# Patient Record
Sex: FEMALE | Race: WHITE | Employment: OTHER | URBAN - METROPOLITAN AREA
[De-identification: names, ages, dates, MRNs, and addresses within clinical notes are randomized per-mention and may not be internally consistent; named-entity substitution may affect disease eponyms.]

---

## 2017-02-04 ENCOUNTER — IP HISTORICAL/CONVERTED ENCOUNTER (OUTPATIENT)
Dept: OTHER | Age: 77
End: 2017-02-04

## 2021-05-04 ENCOUNTER — HOSPITAL ENCOUNTER (INPATIENT)
Age: 81
LOS: 15 days | Discharge: NURSING, REHAB OR LONG-TERM CARE | DRG: 242 | End: 2021-05-19
Attending: EMERGENCY MEDICINE | Admitting: FAMILY MEDICINE
Payer: MEDICARE

## 2021-05-04 ENCOUNTER — APPOINTMENT (OUTPATIENT)
Dept: GENERAL RADIOLOGY | Age: 81
DRG: 242 | End: 2021-05-04
Attending: EMERGENCY MEDICINE
Payer: MEDICARE

## 2021-05-04 ENCOUNTER — APPOINTMENT (OUTPATIENT)
Dept: NON INVASIVE DIAGNOSTICS | Age: 81
DRG: 242 | End: 2021-05-04
Attending: FAMILY MEDICINE
Payer: MEDICARE

## 2021-05-04 DIAGNOSIS — I44.2 COMPLETE HEART BLOCK (HCC): ICD-10-CM

## 2021-05-04 DIAGNOSIS — I73.9 PVD (PERIPHERAL VASCULAR DISEASE) (HCC): ICD-10-CM

## 2021-05-04 DIAGNOSIS — I50.9 ACUTE CONGESTIVE HEART FAILURE, UNSPECIFIED HEART FAILURE TYPE (HCC): Primary | ICD-10-CM

## 2021-05-04 DIAGNOSIS — R00.1 BRADYCARDIA: ICD-10-CM

## 2021-05-04 DIAGNOSIS — R07.9 CHEST PAIN, UNSPECIFIED TYPE: ICD-10-CM

## 2021-05-04 PROBLEM — J44.9 COPD (CHRONIC OBSTRUCTIVE PULMONARY DISEASE) (HCC): Status: ACTIVE | Noted: 2021-05-04

## 2021-05-04 LAB
ALBUMIN SERPL-MCNC: 3 G/DL (ref 3.5–5)
ALBUMIN/GLOB SERPL: 0.9 {RATIO} (ref 1.1–2.2)
ALP SERPL-CCNC: 68 U/L (ref 45–117)
ALT SERPL-CCNC: 17 U/L (ref 12–78)
ANION GAP SERPL CALC-SCNC: 4 MMOL/L (ref 5–15)
APPEARANCE UR: CLEAR
APTT PPP: 21.8 SEC (ref 21.2–34.1)
ARTERIAL PATENCY WRIST A: ABNORMAL
ARTERIAL PATENCY WRIST A: ABNORMAL
AST SERPL W P-5'-P-CCNC: 13 U/L (ref 15–37)
ATRIAL RATE: 98 BPM
BACTERIA URNS QL MICRO: NEGATIVE /HPF
BASE EXCESS BLDA CALC-SCNC: 2.9 MMOL/L (ref 0–2)
BASE EXCESS BLDA CALC-SCNC: 4.5 MMOL/L (ref 0–2)
BASOPHILS # BLD: 0.1 K/UL (ref 0–0.1)
BASOPHILS NFR BLD: 1 % (ref 0–1)
BDY SITE: ABNORMAL
BDY SITE: ABNORMAL
BILIRUB SERPL-MCNC: 0.4 MG/DL (ref 0.2–1)
BILIRUB UR QL: NEGATIVE
BNP SERPL-MCNC: 7360 PG/ML
BNP SERPL-MCNC: 9690 PG/ML
BUN SERPL-MCNC: 25 MG/DL (ref 6–20)
BUN/CREAT SERPL: 28 (ref 12–20)
CA-I BLD-MCNC: 8.2 MG/DL (ref 8.5–10.1)
CALCULATED P AXIS, ECG09: 68 DEGREES
CALCULATED R AXIS, ECG10: -52 DEGREES
CALCULATED T AXIS, ECG11: 94 DEGREES
CHLORIDE SERPL-SCNC: 97 MMOL/L (ref 97–108)
CO2 SERPL-SCNC: 28 MMOL/L (ref 21–32)
COLOR UR: ABNORMAL
COVID-19 RAPID TEST, COVR: NOT DETECTED
CREAT SERPL-MCNC: 0.88 MG/DL (ref 0.55–1.02)
D DIMER PPP FEU-MCNC: 0.5 UG/ML(FEU)
DIAGNOSIS, 93000: NORMAL
DIFFERENTIAL METHOD BLD: ABNORMAL
EOSINOPHIL # BLD: 0.1 K/UL (ref 0–0.4)
EOSINOPHIL NFR BLD: 1 % (ref 0–7)
EPAP/CPAP/PEEP, PAPEEP: 0
ERYTHROCYTE [DISTWIDTH] IN BLOOD BY AUTOMATED COUNT: 13.6 % (ref 11.5–14.5)
FIO2 ON VENT: 40 %
GAS FLOW.O2 O2 DELIVERY SYS: 6 L/MIN
GLOBULIN SER CALC-MCNC: 3.5 G/DL (ref 2–4)
GLUCOSE BLD STRIP.AUTO-MCNC: 260 MG/DL (ref 65–100)
GLUCOSE BLD STRIP.AUTO-MCNC: 269 MG/DL (ref 65–100)
GLUCOSE SERPL-MCNC: 182 MG/DL (ref 65–100)
GLUCOSE UR STRIP.AUTO-MCNC: 50 MG/DL
HCO3 BLDA-SCNC: 27 MMOL/L (ref 22–26)
HCO3 BLDA-SCNC: 29 MMOL/L (ref 22–26)
HCT VFR BLD AUTO: 30.8 % (ref 35–47)
HGB BLD-MCNC: 10 G/DL (ref 11.5–16)
HGB UR QL STRIP: NEGATIVE
IMM GRANULOCYTES # BLD AUTO: 0.1 K/UL (ref 0–0.04)
IMM GRANULOCYTES NFR BLD AUTO: 0 % (ref 0–0.5)
INR PPP: 1.1 (ref 0.9–1.1)
KETONES UR QL STRIP.AUTO: 5 MG/DL
LACTATE SERPL-SCNC: 0.6 MMOL/L (ref 0.4–2)
LEUKOCYTE ESTERASE UR QL STRIP.AUTO: NEGATIVE
LYMPHOCYTES # BLD: 0.8 K/UL (ref 0.8–3.5)
LYMPHOCYTES NFR BLD: 6 % (ref 12–49)
MAGNESIUM SERPL-MCNC: 1.8 MG/DL (ref 1.6–2.4)
MCH RBC QN AUTO: 31.9 PG (ref 26–34)
MCHC RBC AUTO-ENTMCNC: 32.5 G/DL (ref 30–36.5)
MCV RBC AUTO: 98.4 FL (ref 80–99)
MONOCYTES # BLD: 0.7 K/UL (ref 0–1)
MONOCYTES NFR BLD: 5 % (ref 5–13)
NEUTS SEG # BLD: 12.4 K/UL (ref 1.8–8)
NEUTS SEG NFR BLD: 87 % (ref 32–75)
NITRITE UR QL STRIP.AUTO: NEGATIVE
NRBC # BLD: 0 K/UL (ref 0–0.01)
NRBC BLD-RTO: 0 PER 100 WBC
P-R INTERVAL, ECG05: 168 MS
PCO2 BLDA: 45 MMHG (ref 35–45)
PCO2 BLDA: 52 MMHG (ref 35–45)
PERFORMED BY, TECHID: ABNORMAL
PERFORMED BY, TECHID: ABNORMAL
PH BLDA: 7.36 [PH] (ref 7.35–7.45)
PH BLDA: 7.43 [PH] (ref 7.35–7.45)
PH UR STRIP: 5 [PH] (ref 5–8)
PLATELET # BLD AUTO: 313 K/UL (ref 150–400)
PMV BLD AUTO: 11.7 FL (ref 8.9–12.9)
PO2 BLDA: 120 MMHG (ref 75–100)
PO2 BLDA: 72 MMHG (ref 75–100)
POTASSIUM SERPL-SCNC: 4.2 MMOL/L (ref 3.5–5.1)
PROCALCITONIN SERPL-MCNC: <0.05 NG/ML
PROT SERPL-MCNC: 6.5 G/DL (ref 6.4–8.2)
PROT UR STRIP-MCNC: NEGATIVE MG/DL
PROTHROMBIN TIME: 13.6 SEC (ref 11.9–14.7)
Q-T INTERVAL, ECG07: 416 MS
QRS DURATION, ECG06: 152 MS
QTC CALCULATION (BEZET), ECG08: 531 MS
RBC # BLD AUTO: 3.13 M/UL (ref 3.8–5.2)
RBC #/AREA URNS HPF: ABNORMAL /HPF (ref 0–5)
SAO2 % BLD: 93 %
SAO2 % BLD: 97 %
SAO2% DEVICE SAO2% SENSOR NAME: ABNORMAL
SAO2% DEVICE SAO2% SENSOR NAME: ABNORMAL
SARS-COV-2, COV2: NORMAL
SODIUM SERPL-SCNC: 129 MMOL/L (ref 136–145)
SP GR UR REFRACTOMETRY: 1.01 (ref 1–1.03)
SPECIMEN SOURCE: NORMAL
THERAPEUTIC RANGE,PTTT: NORMAL SEC (ref 82–109)
TROPONIN I SERPL-MCNC: 0.15 NG/ML
TROPONIN I SERPL-MCNC: <0.05 NG/ML
TSH SERPL DL<=0.05 MIU/L-ACNC: 1.62 UIU/ML (ref 0.36–3.74)
UA: UC IF INDICATED,UAUC: ABNORMAL
UROBILINOGEN UR QL STRIP.AUTO: 0.1 EU/DL (ref 0.1–1)
VENTRICULAR RATE, ECG03: 98 BPM
WBC # BLD AUTO: 14.2 K/UL (ref 3.6–11)
WBC URNS QL MICRO: ABNORMAL /HPF (ref 0–4)

## 2021-05-04 PROCEDURE — 74011000250 HC RX REV CODE- 250: Performed by: FAMILY MEDICINE

## 2021-05-04 PROCEDURE — 96374 THER/PROPH/DIAG INJ IV PUSH: CPT

## 2021-05-04 PROCEDURE — 74011636637 HC RX REV CODE- 636/637: Performed by: FAMILY MEDICINE

## 2021-05-04 PROCEDURE — 93970 EXTREMITY STUDY: CPT

## 2021-05-04 PROCEDURE — 94640 AIRWAY INHALATION TREATMENT: CPT | Performed by: FAMILY MEDICINE

## 2021-05-04 PROCEDURE — 96375 TX/PRO/DX INJ NEW DRUG ADDON: CPT

## 2021-05-04 PROCEDURE — 74011250637 HC RX REV CODE- 250/637: Performed by: FAMILY MEDICINE

## 2021-05-04 PROCEDURE — 84145 PROCALCITONIN (PCT): CPT

## 2021-05-04 PROCEDURE — 65270000029 HC RM PRIVATE

## 2021-05-04 PROCEDURE — 74011250637 HC RX REV CODE- 250/637: Performed by: EMERGENCY MEDICINE

## 2021-05-04 PROCEDURE — 87040 BLOOD CULTURE FOR BACTERIA: CPT

## 2021-05-04 PROCEDURE — 85025 COMPLETE CBC W/AUTO DIFF WBC: CPT

## 2021-05-04 PROCEDURE — 5A09557 ASSISTANCE WITH RESPIRATORY VENTILATION, GREATER THAN 96 CONSECUTIVE HOURS, CONTINUOUS POSITIVE AIRWAY PRESSURE: ICD-10-PCS | Performed by: FAMILY MEDICINE

## 2021-05-04 PROCEDURE — 96361 HYDRATE IV INFUSION ADD-ON: CPT

## 2021-05-04 PROCEDURE — 82803 BLOOD GASES ANY COMBINATION: CPT

## 2021-05-04 PROCEDURE — 83880 ASSAY OF NATRIURETIC PEPTIDE: CPT

## 2021-05-04 PROCEDURE — 85610 PROTHROMBIN TIME: CPT

## 2021-05-04 PROCEDURE — 85379 FIBRIN DEGRADATION QUANT: CPT

## 2021-05-04 PROCEDURE — 84443 ASSAY THYROID STIM HORMONE: CPT

## 2021-05-04 PROCEDURE — 80053 COMPREHEN METABOLIC PANEL: CPT

## 2021-05-04 PROCEDURE — 99285 EMERGENCY DEPT VISIT HI MDM: CPT

## 2021-05-04 PROCEDURE — 94660 CPAP INITIATION&MGMT: CPT

## 2021-05-04 PROCEDURE — 85730 THROMBOPLASTIN TIME PARTIAL: CPT

## 2021-05-04 PROCEDURE — 94640 AIRWAY INHALATION TREATMENT: CPT

## 2021-05-04 PROCEDURE — 74011000258 HC RX REV CODE- 258: Performed by: FAMILY MEDICINE

## 2021-05-04 PROCEDURE — 82962 GLUCOSE BLOOD TEST: CPT

## 2021-05-04 PROCEDURE — 83735 ASSAY OF MAGNESIUM: CPT

## 2021-05-04 PROCEDURE — 71045 X-RAY EXAM CHEST 1 VIEW: CPT

## 2021-05-04 PROCEDURE — 94761 N-INVAS EAR/PLS OXIMETRY MLT: CPT

## 2021-05-04 PROCEDURE — 36415 COLL VENOUS BLD VENIPUNCTURE: CPT

## 2021-05-04 PROCEDURE — 87635 SARS-COV-2 COVID-19 AMP PRB: CPT

## 2021-05-04 PROCEDURE — 81001 URINALYSIS AUTO W/SCOPE: CPT

## 2021-05-04 PROCEDURE — 74011250636 HC RX REV CODE- 250/636: Performed by: FAMILY MEDICINE

## 2021-05-04 PROCEDURE — 93005 ELECTROCARDIOGRAM TRACING: CPT

## 2021-05-04 PROCEDURE — 83605 ASSAY OF LACTIC ACID: CPT

## 2021-05-04 PROCEDURE — 84484 ASSAY OF TROPONIN QUANT: CPT

## 2021-05-04 PROCEDURE — 83036 HEMOGLOBIN GLYCOSYLATED A1C: CPT

## 2021-05-04 PROCEDURE — 74011000250 HC RX REV CODE- 250: Performed by: EMERGENCY MEDICINE

## 2021-05-04 PROCEDURE — 74011250636 HC RX REV CODE- 250/636: Performed by: EMERGENCY MEDICINE

## 2021-05-04 RX ORDER — BUDESONIDE AND FORMOTEROL FUMARATE DIHYDRATE 160; 4.5 UG/1; UG/1
2 AEROSOL RESPIRATORY (INHALATION) 2 TIMES DAILY
Status: DISCONTINUED | OUTPATIENT
Start: 2021-05-04 | End: 2021-05-10

## 2021-05-04 RX ORDER — FAMOTIDINE 20 MG/1
20 TABLET, FILM COATED ORAL 2 TIMES DAILY
Status: DISCONTINUED | OUTPATIENT
Start: 2021-05-04 | End: 2021-05-11

## 2021-05-04 RX ORDER — ASPIRIN 325 MG
325 TABLET ORAL DAILY
Status: DISCONTINUED | OUTPATIENT
Start: 2021-05-05 | End: 2021-05-07

## 2021-05-04 RX ORDER — ENOXAPARIN SODIUM 100 MG/ML
40 INJECTION SUBCUTANEOUS DAILY
Status: DISCONTINUED | OUTPATIENT
Start: 2021-05-04 | End: 2021-05-19 | Stop reason: HOSPADM

## 2021-05-04 RX ORDER — POLYETHYLENE GLYCOL 3350 17 G/17G
17 POWDER, FOR SOLUTION ORAL DAILY PRN
Status: DISCONTINUED | OUTPATIENT
Start: 2021-05-04 | End: 2021-05-19 | Stop reason: HOSPADM

## 2021-05-04 RX ORDER — LORAZEPAM 2 MG/ML
1 INJECTION INTRAMUSCULAR
Status: COMPLETED | OUTPATIENT
Start: 2021-05-04 | End: 2021-05-04

## 2021-05-04 RX ORDER — MAGNESIUM SULFATE 100 %
4 CRYSTALS MISCELLANEOUS AS NEEDED
Status: DISCONTINUED | OUTPATIENT
Start: 2021-05-04 | End: 2021-05-19 | Stop reason: HOSPADM

## 2021-05-04 RX ORDER — INSULIN LISPRO 100 [IU]/ML
INJECTION, SOLUTION INTRAVENOUS; SUBCUTANEOUS
Status: DISCONTINUED | OUTPATIENT
Start: 2021-05-04 | End: 2021-05-19 | Stop reason: HOSPADM

## 2021-05-04 RX ORDER — FUROSEMIDE 10 MG/ML
40 INJECTION INTRAMUSCULAR; INTRAVENOUS
Status: COMPLETED | OUTPATIENT
Start: 2021-05-04 | End: 2021-05-04

## 2021-05-04 RX ORDER — ASPIRIN 325 MG
325 TABLET ORAL
Status: COMPLETED | OUTPATIENT
Start: 2021-05-04 | End: 2021-05-04

## 2021-05-04 RX ORDER — ATORVASTATIN CALCIUM 10 MG/1
10 TABLET, FILM COATED ORAL
Status: DISCONTINUED | OUTPATIENT
Start: 2021-05-04 | End: 2021-05-19 | Stop reason: HOSPADM

## 2021-05-04 RX ORDER — ESCITALOPRAM OXALATE 10 MG/1
10 TABLET ORAL
Status: DISCONTINUED | OUTPATIENT
Start: 2021-05-04 | End: 2021-05-19 | Stop reason: HOSPADM

## 2021-05-04 RX ORDER — LEVOTHYROXINE SODIUM 75 UG/1
150 TABLET ORAL
Status: DISCONTINUED | OUTPATIENT
Start: 2021-05-05 | End: 2021-05-19 | Stop reason: HOSPADM

## 2021-05-04 RX ORDER — LISINOPRIL 20 MG/1
40 TABLET ORAL DAILY
Status: DISCONTINUED | OUTPATIENT
Start: 2021-05-05 | End: 2021-05-08

## 2021-05-04 RX ORDER — IPRATROPIUM BROMIDE AND ALBUTEROL SULFATE 2.5; .5 MG/3ML; MG/3ML
3 SOLUTION RESPIRATORY (INHALATION)
Status: DISCONTINUED | OUTPATIENT
Start: 2021-05-04 | End: 2021-05-10

## 2021-05-04 RX ORDER — ACETAMINOPHEN 325 MG/1
650 TABLET ORAL
Status: DISCONTINUED | OUTPATIENT
Start: 2021-05-04 | End: 2021-05-19 | Stop reason: HOSPADM

## 2021-05-04 RX ORDER — ACETAMINOPHEN 650 MG/1
650 SUPPOSITORY RECTAL
Status: DISCONTINUED | OUTPATIENT
Start: 2021-05-04 | End: 2021-05-19 | Stop reason: HOSPADM

## 2021-05-04 RX ORDER — INSULIN GLARGINE 100 [IU]/ML
60 INJECTION, SOLUTION SUBCUTANEOUS
Status: DISCONTINUED | OUTPATIENT
Start: 2021-05-04 | End: 2021-05-15

## 2021-05-04 RX ORDER — ONDANSETRON 2 MG/ML
4 INJECTION INTRAMUSCULAR; INTRAVENOUS
Status: DISCONTINUED | OUTPATIENT
Start: 2021-05-04 | End: 2021-05-19 | Stop reason: HOSPADM

## 2021-05-04 RX ORDER — DEXTROSE 50 % IN WATER (D50W) INTRAVENOUS SYRINGE
25-50 AS NEEDED
Status: DISCONTINUED | OUTPATIENT
Start: 2021-05-04 | End: 2021-05-19 | Stop reason: HOSPADM

## 2021-05-04 RX ORDER — ONDANSETRON 4 MG/1
4 TABLET, ORALLY DISINTEGRATING ORAL
Status: DISCONTINUED | OUTPATIENT
Start: 2021-05-04 | End: 2021-05-19 | Stop reason: HOSPADM

## 2021-05-04 RX ORDER — FUROSEMIDE 10 MG/ML
40 INJECTION INTRAMUSCULAR; INTRAVENOUS DAILY
Status: DISCONTINUED | OUTPATIENT
Start: 2021-05-05 | End: 2021-05-05

## 2021-05-04 RX ORDER — METOPROLOL TARTRATE 50 MG/1
100 TABLET ORAL 2 TIMES DAILY
Status: DISCONTINUED | OUTPATIENT
Start: 2021-05-04 | End: 2021-05-05

## 2021-05-04 RX ADMIN — LORAZEPAM 1 MG: 2 INJECTION INTRAMUSCULAR; INTRAVENOUS at 12:18

## 2021-05-04 RX ADMIN — ASPIRIN 325 MG ORAL TABLET 325 MG: 325 PILL ORAL at 14:36

## 2021-05-04 RX ADMIN — FAMOTIDINE 20 MG: 20 TABLET ORAL at 20:06

## 2021-05-04 RX ADMIN — CEFTRIAXONE 1 G: 1 INJECTION, POWDER, FOR SOLUTION INTRAMUSCULAR; INTRAVENOUS at 11:27

## 2021-05-04 RX ADMIN — IPRATROPIUM BROMIDE AND ALBUTEROL SULFATE 3 ML: .5; 2.5 SOLUTION RESPIRATORY (INHALATION) at 21:46

## 2021-05-04 RX ADMIN — SODIUM CHLORIDE 500 ML: 9 INJECTION, SOLUTION INTRAVENOUS at 11:26

## 2021-05-04 RX ADMIN — INSULIN LISPRO 10 UNITS: 100 INJECTION, SOLUTION INTRAVENOUS; SUBCUTANEOUS at 18:40

## 2021-05-04 RX ADMIN — ENOXAPARIN SODIUM 40 MG: 40 INJECTION SUBCUTANEOUS at 18:39

## 2021-05-04 RX ADMIN — METOPROLOL TARTRATE 100 MG: 50 TABLET, FILM COATED ORAL at 20:06

## 2021-05-04 RX ADMIN — ESCITALOPRAM OXALATE 10 MG: 10 TABLET ORAL at 22:13

## 2021-05-04 RX ADMIN — PIPERACILLIN AND TAZOBACTAM 3.38 G: 3; .375 INJECTION, POWDER, LYOPHILIZED, FOR SOLUTION INTRAVENOUS at 18:40

## 2021-05-04 RX ADMIN — BUDESONIDE AND FORMOTEROL FUMARATE DIHYDRATE 2 PUFF: 160; 4.5 AEROSOL RESPIRATORY (INHALATION) at 21:43

## 2021-05-04 RX ADMIN — INSULIN GLARGINE 60 UNITS: 100 INJECTION, SOLUTION SUBCUTANEOUS at 22:13

## 2021-05-04 RX ADMIN — ATORVASTATIN CALCIUM 10 MG: 10 TABLET, FILM COATED ORAL at 22:13

## 2021-05-04 RX ADMIN — METHYLPREDNISOLONE SODIUM SUCCINATE 40 MG: 40 INJECTION, POWDER, FOR SOLUTION INTRAMUSCULAR; INTRAVENOUS at 18:38

## 2021-05-04 RX ADMIN — NITROGLYCERIN 1 INCH: 20 OINTMENT TOPICAL at 14:36

## 2021-05-04 RX ADMIN — FUROSEMIDE 40 MG: 10 INJECTION, SOLUTION INTRAMUSCULAR; INTRAVENOUS at 12:08

## 2021-05-04 NOTE — Clinical Note
Lesion: Located in the Mid LAD. Stent deployed. Multiple inflations used. First inflation pressure = 12 ede; inflation time: 11 sec. Second inflation pressure: 12 ede; inflation time: 6 sec.

## 2021-05-04 NOTE — H&P
History and Physical    Subjective:     Kelvin Barksdale is a 81 yo female with a PMHx significant for DM, HLD, HTN, thyroid disease, and depression, who presents to the ED with shortness of breath for the past 1-2 days. She stated that she was told she had the \"beginnings of COPD\" and was recently given an albuterol inhaler. She was at her doctor's office and was sent to the ER and was given 5L O2 and 10mg decadron via EMS. She states that at home she is unable to walk without shortness of breath. She can ambulate around her house if she is holding onto railings or objects. She is occasionally short of breath at rest as well. She denies orthopnea but states that she has had episodes of PND in the last few days. She states that she has never smoked but her sons do. However, they refrain from smoking when they are near her. She also denies any alcohol or drug use. Her previous occupation was as a  and denies any chronic exposures. She currently lives alone. She denies any chest pain, nausea, vomiting, fever, chills, abdomen pain, recent illnesses, allergies, or sick contacts. Her O2 sat was 93% with NC. She currently has a BiPAP machine on. Significant labs:   WBC 14.2  Hgb 10.0  Na 129  BUN 25  Gluc 182  Procalcitonin (+)   BNP 7360  COVID (-)  D-dimer 0.50    ABG:   PH 7.36  PCO2 52  PO2 72  Bicarb 27    CXR:   Hazy and somewhat patchy reticular markings central and basilar lungs. Interstitial edema, possible cardiogenic cause and/or infectious/inflammatory  origin. Cardiac silhouette within normal limits. Atherosclerotic change thoracic  aorta. Small dependent pleural effusions, left greater than right. No  Pneumothorax.     EKG:   ** Poor data quality, interpretation may be adversely affected**  Normal sinus rhythm   Right bundle branch block   Left anterior fascicular block   ** Bifascicular block **  Left ventricular hypertrophy with repolarization abnormality   Cannot rule out Septal infarct , age undetermined       Past Medical History:   Diagnosis Date    Depression     DM [facility]diabetes mellitus) ([facility] [address]     Hyperlipidemia     Hypertension     Pancreatitis     SOB (shortness of breath)     Thyroid disease       Past Surgical History:   Procedure Laterality Date    HX HYSTERECTOMY       History reviewed. No pertinent family history. Social History     Tobacco Use    Smoking status: Never Smoker    Smokeless tobacco: Never Used   Substance Use Topics    Alcohol use: No       Prior to Admission medications    Medication Sig Start Date End Date Taking? Authorizing Provider   Insulin Needles, Disposable, 31 X 5/16 \" Ndle by Does Not Apply route. 10/11/10   Ryan Gaucher, MD   insulin lispro, human, (HUMALOG KWIKPEN) 100 unit/mL flexpen 15 Units by SubCUTAneous route three (3) times daily (with meals). 10/13/10   Ryan Gaucher, MD   benazepril (LOTENSIN) 40 mg tablet Take 40 mg by mouth daily. 10/11/10   Provider, Historical   LEXAPRO 10 mg tablet Take 10 mg by mouth nightly. 10/11/10   Provider, Historical   famotidine (PEPCID) 20 mg tablet Take 20 mg by mouth two (2) times a day. 10/11/10   Provider, Historical   gabapentin (NEURONTIN) 400 mg capsule  9/3/10   Provider, Historical   hydrocodone-acetaminophen (NORCO) 5-325 mg per tablet Take 1 Tab by mouth every six (6) hours as needed. 10/11/10   Provider, Historical   levothyroxine (SYNTHROID) 150 mcg tablet Take 150 mcg by mouth daily (before breakfast). 10/11/10   Provider, Historical   lorazepam (ATIVAN) 1 mg tablet  8/2/10   Provider, Historical   lovastatin (MEVACOR) 40 mg tablet Take 40 mg by mouth nightly. 10/11/10   Provider, Historical   metoprolol (LOPRESSOR) 100 mg tablet Take 100 mg by mouth two (2) times a day.  10/11/10   Provider, Historical   oxycodone-acetaminophen (PERCOCET 10)  mg per tablet  8/2/10   Provider, Historical   insulin glargine (LANTUS) 100 unit/mL injection 60 Units by SubCUTAneous route nightly. 10/11/10   Claudio Storm MD     Allergies   Allergen Reactions    Nortriptyline Itching    Cymbalta [Duloxetine] Itching    Ivp [Fd And C Blue No.1] Hives    Morphine Itching    Tetracycline Itching        Review of Systems:  A comprehensive review of systems was negative except for that written in the History of Present Illness. Objective: Intake and Output:    05/04 0701 - 05/04 1900  In: 250 [I.V.:250]  Out: -   No intake/output data recorded. Physical Exam:   Visit Vitals  BP (!) 147/82   Pulse 98   Temp 97.8 °F (36.6 °C)   Resp 20   Ht 5' 1\" (1.549 m)   Wt 77.1 kg (170 lb)   SpO2 100%   BMI 32.12 kg/m²     General:  Alert, cooperative, no distress. Head:  Normocephalic, without obvious abnormality, atraumatic. Eyes:  Conjunctivae/corneas clear. Pupils equal, round, reactive to light. Extraocular movements intact. Lungs:   Breath sounds with BiPAP   Chest wall:  No tenderness or deformity. Heart:  Regular rate and rhythm, S1, S2 normal, no murmur, click, rub, or gallop. Possible murmur, difficult to auscultate with BiPAP   Abdomen:   Soft, non-tender. Bowel sounds normal. No masses. No organomegaly. Extremities: Extremities normal, atraumatic, no cyanosis. 1+ pitting edema of lower extremities    Pulses: 2+ and symmetric all extremities. Skin: Skin color, texture, turgor normal. No rashes or lesions. Lymph nodes: Cervical, supraclavicular, and axillary nodes normal.   Neurologic: CNII-XII intact. Normal strength, sensation, and reflexes throughout.        ECG:  ** Poor data quality, interpretation may be adversely affected**   Normal sinus rhythm   Right bundle branch block   Left anterior fascicular block   ** Bifascicular block **   Left ventricular hypertrophy with repolarization abnormality   Cannot rule out Septal infarct , age undetermined     Data Review:   Recent Results (from the past 24 hour(s))   EKG, 12 LEAD, INITIAL    Collection Time: 05/04/21 10:24 AM Result Value Ref Range    Ventricular Rate 98 BPM    Atrial Rate 98 BPM    P-R Interval 168 ms    QRS Duration 152 ms    Q-T Interval 416 ms    QTC Calculation (Bezet) 531 ms    Calculated P Axis 68 degrees    Calculated R Axis -52 degrees    Calculated T Axis 94 degrees    Diagnosis       ** Poor data quality, interpretation may be adversely affected  Normal sinus rhythm  Right bundle branch block  Left anterior fascicular block  ** Bifascicular block **  Left ventricular hypertrophy with repolarization abnormality  Cannot rule out Septal infarct , age undetermined  Abnormal ECG  No previous ECGs available  Confirmed by Manuela Hinton MD ([id number]) on 5/4/2021 12:13:56 PM     SARS-COV-2    Collection Time: 05/04/21 10:30 AM   Result Value Ref Range    SARS-CoV-2 Please find results under separate order     COVID-19 RAPID TEST    Collection Time: 05/04/21 10:30 AM   Result Value Ref Range    Specimen source Nasopharyngeal      COVID-19 rapid test Not Detected Not Detected     LACTIC ACID    Collection Time: 05/04/21 11:12 AM   Result Value Ref Range    Lactic acid 0.6 0.4 - 2.0 mmol/L   MAGNESIUM    Collection Time: 05/04/21 11:12 AM   Result Value Ref Range    Magnesium 1.8 1.6 - 2.4 mg/dL   METABOLIC PANEL, COMPREHENSIVE    Collection Time: 05/04/21 11:12 AM   Result Value Ref Range    Sodium 129 (L) 136 - 145 mmol/L    Potassium 4.2 3.5 - 5.1 mmol/L    Chloride 97 97 - 108 mmol/L    CO2 28 21 - 32 mmol/L    Anion gap 4 (L) 5 - 15 mmol/L    Glucose 182 (H) 65 - 100 mg/dL    BUN 25 (H) 6 - 20 mg/dL    Creatinine 0.88 0.55 - 1.02 mg/dL    BUN/Creatinine ratio 28 (H) 12 - 20      GFR est AA >60 >60 ml/min/1.73m2    GFR est non-AA >60 >60 ml/min/1.73m2    Calcium 8.2 (L) 8.5 - 10.1 mg/dL    Bilirubin, total 0.4 0.2 - 1.0 mg/dL    AST (SGOT) 13 (L) 15 - 37 U/L    ALT (SGPT) 17 12 - 78 U/L    Alk.  phosphatase 68 45 - 117 U/L    Protein, total 6.5 6.4 - 8.2 g/dL    Albumin 3.0 (L) 3.5 - 5.0 g/dL    Globulin 3.5 2.0 - 4.0 g/dL    A-G Ratio 0.9 (L) 1.1 - 2.2     BNP    Collection Time: 05/04/21 11:12 AM   Result Value Ref Range    NT pro-BNP 7,360 (H) <450 pg/mL   PROCALCITONIN    Collection Time: 05/04/21 11:12 AM   Result Value Ref Range    Procalcitonin <0.05 (H) 0 ng/mL   TROPONIN I    Collection Time: 05/04/21 11:12 AM   Result Value Ref Range    Troponin-I, Qt. <0.05 <0.05 ng/mL   TSH 3RD GENERATION    Collection Time: 05/04/21 11:12 AM   Result Value Ref Range    TSH 1.62 0.36 - 3.74 uIU/mL   CBC WITH AUTOMATED DIFF    Collection Time: 05/04/21 11:12 AM   Result Value Ref Range    WBC 14.2 (H) 3.6 - 11.0 K/uL    RBC 3.13 (L) 3.80 - 5.20 M/uL    HGB 10.0 (L) 11.5 - 16.0 g/dL    HCT 30.8 (L) 35.0 - 47.0 %    MCV 98.4 80.0 - 99.0 FL    MCH 31.9 26.0 - 34.0 PG    MCHC 32.5 30.0 - 36.5 g/dL    RDW 13.6 11.5 - 14.5 %    PLATELET 360 866 - 764 K/uL    MPV 11.7 8.9 - 12.9 FL    NRBC 0.0 0.0  WBC    ABSOLUTE NRBC 0.00 0.00 - 0.01 K/uL    NEUTROPHILS 87 (H) 32 - 75 %    LYMPHOCYTES 6 (L) 12 - 49 %    MONOCYTES 5 5 - 13 %    EOSINOPHILS 1 0 - 7 %    BASOPHILS 1 0 - 1 %    IMMATURE GRANULOCYTES 0 0 - 0.5 %    ABS. NEUTROPHILS 12.4 (H) 1.8 - 8.0 K/UL    ABS. LYMPHOCYTES 0.8 0.8 - 3.5 K/UL    ABS. MONOCYTES 0.7 0.0 - 1.0 K/UL    ABS. EOSINOPHILS 0.1 0.0 - 0.4 K/UL    ABS. BASOPHILS 0.1 0.0 - 0.1 K/UL    ABS. IMM.  GRANS. 0.1 (H) 0.00 - 0.04 K/UL    DF AUTOMATED     PROTHROMBIN TIME + INR    Collection Time: 05/04/21 11:25 AM   Result Value Ref Range    Prothrombin time 13.6 11.9 - 14.7 sec    INR 1.1 0.9 - 1.1     PTT    Collection Time: 05/04/21 11:25 AM   Result Value Ref Range    aPTT 21.8 21.2 - 34.1 sec    aPTT, therapeutic range   82 - 109 sec   D DIMER    Collection Time: 05/04/21 11:25 AM   Result Value Ref Range    D DIMER 0.50 (H) <0.50 ug/ml(FEU)   BLOOD GAS, ARTERIAL    Collection Time: 05/04/21 11:25 AM   Result Value Ref Range    pH 7.36 7.35 - 7.45      PCO2 52 (H) 35 - 45 mmHg    PO2 72 (L) 75 - 100 mmHg    O2 SAT 93 (L) >95 %    BICARBONATE 27 (H) 22 - 26 mmol/L    BASE EXCESS 2.9 (H) 0 - 2 mmol/L    O2 METHOD Nasal Cannula      O2 FLOW RATE 6 L/min    SITE Right Radial      EBEN'S TEST PASS         Chest x-ray:  Hazy and somewhat patchy reticular markings central and basilar lungs. Interstitial edema, possible cardiogenic cause and/or infectious/inflammatory  origin. Cardiac silhouette within normal limits. Atherosclerotic change thoracic  aorta. Small dependent pleural effusions, left greater than right. No  Pneumothorax.     Assessment:     Shortness of breath; likely due to COPD exacerbation   On BiPAP FiO2 40%  ASA, nitrobid     Hyponatremia  Na 129    Congestive heart failure  CXR showed hazy/patchy reticular markings    Pleural effusions   Worse on left than right     Anemia  Hgb 10.0     Leukocytosis   WBCs 14.2     Diabetes mellitus    Hypertension     Hypothyroidism  TSH within normal limits     Depression         Plan:       Current Facility-Administered Medications:     escitalopram oxalate (LEXAPRO) tablet 10 mg, 10 mg, Oral, QHS, Beny Cheung MD Heddie Konig  [START ON 5/5/2021] levothyroxine (SYNTHROID) tablet 150 mcg, 150 mcg, Oral, ACB, Cyndi Cheung MD    metoprolol tartrate (LOPRESSOR) tablet 100 mg, 100 mg, Oral, BID, Cyndi Cheung MD    insulin glargine (LANTUS) injection 60 Units, 60 Units, SubCUTAneous, QHS, Beny Cheung MD Heddie Konig  [START ON 5/5/2021] benazepriL (LOTENSIN) tablet 40 mg, 40 mg, Oral, DAILY, Cyndi Cheung MD    famotidine (PEPCID) tablet 20 mg, 20 mg, Oral, BID, Cyndi Cheung MD    lovastatin (MEVACOR) tablet 40 mg, 40 mg, Oral, QHS, Beny Cheung MD    insulin lispro (HUMALOG) injection, , SubCUTAneous, AC&HS, Cyndi Cheung MD    glucose chewable tablet 16 g, 4 Tab, Oral, PRN, Beny Cheung MD    dextrose (D50W) injection syrg 12.5-25 g, 25-50 mL, IntraVENous, PRN, Beny Cheung MD    glucagon (GLUCAGEN) injection 1 mg, 1 mg, IntraMUSCular, PRN, Jass Caro MD    acetaminophen (TYLENOL) tablet 650 mg, 650 mg, Oral, Q6H PRN **OR** acetaminophen (TYLENOL) suppository 650 mg, 650 mg, Rectal, Q6H PRN, Yair Cheung MD    polyethylene glycol (MIRALAX) packet 17 g, 17 g, Oral, DAILY PRN, Yair Cheung MD    ondansetron (ZOFRAN ODT) tablet 4 mg, 4 mg, Oral, Q8H PRN **OR** ondansetron (ZOFRAN) injection 4 mg, 4 mg, IntraVENous, Q6H PRN, Beny Cheung MD    [START ON 5/5/2021] enoxaparin (LOVENOX) injection 40 mg, 40 mg, SubCUTAneous, DAILY, Yair Cheung MD [place] Modena  [START ON 5/5/2021] furosemide (LASIX) injection 40 mg, 40 mg, IntraVENous, DAILY, Yair Cheung MD    methylPREDNISolone (PF) (SOLU-MEDROL) injection 40 mg, 40 mg, IntraVENous, Q6H, Beny Cheung MD    albuterol-ipratropium (DUO-NEB) 2.5 MG-0.5 MG/3 ML, 3 mL, Nebulization, Q6H RT, Yair Cheung MD    piperacillin-tazobactam (ZOSYN) 3.375 g in 0.9% sodium chloride (MBP/ADV) 100 mL MBP, 3.375 g, IntraVENous, Q8H, Beny Cheung MD    budesonide-formoteroL (SYMBICORT) 160-4.5 mcg/actuation HFA inhaler 2 Puff, 2 Puff, Inhalation, BID, Yair Cheung MD [place] Modena  [START ON 5/5/2021] aspirin tablet 325 mg, 325 mg, Oral, DAILY, Beny Cheung MD    Current Outpatient Medications:     Insulin Needles, Disposable, 31 X 5/16 \" Ndle, by Does Not Apply route., Disp: 200 Package, Rfl: 6    insulin lispro, human, (HUMALOG KWIKPEN) 100 unit/mL flexpen, 15 Units by SubCUTAneous route three (3) times daily (with meals). , Disp: 10 Each, Rfl: 6    benazepril (LOTENSIN) 40 mg tablet, Take 40 mg by mouth daily. , Disp: , Rfl:     LEXAPRO 10 mg tablet, Take 10 mg by mouth nightly., Disp: , Rfl:     famotidine (PEPCID) 20 mg tablet, Take 20 mg by mouth two (2) times a day., Disp: , Rfl:     gabapentin (NEURONTIN) 400 mg capsule, , Disp: , Rfl:     hydrocodone-acetaminophen (NORCO) 5-325 mg per tablet, Take 1 Tab by mouth every six (6) hours as needed. , Disp: , Rfl:    levothyroxine (SYNTHROID) 150 mcg tablet, Take 150 mcg by mouth daily (before breakfast). , Disp: , Rfl:     lorazepam (ATIVAN) 1 mg tablet, , Disp: , Rfl:     lovastatin (MEVACOR) 40 mg tablet, Take 40 mg by mouth nightly., Disp: , Rfl:     metoprolol (LOPRESSOR) 100 mg tablet, Take 100 mg by mouth two (2) times a day., Disp: , Rfl:     oxycodone-acetaminophen (PERCOCET 10)  mg per tablet, , Disp: , Rfl:     insulin glargine (LANTUS) 100 unit/mL injection, 60 Units by SubCUTAneous route nightly., Disp: 2 Vial, Rfl: 6     Medical telemetry floor continue BiPAP  Pulmonary consult and cardiology consult  Lasix 40 mg IV daily  Start on aspirin statin  Start on nebulizer treatment IV Solu-Medrol and Symbicort  Doppler studies of the leg to rule out DVT  Lovenox 40 mg subcu for DVT prophylaxis  2D complete echo    And IV Zosyn for possible pneumonia    And repeat the labs in the morning

## 2021-05-04 NOTE — ED TRIAGE NOTES
Pt c/o difficulty breathing for the past couple of days - was at her doctor's office and sent to er for further eval - 5l o2 and 10mg decadron given per ems.

## 2021-05-04 NOTE — Clinical Note
TRANSFER - OUT REPORT:     Verbal report given to: Lisandra. Report consisted of patient's Situation, Background, Assessment and   Recommendations(SBAR). Opportunity for questions and clarification was provided. Patient transported with a Registered Nurse. Patient transported to: PACU.

## 2021-05-04 NOTE — H&P
History and Physical    Subjective:     Aric Reyes is a 81 yo female with a PMHx significant for DM, HLD, HTN, thyroid disease, and depression, who presents to the ED with shortness of breath for the past 1-2 days. She stated that she was told she had the \"beginnings of COPD\" and was recently given an albuterol inhaler. She was at her doctor's office and was sent to the ER and was given 5L O2 and 10mg decadron via EMS. She states that at home she is unable to walk without shortness of breath. She can ambulate around her house if she is holding onto railings or objects. She is occasionally short of breath at rest as well. She denies orthopnea but states that she has had episodes of PND in the last few days. She states that she has never smoked but her sons do. However, they refrain from smoking when they are near her. She also denies any alcohol or drug use. Her previous occupation was as a  and denies any chronic exposures. She currently lives alone. She denies any chest pain, nausea, vomiting, fever, chills, abdomen pain, recent illnesses, allergies, or sick contacts. Her O2 sat was 93% with NC. She currently has a BiPAP machine on. Significant labs:   WBC 14.2  Hgb 10.0  Na 129  BUN 25  Gluc 182  Procalcitonin (+)   BNP 7360  COVID (-)  D-dimer 0.50    ABG:   PH 7.36  PCO2 52  PO2 72  Bicarb 27    CXR:   Hazy and somewhat patchy reticular markings central and basilar lungs. Interstitial edema, possible cardiogenic cause and/or infectious/inflammatory  origin. Cardiac silhouette within normal limits. Atherosclerotic change thoracic  aorta. Small dependent pleural effusions, left greater than right. No  Pneumothorax.     EKG:   ** Poor data quality, interpretation may be adversely affected**  Normal sinus rhythm   Right bundle branch block   Left anterior fascicular block   ** Bifascicular block **  Left ventricular hypertrophy with repolarization abnormality   Cannot rule out Septal infarct , age undetermined       Past Medical History:   Diagnosis Date    Depression     DM [facility]diabetes mellitus) ([facility] [address]     Hyperlipidemia     Hypertension     Pancreatitis     SOB (shortness of breath)     Thyroid disease       Past Surgical History:   Procedure Laterality Date    HX HYSTERECTOMY       History reviewed. No pertinent family history. Social History     Tobacco Use    Smoking status: Never Smoker    Smokeless tobacco: Never Used   Substance Use Topics    Alcohol use: No       Prior to Admission medications    Medication Sig Start Date End Date Taking? Authorizing Provider   Insulin Needles, Disposable, 31 X 5/16 \" Ndle by Does Not Apply route. 10/11/10   Cory Huntley MD   insulin lispro, human, (HUMALOG KWIKPEN) 100 unit/mL flexpen 15 Units by SubCUTAneous route three (3) times daily (with meals). 10/13/10   Cory Huntley MD   benazepril (LOTENSIN) 40 mg tablet Take 40 mg by mouth daily. 10/11/10   Provider, Historical   LEXAPRO 10 mg tablet Take 10 mg by mouth nightly. 10/11/10   Provider, Historical   famotidine (PEPCID) 20 mg tablet Take 20 mg by mouth two (2) times a day. 10/11/10   Provider, Historical   gabapentin (NEURONTIN) 400 mg capsule  9/3/10   Provider, Historical   hydrocodone-acetaminophen (NORCO) 5-325 mg per tablet Take 1 Tab by mouth every six (6) hours as needed. 10/11/10   Provider, Historical   levothyroxine (SYNTHROID) 150 mcg tablet Take 150 mcg by mouth daily (before breakfast). 10/11/10   Provider, Historical   lorazepam (ATIVAN) 1 mg tablet  8/2/10   Provider, Historical   lovastatin (MEVACOR) 40 mg tablet Take 40 mg by mouth nightly. 10/11/10   Provider, Historical   metoprolol (LOPRESSOR) 100 mg tablet Take 100 mg by mouth two (2) times a day.  10/11/10   Provider, Historical   oxycodone-acetaminophen (PERCOCET 10)  mg per tablet  8/2/10   Provider, Historical   insulin glargine (LANTUS) 100 unit/mL injection 60 Units by SubCUTAneous route nightly. 10/11/10   Kaylah Thomas MD     Allergies   Allergen Reactions    Nortriptyline Itching    Cymbalta [Duloxetine] Itching    Ivp [Fd And C Blue No.1] Hives    Morphine Itching    Tetracycline Itching        Review of Systems:  A comprehensive review of systems was negative except for that written in the History of Present Illness. Objective: Intake and Output:    05/04 0701 - 05/04 1900  In: 250 [I.V.:250]  Out: -   No intake/output data recorded. Physical Exam:   Visit Vitals  BP (!) 147/82   Pulse 98   Temp 97.8 °F (36.6 °C)   Resp 20   Ht 5' 1\" (1.549 m)   Wt 170 lb (77.1 kg)   SpO2 100%   BMI 32.12 kg/m²     General:  Alert, cooperative, no distress. Head:  Normocephalic, without obvious abnormality, atraumatic. Eyes:  Conjunctivae/corneas clear. Pupils equal, round, reactive to light. Extraocular movements intact. Lungs:   Breath sounds with BiPAP   Chest wall:  No tenderness or deformity. Heart:  Regular rate and rhythm, S1, S2 normal, no murmur, click, rub, or gallop. Possible murmur, difficult to auscultate with BiPAP   Abdomen:   Soft, non-tender. Bowel sounds normal. No masses. No organomegaly. Extremities: Extremities normal, atraumatic, no cyanosis. 1+ pitting edema of lower extremities    Pulses: 2+ and symmetric all extremities. Skin: Skin color, texture, turgor normal. No rashes or lesions. Lymph nodes: Cervical, supraclavicular, and axillary nodes normal.   Neurologic: CNII-XII intact. Normal strength, sensation, and reflexes throughout.        ECG:  ** Poor data quality, interpretation may be adversely affected**   Normal sinus rhythm   Right bundle branch block   Left anterior fascicular block   ** Bifascicular block **   Left ventricular hypertrophy with repolarization abnormality   Cannot rule out Septal infarct , age undetermined     Data Review:   Recent Results (from the past 24 hour(s))   EKG, 12 LEAD, INITIAL    Collection Time: 05/04/21 10:24 AM Result Value Ref Range    Ventricular Rate 98 BPM    Atrial Rate 98 BPM    P-R Interval 168 ms    QRS Duration 152 ms    Q-T Interval 416 ms    QTC Calculation (Bezet) 531 ms    Calculated P Axis 68 degrees    Calculated R Axis -52 degrees    Calculated T Axis 94 degrees    Diagnosis       ** Poor data quality, interpretation may be adversely affected  Normal sinus rhythm  Right bundle branch block  Left anterior fascicular block  ** Bifascicular block **  Left ventricular hypertrophy with repolarization abnormality  Cannot rule out Septal infarct , age undetermined  Abnormal ECG  No previous ECGs available  Confirmed by Germán Taylor MD, Ankit Whitaker ([id number]) on 5/4/2021 12:13:56 PM     SARS-COV-2    Collection Time: 05/04/21 10:30 AM   Result Value Ref Range    SARS-CoV-2 Please find results under separate order     COVID-19 RAPID TEST    Collection Time: 05/04/21 10:30 AM   Result Value Ref Range    Specimen source Nasopharyngeal      COVID-19 rapid test Not Detected Not Detected     LACTIC ACID    Collection Time: 05/04/21 11:12 AM   Result Value Ref Range    Lactic acid 0.6 0.4 - 2.0 mmol/L   MAGNESIUM    Collection Time: 05/04/21 11:12 AM   Result Value Ref Range    Magnesium 1.8 1.6 - 2.4 mg/dL   METABOLIC PANEL, COMPREHENSIVE    Collection Time: 05/04/21 11:12 AM   Result Value Ref Range    Sodium 129 (L) 136 - 145 mmol/L    Potassium 4.2 3.5 - 5.1 mmol/L    Chloride 97 97 - 108 mmol/L    CO2 28 21 - 32 mmol/L    Anion gap 4 (L) 5 - 15 mmol/L    Glucose 182 (H) 65 - 100 mg/dL    BUN 25 (H) 6 - 20 mg/dL    Creatinine 0.88 0.55 - 1.02 mg/dL    BUN/Creatinine ratio 28 (H) 12 - 20      GFR est AA >60 >60 ml/min/1.73m2    GFR est non-AA >60 >60 ml/min/1.73m2    Calcium 8.2 (L) 8.5 - 10.1 mg/dL    Bilirubin, total 0.4 0.2 - 1.0 mg/dL    AST (SGOT) 13 (L) 15 - 37 U/L    ALT (SGPT) 17 12 - 78 U/L    Alk.  phosphatase 68 45 - 117 U/L    Protein, total 6.5 6.4 - 8.2 g/dL    Albumin 3.0 (L) 3.5 - 5.0 g/dL    Globulin 3.5 2.0 - 4.0 g/dL    A-G Ratio 0.9 (L) 1.1 - 2.2     BNP    Collection Time: 05/04/21 11:12 AM   Result Value Ref Range    NT pro-BNP 7,360 (H) <450 pg/mL   PROCALCITONIN    Collection Time: 05/04/21 11:12 AM   Result Value Ref Range    Procalcitonin <0.05 (H) 0 ng/mL   TROPONIN I    Collection Time: 05/04/21 11:12 AM   Result Value Ref Range    Troponin-I, Qt. <0.05 <0.05 ng/mL   TSH 3RD GENERATION    Collection Time: 05/04/21 11:12 AM   Result Value Ref Range    TSH 1.62 0.36 - 3.74 uIU/mL   CBC WITH AUTOMATED DIFF    Collection Time: 05/04/21 11:12 AM   Result Value Ref Range    WBC 14.2 (H) 3.6 - 11.0 K/uL    RBC 3.13 (L) 3.80 - 5.20 M/uL    HGB 10.0 (L) 11.5 - 16.0 g/dL    HCT 30.8 (L) 35.0 - 47.0 %    MCV 98.4 80.0 - 99.0 FL    MCH 31.9 26.0 - 34.0 PG    MCHC 32.5 30.0 - 36.5 g/dL    RDW 13.6 11.5 - 14.5 %    PLATELET 711 846 - 552 K/uL    MPV 11.7 8.9 - 12.9 FL    NRBC 0.0 0.0  WBC    ABSOLUTE NRBC 0.00 0.00 - 0.01 K/uL    NEUTROPHILS 87 (H) 32 - 75 %    LYMPHOCYTES 6 (L) 12 - 49 %    MONOCYTES 5 5 - 13 %    EOSINOPHILS 1 0 - 7 %    BASOPHILS 1 0 - 1 %    IMMATURE GRANULOCYTES 0 0 - 0.5 %    ABS. NEUTROPHILS 12.4 (H) 1.8 - 8.0 K/UL    ABS. LYMPHOCYTES 0.8 0.8 - 3.5 K/UL    ABS. MONOCYTES 0.7 0.0 - 1.0 K/UL    ABS. EOSINOPHILS 0.1 0.0 - 0.4 K/UL    ABS. BASOPHILS 0.1 0.0 - 0.1 K/UL    ABS. IMM.  GRANS. 0.1 (H) 0.00 - 0.04 K/UL    DF AUTOMATED     PROTHROMBIN TIME + INR    Collection Time: 05/04/21 11:25 AM   Result Value Ref Range    Prothrombin time 13.6 11.9 - 14.7 sec    INR 1.1 0.9 - 1.1     PTT    Collection Time: 05/04/21 11:25 AM   Result Value Ref Range    aPTT 21.8 21.2 - 34.1 sec    aPTT, therapeutic range   82 - 109 sec   D DIMER    Collection Time: 05/04/21 11:25 AM   Result Value Ref Range    D DIMER 0.50 (H) <0.50 ug/ml(FEU)   BLOOD GAS, ARTERIAL    Collection Time: 05/04/21 11:25 AM   Result Value Ref Range    pH 7.36 7.35 - 7.45      PCO2 52 (H) 35 - 45 mmHg    PO2 72 (L) 75 - 100 mmHg    O2 SAT 93 (L) >95 %    BICARBONATE 27 (H) 22 - 26 mmol/L    BASE EXCESS 2.9 (H) 0 - 2 mmol/L    O2 METHOD Nasal Cannula      O2 FLOW RATE 6 L/min    SITE Right Radial      EBEN'S TEST PASS         Chest x-ray:  Hazy and somewhat patchy reticular markings central and basilar lungs. Interstitial edema, possible cardiogenic cause and/or infectious/inflammatory  origin. Cardiac silhouette within normal limits. Atherosclerotic change thoracic  aorta. Small dependent pleural effusions, left greater than right. No  Pneumothorax.     Assessment:     Shortness of breath; likely due to COPD exacerbation   On BiPAP FiO2 40%  ASA, nitrobid     Hyponatremia  Na 129    Interstitial edema  CXR showed hazy/patchy reticular markings    Pleural effusions   Worse on left than right     Anemia  Hgb 10.0     Leukocytosis   WBCs 14.2     Diabetes mellitus    Hypertension     Hypothyroidism  TSH within normal limits     Depression         Plan:     Medications to consider:   Humalog  Lantus   Lasix 40mg IV   D5W  Zosyn   Solu-medrol   Levothyroxine 150mcg qd    Order  CTA to rule out PE   Sputum cx and gram stain for possible pneumonia  Echo   Repeat BNP  Repeat troponin     Consult  Pulmonology   Cardiology  Nephrology   ID    Pending labs  Blood cx

## 2021-05-04 NOTE — Clinical Note
Status[de-identified] INPATIENT [101]  Type of Bed: Telemetry [19]  Inpatient Hospitalization Certified Necessary for the Following Reasons: 3.  Patient receiving treatment that can only be provided in an inpatient setting (further clarification in H&P documentati on)  Admitting Diagnosis: CHF (congestive heart failure) [facility]) [[id number]]  Admitting Physician: Milton Dimas [[id number]]  Attending Physician: Milton Dimas [[id number]]  Estimated Length of Stay: 3-4 Midnights  Discharge Plan[de-identified] Home with Office Foll ow-up

## 2021-05-04 NOTE — Clinical Note
TRANSFER - OUT REPORT:     Verbal report given to: Santi. Report consisted of patient's Situation, Background, Assessment and   Recommendations(SBAR). Opportunity for questions and clarification was provided. Patient transported with a Registered Nurse. Patient transported to: 56.

## 2021-05-04 NOTE — CONSULTS
PULMONARY CONSULT  [facility]    Name: Shannon Dan MRN: [id number]   : 1940 Hospital: [facility]   Date: 2021  Admission date: 2021 Hospital Day: 1       HPI:     Hospital Problems  Never Reviewed          Codes Class Noted POA    CHF (congestive heart failure) ([facility]) ICD-10-CM: I50.9  ICD-9-CM: 428.0  2021 Unknown        COPD (chronic obstructive pulmonary disease) ([facility] ICD-10-CM: J44.9  ICD-9-CM: 109  2021 Unknown                   [x] High complexity decision making was performed  [x] See my orders for details      Subjective/Initial History:     I was asked by Rajni Barker MD to see Shannon Dan  a 80 y.o.  female in consultation     Excerpts from admission 2021 or consult notes as follows:   82-year-old lady came in because of shortness of breath and dyspnea she was told that she has COPD recently and she was giving albuterol inhaler did not seem to help so came to the emergency room she was hypoxic she was put on oxygen 5 L nasal cannula she was having dyspnea minimal exertion and also at rest she is a lifetime non-smoker, but she has been exposed to secondhand smoke her  used to smoke and all her children smoke she used to work on the farm as a farmer no chest pain no fever no chills.        Allergies   Allergen Reactions    Nortriptyline Itching    Cymbalta [Duloxetine] Itching    Ivp [Fd And C Blue No.1] Hives    Morphine Itching    Tetracycline Itching        MAR reviewed and pertinent medications noted or modified as needed     Current Facility-Administered Medications   Medication    escitalopram oxalate (LEXAPRO) tablet 10 mg    [START ON 2021] levothyroxine (SYNTHROID) tablet 150 mcg    metoprolol tartrate (LOPRESSOR) tablet 100 mg    insulin glargine (LANTUS) injection 60 Units    [START ON 2021] lisinopriL (PRINIVIL, ZESTRIL) tablet 40 mg    famotidine (PEPCID) tablet 20 mg    atorvastatin (LIPITOR) tablet 10 mg    insulin lispro (HUMALOG) injection    glucose chewable tablet 16 g    dextrose (D50W) injection syrg 12.5-25 g    glucagon (GLUCAGEN) injection 1 mg    acetaminophen (TYLENOL) tablet 650 mg    Or    acetaminophen (TYLENOL) suppository 650 mg    polyethylene glycol (MIRALAX) packet 17 g    ondansetron (ZOFRAN ODT) tablet 4 mg    Or    ondansetron (ZOFRAN) injection 4 mg    enoxaparin (LOVENOX) injection 40 mg    [START ON 5/5/2021] furosemide (LASIX) injection 40 mg    methylPREDNISolone (PF) (SOLU-MEDROL) injection 40 mg    albuterol-ipratropium (DUO-NEB) 2.5 MG-0.5 MG/3 ML    piperacillin-tazobactam (ZOSYN) 3.375 g in 0.9% sodium chloride (MBP/ADV) 100 mL MBP    budesonide-formoteroL (SYMBICORT) 160-4.5 mcg/actuation HFA inhaler 2 Puff    [START ON 5/5/2021] aspirin tablet 325 mg     Current Outpatient Medications   Medication Sig    Insulin Needles, Disposable, 31 X 5/16 \" Ndle by Does Not Apply route.  insulin lispro, human, (HUMALOG KWIKPEN) 100 unit/mL flexpen 15 Units by SubCUTAneous route three (3) times daily (with meals).  benazepril (LOTENSIN) 40 mg tablet Take 40 mg by mouth daily.  LEXAPRO 10 mg tablet Take 10 mg by mouth nightly.  famotidine (PEPCID) 20 mg tablet Take 20 mg by mouth two (2) times a day.  gabapentin (NEURONTIN) 400 mg capsule     hydrocodone-acetaminophen (NORCO) 5-325 mg per tablet Take 1 Tab by mouth every six (6) hours as needed.  levothyroxine (SYNTHROID) 150 mcg tablet Take 150 mcg by mouth daily (before breakfast).  lorazepam (ATIVAN) 1 mg tablet     lovastatin (MEVACOR) 40 mg tablet Take 40 mg by mouth nightly.  metoprolol (LOPRESSOR) 100 mg tablet Take 100 mg by mouth two (2) times a day.  oxycodone-acetaminophen (PERCOCET 10)  mg per tablet     insulin glargine (LANTUS) 100 unit/mL injection 60 Units by SubCUTAneous route nightly.       Patient PCP: Mariam Connor MD  PMH:  has a past medical history of Depression, DM (diabetes mellitus) ([facility] [address] Hyperlipidemia, Hypertension, Pancreatitis, SOB (shortness of breath), and Thyroid disease. PSH:   has a past surgical history that includes hx hysterectomy. FHX: family history is not on file. SHX:  reports that she has never smoked. She has never used smokeless tobacco. She reports that she does not drink alcohol or use drugs. ROS:    Review of Systems   Constitutional: Negative. HENT: Negative. Eyes: Negative. Respiratory: Positive for cough, sputum production, shortness of breath and wheezing. Cardiovascular: Positive for orthopnea. Gastrointestinal: Negative. Genitourinary: Negative. Musculoskeletal: Negative. Skin: Negative. Neurological: Negative. Psychiatric/Behavioral: Negative. Objective:     Vital Signs: Telemetry:    normal sinus rhythm Intake/Output:   Visit Vitals  BP (!) 147/82   Pulse 98   Temp 97.8 °F (36.6 °C)   Resp 20   Ht 5' 1\" (1.549 m)   Wt 77.1 kg (170 lb)   SpO2 100%   BMI 32.12 kg/m²       Temp (24hrs), Av.8 °F (36.6 °C), Min:97.8 °F (36.6 °C), Max:97.8 °F (36.6 °C)        O2 Device: BIPAP O2 Flow Rate (L/min): 5 l/min       Wt Readings from Last 4 Encounters:   21 77.1 kg (170 lb)   10/11/10 102.2 kg (225 lb 3.2 oz)          Intake/Output Summary (Last 24 hours) at 2021 1645  Last data filed at 2021 1238  Gross per 24 hour   Intake 250 ml   Output    Net 250 ml       Last shift:       0701 -  1900  In: 250 [I.V.:250]  Out: -   Last 3 shifts: No intake/output data recorded. Physical Exam:     Physical Exam   Constitutional: She appears well-developed. HENT:   Head: Normocephalic and atraumatic. Eyes: Pupils are equal, round, and reactive to light. Conjunctivae are normal.   Neck: Normal range of motion. Neck supple. Cardiovascular: Normal rate and regular rhythm. Pulmonary/Chest: Breath sounds normal. She is in respiratory distress. Abdominal: Soft.  Bowel sounds are normal.   Musculoskeletal: Normal range of motion. Neurological: She is alert. Skin: Skin is warm. Labs:    Recent Labs     05/04/21  1125 05/04/21  1112   WBC  --  14.2*   HGB  --  10.0*   PLT  --  313   INR 1.1  --    APTT 21.8  --      Recent Labs     05/04/21  1112   *   K 4.2   CL 97   CO2 28   *   BUN 25*   CREA 0.88   CA 8.2*   MG 1.8   LAC 0.6   ALB 3.0*   ALT 17     Recent Labs     05/04/21  1125   PH 7.36   PCO2 52*   PO2 72*   HCO3 27*     Recent Labs     05/04/21  1112   TROIQ <0.05     No results found for: BNPP, BNP   No results found for: CULT  Lab Results   Component Value Date/Time    TSH 1.62 05/04/2021 11:12 AM       Imaging:    CXR Results  (Last 48 hours)               05/04/21 1050  XR CHEST SNGL V Final result    Narrative:  Chest single view. Hazy and somewhat patchy reticular markings central and basilar lungs. Interstitial edema, possible cardiogenic cause and/or infectious/inflammatory   origin. Cardiac silhouette within normal limits. Atherosclerotic change thoracic   aorta. Small dependent pleural effusions, left greater than right. No   pneumothorax. Asymmetric advanced DJD with nonacute fracture deformity proximal left humerus. IMPRESSION:     1. Chronic Obstructive Pulmonary Disease with Severe Acute Exacerbation requiring inpatient hospitalization and management; has very poor airway clearance. Increased work of breathing  2. Body mass index is 32.12 kg/m². 3. Acute hypoxic and hypercapnic respiratory failure   4. Hyponatremia  5. Congestive heart failure  6. Pleural effusion more on the left side  7. Pt is requiring Drug therapy requiring intensive monitoring for toxicity  8. Pt is unstable, unpredictable needing inpatient monitoring; is acutely ill and at high risk of sudden decline and decompensation with severe consequenses and continued end organ dysfunction and failure  9.  Prognosis guarded RECOMMENDATIONS/PLAN:     1. BIPAP for non invasive ventilatory life support to prevent worsening respiratory acidosis  2. Start patient on IV Solu-Medrol nebulizer treatment  3. Repeat arterial blood gases and try to wean her from noninvasive ventilator BiPAP machine  4. Intubate and place on vent if NIV fails  5. Agree with Empiric IV antibiotics pending culture results   6. Follow culture results  7. Chest x-ray shows congestive changes  8. Supplemental O2 to keep sats > 93%  9. Aspiration precautions  10. Labs to follow electrolytes, renal function and and blood counts  11. Glucose monitoring and SSI  12. Bronchial hygiene with respiratory therapy techniques, bronchodilators  13. DVT, SUP prophylaxis       This care involved high complexity medical decision making: I personally:  · Reviewed the flowsheet and previous days notes  · Reviewed and summarized records or history from previous days note or discussions with staff, family  · High Risk Drug therapy requiring intensive monitoring for toxicity: eg steroids, pressors, antibiotics  · Reviewed and/or ordered Clinical lab tests  · Reviewed images and/or ordered Radiology tests  · Reviewed the patients ECG / Telemetry  · Reviewed and/or adjusted NiPPV settings  · Called and arranged for Radiologic procedures or interventions  · performed or ordered Diagnostic endoscopies with identified risk factors.   · discussed my assessment/management with : Nursing, Hospitalist and Family for coordination of care          Alexandro Solorio MD

## 2021-05-04 NOTE — Clinical Note
Sheath #1: Sheath: inserted. Sheath inserted/placed in the left subclavian  vein. Upon evaluation of the common femoral artery stick using fluoroscopy, the access site puncture was within the safe zone.

## 2021-05-04 NOTE — Clinical Note
Contrast Dose Calculator:   Patient's age: 80.   Patient's sex: Female. Patient weight (kg) = 79. Creatinine level (mg/dL) = 1.01. Creatinine clearance (mL/min): 56.   Contrast concentration (mg/mL) = 370. MACD = 300 mL. Max Contrast dose per Creatinine Cl calculator = 126 mL.

## 2021-05-04 NOTE — Clinical Note
Temporary pacemaker inserted. Rate = 80 bpm.   Sensitivity setting = 2.   3 mA. Electrical capture and mechanical capture obtained.

## 2021-05-04 NOTE — Clinical Note
History and physical documented and up to date, allergies reviewed, lab results reviewed and patient is NPO.

## 2021-05-04 NOTE — Clinical Note
TRANSFER - OUT REPORT:     Verbal report given to: (at bedside). Report consisted of patient's Situation, Background, Assessment and   Recommendations(SBAR). Opportunity for questions and clarification was provided. Patient transported with a Registered Nurse, Monitor, Oxygen and Respiratory Therapist.   Patient transported to: ICU.

## 2021-05-04 NOTE — ED PROVIDER NOTES
EMERGENCY DEPARTMENT HISTORY AND PHYSICAL EXAM      Date: 5/4/2021  Patient Name: Tuan Mazariegos    History of Presenting Illness     Chief Complaint   Patient presents with    Respiratory Distress       History Provided By: Patient    HPI: Tuan Mazariegos, 80 y.o. female with a past medical history significant diabetes, hypertension and hyperlipidemia presents to the ED with chief complaint of Respiratory Distress  . 82-year-old female having severe chest pressure shortness of breath. Was severe when she woke up this morning. Feels like she needs to gasp for air. Slight swelling in her legs is always there. No history of any heart problems that she knows of. Presents via EMS receiving a breathing treatment. Saw her doctor on Friday where she was prescribed an albuterol MDI. No real change in her symptoms. Actively having worsening shortness of breath. There are no other complaints, changes, or physical findings at this time. PCP: Libia Valdez MD    Current Outpatient Medications   Medication Sig Dispense Refill    Insulin Needles, Disposable, 31 X 5/16 \" Ndle by Does Not Apply route. 200 Package 6    insulin lispro, human, (HUMALOG KWIKPEN) 100 unit/mL flexpen 15 Units by SubCUTAneous route three (3) times daily (with meals). 10 Each 6    benazepril (LOTENSIN) 40 mg tablet Take 40 mg by mouth daily.  LEXAPRO 10 mg tablet Take 10 mg by mouth nightly.  famotidine (PEPCID) 20 mg tablet Take 20 mg by mouth two (2) times a day.  gabapentin (NEURONTIN) 400 mg capsule       hydrocodone-acetaminophen (NORCO) 5-325 mg per tablet Take 1 Tab by mouth every six (6) hours as needed.  levothyroxine (SYNTHROID) 150 mcg tablet Take 150 mcg by mouth daily (before breakfast).  lorazepam (ATIVAN) 1 mg tablet       lovastatin (MEVACOR) 40 mg tablet Take 40 mg by mouth nightly.  metoprolol (LOPRESSOR) 100 mg tablet Take 100 mg by mouth two (2) times a day.       oxycodone-acetaminophen (PERCOCET 10)  mg per tablet       insulin glargine (LANTUS) 100 unit/mL injection 60 Units by SubCUTAneous route nightly. 2 Vial 6       Past History     Past Medical History:  Past Medical History:   Diagnosis Date    Depression     DM (diabetes mellitus) ([address]     Hyperlipidemia     Hypertension     Pancreatitis     SOB (shortness of breath)     Thyroid disease        Past Surgical History:  Past Surgical History:   Procedure Laterality Date    HX HYSTERECTOMY         Family History:  History reviewed. No pertinent family history. Social History:  Social History     Tobacco Use    Smoking status: Never Smoker    Smokeless tobacco: Never Used   Substance Use Topics    Alcohol use: No    Drug use: No       Allergies: Allergies   Allergen Reactions    Nortriptyline Itching    Cymbalta [Duloxetine] Itching    Ivp [Fd And C Blue No.1] Hives    Morphine Itching    Tetracycline Itching         Review of Systems   Review of Systems   Constitutional: Negative. Negative for chills, fatigue and fever. HENT: Negative. Negative for congestion, nosebleeds and sore throat. Eyes: Negative. Negative for pain, discharge and visual disturbance. Respiratory: Positive for chest tightness and shortness of breath. Negative for cough. Cardiovascular: Negative for chest pain, palpitations and leg swelling. Gastrointestinal: Negative for abdominal pain, blood in stool, constipation, diarrhea, nausea and vomiting. Endocrine: Negative. Genitourinary: Negative. Negative for difficulty urinating, dysuria, pelvic pain and vaginal bleeding. Musculoskeletal: Negative. Negative for arthralgias, back pain and myalgias. Skin: Negative. Negative for rash and wound. Allergic/Immunologic: Negative. Neurological: Negative. Negative for dizziness, syncope, weakness, numbness and headaches. Hematological: Negative. Psychiatric/Behavioral: Negative.   Negative for agitation, confusion and suicidal ideas. All other systems reviewed and are negative. Physical Exam   Physical Exam  Vitals signs and nursing note reviewed. Exam conducted with a chaperone present. Constitutional:       Appearance: Normal appearance. She is normal weight. HENT:      Head: Normocephalic and atraumatic. Nose: Nose normal.      Mouth/Throat:      Mouth: Mucous membranes are moist.      Pharynx: Oropharynx is clear. Eyes:      Extraocular Movements: Extraocular movements intact. Conjunctiva/sclera: Conjunctivae normal.      Pupils: Pupils are equal, round, and reactive to light. Neck:      Musculoskeletal: Normal range of motion and neck supple. Cardiovascular:      Rate and Rhythm: Normal rate and regular rhythm. Pulses: Normal pulses. Heart sounds: Normal heart sounds. Pulmonary:      Effort: Pulmonary effort is normal. No respiratory distress. Breath sounds: Normal breath sounds. Abdominal:      General: Abdomen is flat. Bowel sounds are normal. There is no distension. Palpations: Abdomen is soft. Tenderness: There is no abdominal tenderness. There is no guarding. Musculoskeletal: Normal range of motion. General: No swelling, tenderness, deformity or signs of injury. Right lower leg: No edema. Left lower leg: No edema. Skin:     General: Skin is warm and dry. Capillary Refill: Capillary refill takes less than 2 seconds. Findings: No lesion or rash. Neurological:      General: No focal deficit present. Mental Status: She is alert and oriented to person, place, and time. Mental status is at baseline. Cranial Nerves: No cranial nerve deficit. Psychiatric:         Mood and Affect: Mood normal.         Behavior: Behavior normal.         Thought Content:  Thought content normal.         Judgment: Judgment normal.         Diagnostic Study Results     Labs -     Recent Results (from the past 12 hour(s)) EKG, 12 LEAD, INITIAL    Collection Time: 05/04/21 10:24 AM   Result Value Ref Range    Ventricular Rate 98 BPM    Atrial Rate 98 BPM    P-R Interval 168 ms    QRS Duration 152 ms    Q-T Interval 416 ms    QTC Calculation (Bezet) 531 ms    Calculated P Axis 68 degrees    Calculated R Axis -52 degrees    Calculated T Axis 94 degrees    Diagnosis        Poor data quality, interpretation may be adversely affected  Normal sinus rhythm  Right bundle branch block  Left anterior fascicular block   Bifascicular block   Left ventricular hypertrophy with repolarization abnormality  Cannot rule out Septal infarct , age undetermined  Abnormal ECG  No previous ECGs available  Confirmed by Luciano Chamorro MD, Ana Ho ([id number]) on 5/4/2021 12:13:56 PM     SARS-COV-2    Collection Time: 05/04/21 10:30 AM   Result Value Ref Range    SARS-CoV-2 Please find results under separate order     COVID-19 RAPID TEST    Collection Time: 05/04/21 10:30 AM   Result Value Ref Range    Specimen source Nasopharyngeal      COVID-19 rapid test Not Detected Not Detected     LACTIC ACID    Collection Time: 05/04/21 11:12 AM   Result Value Ref Range    Lactic acid 0.6 0.4 - 2.0 mmol/L   MAGNESIUM    Collection Time: 05/04/21 11:12 AM   Result Value Ref Range    Magnesium 1.8 1.6 - 2.4 mg/dL   METABOLIC PANEL, COMPREHENSIVE    Collection Time: 05/04/21 11:12 AM   Result Value Ref Range    Sodium 129 (L) 136 - 145 mmol/L    Potassium 4.2 3.5 - 5.1 mmol/L    Chloride 97 97 - 108 mmol/L    CO2 28 21 - 32 mmol/L    Anion gap 4 (L) 5 - 15 mmol/L    Glucose 182 (H) 65 - 100 mg/dL    BUN 25 (H) 6 - 20 mg/dL    Creatinine 0.88 0.55 - 1.02 mg/dL    BUN/Creatinine ratio 28 (H) 12 - 20      GFR est AA >60 >60 ml/min/1.73m2    GFR est non-AA >60 >60 ml/min/1.73m2    Calcium 8.2 (L) 8.5 - 10.1 mg/dL    Bilirubin, total 0.4 0.2 - 1.0 mg/dL    AST (SGOT) 13 (L) 15 - 37 U/L    ALT (SGPT) 17 12 - 78 U/L    Alk.  phosphatase 68 45 - 117 U/L    Protein, total 6.5 6.4 - 8.2 g/dL Albumin 3.0 (L) 3.5 - 5.0 g/dL    Globulin 3.5 2.0 - 4.0 g/dL    A-G Ratio 0.9 (L) 1.1 - 2.2     BNP    Collection Time: 05/04/21 11:12 AM   Result Value Ref Range    NT pro-BNP 7,360 (H) <450 pg/mL   PROCALCITONIN    Collection Time: 05/04/21 11:12 AM   Result Value Ref Range    Procalcitonin <0.05 (H) 0 ng/mL   TROPONIN I    Collection Time: 05/04/21 11:12 AM   Result Value Ref Range    Troponin-I, Qt. <0.05 <0.05 ng/mL   TSH 3RD GENERATION    Collection Time: 05/04/21 11:12 AM   Result Value Ref Range    TSH 1.62 0.36 - 3.74 uIU/mL   CBC WITH AUTOMATED DIFF    Collection Time: 05/04/21 11:12 AM   Result Value Ref Range    WBC 14.2 (H) 3.6 - 11.0 K/uL    RBC 3.13 (L) 3.80 - 5.20 M/uL    HGB 10.0 (L) 11.5 - 16.0 g/dL    HCT 30.8 (L) 35.0 - 47.0 %    MCV 98.4 80.0 - 99.0 FL    MCH 31.9 26.0 - 34.0 PG    MCHC 32.5 30.0 - 36.5 g/dL    RDW 13.6 11.5 - 14.5 %    PLATELET 873 047 - 013 K/uL    MPV 11.7 8.9 - 12.9 FL    NRBC 0.0 0.0  WBC    ABSOLUTE NRBC 0.00 0.00 - 0.01 K/uL    NEUTROPHILS 87 (H) 32 - 75 %    LYMPHOCYTES 6 (L) 12 - 49 %    MONOCYTES 5 5 - 13 %    EOSINOPHILS 1 0 - 7 %    BASOPHILS 1 0 - 1 %    IMMATURE GRANULOCYTES 0 0 - 0.5 %    ABS. NEUTROPHILS 12.4 (H) 1.8 - 8.0 K/UL    ABS. LYMPHOCYTES 0.8 0.8 - 3.5 K/UL    ABS. MONOCYTES 0.7 0.0 - 1.0 K/UL    ABS. EOSINOPHILS 0.1 0.0 - 0.4 K/UL    ABS. BASOPHILS 0.1 0.0 - 0.1 K/UL    ABS. IMM.  GRANS. 0.1 (H) 0.00 - 0.04 K/UL    DF AUTOMATED     PROTHROMBIN TIME + INR    Collection Time: 05/04/21 11:25 AM   Result Value Ref Range    Prothrombin time 13.6 11.9 - 14.7 sec    INR 1.1 0.9 - 1.1     PTT    Collection Time: 05/04/21 11:25 AM   Result Value Ref Range    aPTT 21.8 21.2 - 34.1 sec    aPTT, therapeutic range   82 - 109 sec   D DIMER    Collection Time: 05/04/21 11:25 AM   Result Value Ref Range    D DIMER 0.50 (H) <0.50 ug/ml(FEU)   BLOOD GAS, ARTERIAL    Collection Time: 05/04/21 11:25 AM   Result Value Ref Range    pH 7.36 7.35 - 7.45      PCO2 52 (H) 35 - 45 mmHg    PO2 72 (L) 75 - 100 mmHg    O2 SAT 93 (L) >95 %    BICARBONATE 27 (H) 22 - 26 mmol/L    BASE EXCESS 2.9 (H) 0 - 2 mmol/L    O2 METHOD Nasal Cannula      O2 FLOW RATE 6 L/min    SITE Right Radial      EBEN'S TEST PASS         Radiologic Studies -   XR CHEST SNGL V   Final Result        CT Results  (Last 48 hours)    None        CXR Results  (Last 48 hours)               05/04/21 1050  XR CHEST SNGL V Final result    Narrative:  Chest single view. Hazy and somewhat patchy reticular markings central and basilar lungs. Interstitial edema, possible cardiogenic cause and/or infectious/inflammatory   origin. Cardiac silhouette within normal limits. Atherosclerotic change thoracic   aorta. Small dependent pleural effusions, left greater than right. No   pneumothorax. Asymmetric advanced DJD with nonacute fracture deformity proximal left humerus. Medical Decision Making and ED Course   I am the first provider for this patient. I reviewed the vital signs, available nursing notes, past medical history, past surgical history, family history and social history. Vital Signs-Reviewed the patient's vital signs. Patient Vitals for the past 12 hrs:   Temp Pulse Resp BP SpO2   05/04/21 1436  98  (!) 147/82    05/04/21 1434  98 20 (!) 147/82 100 %   05/04/21 1221  (!) 112 20 (!) 175/81 97 %   05/04/21 1217     98 %   05/04/21 1132   20 (!) 164/72 96 %   05/04/21 1036 97.8 °F (36.6 °C) (!) 103 22 (!) 158/73 97 %       EKG interpretation:   EKG at 1024. Normal sinus rhythm rate of 98. Right bundle branch block. Left anterior fascicular block. Inferior Q waves. Reason rule out dysrhythmia. Interpreted by ER physician. Records Reviewed: Previous Hospital chart. EMS run report      ED Course:   Initial assessment performed. The patients presenting problems have been discussed, and they are in agreement with the care plan formulated and outlined with them.   I have encouraged them to ask questions as they arise throughout their visit. Orders Placed This Encounter    CULTURE, BLOOD, PAIRED     Standing Status:   Standing     Number of Occurrences:   1    COVID-19 RAPID TEST     Standing Status:   Standing     Number of Occurrences:   1    XR CHEST SNGL V     Standing Status:   Standing     Number of Occurrences:   1     Order Specific Question:   Transport     Answer:   BED [2]     Order Specific Question:   Reason for Exam     Answer:   sob    PROTHROMBIN TIME + INR     Standing Status:   Standing     Number of Occurrences:   1    PTT     Standing Status:   Standing     Number of Occurrences:   1    URINALYSIS W/ REFLEX CULTURE     Standing Status:   Standing     Number of Occurrences:   1    SARS-COV-2     Standing Status:   Standing     Number of Occurrences:   1     Order Specific Question:   Specimen source     Answer:   NASOPHARYNGEAL SWAB [650]     Order Specific Question:   Is this test for diagnosis or screening? Answer:   Diagnosis of ill patient     Order Specific Question:   Symptomatic for COVID-19 as defined by CDC? Answer:   Yes     Order Specific Question:   Date of Symptom Onset     Answer:   5/4/2021     Order Specific Question:   Hospitalized for COVID-19? Answer:   No     Order Specific Question:   Admitted to ICU for COVID-19? Answer:   No     Order Specific Question:   Employed in healthcare setting? Answer:   No     Order Specific Question:   Resident in a congregate (group) care setting? Answer:   No     Order Specific Question:   Previously tested for COVID-19? Answer:   No     Order Specific Question:   Pregnant?      Answer:   No    D DIMER     Standing Status:   Standing     Number of Occurrences:   1    BLOOD GAS, ARTERIAL     Standing Status:   Standing     Number of Occurrences:   1    LACTIC ACID     Standing Status:   Standing     Number of Occurrences:   1    MAGNESIUM     Standing Status:   Standing Number of Occurrences:   1    METABOLIC PANEL, COMPREHENSIVE     Standing Status:   Standing     Number of Occurrences:   1    BNP     Standing Status:   Standing     Number of Occurrences:   1    PROCALCITONIN     Standing Status:   Standing     Number of Occurrences:   1    TROPONIN I     Standing Status:   Standing     Number of Occurrences:   1    TSH 3RD GENERATION     Standing Status:   Standing     Number of Occurrences:   1    CBC WITH AUTOMATED DIFF     Standing Status:   Standing     Number of Occurrences:   1    RT--BIPAP     Standing Status:   Standing     Number of Occurrences:   1     Order Specific Question:   Inspiratory Pressure     Answer:   15     Order Specific Question:   Expiratory Pressure     Answer:   5     Order Specific Question:   Minimum Rate     Answer:   14     Order Specific Question:   FIO2     Answer:   40%    EKG, 12 LEAD, INITIAL     Standing Status:   Standing     Number of Occurrences:   1     Order Specific Question:   Reason for Exam:     Answer:   sob    cefTRIAXone (ROCEPHIN) 1 g in sterile water (preservative free) 10 mL IV syringe     Order Specific Question:   Antibiotic Indications     Answer:   Sepsis of Unknown Etiology    sodium chloride 0.9 % bolus infusion 500 mL    furosemide (LASIX) injection 40 mg    aspirin tablet 325 mg    LORazepam (ATIVAN) injection 1 mg    nitroglycerin (NITROBID) 2 % ointment 1 Inch    INITIAL PHYSICIAN ORDER: INPATIENT Telemetry; 3. Patient receiving treatment that can only be provided in an inpatient setting (further clarification in H&P documentation)     Standing Status:   Standing     Number of Occurrences:   1     Order Specific Question:   Status: Answer:   INPATIENT [101]     Order Specific Question:   Type of Bed     Answer:   Telemetry [19]     Order Specific Question:   Inpatient Hospitalization Certified Necessary for the Following Reasons     Answer:   3.  Patient receiving treatment that can only be provided in an inpatient setting (further clarification in H&P documentation)     Order Specific Question:   Admitting Diagnosis     Answer:   CHF (congestive heart failure) [facility]) [[id number]]     Order Specific Question:   Admitting Physician     Answer:   Dean Failing     Order Specific Question:   Attending Physician     Answer:   Dean Failing     Order Specific Question:   Estimated Length of Stay     Answer:   3-4 Midnights     Order Specific Question:   Discharge Plan:     Answer:   Home with Office Follow-up              CONSULTANTS:  Consults  Dr Kareem Cano admit    Provider Notes (Medical Decision Making):   68-year-old female new onset CHF with worsening shortness of breath dyspnea now requiring BiPAP. Tolerating BiPAP well. For diuresis for new onset CHF. Procedures               CRITICAL CARE NOTE : bipap, resp distress  12:55 PM  Amount of Critical Care Time: 45 minutes    IMPENDING DETERIORATION -Airway, Respiratory and Cardiovascular  ASSOCIATED RISK FACTORS - Hypotension  MANAGEMENT- Bedside Assessment and Supervision of Care  INTERPRETATION -  Xrays, CT Scan, ECG and Blood Pressure  INTERVENTIONS - hemodynamic mngmt  CASE REVIEW - Hospitalist/Intensivist  TREATMENT RESPONSE -Improved  PERFORMED BY - Self    NOTES   :  I have spent critical care time involved in lab review, consultations with specialist, family decision- making, bedside attention and documentation. This time excludes time spent in any separate billed procedures. During this entire length of time I was immediately available to the patient . Go Kearns MD            Disposition       Emergency Department Disposition:  Admitted      Diagnosis     Clinical Impression:   1. Acute congestive heart failure, unspecified heart failure type [facility])        Attestations:    Go Kearns MD    Please note that this dictation was completed with [employer], the computer voice recognition software.   Quite often unanticipated grammatical, syntax, homophones, and other interpretive errors are inadvertently transcribed by the computer software. Please disregard these errors. Please excuse any errors that have escaped final proofreading. Thank you.

## 2021-05-04 NOTE — Clinical Note
Lesion located in the Mid LAD. Balloon inflated using multiple inflation technique. Lesion #1: Pressure = 6 ede; Duration = 4 sec. Inflation 2: Pressure = 6 ede; Duration = 10 sec.

## 2021-05-04 NOTE — Clinical Note
Single view of the left ventricle obtained using power injection. Total volume = 20 mL. Rate = 15 mL/sec. Pressure = 500 PSI.

## 2021-05-05 ENCOUNTER — APPOINTMENT (OUTPATIENT)
Dept: GENERAL RADIOLOGY | Age: 81
DRG: 242 | End: 2021-05-05
Attending: INTERNAL MEDICINE
Payer: MEDICARE

## 2021-05-05 ENCOUNTER — APPOINTMENT (OUTPATIENT)
Dept: NON INVASIVE DIAGNOSTICS | Age: 81
DRG: 242 | End: 2021-05-05
Attending: FAMILY MEDICINE
Payer: MEDICARE

## 2021-05-05 LAB
ALBUMIN SERPL-MCNC: 3.1 G/DL (ref 3.5–5)
ALBUMIN/GLOB SERPL: 0.8 {RATIO} (ref 1.1–2.2)
ALP SERPL-CCNC: 74 U/L (ref 45–117)
ALT SERPL-CCNC: 21 U/L (ref 12–78)
ANION GAP SERPL CALC-SCNC: 12 MMOL/L (ref 5–15)
ANION GAP SERPL CALC-SCNC: 9 MMOL/L (ref 5–15)
AST SERPL W P-5'-P-CCNC: 20 U/L (ref 15–37)
BASOPHILS # BLD: 0 K/UL (ref 0–0.1)
BASOPHILS NFR BLD: 0 % (ref 0–1)
BILIRUB SERPL-MCNC: 0.4 MG/DL (ref 0.2–1)
BUN SERPL-MCNC: 34 MG/DL (ref 6–20)
BUN SERPL-MCNC: 39 MG/DL (ref 6–20)
BUN/CREAT SERPL: 27 (ref 12–20)
BUN/CREAT SERPL: 34 (ref 12–20)
CA-I BLD-MCNC: 9 MG/DL (ref 8.5–10.1)
CA-I BLD-MCNC: 9 MG/DL (ref 8.5–10.1)
CHLORIDE SERPL-SCNC: 90 MMOL/L (ref 97–108)
CHLORIDE SERPL-SCNC: 94 MMOL/L (ref 97–108)
CO2 SERPL-SCNC: 25 MMOL/L (ref 21–32)
CO2 SERPL-SCNC: 27 MMOL/L (ref 21–32)
CREAT SERPL-MCNC: 1.16 MG/DL (ref 0.55–1.02)
CREAT SERPL-MCNC: 1.26 MG/DL (ref 0.55–1.02)
DIFFERENTIAL METHOD BLD: ABNORMAL
ECHO AO ROOT DIAM: 3.3 CM
ECHO AV AREA PEAK VELOCITY: 0.83 CM2
ECHO AV AREA VTI: 0.92 CM2
ECHO AV AREA/BSA PEAK VELOCITY: 0.5 CM2/M2
ECHO AV AREA/BSA VTI: 0.5 CM2/M2
ECHO AV MEAN GRADIENT: 16 MMHG
ECHO AV MEAN VELOCITY: 179 CM/S
ECHO AV PEAK GRADIENT: 34 MMHG
ECHO AV VTI: 59.7 CM
ECHO EST RA PRESSURE: 3 MMHG
ECHO LA AREA 4C: 13.32 CM2
ECHO LA MAJOR AXIS: 4.8 CM
ECHO LA MINOR AXIS: 2.64 CM
ECHO LA TO AORTIC ROOT RATIO: 1.45
ECHO LV E' SEPTAL VELOCITY: 3.7 CM/S
ECHO LV EJECTION FRACTION BIPLANE: 47.8 % (ref 55–100)
ECHO LV ESV A2C: 66.3 CM3
ECHO LV INTERNAL DIMENSION DIASTOLIC MMODE: 5.16 CM
ECHO LV INTERNAL DIMENSION SYSTOLIC MMODE: 3.91 CM
ECHO LV IVSD MMODE: 1.53 CM
ECHO LV POSTERIOR WALL DIASTOLIC MMODE: 1.77 CM
ECHO LVOT DIAM: 1.9 CM
ECHO LVOT PEAK GRADIENT: 3 MMHG
ECHO LVOT SV: 55 CM3
ECHO LVOT SV: 77.2 CM3
ECHO LVOT VTI: 13.6 CM
ECHO LVOT VTI: 19.3 CM
ECHO MV A VELOCITY: 145 CM/S
ECHO MV AREA PHT: 2.39 CM2
ECHO MV E DECELERATION TIME (DT): 197 MS
ECHO MV E VELOCITY: 169 CM/S
ECHO MV E/A RATIO: 1.17
ECHO MV E/E' SEPTAL: 45.68
ECHO MV MAX VELOCITY: 202 CM/S
ECHO MV MEAN GRADIENT: 3 MMHG
ECHO MV PEAK GRADIENT: 16 MMHG
ECHO MV PRESSURE HALF TIME (PHT): 92 MS
ECHO MV VTI: 49.5 CM
ECHO PV PEAK INSTANTANEOUS GRADIENT SYSTOLIC: 2 MMHG
ECHO PV REGURGITANT MAX VELOCITY: 291 CM/S
ECHO PV REGURGITANT MAX VELOCITY: 64.9 CM/S
ECHO PV REGURGITANT MAX VELOCITY: 85.3 CM/S
ECHO PVEIN A DURATION: 116 MS
ECHO PVEIN A VELOCITY: 37.5 CM/S
ECHO RA AREA 4C: 15.61 CM2
ECHO RIGHT VENTRICULAR SYSTOLIC PRESSURE (RVSP): 52 MMHG
ECHO RV INTERNAL DIMENSION: 2.92 CM
ECHO TV MAX VELOCITY: 350 CM/S
ECHO TV REGURGITANT PEAK GRADIENT: 49 MMHG
EOSINOPHIL # BLD: 0 K/UL (ref 0–0.4)
EOSINOPHIL NFR BLD: 0 % (ref 0–7)
ERYTHROCYTE [DISTWIDTH] IN BLOOD BY AUTOMATED COUNT: 13.2 % (ref 11.5–14.5)
EST. AVERAGE GLUCOSE BLD GHB EST-MCNC: 171 MG/DL
GLOBULIN SER CALC-MCNC: 4.1 G/DL (ref 2–4)
GLUCOSE BLD STRIP.AUTO-MCNC: 142 MG/DL (ref 65–100)
GLUCOSE BLD STRIP.AUTO-MCNC: 145 MG/DL (ref 65–100)
GLUCOSE BLD STRIP.AUTO-MCNC: 177 MG/DL (ref 65–100)
GLUCOSE BLD STRIP.AUTO-MCNC: 185 MG/DL (ref 65–100)
GLUCOSE BLD STRIP.AUTO-MCNC: 258 MG/DL (ref 65–100)
GLUCOSE SERPL-MCNC: 142 MG/DL (ref 65–100)
GLUCOSE SERPL-MCNC: 234 MG/DL (ref 65–100)
HBA1C MFR BLD: 7.6 % (ref 4–5.6)
HCT VFR BLD AUTO: 34.2 % (ref 35–47)
HGB BLD-MCNC: 11 G/DL (ref 11.5–16)
IMM GRANULOCYTES # BLD AUTO: 0.1 K/UL (ref 0–0.04)
IMM GRANULOCYTES NFR BLD AUTO: 1 % (ref 0–0.5)
LYMPHOCYTES # BLD: 1.4 K/UL (ref 0.8–3.5)
LYMPHOCYTES NFR BLD: 7 % (ref 12–49)
MCH RBC QN AUTO: 31.9 PG (ref 26–34)
MCHC RBC AUTO-ENTMCNC: 32.2 G/DL (ref 30–36.5)
MCV RBC AUTO: 99.1 FL (ref 80–99)
MONOCYTES # BLD: 0.8 K/UL (ref 0–1)
MONOCYTES NFR BLD: 4 % (ref 5–13)
MV DEC SLOPE: 5740 MM/S2
MV DEC SLOPE: 5740 MM/S2
NEUTS SEG # BLD: 18.9 K/UL (ref 1.8–8)
NEUTS SEG NFR BLD: 88 % (ref 32–75)
NRBC # BLD: 0 K/UL (ref 0–0.01)
NRBC BLD-RTO: 0 PER 100 WBC
PERFORMED BY, TECHID: ABNORMAL
PLATELET # BLD AUTO: 379 K/UL (ref 150–400)
PMV BLD AUTO: 12 FL (ref 8.9–12.9)
POTASSIUM SERPL-SCNC: 3.4 MMOL/L (ref 3.5–5.1)
POTASSIUM SERPL-SCNC: 4.3 MMOL/L (ref 3.5–5.1)
PROT SERPL-MCNC: 7.2 G/DL (ref 6.4–8.2)
RBC # BLD AUTO: 3.45 M/UL (ref 3.8–5.2)
SODIUM SERPL-SCNC: 128 MMOL/L (ref 136–145)
SODIUM SERPL-SCNC: 129 MMOL/L (ref 136–145)
WBC # BLD AUTO: 21.3 K/UL (ref 3.6–11)

## 2021-05-05 PROCEDURE — 74011250636 HC RX REV CODE- 250/636: Performed by: FAMILY MEDICINE

## 2021-05-05 PROCEDURE — 77010033678 HC OXYGEN DAILY

## 2021-05-05 PROCEDURE — 80048 BASIC METABOLIC PNL TOTAL CA: CPT

## 2021-05-05 PROCEDURE — 82962 GLUCOSE BLOOD TEST: CPT

## 2021-05-05 PROCEDURE — 36415 COLL VENOUS BLD VENIPUNCTURE: CPT

## 2021-05-05 PROCEDURE — 71045 X-RAY EXAM CHEST 1 VIEW: CPT

## 2021-05-05 PROCEDURE — 74011250637 HC RX REV CODE- 250/637: Performed by: FAMILY MEDICINE

## 2021-05-05 PROCEDURE — 74011000258 HC RX REV CODE- 258: Performed by: FAMILY MEDICINE

## 2021-05-05 PROCEDURE — 94640 AIRWAY INHALATION TREATMENT: CPT

## 2021-05-05 PROCEDURE — 74011636637 HC RX REV CODE- 636/637: Performed by: FAMILY MEDICINE

## 2021-05-05 PROCEDURE — 85025 COMPLETE CBC W/AUTO DIFF WBC: CPT

## 2021-05-05 PROCEDURE — 74011000250 HC RX REV CODE- 250: Performed by: FAMILY MEDICINE

## 2021-05-05 PROCEDURE — 74011250636 HC RX REV CODE- 250/636: Performed by: INTERNAL MEDICINE

## 2021-05-05 PROCEDURE — 93306 TTE W/DOPPLER COMPLETE: CPT

## 2021-05-05 PROCEDURE — 94762 N-INVAS EAR/PLS OXIMTRY CONT: CPT

## 2021-05-05 PROCEDURE — 65270000029 HC RM PRIVATE

## 2021-05-05 PROCEDURE — 94660 CPAP INITIATION&MGMT: CPT

## 2021-05-05 PROCEDURE — 80053 COMPREHEN METABOLIC PANEL: CPT

## 2021-05-05 RX ORDER — FUROSEMIDE 10 MG/ML
40 INJECTION INTRAMUSCULAR; INTRAVENOUS 2 TIMES DAILY
Status: DISCONTINUED | OUTPATIENT
Start: 2021-05-05 | End: 2021-05-06

## 2021-05-05 RX ORDER — HALOPERIDOL 5 MG/ML
0.5 INJECTION INTRAMUSCULAR ONCE
Status: COMPLETED | OUTPATIENT
Start: 2021-05-05 | End: 2021-05-05

## 2021-05-05 RX ORDER — IPRATROPIUM BROMIDE AND ALBUTEROL SULFATE 2.5; .5 MG/3ML; MG/3ML
3 SOLUTION RESPIRATORY (INHALATION)
Status: ACTIVE | OUTPATIENT
Start: 2021-05-05 | End: 2021-05-05

## 2021-05-05 RX ORDER — FUROSEMIDE 10 MG/ML
40 INJECTION INTRAMUSCULAR; INTRAVENOUS ONCE
Status: COMPLETED | OUTPATIENT
Start: 2021-05-05 | End: 2021-05-05

## 2021-05-05 RX ORDER — HYDROXYZINE 50 MG/ML
25 INJECTION, SOLUTION INTRAMUSCULAR ONCE
Status: COMPLETED | OUTPATIENT
Start: 2021-05-05 | End: 2021-05-05

## 2021-05-05 RX ORDER — HYDROXYZINE PAMOATE 25 MG/1
25 CAPSULE ORAL
Status: DISCONTINUED | OUTPATIENT
Start: 2021-05-05 | End: 2021-05-19 | Stop reason: HOSPADM

## 2021-05-05 RX ADMIN — FUROSEMIDE 40 MG: 10 INJECTION, SOLUTION INTRAMUSCULAR; INTRAVENOUS at 11:59

## 2021-05-05 RX ADMIN — LISINOPRIL 40 MG: 20 TABLET ORAL at 08:35

## 2021-05-05 RX ADMIN — PIPERACILLIN AND TAZOBACTAM 3.38 G: 3; .375 INJECTION, POWDER, LYOPHILIZED, FOR SOLUTION INTRAVENOUS at 16:21

## 2021-05-05 RX ADMIN — HALOPERIDOL LACTATE 0.5 MG: 5 INJECTION, SOLUTION INTRAMUSCULAR at 20:06

## 2021-05-05 RX ADMIN — FUROSEMIDE 40 MG: 10 INJECTION, SOLUTION INTRAMUSCULAR; INTRAVENOUS at 21:20

## 2021-05-05 RX ADMIN — METHYLPREDNISOLONE SODIUM SUCCINATE 40 MG: 40 INJECTION, POWDER, FOR SOLUTION INTRAMUSCULAR; INTRAVENOUS at 12:00

## 2021-05-05 RX ADMIN — HYDROXYZINE PAMOATE 25 MG: 25 CAPSULE ORAL at 17:57

## 2021-05-05 RX ADMIN — IPRATROPIUM BROMIDE AND ALBUTEROL SULFATE 3 ML: .5; 2.5 SOLUTION RESPIRATORY (INHALATION) at 01:08

## 2021-05-05 RX ADMIN — INSULIN LISPRO 10 UNITS: 100 INJECTION, SOLUTION INTRAVENOUS; SUBCUTANEOUS at 07:30

## 2021-05-05 RX ADMIN — METOPROLOL TARTRATE 100 MG: 50 TABLET, FILM COATED ORAL at 20:44

## 2021-05-05 RX ADMIN — ACETAMINOPHEN 650 MG: 325 TABLET, FILM COATED ORAL at 00:18

## 2021-05-05 RX ADMIN — METHYLPREDNISOLONE SODIUM SUCCINATE 40 MG: 40 INJECTION, POWDER, FOR SOLUTION INTRAMUSCULAR; INTRAVENOUS at 06:19

## 2021-05-05 RX ADMIN — INSULIN GLARGINE 60 UNITS: 100 INJECTION, SOLUTION SUBCUTANEOUS at 22:00

## 2021-05-05 RX ADMIN — LEVOTHYROXINE SODIUM 150 MCG: 75 TABLET ORAL at 08:35

## 2021-05-05 RX ADMIN — METHYLPREDNISOLONE SODIUM SUCCINATE 40 MG: 40 INJECTION, POWDER, FOR SOLUTION INTRAMUSCULAR; INTRAVENOUS at 00:18

## 2021-05-05 RX ADMIN — FUROSEMIDE 40 MG: 10 INJECTION, SOLUTION INTRAMUSCULAR; INTRAVENOUS at 08:36

## 2021-05-05 RX ADMIN — HYDROXYZINE HYDROCHLORIDE 25 MG: 50 INJECTION, SOLUTION INTRAMUSCULAR at 18:19

## 2021-05-05 RX ADMIN — IPRATROPIUM BROMIDE AND ALBUTEROL SULFATE 3 ML: .5; 2.5 SOLUTION RESPIRATORY (INHALATION) at 13:34

## 2021-05-05 RX ADMIN — ASPIRIN 325 MG ORAL TABLET 325 MG: 325 PILL ORAL at 08:36

## 2021-05-05 RX ADMIN — IPRATROPIUM BROMIDE AND ALBUTEROL SULFATE 3 ML: .5; 2.5 SOLUTION RESPIRATORY (INHALATION) at 07:58

## 2021-05-05 RX ADMIN — ATORVASTATIN CALCIUM 10 MG: 10 TABLET, FILM COATED ORAL at 20:46

## 2021-05-05 RX ADMIN — INSULIN LISPRO 3 UNITS: 100 INJECTION, SOLUTION INTRAVENOUS; SUBCUTANEOUS at 11:30

## 2021-05-05 RX ADMIN — FAMOTIDINE 20 MG: 20 TABLET ORAL at 08:36

## 2021-05-05 RX ADMIN — ESCITALOPRAM OXALATE 10 MG: 10 TABLET ORAL at 20:50

## 2021-05-05 RX ADMIN — HYDROXYZINE PAMOATE 25 MG: 25 CAPSULE ORAL at 01:17

## 2021-05-05 RX ADMIN — METOPROLOL TARTRATE 100 MG: 50 TABLET, FILM COATED ORAL at 08:36

## 2021-05-05 RX ADMIN — PIPERACILLIN AND TAZOBACTAM 3.38 G: 3; .375 INJECTION, POWDER, LYOPHILIZED, FOR SOLUTION INTRAVENOUS at 01:16

## 2021-05-05 RX ADMIN — ENOXAPARIN SODIUM 40 MG: 40 INJECTION SUBCUTANEOUS at 08:36

## 2021-05-05 RX ADMIN — METHYLPREDNISOLONE SODIUM SUCCINATE 40 MG: 40 INJECTION, POWDER, FOR SOLUTION INTRAMUSCULAR; INTRAVENOUS at 17:25

## 2021-05-05 RX ADMIN — BUDESONIDE AND FORMOTEROL FUMARATE DIHYDRATE 2 PUFF: 160; 4.5 AEROSOL RESPIRATORY (INHALATION) at 08:15

## 2021-05-05 RX ADMIN — FAMOTIDINE 20 MG: 20 TABLET ORAL at 20:46

## 2021-05-05 RX ADMIN — IPRATROPIUM BROMIDE AND ALBUTEROL SULFATE 3 ML: .5; 2.5 SOLUTION RESPIRATORY (INHALATION) at 19:35

## 2021-05-05 RX ADMIN — INSULIN LISPRO 3 UNITS: 100 INJECTION, SOLUTION INTRAVENOUS; SUBCUTANEOUS at 16:30

## 2021-05-05 RX ADMIN — BUDESONIDE AND FORMOTEROL FUMARATE DIHYDRATE 2 PUFF: 160; 4.5 AEROSOL RESPIRATORY (INHALATION) at 19:36

## 2021-05-05 RX ADMIN — PIPERACILLIN AND TAZOBACTAM 3.38 G: 3; .375 INJECTION, POWDER, LYOPHILIZED, FOR SOLUTION INTRAVENOUS at 08:37

## 2021-05-05 NOTE — ROUTINE PROCESS
Bedside and Verbal shift change report given to Sharmaine Jeronimo  (oncoming nurse) by Yunior Mcclelland. (offgoing nurse). Report included the following information SBAR, MAR and Recent Results.

## 2021-05-05 NOTE — PROGRESS NOTES
Dr. Alondra Hernandez notified earlier regarding /110; Pt was still agitated;   Son was notified pt was in bilateral wrist restraints;  See orders

## 2021-05-05 NOTE — PROGRESS NOTES
Primary Nurse Manisha Garsia RN and Orion Parisi RN performed a dual skin assessment on this patient Impairment noted- healing abrasion noted to right arm , scab noted to same arm ; redness noted to perineum .

## 2021-05-05 NOTE — ROUTINE PROCESS
Report given to Aayush Lopez RN oncoming nurse to include sbar, mar, kardex, recent orders and changes

## 2021-05-05 NOTE — PROGRESS NOTES
Dr. Rivers Servant notified at [phone] rega leave room to go downstairs; Very agitated;  Cursing and pushing at the nurses;   Code manuel called;  Orders given to give vistaril IM;

## 2021-05-05 NOTE — PROGRESS NOTES
Patient became tachypnic with increased WOB on 7lpm NC. Duoneb was given. Patient was placed back on BiPAP on the previous settings. Appears more comfortable and is tolerating well.

## 2021-05-05 NOTE — CONSULTS
Consult    Patient: Tricia Urrutia MRN: [id number]  SSN: [id number]    YOB: 1940  Age: 80 y.o. Sex: female      Subjective:      Tricia Urrutia is a 80 y.o. female who is being seen for congestive heart failure. Patient with history of depression, diabetes mellitus, hyperlipidemia, hypertension who presented to the emergency room complaining of shortness of breath for the last 2 days. She has been told she has a COPD. Upon arrival to the emergency room she received nebulizer treatment and she was satting in low 90s on 5 L of oxygen by nasal cannula. She has been so short winded at home with minimal activity. She has some PND. Past Medical History:   Diagnosis Date    Depression     DM (diabetes mellitus) ([address]     Hyperlipidemia     Hypertension     Pancreatitis     SOB (shortness of breath)     Thyroid disease      Past Surgical History:   Procedure Laterality Date    HX HYSTERECTOMY        History reviewed. No pertinent family history.   Social History     Tobacco Use    Smoking status: Never Smoker    Smokeless tobacco: Never Used   Substance Use Topics    Alcohol use: No      Current Facility-Administered Medications   Medication Dose Route Frequency Provider Last Rate Last Admin    hydrOXYzine pamoate (VISTARIL) capsule 25 mg  25 mg Oral Q6H PRN Beny Cheung MD   25 mg at 05/05/21 0117    escitalopram oxalate (LEXAPRO) tablet 10 mg  10 mg Oral QHS Beny Cheung MD   10 mg at 05/04/21 2213    levothyroxine (SYNTHROID) tablet 150 mcg  150 mcg Oral ACB Beny Cheung MD   150 mcg at 05/05/21 0835    metoprolol tartrate (LOPRESSOR) tablet 100 mg  100 mg Oral BID Beny Cheung MD   100 mg at 05/05/21 0836    insulin glargine (LANTUS) injection 60 Units  60 Units SubCUTAneous QHS Beny Cheung MD   60 Units at 05/04/21 2213    lisinopriL (PRINIVIL, ZESTRIL) tablet 40 mg  40 mg Oral DAILY Beny Cheung MD   40 mg at 05/05/21 0835    famotidine (PEPCID) tablet 20 mg  20 mg Oral BID Beny Cheung MD   20 mg at 05/05/21 0836    atorvastatin (LIPITOR) tablet 10 mg  10 mg Oral QHS Beny Cheung MD   10 mg at 05/04/21 2213    insulin lispro (HUMALOG) injection   SubCUTAneous AC&HS Richelle Cheung MD   3 Units at 05/05/21 1130    glucose chewable tablet 16 g  4 Tab Oral PRN Richelle Cheung MD        dextrose (D50W) injection syrg 12.5-25 g  25-50 mL IntraVENous PRN Richelle Cheung MD        glucagon (GLUCAGEN) injection 1 mg  1 mg IntraMUSCular PRN Richelle Cheung MD       [facility] acetaminophen (TYLENOL) tablet 650 mg  650 mg Oral Q6H PRN Richelle Cheung MD   650 mg at 05/05/21 0018    Or    acetaminophen (TYLENOL) suppository 650 mg  650 mg Rectal Q6H PRN Richelle Cheung MD        polyethylene glycol (MIRALAX) packet 17 g  17 g Oral DAILY PRN Richelle Cheung MD        ondansetron (ZOFRAN ODT) tablet 4 mg  4 mg Oral Q8H PRN Richelle Cheung MD        Or    [facility]) injection 4 mg  4 mg IntraVENous Q6H PRN Richelle Cheung MD        enoxaparin (LOVENOX) injection 40 mg  40 mg SubCUTAneous DAILY Beny Cheung MD   40 mg at 05/05/21 0836    furosemide (LASIX) injection 40 mg  40 mg IntraVENous DAILY Beny Cheung MD   40 mg at 05/05/21 0836    methylPREDNISolone (PF) (SOLU-MEDROL) injection 40 mg  40 mg IntraVENous Q6H Beny Cheung MD   40 mg at 05/05/21 1200    albuterol-ipratropium (DUO-NEB) 2.5 MG-0.5 MG/3 ML  3 mL Nebulization Q6H RT Beny Cheung MD   3 mL at 05/05/21 1334    piperacillin-tazobactam (ZOSYN) 3.375 g in 0.9% sodium chloride (MBP/ADV) 100 mL MBP  3.375 g IntraVENous Q8H Beny Cheung  mL/hr at 05/05/21 0837 3.375 g at 05/05/21 0837    budesonide-formoteroL (SYMBICORT) 160-4.5 mcg/actuation HFA inhaler 2 Puff  2 Puff Inhalation BID Richelle Cheung MD   2 Puff at 05/05/21 0815    aspirin tablet 325 mg  325 mg Oral DAILY Beny Cheung MD   325 mg at 05/05/21 6107 Allergies   Allergen Reactions    Nortriptyline Itching    Cymbalta [Duloxetine] Itching    Ivp [Fd And C Blue No.1] Hives    Morphine Itching    Tetracycline Itching       Review of Systems:  A comprehensive review of systems was negative except for that written in the History of Present Illness. She is a poor historian. Objective:     Vitals:    05/05/21 0736 05/05/21 0805 05/05/21 0811 05/05/21 1159   BP: (!) 154/98   (!) 154/98   Pulse: 87   87   Resp: 30      Temp: 97.3 °F (36.3 °C)      SpO2: 99% 98% 97%    Weight:       Height:            Physical Exam:  General:  Alert, cooperative, no distress, appears stated age. Eyes:  Conjunctivae/corneas clear. PERRL, EOMs intact. Fundi benign   Ears:  Normal TMs and external ear canals both ears. Nose: Nares normal. Septum midline. Mucosa normal. No drainage or sinus tenderness. Mouth/Throat: Lips, mucosa, and tongue normal. Teeth and gums normal.   Neck: Supple, symmetrical, trachea midline, no adenopathy, thyroid: no enlargment/tenderness/nodules, no carotid bruit and no JVD. Back:   Symmetric, no curvature. ROM normal. No CVA tenderness. Lungs:    Coarse breath sounds bilaterally. Heart:  Regular rate and rhythm, S1, S2 normal, no murmur, click, rub or gallop. Abdomen:   Soft, non-tender. Bowel sounds normal. No masses,  No organomegaly. Extremities: Extremities normal, atraumatic, no cyanosis or edema. Pulses: 2+ and symmetric all extremities. Skin: Skin color, texture, turgor normal. No rashes or lesions   Lymph nodes: Cervical, supraclavicular, and axillary nodes normal.   Neurologic: CNII-XII intact. Normal strength, sensation and reflexes throughout.          Assessment:     Hospital Problems  Never Reviewed          Codes Class Noted POA    CHF (congestive heart failure) ([facility]) ICD-10-CM: I50.9  ICD-9-CM: 428.0  5/4/2021 Unknown        COPD [facility]chronic obstructive pulmonary disease) ([facility] ICD-10-CM: J44.9  ICD-9-CM: 496  5/4/2021 Unknown            Mrs. Sujata Penaloza is a 42-year-old white female with:  1. Heart failure with decompensation  2. Diabetes mellitus  3. Hypertension  4. Hypothyroidism  5. Depression  6. COPD  7. Right bundle branch block with left episode  8. Hyponatremia  9. Acute kidney injury  10. Peripheral vascular disease  11. Right upper lobe opacity  12. Fracture deformity of proximal left humerus  Plan:     EKG showed sinus rhythm, right bundle branch block with left anterior fascicular block. White count is elevated at 21. Hemoglobin 11, platelet is 642. Sodium is 128, chloride is 94, bicarb 25, creatinine is 1.26. First set of troponin is negative and the second 1 was 0.15. BNP was elevated. A1c 7.6. Currently on aspirin, atorvastatin, IV Lasix, lisinopril, metoprolol. I will increase the Lasix and stop the lisinopril until the kidney function is more stable. Obtain an echocardiogram.  We will monitor kidney function, ins and outs and daily weight. Further recommendation depending on clinical progression, echo finding  If sodium level deteriorating then consider nephrology consult. Duplex ultrasound was negative for DVT    Thank you  For involving me in Mrs. Logan pinedo. I will follow.       Signed By: Pavel Kumar MD     May 5, 2021

## 2021-05-05 NOTE — H&P
Hospitalist Progress Note    Subjective:     Verna Varghese is a 79 yo female with a PMHx significant for DM, HLD, HTN, thyroid disease, and depression, who presents to the ED with shortness of breath for the past 1-2 days due to a severe acute exacerbation of COPD, acute hypoxic/hypercapnic respiratory failure, and CHF. She also has a bilateral pleural effusion (worse on left), interstitial edema (cardiogenic or infectious cause), and hyponatremia.   ___________________________________________________________    Today, the patient presents severely short of breath with labored breathing and wheezing, on 7L NC. She states that she feels the same as yesterday and is still struggling to breathe. Last night she had difficulty tolerating BiPAP and SpO2 dropped to 85-86% when mask was removed. The BiPAP was replaced, and then discontinued by Dr. Jackelyn Salomon according to the respiratory therapist. The patient is now on 7L NC although she occasionally removes it. She had some nausea this morning but it has subsided. She denies any chest pain, coughing, lightheadedness, dizziness, vomiting, fever, or chills. Significant labs:   WBC 21.3 (14.2 yesterday)  Na 128 (129 yesterday)  Gluc 234  BNP 9,690 (7,360)  BUN 34 (25 yesterday)  Cr 1.26 (0.88 yesterday)  Hgb 11.0 (10.0 yesterday)  Procalcitonin (+)     UA:   Gluc 50  Ketone 5    ABG: on BiPAP  PH 7.43  PCO2 45  PO2 120  Bicarb 29    CXR:   Hazy and somewhat patchy reticular markings central and basilar lungs. Interstitial edema, possible cardiogenic cause and/or infectious/inflammatory  origin. Small dependent pleural effusions, left greater than right. Past Medical History:   Diagnosis Date    Depression     DM (diabetes mellitus) ([facility]     Hyperlipidemia     Hypertension     Pancreatitis     SOB (shortness of breath)     Thyroid disease       Past Surgical History:   Procedure Laterality Date    HX HYSTERECTOMY       History reviewed.  No pertinent family history. Social History     Tobacco Use    Smoking status: Never Smoker    Smokeless tobacco: Never Used   Substance Use Topics    Alcohol use: No       Prior to Admission medications    Medication Sig Start Date End Date Taking? Authorizing Provider   Insulin Needles, Disposable, 31 X 5/16 \" Ndle by Does Not Apply route. 10/11/10   Janiya Kelly MD   insulin lispro, human, (HUMALOG KWIKPEN) 100 unit/mL flexpen 15 Units by SubCUTAneous route three (3) times daily (with meals). 10/13/10   Janiya Kelly MD   benazepril (LOTENSIN) 40 mg tablet Take 40 mg by mouth daily. 10/11/10   Provider, Historical   LEXAPRO 10 mg tablet Take 10 mg by mouth nightly. 10/11/10   Provider, Historical   famotidine (PEPCID) 20 mg tablet Take 20 mg by mouth two (2) times a day. 10/11/10   Provider, Historical   gabapentin (NEURONTIN) 400 mg capsule  9/3/10   Provider, Historical   hydrocodone-acetaminophen (NORCO) 5-325 mg per tablet Take 1 Tab by mouth every six (6) hours as needed. 10/11/10   Provider, Historical   levothyroxine (SYNTHROID) 150 mcg tablet Take 150 mcg by mouth daily (before breakfast). 10/11/10   Provider, Historical   lorazepam (ATIVAN) 1 mg tablet  8/2/10   Provider, Historical   lovastatin (MEVACOR) 40 mg tablet Take 40 mg by mouth nightly. 10/11/10   Provider, Historical   metoprolol (LOPRESSOR) 100 mg tablet Take 100 mg by mouth two (2) times a day. 10/11/10   Provider, Historical   oxycodone-acetaminophen (PERCOCET 10)  mg per tablet  8/2/10   Provider, Historical   insulin glargine (LANTUS) 100 unit/mL injection 60 Units by SubCUTAneous route nightly.  10/11/10   Janiya Kelly MD     Allergies   Allergen Reactions    Nortriptyline Itching    Cymbalta [Duloxetine] Itching    Ivp [Fd And C Blue No.1] Hives    Morphine Itching    Tetracycline Itching        Review of Systems:  A comprehensive review of systems was negative except for that written in the History of Present Illness. Objective: Intake and Output:    No intake/output data recorded. 05/03 1901 - 05/05 0700  In: 250 [I.V.:250]  Out: -     Physical Exam:   Visit Vitals  BP (!) 154/98 (BP 1 Location: Left upper arm, BP Patient Position: At rest)   Pulse 87   Temp 97.3 °F (36.3 °C)   Resp 30   Ht 5' 1\" (1.549 m)   Wt 184 lb 12.8 oz (83.8 kg)   SpO2 97%   BMI 34.92 kg/m²     General:  Alert, cooperative, no distress. Head:  Normocephalic, without obvious abnormality, atraumatic. Eyes:  Conjunctivae/corneas clear. Pupils equal, round, reactive to light. Extraocular movements intact. Lungs:   Labored breathing, crackles, wheezing. Chest wall:  No tenderness or deformity. Heart:  Regular rate and rhythm, S1, S2 normal, no murmur, click, rub, or gallop. Possible murmur, difficult to auscultate with BiPAP   Abdomen:   Soft, non-tender. Bowel sounds normal. No masses. No organomegaly. Extremities: Extremities normal, atraumatic, no cyanosis. 1+ pitting edema of lower extremities    Pulses: 2+ and symmetric all extremities. Skin: Skin color, texture, turgor normal. No rashes or lesions. Lymph nodes: Cervical, supraclavicular, and axillary nodes normal.   Neurologic: CNII-XII intact. Normal strength, sensation, and reflexes throughout.        ECG:  ** Poor data quality, interpretation may be adversely affected**   Normal sinus rhythm   Right bundle branch block   Left anterior fascicular block   ** Bifascicular block **   Left ventricular hypertrophy with repolarization abnormality   Cannot rule out Septal infarct , age undetermined     Data Review:   Recent Results (from the past 24 hour(s))   EKG, 12 LEAD, INITIAL    Collection Time: 05/04/21 10:24 AM   Result Value Ref Range    Ventricular Rate 98 BPM    Atrial Rate 98 BPM    P-R Interval 168 ms    QRS Duration 152 ms    Q-T Interval 416 ms    QTC Calculation (Bezet) 531 ms    Calculated P Axis 68 degrees    Calculated R Axis -52 degrees    Calculated T Axis 94 degrees    Diagnosis       ** Poor data quality, interpretation may be adversely affected  Normal sinus rhythm  Right bundle branch block  Left anterior fascicular block  ** Bifascicular block **  Left ventricular hypertrophy with repolarization abnormality  Cannot rule out Septal infarct , age undetermined  Abnormal ECG  No previous ECGs available  Confirmed by Jhoana Marshall MD, Rip Smith ([id number]) on 5/4/2021 12:13:56 PM     SARS-COV-2    Collection Time: 05/04/21 10:30 AM   Result Value Ref Range    SARS-CoV-2 Please find results under separate order     COVID-19 RAPID TEST    Collection Time: 05/04/21 10:30 AM   Result Value Ref Range    Specimen source Nasopharyngeal      COVID-19 rapid test Not Detected Not Detected     LACTIC ACID    Collection Time: 05/04/21 11:12 AM   Result Value Ref Range    Lactic acid 0.6 0.4 - 2.0 mmol/L   MAGNESIUM    Collection Time: 05/04/21 11:12 AM   Result Value Ref Range    Magnesium 1.8 1.6 - 2.4 mg/dL   METABOLIC PANEL, COMPREHENSIVE    Collection Time: 05/04/21 11:12 AM   Result Value Ref Range    Sodium 129 (L) 136 - 145 mmol/L    Potassium 4.2 3.5 - 5.1 mmol/L    Chloride 97 97 - 108 mmol/L    CO2 28 21 - 32 mmol/L    Anion gap 4 (L) 5 - 15 mmol/L    Glucose 182 (H) 65 - 100 mg/dL    BUN 25 (H) 6 - 20 mg/dL    Creatinine 0.88 0.55 - 1.02 mg/dL    BUN/Creatinine ratio 28 (H) 12 - 20      GFR est AA >60 >60 ml/min/1.73m2    GFR est non-AA >60 >60 ml/min/1.73m2    Calcium 8.2 (L) 8.5 - 10.1 mg/dL    Bilirubin, total 0.4 0.2 - 1.0 mg/dL    AST (SGOT) 13 (L) 15 - 37 U/L    ALT (SGPT) 17 12 - 78 U/L    Alk.  phosphatase 68 45 - 117 U/L    Protein, total 6.5 6.4 - 8.2 g/dL    Albumin 3.0 (L) 3.5 - 5.0 g/dL    Globulin 3.5 2.0 - 4.0 g/dL    A-G Ratio 0.9 (L) 1.1 - 2.2     BNP    Collection Time: 05/04/21 11:12 AM   Result Value Ref Range    NT pro-BNP 7,360 (H) <450 pg/mL   PROCALCITONIN    Collection Time: 05/04/21 11:12 AM   Result Value Ref Range    Procalcitonin <0.05 (H) 0 ng/mL TROPONIN I    Collection Time: 05/04/21 11:12 AM   Result Value Ref Range    Troponin-I, Qt. <0.05 <0.05 ng/mL   TSH 3RD GENERATION    Collection Time: 05/04/21 11:12 AM   Result Value Ref Range    TSH 1.62 0.36 - 3.74 uIU/mL   CBC WITH AUTOMATED DIFF    Collection Time: 05/04/21 11:12 AM   Result Value Ref Range    WBC 14.2 (H) 3.6 - 11.0 K/uL    RBC 3.13 (L) 3.80 - 5.20 M/uL    HGB 10.0 (L) 11.5 - 16.0 g/dL    HCT 30.8 (L) 35.0 - 47.0 %    MCV 98.4 80.0 - 99.0 FL    MCH 31.9 26.0 - 34.0 PG    MCHC 32.5 30.0 - 36.5 g/dL    RDW 13.6 11.5 - 14.5 %    PLATELET 349 644 - 358 K/uL    MPV 11.7 8.9 - 12.9 FL    NRBC 0.0 0.0  WBC    ABSOLUTE NRBC 0.00 0.00 - 0.01 K/uL    NEUTROPHILS 87 (H) 32 - 75 %    LYMPHOCYTES 6 (L) 12 - 49 %    MONOCYTES 5 5 - 13 %    EOSINOPHILS 1 0 - 7 %    BASOPHILS 1 0 - 1 %    IMMATURE GRANULOCYTES 0 0 - 0.5 %    ABS. NEUTROPHILS 12.4 (H) 1.8 - 8.0 K/UL    ABS. LYMPHOCYTES 0.8 0.8 - 3.5 K/UL    ABS. MONOCYTES 0.7 0.0 - 1.0 K/UL    ABS. EOSINOPHILS 0.1 0.0 - 0.4 K/UL    ABS. BASOPHILS 0.1 0.0 - 0.1 K/UL    ABS. IMM.  GRANS. 0.1 (H) 0.00 - 0.04 K/UL    DF AUTOMATED     PROTHROMBIN TIME + INR    Collection Time: 05/04/21 11:25 AM   Result Value Ref Range    Prothrombin time 13.6 11.9 - 14.7 sec    INR 1.1 0.9 - 1.1     PTT    Collection Time: 05/04/21 11:25 AM   Result Value Ref Range    aPTT 21.8 21.2 - 34.1 sec    aPTT, therapeutic range   82 - 109 sec   D DIMER    Collection Time: 05/04/21 11:25 AM   Result Value Ref Range    D DIMER 0.50 (H) <0.50 ug/ml(FEU)   BLOOD GAS, ARTERIAL    Collection Time: 05/04/21 11:25 AM   Result Value Ref Range    pH 7.36 7.35 - 7.45      PCO2 52 (H) 35 - 45 mmHg    PO2 72 (L) 75 - 100 mmHg    O2 SAT 93 (L) >95 %    BICARBONATE 27 (H) 22 - 26 mmol/L    BASE EXCESS 2.9 (H) 0 - 2 mmol/L    O2 METHOD Nasal Cannula      O2 FLOW RATE 6 L/min    SITE Right Radial      EBEN'S TEST PASS         Chest x-ray:  Hazy and somewhat patchy reticular markings central and basilar lungs. Interstitial edema, possible cardiogenic cause and/or infectious/inflammatory  origin. Cardiac silhouette within normal limits. Atherosclerotic change thoracic  aorta. Small dependent pleural effusions, left greater than right. No  Pneumothorax.     Assessment:     Shortness of breath; likely due to COPD exacerbation   On 7L NC  On Duo-neb, symbicort, solu-medrol  BiPAP discontinued    Hyponatremia  Na 128    Congestive heart failure  CXR showed hazy/patchy reticular markings    Pleural effusions   Worse on left than right   Lasix 40mg currently     Anemia  Hgb 11.0    Leukocytosis   WBCs 21.3 (was 14.2)     Diabetes mellitus  Gluc 243    Hypertension     Hypothyroidism  TSH within normal limits     Depression       Plan:     Continue medications:   Duo-meb 3mL neb q6hrs  ASA 325mg po qday   Lipitor 10mg po qhs  Symbicort 2 puffs inhaled bid   Enoxaparin 40mg subq qday   Lexapro 10mg po qhs  Famotidine 20mg po bid   Lasix 40mg IV qday (Increase to 80mg or 40mg bid)  Lantus 60 units subq qhs  Humalog subq qid   Synthroid 150mcg po qam   Lisinopril 40mg po qday   Solu-medrol 40mg IV q6hrs  Metoprolol tartrate 100mg po bid   Zosyn 3.375mg IV q8hrs       Continue 7L NC and switch to BiPAP as required   Increase lasix dose     Pending labs:   Doppler  Echo   Blood cx     Continue to follow with   Pulmonology     Awaiting cardiology consult   Consult ID     Repeat CBC, CMP, ABG, BNP    Current Facility-Administered Medications:     albuterol-ipratropium (DUO-NEB) 2.5 MG-0.5 MG/3 ML, 3 mL, Nebulization, NOW, Beny Cheung MD, Stopped at 05/05/21 0100    hydrOXYzine pamoate (VISTARIL) capsule 25 mg, 25 mg, Oral, Q6H PRN, Beny Cheung MD, 25 mg at 05/05/21 0117    escitalopram oxalate (LEXAPRO) tablet 10 mg, 10 mg, Oral, QHS, Beny Cheung MD, 10 mg at 05/04/21 2213    levothyroxine (SYNTHROID) tablet 150 mcg, 150 mcg, Oral, ACB, Beny Cheung MD, 150 mcg at 05/05/21 5624   metoprolol tartrate (LOPRESSOR) tablet 100 mg, 100 mg, Oral, BID, Beny Cheung MD, 100 mg at 05/05/21 0836    insulin glargine (LANTUS) injection 60 Units, 60 Units, SubCUTAneous, QHS, Beny Cheung MD, 60 Units at 05/04/21 2213    lisinopriL (PRINIVIL, ZESTRIL) tablet 40 mg, 40 mg, Oral, DAILY, Beny Cheung MD, 40 mg at 05/05/21 0835    famotidine (PEPCID) tablet 20 mg, 20 mg, Oral, BID, Beny Cheung MD, 20 mg at 05/05/21 2455    atorvastatin (LIPITOR) tablet 10 mg, 10 mg, Oral, QHS, Beny Cheung MD, 10 mg at 05/04/21 2213    insulin lispro (HUMALOG) injection, , SubCUTAneous, AC&HS, Beny Cheung MD, 10 Units at 05/05/21 0730    glucose chewable tablet 16 g, 4 Tab, Oral, PRN, Itz Cheung MD    dextrose (D50W) injection syrg 12.5-25 g, 25-50 mL, IntraVENous, PRN, Itz Cheung MD    glucagon (GLUCAGEN) injection 1 mg, 1 mg, IntraMUSCular, PRN, Itz Cheung MD    acetaminophen (TYLENOL) tablet 650 mg, 650 mg, Oral, Q6H PRN, 650 mg at 05/05/21 0018 **OR** acetaminophen (TYLENOL) suppository 650 mg, 650 mg, Rectal, Q6H PRN, Itz Cheung MD    polyethylene glycol (MIRALAX) packet 17 g, 17 g, Oral, DAILY PRN, Itz Cheung MD    ondansetron (ZOFRAN ODT) tablet 4 mg, 4 mg, Oral, Q8H PRN **OR** ondansetron (ZOFRAN) injection 4 mg, 4 mg, IntraVENous, Q6H PRN, Beny Cheung MD    enoxaparin (LOVENOX) injection 40 mg, 40 mg, SubCUTAneous, DAILY, Beny Cheung MD, 40 mg at 05/05/21 0836    furosemide (LASIX) injection 40 mg, 40 mg, IntraVENous, DAILY, Beny Cheung MD, 40 mg at 05/05/21 0836    methylPREDNISolone (PF) (SOLU-MEDROL) injection 40 mg, 40 mg, IntraVENous, Q6H, Beny Cheung MD, 40 mg at 05/05/21 0619    albuterol-ipratropium (DUO-NEB) 2.5 MG-0.5 MG/3 ML, 3 mL, Nebulization, Q6H RT, Beny Cheung MD, 3 mL at 05/05/21 0758    piperacillin-tazobactam (ZOSYN) 3.375 g in 0.9% sodium chloride (MBP/ADV) 100 mL MBP, 3.375 g, IntraVENous, Q8H, Beny Cheung MD, Last Rate: 200 mL/hr at 05/05/21 0837, 3.375 g at 05/05/21 0837    budesonide-formoteroL (SYMBICORT) 160-4.5 mcg/actuation HFA inhaler 2 Puff, 2 Puff, Inhalation, BID, Beny Cheung MD, 2 Puff at 05/05/21 0815    aspirin tablet 325 mg, 325 mg, Oral, DAILY, Beny Cheung MD, 325 mg at 05/05/21 4309

## 2021-05-05 NOTE — PROGRESS NOTES
Nutrition Education    · Verbally reviewed information with Patient/daughter  · Educated on CHF nutrition therapy  · Written educational materials provided- Heart failure nutrition therapy handout  · Contact name and number provided. · Refer to Patient Education activity for more details. RD spoke to pt and daughter at bedside regarding heart healthy nutrition therapy. Pt reports her appetite has been poor the past x2/d since she has not been feeling too well. RD added ONS. Pt reports that she has been trying to follow a heart healthy diet PTA. Diet recall B- turkey ward, egg, cheese biscuit L- salad, turkey sandwich on white bread D- air fryer with chicken and french fries. Reports she has lost 41 lbs in the past year intentionally by cutting back on portions and increasing physical activity. RD reviewed heart healthy diet. Reviewed sodium restriction and food sources. Encouraged pt to monitor wt to watch for fluid retention. Reports recently started taking a diuretic per MD. Reviewed heart healthy foods to increase: whole grains, lean protein, low fat dairy, f/v, heart healthy fats. Discussed foods to limit for heart health. RD gave contact info and encouraged to reach out with further questions or concerns.      Electronically signed by Reggie Tsai RD on 5/5/2021 at 1:35 PM    Contact Number: Ext [phone]

## 2021-05-05 NOTE — PROGRESS NOTES
0224: Patient is having difficulty tolerating BiPAP due to anxiety. SpO2 dropped to 86% when mask was removed. WOB is improved with BiPAP, increased IPAP from 15 to 18. Nurse at bedside, patient is encouraged to keep BiPAP mask on.    0300: Called to the bedside by nursing, patient is trying to get BiPAP off. Upon arrival, patient had BiPAP mask off and SpO2 was 85% Placed patient on 9lpm NC ([place]). Explained to patient that if her breathing gets worse she will need to go back onto the BiPAP. Patient is very resistant to BiPAP equipment at this time. SpO2 recovered quickly once placed on NC. Discussed plan with nursing. 4651: Called to patient's bedside, patient requests to go back on BiPAP machine after getting SOB getting up to the restroom. Education provided on how the equipment works and that she will need to keep it on till her breathing improves.  Encouraged patient to try to tolerate equipment at least till breakfast, then re-assess going back to the NC.

## 2021-05-05 NOTE — ROUTINE PROCESS
.. TRANSFER - OUT REPORT:    Verbal report given to Moses on Lizet Mcclain  being transferred to [facility] for routine progression of care       Report consisted of patients Situation, Background, Assessment and   Recommendations(SBAR). Information from the following report(s) SBAR was reviewed with the receiving nurse. Lines:   Peripheral IV 05/04/21 Left Antecubital (Active)   Site Assessment Clean, dry, & intact 05/04/21 1037   Phlebitis Assessment 0 05/04/21 1037   Infiltration Assessment 0 05/04/21 1037   Dressing Status Clean, dry, & intact 05/04/21 1037   Dressing Type Transparent 05/04/21 1037   Hub Color/Line Status Pink;Flushed;Patent 05/04/21 1037        Opportunity for questions and clarification was provided.       Patient transported with:   [employer]

## 2021-05-05 NOTE — CONSULTS
PULMONARY NOTE  [facility]    Name: Rudy Orlando MRN: [id number]   : 1940 Hospital: [address] [place] [address]   Date: 2021  Admission date: 2021 Hospital Day: 2       HPI:     Hospital Problems  Never Reviewed          Codes Class Noted POA    CHF (congestive heart failure) ([facility]) ICD-10-CM: I50.9  ICD-9-CM: 428.0  2021 Unknown        COPD (chronic obstructive pulmonary disease) ([facility] ICD-10-CM: J44.9  ICD-9-CM: 496  2021 Unknown                   [x] High complexity decision making was performed  [x] See my orders for details      Subjective/Initial History:     I was asked by Tessy Mcclelland MD to see Rudy Orlando  a 80 y.o.  female in consultation     Excerpts from admission 2021 or consult notes as follows:   29-year-old lady came in because of shortness of breath and dyspnea she was told that she has COPD recently and she was giving albuterol inhaler did not seem to help so came to the emergency room she was hypoxic she was put on oxygen 5 L nasal cannula she was having dyspnea minimal exertion and also at rest she is a lifetime non-smoker, but she has been exposed to secondhand smoke her  used to smoke and all her children smoke she used to work on the farm as a farmer no chest pain no fever no chills.        Allergies   Allergen Reactions    Nortriptyline Itching    Cymbalta [Duloxetine] Itching    Ivp [Fd And C Blue No.1] Hives    Morphine Itching    Tetracycline Itching        MAR reviewed and pertinent medications noted or modified as needed     Current Facility-Administered Medications   Medication    albuterol-ipratropium (DUO-NEB) 2.5 MG-0.5 MG/3 ML    hydrOXYzine pamoate (VISTARIL) capsule 25 mg    escitalopram oxalate (LEXAPRO) tablet 10 mg    levothyroxine (SYNTHROID) tablet 150 mcg    metoprolol tartrate (LOPRESSOR) tablet 100 mg    insulin glargine (LANTUS) injection 60 Units    lisinopriL (PRINIVIL, ZESTRIL) tablet 40 mg    famotidine (PEPCID) tablet 20 mg    atorvastatin (LIPITOR) tablet 10 mg    insulin lispro (HUMALOG) injection    glucose chewable tablet 16 g    dextrose (D50W) injection syrg 12.5-25 g    glucagon (GLUCAGEN) injection 1 mg    acetaminophen (TYLENOL) tablet 650 mg    Or    acetaminophen (TYLENOL) suppository 650 mg    polyethylene glycol (MIRALAX) packet 17 g    ondansetron (ZOFRAN ODT) tablet 4 mg    Or    ondansetron (ZOFRAN) injection 4 mg    enoxaparin (LOVENOX) injection 40 mg    furosemide (LASIX) injection 40 mg    methylPREDNISolone (PF) (SOLU-MEDROL) injection 40 mg    albuterol-ipratropium (DUO-NEB) 2.5 MG-0.5 MG/3 ML    piperacillin-tazobactam (ZOSYN) 3.375 g in 0.9% sodium chloride (MBP/ADV) 100 mL MBP    budesonide-formoteroL (SYMBICORT) 160-4.5 mcg/actuation HFA inhaler 2 Puff    aspirin tablet 325 mg      Patient PCP: Gemma Wolfe MD  PMH:  has a past medical history of Depression, DM (diabetes mellitus) ([address] Hyperlipidemia, Hypertension, Pancreatitis, SOB (shortness of breath), and Thyroid disease. PSH:   has a past surgical history that includes hx hysterectomy. FHX: family history is not on file. SHX:  reports that she has never smoked. She has never used smokeless tobacco. She reports that she does not drink alcohol or use drugs. ROS:    Review of Systems   Constitutional: Negative. HENT: Negative. Eyes: Negative. Respiratory: Positive for cough, sputum production, shortness of breath and wheezing. Cardiovascular: Positive for orthopnea. Gastrointestinal: Negative. Genitourinary: Negative. Musculoskeletal: Negative. Skin: Negative. Neurological: Negative. Psychiatric/Behavioral: Negative.          Objective:     Vital Signs: Telemetry:    normal sinus rhythm Intake/Output:   Visit Vitals  BP (!) 154/98 (BP 1 Location: Left upper arm, BP Patient Position: At rest)   Pulse 87   Temp 97.3 °F (36.3 °C)   Resp 30   Ht 5' 1\" (1.549 m) Wt 83.8 kg (184 lb 12.8 oz)   SpO2 97%   BMI 34.92 kg/m²       Temp (24hrs), Av.7 °F (36.5 °C), Min:97.3 °F (36.3 °C), Max:98.1 °F (36.7 °C)        O2 Device: BIPAP O2 Flow Rate (L/min): 6 l/min       Wt Readings from Last 4 Encounters:   21 83.8 kg (184 lb 12.8 oz)   10/11/10 102.2 kg (225 lb 3.2 oz)          Intake/Output Summary (Last 24 hours) at 2021 0956  Last data filed at 2021 1238  Gross per 24 hour   Intake 250 ml   Output    Net 250 ml       Last shift:      No intake/output data recorded. Last 3 shifts: 1901 -  0700  In: 250 [I.V.:250]  Out: -        Physical Exam:     Physical Exam   Constitutional: She appears well-developed. HENT:   Head: Normocephalic and atraumatic. Eyes: Pupils are equal, round, and reactive to light. Conjunctivae are normal.   Neck: Normal range of motion. Neck supple. Cardiovascular: Normal rate and regular rhythm. Pulmonary/Chest: Breath sounds normal. She is in respiratory distress. Abdominal: Soft. Bowel sounds are normal.   Musculoskeletal: Normal range of motion. Neurological: She is alert. Skin: Skin is warm.         Labs:    Recent Labs     21  0638 21  1125 21  1112   WBC 21.3*  --  14.2*   HGB 11.0*  --  10.0*     --  313   INR  --  1.1  --    APTT  --  21.8  --      Recent Labs     21  0638 21  1112   * 129*   K 4.3 4.2   CL 94* 97   CO2 25 28   * 182*   BUN 34* 25*   CREA 1.26* 0.88   CA 9.0 8.2*   MG  --  1.8   LAC  --  0.6   ALB 3.1* 3.0*   ALT 21 17     Recent Labs     21  1933 21  1125   PH 7.43 7.36   PCO2 45 52*   PO2 120* 72*   HCO3 29* 27*   FIO2 40.0  --      Recent Labs     21  1600 21  1112   TROIQ 0.15* <0.05     No results found for: BNPP, BNP   No results found for: CULT  Lab Results   Component Value Date/Time    TSH 1.62 2021 11:12 AM       Imaging:    CXR Results  (Last 48 hours)               21 1050  XR CHEST SNGL V Final result    Narrative:  Chest single view. Hazy and somewhat patchy reticular markings central and basilar lungs. Interstitial edema, possible cardiogenic cause and/or infectious/inflammatory   origin. Cardiac silhouette within normal limits. Atherosclerotic change thoracic   aorta. Small dependent pleural effusions, left greater than right. No   pneumothorax. Asymmetric advanced DJD with nonacute fracture deformity proximal left humerus. IMPRESSION:     1. Chronic Obstructive Pulmonary Disease with Severe Acute Exacerbation requiring inpatient hospitalization and management; has very poor airway clearance. Increased work of breathing  Body mass index is 34.92 kg/m². 2. Acute hypoxic and hypercapnic respiratory failure   3. Hyponatremia  4. Congestive heart failure  5. Pleural effusion more on the left side  6. Pt is requiring Drug therapy requiring intensive monitoring for toxicity  7. Pt is unstable, unpredictable needing inpatient monitoring; is acutely ill and at high risk of sudden decline and decompensation with severe consequenses and continued end organ dysfunction and failure  8. Prognosis guarded       RECOMMENDATIONS/PLAN:     1. BIPAP for non invasive ventilatory life support to prevent worsening respiratory acidosis  2. Patient on IV Solu-Medrol and nebulizer treatment  3. Repeat arterial blood gases shows normal PCO2 and will try to wean her from noninvasive ventilator BiPAP machine  4. Intubate and place on vent if NIV fails  5. Chest x-ray shows congestive changes we will repeat chest x-ray  6. Supplemental O2 to keep sats > 93%  7. Aspiration precautions  8. Labs to follow electrolytes, renal function and and blood counts  9. Glucose monitoring and SSI  10. Bronchial hygiene with respiratory therapy techniques, bronchodilators  11.  DVT, SUP prophylaxis             Leyla Meza MD

## 2021-05-05 NOTE — PROGRESS NOTES
Hospitalist Progress Note    Subjective:     Allen Juarez is a 81 yo female with a PMHx significant for DM, HLD, HTN, thyroid disease, and depression, who presents to the ED with shortness of breath for the past 1-2 days due to a severe acute exacerbation of COPD, acute hypoxic/hypercapnic respiratory failure, and CHF. She also has a bilateral pleural effusion (worse on left), interstitial edema (cardiogenic or infectious cause), and hyponatremia.   ___________________________________________________________    Today, the patient presents severely short of breath with labored breathing and wheezing, on 7L NC. She states that she feels the same as yesterday and is still struggling to breathe. Last night she had difficulty tolerating BiPAP and SpO2 dropped to 85-86% when mask was removed. The BiPAP was replaced, and then discontinued by Dr. Carla Menon according to the respiratory therapist. The patient is now on 7L NC although she occasionally removes it. She had some nausea this morning but it has subsided. She denies any chest pain, coughing, lightheadedness, dizziness, vomiting, fever, or chills. Significant labs:   WBC 21.3 (14.2 yesterday)  Na 128 (129 yesterday)  Gluc 234  BNP 9,690 (7,360)  BUN 34 (25 yesterday)  Cr 1.26 (0.88 yesterday)  Hgb 11.0 (10.0 yesterday)  Procalcitonin (+)     UA:   Gluc 50  Ketone 5    ABG: on BiPAP  PH 7.43  PCO2 45  PO2 120  Bicarb 29    CXR:   Hazy and somewhat patchy reticular markings central and basilar lungs. Interstitial edema, possible cardiogenic cause and/or infectious/inflammatory  origin. Small dependent pleural effusions, left greater than right. Past Medical History:   Diagnosis Date    Depression     [facility]di[facility] mellitus) [facility]     Hyperlipidemia     Hypertension     Pancreatitis     SOB (shortness of breath)     Thyroid disease       Past Surgical History:   Procedure Laterality Date    HX HYSTERECTOMY       History reviewed.  No pertinent family history. Social History     Tobacco Use    Smoking status: Never Smoker    Smokeless tobacco: Never Used   Substance Use Topics    Alcohol use: No       Prior to Admission medications    Medication Sig Start Date End Date Taking? Authorizing Provider   Insulin Needles, Disposable, 31 X 5/16 \" Ndle by Does Not Apply route. 10/11/10   Cory Huntley MD   insulin lispro, human, (HUMALOG KWIKPEN) 100 unit/mL flexpen 15 Units by SubCUTAneous route three (3) times daily (with meals). 10/13/10   Cory Huntley MD   benazepril (LOTENSIN) 40 mg tablet Take 40 mg by mouth daily. 10/11/10   Provider, Historical   LEXAPRO 10 mg tablet Take 10 mg by mouth nightly. 10/11/10   Provider, Historical   famotidine (PEPCID) 20 mg tablet Take 20 mg by mouth two (2) times a day. 10/11/10   Provider, Historical   gabapentin (NEURONTIN) 400 mg capsule  9/3/10   Provider, Historical   hydrocodone-acetaminophen (NORCO) 5-325 mg per tablet Take 1 Tab by mouth every six (6) hours as needed. 10/11/10   Provider, Historical   levothyroxine (SYNTHROID) 150 mcg tablet Take 150 mcg by mouth daily (before breakfast). 10/11/10   Provider, Historical   lorazepam (ATIVAN) 1 mg tablet  8/2/10   Provider, Historical   lovastatin (MEVACOR) 40 mg tablet Take 40 mg by mouth nightly. 10/11/10   Provider, Historical   metoprolol (LOPRESSOR) 100 mg tablet Take 100 mg by mouth two (2) times a day. 10/11/10   Provider, Historical   oxycodone-acetaminophen (PERCOCET 10)  mg per tablet  8/2/10   Provider, Historical   insulin glargine (LANTUS) 100 unit/mL injection 60 Units by SubCUTAneous route nightly.  10/11/10   Cory Huntley MD     Allergies   Allergen Reactions    Nortriptyline Itching    Cymbalta [Duloxetine] Itching    Ivp [Fd And C Blue No.1] Hives    Morphine Itching    Tetracycline Itching        Review of Systems:  A comprehensive review of systems was negative except for that written in the History of Present Illness. Objective: Intake and Output:    No intake/output data recorded. 05/03 1901 - 05/05 0700  In: 250 [I.V.:250]  Out: -     Physical Exam:   Visit Vitals  BP (!) 154/98   Pulse 87   Temp 97.3 °F (36.3 °C)   Resp 30   Ht 5' 1\" (1.549 m)   Wt 83 kg (182 lb 14.4 oz)   SpO2 97%   BMI 34.56 kg/m²     General:  Alert, cooperative, no distress. Head:  Normocephalic, without obvious abnormality, atraumatic. Eyes:  Conjunctivae/corneas clear. Pupils equal, round, reactive to light. Extraocular movements intact. Lungs:    Decreased breath sounds , crackles, wheezing. Chest wall:  No tenderness or deformity. Heart:  Regular rate and rhythm, S1, S2 normal, no murmur, click, rub, or gallop. Possible murmur, difficult to auscultate with BiPAP   Abdomen:   Soft, non-tender. Bowel sounds normal. No masses. No organomegaly. Extremities: Extremities normal, atraumatic, no cyanosis. 1+ pitting edema of lower extremities    Pulses: 2+ and symmetric all extremities. Skin: Skin color, texture, turgor normal. No rashes or lesions. Lymph nodes: Cervical, supraclavicular, and axillary nodes normal.   Neurologic: CNII-XII intact. Normal strength, sensation, and reflexes throughout.        ECG:  ** Poor data quality, interpretation may be adversely affected**   Normal sinus rhythm   Right bundle branch block   Left anterior fascicular block   ** Bifascicular block **   Left ventricular hypertrophy with repolarization abnormality   Cannot rule out Septal infarct , age undetermined     Data Review:   Recent Results (from the past 24 hour(s))   EKG, 12 LEAD, INITIAL    Collection Time: 05/04/21 10:24 AM   Result Value Ref Range    Ventricular Rate 98 BPM    Atrial Rate 98 BPM    P-R Interval 168 ms    QRS Duration 152 ms    Q-T Interval 416 ms    QTC Calculation (Bezet) 531 ms    Calculated P Axis 68 degrees    Calculated R Axis -52 degrees    Calculated T Axis 94 degrees    Diagnosis       ** Poor data quality, interpretation may be adversely affected  Normal sinus rhythm  Right bundle branch block  Left anterior fascicular block  ** Bifascicular block **  Left ventricular hypertrophy with repolarization abnormality  Cannot rule out Septal infarct , age undetermined  Abnormal ECG  No previous ECGs available  Confirmed by Lisbeth Vila MD, Nicol Serna ([id number]) on 5/4/2021 12:13:56 PM     SARS-COV-2    Collection Time: 05/04/21 10:30 AM   Result Value Ref Range    SARS-CoV-2 Please find results under separate order     COVID-19 RAPID TEST    Collection Time: 05/04/21 10:30 AM   Result Value Ref Range    Specimen source Nasopharyngeal      COVID-19 rapid test Not Detected Not Detected     LACTIC ACID    Collection Time: 05/04/21 11:12 AM   Result Value Ref Range    Lactic acid 0.6 0.4 - 2.0 mmol/L   MAGNESIUM    Collection Time: 05/04/21 11:12 AM   Result Value Ref Range    Magnesium 1.8 1.6 - 2.4 mg/dL   METABOLIC PANEL, COMPREHENSIVE    Collection Time: 05/04/21 11:12 AM   Result Value Ref Range    Sodium 129 (L) 136 - 145 mmol/L    Potassium 4.2 3.5 - 5.1 mmol/L    Chloride 97 97 - 108 mmol/L    CO2 28 21 - 32 mmol/L    Anion gap 4 (L) 5 - 15 mmol/L    Glucose 182 (H) 65 - 100 mg/dL    BUN 25 (H) 6 - 20 mg/dL    Creatinine 0.88 0.55 - 1.02 mg/dL    BUN/Creatinine ratio 28 (H) 12 - 20      GFR est AA >60 >60 ml/min/1.73m2    GFR est non-AA >60 >60 ml/min/1.73m2    Calcium 8.2 (L) 8.5 - 10.1 mg/dL    Bilirubin, total 0.4 0.2 - 1.0 mg/dL    AST (SGOT) 13 (L) 15 - 37 U/L    ALT (SGPT) 17 12 - 78 U/L    Alk.  phosphatase 68 45 - 117 U/L    Protein, total 6.5 6.4 - 8.2 g/dL    Albumin 3.0 (L) 3.5 - 5.0 g/dL    Globulin 3.5 2.0 - 4.0 g/dL    A-G Ratio 0.9 (L) 1.1 - 2.2     BNP    Collection Time: 05/04/21 11:12 AM   Result Value Ref Range    NT pro-BNP 7,360 (H) <450 pg/mL   PROCALCITONIN    Collection Time: 05/04/21 11:12 AM   Result Value Ref Range    Procalcitonin <0.05 (H) 0 ng/mL   TROPONIN I    Collection Time: 05/04/21 11:12 AM   Result Value Ref Range    Troponin-I, Qt. <0.05 <0.05 ng/mL   TSH 3RD GENERATION    Collection Time: 05/04/21 11:12 AM   Result Value Ref Range    TSH 1.62 0.36 - 3.74 uIU/mL   CBC WITH AUTOMATED DIFF    Collection Time: 05/04/21 11:12 AM   Result Value Ref Range    WBC 14.2 (H) 3.6 - 11.0 K/uL    RBC 3.13 (L) 3.80 - 5.20 M/uL    HGB 10.0 (L) 11.5 - 16.0 g/dL    HCT 30.8 (L) 35.0 - 47.0 %    MCV 98.4 80.0 - 99.0 FL    MCH 31.9 26.0 - 34.0 PG    MCHC 32.5 30.0 - 36.5 g/dL    RDW 13.6 11.5 - 14.5 %    PLATELET 835 330 - 409 K/uL    MPV 11.7 8.9 - 12.9 FL    NRBC 0.0 0.0  WBC    ABSOLUTE NRBC 0.00 0.00 - 0.01 K/uL    NEUTROPHILS 87 (H) 32 - 75 %    LYMPHOCYTES 6 (L) 12 - 49 %    MONOCYTES 5 5 - 13 %    EOSINOPHILS 1 0 - 7 %    BASOPHILS 1 0 - 1 %    IMMATURE GRANULOCYTES 0 0 - 0.5 %    ABS. NEUTROPHILS 12.4 (H) 1.8 - 8.0 K/UL    ABS. LYMPHOCYTES 0.8 0.8 - 3.5 K/UL    ABS. MONOCYTES 0.7 0.0 - 1.0 K/UL    ABS. EOSINOPHILS 0.1 0.0 - 0.4 K/UL    ABS. BASOPHILS 0.1 0.0 - 0.1 K/UL    ABS. IMM. GRANS. 0.1 (H) 0.00 - 0.04 K/UL    DF AUTOMATED     PROTHROMBIN TIME + INR    Collection Time: 05/04/21 11:25 AM   Result Value Ref Range    Prothrombin time 13.6 11.9 - 14.7 sec    INR 1.1 0.9 - 1.1     PTT    Collection Time: 05/04/21 11:25 AM   Result Value Ref Range    aPTT 21.8 21.2 - 34.1 sec    aPTT, therapeutic range   82 - 109 sec   D DIMER    Collection Time: 05/04/21 11:25 AM   Result Value Ref Range    D DIMER 0.50 (H) <0.50 ug/ml(FEU)   BLOOD GAS, ARTERIAL    Collection Time: 05/04/21 11:25 AM   Result Value Ref Range    pH 7.36 7.35 - 7.45      PCO2 52 (H) 35 - 45 mmHg    PO2 72 (L) 75 - 100 mmHg    O2 SAT 93 (L) >95 %    BICARBONATE 27 (H) 22 - 26 mmol/L    BASE EXCESS 2.9 (H) 0 - 2 mmol/L    O2 METHOD Nasal Cannula      O2 FLOW RATE 6 L/min    SITE Right Radial      EBEN'S TEST PASS         Chest x-ray:  Hazy and somewhat patchy reticular markings central and basilar lungs.   Interstitial edema, possible cardiogenic cause and/or infectious/inflammatory  origin. Cardiac silhouette within normal limits. Atherosclerotic change thoracic  aorta. Small dependent pleural effusions, left greater than right. No  Pneumothorax.     Assessment:   Acute hypoxemic respiratory failure  COPD exacerbation   On 7L NC  On Duo-neb, symbicort, solu-medrol  BiPAP discontinued    Hyponatremia  Na 128    Congestive heart failure  CXR showed hazy/patchy reticular markings    Pleural effusions   Worse on left than right   Lasix 40 mg IV twice daily    Anemia  Hgb 11.0    Leukocytosis   WBCs 21.3 (was 14.2)     Diabetes mellitus  Gluc 243    Hypertension     Hypothyroidism  TSH within normal limits     Depression     Echo done shows ejection fraction of 40 to 45% and moderate grade 2 diastolic dysfunction    Dopplers negative for DVT  Plan:     Continue medications:   Duo-meb 3mL neb q6hrs  ASA 325mg po qday   Lipitor 10mg po qhs  Symbicort 2 puffs inhaled bid   Enoxaparin 40mg subq qday   Lexapro 10mg po qhs  Famotidine 20mg po bid   Lasix 40mg IV 40mg bid)  Lantus 60 units subq qhs  Humalog subq qid   Synthroid 150mcg po qam   Lisinopril 40mg po qday   Solu-medrol 40mg IV q6hrs  Metoprolol tartrate 100mg po bid   Zosyn 3.375mg IV q8hrs               Blood cx so far negative    Continue to follow with   Pulmonology         Repeat CBC, CMP, ABG, BNP    Current Facility-Administered Medications:     hydrOXYzine pamoate (VISTARIL) capsule 25 mg, 25 mg, Oral, Q6H PRN, Beny Cheung MD, 25 mg at 05/05/21 0117    furosemide (LASIX) injection 40 mg, 40 mg, IntraVENous, BID, Noé Bunch MD    escitalopram oxalate (LEXAPRO) tablet 10 mg, 10 mg, Oral, QHS, Beny Cheung MD, 10 mg at 05/04/21 2213    levothyroxine (SYNTHROID) tablet 150 mcg, 150 mcg, Oral, ACB, Beny Cheung MD, 150 mcg at 05/05/21 0835    metoprolol tartrate (LOPRESSOR) tablet 100 mg, 100 mg, Oral, BID, Beny Cheung MD, 100 mg at 05/05/21 0836    insulin glargine (LANTUS) injection 60 Units, 60 Units, SubCUTAneous, QHS, Beny Cheung MD, 60 Units at 05/04/21 2213    [Held by provider] lisinopriL (PRINIVIL, ZESTRIL) tablet 40 mg, 40 mg, Oral, DAILY, Beny Cheung MD, 40 mg at 05/05/21 0835    famotidine (PEPCID) tablet 20 mg, 20 mg, Oral, BID, Sky Cheung MD, 20 mg at 05/05/21 9867    atorvastatin (LIPITOR) tablet 10 mg, 10 mg, Oral, QHS, Beny Cheung MD, 10 mg at 05/04/21 2213    insulin lispro (HUMALOG) injection, , SubCUTAneous, AC&HS, Beny Cheung MD, 3 Units at 05/05/21 1130    glucose chewable tablet 16 g, 4 Tab, Oral, PRN, Sky Cheung MD    dextrose (D50W) injection syrg 12.5-25 g, 25-50 mL, IntraVENous, PRN, Sky Cheung MD    glucagon (GLUCAGEN) injection 1 mg, 1 mg, IntraMUSCular, PRN, Sky Cheung MD    acetaminophen (TYLENOL) tablet 650 mg, 650 mg, Oral, Q6H PRN, 650 mg at 05/05/21 0018 **OR** acetaminophen (TYLENOL) suppository 650 mg, 650 mg, Rectal, Q6H PRN, Sky Cheung MD    polyethylene glycol (MIRALAX) packet 17 g, 17 g, Oral, DAILY PRN, Sky Cheung MD    ondansetron (ZOFRAN ODT) tablet 4 mg, 4 mg, Oral, Q8H PRN **OR** ondansetron (ZOFRAN) injection 4 mg, 4 mg, IntraVENous, Q6H PRN, Beny Cheung MD    enoxaparin (LOVENOX) injection 40 mg, 40 mg, SubCUTAneous, DAILY, Beny Cheung MD, 40 mg at 05/05/21 0836    methylPREDNISolone (PF) (SOLU-MEDROL) injection 40 mg, 40 mg, IntraVENous, Q6H, Beny Cheung MD, 40 mg at 05/05/21 1200    albuterol-ipratropium (DUO-NEB) 2.5 MG-0.5 MG/3 ML, 3 mL, Nebulization, Q6H RT, Beny Cheung MD, 3 mL at 05/05/21 1334    piperacillin-tazobactam (ZOSYN) 3.375 g in 0.9% sodium chloride (MBP/ADV) 100 mL MBP, 3.375 g, IntraVENous, Q8H, Beny Cheung MD, Last Rate: 200 mL/hr at 05/05/21 0837, 3.375 g at 05/05/21 0837    budesonide-formoteroL (SYMBICORT) 160-4.5 mcg/actuation HFA inhaler 2 Puff, 2 Puff, Inhalation, BID, Sky Cheung MD, 2 Zelda at 05/05/21 0830    aspirin tablet 325 mg, 325 mg, Oral, DAILY, Beny Cheung MD, 325 mg at 05/05/21 8728

## 2021-05-06 LAB
ALBUMIN SERPL-MCNC: 3.1 G/DL (ref 3.5–5)
ALBUMIN/GLOB SERPL: 0.8 {RATIO} (ref 1.1–2.2)
ALP SERPL-CCNC: 65 U/L (ref 45–117)
ALT SERPL-CCNC: 32 U/L (ref 12–78)
ANION GAP SERPL CALC-SCNC: 10 MMOL/L (ref 5–15)
AST SERPL W P-5'-P-CCNC: 48 U/L (ref 15–37)
BASOPHILS # BLD: 0 K/UL (ref 0–0.1)
BASOPHILS NFR BLD: 0 % (ref 0–1)
BILIRUB SERPL-MCNC: 0.4 MG/DL (ref 0.2–1)
BUN SERPL-MCNC: 42 MG/DL (ref 6–20)
BUN/CREAT SERPL: 35 (ref 12–20)
CA-I BLD-MCNC: 8.6 MG/DL (ref 8.5–10.1)
CHLORIDE SERPL-SCNC: 92 MMOL/L (ref 97–108)
CO2 SERPL-SCNC: 30 MMOL/L (ref 21–32)
CREAT SERPL-MCNC: 1.2 MG/DL (ref 0.55–1.02)
DIFFERENTIAL METHOD BLD: ABNORMAL
EOSINOPHIL # BLD: 0 K/UL (ref 0–0.4)
EOSINOPHIL NFR BLD: 0 % (ref 0–7)
ERYTHROCYTE [DISTWIDTH] IN BLOOD BY AUTOMATED COUNT: 13 % (ref 11.5–14.5)
GLOBULIN SER CALC-MCNC: 3.8 G/DL (ref 2–4)
GLUCOSE BLD STRIP.AUTO-MCNC: 153 MG/DL (ref 65–100)
GLUCOSE BLD STRIP.AUTO-MCNC: 182 MG/DL (ref 65–100)
GLUCOSE BLD STRIP.AUTO-MCNC: 183 MG/DL (ref 65–100)
GLUCOSE BLD STRIP.AUTO-MCNC: 275 MG/DL (ref 65–100)
GLUCOSE SERPL-MCNC: 226 MG/DL (ref 65–100)
HCT VFR BLD AUTO: 33.6 % (ref 35–47)
HGB BLD-MCNC: 11.3 G/DL (ref 11.5–16)
IMM GRANULOCYTES # BLD AUTO: 0.2 K/UL (ref 0–0.04)
IMM GRANULOCYTES NFR BLD AUTO: 1 % (ref 0–0.5)
LYMPHOCYTES # BLD: 1 K/UL (ref 0.8–3.5)
LYMPHOCYTES NFR BLD: 4 % (ref 12–49)
MCH RBC QN AUTO: 31.6 PG (ref 26–34)
MCHC RBC AUTO-ENTMCNC: 33.6 G/DL (ref 30–36.5)
MCV RBC AUTO: 93.9 FL (ref 80–99)
MONOCYTES # BLD: 0.9 K/UL (ref 0–1)
MONOCYTES NFR BLD: 3 % (ref 5–13)
NEUTS SEG # BLD: 25.2 K/UL (ref 1.8–8)
NEUTS SEG NFR BLD: 92 % (ref 32–75)
NRBC # BLD: 0 K/UL (ref 0–0.01)
NRBC BLD-RTO: 0 PER 100 WBC
PERFORMED BY, TECHID: ABNORMAL
PLATELET # BLD AUTO: 412 K/UL (ref 150–400)
PMV BLD AUTO: 11.4 FL (ref 8.9–12.9)
POTASSIUM SERPL-SCNC: 2.6 MMOL/L (ref 3.5–5.1)
POTASSIUM SERPL-SCNC: 2.6 MMOL/L (ref 3.5–5.1)
POTASSIUM SERPL-SCNC: 3.3 MMOL/L (ref 3.5–5.1)
PROT SERPL-MCNC: 6.9 G/DL (ref 6.4–8.2)
RBC # BLD AUTO: 3.58 M/UL (ref 3.8–5.2)
SODIUM SERPL-SCNC: 132 MMOL/L (ref 136–145)
TROPONIN I SERPL-MCNC: 0.21 NG/ML
WBC # BLD AUTO: 27.3 K/UL (ref 3.6–11)

## 2021-05-06 PROCEDURE — 94640 AIRWAY INHALATION TREATMENT: CPT

## 2021-05-06 PROCEDURE — 36415 COLL VENOUS BLD VENIPUNCTURE: CPT

## 2021-05-06 PROCEDURE — 84484 ASSAY OF TROPONIN QUANT: CPT

## 2021-05-06 PROCEDURE — 84132 ASSAY OF SERUM POTASSIUM: CPT

## 2021-05-06 PROCEDURE — 82962 GLUCOSE BLOOD TEST: CPT

## 2021-05-06 PROCEDURE — 74011250636 HC RX REV CODE- 250/636: Performed by: INTERNAL MEDICINE

## 2021-05-06 PROCEDURE — 74011250637 HC RX REV CODE- 250/637: Performed by: FAMILY MEDICINE

## 2021-05-06 PROCEDURE — 74011000258 HC RX REV CODE- 258: Performed by: FAMILY MEDICINE

## 2021-05-06 PROCEDURE — 94760 N-INVAS EAR/PLS OXIMETRY 1: CPT

## 2021-05-06 PROCEDURE — 74011250636 HC RX REV CODE- 250/636: Performed by: FAMILY MEDICINE

## 2021-05-06 PROCEDURE — 85025 COMPLETE CBC W/AUTO DIFF WBC: CPT

## 2021-05-06 PROCEDURE — 65270000029 HC RM PRIVATE

## 2021-05-06 PROCEDURE — 74011636637 HC RX REV CODE- 636/637: Performed by: FAMILY MEDICINE

## 2021-05-06 PROCEDURE — 77010033678 HC OXYGEN DAILY

## 2021-05-06 PROCEDURE — 74011000250 HC RX REV CODE- 250: Performed by: FAMILY MEDICINE

## 2021-05-06 RX ORDER — POTASSIUM CHLORIDE 20 MEQ/1
40 TABLET, EXTENDED RELEASE ORAL EVERY 4 HOURS
Status: COMPLETED | OUTPATIENT
Start: 2021-05-06 | End: 2021-05-06

## 2021-05-06 RX ORDER — POTASSIUM CHLORIDE 7.45 MG/ML
10 INJECTION INTRAVENOUS
Status: COMPLETED | OUTPATIENT
Start: 2021-05-06 | End: 2021-05-06

## 2021-05-06 RX ORDER — POTASSIUM CHLORIDE 7.45 MG/ML
10 INJECTION INTRAVENOUS
Status: DISCONTINUED | OUTPATIENT
Start: 2021-05-06 | End: 2021-05-06

## 2021-05-06 RX ORDER — PREDNISONE 5 MG/1
10 TABLET ORAL
Status: DISCONTINUED | OUTPATIENT
Start: 2021-05-07 | End: 2021-05-16

## 2021-05-06 RX ADMIN — FAMOTIDINE 20 MG: 20 TABLET ORAL at 08:40

## 2021-05-06 RX ADMIN — FAMOTIDINE 20 MG: 20 TABLET ORAL at 20:38

## 2021-05-06 RX ADMIN — IPRATROPIUM BROMIDE AND ALBUTEROL SULFATE 3 ML: .5; 2.5 SOLUTION RESPIRATORY (INHALATION) at 01:03

## 2021-05-06 RX ADMIN — METHYLPREDNISOLONE SODIUM SUCCINATE 40 MG: 40 INJECTION, POWDER, FOR SOLUTION INTRAMUSCULAR; INTRAVENOUS at 05:50

## 2021-05-06 RX ADMIN — METHYLPREDNISOLONE SODIUM SUCCINATE 40 MG: 40 INJECTION, POWDER, FOR SOLUTION INTRAMUSCULAR; INTRAVENOUS at 11:59

## 2021-05-06 RX ADMIN — INSULIN GLARGINE 60 UNITS: 100 INJECTION, SOLUTION SUBCUTANEOUS at 21:00

## 2021-05-06 RX ADMIN — IPRATROPIUM BROMIDE AND ALBUTEROL SULFATE 3 ML: .5; 2.5 SOLUTION RESPIRATORY (INHALATION) at 19:16

## 2021-05-06 RX ADMIN — POTASSIUM CHLORIDE 10 MEQ: 7.46 INJECTION, SOLUTION INTRAVENOUS at 17:48

## 2021-05-06 RX ADMIN — POTASSIUM CHLORIDE 40 MEQ: 1500 TABLET, EXTENDED RELEASE ORAL at 20:39

## 2021-05-06 RX ADMIN — IPRATROPIUM BROMIDE AND ALBUTEROL SULFATE 3 ML: .5; 2.5 SOLUTION RESPIRATORY (INHALATION) at 09:03

## 2021-05-06 RX ADMIN — POTASSIUM CHLORIDE 40 MEQ: 1500 TABLET, EXTENDED RELEASE ORAL at 12:19

## 2021-05-06 RX ADMIN — BUDESONIDE AND FORMOTEROL FUMARATE DIHYDRATE 2 PUFF: 160; 4.5 AEROSOL RESPIRATORY (INHALATION) at 09:03

## 2021-05-06 RX ADMIN — LEVOTHYROXINE SODIUM 150 MCG: 75 TABLET ORAL at 08:40

## 2021-05-06 RX ADMIN — HYDROXYZINE PAMOATE 25 MG: 25 CAPSULE ORAL at 03:14

## 2021-05-06 RX ADMIN — INSULIN LISPRO 2 UNITS: 100 INJECTION, SOLUTION INTRAVENOUS; SUBCUTANEOUS at 16:28

## 2021-05-06 RX ADMIN — BUDESONIDE AND FORMOTEROL FUMARATE DIHYDRATE 2 PUFF: 160; 4.5 AEROSOL RESPIRATORY (INHALATION) at 19:17

## 2021-05-06 RX ADMIN — ASPIRIN 325 MG ORAL TABLET 325 MG: 325 PILL ORAL at 08:40

## 2021-05-06 RX ADMIN — POTASSIUM CHLORIDE 40 MEQ: 1500 TABLET, EXTENDED RELEASE ORAL at 16:08

## 2021-05-06 RX ADMIN — IPRATROPIUM BROMIDE AND ALBUTEROL SULFATE 3 ML: .5; 2.5 SOLUTION RESPIRATORY (INHALATION) at 13:28

## 2021-05-06 RX ADMIN — INSULIN LISPRO 10 UNITS: 100 INJECTION, SOLUTION INTRAVENOUS; SUBCUTANEOUS at 11:30

## 2021-05-06 RX ADMIN — PIPERACILLIN AND TAZOBACTAM 3.38 G: 3; .375 INJECTION, POWDER, LYOPHILIZED, FOR SOLUTION INTRAVENOUS at 08:40

## 2021-05-06 RX ADMIN — METHYLPREDNISOLONE SODIUM SUCCINATE 40 MG: 40 INJECTION, POWDER, FOR SOLUTION INTRAMUSCULAR; INTRAVENOUS at 00:06

## 2021-05-06 RX ADMIN — ESCITALOPRAM OXALATE 10 MG: 10 TABLET ORAL at 20:38

## 2021-05-06 RX ADMIN — ENOXAPARIN SODIUM 40 MG: 40 INJECTION SUBCUTANEOUS at 08:40

## 2021-05-06 RX ADMIN — POTASSIUM CHLORIDE 10 MEQ: 7.46 INJECTION, SOLUTION INTRAVENOUS at 16:08

## 2021-05-06 RX ADMIN — PIPERACILLIN AND TAZOBACTAM 3.38 G: 3; .375 INJECTION, POWDER, LYOPHILIZED, FOR SOLUTION INTRAVENOUS at 00:06

## 2021-05-06 RX ADMIN — FUROSEMIDE 40 MG: 10 INJECTION, SOLUTION INTRAMUSCULAR; INTRAVENOUS at 08:41

## 2021-05-06 RX ADMIN — ATORVASTATIN CALCIUM 10 MG: 10 TABLET, FILM COATED ORAL at 20:39

## 2021-05-06 RX ADMIN — INSULIN LISPRO 3 UNITS: 100 INJECTION, SOLUTION INTRAVENOUS; SUBCUTANEOUS at 07:30

## 2021-05-06 RX ADMIN — PIPERACILLIN AND TAZOBACTAM 3.38 G: 3; .375 INJECTION, POWDER, LYOPHILIZED, FOR SOLUTION INTRAVENOUS at 16:15

## 2021-05-06 NOTE — PROGRESS NOTES
Hospitalist Progress Note    Subjective:     Michele Wilcox is a 81 yo female with a PMHx significant for DM, HLD, HTN, thyroid disease, and depression, who presents to the ED with shortness of breath for the past 1-2 days due to a severe acute exacerbation of COPD, acute hypoxic/hypercapnic respiratory failure, and CHF. She also has a bilateral pleural effusion (worse on left), interstitial edema (cardiogenic or infectious cause), and hyponatremia.   ___________________________________________________________    Today, the patient presents awake and in no acute distress, sitting up in bed on 3L NC. She is able to speak without difficulty and is not wheezing. She does appear somewhat confused but is oriented to person and place. The patient does have restraints on due to agitation and aggressive behavior last night. She was given vistaril last night. No BiPAP due to restraints. She denies any shortness of breath, chest pain, nausea, vomiting, dizziness, or coughing. The sitter stated that son had been visiting and mentioned that the patient lives at home alone. RN note:  HR dropped to 42 and had a heart block overnight. Ordered to discontinue metoprolol. Pulmonology consult:   Continue on IV solu-medrol and neb tx  Repeat ABG shows normal PCO2     Cardiology consult:   Stopped lisinopril (until kidney fx is more stable)  If Na continues to deteriorate, consider nephrology consult. Significant labs: (05/05)   WBC 21.3 (14.2 before)  Na 129 (128 before)  Gluc 234  BNP 9,690 (7,360)  BUN 34 (27 before)  Cr 1.16 (1.26 before)    ABG: on BiPAP  PH 7.43  PCO2 45  PO2 120  Bicarb 29    CXR: (05/05)      More conspicuous RUL opacity. Patchy mid and lower lung reticular markings persist. Cardiac and mediastinal structures unchanged. Thoracic aorta atherosclerosis. No pneumothorax. Probable small dependent bilateral pleuraleffusions.       Past Medical History:   Diagnosis Date    Depression     DM (diabetes mellitus) ([facility] [address]     Hyperlipidemia     Hypertension     Pancreatitis     SOB (shortness of breath)     Thyroid disease       Past Surgical History:   Procedure Laterality Date    HX HYSTERECTOMY       History reviewed. No pertinent family history. Social History     Tobacco Use    Smoking status: Never Smoker    Smokeless tobacco: Never Used   Substance Use Topics    Alcohol use: No       Prior to Admission medications    Medication Sig Start Date End Date Taking? Authorizing Provider   Insulin Needles, Disposable, 31 X 5/16 \" Ndle by Does Not Apply route. 10/11/10   Claire Vaughn MD   insulin lispro, human, (HUMALOG KWIKPEN) 100 unit/mL flexpen 15 Units by SubCUTAneous route three (3) times daily (with meals). 10/13/10   Claire Vaughn MD   benazepril (LOTENSIN) 40 mg tablet Take 40 mg by mouth daily. 10/11/10   Provider, Historical   LEXAPRO 10 mg tablet Take 10 mg by mouth nightly. 10/11/10   Provider, Historical   famotidine (PEPCID) 20 mg tablet Take 20 mg by mouth two (2) times a day. 10/11/10   Provider, Historical   gabapentin (NEURONTIN) 400 mg capsule  9/3/10   Provider, Historical   hydrocodone-acetaminophen (NORCO) 5-325 mg per tablet Take 1 Tab by mouth every six (6) hours as needed. 10/11/10   Provider, Historical   levothyroxine (SYNTHROID) 150 mcg tablet Take 150 mcg by mouth daily (before breakfast). 10/11/10   Provider, Historical   lorazepam (ATIVAN) 1 mg tablet  8/2/10   Provider, Historical   lovastatin (MEVACOR) 40 mg tablet Take 40 mg by mouth nightly. 10/11/10   Provider, Historical   metoprolol (LOPRESSOR) 100 mg tablet Take 100 mg by mouth two (2) times a day. 10/11/10   Provider, Historical   oxycodone-acetaminophen (PERCOCET 10)  mg per tablet  8/2/10   Provider, Historical   insulin glargine (LANTUS) 100 unit/mL injection 60 Units by SubCUTAneous route nightly.  10/11/10   Claire Vaughn MD     Allergies   Allergen Reactions    Nortriptyline Itching    Cymbalta [Duloxetine] Itching    Ivp [Fd And C Blue No.1] Hives    Morphine Itching    Tetracycline Itching        Review of Systems:  A comprehensive review of systems was negative except for that written in the History of Present Illness. Objective: Intake and Output:    05/06 0701 - 05/06 1900  In: -   Out: 420 [Urine:420]  No intake/output data recorded. Physical Exam:   Visit Vitals  BP (!) 141/76 (BP Patient Position: At rest;Semi fowlers)   Pulse 84   Temp 98.2 °F (36.8 °C)   Resp 20   Ht 5' 1\" (1.549 m)   Wt 83 kg (182 lb 14.4 oz)   SpO2 97%   BMI 34.56 kg/m²     General:  Alert, cooperative, no distress. Head:  Normocephalic, without obvious abnormality, atraumatic. Eyes:  Conjunctivae/corneas clear. Pupils equal, round, reactive to light. Extraocular movements intact. Lungs:    Decreased breath sounds , crackles, wheezing. Chest wall:  No tenderness or deformity. Heart:  Regular rate and rhythm, S1, S2 normal, no murmur, click, rub, or gallop. Possible murmur, difficult to auscultate with BiPAP   Abdomen:   Soft, non-tender. Bowel sounds normal. No masses. No organomegaly. Extremities: Extremities normal, atraumatic, no cyanosis. 1+ pitting edema of lower extremities    Pulses: 2+ and symmetric all extremities. Skin: Skin color, texture, turgor normal. No rashes or lesions. Lymph nodes: Cervical, supraclavicular, and axillary nodes normal.   Neurologic: CNII-XII intact. Normal strength, sensation, and reflexes throughout.        ECG:  ** Poor data quality, interpretation may be adversely affected**   Normal sinus rhythm   Right bundle branch block   Left anterior fascicular block   ** Bifascicular block **   Left ventricular hypertrophy with repolarization abnormality   Cannot rule out Septal infarct , age undetermined     Data Review:   Recent Results (from the past 24 hour(s))   EKG, 12 LEAD, INITIAL    Collection Time: 05/04/21 10:24 AM   Result Value Ref Range Ventricular Rate 98 BPM    Atrial Rate 98 BPM    P-R Interval 168 ms    QRS Duration 152 ms    Q-T Interval 416 ms    QTC Calculation (Bezet) 531 ms    Calculated P Axis 68 degrees    Calculated R Axis -52 degrees    Calculated T Axis 94 degrees    Diagnosis       ** Poor data quality, interpretation may be adversely affected  Normal sinus rhythm  Right bundle branch block  Left anterior fascicular block  ** Bifascicular block **  Left ventricular hypertrophy with repolarization abnormality  Cannot rule out Septal infarct , age undetermined  Abnormal ECG  No previous ECGs available  Confirmed by Gianni Thompson MD, Jaclynn Canavan ([id number]) on 5/4/2021 12:13:56 PM     SARS-COV-2    Collection Time: 05/04/21 10:30 AM   Result Value Ref Range    SARS-CoV-2 Please find results under separate order     COVID-19 RAPID TEST    Collection Time: 05/04/21 10:30 AM   Result Value Ref Range    Specimen source Nasopharyngeal      COVID-19 rapid test Not Detected Not Detected     LACTIC ACID    Collection Time: 05/04/21 11:12 AM   Result Value Ref Range    Lactic acid 0.6 0.4 - 2.0 mmol/L   MAGNESIUM    Collection Time: 05/04/21 11:12 AM   Result Value Ref Range    Magnesium 1.8 1.6 - 2.4 mg/dL   METABOLIC PANEL, COMPREHENSIVE    Collection Time: 05/04/21 11:12 AM   Result Value Ref Range    Sodium 129 (L) 136 - 145 mmol/L    Potassium 4.2 3.5 - 5.1 mmol/L    Chloride 97 97 - 108 mmol/L    CO2 28 21 - 32 mmol/L    Anion gap 4 (L) 5 - 15 mmol/L    Glucose 182 (H) 65 - 100 mg/dL    BUN 25 (H) 6 - 20 mg/dL    Creatinine 0.88 0.55 - 1.02 mg/dL    BUN/Creatinine ratio 28 (H) 12 - 20      GFR est AA >60 >60 ml/min/1.73m2    GFR est non-AA >60 >60 ml/min/1.73m2    Calcium 8.2 (L) 8.5 - 10.1 mg/dL    Bilirubin, total 0.4 0.2 - 1.0 mg/dL    AST (SGOT) 13 (L) 15 - 37 U/L    ALT (SGPT) 17 12 - 78 U/L    Alk.  phosphatase 68 45 - 117 U/L    Protein, total 6.5 6.4 - 8.2 g/dL    Albumin 3.0 (L) 3.5 - 5.0 g/dL    Globulin 3.5 2.0 - 4.0 g/dL    A-G Ratio 0.9 (L) 1.1 - 2.2     BNP    Collection Time: 05/04/21 11:12 AM   Result Value Ref Range    NT pro-BNP 7,360 (H) <450 pg/mL   PROCALCITONIN    Collection Time: 05/04/21 11:12 AM   Result Value Ref Range    Procalcitonin <0.05 (H) 0 ng/mL   TROPONIN I    Collection Time: 05/04/21 11:12 AM   Result Value Ref Range    Troponin-I, Qt. <0.05 <0.05 ng/mL   TSH 3RD GENERATION    Collection Time: 05/04/21 11:12 AM   Result Value Ref Range    TSH 1.62 0.36 - 3.74 uIU/mL   CBC WITH AUTOMATED DIFF    Collection Time: 05/04/21 11:12 AM   Result Value Ref Range    WBC 14.2 (H) 3.6 - 11.0 K/uL    RBC 3.13 (L) 3.80 - 5.20 M/uL    HGB 10.0 (L) 11.5 - 16.0 g/dL    HCT 30.8 (L) 35.0 - 47.0 %    MCV 98.4 80.0 - 99.0 FL    MCH 31.9 26.0 - 34.0 PG    MCHC 32.5 30.0 - 36.5 g/dL    RDW 13.6 11.5 - 14.5 %    PLATELET 891 436 - 185 K/uL    MPV 11.7 8.9 - 12.9 FL    NRBC 0.0 0.0  WBC    ABSOLUTE NRBC 0.00 0.00 - 0.01 K/uL    NEUTROPHILS 87 (H) 32 - 75 %    LYMPHOCYTES 6 (L) 12 - 49 %    MONOCYTES 5 5 - 13 %    EOSINOPHILS 1 0 - 7 %    BASOPHILS 1 0 - 1 %    IMMATURE GRANULOCYTES 0 0 - 0.5 %    ABS. NEUTROPHILS 12.4 (H) 1.8 - 8.0 K/UL    ABS. LYMPHOCYTES 0.8 0.8 - 3.5 K/UL    ABS. MONOCYTES 0.7 0.0 - 1.0 K/UL    ABS. EOSINOPHILS 0.1 0.0 - 0.4 K/UL    ABS. BASOPHILS 0.1 0.0 - 0.1 K/UL    ABS. IMM.  GRANS. 0.1 (H) 0.00 - 0.04 K/UL    DF AUTOMATED     PROTHROMBIN TIME + INR    Collection Time: 05/04/21 11:25 AM   Result Value Ref Range    Prothrombin time 13.6 11.9 - 14.7 sec    INR 1.1 0.9 - 1.1     PTT    Collection Time: 05/04/21 11:25 AM   Result Value Ref Range    aPTT 21.8 21.2 - 34.1 sec    aPTT, therapeutic range   82 - 109 sec   D DIMER    Collection Time: 05/04/21 11:25 AM   Result Value Ref Range    D DIMER 0.50 (H) <0.50 ug/ml(FEU)   BLOOD GAS, ARTERIAL    Collection Time: 05/04/21 11:25 AM   Result Value Ref Range    pH 7.36 7.35 - 7.45      PCO2 52 (H) 35 - 45 mmHg    PO2 72 (L) 75 - 100 mmHg    O2 SAT 93 (L) >95 %    BICARBONATE 27 (H) 22 - 26 mmol/L    BASE EXCESS 2.9 (H) 0 - 2 mmol/L    O2 METHOD Nasal Cannula      O2 FLOW RATE 6 L/min    SITE Right Radial      EBEN'S TEST PASS         Chest x-ray:  More conspicuous RUL opacity. Patchy mid and lower lung reticular markings  persist. Cardiac and mediastinal structures unchanged. Thoracic aorta  atherosclerosis. No pneumothorax. Probable small dependent bilateral pleural  effusions.     Assessment:   Acute hypoxemic respiratory failure  COPD exacerbation   On 3L NC  On Duo-neb, symbicort, solu-medrol  No BiPAP due to restraints    Hyponatremia    Hypokalemia  Replace potassium    Congestive heart failure  CXR showed RUL opacity and hazy/patchy reticular markings  Elevated troponin  Cardiology discussed with the patient son plan for cardiac cath tomorrow    Pleural effusions   Worse on left than right   Lasix 40 mg IV twice daily    Anemia  Hgb 11.0    Leukocytosis   WBC 27,000 likely from steroid    Diabetes mellitus  Gluc 243    Hypertension     Hypothyroidism  TSH within normal limits     Depression     Echo done shows ejection fraction of 40 to 45% and moderate grade 2 diastolic dysfunction    Dopplers negative for DVT  Plan:     Continue medications:   Duo-meb 3mL neb q6hrs  ASA 325mg po qday   Lipitor 10mg po qhs  Symbicort 2 puffs inhaled bid   Enoxaparin 40mg subq qday   Lexapro 10mg po qhs  Famotidine 20mg po bid   Lasix 40mg IV bid  Lantus 60 units subq qhs  Humalog subq qid   Synthroid 150mcg po qam   Lisinopril 40mg po qday (on hold due to kidney fx)   Discontinue Solu-Medrol  Start on prednisone 10 mg daily  Metoprolol tartrate 100mg po bid   Zosyn 3.375mg IV q8hrs     Blood cx so far negative    Continue to follow with   Pulmonology     Discussed with the patient's son at the bedside  Discussed with the cardiology  For cardiac cath tomorrow  Replace potassium        Repeat CBC, CMP, ABG, BNP    Current Facility-Administered Medications:     potassium chloride (K-DUR, KLOR-CON) SR tablet 40 mEq, 40 mEq, Oral, Q4H, Beny Cheung MD, 40 mEq at 05/06/21 1219    potassium chloride 10 mEq in 100 ml IVPB, 10 mEq, IntraVENous, Q1H, Noé Bunch MD    hydrOXYzine pamoate (VISTARIL) capsule 25 mg, 25 mg, Oral, Q6H PRN, Beny Cheung MD, 25 mg at 05/06/21 0314    piperacillin-tazobactam (ZOSYN) 3.375 g in 0.9% sodium chloride (MBP/ADV) 100 mL MBP, 3.375 g, IntraVENous, Q8H, Beny Cheung MD, Last Rate: 25 mL/hr at 05/06/21 0840, 3.375 g at 05/06/21 0840    escitalopram oxalate (LEXAPRO) tablet 10 mg, 10 mg, Oral, QHS, Beny Cheung MD, 10 mg at 05/05/21 2050    levothyroxine (SYNTHROID) tablet 150 mcg, 150 mcg, Oral, ACB, Beny Cheung MD, 150 mcg at 05/06/21 0840    insulin glargine (LANTUS) injection 60 Units, 60 Units, SubCUTAneous, QHS, Beny Cheung MD, 60 Units at 05/05/21 2200    [Held by provider] lisinopriL (PRINIVIL, ZESTRIL) tablet 40 mg, 40 mg, Oral, DAILY, Beny Cheung MD, 40 mg at 05/05/21 0835    famotidine (PEPCID) tablet 20 mg, 20 mg, Oral, BID, Beny Cheung MD, 20 mg at 05/06/21 0840    atorvastatin (LIPITOR) tablet 10 mg, 10 mg, Oral, QHS, Beny Cheung MD, 10 mg at 05/05/21 2046    insulin lispro (HUMALOG) injection, , SubCUTAneous, AC&HS, Lynda Boyer MD, 10 Units at 05/06/21 1130    glucose chewable tablet 16 g, 4 Tab, Oral, PRN, Manuela Cheung MD    dextrose (D50W) injection syrg 12.5-25 g, 25-50 mL, IntraVENous, PRN, Manuela Cheung MD    glucagon (GLUCAGEN) injection 1 mg, 1 mg, IntraMUSCular, PRN, Manuela Cheung MD    acetaminophen (TYLENOL) tablet 650 mg, 650 mg, Oral, Q6H PRN, 650 mg at 05/05/21 0018 **OR** acetaminophen (TYLENOL) suppository 650 mg, 650 mg, Rectal, Q6H PRN, Manuela Cheung MD    polyethylene glycol (MIRALAX) packet 17 g, 17 g, Oral, DAILY PRN, Beny Cheung MD    ondansetron (ZOFRAN ODT) tablet 4 mg, 4 mg, Oral, Q8H PRN **OR** ondansetron (ZOFRAN) injection 4 mg, 4 mg, IntraVENous, Q6H PRN, Beny Cheung MD    enoxaparin (LOVENOX) injection 40 mg, 40 mg, SubCUTAneous, DAILY, Beny Cheung MD, 40 mg at 05/06/21 0840    methylPREDNISolone (PF) (SOLU-MEDROL) injection 40 mg, 40 mg, IntraVENous, Q6H, Beny Cheung MD, 40 mg at 05/06/21 1159    albuterol-ipratropium (DUO-NEB) 2.5 MG-0.5 MG/3 ML, 3 mL, Nebulization, Q6H RT, Beny Cheung MD, 3 mL at 05/06/21 1328    budesonide-formoteroL (SYMBICORT) 160-4.5 mcg/actuation HFA inhaler 2 Puff, 2 Puff, Inhalation, BID, Beny Cheung MD, 2 Puff at 05/06/21 0903    aspirin tablet 325 mg, 325 mg, Oral, DAILY, Beny Cheung MD, 325 mg at 05/06/21 0840

## 2021-05-06 NOTE — PROGRESS NOTES
Hospitalist Progress Note    Subjective:     Mita Hernandez is a 79 yo female with a PMHx significant for DM, HLD, HTN, thyroid disease, and depression, who presents to the ED with shortness of breath for the past 1-2 days due to a severe acute exacerbation of COPD, acute hypoxic/hypercapnic respiratory failure, and CHF. She also has a bilateral pleural effusion (worse on left), interstitial edema (cardiogenic or infectious cause), and hyponatremia.   ___________________________________________________________    Today, the patient presents awake and in no acute distress, sitting up in bed on 3L NC. She is able to speak without difficulty and is not wheezing. She does appear somewhat confused but is oriented to person and place. The patient does have restraints on due to agitation and aggressive behavior last night. She was given vistaril last night. No BiPAP due to restraints. She denies any shortness of breath, chest pain, nausea, vomiting, dizziness, or coughing. The sitter stated that son had been visiting and mentioned that the patient lives at home alone. RN note:  HR dropped to 42 and had a heart block overnight. Ordered to discontinue metoprolol. Pulmonology consult:   Continue on IV solu-medrol and neb tx  Repeat ABG shows normal PCO2     Cardiology consult:   Stopped lisinopril (until kidney fx is more stable)  If Na continues to deteriorate, consider nephrology consult. Significant labs: (05/05)   WBC 21.3 (14.2 before)  Na 129 (128 before)  Gluc 234  BNP 9,690 (7,360)  BUN 34 (27 before)  Cr 1.16 (1.26 before)    ABG: on BiPAP  PH 7.43  PCO2 45  PO2 120  Bicarb 29    CXR: (05/05)      More conspicuous RUL opacity. Patchy mid and lower lung reticular markings persist. Cardiac and mediastinal structures unchanged. Thoracic aorta atherosclerosis. No pneumothorax. Probable small dependent bilateral pleuraleffusions.       Past Medical History:   Diagnosis Date    Depression     DM (diabetes mellitus) ([facility]     Hyperlipidemia     Hypertension     Pancreatitis     SOB (shortness of breath)     Thyroid disease       Past Surgical History:   Procedure Laterality Date    HX HYSTERECTOMY       History reviewed. No pertinent family history. Social History     Tobacco Use    Smoking status: Never Smoker    Smokeless tobacco: Never Used   Substance Use Topics    Alcohol use: No       Prior to Admission medications    Medication Sig Start Date End Date Taking? Authorizing Provider   Insulin Needles, Disposable, 31 X 5/16 \" Ndle by Does Not Apply route. 10/11/10   Benjy Sagastume MD   insulin lispro, human, (HUMALOG KWIKPEN) 100 unit/mL flexpen 15 Units by SubCUTAneous route three (3) times daily (with meals). 10/13/10   Benjy Sagastume MD   benazepril (LOTENSIN) 40 mg tablet Take 40 mg by mouth daily. 10/11/10   Provider, Historical   LEXAPRO 10 mg tablet Take 10 mg by mouth nightly. 10/11/10   Provider, Historical   famotidine (PEPCID) 20 mg tablet Take 20 mg by mouth two (2) times a day. 10/11/10   Provider, Historical   gabapentin (NEURONTIN) 400 mg capsule  9/3/10   Provider, Historical   hydrocodone-acetaminophen (NORCO) 5-325 mg per tablet Take 1 Tab by mouth every six (6) hours as needed. 10/11/10   Provider, Historical   levothyroxine (SYNTHROID) 150 mcg tablet Take 150 mcg by mouth daily (before breakfast). 10/11/10   Provider, Historical   lorazepam (ATIVAN) 1 mg tablet  8/2/10   Provider, Historical   lovastatin (MEVACOR) 40 mg tablet Take 40 mg by mouth nightly. 10/11/10   Provider, Historical   metoprolol (LOPRESSOR) 100 mg tablet Take 100 mg by mouth two (2) times a day. 10/11/10   Provider, Historical   oxycodone-acetaminophen (PERCOCET 10)  mg per tablet  8/2/10   Provider, Historical   insulin glargine (LANTUS) 100 unit/mL injection 60 Units by SubCUTAneous route nightly.  10/11/10   Benjy Sagastume MD     Allergies   Allergen Reactions    Nortriptyline Itching    Cymbalta [Duloxetine] Itching    Ivp [Fd And C Blue No.1] Hives    Morphine Itching    Tetracycline Itching        Review of Systems:  A comprehensive review of systems was negative except for that written in the History of Present Illness. Objective: Intake and Output:    05/06 0701 - 05/06 1900  In: -   Out: 420 [Urine:420]  No intake/output data recorded. Physical Exam:   Visit Vitals  BP (!) 141/76 (BP Patient Position: At rest;Semi fowlers)   Pulse 84   Temp 98.2 °F (36.8 °C)   Resp 20   Ht 5' 1\" (1.549 m)   Wt 182 lb 14.4 oz (83 kg)   SpO2 97%   BMI 34.56 kg/m²     General:  Alert, cooperative, no distress. Head:  Normocephalic, without obvious abnormality, atraumatic. Eyes:  Conjunctivae/corneas clear. Pupils equal, round, reactive to light. Extraocular movements intact. Lungs:    Decreased breath sounds , crackles, wheezing. Chest wall:  No tenderness or deformity. Heart:  Regular rate and rhythm, S1, S2 normal, no murmur, click, rub, or gallop. Possible murmur, difficult to auscultate with BiPAP   Abdomen:   Soft, non-tender. Bowel sounds normal. No masses. No organomegaly. Extremities: Extremities normal, atraumatic, no cyanosis. 1+ pitting edema of lower extremities    Pulses: 2+ and symmetric all extremities. Skin: Skin color, texture, turgor normal. No rashes or lesions. Lymph nodes: Cervical, supraclavicular, and axillary nodes normal.   Neurologic: CNII-XII intact. Normal strength, sensation, and reflexes throughout.        ECG:  ** Poor data quality, interpretation may be adversely affected**   Normal sinus rhythm   Right bundle branch block   Left anterior fascicular block   ** Bifascicular block **   Left ventricular hypertrophy with repolarization abnormality   Cannot rule out Septal infarct , age undetermined     Data Review:   Recent Results (from the past 24 hour(s))   EKG, 12 LEAD, INITIAL    Collection Time: 05/04/21 10:24 AM   Result Value Ref Range Ventricular Rate 98 BPM    Atrial Rate 98 BPM    P-R Interval 168 ms    QRS Duration 152 ms    Q-T Interval 416 ms    QTC Calculation (Bezet) 531 ms    Calculated P Axis 68 degrees    Calculated R Axis -52 degrees    Calculated T Axis 94 degrees    Diagnosis       ** Poor data quality, interpretation may be adversely affected  Normal sinus rhythm  Right bundle branch block  Left anterior fascicular block  ** Bifascicular block **  Left ventricular hypertrophy with repolarization abnormality  Cannot rule out Septal infarct , age undetermined  Abnormal ECG  No previous ECGs available  Confirmed by Lisbeth Vila MD, Nicol Serna ([id number]) on 5/4/2021 12:13:56 PM     SARS-COV-2    Collection Time: 05/04/21 10:30 AM   Result Value Ref Range    SARS-CoV-2 Please find results under separate order     COVID-19 RAPID TEST    Collection Time: 05/04/21 10:30 AM   Result Value Ref Range    Specimen source Nasopharyngeal      COVID-19 rapid test Not Detected Not Detected     LACTIC ACID    Collection Time: 05/04/21 11:12 AM   Result Value Ref Range    Lactic acid 0.6 0.4 - 2.0 mmol/L   MAGNESIUM    Collection Time: 05/04/21 11:12 AM   Result Value Ref Range    Magnesium 1.8 1.6 - 2.4 mg/dL   METABOLIC PANEL, COMPREHENSIVE    Collection Time: 05/04/21 11:12 AM   Result Value Ref Range    Sodium 129 (L) 136 - 145 mmol/L    Potassium 4.2 3.5 - 5.1 mmol/L    Chloride 97 97 - 108 mmol/L    CO2 28 21 - 32 mmol/L    Anion gap 4 (L) 5 - 15 mmol/L    Glucose 182 (H) 65 - 100 mg/dL    BUN 25 (H) 6 - 20 mg/dL    Creatinine 0.88 0.55 - 1.02 mg/dL    BUN/Creatinine ratio 28 (H) 12 - 20      GFR est AA >60 >60 ml/min/1.73m2    GFR est non-AA >60 >60 ml/min/1.73m2    Calcium 8.2 (L) 8.5 - 10.1 mg/dL    Bilirubin, total 0.4 0.2 - 1.0 mg/dL    AST (SGOT) 13 (L) 15 - 37 U/L    ALT (SGPT) 17 12 - 78 U/L    Alk.  phosphatase 68 45 - 117 U/L    Protein, total 6.5 6.4 - 8.2 g/dL    Albumin 3.0 (L) 3.5 - 5.0 g/dL    Globulin 3.5 2.0 - 4.0 g/dL    A-G Ratio 0.9 (L) 1.1 - 2.2     BNP    Collection Time: 05/04/21 11:12 AM   Result Value Ref Range    NT pro-BNP 7,360 (H) <450 pg/mL   PROCALCITONIN    Collection Time: 05/04/21 11:12 AM   Result Value Ref Range    Procalcitonin <0.05 (H) 0 ng/mL   TROPONIN I    Collection Time: 05/04/21 11:12 AM   Result Value Ref Range    Troponin-I, Qt. <0.05 <0.05 ng/mL   TSH 3RD GENERATION    Collection Time: 05/04/21 11:12 AM   Result Value Ref Range    TSH 1.62 0.36 - 3.74 uIU/mL   CBC WITH AUTOMATED DIFF    Collection Time: 05/04/21 11:12 AM   Result Value Ref Range    WBC 14.2 (H) 3.6 - 11.0 K/uL    RBC 3.13 (L) 3.80 - 5.20 M/uL    HGB 10.0 (L) 11.5 - 16.0 g/dL    HCT 30.8 (L) 35.0 - 47.0 %    MCV 98.4 80.0 - 99.0 FL    MCH 31.9 26.0 - 34.0 PG    MCHC 32.5 30.0 - 36.5 g/dL    RDW 13.6 11.5 - 14.5 %    PLATELET 853 553 - 397 K/uL    MPV 11.7 8.9 - 12.9 FL    NRBC 0.0 0.0  WBC    ABSOLUTE NRBC 0.00 0.00 - 0.01 K/uL    NEUTROPHILS 87 (H) 32 - 75 %    LYMPHOCYTES 6 (L) 12 - 49 %    MONOCYTES 5 5 - 13 %    EOSINOPHILS 1 0 - 7 %    BASOPHILS 1 0 - 1 %    IMMATURE GRANULOCYTES 0 0 - 0.5 %    ABS. NEUTROPHILS 12.4 (H) 1.8 - 8.0 K/UL    ABS. LYMPHOCYTES 0.8 0.8 - 3.5 K/UL    ABS. MONOCYTES 0.7 0.0 - 1.0 K/UL    ABS. EOSINOPHILS 0.1 0.0 - 0.4 K/UL    ABS. BASOPHILS 0.1 0.0 - 0.1 K/UL    ABS. IMM.  GRANS. 0.1 (H) 0.00 - 0.04 K/UL    DF AUTOMATED     PROTHROMBIN TIME + INR    Collection Time: 05/04/21 11:25 AM   Result Value Ref Range    Prothrombin time 13.6 11.9 - 14.7 sec    INR 1.1 0.9 - 1.1     PTT    Collection Time: 05/04/21 11:25 AM   Result Value Ref Range    aPTT 21.8 21.2 - 34.1 sec    aPTT, therapeutic range   82 - 109 sec   D DIMER    Collection Time: 05/04/21 11:25 AM   Result Value Ref Range    D DIMER 0.50 (H) <0.50 ug/ml(FEU)   BLOOD GAS, ARTERIAL    Collection Time: 05/04/21 11:25 AM   Result Value Ref Range    pH 7.36 7.35 - 7.45      PCO2 52 (H) 35 - 45 mmHg    PO2 72 (L) 75 - 100 mmHg    O2 SAT 93 (L) >95 %    BICARBONATE 27 (H) 22 - 26 mmol/L    BASE EXCESS 2.9 (H) 0 - 2 mmol/L    O2 METHOD Nasal Cannula      O2 FLOW RATE 6 L/min    SITE Right Radial      EBEN'S TEST PASS         Chest x-ray:  More conspicuous RUL opacity. Patchy mid and lower lung reticular markings  persist. Cardiac and mediastinal structures unchanged. Thoracic aorta  atherosclerosis. No pneumothorax. Probable small dependent bilateral pleural  effusions.     Assessment:   Acute hypoxemic respiratory failure  COPD exacerbation   On 3L NC  On Duo-neb, symbicort, solu-medrol  No BiPAP due to restraints    Hyponatremia  Na 129    Congestive heart failure  CXR showed RUL opacity and hazy/patchy reticular markings    Pleural effusions   Worse on left than right   Lasix 40 mg IV twice daily    Anemia  Hgb 11.0    Leukocytosis   WBCs 21.3 (was 14.2)     Diabetes mellitus  Gluc 243    Hypertension     Hypothyroidism  TSH within normal limits     Depression     Echo done shows ejection fraction of 40 to 45% and moderate grade 2 diastolic dysfunction    Dopplers negative for DVT  Plan:     Continue medications:   Duo-meb 3mL neb q6hrs  ASA 325mg po qday   Lipitor 10mg po qhs  Symbicort 2 puffs inhaled bid   Enoxaparin 40mg subq qday   Lexapro 10mg po qhs  Famotidine 20mg po bid   Lasix 40mg IV bid  Lantus 60 units subq qhs  Humalog subq qid   Synthroid 150mcg po qam   Lisinopril 40mg po qday (on hold due to kidney fx)   Solu-medrol 40mg IV q6hrs  Metoprolol tartrate 100mg po bid   Zosyn 3.375mg IV q8hrs     Blood cx so far negative    Continue to follow with   Pulmonology         Repeat CBC, CMP, ABG, BNP    Current Facility-Administered Medications:     hydrOXYzine pamoate (VISTARIL) capsule 25 mg, 25 mg, Oral, Q6H PRN, Beny Cheung MD, 25 mg at 05/06/21 0314    furosemide (LASIX) injection 40 mg, 40 mg, IntraVENous, BID, Noé Bunch MD, 40 mg at 05/06/21 0841    piperacillin-tazobactam (ZOSYN) 3.375 g in 0.9% sodium chloride (MBP/ADV) 100 mL MBP, 3.375 g, IntraVENous, Q8H, Beny Cheung MD, Last Rate: 25 mL/hr at 05/06/21 0840, 3.375 g at 05/06/21 0840    escitalopram oxalate (LEXAPRO) tablet 10 mg, 10 mg, Oral, QHS, Beny Cheung MD, 10 mg at 05/05/21 2050    levothyroxine (SYNTHROID) tablet 150 mcg, 150 mcg, Oral, ACB, Beny Cheung MD, 150 mcg at 05/06/21 0840    insulin glargine (LANTUS) injection 60 Units, 60 Units, SubCUTAneous, QHS, Beny Cheung MD, 60 Units at 05/05/21 2200    [Held by provider] lisinopriL (PRINIVIL, ZESTRIL) tablet 40 mg, 40 mg, Oral, DAILY, Beny Cheung MD, 40 mg at 05/05/21 0835    famotidine (PEPCID) tablet 20 mg, 20 mg, Oral, BID, Beny Cheung MD, 20 mg at 05/06/21 0840    atorvastatin (LIPITOR) tablet 10 mg, 10 mg, Oral, QHS, Beny Cheung MD, 10 mg at 05/05/21 2046    insulin lispro (HUMALOG) injection, , SubCUTAneous, AC&HS, Jass Caro MD, 3 Units at 05/06/21 0730    glucose chewable tablet 16 g, 4 Tab, Oral, PRN, Yair Cheung MD    dextrose (D50W) injection syrg 12.5-25 g, 25-50 mL, IntraVENous, PRN, Yair Cheung MD    glucagon (GLUCAGEN) injection 1 mg, 1 mg, IntraMUSCular, PRN, Yair Cheung MD    acetaminophen (TYLENOL) tablet 650 mg, 650 mg, Oral, Q6H PRN, 650 mg at 05/05/21 0018 **OR** acetaminophen (TYLENOL) suppository 650 mg, 650 mg, Rectal, Q6H PRN, Yair Cheung MD    polyethylene glycol (MIRALAX) packet 17 g, 17 g, Oral, DAILY PRN, Yair Cheung MD    ondansetron (ZOFRAN ODT) tablet 4 mg, 4 mg, Oral, Q8H PRN **OR** ondansetron (ZOFRAN) injection 4 mg, 4 mg, IntraVENous, Q6H PRN, Beny Cheung MD    enoxaparin (LOVENOX) injection 40 mg, 40 mg, SubCUTAneous, DAILY, Beny Cheung MD, 40 mg at 05/06/21 0840    methylPREDNISolone (PF) (SOLU-MEDROL) injection 40 mg, 40 mg, IntraVENous, Q6H, Beny Cheung MD, 40 mg at 05/06/21 0550    albuterol-ipratropium (DUO-NEB) 2.5 MG-0.5 MG/3 ML, 3 mL, Nebulization, Q6H RT, Beny Cheung MD, 3 mL at 05/06/21 0903    budesonide-formoteroL (SYMBICORT) 160-4.5 mcg/actuation HFA inhaler 2 Puff, 2 Puff, Inhalation, BID, Beny Cheung MD, 2 Puff at 05/06/21 0903    aspirin tablet 325 mg, 325 mg, Oral, DAILY, Beny Cheung MD, 325 mg at 05/06/21 0840

## 2021-05-06 NOTE — PROGRESS NOTES
Tele called regarding pt heart rate dropping to 42 and a heart block. Call taken by Sondra Matthews RN. I contacted Dr. Carey Dotson to let him know. Also mentioned to him that pt has had appx 3 elevated BP readings, but had received her night BP meds recently. Order received to discontinue metoprolol.

## 2021-05-06 NOTE — PROGRESS NOTES
Lab called Troponin  level 0.21, and potassium 2.6. Dr. Stevan Tillman made aware new order to notify cardiology that patient needs to be seen, and potassium 40 meq q 4 every times 3 doses.

## 2021-05-06 NOTE — CONSULTS
PULMONARY NOTE  [facility]    Name: Isatu Waters MRN: [id number]   : 1940 Hospital: [address] [place] [address]   Date: 2021  Admission date: 2021 Hospital Day: 3       HPI:     Hospital Problems  Never Reviewed          Codes Class Noted POA    CHF (congestive heart failure) ([facility]) ICD-10-CM: I50.9  ICD-9-CM: 428.0  2021 Unknown        COPD (chronic obstructive pulmonary disease) ([facility] ICD-10-CM: J44.9  ICD-9-CM: 496  2021 Unknown                   [x] High complexity decision making was performed  [x] See my orders for details      Subjective/Initial History:     I was asked by Noa Persaud MD to see Isatu Waters  a 80 y.o.  female in consultation     Excerpts from admission 2021 or consult notes as follows:   62-year-old lady came in because of shortness of breath and dyspnea she was told that she has COPD recently and she was giving albuterol inhaler did not seem to help so came to the emergency room she was hypoxic she was put on oxygen 5 L nasal cannula she was having dyspnea minimal exertion and also at rest she is a lifetime non-smoker, but she has been exposed to secondhand smoke her  used to smoke and all her children smoke she used to work on the farm as a farmer no chest pain no fever no chills.        Allergies   Allergen Reactions    Nortriptyline Itching    Cymbalta [Duloxetine] Itching    Ivp [Fd And C Blue No.1] Hives    Morphine Itching    Tetracycline Itching        MAR reviewed and pertinent medications noted or modified as needed     Current Facility-Administered Medications   Medication    hydrOXYzine pamoate (VISTARIL) capsule 25 mg    furosemide (LASIX) injection 40 mg    piperacillin-tazobactam (ZOSYN) 3.375 g in 0.9% sodium chloride (MBP/ADV) 100 mL MBP    escitalopram oxalate (LEXAPRO) tablet 10 mg    levothyroxine (SYNTHROID) tablet 150 mcg    insulin glargine (LANTUS) injection 60 Units    [Held by provider] lisinopriL (PRINIVIL, ZESTRIL) tablet 40 mg    famotidine (PEPCID) tablet 20 mg    atorvastatin (LIPITOR) tablet 10 mg    insulin lispro (HUMALOG) injection    glucose chewable tablet 16 g    dextrose (D50W) injection syrg 12.5-25 g    glucagon (GLUCAGEN) injection 1 mg    acetaminophen (TYLENOL) tablet 650 mg    Or    acetaminophen (TYLENOL) suppository 650 mg    polyethylene glycol (MIRALAX) packet 17 g    ondansetron (ZOFRAN ODT) tablet 4 mg    Or    ondansetron (ZOFRAN) injection 4 mg    enoxaparin (LOVENOX) injection 40 mg    methylPREDNISolone (PF) (SOLU-MEDROL) injection 40 mg    albuterol-ipratropium (DUO-NEB) 2.5 MG-0.5 MG/3 ML    budesonide-formoteroL (SYMBICORT) 160-4.5 mcg/actuation HFA inhaler 2 Puff    aspirin tablet 325 mg      Patient PCP: Alexa Henderson MD  PMH:  has a past medical history of Depression, DM (diabetes mellitus) ([address] Hyperlipidemia, Hypertension, Pancreatitis, SOB (shortness of breath), and Thyroid disease. PSH:   has a past surgical history that includes hx hysterectomy. FHX: family history is not on file. SHX:  reports that she has never smoked. She has never used smokeless tobacco. She reports that she does not drink alcohol or use drugs. ROS:    Review of Systems   Constitutional: Negative. HENT: Negative. Eyes: Negative. Respiratory: Positive for cough, sputum production, shortness of breath and wheezing. Cardiovascular: Positive for orthopnea. Gastrointestinal: Negative. Genitourinary: Negative. Musculoskeletal: Negative. Skin: Negative. Neurological: Negative. Psychiatric/Behavioral: Negative.          Objective:     Vital Signs: Telemetry:    normal sinus rhythm Intake/Output:   Visit Vitals  BP (!) 141/76 (BP Patient Position: At rest;Semi fowlers)   Pulse 84   Temp 98.2 °F (36.8 °C)   Resp 20   Ht 5' 1\" (1.549 m)   Wt 83 kg (182 lb 14.4 oz)   SpO2 97%   BMI 34.56 kg/m²       Temp (24hrs), Av.5 °F (36.4 °C), Min:97 °F (36.1 °C), Max:98.2 °F (36.8 °C)        O2 Device: Nasal cannula O2 Flow Rate (L/min): 4 l/min       Wt Readings from Last 4 Encounters:   05/05/21 83 kg (182 lb 14.4 oz)   10/11/10 102.2 kg (225 lb 3.2 oz)          Intake/Output Summary (Last 24 hours) at 5/6/2021 0919  Last data filed at 5/6/2021 0824  Gross per 24 hour   Intake    Output 420 ml   Net -420 ml       Last shift:      05/06 0701 - 05/06 1900  In: -   Out: 420 [Urine:420]  Last 3 shifts: No intake/output data recorded. Physical Exam:     Physical Exam   Constitutional: She appears well-developed. HENT:   Head: Normocephalic and atraumatic. Eyes: Pupils are equal, round, and reactive to light. Conjunctivae are normal.   Neck: Normal range of motion. Neck supple. Cardiovascular: Normal rate and regular rhythm. Pulmonary/Chest: Breath sounds normal. She is in respiratory distress. Abdominal: Soft. Bowel sounds are normal.   Musculoskeletal: Normal range of motion. Neurological: She is alert. Skin: Skin is warm.         Labs:    Recent Labs     05/05/21  0638 05/04/21  1125 05/04/21  1112   WBC 21.3*  --  14.2*   HGB 11.0*  --  10.0*     --  313   INR  --  1.1  --    APTT  --  21.8  --      Recent Labs     05/05/21  1606 05/05/21  0638 05/04/21  1112   * 128* 129*   K 3.4* 4.3 4.2   CL 90* 94* 97   CO2 27 25 28   * 234* 182*   BUN 39* 34* 25*   CREA 1.16* 1.26* 0.88   CA 9.0 9.0 8.2*   MG  --   --  1.8   LAC  --   --  0.6   ALB  --  3.1* 3.0*   ALT  --  21 17     Recent Labs     05/04/21  1933 05/04/21  1125   PH 7.43 7.36   PCO2 45 52*   PO2 120* 72*   HCO3 29* 27*   FIO2 40.0  --      Recent Labs     05/04/21  1600 05/04/21  1112   TROIQ 0.15* <0.05     No results found for: BNPP, BNP   Lab Results   Component Value Date/Time    Culture result: No growth 1 day 05/04/2021 10:30 AM     Lab Results   Component Value Date/Time    TSH 1.62 05/04/2021 11:12 AM       Imaging:    CXR Results  (Last 48 hours) 05/05/21 1110  XR CHEST PORT Final result    Narrative:  Chest single view       Comparison single view chest 5/4/2021. More conspicuous RUL opacity. Patchy mid and lower lung reticular markings   persist. Cardiac and mediastinal structures unchanged. Thoracic aorta   atherosclerosis. No pneumothorax. Probable small dependent bilateral pleural   effusions. Fracture deformity proximal left humerus. Continued follow-up recommended. 05/04/21 1050  XR CHEST SNGL V Final result    Narrative:  Chest single view. Hazy and somewhat patchy reticular markings central and basilar lungs. Interstitial edema, possible cardiogenic cause and/or infectious/inflammatory   origin. Cardiac silhouette within normal limits. Atherosclerotic change thoracic   aorta. Small dependent pleural effusions, left greater than right. No   pneumothorax. Asymmetric advanced DJD with nonacute fracture deformity proximal left humerus. IMPRESSION:     1. Chronic Obstructive Pulmonary Disease with Severe Acute Exacerbation requiring inpatient hospitalization and management; has very poor airway clearance. Increased work of breathing  Body mass index is 34.56 kg/m². 2. Acute hypoxic and hypercapnic respiratory failure she is on a restraints but cannot wear the BiPAP mask  3. Hyponatremia  4. Congestive heart failure  5. Pleural effusion more on the left side  6. Pt is requiring Drug therapy requiring intensive monitoring for toxicity  7. Pt is unstable, unpredictable needing inpatient monitoring; is acutely ill and at high risk of sudden decline and decompensation with severe consequenses and continued end organ dysfunction and failure  8. Prognosis guarded       RECOMMENDATIONS/PLAN:     1. BIPAP for non invasive ventilatory life support to prevent worsening respiratory acidosis cannot wear the mask as she is on a restraint  2.  Patient on IV Solu-Medrol and nebulizer treatment  3. Repeat arterial blood gases shows normal PCO2   4. Intubate and place on vent if NIV fails  5. Chest x-ray shows congestive changes   6. Supplemental O2 to keep sats > 93%  7. Aspiration precautions  8. Labs to follow electrolytes, renal function and and blood counts  9. Glucose monitoring and SSI  10. Bronchial hygiene with respiratory therapy techniques, bronchodilators  11.  DVT, SUP prophylaxis             Marry Cummings MD

## 2021-05-06 NOTE — PROGRESS NOTES
Bedside and Verbal shift change report given to January Pozo (oncoming nurse) by DODIE Okeefe RN (offgoing nurse). Report given with SBAR, Kardex, Intake/Output, MAR and Recent Results.

## 2021-05-06 NOTE — PROGRESS NOTES
Progress Note    Patient: Ilsa Markham MRN: [id number]  SSN: [id number]    YOB: 1940  Age: 80 y.o. Sex: female      Admit Date: 5/4/2021    LOS: 2 days     Subjective:     Patient was confused yesterday. She was in restraints. Anyhow she is much better this morning. She was seen and examined in the presence of her son. Objective:     Vitals:    05/05/21 2300 05/06/21 0105 05/06/21 0739 05/06/21 0903   BP:   (!) 141/76    Pulse:   84    Resp:   20    Temp:   98.2 °F (36.8 °C)    SpO2: 99% 100%  97%   Weight:       Height:            Intake and Output:  Current Shift: 05/06 0701 - 05/06 1900  In: -   Out: 420 [Urine:420]  Last three shifts: No intake/output data recorded. Physical Exam:   General:  Alert, cooperative, no distress, appears stated age. Eyes:  Conjunctivae/corneas clear. PERRL, EOMs intact. Fundi benign   Ears:  Normal TMs and external ear canals both ears. Nose: Nares normal. Septum midline. Mucosa normal. No drainage or sinus tenderness. Mouth/Throat: Lips, mucosa, and tongue normal. Teeth and gums normal.   Neck: Supple, symmetrical, trachea midline, no adenopathy, thyroid: no enlargment/tenderness/nodules, no carotid bruit and no JVD. Back:   Symmetric, no curvature. ROM normal. No CVA tenderness. Lungs:   Clear to auscultation bilaterally. Heart:  Regular rate and rhythm, S1, S2 normal, no murmur, click, rub or gallop. Abdomen:   Soft, non-tender. Bowel sounds normal. No masses,  No organomegaly. Extremities: Extremities normal, atraumatic, no cyanosis or edema. Pulses: 2+ and symmetric all extremities. Skin: Skin color, texture, turgor normal. No rashes or lesions   Lymph nodes: Cervical, supraclavicular, and axillary nodes normal.   Neurologic: CNII-XII intact. Normal strength, sensation and reflexes throughout. Lab/Data Review: All lab results for the last 24 hours reviewed.          Assessment:     Active Problems:    CHF (congestive heart failure) ([facility] (5/4/2021)      COPD (chronic obstructive pulmonary disease) ([facility] (5/4/2021)    Mrs. Jamie Alcala is a 68-year-old white female with:  1. Heart failure with decompensation  2. Diabetes mellitus  3. Hypertension  4. Hypothyroidism  5. Depression  6. COPD  7. Right bundle branch block with left episode  8. Hyponatremia  9. Acute kidney injury  10. Peripheral vascular disease  11. Right upper lobe opacity  12. Fracture deformity of proximal left humerus    Plan:     There is no accurate ins and outs. Anyhow she had a brief episode of A-V dissociation early this morning. She was on 100 mg of metoprolol that was discontinued. Her blood pressure is better controlled. Creatinine is 1.2 and BUN is 42. I will hold off any more diuresis for now. Breathing has improved. Potassium is 2.6. Will replete potassium. We will have plan for right and left heart catheterization tomorrow. I have discussed with the son Gayathri Phillips at bedside regarding my recommendations for verbalized understanding and agreed to proceed with the procedure. He can be reached at [phone]. Daughter Sarika Palacio can be reached at [phone]. Echo yesterday showed EF of 40 to 45% with mild concentric LVH, moderately dilated left atrium, aortic valve area of 0.9 cm² with moderate to severe aortic stenosis, mean gradient of 16 mmHg. Mitral valve stenosis in the mild range. RVSP 52 mmHg.   Troponin in the indeterminate range  Signed By: Reuben Venegas MD     May 6, 2021

## 2021-05-06 NOTE — PROGRESS NOTES
Spoke with Dr. Debbie Dickens concerning potassium orders complete po potassium order and give patient 2 iv doses of 10meq potassium, and get potasium level at 5pm.

## 2021-05-07 LAB
ALBUMIN SERPL-MCNC: 2.9 G/DL (ref 3.5–5)
ALBUMIN/GLOB SERPL: 0.9 {RATIO} (ref 1.1–2.2)
ALP SERPL-CCNC: 57 U/L (ref 45–117)
ALT SERPL-CCNC: 50 U/L (ref 12–78)
ANION GAP SERPL CALC-SCNC: 5 MMOL/L (ref 5–15)
AST SERPL W P-5'-P-CCNC: 62 U/L (ref 15–37)
BASOPHILS # BLD: 0 K/UL (ref 0–0.1)
BASOPHILS NFR BLD: 0 % (ref 0–1)
BILIRUB SERPL-MCNC: 0.4 MG/DL (ref 0.2–1)
BNP SERPL-MCNC: ABNORMAL PG/ML
BUN SERPL-MCNC: 32 MG/DL (ref 6–20)
BUN/CREAT SERPL: 32 (ref 12–20)
CA-I BLD-MCNC: 8.2 MG/DL (ref 8.5–10.1)
CHLORIDE SERPL-SCNC: 98 MMOL/L (ref 97–108)
CO2 SERPL-SCNC: 33 MMOL/L (ref 21–32)
CREAT SERPL-MCNC: 1.01 MG/DL (ref 0.55–1.02)
DIFFERENTIAL METHOD BLD: ABNORMAL
EOSINOPHIL # BLD: 0 K/UL (ref 0–0.4)
EOSINOPHIL NFR BLD: 0 % (ref 0–7)
ERYTHROCYTE [DISTWIDTH] IN BLOOD BY AUTOMATED COUNT: 13.3 % (ref 11.5–14.5)
GLOBULIN SER CALC-MCNC: 3.3 G/DL (ref 2–4)
GLUCOSE BLD STRIP.AUTO-MCNC: 65 MG/DL (ref 65–100)
GLUCOSE BLD STRIP.AUTO-MCNC: 66 MG/DL (ref 65–100)
GLUCOSE BLD STRIP.AUTO-MCNC: 73 MG/DL (ref 65–100)
GLUCOSE BLD STRIP.AUTO-MCNC: 95 MG/DL (ref 65–100)
GLUCOSE SERPL-MCNC: 101 MG/DL (ref 65–100)
HCT VFR BLD AUTO: 32.5 % (ref 35–47)
HGB BLD-MCNC: 10.8 G/DL (ref 11.5–16)
IMM GRANULOCYTES # BLD AUTO: 0.1 K/UL (ref 0–0.04)
IMM GRANULOCYTES NFR BLD AUTO: 1 % (ref 0–0.5)
LYMPHOCYTES # BLD: 1.1 K/UL (ref 0.8–3.5)
LYMPHOCYTES NFR BLD: 5 % (ref 12–49)
MCH RBC QN AUTO: 31.7 PG (ref 26–34)
MCHC RBC AUTO-ENTMCNC: 33.2 G/DL (ref 30–36.5)
MCV RBC AUTO: 95.3 FL (ref 80–99)
MONOCYTES # BLD: 1.6 K/UL (ref 0–1)
MONOCYTES NFR BLD: 8 % (ref 5–13)
NEUTS SEG # BLD: 16.9 K/UL (ref 1.8–8)
NEUTS SEG NFR BLD: 86 % (ref 32–75)
NRBC # BLD: 0 K/UL (ref 0–0.01)
NRBC BLD-RTO: 0 PER 100 WBC
PERFORMED BY, TECHID: NORMAL
PLATELET # BLD AUTO: 349 K/UL (ref 150–400)
PMV BLD AUTO: 11.2 FL (ref 8.9–12.9)
POTASSIUM SERPL-SCNC: 3.4 MMOL/L (ref 3.5–5.1)
PROT SERPL-MCNC: 6.2 G/DL (ref 6.4–8.2)
RBC # BLD AUTO: 3.41 M/UL (ref 3.8–5.2)
SODIUM SERPL-SCNC: 136 MMOL/L (ref 136–145)
WBC # BLD AUTO: 19.8 K/UL (ref 3.6–11)

## 2021-05-07 PROCEDURE — 77030019698 HC SYR ANGI MDLON MRTM -A: Performed by: INTERNAL MEDICINE

## 2021-05-07 PROCEDURE — 76210000003 HC OR PH I REC 3.5 TO 4 HR: Performed by: INTERNAL MEDICINE

## 2021-05-07 PROCEDURE — 77030025703 HC SYR ANGI VACLOK MRTM -A: Performed by: INTERNAL MEDICINE

## 2021-05-07 PROCEDURE — 74011250637 HC RX REV CODE- 250/637: Performed by: INTERNAL MEDICINE

## 2021-05-07 PROCEDURE — 74011250636 HC RX REV CODE- 250/636: Performed by: INTERNAL MEDICINE

## 2021-05-07 PROCEDURE — 92928 PRQ TCAT PLMT NTRAC ST 1 LES: CPT | Performed by: INTERNAL MEDICINE

## 2021-05-07 PROCEDURE — C1769 GUIDE WIRE: HCPCS | Performed by: INTERNAL MEDICINE

## 2021-05-07 PROCEDURE — 027034Z DILATION OF CORONARY ARTERY, ONE ARTERY WITH DRUG-ELUTING INTRALUMINAL DEVICE, PERCUTANEOUS APPROACH: ICD-10-PCS | Performed by: INTERNAL MEDICINE

## 2021-05-07 PROCEDURE — B2111ZZ FLUOROSCOPY OF MULTIPLE CORONARY ARTERIES USING LOW OSMOLAR CONTRAST: ICD-10-PCS | Performed by: INTERNAL MEDICINE

## 2021-05-07 PROCEDURE — 74011000258 HC RX REV CODE- 258: Performed by: INTERNAL MEDICINE

## 2021-05-07 PROCEDURE — 77030003394 HC NDL ART COOK -A: Performed by: INTERNAL MEDICINE

## 2021-05-07 PROCEDURE — 77030008542 HC TBNG MON PRSS EDWD -A: Performed by: INTERNAL MEDICINE

## 2021-05-07 PROCEDURE — 77030013516 HC DEV INFL ANGI MRTM -B: Performed by: INTERNAL MEDICINE

## 2021-05-07 PROCEDURE — 4A023N8 MEASUREMENT OF CARDIAC SAMPLING AND PRESSURE, BILATERAL, PERCUTANEOUS APPROACH: ICD-10-PCS | Performed by: INTERNAL MEDICINE

## 2021-05-07 PROCEDURE — 74011000250 HC RX REV CODE- 250: Performed by: FAMILY MEDICINE

## 2021-05-07 PROCEDURE — 85025 COMPLETE CBC W/AUTO DIFF WBC: CPT

## 2021-05-07 PROCEDURE — 77030041700 HC CATH BLLN WDG PRES TELE -B: Performed by: INTERNAL MEDICINE

## 2021-05-07 PROCEDURE — C1887 CATHETER, GUIDING: HCPCS | Performed by: INTERNAL MEDICINE

## 2021-05-07 PROCEDURE — 94760 N-INVAS EAR/PLS OXIMETRY 1: CPT

## 2021-05-07 PROCEDURE — 77030042317 HC BND COMPR HEMSTAT -B: Performed by: INTERNAL MEDICINE

## 2021-05-07 PROCEDURE — 36415 COLL VENOUS BLD VENIPUNCTURE: CPT

## 2021-05-07 PROCEDURE — C1874 STENT, COATED/COV W/DEL SYS: HCPCS | Performed by: INTERNAL MEDICINE

## 2021-05-07 PROCEDURE — 2709999900 HC NON-CHARGEABLE SUPPLY: Performed by: INTERNAL MEDICINE

## 2021-05-07 PROCEDURE — 77030012468 HC VLV BLEEDBK CNTRL ABBT -B: Performed by: INTERNAL MEDICINE

## 2021-05-07 PROCEDURE — 93460 R&L HRT ART/VENTRICLE ANGIO: CPT | Performed by: INTERNAL MEDICINE

## 2021-05-07 PROCEDURE — 93571 IV DOP VEL&/PRESS C FLO 1ST: CPT | Performed by: INTERNAL MEDICINE

## 2021-05-07 PROCEDURE — C1894 INTRO/SHEATH, NON-LASER: HCPCS | Performed by: INTERNAL MEDICINE

## 2021-05-07 PROCEDURE — 99153 MOD SED SAME PHYS/QHP EA: CPT | Performed by: INTERNAL MEDICINE

## 2021-05-07 PROCEDURE — 83880 ASSAY OF NATRIURETIC PEPTIDE: CPT

## 2021-05-07 PROCEDURE — 93010 ELECTROCARDIOGRAM REPORT: CPT | Performed by: INTERNAL MEDICINE

## 2021-05-07 PROCEDURE — 4A033BC MEASUREMENT OF ARTERIAL PRESSURE, CORONARY, PERCUTANEOUS APPROACH: ICD-10-PCS | Performed by: INTERNAL MEDICINE

## 2021-05-07 PROCEDURE — 99152 MOD SED SAME PHYS/QHP 5/>YRS: CPT | Performed by: INTERNAL MEDICINE

## 2021-05-07 PROCEDURE — C1725 CATH, TRANSLUMIN NON-LASER: HCPCS | Performed by: INTERNAL MEDICINE

## 2021-05-07 PROCEDURE — 74011250637 HC RX REV CODE- 250/637: Performed by: FAMILY MEDICINE

## 2021-05-07 PROCEDURE — 74011250636 HC RX REV CODE- 250/636: Performed by: FAMILY MEDICINE

## 2021-05-07 PROCEDURE — 82962 GLUCOSE BLOOD TEST: CPT

## 2021-05-07 PROCEDURE — 65270000029 HC RM PRIVATE

## 2021-05-07 PROCEDURE — 74011000258 HC RX REV CODE- 258: Performed by: FAMILY MEDICINE

## 2021-05-07 PROCEDURE — 80053 COMPREHEN METABOLIC PANEL: CPT

## 2021-05-07 PROCEDURE — 74011000250 HC RX REV CODE- 250: Performed by: INTERNAL MEDICINE

## 2021-05-07 PROCEDURE — 93005 ELECTROCARDIOGRAM TRACING: CPT

## 2021-05-07 PROCEDURE — 76937 US GUIDE VASCULAR ACCESS: CPT | Performed by: INTERNAL MEDICINE

## 2021-05-07 PROCEDURE — 77030016700 HC CATH ANGI DX INFN2 CARD -B: Performed by: INTERNAL MEDICINE

## 2021-05-07 PROCEDURE — 74011000636 HC RX REV CODE- 636: Performed by: INTERNAL MEDICINE

## 2021-05-07 PROCEDURE — 94640 AIRWAY INHALATION TREATMENT: CPT

## 2021-05-07 DEVICE — IMPLANTABLE DEVICE: Type: IMPLANTABLE DEVICE | Status: FUNCTIONAL

## 2021-05-07 RX ORDER — FENTANYL CITRATE 50 UG/ML
INJECTION, SOLUTION INTRAMUSCULAR; INTRAVENOUS AS NEEDED
Status: DISCONTINUED | OUTPATIENT
Start: 2021-05-07 | End: 2021-05-07 | Stop reason: HOSPADM

## 2021-05-07 RX ORDER — SODIUM CHLORIDE 0.9 % (FLUSH) 0.9 %
5-40 SYRINGE (ML) INJECTION EVERY 8 HOURS
Status: DISCONTINUED | OUTPATIENT
Start: 2021-05-07 | End: 2021-05-19 | Stop reason: HOSPADM

## 2021-05-07 RX ORDER — HEPARIN SODIUM 1000 [USP'U]/ML
INJECTION, SOLUTION INTRAVENOUS; SUBCUTANEOUS AS NEEDED
Status: DISCONTINUED | OUTPATIENT
Start: 2021-05-07 | End: 2021-05-07 | Stop reason: HOSPADM

## 2021-05-07 RX ORDER — HEPARIN SODIUM 200 [USP'U]/100ML
INJECTION, SOLUTION INTRAVENOUS
Status: COMPLETED | OUTPATIENT
Start: 2021-05-07 | End: 2021-05-07

## 2021-05-07 RX ORDER — GUAIFENESIN 100 MG/5ML
LIQUID (ML) ORAL AS NEEDED
Status: DISCONTINUED | OUTPATIENT
Start: 2021-05-07 | End: 2021-05-07 | Stop reason: HOSPADM

## 2021-05-07 RX ORDER — LIDOCAINE HYDROCHLORIDE 10 MG/ML
INJECTION INFILTRATION; PERINEURAL AS NEEDED
Status: DISCONTINUED | OUTPATIENT
Start: 2021-05-07 | End: 2021-05-07 | Stop reason: HOSPADM

## 2021-05-07 RX ORDER — GUAIFENESIN 100 MG/5ML
81 LIQUID (ML) ORAL DAILY
Status: DISCONTINUED | OUTPATIENT
Start: 2021-05-07 | End: 2021-05-19 | Stop reason: HOSPADM

## 2021-05-07 RX ORDER — SODIUM CHLORIDE 0.9 % (FLUSH) 0.9 %
5-40 SYRINGE (ML) INJECTION AS NEEDED
Status: DISCONTINUED | OUTPATIENT
Start: 2021-05-07 | End: 2021-05-19 | Stop reason: HOSPADM

## 2021-05-07 RX ADMIN — PIPERACILLIN AND TAZOBACTAM 3.38 G: 3; .375 INJECTION, POWDER, LYOPHILIZED, FOR SOLUTION INTRAVENOUS at 17:27

## 2021-05-07 RX ADMIN — PIPERACILLIN AND TAZOBACTAM 3.38 G: 3; .375 INJECTION, POWDER, LYOPHILIZED, FOR SOLUTION INTRAVENOUS at 00:00

## 2021-05-07 RX ADMIN — IPRATROPIUM BROMIDE AND ALBUTEROL SULFATE 3 ML: .5; 2.5 SOLUTION RESPIRATORY (INHALATION) at 01:42

## 2021-05-07 RX ADMIN — Medication 10 ML: at 17:30

## 2021-05-07 RX ADMIN — IPRATROPIUM BROMIDE AND ALBUTEROL SULFATE 3 ML: .5; 2.5 SOLUTION RESPIRATORY (INHALATION) at 14:00

## 2021-05-07 RX ADMIN — ESCITALOPRAM OXALATE 10 MG: 10 TABLET ORAL at 20:54

## 2021-05-07 RX ADMIN — PIPERACILLIN AND TAZOBACTAM 3.38 G: 3; .375 INJECTION, POWDER, LYOPHILIZED, FOR SOLUTION INTRAVENOUS at 23:31

## 2021-05-07 RX ADMIN — TICAGRELOR 90 MG: 90 TABLET ORAL at 20:54

## 2021-05-07 RX ADMIN — FAMOTIDINE 20 MG: 20 TABLET ORAL at 20:54

## 2021-05-07 RX ADMIN — ATORVASTATIN CALCIUM 10 MG: 10 TABLET, FILM COATED ORAL at 20:54

## 2021-05-07 RX ADMIN — IPRATROPIUM BROMIDE AND ALBUTEROL SULFATE 3 ML: .5; 2.5 SOLUTION RESPIRATORY (INHALATION) at 20:13

## 2021-05-07 RX ADMIN — BUDESONIDE AND FORMOTEROL FUMARATE DIHYDRATE 2 PUFF: 160; 4.5 AEROSOL RESPIRATORY (INHALATION) at 20:13

## 2021-05-07 RX ADMIN — Medication 10 ML: at 20:53

## 2021-05-07 NOTE — ROUTINE PROCESS
TRANSPORTED PATIENT FROM PACU TO [facility] ,[id number]., BEDSIDE REPORT GIVEN TO LATRICE LOPEZ. REPLACED TELE BOX 69 WITH TELE BOX 09. PATIENT ALERT. ORIENTED TO PERSON, SITTER AT BEDSIDE.

## 2021-05-07 NOTE — PROGRESS NOTES
TRANSFER - IN REPORT:    Verbal report received from LATRICE Bautista(name) on Artis Bailey  being received from 4W(unit) for routine progression of care      Report consisted of patients Situation, Background, Assessment and   Recommendations(SBAR). Information from the following report(s) SBAR, Procedure Summary, Intake/Output, MAR and Recent Results was reviewed with the receiving nurse. Opportunity for questions and clarification was provided. Assessment completed upon patients arrival to unit and care assumed.

## 2021-05-07 NOTE — PROGRESS NOTES
Progress Note    Patient: Emelda Dubin MRN: [id number]  SSN: [id number]    YOB: 1940  Age: 80 y.o. Sex: female      Admit Date: 5/4/2021    LOS: 3 days     Subjective:     She was confused last night again. She has a sitter. She did not have any chest pain. Shortness of breath is improved    Objective:     Vitals:    05/06/21 1623 05/06/21 1919 05/06/21 1953 05/07/21 0039   BP: (!) 147/81  (!) 148/70 (!) 140/106   Pulse: 95  97 (!) 107   Resp: 16  20 20   Temp: 97.8 °F (36.6 °C)  98.5 °F (36.9 °C) 98.2 °F (36.8 °C)   SpO2: 98% 96% 97% 97%   Weight:   79.1 kg (174 lb 4.8 oz)    Height:            Intake and Output:  Current Shift: No intake/output data recorded. Last three shifts: 05/05 1901 - 05/07 0700  In: 500 [I.V.:500]  Out: 5 [Urine:420]    Physical Exam:   General:  Alert, cooperative, no distress, appears stated age. Eyes:  Conjunctivae/corneas clear. PERRL, EOMs intact. Fundi benign   Ears:  Normal TMs and external ear canals both ears. Nose: Nares normal. Septum midline. Mucosa normal. No drainage or sinus tenderness. Mouth/Throat: Lips, mucosa, and tongue normal. Teeth and gums normal.   Neck: Supple, symmetrical, trachea midline, no adenopathy, thyroid: no enlargment/tenderness/nodules, no carotid bruit and no JVD. Back:   Symmetric, no curvature. ROM normal. No CVA tenderness. Lungs:   Clear to auscultation bilaterally. Heart:   Ejection systolic murmur in the aortic area   Abdomen:   Soft, non-tender. Bowel sounds normal. No masses,  No organomegaly. Extremities: Extremities normal, atraumatic, no cyanosis or edema. Pulses: 2+ and symmetric all extremities. Skin: Skin color, texture, turgor normal. No rashes or lesions   Lymph nodes: Cervical, supraclavicular, and axillary nodes normal.   Neurologic: CNII-XII intact. Normal strength, sensation and reflexes throughout. Lab/Data Review: All lab results for the last 24 hours reviewed. Assessment:     Active Problems:    CHF (congestive heart failure) ([facility]) (5/4/2021)      COPD (chronic obstructive pulmonary disease) ([facility] (5/4/2021)    Mrs. Kaela Jacobo is a 53-year-old white female with:  1. Heart failure with decompensation  2. Diabetes mellitus  3. Hypertension  4. Hypothyroidism  5. Depression  6. COPD  7. Right bundle branch block with left episode  8. Hyponatremia  9. Acute kidney injury  10. Peripheral vascular disease  11. Right upper lobe opacity  12. Fracture deformity of proximal left humerus    Plan:     Patient underwent cardiac catheterization via right radial artery. She was noted to have moderate coronary artery disease in the mid LAD anyhow she had apical hypokinesis. IFR of mid LAD was 0.74 and she received 1 stent 3.5 x 22 mm drug-eluting stent. She would be on Brilinta, aspirin. On a statin. We will monitor her kidney function. She has severe aortic stenosis with a gradient of 47 mmHg across the aortic valve. Wedge pressure was elevated.   We will plan for TAVR as an outpatient    Signed By: Pavel Kumar MD     May 7, 2021

## 2021-05-07 NOTE — PROGRESS NOTES
Hospitalist Progress Note    Subjective:     Vi Araujo is a 79 yo female with a PMHx significant for DM, HLD, HTN, thyroid disease, and depression, who presents to the ED with shortness of breath for the past 1-2 days due to a severe acute exacerbation of COPD, acute hypoxic/hypercapnic respiratory failure, and CHF. She also has a bilateral pleural effusion (worse on left), interstitial edema (cardiogenic or infectious cause), and hyponatremia.   ___________________________________________________________    Today, the patient presents awake and in no acute distress, sitting up in bed on 3L NC. She is able to speak without difficulty and is not wheezing. She does appear somewhat confused but is oriented to person and place. Patient patient have a cardiac cath      Cardiac cath in place 1 stent  Patient have significant severe aortic stenosis    Past Medical History:   Diagnosis Date    Depression     DM (diabetes mellitus) ([address]     Hyperlipidemia     Hypertension     Pancreatitis     SOB (shortness of breath)     Thyroid disease       Past Surgical History:   Procedure Laterality Date    HX HYSTERECTOMY       History reviewed. No pertinent family history. Social History     Tobacco Use    Smoking status: Never Smoker    Smokeless tobacco: Never Used   Substance Use Topics    Alcohol use: No       Prior to Admission medications    Medication Sig Start Date End Date Taking? Authorizing Provider   Insulin Needles, Disposable, 31 X 5/16 \" Ndle by Does Not Apply route. 10/11/10   Ana Suresh MD   insulin lispro, human, (HUMALOG KWIKPEN) 100 unit/mL flexpen 15 Units by SubCUTAneous route three (3) times daily (with meals). 10/13/10   Ana Suresh MD   benazepril (LOTENSIN) 40 mg tablet Take 40 mg by mouth daily. 10/11/10   Provider, Historical   LEXAPRO 10 mg tablet Take 10 mg by mouth nightly.  10/11/10   Provider, Historical   famotidine (PEPCID) 20 mg tablet Take 20 mg by mouth two (2) times a day. 10/11/10   Provider, Historical   gabapentin (NEURONTIN) 400 mg capsule  9/3/10   Provider, Historical   hydrocodone-acetaminophen (NORCO) 5-325 mg per tablet Take 1 Tab by mouth every six (6) hours as needed. 10/11/10   Provider, Historical   levothyroxine (SYNTHROID) 150 mcg tablet Take 150 mcg by mouth daily (before breakfast). 10/11/10   Provider, Historical   lorazepam (ATIVAN) 1 mg tablet  8/2/10   Provider, Historical   lovastatin (MEVACOR) 40 mg tablet Take 40 mg by mouth nightly. 10/11/10   Provider, Historical   metoprolol (LOPRESSOR) 100 mg tablet Take 100 mg by mouth two (2) times a day. 10/11/10   Provider, Historical   oxycodone-acetaminophen (PERCOCET 10)  mg per tablet  8/2/10   Provider, Historical   insulin glargine (LANTUS) 100 unit/mL injection 60 Units by SubCUTAneous route nightly. 10/11/10   Hui Juarez MD     Allergies   Allergen Reactions    Nortriptyline Itching    Cymbalta [Duloxetine] Itching    Ivp [Fd And C Blue No.1] Hives    Morphine Itching    Tetracycline Itching        Review of Systems:  A comprehensive review of systems was negative except for that written in the History of Present Illness. Objective: Intake and Output:    No intake/output data recorded. 05/05 1901 - 05/07 0700  In: 500 [I.V.:500]  Out: 5 [Urine:420]    Physical Exam:   Visit Vitals  BP (!) 143/72 (BP 1 Location: Left lower arm)   Pulse (!) 103   Temp 98.2 °F (36.8 °C)   Resp 15   Ht 5' 1\" (1.549 m)   Wt 79.1 kg (174 lb 4.8 oz)   SpO2 96%   BMI 32.93 kg/m²     General:  Alert, cooperative, no distress. Head:  Normocephalic, without obvious abnormality, atraumatic. Eyes:  Conjunctivae/corneas clear. Pupils equal, round, reactive to light. Extraocular movements intact. Lungs:    Decreased breath sounds , crackles, wheezing. Chest wall:  No tenderness or deformity. Heart:  Regular rate and rhythm, S1, S2 normal, no murmur, click, rub, or gallop.  Possible murmur, difficult to auscultate with BiPAP   Abdomen:   Soft, non-tender. Bowel sounds normal. No masses. No organomegaly. Extremities: Extremities normal, atraumatic, no cyanosis. 1+ pitting edema of lower extremities    Pulses: 2+ and symmetric all extremities. Skin: Skin color, texture, turgor normal. No rashes or lesions. Lymph nodes: Cervical, supraclavicular, and axillary nodes normal.   Neurologic: CNII-XII intact. Normal strength, sensation, and reflexes throughout.        ECG:  ** Poor data quality, interpretation may be adversely affected**   Normal sinus rhythm   Right bundle branch block   Left anterior fascicular block   ** Bifascicular block **   Left ventricular hypertrophy with repolarization abnormality   Cannot rule out Septal infarct , age undetermined     Data Review:   Recent Results (from the past 24 hour(s))   EKG, 12 LEAD, INITIAL    Collection Time: 05/04/21 10:24 AM   Result Value Ref Range    Ventricular Rate 98 BPM    Atrial Rate 98 BPM    P-R Interval 168 ms    QRS Duration 152 ms    Q-T Interval 416 ms    QTC Calculation (Bezet) 531 ms    Calculated P Axis 68 degrees    Calculated R Axis -52 degrees    Calculated T Axis 94 degrees    Diagnosis       ** Poor data quality, interpretation may be adversely affected  Normal sinus rhythm  Right bundle branch block  Left anterior fascicular block  ** Bifascicular block **  Left ventricular hypertrophy with repolarization abnormality  Cannot rule out Septal infarct , age undetermined  Abnormal ECG  No previous ECGs available  Confirmed by James Simpson MD, Oneida Pang ([id number]) on 5/4/2021 12:13:56 PM     SARS-COV-2    Collection Time: 05/04/21 10:30 AM   Result Value Ref Range    SARS-CoV-2 Please find results under separate order     COVID-19 RAPID TEST    Collection Time: 05/04/21 10:30 AM   Result Value Ref Range    Specimen source Nasopharyngeal      COVID-19 rapid test Not Detected Not Detected     LACTIC ACID    Collection Time: 05/04/21 11:12 AM   Result Value Ref Range    Lactic acid 0.6 0.4 - 2.0 mmol/L   MAGNESIUM    Collection Time: 05/04/21 11:12 AM   Result Value Ref Range    Magnesium 1.8 1.6 - 2.4 mg/dL   METABOLIC PANEL, COMPREHENSIVE    Collection Time: 05/04/21 11:12 AM   Result Value Ref Range    Sodium 129 (L) 136 - 145 mmol/L    Potassium 4.2 3.5 - 5.1 mmol/L    Chloride 97 97 - 108 mmol/L    CO2 28 21 - 32 mmol/L    Anion gap 4 (L) 5 - 15 mmol/L    Glucose 182 (H) 65 - 100 mg/dL    BUN 25 (H) 6 - 20 mg/dL    Creatinine 0.88 0.55 - 1.02 mg/dL    BUN/Creatinine ratio 28 (H) 12 - 20      GFR est AA >60 >60 ml/min/1.73m2    GFR est non-AA >60 >60 ml/min/1.73m2    Calcium 8.2 (L) 8.5 - 10.1 mg/dL    Bilirubin, total 0.4 0.2 - 1.0 mg/dL    AST (SGOT) 13 (L) 15 - 37 U/L    ALT (SGPT) 17 12 - 78 U/L    Alk.  phosphatase 68 45 - 117 U/L    Protein, total 6.5 6.4 - 8.2 g/dL    Albumin 3.0 (L) 3.5 - 5.0 g/dL    Globulin 3.5 2.0 - 4.0 g/dL    A-G Ratio 0.9 (L) 1.1 - 2.2     BNP    Collection Time: 05/04/21 11:12 AM   Result Value Ref Range    NT pro-BNP 7,360 (H) <450 pg/mL   PROCALCITONIN    Collection Time: 05/04/21 11:12 AM   Result Value Ref Range    Procalcitonin <0.05 (H) 0 ng/mL   TROPONIN I    Collection Time: 05/04/21 11:12 AM   Result Value Ref Range    Troponin-I, Qt. <0.05 <0.05 ng/mL   TSH 3RD GENERATION    Collection Time: 05/04/21 11:12 AM   Result Value Ref Range    TSH 1.62 0.36 - 3.74 uIU/mL   CBC WITH AUTOMATED DIFF    Collection Time: 05/04/21 11:12 AM   Result Value Ref Range    WBC 14.2 (H) 3.6 - 11.0 K/uL    RBC 3.13 (L) 3.80 - 5.20 M/uL    HGB 10.0 (L) 11.5 - 16.0 g/dL    HCT 30.8 (L) 35.0 - 47.0 %    MCV 98.4 80.0 - 99.0 FL    MCH 31.9 26.0 - 34.0 PG    MCHC 32.5 30.0 - 36.5 g/dL    RDW 13.6 11.5 - 14.5 %    PLATELET 455 566 - 424 K/uL    MPV 11.7 8.9 - 12.9 FL    NRBC 0.0 0.0  WBC    ABSOLUTE NRBC 0.00 0.00 - 0.01 K/uL    NEUTROPHILS 87 (H) 32 - 75 %    LYMPHOCYTES 6 (L) 12 - 49 %    MONOCYTES 5 5 - 13 %    EOSINOPHILS 1 0 - 7 % BASOPHILS 1 0 - 1 %    IMMATURE GRANULOCYTES 0 0 - 0.5 %    ABS. NEUTROPHILS 12.4 (H) 1.8 - 8.0 K/UL    ABS. LYMPHOCYTES 0.8 0.8 - 3.5 K/UL    ABS. MONOCYTES 0.7 0.0 - 1.0 K/UL    ABS. EOSINOPHILS 0.1 0.0 - 0.4 K/UL    ABS. BASOPHILS 0.1 0.0 - 0.1 K/UL    ABS. IMM. GRANS. 0.1 (H) 0.00 - 0.04 K/UL    DF AUTOMATED     PROTHROMBIN TIME + INR    Collection Time: 05/04/21 11:25 AM   Result Value Ref Range    Prothrombin time 13.6 11.9 - 14.7 sec    INR 1.1 0.9 - 1.1     PTT    Collection Time: 05/04/21 11:25 AM   Result Value Ref Range    aPTT 21.8 21.2 - 34.1 sec    aPTT, therapeutic range   82 - 109 sec   D DIMER    Collection Time: 05/04/21 11:25 AM   Result Value Ref Range    D DIMER 0.50 (H) <0.50 ug/ml(FEU)   BLOOD GAS, ARTERIAL    Collection Time: 05/04/21 11:25 AM   Result Value Ref Range    pH 7.36 7.35 - 7.45      PCO2 52 (H) 35 - 45 mmHg    PO2 72 (L) 75 - 100 mmHg    O2 SAT 93 (L) >95 %    BICARBONATE 27 (H) 22 - 26 mmol/L    BASE EXCESS 2.9 (H) 0 - 2 mmol/L    O2 METHOD Nasal Cannula      O2 FLOW RATE 6 L/min    SITE Right Radial      EBEN'S TEST PASS         Chest x-ray:  More conspicuous RUL opacity. Patchy mid and lower lung reticular markings  persist. Cardiac and mediastinal structures unchanged. Thoracic aorta  atherosclerosis. No pneumothorax. Probable small dependent bilateral pleural  effusions.     Assessment:       CAD status post stent LAD  Severe aortic stenosis  Acute hypoxemic respiratory failure  COPD exacerbation   On 3L NC  On Duo-neb, symbicort, solu-medrol  No BiPAP due to restraints    Hyponatremia    Hypokalemia  Replace potassium    Congestive heart failure  CXR showed RUL opacity and hazy/patchy reticular markings  Elevated troponin  Cardiology discussed with the patient son plan for cardiac cath tomorrow    Pleural effusions   Worse on left than right   Lasix 40 mg IV twice daily    Anemia  Hgb 11.0    Leukocytosis   WBC 27,000 likely from steroid    Diabetes mellitus  Gluc 243    Hypertension     Hypothyroidism  TSH within normal limits     Depression     Echo done shows ejection fraction of 40 to 45% and moderate grade 2 diastolic dysfunction    Dopplers negative for DVT  Plan:     Continue medications:   Duo-meb 3mL neb q6hrs  ASA 325mg po qday   Lipitor 10mg po qhs  Symbicort 2 puffs inhaled bid   Enoxaparin 40mg subq qday   Lexapro 10mg po qhs  Famotidine 20mg po bid   Lasix 40mg IV bid  Lantus 60 units subq qhs  Humalog subq qid   Synthroid 150mcg po qam   Lisinopril 40mg po qday (on hold due to kidney fx)   Discontinue Solu-Medrol  Start on prednisone 10 mg daily  Metoprolol tartrate 100mg po bid   Zosyn 3.375mg IV q8hrs   Brilinta 90 mg twice a day    Blood cx so far negative    Continue to follow with   Pulmonology           Repeat CBC, CMP, ABG, BNP    Current Facility-Administered Medications:     aspirin chewable tablet 81 mg, 81 mg, Oral, DAILY, Noé Bunch MD    ticagrelor (BRILINTA) tablet 90 mg, 90 mg, Oral, Q12H, Noé Bunch MD    sodium chloride (NS) flush 5-40 mL, 5-40 mL, IntraVENous, Q8H, Noé Bunch MD    sodium chloride (NS) flush 5-40 mL, 5-40 mL, IntraVENous, PRN, Noé Bunch MD    predniSONE (DELTASONE) tablet 10 mg, 10 mg, Oral, DAILY WITH BREAKFAST, Kehinde Cheung MD    hydrOXYzine pamoate (VISTARIL) capsule 25 mg, 25 mg, Oral, Q6H PRN, Beny Cheung MD, 25 mg at 05/06/21 0314    piperacillin-tazobactam (ZOSYN) 3.375 g in 0.9% sodium chloride (MBP/ADV) 100 mL MBP, 3.375 g, IntraVENous, Q8H, Beny Cheung MD, Last Rate: 25 mL/hr at 05/07/21 0000, 3.375 g at 05/07/21 0000    escitalopram oxalate (LEXAPRO) tablet 10 mg, 10 mg, Oral, QHS, Beny Cheung MD, 10 mg at 05/06/21 2038    levothyroxine (SYNTHROID) tablet 150 mcg, 150 mcg, Oral, ACB, Beyn Cheung MD, 150 mcg at 05/06/21 0840    insulin glargine (LANTUS) injection 60 Units, 60 Units, SubCUTAneous, QCANDIS, Beny Cheung MD, 60 Units at 05/06/21 2100    [Held by provider] lisinopriL (PRINIVIL, ZESTRIL) tablet 40 mg, 40 mg, Oral, DAILY, Beny Cheung MD, Stopped at 05/06/21 0900    famotidine (PEPCID) tablet 20 mg, 20 mg, Oral, BID, Beny Cheung MD, 20 mg at 05/06/21 2038    atorvastatin (LIPITOR) tablet 10 mg, 10 mg, Oral, QHS, Beny Cheung MD, 10 mg at 05/06/21 2039    insulin lispro (HUMALOG) injection, , SubCUTAneous, AC&HS, Prema Cheung MD, Stopped at 05/06/21 2100    glucose chewable tablet 16 g, 4 Tab, Oral, PRN, Prema Cheung MD    dextrose (D50W) injection syrg 12.5-25 g, 25-50 mL, IntraVENous, PRN, Prema Cheung MD    glucagon [facility]) injection 1 mg, 1 mg, IntraMUSCular, PRN, Prema Cheung MD    acetaminophen (TYLENOL) tablet 650 mg, 650 mg, Oral, Q6H PRN, 650 mg at 05/05/21 0018 **OR** acetaminophen (TYLENOL) suppository 650 mg, 650 mg, Rectal, Q6H PRN, Prema Cheung MD    polyethylene glycol (MIRALAX) packet 17 g, 17 g, Oral, DAILY PRN, Prema Cheung MD    ondansetron (ZOFRAN ODT) tablet 4 mg, 4 mg, Oral, Q8H PRN **OR** ondansetron (ZOFRAN) injection 4 mg, 4 mg, IntraVENous, Q6H PRN, Beny Cheung MD    enoxaparin (LOVENOX) injection 40 mg, 40 mg, SubCUTAneous, DAILY, Beny Cheung MD, 40 mg at 05/06/21 0840    albuterol-ipratropium (DUO-NEB) 2.5 MG-0.5 MG/3 ML, 3 mL, Nebulization, Q6H RT, Beny Cheung MD, 3 mL at 05/07/21 0142    budesonide-formoteroL (SYMBICORT) 160-4.5 mcg/actuation HFA inhaler 2 Puff, 2 Puff, Inhalation, BID, Beny Cheung MD, 2 Puff at 05/06/21 8281

## 2021-05-07 NOTE — PROGRESS NOTES
Cath Lab staff stated that the patient was extremely confused during the procedure and is not amenable to teaching. I did attempt to locate a family member in the hospital and to call the patient's son. I was not successful. A folder was left with the patient's sitter to give to the patient's family explaining the importance of medication compliance along with a Brilinta coupon and my business card in case they have questions. Phase 2 outpatient cardiac rehab referral is not appropriate for this patient at this time due to physical and/or mental limitations, diagnosis and/or treatment plans.

## 2021-05-08 LAB
ALBUMIN SERPL-MCNC: 2.5 G/DL (ref 3.5–5)
ALBUMIN/GLOB SERPL: 0.8 {RATIO} (ref 1.1–2.2)
ALP SERPL-CCNC: 55 U/L (ref 45–117)
ALT SERPL-CCNC: 59 U/L (ref 12–78)
ANION GAP SERPL CALC-SCNC: 5 MMOL/L (ref 5–15)
AST SERPL W P-5'-P-CCNC: 36 U/L (ref 15–37)
ATRIAL RATE: 98 BPM
BASOPHILS # BLD: 0 K/UL (ref 0–0.1)
BASOPHILS NFR BLD: 0 % (ref 0–1)
BILIRUB SERPL-MCNC: 0.4 MG/DL (ref 0.2–1)
BNP SERPL-MCNC: ABNORMAL PG/ML
BUN SERPL-MCNC: 27 MG/DL (ref 6–20)
BUN/CREAT SERPL: 26 (ref 12–20)
CA-I BLD-MCNC: 8 MG/DL (ref 8.5–10.1)
CALCULATED P AXIS, ECG09: 68 DEGREES
CALCULATED R AXIS, ECG10: -57 DEGREES
CALCULATED T AXIS, ECG11: 101 DEGREES
CHLORIDE SERPL-SCNC: 102 MMOL/L (ref 97–108)
CO2 SERPL-SCNC: 34 MMOL/L (ref 21–32)
CREAT SERPL-MCNC: 1.03 MG/DL (ref 0.55–1.02)
DIAGNOSIS, 93000: NORMAL
DIFFERENTIAL METHOD BLD: ABNORMAL
EOSINOPHIL # BLD: 0.2 K/UL (ref 0–0.4)
EOSINOPHIL NFR BLD: 1 % (ref 0–7)
ERYTHROCYTE [DISTWIDTH] IN BLOOD BY AUTOMATED COUNT: 13.7 % (ref 11.5–14.5)
GLOBULIN SER CALC-MCNC: 3.1 G/DL (ref 2–4)
GLUCOSE BLD STRIP.AUTO-MCNC: 114 MG/DL (ref 65–100)
GLUCOSE BLD STRIP.AUTO-MCNC: 131 MG/DL (ref 65–100)
GLUCOSE BLD STRIP.AUTO-MCNC: 184 MG/DL (ref 65–100)
GLUCOSE BLD STRIP.AUTO-MCNC: 214 MG/DL (ref 65–100)
GLUCOSE SERPL-MCNC: 76 MG/DL (ref 65–100)
HCT VFR BLD AUTO: 36.7 % (ref 35–47)
HGB BLD-MCNC: 12 G/DL (ref 11.5–16)
IMM GRANULOCYTES # BLD AUTO: 0.1 K/UL (ref 0–0.04)
IMM GRANULOCYTES NFR BLD AUTO: 0 % (ref 0–0.5)
LYMPHOCYTES # BLD: 0.9 K/UL (ref 0.8–3.5)
LYMPHOCYTES NFR BLD: 6 % (ref 12–49)
MCH RBC QN AUTO: 31.4 PG (ref 26–34)
MCHC RBC AUTO-ENTMCNC: 32.7 G/DL (ref 30–36.5)
MCV RBC AUTO: 96.1 FL (ref 80–99)
MONOCYTES # BLD: 1.1 K/UL (ref 0–1)
MONOCYTES NFR BLD: 7 % (ref 5–13)
NEUTS SEG # BLD: 14 K/UL (ref 1.8–8)
NEUTS SEG NFR BLD: 86 % (ref 32–75)
NRBC # BLD: 0 K/UL (ref 0–0.01)
NRBC BLD-RTO: 0 PER 100 WBC
P-R INTERVAL, ECG05: 168 MS
PERFORMED BY, TECHID: ABNORMAL
PLATELET # BLD AUTO: 377 K/UL (ref 150–400)
PMV BLD AUTO: 11 FL (ref 8.9–12.9)
POTASSIUM SERPL-SCNC: 3.2 MMOL/L (ref 3.5–5.1)
PROT SERPL-MCNC: 5.6 G/DL (ref 6.4–8.2)
Q-T INTERVAL, ECG07: 424 MS
QRS DURATION, ECG06: 146 MS
QTC CALCULATION (BEZET), ECG08: 541 MS
RBC # BLD AUTO: 3.82 M/UL (ref 3.8–5.2)
SODIUM SERPL-SCNC: 141 MMOL/L (ref 136–145)
VENTRICULAR RATE, ECG03: 98 BPM
WBC # BLD AUTO: 16.3 K/UL (ref 3.6–11)

## 2021-05-08 PROCEDURE — 82962 GLUCOSE BLOOD TEST: CPT

## 2021-05-08 PROCEDURE — 80053 COMPREHEN METABOLIC PANEL: CPT

## 2021-05-08 PROCEDURE — 74011250636 HC RX REV CODE- 250/636: Performed by: INTERNAL MEDICINE

## 2021-05-08 PROCEDURE — 77010033678 HC OXYGEN DAILY

## 2021-05-08 PROCEDURE — 65270000029 HC RM PRIVATE

## 2021-05-08 PROCEDURE — 94640 AIRWAY INHALATION TREATMENT: CPT

## 2021-05-08 PROCEDURE — 74011000250 HC RX REV CODE- 250: Performed by: FAMILY MEDICINE

## 2021-05-08 PROCEDURE — 74011000258 HC RX REV CODE- 258: Performed by: FAMILY MEDICINE

## 2021-05-08 PROCEDURE — 74011636637 HC RX REV CODE- 636/637: Performed by: FAMILY MEDICINE

## 2021-05-08 PROCEDURE — 74011250637 HC RX REV CODE- 250/637: Performed by: FAMILY MEDICINE

## 2021-05-08 PROCEDURE — 94760 N-INVAS EAR/PLS OXIMETRY 1: CPT

## 2021-05-08 PROCEDURE — 83880 ASSAY OF NATRIURETIC PEPTIDE: CPT

## 2021-05-08 PROCEDURE — 36415 COLL VENOUS BLD VENIPUNCTURE: CPT

## 2021-05-08 PROCEDURE — 74011250636 HC RX REV CODE- 250/636: Performed by: FAMILY MEDICINE

## 2021-05-08 PROCEDURE — 85025 COMPLETE CBC W/AUTO DIFF WBC: CPT

## 2021-05-08 PROCEDURE — 74011250637 HC RX REV CODE- 250/637: Performed by: INTERNAL MEDICINE

## 2021-05-08 RX ORDER — FUROSEMIDE 10 MG/ML
40 INJECTION INTRAMUSCULAR; INTRAVENOUS DAILY
Status: DISCONTINUED | OUTPATIENT
Start: 2021-05-08 | End: 2021-05-13

## 2021-05-08 RX ADMIN — FAMOTIDINE 20 MG: 20 TABLET ORAL at 20:59

## 2021-05-08 RX ADMIN — FAMOTIDINE 20 MG: 20 TABLET ORAL at 09:56

## 2021-05-08 RX ADMIN — LEVOTHYROXINE SODIUM 150 MCG: 75 TABLET ORAL at 09:56

## 2021-05-08 RX ADMIN — IPRATROPIUM BROMIDE AND ALBUTEROL SULFATE 3 ML: .5; 2.5 SOLUTION RESPIRATORY (INHALATION) at 02:23

## 2021-05-08 RX ADMIN — PIPERACILLIN AND TAZOBACTAM 3.38 G: 3; .375 INJECTION, POWDER, LYOPHILIZED, FOR SOLUTION INTRAVENOUS at 19:42

## 2021-05-08 RX ADMIN — PREDNISONE 10 MG: 5 TABLET ORAL at 09:56

## 2021-05-08 RX ADMIN — INSULIN GLARGINE 60 UNITS: 100 INJECTION, SOLUTION SUBCUTANEOUS at 21:07

## 2021-05-08 RX ADMIN — ATORVASTATIN CALCIUM 10 MG: 10 TABLET, FILM COATED ORAL at 21:00

## 2021-05-08 RX ADMIN — FUROSEMIDE 40 MG: 10 INJECTION, SOLUTION INTRAMUSCULAR; INTRAVENOUS at 10:05

## 2021-05-08 RX ADMIN — TICAGRELOR 90 MG: 90 TABLET ORAL at 10:00

## 2021-05-08 RX ADMIN — INSULIN LISPRO 6 UNITS: 100 INJECTION, SOLUTION INTRAVENOUS; SUBCUTANEOUS at 18:40

## 2021-05-08 RX ADMIN — TICAGRELOR 90 MG: 90 TABLET ORAL at 21:00

## 2021-05-08 RX ADMIN — PIPERACILLIN AND TAZOBACTAM 3.38 G: 3; .375 INJECTION, POWDER, LYOPHILIZED, FOR SOLUTION INTRAVENOUS at 09:57

## 2021-05-08 RX ADMIN — ASPIRIN 81 MG CHEWABLE TABLET 81 MG: 81 TABLET CHEWABLE at 09:56

## 2021-05-08 RX ADMIN — HYDROXYZINE PAMOATE 25 MG: 25 CAPSULE ORAL at 00:34

## 2021-05-08 RX ADMIN — Medication 10 ML: at 04:08

## 2021-05-08 RX ADMIN — IPRATROPIUM BROMIDE AND ALBUTEROL SULFATE 3 ML: .5; 2.5 SOLUTION RESPIRATORY (INHALATION) at 07:38

## 2021-05-08 RX ADMIN — ENOXAPARIN SODIUM 40 MG: 40 INJECTION SUBCUTANEOUS at 09:57

## 2021-05-08 RX ADMIN — ESCITALOPRAM OXALATE 10 MG: 10 TABLET ORAL at 21:00

## 2021-05-08 RX ADMIN — INSULIN LISPRO 3 UNITS: 100 INJECTION, SOLUTION INTRAVENOUS; SUBCUTANEOUS at 13:33

## 2021-05-08 RX ADMIN — IPRATROPIUM BROMIDE AND ALBUTEROL SULFATE 3 ML: .5; 2.5 SOLUTION RESPIRATORY (INHALATION) at 18:26

## 2021-05-08 RX ADMIN — BUDESONIDE AND FORMOTEROL FUMARATE DIHYDRATE 2 PUFF: 160; 4.5 AEROSOL RESPIRATORY (INHALATION) at 18:26

## 2021-05-08 RX ADMIN — IPRATROPIUM BROMIDE AND ALBUTEROL SULFATE 3 ML: .5; 2.5 SOLUTION RESPIRATORY (INHALATION) at 13:25

## 2021-05-08 RX ADMIN — Medication 10 ML: at 21:01

## 2021-05-08 NOTE — PROGRESS NOTES
Physician Progress Note      Melissa Dia  CSN #:                  [id number]  :                       1940  ADMIT DATE:       2021 10:15 AM  DISCH DATE:  RESPONDING  PROVIDER #:        Kristofer BENNETT MD          QUERY TEXT:    Pt admitted with shortness of breath and has CHF documented. If possible, please document in progress notes and discharge summary further specificity regarding the type and acuity of CHF:      The medical record reflects the following:  Risk Factors: 80year old female, shortness of breath, HTN  Clinical Indicators:  H&P - Congestive heart failure   Echo - LVEF is 40 - 45%. Normal cavity size. Mild concentric hypertrophy. Moderate (grade 2) left ventricular diastolic dysfunction.  CXR - Hazy and somewhat patchy reticular markings central and basilar lungs. Interstitial edema, possible cardiogenic cause and/or infectious/inflammatory  origin. Cardiac silhouette within normal limits. Atherosclerotic change thoracic  aorta. Small dependent pleural effusions, left greater than right. BNP: 7,360 - 9,690  Treatment: IV Lasix 40mg BID    Please call [phone] with any questions  Options provided:  -- Acute on Chronic Systolic CHF/HFrEF  -- Acute Systolic CHF/HFrEF  -- Chronic Systolic CHF/HFrEF  -- Other - I will add my own diagnosis  -- Disagree - Not applicable / Not valid  -- Disagree - Clinically unable to determine / Unknown  -- Refer to Clinical Documentation Reviewer    PROVIDER RESPONSE TEXT:    This patient is in acute on chronic systolic CHF/HFrEF. Query created by:  Julissa Kate on 2021 8:36 AM      Electronically signed by:  Kristofer Gerardo MD 2021 9:17 AM

## 2021-05-08 NOTE — CONSULTS
PULMONARY NOTE  [facility]    Name: José Miguel Floyd MRN: [id number]   : 1940 Hospital: [facility]   Date: 2021  Admission date: 2021 Hospital Day: 5       HPI:     Hospital Problems  Never Reviewed          Codes Class Noted POA    CHF (congestive heart failure) ([facility]) ICD-10-CM: I50.9  ICD-9-CM: 428.0  2021 Unknown        COPD (chronic obstructive pulmonary disease) ([facility] ICD-10-CM: J44.9  ICD-9-CM: 496  2021 Unknown                   [x] High complexity decision making was performed  [x] See my orders for details      Subjective/Initial History:     I was asked by Grayson Allen MD to see José Miguel Floyd  a 80 y.o.  female in consultation     Excerpts from admission 2021 or consult notes as follows:   77-year-old lady came in because of shortness of breath and dyspnea she was told that she has COPD recently and she was giving albuterol inhaler did not seem to help so came to the emergency room she was hypoxic she was put on oxygen 5 L nasal cannula she was having dyspnea minimal exertion and also at rest she is a lifetime non-smoker, but she has been exposed to secondhand smoke her  used to smoke and all her children smoke she used to work on the farm as a farmer no chest pain no fever no chills.        Allergies   Allergen Reactions    Nortriptyline Itching    Cymbalta [Duloxetine] Itching    Ivp [Fd And C Blue No.1] Hives    Morphine Itching    Tetracycline Itching        MAR reviewed and pertinent medications noted or modified as needed     Current Facility-Administered Medications   Medication    furosemide (LASIX) injection 40 mg    aspirin chewable tablet 81 mg    ticagrelor (BRILINTA) tablet 90 mg    sodium chloride (NS) flush 5-40 mL    sodium chloride (NS) flush 5-40 mL    predniSONE (DELTASONE) tablet 10 mg    hydrOXYzine pamoate (VISTARIL) capsule 25 mg    piperacillin-tazobactam (ZOSYN) 3.375 g in 0.9% sodium chloride (MBP/ADV) 100 mL MBP    escitalopram oxalate (LEXAPRO) tablet 10 mg    levothyroxine (SYNTHROID) tablet 150 mcg    insulin glargine (LANTUS) injection 60 Units    famotidine (PEPCID) tablet 20 mg    atorvastatin (LIPITOR) tablet 10 mg    insulin lispro (HUMALOG) injection    glucose chewable tablet 16 g    dextrose (D50W) injection syrg 12.5-25 g    glucagon (GLUCAGEN) injection 1 mg    acetaminophen (TYLENOL) tablet 650 mg    Or    acetaminophen (TYLENOL) suppository 650 mg    polyethylene glycol (MIRALAX) packet 17 g    ondansetron (ZOFRAN ODT) tablet 4 mg    Or    ondansetron (ZOFRAN) injection 4 mg    enoxaparin (LOVENOX) injection 40 mg    albuterol-ipratropium (DUO-NEB) 2.5 MG-0.5 MG/3 ML    budesonide-formoteroL (SYMBICORT) 160-4.5 mcg/actuation HFA inhaler 2 Puff      Patient PCP: Neal Chu MD  PMH:  has a past medical history of Depression, DM (diabetes mellitus) ([facility] Hyperlipidemia, Hypertension, Pancreatitis, SOB (shortness of breath), and Thyroid disease. PSH:   has a past surgical history that includes hx hysterectomy. FHX: family history is not on file. SHX:  reports that she has never smoked. She has never used smokeless tobacco. She reports that she does not drink alcohol or use drugs. ROS:    Review of Systems   Constitutional: Negative. HENT: Negative. Eyes: Negative. Respiratory: Positive for cough, sputum production, shortness of breath and wheezing. Cardiovascular: Positive for orthopnea. Gastrointestinal: Negative. Genitourinary: Negative. Musculoskeletal: Negative. Skin: Negative. Neurological: Negative. Psychiatric/Behavioral: Negative.          Objective:     Vital Signs: Telemetry:    normal sinus rhythm Intake/Output:   Visit Vitals  BP (!) 143/67   Pulse 100   Temp 97.5 °F (36.4 °C)   Resp 20   Ht 5' 1\" (1.549 m)   Wt 79.1 kg (174 lb 4.8 oz)   SpO2 98%   BMI 32.93 kg/m²       Temp (24hrs), Av.8 °F (36.6 °C), Min:97.4 °F (36.3 °C), Max:98.2 °F (36.8 °C)        O2 Device: Nasal cannula O2 Flow Rate (L/min): 2 l/min       Wt Readings from Last 4 Encounters:   05/06/21 79.1 kg (174 lb 4.8 oz)   10/11/10 102.2 kg (225 lb 3.2 oz)          Intake/Output Summary (Last 24 hours) at 5/8/2021 1038  Last data filed at 5/7/2021 2000  Gross per 24 hour   Intake    Output 900 ml   Net -900 ml       Last shift:      No intake/output data recorded. Last 3 shifts: 05/06 1901 - 05/08 0700  In: -   Out: 900 [Urine:900]       Physical Exam:     Physical Exam   Constitutional: She appears well-developed. HENT:   Head: Normocephalic and atraumatic. Eyes: Pupils are equal, round, and reactive to light. Conjunctivae are normal.   Neck: Normal range of motion. Neck supple. Cardiovascular: Normal rate and regular rhythm. Pulmonary/Chest: Breath sounds normal. She is in respiratory distress. Abdominal: Soft. Bowel sounds are normal.   Musculoskeletal: Normal range of motion. Neurological: She is alert. Skin: Skin is warm. Labs:    Recent Labs     05/07/21  0550 05/06/21  1007   WBC 19.8* 27.3*   HGB 10.8* 11.3*    412*     Recent Labs     05/07/21  0550 05/06/21  2130 05/06/21  1607 05/06/21  1007 05/05/21  1606     --   --  132* 129*   K 3.4* 3.3* 2.6* 2.6* 3.4*   CL 98  --   --  92* 90*   CO2 33*  --   --  30 27   *  --   --  226* 142*   BUN 32*  --   --  42* 39*   CREA 1.01  --   --  1.20* 1.16*   CA 8.2*  --   --  8.6 9.0   ALB 2.9*  --   --  3.1*  --    ALT 50  --   --  32  --      No results for input(s): PH, PCO2, PO2, HCO3, FIO2 in the last 72 hours. Recent Labs     05/06/21  1007   TROIQ 0.21*     No results found for: BNPP, BNP   Lab Results   Component Value Date/Time    Culture result: No growth 3 days 05/04/2021 10:30 AM     Lab Results   Component Value Date/Time    TSH 1.62 05/04/2021 11:12 AM       Imaging:    CXR Results  (Last 48 hours)    None                IMPRESSION:     1.  Chronic Obstructive Pulmonary Disease she is a lifetime non-smoker  2. Acute hypoxic and hypercapnic respiratory failure she is on a restraints but cannot wear the BiPAP mask  3. Hyponatremia  4. Decompensated congestive heart failure  5. Pleural effusion more on the left side  6. Prognosis guarded       RECOMMENDATIONS/PLAN:     1. Unable to use the mask he is on nasal cannula  2. Patient on IV Solu-Medrol and nebulizer treatment discontinue IV Solu-Medrol start patient on prednisone  3. Repeat arterial blood gases shows normal PCO2   4. Intubate and place on vent if NIV fails  5. Chest x-ray shows congestive changes   6. Supplemental O2 to keep sats > 93%  7. We will get PT OT evaluation pulse ox room air and ambulation  8.  We will get overnight pulse oximetry       Fallon Vigil MD

## 2021-05-08 NOTE — PROGRESS NOTES
Progress Note    Patient: Izabella Miranda MRN: [id number]  SSN: [id number]    YOB: 1940  Age: 80 y.o. Sex: female      Admit Date: 5/4/2021    LOS: 4 days     Subjective:     No acute events overnight    Objective:     Vitals:    05/08/21 0353 05/08/21 0432 05/08/21 0738 05/08/21 0743   BP:  121/64  (!) 143/67   Pulse: 98 89  100   Resp:  20  20   Temp:  97.4 °F (36.3 °C)  97.5 °F (36.4 °C)   SpO2:  98% 98%    Weight:       Height:            Intake and Output:  Current Shift: No intake/output data recorded. Last three shifts: 05/06 1901 - 05/08 0700  In: -   Out: 900 [Urine:900]    Physical Exam:   General:  Alert, cooperative, no distress, appears stated age. Eyes:  Conjunctivae/corneas clear. PERRL, EOMs intact. Fundi benign   Ears:  Normal TMs and external ear canals both ears. Nose: Nares normal. Septum midline. Mucosa normal. No drainage or sinus tenderness. Mouth/Throat: Lips, mucosa, and tongue normal. Teeth and gums normal.   Neck: Supple, symmetrical, trachea midline, no adenopathy, thyroid: no enlargment/tenderness/nodules, no carotid bruit and no JVD. Back:   Symmetric, no curvature. ROM normal. No CVA tenderness. Lungs:   Clear to auscultation bilaterally. Heart:   Ejection systolic murmur in the aortic area   Abdomen:   Soft, non-tender. Bowel sounds normal. No masses,  No organomegaly. Extremities: Extremities normal, atraumatic, no cyanosis or edema. Pulses: 2+ and symmetric all extremities. Skin: Skin color, texture, turgor normal. No rashes or lesions   Lymph nodes: Cervical, supraclavicular, and axillary nodes normal.   Neurologic: CNII-XII intact. Normal strength, sensation and reflexes throughout. Lab/Data Review: All lab results for the last 24 hours reviewed. Assessment:     Active Problems:    CHF (congestive heart failure) ([facility]) (5/4/2021)      COPD (chronic obstructive pulmonary disease) ([address] (5/4/2021)    Mrs. Ninfa Cooper is a 68-year-old white female with:  1. Heart failure with decompensation  2. Diabetes mellitus  3. Hypertension  4. Hypothyroidism  5. Depression  6. COPD  7. Right bundle branch block with left episode  8. Hyponatremia  9. Acute kidney injury  10. Peripheral vascular disease  11. Right upper lobe opacity  12. Fracture deformity of proximal left humerus    Plan:       Patient underwent PCI of the mid LAD. She had apical hypokinesis. Her rhythm was stable overnight. Blood pressure is stable. She is currently on aspirin, atorvastatin, Brilinta. Kidney function pending. BNP yesterday was severely elevated. Continue IV diuresis. Recheck labs in a.m. Assessment as an outpatient for TAVR.   Signed By: Yaneth Neal MD     May 8, 2021

## 2021-05-08 NOTE — PROGRESS NOTES
SpO2 on room air, at rest=94%. SpO2 on room air, while ambulating=87%.   SpO2 on 2L , while ambulating=93%

## 2021-05-08 NOTE — PROGRESS NOTES
Hospitalist Progress Note    Subjective:     Michele Wilcox is a 79 yo female with a PMHx significant for DM, HLD, HTN, thyroid disease, and depression, who presents to the ED with shortness of breath for the past 1-2 days due to a severe acute exacerbation of COPD, acute hypoxic/hypercapnic respiratory failure, and CHF. She also has a bilateral pleural effusion (worse on left), interstitial edema (cardiogenic or infectious cause), and hyponatremia.   ___________________________________________________________    Today, the patient presents awake and in no acute distress, sitting up in bed on 3L NC.    daughter at the bedside    Cardiac cath in place 1 stent  Patient have significant severe aortic stenosis    Past Medical History:   Diagnosis Date    Depression     DM (diabetes mellitus) ([address]     Hyperlipidemia     Hypertension     Pancreatitis     SOB (shortness of breath)     Thyroid disease       Past Surgical History:   Procedure Laterality Date    HX HYSTERECTOMY       History reviewed. No pertinent family history. Social History     Tobacco Use    Smoking status: Never Smoker    Smokeless tobacco: Never Used   Substance Use Topics    Alcohol use: No       Prior to Admission medications    Medication Sig Start Date End Date Taking? Authorizing Provider   Insulin Needles, Disposable, 31 X 5/16 \" Ndle by Does Not Apply route. 10/11/10   Chicho Gan MD   insulin lispro, human, (HUMALOG KWIKPEN) 100 unit/mL flexpen 15 Units by SubCUTAneous route three (3) times daily (with meals). 10/13/10   Chicho Gan MD   benazepril (LOTENSIN) 40 mg tablet Take 40 mg by mouth daily. 10/11/10   Provider, Historical   LEXAPRO 10 mg tablet Take 10 mg by mouth nightly. 10/11/10   Provider, Historical   famotidine (PEPCID) 20 mg tablet Take 20 mg by mouth two (2) times a day.  10/11/10   Provider, Historical   gabapentin (NEURONTIN) 400 mg capsule  9/3/10   Provider, Historical   hydrocodone-acetaminophen (NORCO) 5-325 mg per tablet Take 1 Tab by mouth every six (6) hours as needed. 10/11/10   Provider, Historical   levothyroxine (SYNTHROID) 150 mcg tablet Take 150 mcg by mouth daily (before breakfast). 10/11/10   Provider, Historical   lorazepam (ATIVAN) 1 mg tablet  8/2/10   Provider, Historical   lovastatin (MEVACOR) 40 mg tablet Take 40 mg by mouth nightly. 10/11/10   Provider, Historical   metoprolol (LOPRESSOR) 100 mg tablet Take 100 mg by mouth two (2) times a day. 10/11/10   Provider, Historical   oxycodone-acetaminophen (PERCOCET 10)  mg per tablet  8/2/10   Provider, Historical   insulin glargine (LANTUS) 100 unit/mL injection 60 Units by SubCUTAneous route nightly. 10/11/10   Vega Hidalgo MD     Allergies   Allergen Reactions    Nortriptyline Itching    Cymbalta [Duloxetine] Itching    Ivp [Fd And C Blue No.1] Hives    Morphine Itching    Tetracycline Itching        Review of Systems:  A comprehensive review of systems was negative except for that written in the History of Present Illness. Objective: Intake and Output:    No intake/output data recorded. 05/06 1901 - 05/08 0700  In: -   Out: 900 [Urine:900]    Physical Exam:   Visit Vitals  BP (!) 95/56   Pulse (!) 107   Temp 98.9 °F (37.2 °C)   Resp 20   Ht 5' 1\" (1.549 m)   Wt 79.1 kg (174 lb 4.8 oz)   SpO2 99%   BMI 32.93 kg/m²     General:  Alert, cooperative, no distress. Head:  Normocephalic, without obvious abnormality, atraumatic. Eyes:  Conjunctivae/corneas clear. Pupils equal, round, reactive to light. Extraocular movements intact. Lungs:    Decreased breath sounds , crackles, wheezing. Chest wall:  No tenderness or deformity. Heart:  Regular rate and rhythm, S1, S2 normal, no murmur, click, rub, or gallop. Possible murmur, difficult to auscultate with BiPAP   Abdomen:   Soft, non-tender. Bowel sounds normal. No masses. No organomegaly. Extremities: Extremities normal, atraumatic, no cyanosis.  1+ pitting edema of lower extremities    Pulses: 2+ and symmetric all extremities. Skin: Skin color, texture, turgor normal. No rashes or lesions. Lymph nodes: Cervical, supraclavicular, and axillary nodes normal.   Neurologic: CNII-XII intact. Normal strength, sensation, and reflexes throughout.        ECG:  ** Poor data quality, interpretation may be adversely affected**   Normal sinus rhythm   Right bundle branch block   Left anterior fascicular block   ** Bifascicular block **   Left ventricular hypertrophy with repolarization abnormality   Cannot rule out Septal infarct , age undetermined     Data Review:   Recent Results (from the past 24 hour(s))   EKG, 12 LEAD, INITIAL    Collection Time: 05/04/21 10:24 AM   Result Value Ref Range    Ventricular Rate 98 BPM    Atrial Rate 98 BPM    P-R Interval 168 ms    QRS Duration 152 ms    Q-T Interval 416 ms    QTC Calculation (Bezet) 531 ms    Calculated P Axis 68 degrees    Calculated R Axis -52 degrees    Calculated T Axis 94 degrees    Diagnosis       ** Poor data quality, interpretation may be adversely affected  Normal sinus rhythm  Right bundle branch block  Left anterior fascicular block  ** Bifascicular block **  Left ventricular hypertrophy with repolarization abnormality  Cannot rule out Septal infarct , age undetermined  Abnormal ECG  No previous ECGs available  Confirmed by Luciano Chamorro MD, Ana Ho ([id number]) on 5/4/2021 12:13:56 PM     SARS-COV-2    Collection Time: 05/04/21 10:30 AM   Result Value Ref Range    SARS-CoV-2 Please find results under separate order     COVID-19 RAPID TEST    Collection Time: 05/04/21 10:30 AM   Result Value Ref Range    Specimen source Nasopharyngeal      COVID-19 rapid test Not Detected Not Detected     LACTIC ACID    Collection Time: 05/04/21 11:12 AM   Result Value Ref Range    Lactic acid 0.6 0.4 - 2.0 mmol/L   MAGNESIUM    Collection Time: 05/04/21 11:12 AM   Result Value Ref Range    Magnesium 1.8 1.6 - 2.4 mg/dL   METABOLIC PANEL, COMPREHENSIVE    Collection Time: 05/04/21 11:12 AM   Result Value Ref Range    Sodium 129 (L) 136 - 145 mmol/L    Potassium 4.2 3.5 - 5.1 mmol/L    Chloride 97 97 - 108 mmol/L    CO2 28 21 - 32 mmol/L    Anion gap 4 (L) 5 - 15 mmol/L    Glucose 182 (H) 65 - 100 mg/dL    BUN 25 (H) 6 - 20 mg/dL    Creatinine 0.88 0.55 - 1.02 mg/dL    BUN/Creatinine ratio 28 (H) 12 - 20      GFR est AA >60 >60 ml/min/1.73m2    GFR est non-AA >60 >60 ml/min/1.73m2    Calcium 8.2 (L) 8.5 - 10.1 mg/dL    Bilirubin, total 0.4 0.2 - 1.0 mg/dL    AST (SGOT) 13 (L) 15 - 37 U/L    ALT (SGPT) 17 12 - 78 U/L    Alk. phosphatase 68 45 - 117 U/L    Protein, total 6.5 6.4 - 8.2 g/dL    Albumin 3.0 (L) 3.5 - 5.0 g/dL    Globulin 3.5 2.0 - 4.0 g/dL    A-G Ratio 0.9 (L) 1.1 - 2.2     BNP    Collection Time: 05/04/21 11:12 AM   Result Value Ref Range    NT pro-BNP 7,360 (H) <450 pg/mL   PROCALCITONIN    Collection Time: 05/04/21 11:12 AM   Result Value Ref Range    Procalcitonin <0.05 (H) 0 ng/mL   TROPONIN I    Collection Time: 05/04/21 11:12 AM   Result Value Ref Range    Troponin-I, Qt. <0.05 <0.05 ng/mL   TSH 3RD GENERATION    Collection Time: 05/04/21 11:12 AM   Result Value Ref Range    TSH 1.62 0.36 - 3.74 uIU/mL   CBC WITH AUTOMATED DIFF    Collection Time: 05/04/21 11:12 AM   Result Value Ref Range    WBC 14.2 (H) 3.6 - 11.0 K/uL    RBC 3.13 (L) 3.80 - 5.20 M/uL    HGB 10.0 (L) 11.5 - 16.0 g/dL    HCT 30.8 (L) 35.0 - 47.0 %    MCV 98.4 80.0 - 99.0 FL    MCH 31.9 26.0 - 34.0 PG    MCHC 32.5 30.0 - 36.5 g/dL    RDW 13.6 11.5 - 14.5 %    PLATELET 793 021 - 801 K/uL    MPV 11.7 8.9 - 12.9 FL    NRBC 0.0 0.0  WBC    ABSOLUTE NRBC 0.00 0.00 - 0.01 K/uL    NEUTROPHILS 87 (H) 32 - 75 %    LYMPHOCYTES 6 (L) 12 - 49 %    MONOCYTES 5 5 - 13 %    EOSINOPHILS 1 0 - 7 %    BASOPHILS 1 0 - 1 %    IMMATURE GRANULOCYTES 0 0 - 0.5 %    ABS. NEUTROPHILS 12.4 (H) 1.8 - 8.0 K/UL    ABS. LYMPHOCYTES 0.8 0.8 - 3.5 K/UL    ABS.  MONOCYTES 0.7 0.0 - 1.0 K/UL ABS. EOSINOPHILS 0.1 0.0 - 0.4 K/UL    ABS. BASOPHILS 0.1 0.0 - 0.1 K/UL    ABS. IMM. GRANS. 0.1 (H) 0.00 - 0.04 K/UL    DF AUTOMATED     PROTHROMBIN TIME + INR    Collection Time: 05/04/21 11:25 AM   Result Value Ref Range    Prothrombin time 13.6 11.9 - 14.7 sec    INR 1.1 0.9 - 1.1     PTT    Collection Time: 05/04/21 11:25 AM   Result Value Ref Range    aPTT 21.8 21.2 - 34.1 sec    aPTT, therapeutic range   82 - 109 sec   D DIMER    Collection Time: 05/04/21 11:25 AM   Result Value Ref Range    D DIMER 0.50 (H) <0.50 ug/ml(FEU)   BLOOD GAS, ARTERIAL    Collection Time: 05/04/21 11:25 AM   Result Value Ref Range    pH 7.36 7.35 - 7.45      PCO2 52 (H) 35 - 45 mmHg    PO2 72 (L) 75 - 100 mmHg    O2 SAT 93 (L) >95 %    BICARBONATE 27 (H) 22 - 26 mmol/L    BASE EXCESS 2.9 (H) 0 - 2 mmol/L    O2 METHOD Nasal Cannula      O2 FLOW RATE 6 L/min    SITE Right Radial      EBEN'S TEST PASS         Chest x-ray:  More conspicuous RUL opacity. Patchy mid and lower lung reticular markings  persist. Cardiac and mediastinal structures unchanged. Thoracic aorta  atherosclerosis. No pneumothorax. Probable small dependent bilateral pleural  effusions.     Assessment:       CAD status post stent LAD  Severe aortic stenosis  Acute hypoxemic respiratory failure  COPD exacerbation   On 3L NC  On Duo-neb, symbicort, solu-medrol  No BiPAP due to restraints    Hyponatremia    Congestive heart failure    Pleural effusions   y    Anemia      Leukocytosis     Diabetes mellitus      Hypertension     Hypothyroidism      Depression     Echo done shows ejection fraction of 40 to 45% and moderate grade 2 diastolic dysfunction    Dopplers negative for DVT  Plan:     Continue medications:   Duo-meb 3mL neb q6hrs  ASA 325mg po qday   Lipitor 10mg po qhs  Symbicort 2 puffs inhaled bid   Enoxaparin 40mg subq qday   Lexapro 10mg po qhs  Famotidine 20mg po bid   Lasix 40mg IV bid  Lantus 60 units subq qhs  Humalog subq qid   Synthroid 150mcg po qam Lisinopril 40mg po qday (on hold due to kidney fx)   Discontinue Solu-Medrol  Start on prednisone 10 mg daily  Metoprolol tartrate 100mg po bid   Zosyn 3.375mg IV q8hrs   Brilinta 90 mg twice a day    Blood cx so far negative    Oxygen room air 94% while ambulating 87%  While ambulating on 2 L 93%     consult for discharge home on home O2  PT OT consult    Discussed with the patient's daughter at the bedside regarding discharge planning    Current Facility-Administered Medications:     furosemide (LASIX) injection 40 mg, 40 mg, IntraVENous, DAILY, Noé Bunch MD, 40 mg at 05/08/21 1005    aspirin chewable tablet 81 mg, 81 mg, Oral, DAILY, Noé Bunch MD, 81 mg at 05/08/21 0956    ticagrelor (BRILINTA) tablet 90 mg, 90 mg, Oral, Q12H, Noé Bunch MD, 90 mg at 05/08/21 1000    sodium chloride (NS) flush 5-40 mL, 5-40 mL, IntraVENous, Q8H, Noé Bunch MD, 10 mL at 05/08/21 0408    sodium chloride (NS) flush 5-40 mL, 5-40 mL, IntraVENous, PRN, Noé Bunch MD    predniSONE (DELTASONE) tablet 10 mg, 10 mg, Oral, DAILY WITH BREAKFAST, Marlene Mishra MD, 10 mg at 05/08/21 9296    hydrOXYzine pamoate (VISTARIL) capsule 25 mg, 25 mg, Oral, Q6H PRN, Marlene Mishra MD, 25 mg at 05/08/21 0034    piperacillin-tazobactam (ZOSYN) 3.375 g in 0.9% sodium chloride (MBP/ADV) 100 mL MBP, 3.375 g, IntraVENous, Q8H, Beny Cheung MD, Last Rate: 25 mL/hr at 05/08/21 0957, 3.375 g at 05/08/21 0957    escitalopram oxalate (LEXAPRO) tablet 10 mg, 10 mg, Oral, QHS, Beny Cheung MD, 10 mg at 05/07/21 2054    levothyroxine (SYNTHROID) tablet 150 mcg, 150 mcg, Oral, ACB, Beny Cheung MD, 150 mcg at 05/08/21 0956    insulin glargine (LANTUS) injection 60 Units, 60 Units, SubCUTAneous, QHS, Beny Cheung MD, 60 Units at 05/06/21 2100    famotidine (PEPCID) tablet 20 mg, 20 mg, Oral, BID, Bney Cheung MD, 20 mg at 05/08/21 0956    atorvastatin (LIPITOR) tablet 10 mg, 10 mg, Oral, QHS, Beny Cheung MD, 10 mg at 05/07/21 2054    insulin lispro (HUMALOG) injection, , SubCUTAneous, AC&HS, Beny Cheung MD, 3 Units at 05/08/21 1333    glucose chewable tablet 16 g, 4 Tab, Oral, PRN, Marilou Cheung MD    dextrose (D50W) injection syrg 12.5-25 g, 25-50 mL, IntraVENous, PRN, Marilou Cheung MD    glucagon [facility]) injection 1 mg, 1 mg, IntraMUSCular, PRN, Marilou Cheung MD    acetaminophen (TYLENOL) tablet 650 mg, 650 mg, Oral, Q6H PRN, 650 mg at 05/05/21 0018 **OR** acetaminophen (TYLENOL) suppository 650 mg, 650 mg, Rectal, Q6H PRN, Marilou Cheung MD    polyethylene glycol (MIRALAX) packet 17 g, 17 g, Oral, DAILY PRN, Marilou Cheung MD    ondansetron (ZOFRAN ODT) tablet 4 mg, 4 mg, Oral, Q8H PRN **OR** ondansetron (ZOFRAN) injection 4 mg, 4 mg, IntraVENous, Q6H PRN, Beny Cheung MD    enoxaparin (LOVENOX) injection 40 mg, 40 mg, SubCUTAneous, DAILY, Beny Cheung MD, 40 mg at 05/08/21 0957    albuterol-ipratropium (DUO-NEB) 2.5 MG-0.5 MG/3 ML, 3 mL, Nebulization, Q6H RT, Beny Cheung MD, 3 mL at 05/08/21 1325    budesonide-formoteroL (SYMBICORT) 160-4.5 mcg/actuation HFA inhaler 2 Puff, 2 Puff, Inhalation, BID, Beny Cheung MD, 2 Puff at 05/07/21 2013

## 2021-05-09 LAB
ALBUMIN SERPL-MCNC: 2.7 G/DL (ref 3.5–5)
ALBUMIN/GLOB SERPL: 0.8 {RATIO} (ref 1.1–2.2)
ALP SERPL-CCNC: 58 U/L (ref 45–117)
ALT SERPL-CCNC: 60 U/L (ref 12–78)
ANION GAP SERPL CALC-SCNC: 6 MMOL/L (ref 5–15)
AST SERPL W P-5'-P-CCNC: 35 U/L (ref 15–37)
BILIRUB SERPL-MCNC: 0.5 MG/DL (ref 0.2–1)
BUN SERPL-MCNC: 31 MG/DL (ref 6–20)
BUN/CREAT SERPL: 29 (ref 12–20)
CA-I BLD-MCNC: 8.4 MG/DL (ref 8.5–10.1)
CHLORIDE SERPL-SCNC: 103 MMOL/L (ref 97–108)
CO2 SERPL-SCNC: 32 MMOL/L (ref 21–32)
CREAT SERPL-MCNC: 1.08 MG/DL (ref 0.55–1.02)
ERYTHROCYTE [DISTWIDTH] IN BLOOD BY AUTOMATED COUNT: 13.9 % (ref 11.5–14.5)
GLOBULIN SER CALC-MCNC: 3.5 G/DL (ref 2–4)
GLUCOSE BLD STRIP.AUTO-MCNC: 119 MG/DL (ref 65–100)
GLUCOSE BLD STRIP.AUTO-MCNC: 142 MG/DL (ref 65–100)
GLUCOSE BLD STRIP.AUTO-MCNC: 144 MG/DL (ref 65–100)
GLUCOSE BLD STRIP.AUTO-MCNC: 319 MG/DL (ref 65–100)
GLUCOSE BLD STRIP.AUTO-MCNC: 40 MG/DL (ref 65–100)
GLUCOSE BLD STRIP.AUTO-MCNC: 48 MG/DL (ref 65–100)
GLUCOSE BLD STRIP.AUTO-MCNC: 61 MG/DL (ref 65–100)
GLUCOSE SERPL-MCNC: 36 MG/DL (ref 65–100)
HCT VFR BLD AUTO: 40.4 % (ref 35–47)
HGB BLD-MCNC: 13.2 G/DL (ref 11.5–16)
MCH RBC QN AUTO: 31.5 PG (ref 26–34)
MCHC RBC AUTO-ENTMCNC: 32.7 G/DL (ref 30–36.5)
MCV RBC AUTO: 96.4 FL (ref 80–99)
NRBC # BLD: 0 K/UL (ref 0–0.01)
NRBC BLD-RTO: 0 PER 100 WBC
PERFORMED BY, TECHID: ABNORMAL
PLATELET # BLD AUTO: 455 K/UL (ref 150–400)
PMV BLD AUTO: 11.2 FL (ref 8.9–12.9)
POTASSIUM SERPL-SCNC: 2.6 MMOL/L (ref 3.5–5.1)
PROT SERPL-MCNC: 6.2 G/DL (ref 6.4–8.2)
RBC # BLD AUTO: 4.19 M/UL (ref 3.8–5.2)
SODIUM SERPL-SCNC: 141 MMOL/L (ref 136–145)
WBC # BLD AUTO: 23.7 K/UL (ref 3.6–11)

## 2021-05-09 PROCEDURE — 97530 THERAPEUTIC ACTIVITIES: CPT

## 2021-05-09 PROCEDURE — 65610000006 HC RM INTENSIVE CARE

## 2021-05-09 PROCEDURE — 74011000258 HC RX REV CODE- 258: Performed by: FAMILY MEDICINE

## 2021-05-09 PROCEDURE — 74011636637 HC RX REV CODE- 636/637: Performed by: FAMILY MEDICINE

## 2021-05-09 PROCEDURE — 93005 ELECTROCARDIOGRAM TRACING: CPT

## 2021-05-09 PROCEDURE — 85027 COMPLETE CBC AUTOMATED: CPT

## 2021-05-09 PROCEDURE — 74011250636 HC RX REV CODE- 250/636: Performed by: INTERNAL MEDICINE

## 2021-05-09 PROCEDURE — 94762 N-INVAS EAR/PLS OXIMTRY CONT: CPT

## 2021-05-09 PROCEDURE — 82962 GLUCOSE BLOOD TEST: CPT

## 2021-05-09 PROCEDURE — 97161 PT EVAL LOW COMPLEX 20 MIN: CPT

## 2021-05-09 PROCEDURE — 97165 OT EVAL LOW COMPLEX 30 MIN: CPT

## 2021-05-09 PROCEDURE — 74011250637 HC RX REV CODE- 250/637: Performed by: FAMILY MEDICINE

## 2021-05-09 PROCEDURE — 94640 AIRWAY INHALATION TREATMENT: CPT

## 2021-05-09 PROCEDURE — 74011000250 HC RX REV CODE- 250: Performed by: FAMILY MEDICINE

## 2021-05-09 PROCEDURE — 87641 MR-STAPH DNA AMP PROBE: CPT

## 2021-05-09 PROCEDURE — 74011250637 HC RX REV CODE- 250/637: Performed by: INTERNAL MEDICINE

## 2021-05-09 PROCEDURE — 74011250636 HC RX REV CODE- 250/636: Performed by: FAMILY MEDICINE

## 2021-05-09 PROCEDURE — 80053 COMPREHEN METABOLIC PANEL: CPT

## 2021-05-09 RX ORDER — ATROPINE SULFATE 0.1 MG/ML
INJECTION INTRAVENOUS
Status: DISPENSED
Start: 2021-05-09 | End: 2021-05-10

## 2021-05-09 RX ORDER — DOPAMINE HYDROCHLORIDE 320 MG/100ML
0-20 INJECTION, SOLUTION INTRAVENOUS
Status: DISCONTINUED | OUTPATIENT
Start: 2021-05-09 | End: 2021-05-19 | Stop reason: HOSPADM

## 2021-05-09 RX ORDER — POTASSIUM CHLORIDE 750 MG/1
40 TABLET, FILM COATED, EXTENDED RELEASE ORAL EVERY 4 HOURS
Status: COMPLETED | OUTPATIENT
Start: 2021-05-09 | End: 2021-05-09

## 2021-05-09 RX ORDER — ATROPINE SULFATE 0.4 MG/ML
0.5 INJECTION, SOLUTION ENDOTRACHEAL; INTRAMEDULLARY; INTRAMUSCULAR; INTRAVENOUS; SUBCUTANEOUS ONCE
Status: COMPLETED | OUTPATIENT
Start: 2021-05-09 | End: 2021-05-09

## 2021-05-09 RX ADMIN — Medication 10 ML: at 06:13

## 2021-05-09 RX ADMIN — ATROPINE SULFATE 0.5 MG: 0.4 INJECTION, SOLUTION INTRAMUSCULAR; INTRAVENOUS; SUBCUTANEOUS at 18:03

## 2021-05-09 RX ADMIN — ESCITALOPRAM OXALATE 10 MG: 10 TABLET ORAL at 21:55

## 2021-05-09 RX ADMIN — POTASSIUM CHLORIDE 40 MEQ: 750 TABLET, EXTENDED RELEASE ORAL at 09:10

## 2021-05-09 RX ADMIN — INSULIN LISPRO 10 UNITS: 100 INJECTION, SOLUTION INTRAVENOUS; SUBCUTANEOUS at 16:15

## 2021-05-09 RX ADMIN — IPRATROPIUM BROMIDE AND ALBUTEROL SULFATE 3 ML: .5; 2.5 SOLUTION RESPIRATORY (INHALATION) at 21:07

## 2021-05-09 RX ADMIN — ASPIRIN 81 MG CHEWABLE TABLET 81 MG: 81 TABLET CHEWABLE at 09:10

## 2021-05-09 RX ADMIN — POTASSIUM CHLORIDE 40 MEQ: 750 TABLET, EXTENDED RELEASE ORAL at 16:15

## 2021-05-09 RX ADMIN — FUROSEMIDE 40 MG: 10 INJECTION, SOLUTION INTRAMUSCULAR; INTRAVENOUS at 09:09

## 2021-05-09 RX ADMIN — TICAGRELOR 90 MG: 90 TABLET ORAL at 09:10

## 2021-05-09 RX ADMIN — Medication 10 ML: at 13:25

## 2021-05-09 RX ADMIN — ENOXAPARIN SODIUM 40 MG: 40 INJECTION SUBCUTANEOUS at 09:10

## 2021-05-09 RX ADMIN — Medication 10 ML: at 22:11

## 2021-05-09 RX ADMIN — IPRATROPIUM BROMIDE AND ALBUTEROL SULFATE 3 ML: .5; 2.5 SOLUTION RESPIRATORY (INHALATION) at 00:41

## 2021-05-09 RX ADMIN — BUDESONIDE AND FORMOTEROL FUMARATE DIHYDRATE 2 PUFF: 160; 4.5 AEROSOL RESPIRATORY (INHALATION) at 21:07

## 2021-05-09 RX ADMIN — DOPAMINE HYDROCHLORIDE IN DEXTROSE 5 MCG/KG/MIN: 3.2 INJECTION, SOLUTION INTRAVENOUS at 19:46

## 2021-05-09 RX ADMIN — INSULIN GLARGINE 60 UNITS: 100 INJECTION, SOLUTION SUBCUTANEOUS at 21:57

## 2021-05-09 RX ADMIN — PIPERACILLIN AND TAZOBACTAM 3.38 G: 3; .375 INJECTION, POWDER, LYOPHILIZED, FOR SOLUTION INTRAVENOUS at 16:15

## 2021-05-09 RX ADMIN — FAMOTIDINE 20 MG: 20 TABLET ORAL at 09:10

## 2021-05-09 RX ADMIN — IPRATROPIUM BROMIDE AND ALBUTEROL SULFATE 3 ML: .5; 2.5 SOLUTION RESPIRATORY (INHALATION) at 08:05

## 2021-05-09 RX ADMIN — TICAGRELOR 90 MG: 90 TABLET ORAL at 21:55

## 2021-05-09 RX ADMIN — ATORVASTATIN CALCIUM 10 MG: 10 TABLET, FILM COATED ORAL at 21:55

## 2021-05-09 RX ADMIN — GLUCAGON HYDROCHLORIDE 1 MG: KIT at 17:38

## 2021-05-09 RX ADMIN — PREDNISONE 10 MG: 5 TABLET ORAL at 09:09

## 2021-05-09 RX ADMIN — FAMOTIDINE 20 MG: 20 TABLET ORAL at 21:55

## 2021-05-09 RX ADMIN — PIPERACILLIN AND TAZOBACTAM 3.38 G: 3; .375 INJECTION, POWDER, LYOPHILIZED, FOR SOLUTION INTRAVENOUS at 09:10

## 2021-05-09 RX ADMIN — POTASSIUM CHLORIDE 40 MEQ: 750 TABLET, EXTENDED RELEASE ORAL at 13:25

## 2021-05-09 RX ADMIN — BUDESONIDE AND FORMOTEROL FUMARATE DIHYDRATE 2 PUFF: 160; 4.5 AEROSOL RESPIRATORY (INHALATION) at 08:05

## 2021-05-09 RX ADMIN — PIPERACILLIN AND TAZOBACTAM 3.38 G: 3; .375 INJECTION, POWDER, LYOPHILIZED, FOR SOLUTION INTRAVENOUS at 00:00

## 2021-05-09 RX ADMIN — LEVOTHYROXINE SODIUM 150 MCG: 75 TABLET ORAL at 09:26

## 2021-05-09 RX ADMIN — IPRATROPIUM BROMIDE AND ALBUTEROL SULFATE 3 ML: .5; 2.5 SOLUTION RESPIRATORY (INHALATION) at 13:45

## 2021-05-09 NOTE — PROGRESS NOTES
Problem: Mobility Impaired (Adult and Pediatric)  Goal: *Acute Goals and Plan of Care (Insert Text)  Description: Patient will move from supine to sit and sit to supine , scoot up and down, and roll side to side in bed with modified independence within 7 day(s). Patient will transfer from bed to chair and chair to bed with supervision/set-up using the least restrictive device within 7 day(s). Patient will improve static standing balance to modified independence within 1 week(s). Patient will ambulate 50 feet with supervision with least restrictive device within 1 weeks. Outcome: Not Met   PHYSICAL THERAPY EVALUATION  Patient: Brennan Frazier (38 y.o. female)  Date: 5/9/2021  Primary Diagnosis: CHF (congestive heart failure) ([facility]) [I50.9]  COPD (chronic obstructive pulmonary disease) ([facility] [J44.9]  Procedure(s) (LRB):  LEFT HEART CATH / CORONARY ANGIOGRAPHY (N/A)  Right Heart Cath (N/A)  Fractional Flow Reserve (N/A)  Percutaneous Coronary Intervention (N/A) 2 Days Post-Op   Precautions: fall  ASSESSMENT  As per ED notes(Brayden Alcala is a 81 yo female with a PMHx significant for DM, HLD, HTN, thyroid disease, and depression, who presents to the ED with shortness of breath for the past 1-2 days. She stated that she was told she had the \"beginnings of COPD\" and was recently given an albuterol inhaler. She was at her doctor's office and was sent to the ER and was given 5L O2 and 10mg decadron via EMS. She states that at home she is unable to walk without shortness of breath. She can ambulate around her house if she is holding onto railings or objects. She is occasionally short of breath at rest as well. She denies orthopnea but states that she has had episodes of PND in the last few days. She states that she has never smoked but her sons do. However, they refrain from smoking when they are near her. She also denies any alcohol or drug use.  Her previous occupation was as a  and denies any chronic exposures. She currently lives alone. She denies any chest pain, nausea, vomiting, fever, chills, abdomen pain, recent illnesses, allergies, or sick contacts. Her O2 sat was 93% with NC. She currently has a BiPAP machine on.)    Patient was found in bed supine at RA. Spo2 wfl. Patient agreeable to PT eval. Able to perform bed mob andsupine to sit with sba to cg. Sit to stand with cg to min assist. Patient took 5 steps back and forth to the bed with gait belt and RW. Patient;s  at rest to begin with. After returning to bed  for fraction of a second and than 106. Patient had no c/os. MD  came to see the patient and reported to MD about the HR       Current Level of Function Impacting Discharge (mobility/balance): decreased gen strength and safety. decreased mobility  Other factors to consider for discharge: HHPT pending progress wt therapy. Patient will benefit from skilled therapy intervention to address the above noted impairments. PLAN :  Recommendations and Planned Interventions: bed mobility training, transfer training, gait training, therapeutic exercises, patient and family training/education, and therapeutic activities      Frequency/Duration: Patient will be followed by physical therapy:  3 -5times a week to address goals. Recommendation for discharge: (in order for the patient to meet his/her long term goals)  To be determined: IRF vs HHPT    This discharge recommendation:  Has been made in collaboration with the attending provider and/or case management    IF patient discharges home will need the following DME: to be determined (TBD)         SUBJECTIVE:   Patient stated I am feeling better.     OBJECTIVE DATA SUMMARY:   HISTORY:    Past Medical History:   Diagnosis Date    Depression     DM (diabetes mellitus) ([facility]     Hyperlipidemia     Hypertension     Pancreatitis     SOB (shortness of breath)     Thyroid disease      Past Surgical History:   Procedure Laterality Date HX HYSTERECTOMY         Personal factors and/or comorbidities impacting plan of care:   Home Situation  Home Environment: Private residence  # Steps to Enter: 3  Wheelchair Ramp: Yes  One/Two Story Residence: Two story, live on 1st floor  Lift Chair Available: Yes  Living Alone: Yes(son present stating he can stay with pt upon d/c)  Support Systems: Child(toshia)  Patient Expects to be Discharged to[de-identified] Private residence  Current DME Used/Available at Home: Ginny Creed, straight, Shower chair, Walker, rollator, Walker, rolling  Tub or Shower Type: Tub/Shower combination    EXAMINATION/PRESENTATION/DECISION MAKING:   Critical Behavior:  Neurologic State: Alert  Orientation Level: Oriented X4        Hearing: Auditory  Auditory Impairment: Hard of hearing, right side(wears hearing assistive device )  Range Of Motion:  AROM: Generally decreased, functional                       Strength:    Strength: Generally decreased, functional(you UE grossly observed to be 3+/5)                    Tone & Sensation:                  Sensation: Intact               Functional Mobility:  Bed Mobility:  Rolling: Stand-by assistance  Supine to Sit: Stand-by assistance  Sit to Supine: Contact guard assistance  Scooting: Stand-by assistance  Transfers:  Sit to Stand: Contact guard assistance  Stand to Sit: Contact guard assistance        Bed to Chair: Contact guard assistance              Balance:   Sitting: Impaired; With support  Sitting - Static: Good (unsupported)  Sitting - Dynamic: Fair (occasional)  Standing: Impaired; With support  Standing - Static: Constant support;Good  Standing - Dynamic : Constant support; Fair  Ambulation/Gait Training:  Distance (ft): 5 Feet (ft)  Assistive Device: Gait belt;Walker, rolling  Ambulation - Level of Assistance: Minimal assistance;Assist x1     Gait Description (WDL): Exceptions to [facility]           Base of Support: Widened     Speed/Roya: Slow  Step Length: Left shortened;Right shortened Functional Measure:  [facility] AM-PAC 6 Clicks         Basic Mobility Inpatient Short Form  How much difficulty does the patient currently have. .. Unable A Lot A Little None   1. Turning over in bed (including adjusting bedclothes, sheets and blankets)? [] 1   [] 2   [x] 3   [] 4   2. Sitting down on and standing up from a chair with arms ( e.g., wheelchair, bedside commode, etc.)   [] 1   [] 2   [x] 3   [] 4   3. Moving from lying on back to sitting on the side of the bed? [] 1   [] 2   [x] 3   [] 4          How much help from another person does the patient currently need. .. Total A Lot A Little None   4. Moving to and from a bed to a chair (including a wheelchair)? [] 1   [] 2   [x] 3   [] 4   5. Need to walk in hospital room? [] 1   [] 2   [x] 3   [] 4   6. Climbing 3-5 steps with a railing? [] 1   [] 2   [x] 3   [] 4   © , Trustees of [facility], under license to [employer] All rights reserved     Score:  Initial:  Most Recent: X (Date: 2021 )   Interpretation of Tool:  Represents activities that are increasingly more difficult (i.e. Bed mobility, Transfers, Gait). Score 24 23 22-20 19-15 14-10 9-7 6   Modifier CH CI CJ CK CL CM CN           Physical Therapy Evaluation Charge Determination   History Examination Presentation Decision-Making   LOW Complexity : Zero comorbidities / personal factors that will impact the outcome / POC LOW Complexity : 1-2 Standardized tests and measures addressing body structure, function, activity limitation and / or participation in recreation  LOW Complexity : Stable, uncomplicated  LOW Complexity : FOTO score of       Based on the above components, the patient evaluation is determined to be of the following complexity level: LOW     Pain Ratin/10    Activity Tolerance:   Good  Please refer to the flowsheet for vital signs taken during this treatment.     After treatment patient left in no apparent distress: Supine in bed, Call bell within reach, and Bed / chair alarm activated    COMMUNICATION/EDUCATION:   The patients plan of care was discussed with: Occupational therapist, Registered nurse, and Physician. Fall prevention education was provided and the patient/caregiver indicated understanding., Patient/family have participated as able in goal setting and plan of care. , and Patient/family agree to work toward stated goals and plan of care.     Thank you for this referral.  Toyin Culver PT   Time Calculation: 20 mins

## 2021-05-09 NOTE — CONSULTS
PULMONARY NOTE  [facility]    Name: Sindhu Shanks MRN: [id number]   : 1940 [facility]   Date: 2021  Admission date: 2021 Hospital Day: 6       HPI:     Hospital Problems  Never Reviewed          Codes Class Noted POA    CHF (congestive heart failure) ([facility]) ICD-10-CM: I50.9  ICD-9-CM: 428.0  2021 Unknown        COPD (chronic obstructive pulmonary disease) ([facility] ICD-10-CM: J44.9  ICD-9-CM: 496  2021 Unknown                   [x] High complexity decision making was performed  [x] See my orders for details      Subjective/Initial History:     I was asked by Zi Peoples MD to see Sindhu Shanks  a 80 y.o.  female in consultation     Excerpts from admission 2021 or consult notes as follows:   79-year-old lady came in because of shortness of breath and dyspnea she was told that she has COPD recently and she was giving albuterol inhaler did not seem to help so came to the emergency room she was hypoxic she was put on oxygen 5 L nasal cannula she was having dyspnea minimal exertion and also at rest she is a lifetime non-smoker, but she has been exposed to secondhand smoke her  used to smoke and all her children smoke she used to work on the farm as a farmer no chest pain no fever no chills.        Allergies   Allergen Reactions    Nortriptyline Itching    Cymbalta [Duloxetine] Itching    Ivp [Fd And C Blue No.1] Hives    Morphine Itching    Tetracycline Itching        MAR reviewed and pertinent medications noted or modified as needed     Current Facility-Administered Medications   Medication    potassium chloride SR (KLOR-CON 10) tablet 40 mEq    furosemide (LASIX) injection 40 mg    aspirin chewable tablet 81 mg    ticagrelor (BRILINTA) tablet 90 mg    sodium chloride (NS) flush 5-40 mL    sodium chloride (NS) flush 5-40 mL    predniSONE (DELTASONE) tablet 10 mg    hydrOXYzine pamoate (VISTARIL) capsule 25 mg    piperacillin-tazobactam (ZOSYN) 3.375 g in 0.9% sodium chloride (MBP/ADV) 100 mL MBP    escitalopram oxalate (LEXAPRO) tablet 10 mg    levothyroxine (SYNTHROID) tablet 150 mcg    insulin glargine (LANTUS) injection 60 Units    famotidine (PEPCID) tablet 20 mg    atorvastatin (LIPITOR) tablet 10 mg    insulin lispro (HUMALOG) injection    glucose chewable tablet 16 g    dextrose (D50W) injection syrg 12.5-25 g    glucagon (GLUCAGEN) injection 1 mg    acetaminophen (TYLENOL) tablet 650 mg    Or    acetaminophen (TYLENOL) suppository 650 mg    polyethylene glycol (MIRALAX) packet 17 g    ondansetron (ZOFRAN ODT) tablet 4 mg    Or    ondansetron (ZOFRAN) injection 4 mg    enoxaparin (LOVENOX) injection 40 mg    albuterol-ipratropium (DUO-NEB) 2.5 MG-0.5 MG/3 ML    budesonide-formoteroL (SYMBICORT) 160-4.5 mcg/actuation HFA inhaler 2 Puff      Patient PCP: Ney Almonte MD  PMH:  has a past medical history of Depression, DM (diabetes mellitus) ([facility] Hyperlipidemia, Hypertension, Pancreatitis, SOB (shortness of breath), and Thyroid disease. PSH:   has a past surgical history that includes hx hysterectomy. FHX: family history is not on file. SHX:  reports that she has never smoked. She has never used smokeless tobacco. She reports that she does not drink alcohol or use drugs. ROS:    Review of Systems   Constitutional: Negative. HENT: Negative. Eyes: Negative. Respiratory: Positive for cough, sputum production, shortness of breath and wheezing. Cardiovascular: Positive for orthopnea. Gastrointestinal: Negative. Genitourinary: Negative. Musculoskeletal: Negative. Skin: Negative. Neurological: Negative. Psychiatric/Behavioral: Negative.          Objective:     Vital Signs: Telemetry:    normal sinus rhythm Intake/Output:   Visit Vitals  /61   Pulse (!) 51   Temp 97.8 °F (36.6 °C)   Resp 18   Ht 5' 1\" (1.549 m)   Wt 79.1 kg (174 lb 4.8 oz)   SpO2 97% BMI 32.93 kg/m²       Temp (24hrs), Av.4 °F (36.9 °C), Min:97.8 °F (36.6 °C), Max:98.9 °F (37.2 °C)        O2 Device: None (Room air) O2 Flow Rate (L/min): 2 l/min       Wt Readings from Last 4 Encounters:   21 79.1 kg (174 lb 4.8 oz)   10/11/10 102.2 kg (225 lb 3.2 oz)        No intake or output data in the 24 hours ending 21 1049    Last shift:      No intake/output data recorded. Last 3 shifts:  190 -  07  In: -   Out: 900 [Urine:900]       Physical Exam:     Physical Exam   Constitutional: She appears well-developed. HENT:   Head: Normocephalic and atraumatic. Eyes: Pupils are equal, round, and reactive to light. Conjunctivae are normal.   Neck: Normal range of motion. Neck supple. Cardiovascular: Normal rate and regular rhythm. Pulmonary/Chest: Breath sounds normal. She is in respiratory distress. Abdominal: Soft. Bowel sounds are normal.   Musculoskeletal: Normal range of motion. Neurological: She is alert. Skin: Skin is warm. Labs:    Recent Labs     21  0640 21  21421  0550   WBC 23.7* 16.3* 19.8*   HGB 13.2 12.0 10.8*   * 377 349     Recent Labs     21  0640 21  21421  0550    141 136   K 2.6* 3.2* 3.4*    102 98   CO2 32 34* 33*   GLU 36* 76 101*   BUN 31* 27* 32*   CREA 1.08* 1.03* 1.01   CA 8.4* 8.0* 8.2*   ALB 2.7* 2.5* 2.9*   ALT 60 59 50     No results for input(s): PH, PCO2, PO2, HCO3, FIO2 in the last 72 hours. No results for input(s): CPK, CKNDX, TROIQ in the last 72 hours. No lab exists for component: CPKMB  No results found for: BNPP, BNP   Lab Results   Component Value Date/Time    Culture result: No growth 4 days 2021 10:30 AM     Lab Results   Component Value Date/Time    TSH 1.62 2021 11:12 AM       Imaging:    CXR Results  (Last 48 hours)    None                IMPRESSION:     1. Chronic Obstructive Pulmonary Disease she is a lifetime non-smoker  2.  Acute hypoxic and hypercapnic respiratory failure she is on a restraints but cannot wear the BiPAP mask  3. Hyponatremia  4. Decompensated congestive heart failure  5. Pleural effusion more on the left side  6. Prognosis guarded       RECOMMENDATIONS/PLAN:     1. Unable to use the mask he is on nasal cannula  2. Patient on IV Solu-Medrol and nebulizer treatment discontinue IV Solu-Medrol start patient on prednisone  3. Repeat arterial blood gases shows normal PCO2   4. Intubate and place on vent if NIV fails  5. Chest x-ray shows congestive changes   6. Supplemental O2 to keep sats > 93%  7. Overnight pulse oximetry shows no desaturation but pulse ox room air and ambulation shows desaturating to 87% on room air   SpO2 on room air, at rest=94%. SpO2 on room air, while ambulating=87%.   SpO2 on 2L , while ambulating=93%  We will repeat before discharge again pulse ox on room air and ambulation    Cristian Ballard MD

## 2021-05-09 NOTE — PROGRESS NOTES
Received call from telemetry- patient's heart rate between 40s-50s with possible third degree AV block. STAT EKG ordered and Dr. Mumtaz Daniel paged. 1730-Spoke with Dr. Mumtaz Daniel- orders received to give 1 mg IV glucagon ONCE.    1800- Spoke with Dr. Mumtaz Daniel regarding EKG results. HR still 40s-50. Orders received to give 0.5 mg IV atropine ONCE, and transfer patient to ICU at this time. SBAR report given to [facility], RN in ICU. Patient was transferred to room 269 with all belongings. Tiburcio Astogra RN spoke with son Kelsie Hui) regarding patient's transfer to room 269. Dr. Etienne Nery aware of transfer.

## 2021-05-09 NOTE — PROGRESS NOTES
Problem: Self Care Deficits Care Plan (Adult)  Goal: *Acute Goals and Plan of Care (Insert Text)  Description: Pt will be MI sup<->sit in prep for EOB ADL's  Pt will be MI  LB dressing EOB level  Pt will be Mi  sit<-> prep for toilet transfer  Pt will be MI  toilet transfer with LRAD  Pt will be MI  toileting/cloth mgmt LRAD  Pt will be MI  grooming standing sink  Pt will be MI bathing sitting/standing sink LRAD  Pt will be MI you UE HEP in prep for self care tasks      Outcome: Not Met     OCCUPATIONAL THERAPY EVALUATION  Patient: Kiya Arango (15 y.o. female)  Date: 5/9/2021  Primary Diagnosis: CHF (congestive heart failure) ([facility]) [I50.9]  COPD (chronic obstructive pulmonary disease) ([facility] [J44.9]  Procedure(s) (LRB):  LEFT HEART CATH / CORONARY ANGIOGRAPHY (N/A)  Right Heart Cath (N/A)  Fractional Flow Reserve (N/A)  Percutaneous Coronary Intervention (N/A) 2 Days Post-Op   Precautions: Falls       ASSESSMENT  Pt is 81 y/o female came to [facility] with SOB for past 1-2 days and adm 5/4/2021 for severe COPD exacerbation, acute hypoxic/hypercapnic respiratory failure, CHF, you pleural effusion worse on left, anemia, leukocytosis, hypothyroidism, interstitial edema, 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 Pt has hx of DM, HLD, HTN, thyroid disease, depression. Pt received semi supine in bed A&Ox4 and agreeable for OT eval/tx. Per pt report, pt lives alone (son present stating he can stay with pt upon D/C)  in 2 story home (stays on first floor) with ramp to enter and is MI for self care using shower chair and functional transfers/mobility using cane (also has RW and rollator).      Pt currently presents with generalized weakness, decreased balance, decreased activity tolerance and increased need for assist with self care (min A LB dressing seated EOB pt stating she does not wear socks but slip on shoes at home, CGA toileting/cloth mgmt, CGA grooming standing) and functional transfers/mobility (SBA sup->sit, SBA scooting EOB, CGA sit<->stand and toilet transfer with Rw and gait belt). Pt would benefit from skilled OT services while at [facility] in order to increase safety and independence with self care and functional transfers/mobility. Recommend discharge to home with [facility] and family care when medically appropriate. Other factors to consider for discharge: time since onset, good family support         PLAN :  Recommendations and Planned Interventions: self care training, functional mobility training, therapeutic exercise, balance training, therapeutic activities, endurance activities, patient education, and home safety training    Frequency/Duration: Patient will be followed by occupational therapy 5 times a week to address goals. Recommendation for discharge: (in order for the patient to meet his/her long term goals)  HHOT and family care (son stating he will stay with pt upon D/C)    This discharge recommendation:  Has been made in collaboration with the attending provider and/or case management    IF patient discharges home will need the following DME: TBD       SUBJECTIVE:   Patient stated I usually wear slip on shoes not socks.     OBJECTIVE DATA SUMMARY:   HISTORY:   Past Medical History:   Diagnosis Date    Depression     DM (diabetes mellitus) ([facility] [address]     Hyperlipidemia     Hypertension     Pancreatitis     SOB (shortness of breath)     Thyroid disease      Past Surgical History:   Procedure Laterality Date    HX HYSTERECTOMY         Expanded or extensive additional review of patient history:     Home Situation  Home Environment: Private residence  # Steps to Enter: 3  Wheelchair Ramp: Yes  One/Two Story Residence: Two story, live on 1st floor  Lift Chair Available: Yes  Living Alone: Yes(son present stating he can stay with pt upon d/c)  Support Systems: Child(toshia)  Patient Expects to be Discharged to[de-identified] Private residence  Current DME Used/Available at Home: Cane, straight, Shower chair, Walker, rollator, Walker, rolling  Tub or Shower Type: Tub/Shower combination    PLOF: Pt MI for ADLS/IADLS, MI with mobility prior to admission. EXAMINATION OF PERFORMANCE DEFICITS:  Cognitive/Behavioral Status:  Neurologic State: Alert  Orientation Level: Oriented X4     Hearing: Auditory  Auditory Impairment: Hard of hearing, right side(wears hearing assistive device )    Range of Motion:  AROM: Generally decreased, functional     Strength:  Strength: Generally decreased, functional(you UE grossly observed to be 3+/5)     Balance:  Sitting: Impaired; With support  Sitting - Static: Good (unsupported)  Sitting - Dynamic: Fair (occasional)  Standing: Impaired; With support  Standing - Static: Constant support;Good  Standing - Dynamic : Constant support; Fair    Functional Mobility and Transfers for ADLs:  Bed Mobility:  Rolling: Stand-by assistance  Supine to Sit: Stand-by assistance  Sit to Supine: Contact guard assistance  Scooting: Stand-by assistance    Transfers:  Sit to Stand: Contact guard assistance  Stand to Sit: Contact guard assistance  Bed to Chair: Contact guard assistance  Bathroom Mobility: Contact guard assistance  Toilet Transfer : Contact guard assistance  Assistive Device : Gait Belt;Walker    ADL Assessment:     Oral Facial Hygiene/Grooming: Contact guard assistance    Lower Body Dressing: Minimum assistance    Toileting: Contact guard assistance     ADL Intervention and task modifications:     Grooming  Grooming Assistance: Contact guard assistance  Position Performed: Standing  Washing Face: Contact guard assistance  Washing Hands: Contact guard assistance    Lower Body Dressing Assistance  Socks: Minimum assistance  Position Performed: Seated edge of bed    Toileting  Toileting Assistance: Contact guard assistance  Bladder Hygiene: Stand-by assistance  Bowel Hygiene: Stand-by assistance  Clothing Management: Contact guard assistance    Therapeutic Exercise:  Pt educated on you UE HEP in prep for self care tasks with pt demonstrating and verbalizing understanding     [employer] AM-PACTM \"6 Clicks\"                                                       Daily Activity Inpatient Short Form  How much help from another person does the patient currently need. .. Total; A Lot A Little None   1. Putting on and taking off regular lower body clothing? []  1 []  2 [x]  3 []  4   2. Bathing (including washing, rinsing, drying)? []  1 []  2 [x]  3 []  4   3. Toileting, which includes using toilet, bedpan or urinal? [] 1 []  2 [x]  3 []  4   4. Putting on and taking off regular upper body clothing? []  1 []  2 []  3 [x]  4   5. Taking care of personal grooming such as brushing teeth? []  1 []  2 [x]  3 []  4   6. Eating meals? []  1 []  2 []  3 [x]  4   © 2007, Trustees of [facility] [employer], under license to [employer]. All rights reserved     Score: 20/24     Interpretation of Tool:  Represents clinically-significant functional categories (i.e. Activities of daily living). Percentage of Impairment CH    0%   CI    1-19% CJ    20-39% CK    40-59% CL    60-79% CM    80-99% CN     100%   [facility]  Score 6-24 24 23 20-22 15-19 10-14 7-9 6        Occupational Therapy Evaluation Charge Determination   History Examination Decision-Making   LOW Complexity : Brief history review  LOW Complexity : 1-3 performance deficits relating to physical, cognitive , or psychosocial skils that result in activity limitations and / or participation restrictions  MEDIUM Complexity : Patient may present with comorbidities that affect occupational performnce.  Miniml to moderate modification of tasks or assistance (eg, physical or verbal ) with assesment(s) is necessary to enable patient to complete evaluation       Based on the above components, the patient evaluation is determined to be of the following complexity level: LOW   Pain Rating:  No pain reported    Activity Tolerance:   Fair and requires rest breaks  Please refer to the flowsheet for vital signs taken during this treatment. After treatment patient left in no apparent distress:    Sitting in chair, Call bell within reach, and Caregiver / family present    COMMUNICATION/EDUCATION:   The patients plan of care was discussed with: Physical therapist and Registered nurse. Home safety education was provided and the patient/caregiver indicated understanding. and Patient/family have participated as able in goal setting and plan of care. This patients plan of care is appropriate for delegation to [facility].     Thank you for this referral.  Beau Keyes  Time Calculation: 38 mins

## 2021-05-09 NOTE — PROGRESS NOTES
Progress Note      5/9/2021 7:11 PM  NAME: Johana Hassan   MRN:  [id number]   Admit Diagnosis: CHF (congestive heart failure) ([facility]) [I50.9]  COPD (chronic obstructive pulmonary disease) ([facility] [J44.9]      Problem List:   1.  Heart failure with decompensation  2.  Diabetes mellitus  3.  Hypertension  4.  Hypothyroidism  5.  Depression  6.  COPD  7.  Right bundle branch block with left episode  8.  Hyponatremia  9.  Acute kidney injury  10.  Peripheral vascular disease  11.  Right upper lobe opacity  12.  Fracture deformity of proximal left humerus     Assessment/Plan:   -61-year-old white female status post PCI of the mid LAD transfer from 3 W. to ICU for third-degree AV block  -When I saw the patient she was lying comfortably in the bed in room 269 and in and out of third-degree AV block on the monitor.  -Patient is completely asymptomatic and denies any chest pain shortness of breath dizziness palpitation or any other anginal equivalent at this time.  -Currently she is hemodynamically stable and her heart rate between 40 to 61 bpm and systolic blood pressure is 103 mmHg.  -Based on my current evaluation patient may be a candidate for permanent pacemaker so I will start her on dopamine for now and keep atropine handy on the bedside to cruise through the night if possible and then evaluate her for PPM in the morning.  -If patient become hemodynamically unstable during the night per my clinical evaluation we will proceed with temporary pacemaker on an urgent basis tonight. Temporary pacing pads are applied to the patient for now. -For now continue all current medical management. []       High complexity decision making was performed in this patient at high risk for decompensation with multiple organ involvement.     Subjective:     Johana Hassan is a 61-year-old white female with history of coronary artery disease status post mid LAD stent placement recently discharged showing bradycardia and found to be in and out of third-degree AV block. Patient was transferred to ICU to 71 and I came to see the patient he was completely asymptomatic and hemodynamically stable. Starting dopamine with atropine on as needed basis during the night and we will evaluate her for a permanent pacemaker in the morning. If needed during the night we will put  transvenous pacemaker. Discussed with RN events overnight. Review of Systems: Limited review of the system pertinent to cardiovascular symptoms are unremarkable. Symptom Y/N Comments  Symptom Y/N Comments   Fever/Chills N   Chest Pain N    Poor Appetite N   Edema N    Cough N   Abdominal Pain N    Sputum N   Joint Pain N    SOB/KNOWLES N   Pruritis/Rash N    Nausea/vomit N   Tolerating PT/OT Y    Diarrhea N   Tolerating Diet Y    Constipation N   Other       Could NOT obtain due to:      Objective:      Physical Exam:    Last 24hrs VS reviewed since prior progress note. Most recent are:    Visit Vitals  BP (!) 130/54   Pulse 68   Temp 98.8 °F (37.1 °C)   Resp 18   Ht 5' 1\" (1.549 m)   Wt 79.8 kg (175 lb 14.8 oz)   SpO2 96%   BMI 33.24 kg/m²     No intake or output data in the 24 hours ending 05/09/21 1911     General Appearance: Well developed, well nourished, alert & oriented x 3,    no acute distress. Ears/Nose/Mouth/Throat: Hearing grossly normal.  Neck: Supple. Chest: Lungs clear to auscultation bilaterally. Cardiovascular: Regular rate and rhythm, S1S2 normal, no murmur. Abdomen: Soft, non-tender, bowel sounds are active. Extremities: No edema bilaterally. Skin: Warm and dry. []         Post-cath site without hematoma, bruit, tenderness, or thrill. Distal pulses intact.     PMH/SH reviewed - no change compared to H&P    Data Review    Telemetry: normal sinus rhythm     EKG:   []  No new EKG for review  XR CHEST PORT   Final Result      XR CHEST SNGL V   Final Result           Lab Data Personally Reviewed:    Recent Labs     05/09/21  0640 05/08/21  2145   WBC 23.7* 16.3*   HGB 13.2 12.0   HCT 40.4 36.7   * 377     No results for input(s): INR, PTP, APTT, INREXT in the last 72 hours. Recent Labs     05/09/21  0640 05/08/21  2145 05/07/21  0550    141 136   K 2.6* 3.2* 3.4*    102 98   CO2 32 34* 33*   BUN 31* 27* 32*   CREA 1.08* 1.03* 1.01   GLU 36* 76 101*   CA 8.4* 8.0* 8.2*     No results for input(s): CPK, CKNDX, TROIQ in the last 72 hours. No lab exists for component: CPKMB  Lab Results   Component Value Date/Time    Cholesterol, total 209 (H) 06/20/2009 04:30 AM    HDL Cholesterol 38 (L) 06/20/2009 04:30 AM    LDL, calculated 138.4 (H) 06/20/2009 04:30 AM    Triglyceride 163 06/20/2009 04:30 AM    CHOL/HDL Ratio 5.5 (H) 06/20/2009 04:30 AM       Recent Labs     05/09/21  0640 05/08/21  2145 05/07/21  0550   AP 58 55 57   TP 6.2* 5.6* 6.2*   ALB 2.7* 2.5* 2.9*   GLOB 3.5 3.1 3.3     No results for input(s): PH, PCO2, PO2 in the last 72 hours.     Medications Personally Reviewed:    Current Facility-Administered Medications   Medication Dose Route Frequency    DOPamine (INTROPIN) 800 mg in dextrose 5% 250 mL infusion  0-20 mcg/kg/min IntraVENous TITRATE    atropine 0.1 mg/mL injection        furosemide (LASIX) injection 40 mg  40 mg IntraVENous DAILY    aspirin chewable tablet 81 mg  81 mg Oral DAILY    ticagrelor (BRILINTA) tablet 90 mg  90 mg Oral Q12H    sodium chloride (NS) flush 5-40 mL  5-40 mL IntraVENous Q8H    sodium chloride (NS) flush 5-40 mL  5-40 mL IntraVENous PRN    predniSONE (DELTASONE) tablet 10 mg  10 mg Oral DAILY WITH BREAKFAST    hydrOXYzine pamoate (VISTARIL) capsule 25 mg  25 mg Oral Q6H PRN    piperacillin-tazobactam (ZOSYN) 3.375 g in 0.9% sodium chloride (MBP/ADV) 100 mL MBP  3.375 g IntraVENous Q8H    escitalopram oxalate (LEXAPRO) tablet 10 mg  10 mg Oral QHS    levothyroxine (SYNTHROID) tablet 150 mcg  150 mcg Oral ACB    insulin glargine (LANTUS) injection 60 Units  60 Units SubCUTAneous QHS    famotidine (PEPCID) tablet 20 mg  20 mg Oral BID    atorvastatin (LIPITOR) tablet 10 mg  10 mg Oral QHS    insulin lispro (HUMALOG) injection   SubCUTAneous AC&HS    glucose chewable tablet 16 g  4 Tab Oral PRN    dextrose (D50W) injection syrg 12.5-25 g  25-50 mL IntraVENous PRN    glucagon (GLUCAGEN) injection 1 mg  1 mg IntraMUSCular PRN    acetaminophen (TYLENOL) tablet 650 mg  650 mg Oral Q6H PRN    Or    acetaminophen (TYLENOL) suppository 650 mg  650 mg Rectal Q6H PRN    polyethylene glycol (MIRALAX) packet 17 g  17 g Oral DAILY PRN    ondansetron (ZOFRAN ODT) tablet 4 mg  4 mg Oral Q8H PRN    Or    ondansetron (ZOFRAN) injection 4 mg  4 mg IntraVENous Q6H PRN    enoxaparin (LOVENOX) injection 40 mg  40 mg SubCUTAneous DAILY    albuterol-ipratropium (DUO-NEB) 2.5 MG-0.5 MG/3 ML  3 mL Nebulization Q6H RT    budesonide-formoteroL (SYMBICORT) 160-4.5 mcg/actuation HFA inhaler 2 Puff  2 Puff Inhalation BID         Yamilet Tanner MD

## 2021-05-09 NOTE — PROGRESS NOTES
Hospitalist Progress Note    Subjective:     Mita Hernandez is a 79 yo female with a PMHx significant for DM, HLD, HTN, thyroid disease, and depression, who presents to the ED with shortness of breath for the past 1-2 days due to a severe acute exacerbation of COPD, acute hypoxic/hypercapnic respiratory failure, and CHF. She also has a bilateral pleural effusion (worse on left), interstitial edema (cardiogenic or infectious cause), and hyponatremia.   ___________________________________________________________    Today, the patient presents awake and in no acute distress, sitting up in bed    Son at the bedside    Cardiac cath in place 1 stent  Patient have significant severe aortic stenosis    Past Medical History:   Diagnosis Date    Depression     DM (diabetes mellitus) ([address]     Hyperlipidemia     Hypertension     Pancreatitis     SOB (shortness of breath)     Thyroid disease       Past Surgical History:   Procedure Laterality Date    HX HYSTERECTOMY       History reviewed. No pertinent family history. Social History     Tobacco Use    Smoking status: Never Smoker    Smokeless tobacco: Never Used   Substance Use Topics    Alcohol use: No       Prior to Admission medications    Medication Sig Start Date End Date Taking? Authorizing Provider   Insulin Needles, Disposable, 31 X 5/16 \" Ndle by Does Not Apply route. 10/11/10   Kaylah Thomas MD   insulin lispro, human, (HUMALOG KWIKPEN) 100 unit/mL flexpen 15 Units by SubCUTAneous route three (3) times daily (with meals). 10/13/10   Kaylah Thomas MD   benazepril (LOTENSIN) 40 mg tablet Take 40 mg by mouth daily. 10/11/10   Provider, Historical   LEXAPRO 10 mg tablet Take 10 mg by mouth nightly. 10/11/10   Provider, Historical   famotidine (PEPCID) 20 mg tablet Take 20 mg by mouth two (2) times a day.  10/11/10   Provider, Historical   gabapentin (NEURONTIN) 400 mg capsule  9/3/10   Provider, Historical   hydrocodone-acetaminophen (NORCO) 5-325 mg per tablet Take 1 Tab by mouth every six (6) hours as needed. 10/11/10   Provider, Historical   levothyroxine (SYNTHROID) 150 mcg tablet Take 150 mcg by mouth daily (before breakfast). 10/11/10   Provider, Historical   lorazepam (ATIVAN) 1 mg tablet  8/2/10   Provider, Historical   lovastatin (MEVACOR) 40 mg tablet Take 40 mg by mouth nightly. 10/11/10   Provider, Historical   metoprolol (LOPRESSOR) 100 mg tablet Take 100 mg by mouth two (2) times a day. 10/11/10   Provider, Historical   oxycodone-acetaminophen (PERCOCET 10)  mg per tablet  8/2/10   Provider, Historical   insulin glargine (LANTUS) 100 unit/mL injection 60 Units by SubCUTAneous route nightly. 10/11/10   Lonnie Gallardo MD     Allergies   Allergen Reactions    Nortriptyline Itching    Cymbalta [Duloxetine] Itching    Ivp [Fd And C Blue No.1] Hives    Morphine Itching    Tetracycline Itching        Review of Systems:  A comprehensive review of systems was negative except for that written in the History of Present Illness. Objective: Intake and Output:    No intake/output data recorded. 05/07 1901 - 05/09 0700  In: -   Out: 900 [Urine:900]    Physical Exam:   Visit Vitals  /62   Pulse 73   Temp 97.7 °F (36.5 °C)   Resp 18   Ht 5' 1\" (1.549 m)   Wt 79.1 kg (174 lb 4.8 oz)   SpO2 97%   BMI 32.93 kg/m²     General:  Alert, cooperative, no distress. Head:  Normocephalic, without obvious abnormality, atraumatic. Eyes:  Conjunctivae/corneas clear. Pupils equal, round, reactive to light. Extraocular movements intact. Lungs:    Decreased breath sounds , crackles, wheezing. Chest wall:  No tenderness or deformity. Heart:  Regular rate and rhythm, S1, S2 normal, no murmur, click, rub, or gallop. Possible murmur, difficult to auscultate with BiPAP   Abdomen:   Soft, non-tender. Bowel sounds normal. No masses. No organomegaly. Extremities: Extremities normal, atraumatic, no cyanosis.  1+ pitting edema of lower extremities Pulses: 2+ and symmetric all extremities. Skin: Skin color, texture, turgor normal. No rashes or lesions. Lymph nodes: Cervical, supraclavicular, and axillary nodes normal.   Neurologic: CNII-XII intact. Normal strength, sensation, and reflexes throughout.        ECG:  ** Poor data quality, interpretation may be adversely affected**   Normal sinus rhythm   Right bundle branch block   Left anterior fascicular block   ** Bifascicular block **   Left ventricular hypertrophy with repolarization abnormality   Cannot rule out Septal infarct , age undetermined     Data Review:   Recent Results (from the past 24 hour(s))   EKG, 12 LEAD, INITIAL    Collection Time: 05/04/21 10:24 AM   Result Value Ref Range    Ventricular Rate 98 BPM    Atrial Rate 98 BPM    P-R Interval 168 ms    QRS Duration 152 ms    Q-T Interval 416 ms    QTC Calculation (Bezet) 531 ms    Calculated P Axis 68 degrees    Calculated R Axis -52 degrees    Calculated T Axis 94 degrees    Diagnosis       ** Poor data quality, interpretation may be adversely affected  Normal sinus rhythm  Right bundle branch block  Left anterior fascicular block  ** Bifascicular block **  Left ventricular hypertrophy with repolarization abnormality  Cannot rule out Septal infarct , age undetermined  Abnormal ECG  No previous ECGs available  Confirmed by Keyon Beavers MD, Favio Eric ([id number]) on 5/4/2021 12:13:56 PM     SARS-COV-2    Collection Time: 05/04/21 10:30 AM   Result Value Ref Range    SARS-CoV-2 Please find results under separate order     COVID-19 RAPID TEST    Collection Time: 05/04/21 10:30 AM   Result Value Ref Range    Specimen source Nasopharyngeal      COVID-19 rapid test Not Detected Not Detected     LACTIC ACID    Collection Time: 05/04/21 11:12 AM   Result Value Ref Range    Lactic acid 0.6 0.4 - 2.0 mmol/L   MAGNESIUM    Collection Time: 05/04/21 11:12 AM   Result Value Ref Range    Magnesium 1.8 1.6 - 2.4 mg/dL   METABOLIC PANEL, COMPREHENSIVE    Collection Time: 05/04/21 11:12 AM   Result Value Ref Range    Sodium 129 (L) 136 - 145 mmol/L    Potassium 4.2 3.5 - 5.1 mmol/L    Chloride 97 97 - 108 mmol/L    CO2 28 21 - 32 mmol/L    Anion gap 4 (L) 5 - 15 mmol/L    Glucose 182 (H) 65 - 100 mg/dL    BUN 25 (H) 6 - 20 mg/dL    Creatinine 0.88 0.55 - 1.02 mg/dL    BUN/Creatinine ratio 28 (H) 12 - 20      GFR est AA >60 >60 ml/min/1.73m2    GFR est non-AA >60 >60 ml/min/1.73m2    Calcium 8.2 (L) 8.5 - 10.1 mg/dL    Bilirubin, total 0.4 0.2 - 1.0 mg/dL    AST (SGOT) 13 (L) 15 - 37 U/L    ALT (SGPT) 17 12 - 78 U/L    Alk. phosphatase 68 45 - 117 U/L    Protein, total 6.5 6.4 - 8.2 g/dL    Albumin 3.0 (L) 3.5 - 5.0 g/dL    Globulin 3.5 2.0 - 4.0 g/dL    A-G Ratio 0.9 (L) 1.1 - 2.2     BNP    Collection Time: 05/04/21 11:12 AM   Result Value Ref Range    NT pro-BNP 7,360 (H) <450 pg/mL   PROCALCITONIN    Collection Time: 05/04/21 11:12 AM   Result Value Ref Range    Procalcitonin <0.05 (H) 0 ng/mL   TROPONIN I    Collection Time: 05/04/21 11:12 AM   Result Value Ref Range    Troponin-I, Qt. <0.05 <0.05 ng/mL   TSH 3RD GENERATION    Collection Time: 05/04/21 11:12 AM   Result Value Ref Range    TSH 1.62 0.36 - 3.74 uIU/mL   CBC WITH AUTOMATED DIFF    Collection Time: 05/04/21 11:12 AM   Result Value Ref Range    WBC 14.2 (H) 3.6 - 11.0 K/uL    RBC 3.13 (L) 3.80 - 5.20 M/uL    HGB 10.0 (L) 11.5 - 16.0 g/dL    HCT 30.8 (L) 35.0 - 47.0 %    MCV 98.4 80.0 - 99.0 FL    MCH 31.9 26.0 - 34.0 PG    MCHC 32.5 30.0 - 36.5 g/dL    RDW 13.6 11.5 - 14.5 %    PLATELET 855 729 - 290 K/uL    MPV 11.7 8.9 - 12.9 FL    NRBC 0.0 0.0  WBC    ABSOLUTE NRBC 0.00 0.00 - 0.01 K/uL    NEUTROPHILS 87 (H) 32 - 75 %    LYMPHOCYTES 6 (L) 12 - 49 %    MONOCYTES 5 5 - 13 %    EOSINOPHILS 1 0 - 7 %    BASOPHILS 1 0 - 1 %    IMMATURE GRANULOCYTES 0 0 - 0.5 %    ABS. NEUTROPHILS 12.4 (H) 1.8 - 8.0 K/UL    ABS. LYMPHOCYTES 0.8 0.8 - 3.5 K/UL    ABS. MONOCYTES 0.7 0.0 - 1.0 K/UL    ABS.  EOSINOPHILS 0.1 0.0 - 0.4 K/UL    ABS. BASOPHILS 0.1 0.0 - 0.1 K/UL    ABS. IMM. GRANS. 0.1 (H) 0.00 - 0.04 K/UL    DF AUTOMATED     PROTHROMBIN TIME + INR    Collection Time: 05/04/21 11:25 AM   Result Value Ref Range    Prothrombin time 13.6 11.9 - 14.7 sec    INR 1.1 0.9 - 1.1     PTT    Collection Time: 05/04/21 11:25 AM   Result Value Ref Range    aPTT 21.8 21.2 - 34.1 sec    aPTT, therapeutic range   82 - 109 sec   D DIMER    Collection Time: 05/04/21 11:25 AM   Result Value Ref Range    D DIMER 0.50 (H) <0.50 ug/ml(FEU)   BLOOD GAS, ARTERIAL    Collection Time: 05/04/21 11:25 AM   Result Value Ref Range    pH 7.36 7.35 - 7.45      PCO2 52 (H) 35 - 45 mmHg    PO2 72 (L) 75 - 100 mmHg    O2 SAT 93 (L) >95 %    BICARBONATE 27 (H) 22 - 26 mmol/L    BASE EXCESS 2.9 (H) 0 - 2 mmol/L    O2 METHOD Nasal Cannula      O2 FLOW RATE 6 L/min    SITE Right Radial      EBEN'S TEST PASS         Chest x-ray:  More conspicuous RUL opacity. Patchy mid and lower lung reticular markings  persist. Cardiac and mediastinal structures unchanged. Thoracic aorta  atherosclerosis. No pneumothorax. Probable small dependent bilateral pleural  effusions.     Assessment:       CAD status post stent LAD  Severe aortic stenosis  Acute hypoxemic respiratory failure  COPD exacerbation   On 3L NC  On Duo-neb, symbicort, solu-medrol  No BiPAP due to restraints    Hyponatremia    Congestive heart failure    Pleural effusions   y    Anemia      Leukocytosis     Diabetes mellitus      Hypertension     Hypothyroidism      Depression     Echo done shows ejection fraction of 40 to 45% and moderate grade 2 diastolic dysfunction    Dopplers negative for DVT  Plan:     Continue medications:   Duo-meb 3mL neb q6hrs  ASA 325mg po qday   Lipitor 10mg po qhs  Symbicort 2 puffs inhaled bid   Enoxaparin 40mg subq qday   Lexapro 10mg po qhs  Famotidine 20mg po bid   Lasix 40mg IV bid  Lantus 60 units subq qhs  Humalog subq qid   Synthroid 150mcg po qam   Lisinopril 40mg po qday (on hold due to kidney fx)   Discontinue Solu-Medrol  Start on prednisone 10 mg daily  Metoprolol tartrate 100mg po bid   Zosyn 3.375mg IV q8hrs   Brilinta 90 mg twice a day    Blood cx so far negative    Oxygen room air 94% while ambulating 87%  While ambulating on 2 L 93%     consult for discharge home on home O2  PT OT consult    Discussed with the patient's sonr at the bedside regarding discharge planning    Current Facility-Administered Medications:     potassium chloride SR (KLOR-CON 10) tablet 40 mEq, 40 mEq, Oral, Q4H, Beny Cheung MD, 40 mEq at 05/09/21 1325    furosemide (LASIX) injection 40 mg, 40 mg, IntraVENous, DAILY, Noé Bunch MD, 40 mg at 05/09/21 0909    aspirin chewable tablet 81 mg, 81 mg, Oral, DAILY, Noé Bunch MD, 81 mg at 05/09/21 0910    ticagrelor (BRILINTA) tablet 90 mg, 90 mg, Oral, Q12H, Noé Bunch MD, 90 mg at 05/09/21 0910    sodium chloride (NS) flush 5-40 mL, 5-40 mL, IntraVENous, Q8H, Noé Bunch MD, 10 mL at 05/09/21 1325    sodium chloride (NS) flush 5-40 mL, 5-40 mL, IntraVENous, PRN, Noé Bunch MD    predniSONE (DELTASONE) tablet 10 mg, 10 mg, Oral, DAILY WITH BREAKFAST, Beny Cheung MD, 10 mg at 05/09/21 0909    hydrOXYzine pamoate (VISTARIL) capsule 25 mg, 25 mg, Oral, Q6H PRN, Beny Cheung MD, 25 mg at 05/08/21 0034    piperacillin-tazobactam (ZOSYN) 3.375 g in 0.9% sodium chloride (MBP/ADV) 100 mL MBP, 3.375 g, IntraVENous, Q8H, Beny Cheung MD, Last Rate: 25 mL/hr at 05/09/21 0910, 3.375 g at 05/09/21 0910    escitalopram oxalate (LEXAPRO) tablet 10 mg, 10 mg, Oral, QHS, Beny Cheung MD, 10 mg at 05/08/21 2100    levothyroxine (SYNTHROID) tablet 150 mcg, 150 mcg, Oral, ACB, Beny Cheung MD, 150 mcg at 05/09/21 0926    insulin glargine (LANTUS) injection 60 Units, 60 Units, SubCUTAneous, QHS, Beny Cheung MD, 60 Units at 05/08/21 2107    famotidine (PEPCID) tablet 20 mg, 20 mg, Oral, BID, Lynette Beal MD, 20 mg at 05/09/21 0910    atorvastatin (LIPITOR) tablet 10 mg, 10 mg, Oral, QHS, Beny Cheung MD, Stopped at 05/08/21 2200    insulin lispro (HUMALOG) injection, , SubCUTAneous, AC&HS, Yohannes Cheung MD, Stopped at 05/08/21 2200    glucose chewable tablet 16 g, 4 Tab, Oral, PRN, Yohannes Cheung MD    dextrose (D50W) injection syrg 12.5-25 g, 25-50 mL, IntraVENous, PRN, Yohannes Cheung MD    glucagon [facility]) injection 1 mg, 1 mg, IntraMUSCular, PRN, Yohannes Cheung MD    acetaminophen (TYLENOL) tablet 650 mg, 650 mg, Oral, Q6H PRN, 650 mg at 05/05/21 0018 **OR** acetaminophen (TYLENOL) suppository 650 mg, 650 mg, Rectal, Q6H PRN, Yohannes Cheung MD    polyethylene glycol (MIRALAX) packet 17 g, 17 g, Oral, DAILY PRN, Yohannes Cheung MD    ondansetron (ZOFRAN ODT) tablet 4 mg, 4 mg, Oral, Q8H PRN **OR** ondansetron (ZOFRAN) injection 4 mg, 4 mg, IntraVENous, Q6H PRN, Beny Cheung MD    enoxaparin (LOVENOX) injection 40 mg, 40 mg, SubCUTAneous, DAILY, Beny Cheung MD, 40 mg at 05/09/21 0910    albuterol-ipratropium (DUO-NEB) 2.5 MG-0.5 MG/3 ML, 3 mL, Nebulization, Q6H RT, Beny Cheung MD, 3 mL at 05/09/21 1345    budesonide-formoteroL (SYMBICORT) 160-4.5 mcg/actuation HFA inhaler 2 Puff, 2 Puff, Inhalation, BID, Lynette Beal MD, 2 Puff at 05/09/21 1951

## 2021-05-10 ENCOUNTER — APPOINTMENT (OUTPATIENT)
Dept: GENERAL RADIOLOGY | Age: 81
DRG: 242 | End: 2021-05-10
Attending: ANESTHESIOLOGY
Payer: MEDICARE

## 2021-05-10 ENCOUNTER — APPOINTMENT (OUTPATIENT)
Dept: GENERAL RADIOLOGY | Age: 81
DRG: 242 | End: 2021-05-10
Attending: INTERNAL MEDICINE
Payer: MEDICARE

## 2021-05-10 ENCOUNTER — APPOINTMENT (OUTPATIENT)
Dept: INTERVENTIONAL RADIOLOGY/VASCULAR | Age: 81
DRG: 242 | End: 2021-05-10
Attending: FAMILY MEDICINE
Payer: MEDICARE

## 2021-05-10 LAB
ALBUMIN SERPL-MCNC: 2.4 G/DL (ref 3.5–5)
ALBUMIN/GLOB SERPL: 0.8 {RATIO} (ref 1.1–2.2)
ALP SERPL-CCNC: 49 U/L (ref 45–117)
ALT SERPL-CCNC: 46 U/L (ref 12–78)
ANION GAP SERPL CALC-SCNC: 5 MMOL/L (ref 5–15)
ARTERIAL PATENCY WRIST A: POSITIVE
AST SERPL W P-5'-P-CCNC: 26 U/L (ref 15–37)
BASE EXCESS BLDA CALC-SCNC: 1.9 MMOL/L (ref 0–2)
BASOPHILS # BLD: 0.1 K/UL (ref 0–0.1)
BASOPHILS NFR BLD: 0 % (ref 0–1)
BDY SITE: ABNORMAL
BILIRUB SERPL-MCNC: 0.4 MG/DL (ref 0.2–1)
BUN SERPL-MCNC: 41 MG/DL (ref 6–20)
BUN/CREAT SERPL: 30 (ref 12–20)
CA-I BLD-MCNC: 8.2 MG/DL (ref 8.5–10.1)
CHLORIDE SERPL-SCNC: 106 MMOL/L (ref 97–108)
CO2 SERPL-SCNC: 30 MMOL/L (ref 21–32)
CREAT SERPL-MCNC: 1.38 MG/DL (ref 0.55–1.02)
DIFFERENTIAL METHOD BLD: ABNORMAL
EOSINOPHIL # BLD: 1 K/UL (ref 0–0.4)
EOSINOPHIL NFR BLD: 4 % (ref 0–7)
EPAP/CPAP/PEEP, PAPEEP: 5
ERYTHROCYTE [DISTWIDTH] IN BLOOD BY AUTOMATED COUNT: 14.1 % (ref 11.5–14.5)
FIO2 ON VENT: 10 %
GAS FLOW.O2 SETTING OXYMISER: 12 L/MIN
GLOBULIN SER CALC-MCNC: 3.2 G/DL (ref 2–4)
GLUCOSE BLD STRIP.AUTO-MCNC: 108 MG/DL (ref 65–100)
GLUCOSE BLD STRIP.AUTO-MCNC: 123 MG/DL (ref 65–100)
GLUCOSE BLD STRIP.AUTO-MCNC: 162 MG/DL (ref 65–100)
GLUCOSE SERPL-MCNC: 140 MG/DL (ref 65–100)
HCO3 BLDA-SCNC: 26 MMOL/L (ref 22–26)
HCT VFR BLD AUTO: 32.7 % (ref 35–47)
HGB BLD-MCNC: 10.7 G/DL (ref 11.5–16)
IMM GRANULOCYTES # BLD AUTO: 0.1 K/UL (ref 0–0.04)
IMM GRANULOCYTES NFR BLD AUTO: 1 % (ref 0–0.5)
IPAP/PIP, IPAPIP: 0
LYMPHOCYTES # BLD: 1.3 K/UL (ref 0.8–3.5)
LYMPHOCYTES NFR BLD: 5 % (ref 12–49)
MAGNESIUM SERPL-MCNC: 1.9 MG/DL (ref 1.6–2.4)
MCH RBC QN AUTO: 31.7 PG (ref 26–34)
MCHC RBC AUTO-ENTMCNC: 32.7 G/DL (ref 30–36.5)
MCV RBC AUTO: 96.7 FL (ref 80–99)
MONOCYTES # BLD: 1.8 K/UL (ref 0–1)
MONOCYTES NFR BLD: 7 % (ref 5–13)
MRSA DNA SPEC QL NAA+PROBE: NOT DETECTED
NEUTS SEG # BLD: 21.6 K/UL (ref 1.8–8)
NEUTS SEG NFR BLD: 83 % (ref 32–75)
NRBC # BLD: 0 K/UL (ref 0–0.01)
NRBC BLD-RTO: 0 PER 100 WBC
PCO2 BLDA: 32 MMHG (ref 35–45)
PERFORMED BY, TECHID: ABNORMAL
PH BLDA: 7.5 [PH] (ref 7.35–7.45)
PLATELET # BLD AUTO: 366 K/UL (ref 150–400)
PMV BLD AUTO: 11.4 FL (ref 8.9–12.9)
PO2 BLDA: 327 MMHG (ref 75–100)
POTASSIUM SERPL-SCNC: 4 MMOL/L (ref 3.5–5.1)
PROT SERPL-MCNC: 5.6 G/DL (ref 6.4–8.2)
RBC # BLD AUTO: 3.38 M/UL (ref 3.8–5.2)
SAO2 % BLD: 98 %
SAO2% DEVICE SAO2% SENSOR NAME: ABNORMAL
SODIUM SERPL-SCNC: 141 MMOL/L (ref 136–145)
VENTILATION MODE VENT: ABNORMAL
VT SETTING VENT: 500 ML
WBC # BLD AUTO: 25.8 K/UL (ref 3.6–11)

## 2021-05-10 PROCEDURE — 74011250636 HC RX REV CODE- 250/636: Performed by: FAMILY MEDICINE

## 2021-05-10 PROCEDURE — 82962 GLUCOSE BLOOD TEST: CPT

## 2021-05-10 PROCEDURE — 77030016707 HC CATH ANGI DX SUPT1 CARD -B: Performed by: INTERNAL MEDICINE

## 2021-05-10 PROCEDURE — 71045 X-RAY EXAM CHEST 1 VIEW: CPT

## 2021-05-10 PROCEDURE — 94002 VENT MGMT INPAT INIT DAY: CPT

## 2021-05-10 PROCEDURE — 85025 COMPLETE CBC W/AUTO DIFF WBC: CPT

## 2021-05-10 PROCEDURE — 2709999900 HC NON-CHARGEABLE SUPPLY: Performed by: INTERNAL MEDICINE

## 2021-05-10 PROCEDURE — 74011250636 HC RX REV CODE- 250/636: Performed by: INTERNAL MEDICINE

## 2021-05-10 PROCEDURE — 94640 AIRWAY INHALATION TREATMENT: CPT

## 2021-05-10 PROCEDURE — 77030002912 HC SUT ETHBND J&J -A: Performed by: INTERNAL MEDICINE

## 2021-05-10 PROCEDURE — 74011636637 HC RX REV CODE- 636/637: Performed by: FAMILY MEDICINE

## 2021-05-10 PROCEDURE — 77001 FLUOROGUIDE FOR VEIN DEVICE: CPT

## 2021-05-10 PROCEDURE — 92950 HEART/LUNG RESUSCITATION CPR: CPT

## 2021-05-10 PROCEDURE — 33210 INSERT ELECTRD/PM CATH SNGL: CPT | Performed by: INTERNAL MEDICINE

## 2021-05-10 PROCEDURE — C1894 INTRO/SHEATH, NON-LASER: HCPCS | Performed by: INTERNAL MEDICINE

## 2021-05-10 PROCEDURE — 74011000250 HC RX REV CODE- 250

## 2021-05-10 PROCEDURE — C1769 GUIDE WIRE: HCPCS

## 2021-05-10 PROCEDURE — 93454 CORONARY ARTERY ANGIO S&I: CPT | Performed by: INTERNAL MEDICINE

## 2021-05-10 PROCEDURE — 74011000250 HC RX REV CODE- 250: Performed by: FAMILY MEDICINE

## 2021-05-10 PROCEDURE — 74011000636 HC RX REV CODE- 636: Performed by: INTERNAL MEDICINE

## 2021-05-10 PROCEDURE — 65610000006 HC RM INTENSIVE CARE

## 2021-05-10 PROCEDURE — 36415 COLL VENOUS BLD VENIPUNCTURE: CPT

## 2021-05-10 PROCEDURE — 5A1223Z PERFORMANCE OF CARDIAC PACING, CONTINUOUS: ICD-10-PCS | Performed by: INTERNAL MEDICINE

## 2021-05-10 PROCEDURE — C1769 GUIDE WIRE: HCPCS | Performed by: INTERNAL MEDICINE

## 2021-05-10 PROCEDURE — 0BH17EZ INSERTION OF ENDOTRACHEAL AIRWAY INTO TRACHEA, VIA NATURAL OR ARTIFICIAL OPENING: ICD-10-PCS | Performed by: FAMILY MEDICINE

## 2021-05-10 PROCEDURE — 83735 ASSAY OF MAGNESIUM: CPT

## 2021-05-10 PROCEDURE — 74011000258 HC RX REV CODE- 258: Performed by: FAMILY MEDICINE

## 2021-05-10 PROCEDURE — 93005 ELECTROCARDIOGRAM TRACING: CPT

## 2021-05-10 PROCEDURE — 80053 COMPREHEN METABOLIC PANEL: CPT

## 2021-05-10 PROCEDURE — 74011250636 HC RX REV CODE- 250/636

## 2021-05-10 PROCEDURE — 82803 BLOOD GASES ANY COMBINATION: CPT

## 2021-05-10 PROCEDURE — 76937 US GUIDE VASCULAR ACCESS: CPT

## 2021-05-10 PROCEDURE — 77030005319 HC CATH PACE FEM BL STJU -C: Performed by: INTERNAL MEDICINE

## 2021-05-10 PROCEDURE — 74011000250 HC RX REV CODE- 250: Performed by: INTERNAL MEDICINE

## 2021-05-10 PROCEDURE — 77030041720 HC KT TRNSDUC BLD EDWD -C: Performed by: INTERNAL MEDICINE

## 2021-05-10 PROCEDURE — 74011250637 HC RX REV CODE- 250/637: Performed by: FAMILY MEDICINE

## 2021-05-10 PROCEDURE — 74011250637 HC RX REV CODE- 250/637: Performed by: INTERNAL MEDICINE

## 2021-05-10 PROCEDURE — B2111ZZ FLUOROSCOPY OF MULTIPLE CORONARY ARTERIES USING LOW OSMOLAR CONTRAST: ICD-10-PCS | Performed by: INTERNAL MEDICINE

## 2021-05-10 PROCEDURE — 5A1945Z RESPIRATORY VENTILATION, 24-96 CONSECUTIVE HOURS: ICD-10-PCS | Performed by: FAMILY MEDICINE

## 2021-05-10 PROCEDURE — 4A023N7 MEASUREMENT OF CARDIAC SAMPLING AND PRESSURE, LEFT HEART, PERCUTANEOUS APPROACH: ICD-10-PCS | Performed by: INTERNAL MEDICINE

## 2021-05-10 DEVICE — FLOW DIRECTED PACING CATHETER
Type: IMPLANTABLE DEVICE | Status: FUNCTIONAL
Brand: PACEL™

## 2021-05-10 RX ORDER — MAGNESIUM SULFATE 1 G/100ML
INJECTION INTRAVENOUS
Status: COMPLETED
Start: 2021-05-10 | End: 2021-05-10

## 2021-05-10 RX ORDER — PROPOFOL 10 MG/ML
0-50 VIAL (ML) INTRAVENOUS
Status: DISCONTINUED | OUTPATIENT
Start: 2021-05-10 | End: 2021-05-19 | Stop reason: HOSPADM

## 2021-05-10 RX ORDER — EPINEPHRINE 0.1 MG/ML
INJECTION INTRACARDIAC; INTRAVENOUS
Status: DISCONTINUED | OUTPATIENT
Start: 2021-05-10 | End: 2021-05-19 | Stop reason: HOSPADM

## 2021-05-10 RX ORDER — ETOMIDATE 2 MG/ML
0.3 INJECTION INTRAVENOUS ONCE
Status: COMPLETED | OUTPATIENT
Start: 2021-05-10 | End: 2021-05-10

## 2021-05-10 RX ORDER — HEPARIN SODIUM 200 [USP'U]/100ML
INJECTION, SOLUTION INTRAVENOUS
Status: COMPLETED | OUTPATIENT
Start: 2021-05-10 | End: 2021-05-10

## 2021-05-10 RX ORDER — SUCCINYLCHOLINE CHLORIDE 20 MG/ML
INJECTION INTRAMUSCULAR; INTRAVENOUS
Status: COMPLETED
Start: 2021-05-10 | End: 2021-05-10

## 2021-05-10 RX ORDER — MIDAZOLAM HYDROCHLORIDE 1 MG/ML
INJECTION, SOLUTION INTRAMUSCULAR; INTRAVENOUS
Status: COMPLETED
Start: 2021-05-10 | End: 2021-05-10

## 2021-05-10 RX ORDER — IPRATROPIUM BROMIDE AND ALBUTEROL SULFATE 2.5; .5 MG/3ML; MG/3ML
3 SOLUTION RESPIRATORY (INHALATION)
Status: DISCONTINUED | OUTPATIENT
Start: 2021-05-10 | End: 2021-05-19 | Stop reason: HOSPADM

## 2021-05-10 RX ORDER — MIDAZOLAM HYDROCHLORIDE 1 MG/ML
2 INJECTION, SOLUTION INTRAMUSCULAR; INTRAVENOUS ONCE
Status: COMPLETED | OUTPATIENT
Start: 2021-05-10 | End: 2021-05-10

## 2021-05-10 RX ORDER — BUDESONIDE 0.5 MG/2ML
500 INHALANT ORAL
Status: DISCONTINUED | OUTPATIENT
Start: 2021-05-10 | End: 2021-05-16

## 2021-05-10 RX ORDER — LIDOCAINE HYDROCHLORIDE 10 MG/ML
INJECTION INFILTRATION; PERINEURAL AS NEEDED
Status: DISCONTINUED | OUTPATIENT
Start: 2021-05-10 | End: 2021-05-10 | Stop reason: HOSPADM

## 2021-05-10 RX ORDER — ETOMIDATE 2 MG/ML
INJECTION INTRAVENOUS
Status: COMPLETED
Start: 2021-05-10 | End: 2021-05-10

## 2021-05-10 RX ORDER — FUROSEMIDE 10 MG/ML
40 INJECTION INTRAMUSCULAR; INTRAVENOUS ONCE
Status: COMPLETED | OUTPATIENT
Start: 2021-05-10 | End: 2021-05-10

## 2021-05-10 RX ORDER — SUCCINYLCHOLINE CHLORIDE 20 MG/ML
140 INJECTION INTRAMUSCULAR; INTRAVENOUS
Status: COMPLETED | OUTPATIENT
Start: 2021-05-10 | End: 2021-05-10

## 2021-05-10 RX ADMIN — MIDAZOLAM HYDROCHLORIDE 2 MG: 1 INJECTION, SOLUTION INTRAMUSCULAR; INTRAVENOUS at 14:45

## 2021-05-10 RX ADMIN — ETOMIDATE 23.94 MG: 20 INJECTION, SOLUTION INTRAVENOUS at 16:22

## 2021-05-10 RX ADMIN — BUDESONIDE AND FORMOTEROL FUMARATE DIHYDRATE 2 PUFF: 160; 4.5 AEROSOL RESPIRATORY (INHALATION) at 07:41

## 2021-05-10 RX ADMIN — PROPOFOL 45 MCG/KG/MIN: 10 INJECTION, EMULSION INTRAVENOUS at 17:16

## 2021-05-10 RX ADMIN — ETOMIDATE 23.94 MG: 2 INJECTION INTRAVENOUS at 16:22

## 2021-05-10 RX ADMIN — EPINEPHRINE 1 MG: 0.1 INJECTION, SOLUTION ENDOTRACHEAL; INTRACARDIAC; INTRAVENOUS at 14:16

## 2021-05-10 RX ADMIN — PROPOFOL 45 MCG/KG/MIN: 10 INJECTION, EMULSION INTRAVENOUS at 16:29

## 2021-05-10 RX ADMIN — SUCCINYLCHOLINE CHLORIDE 140 MG: 20 INJECTION, SOLUTION INTRAMUSCULAR; INTRAVENOUS at 14:45

## 2021-05-10 RX ADMIN — PIPERACILLIN AND TAZOBACTAM 3.38 G: 3; .375 INJECTION, POWDER, LYOPHILIZED, FOR SOLUTION INTRAVENOUS at 00:40

## 2021-05-10 RX ADMIN — LEVOTHYROXINE SODIUM 150 MCG: 75 TABLET ORAL at 08:46

## 2021-05-10 RX ADMIN — ENOXAPARIN SODIUM 40 MG: 40 INJECTION SUBCUTANEOUS at 08:49

## 2021-05-10 RX ADMIN — DOPAMINE HYDROCHLORIDE IN DEXTROSE 12 MCG/KG/MIN: 3.2 INJECTION, SOLUTION INTRAVENOUS at 12:27

## 2021-05-10 RX ADMIN — FUROSEMIDE 40 MG: 10 INJECTION, SOLUTION INTRAMUSCULAR; INTRAVENOUS at 15:00

## 2021-05-10 RX ADMIN — PIPERACILLIN AND TAZOBACTAM 3.38 G: 3; .375 INJECTION, POWDER, LYOPHILIZED, FOR SOLUTION INTRAVENOUS at 16:35

## 2021-05-10 RX ADMIN — MAGNESIUM SULFATE HEPTAHYDRATE: 1 INJECTION, SOLUTION INTRAVENOUS at 15:00

## 2021-05-10 RX ADMIN — SUCCINYLCHOLINE CHLORIDE 140 MG: 20 INJECTION INTRAMUSCULAR; INTRAVENOUS at 14:45

## 2021-05-10 RX ADMIN — WATER 5 MG: 1 INJECTION INTRAMUSCULAR; INTRAVENOUS; SUBCUTANEOUS at 15:46

## 2021-05-10 RX ADMIN — BUDESONIDE 500 MCG: 0.5 INHALANT RESPIRATORY (INHALATION) at 21:05

## 2021-05-10 RX ADMIN — PREDNISONE 10 MG: 5 TABLET ORAL at 08:46

## 2021-05-10 RX ADMIN — Medication 10 ML: at 14:01

## 2021-05-10 RX ADMIN — FUROSEMIDE 40 MG: 10 INJECTION, SOLUTION INTRAMUSCULAR; INTRAVENOUS at 08:46

## 2021-05-10 RX ADMIN — TICAGRELOR 90 MG: 90 TABLET ORAL at 08:46

## 2021-05-10 RX ADMIN — ASPIRIN 81 MG CHEWABLE TABLET 81 MG: 81 TABLET CHEWABLE at 08:45

## 2021-05-10 RX ADMIN — IPRATROPIUM BROMIDE AND ALBUTEROL SULFATE 3 ML: .5; 2.5 SOLUTION RESPIRATORY (INHALATION) at 01:54

## 2021-05-10 RX ADMIN — PIPERACILLIN AND TAZOBACTAM 3.38 G: 3; .375 INJECTION, POWDER, LYOPHILIZED, FOR SOLUTION INTRAVENOUS at 08:46

## 2021-05-10 RX ADMIN — Medication 10 ML: at 05:18

## 2021-05-10 RX ADMIN — FAMOTIDINE 20 MG: 20 TABLET ORAL at 08:45

## 2021-05-10 NOTE — PROGRESS NOTES
Code Blue Note      I responded to a Code Blue called on this patient in room 269 ICU. Patient noted with agonal respirations and ventricular fibrillation. She was shocked x2  Remain short of breath even on facemask oxygen.   Intubated by anesthesiologist Dr Joselo Hernandez  Noted to be in complete heart block      Rhythm was complete heart block/V. fib/V. tach    Taken to cardiac catheterization lab for temporal pacemaker

## 2021-05-10 NOTE — PROGRESS NOTES
Patient currently on OT caseload however patient transferred to ICU from [facility] due to medical decline. Awaiting evaluation for permanent pacemaker placement. Will need new OT eval order once pt medically stable for evaluation. Thank you.

## 2021-05-10 NOTE — PROGRESS NOTES
Comprehensive Nutrition Assessment    Type and Reason for Visit: Initial(intubation)    Nutrition Recommendations/Plan:   D/c PO diet, ONS    If pt returns from cath lab on vent, initiate TF of Promote at 20mL/hr    RD to provide complete recs if pt remains intubated      Nutrition Assessment:  SOB, chest pain. Dx CHF exacerbation, bilat pleural effusion. S/p CHF edu (5/5). Worsening status, EKG (5/1) showing complete heart block- transferred to ICU for dopamine drip. Previously on BiPAP. Code blue today requiring intubation. Pt leaving for cath lab at time of RD visit. Prior to intubation, Cardiac diet with Ensure Enlive daily ordered- no PO intakes documented. RN reports good intake at B+L today, however pt did not like foods offered; unsure of previously d/t recent admit to ICU. RD unable to determine indication for Ensure Enlive; possibly ordered only d/t preference. Labs: BUN 41, Cr 1.38, , BG , lytes wnl. Meds: dopamine, furosemide, insulin, zosyn, prednisone. Malnutrition Assessment:  Malnutrition Status:  Insufficient data(will perform NFPE as able)    Context:  Acute illness     Findings of the 6 clinical characteristics of malnutrition:   Energy Intake:  Unable to assess  Weight Loss:  Unable to assess     Body Fat Loss:  Unable to assess,     Muscle Mass Loss:  Unable to assess,    Fluid Accumulation:  No significant fluid accumulation,      Estimated Daily Nutrient Needs:  Energy (kcal): 1125kcal (14kcal/kg); Weight Used for Energy Requirements: Current  Protein (g): 96g (2g/kg IBW); Weight Used for Protein Requirements: Ideal  Fluid (ml/day): 1600kcal (20kcal/kg); Method Used for Fluid Requirements: ml/kg    Nutrition Related Findings:  NFPE not performed; pt appears nourished on visual assessment. RD to f/u as able. Last BM today, formed. No documented n/v or c/d. No edema documented.       Wounds:    None       Current Nutrition Therapies:  DIET NUTRITIONAL SUPPLEMENTS AM Snack, PM Snack; Ensure Enlive (chocolate)  DIET CARDIAC Regular    Anthropometric Measures:  · Height:  5' 1\" (154.9 cm)  · Current Body Wt:  79.8 kg (175 lb 14.8 oz)(5/5)   · Ideal Body Wt:  105 lbs:  167.5 %   · BMI Category:  Obese class 1 (BMI 30.0-34. 9)     Wt Readings from Last 3 Encounters:   05/09/21 79.8 kg (175 lb 14.8 oz)   10/11/10 102.2 kg (225 lb 3.2 oz)   Limited wt hx indicates 50# loss x11 years- unable to more acutely assess wt changes. Nutrition Diagnosis:   · Inadequate oral intake related to impaired respiratory function as evidenced by intubation    Nutrition Interventions:   Food and/or Nutrient Delivery: Modify current diet, Start tube feeding  Nutrition Education and Counseling: No recommendations at this time  Coordination of Nutrition Care: Continue to monitor while inpatient    Goals:  Initiate means of nutrition to meet >75% EENs in 5-7 days, Wt maintenance +/- 0.5kg per week while critically ill, Lytes wnl       Nutrition Monitoring and Evaluation:   Behavioral-Environmental Outcomes: None identified  Food/Nutrient Intake Outcomes: Enteral nutrition intake/tolerance  Physical Signs/Symptoms Outcomes: Nutrition focused physical findings    Discharge Planning:     Too soon to determine     Electronically signed by Shellie Douglas on 5/10/2021 at 2:59 PM    Contact: [id number]

## 2021-05-10 NOTE — PROGRESS NOTES
Patient currently on PT caseload however patient transferred to ICU from [facility] due to medical decline. Will need new PT eval order once pt medically stable for reassessment. Thank you.

## 2021-05-10 NOTE — PROGRESS NOTES
Progress Note    Patient: Dominique Singh MRN: [id number]  SSN: [id number]    YOB: 1940  Age: 80 y.o. Sex: female      Admit Date: 5/4/2021    LOS: 6 days     Subjective:   Patient was transferred to the ICU yesterday due to complete heart block. Currently on dopamine. She received atropine and glucagon. She is laying comfortable with son at bedside. Objective:     Vitals:    05/10/21 0600 05/10/21 0636 05/10/21 0700 05/10/21 0741   BP: (!) 139/31 (!) 133/33     Pulse: (!) 45 (!) 45     Resp: 19      Temp:   98.1 °F (36.7 °C)    SpO2: 97%   97%   Weight:       Height:            Intake and Output:  Current Shift: No intake/output data recorded. Last three shifts: 05/08 1901 - 05/10 0700  In: 424.6 [P.O.:222; I.V.:202.6]  Out: 350 [Urine:350]    Physical Exam:   General:  Alert, cooperative, no distress, appears stated age. Eyes:  Conjunctivae/corneas clear. PERRL, EOMs intact. Fundi benign   Ears:  Normal TMs and external ear canals both ears. Nose: Nares normal. Septum midline. Mucosa normal. No drainage or sinus tenderness. Mouth/Throat: Lips, mucosa, and tongue normal. Teeth and gums normal.   Neck: Supple, symmetrical, trachea midline, no adenopathy, thyroid: no enlargment/tenderness/nodules, no carotid bruit and no JVD. Back:   Symmetric, no curvature. ROM normal. No CVA tenderness. Lungs:   Clear to auscultation bilaterally. Heart:   Ejection systolic murmur in the aortic area   Abdomen:   Soft, non-tender. Bowel sounds normal. No masses,  No organomegaly. Extremities: Extremities normal, atraumatic, no cyanosis or edema. Pulses: 2+ and symmetric all extremities. Skin: Skin color, texture, turgor normal. No rashes or lesions   Lymph nodes: Cervical, supraclavicular, and axillary nodes normal.   Neurologic: CNII-XII intact. Normal strength, sensation and reflexes throughout. Lab/Data Review: All lab results for the last 24 hours reviewed.          Assessment: Active Problems:    CHF (congestive heart failure) ([facility] [address] (5/4/2021)      COPD (chronic obstructive pulmonary disease) ([facility] [address] (5/4/2021)    Mrs. Genet Jimenez is a 62-year-old white female with:  1. Heart failure with decompensation  2. Diabetes mellitus  3. Hypertension  4. Hypothyroidism  5. Depression  6. COPD  7. Right bundle branch block with left episode  8. Hyponatremia  9. Acute kidney injury  10. Peripheral vascular disease  11. Right upper lobe opacity  12. Fracture deformity of proximal left humerus    Plan:     Patient was in complete heart block and remains in complete heart block this morning. She is in junctional escape rhythm but intermittently she will go into ventricular escape rhythm. Ventricular escape rhythm is not stable. She is currently on dopamine. Patient will need a pacemaker insertion. She is being evaluated for TAVR as an outpatient. I discussed with electrophysiologist Dr. Wagner Granados who will plan for dual-chamber pacemaker today. Hold off any AV nadia blocking agent. Continue dual antiplatelet therapy.     Signed By: Jennifer Cohn MD     May 10, 2021

## 2021-05-10 NOTE — PROGRESS NOTES
Bedside shift change report given to [address] [place] [address] (oncoming nurse) by Sp Sandoval RN (offgoing nurse). Report included the following information SBAR.

## 2021-05-10 NOTE — PROGRESS NOTES
Four eye skin assessment completed by myself and Frieda Finn RN. Patient skin clean dry and intact. Redness on sacrum blanches to touch. No breakdown.

## 2021-05-10 NOTE — PROGRESS NOTES
Reason for Admission:   SOB/heart block                  RUR Score:     23%             PCP: First and Last name:   Hermann Vides MD   Name of Practice:    Are you a current patient: Yes/No:    Approximate date of last visit:    Can you participate in a virtual visit if needed:     Do you (patient/family) have any concerns for transition/discharge? No                Plan for utilizing home health:   Yes    Current Advanced Directive/Advance Care Plan:  Full Code    Advance Care Planning   Healthcare Decision Maker:       Primary Decision Maker: Jas[facility] [phone]      Transition of Care Plan:        CM met with patient at the bedside for DCP assessment. Patient lives at home alone. Patient has a rollator, no home oxygen. Patient states she has had home health in the past, does not remember name of company. Patient agreeable to home health at discharge, no preference for agency. CM will send referrals. Discharge plan: home with home health.

## 2021-05-10 NOTE — CONSULTS
PULMONARY NOTE  [facility]    Name: Jose Alfredo Martinez MRN: [id number]   : 1940 Hospital: [facility] [place]   Date: 5/10/2021  Admission date: 2021 Hospital Day: 7       HPI:     Hospital Problems  Never Reviewed          Codes Class Noted POA    CHF (congestive heart failure) ([facility]) ICD-10-CM: I50.9  ICD-9-CM: 428.0  2021 Unknown        COPD (chronic obstructive pulmonary disease) ([facility] ICD-10-CM: J44.9  ICD-9-CM: 496  2021 Unknown                   [x] High complexity decision making was performed  [x] See my orders for details      Subjective/Initial History:     I was asked by Joyce Montalvo MD to see Jose Alfredo Martinez  a 80 y.o.  female in consultation     Excerpts from admission 2021 or consult notes as follows:   79-year-old lady came in because of shortness of breath and dyspnea she was told that she has COPD recently and she was giving albuterol inhaler did not seem to help so came to the emergency room she was hypoxic she was put on oxygen 5 L nasal cannula she was having dyspnea minimal exertion and also at rest she is a lifetime non-smoker, but she has been exposed to secondhand smoke her  used to smoke and all her children smoke she used to work on the farm as a farmer no chest pain no fever no chills.      5/10 patient condition got worse yesterday heart rate was in 40s to 50s EKG shows complete heart block patient was transferred to ICU on dopamine drip  Allergies   Allergen Reactions    Nortriptyline Itching    Cymbalta [Duloxetine] Itching    Ivp [Fd And C Blue No.1] Hives    Morphine Itching    Tetracycline Itching        MAR reviewed and pertinent medications noted or modified as needed     Current Facility-Administered Medications   Medication    albuterol-ipratropium (DUO-NEB) 2.5 MG-0.5 MG/3 ML    DOPamine (INTROPIN) 800 mg in dextrose 5% 250 mL infusion    furosemide (LASIX) injection 40 mg    aspirin chewable tablet 81 mg    ticagrelor (BRILINTA) tablet 90 mg    sodium chloride (NS) flush 5-40 mL    sodium chloride (NS) flush 5-40 mL    predniSONE (DELTASONE) tablet 10 mg    hydrOXYzine pamoate (VISTARIL) capsule 25 mg    piperacillin-tazobactam (ZOSYN) 3.375 g in 0.9% sodium chloride (MBP/ADV) 100 mL MBP    escitalopram oxalate (LEXAPRO) tablet 10 mg    levothyroxine (SYNTHROID) tablet 150 mcg    insulin glargine (LANTUS) injection 60 Units    famotidine (PEPCID) tablet 20 mg    atorvastatin (LIPITOR) tablet 10 mg    insulin lispro (HUMALOG) injection    glucose chewable tablet 16 g    dextrose (D50W) injection syrg 12.5-25 g    glucagon (GLUCAGEN) injection 1 mg    acetaminophen (TYLENOL) tablet 650 mg    Or    acetaminophen (TYLENOL) suppository 650 mg    polyethylene glycol (MIRALAX) packet 17 g    ondansetron (ZOFRAN ODT) tablet 4 mg    Or    ondansetron (ZOFRAN) injection 4 mg    enoxaparin (LOVENOX) injection 40 mg    budesonide-formoteroL (SYMBICORT) 160-4.5 mcg/actuation HFA inhaler 2 Puff      Patient PCP: Maya Perkins MD  PMH:  has a past medical history of Depression, DM (diabetes mellitus) ([address] Hyperlipidemia, Hypertension, Pancreatitis, SOB (shortness of breath), and Thyroid disease. PSH:   has a past surgical history that includes hx hysterectomy. FHX: family history is not on file. SHX:  reports that she has never smoked. She has never used smokeless tobacco. She reports that she does not drink alcohol or use drugs. ROS:    Review of Systems   Constitutional: Negative. HENT: Negative. Eyes: Negative. Respiratory: Positive for cough, sputum production, shortness of breath and wheezing. Cardiovascular: Positive for orthopnea. Gastrointestinal: Negative. Genitourinary: Negative. Musculoskeletal: Negative. Skin: Negative. Neurological: Negative. Psychiatric/Behavioral: Negative.          Objective:     Vital Signs: Telemetry:    normal sinus rhythm Intake/Output:   Visit Vitals  BP (!) 137/32   Pulse (!) 53   Temp 98.1 °F (36.7 °C)   Resp 19   Ht 5' 1\" (1.549 m)   Wt 79.8 kg (175 lb 14.8 oz)   SpO2 97%   BMI 33.24 kg/m²       Temp (24hrs), Av.1 °F (36.7 °C), Min:97.5 °F (36.4 °C), Max:98.8 °F (37.1 °C)        O2 Device: None (Room air) O2 Flow Rate (L/min): 2 l/min       Wt Readings from Last 4 Encounters:   21 79.8 kg (175 lb 14.8 oz)   10/11/10 102.2 kg (225 lb 3.2 oz)          Intake/Output Summary (Last 24 hours) at 5/10/2021 0916  Last data filed at 5/10/2021 0600  Gross per 24 hour   Intake 424.58 ml   Output 350 ml   Net 74.58 ml       Last shift:      No intake/output data recorded. Last 3 shifts:  190 - 05/10 0700  In: 424.6 [P.O.:222; I.V.:202.6]  Out: 350 [Urine:350]       Physical Exam:     Physical Exam   Constitutional: She appears well-developed. HENT:   Head: Normocephalic and atraumatic. Eyes: Pupils are equal, round, and reactive to light. Conjunctivae are normal.   Neck: Normal range of motion. Neck supple. Cardiovascular: Normal rate and regular rhythm. Pulmonary/Chest: Breath sounds normal. She is in respiratory distress. Abdominal: Soft. Bowel sounds are normal.   Musculoskeletal: Normal range of motion. Neurological: She is alert. Skin: Skin is warm. Labs:    Recent Labs     05/10/21  0410 21  0640 21  2145   WBC 25.8* 23.7* 16.3*   HGB 10.7* 13.2 12.0    455* 377     Recent Labs     05/10/21  0410 21  0640 21  2145    141 141   K 4.0 2.6* 3.2*    103 102   CO2 30 32 34*   * 36* 76   BUN 41* 31* 27*   CREA 1.38* 1.08* 1.03*   CA 8.2* 8.4* 8.0*   MG 1.9  --   --    ALB 2.4* 2.7* 2.5*   ALT 46 60 59     No results for input(s): PH, PCO2, PO2, HCO3, FIO2 in the last 72 hours. No results for input(s): CPK, CKNDX, TROIQ in the last 72 hours.     No lab exists for component: CPKMB  No results found for: BNPP, BNP   Lab Results   Component Value Date/Time Culture result: No growth 4 days 05/04/2021 10:30 AM     Lab Results   Component Value Date/Time    TSH 1.62 05/04/2021 11:12 AM       Imaging:    CXR Results  (Last 48 hours)    None                IMPRESSION:     1. Chronic Obstructive Pulmonary Disease she is a lifetime non-smoker  2. Acute hypoxic and hypercapnic respiratory failure she is on a restraints but cannot wear the BiPAP mask  3. Patient is on Brilinta which can also cause shortness of breath  4. Complete heart block  5. Arrhythmia on dopamine  6. Hyponatremia  7. Decompensated congestive heart failure  8. Pleural effusion more on the left side  9. Right upper lobe opacity possible pneumonia  10. Severe aortic stenosis      RECOMMENDATIONS/PLAN:     1. Unable to use the mask so she is on nasal cannula 2 L tolerating well  2. Patient on nebulizer treatment and prednisone  3. For permanent pacemaker  4. Repeat arterial blood gases shows normal PCO2   5. Chest x-ray shows congestive changes with right upper lobe opacity we will repeat chest x-ray today continue with Zosyn White count is elevated  6. Supplemental O2 to keep sats > 93%  7. Overnight pulse oximetry shows no desaturation but pulse ox room air and ambulation shows desaturating to 87% on room air   SpO2 on room air, at rest=94%. SpO2 on room air, while ambulating=87%.   SpO2 on 2L , while ambulating=93%  We will repeat before discharge again pulse ox on room air and ambulation  Time spent in icu 30 min    Trina Love MD

## 2021-05-10 NOTE — PROGRESS NOTES
responded to Code Blue, consulted with IDT, provided silent prayerfulness. Code Blue resolved, there were no visitors present.  advised staff of chaplains availability for follow up upon further referrals.      Visited by Trish Wilson, [facility]    can be contacted by calling  and requesting  on call

## 2021-05-10 NOTE — PROGRESS NOTES
CM was consulted for home oxygen. Patient will need RT eval within 48 hours of discharge to set up home oxygen.

## 2021-05-10 NOTE — PROGRESS NOTES
Hospitalist Progress Note    Subjective:     Latoya Guzman is a 79 yo female with a PMHx significant for DM, HLD, HTN, thyroid disease, and depression, who presents to the ED with shortness of breath for the past 1-2 days due to a severe acute exacerbation of COPD, acute hypoxic/hypercapnic respiratory failure, and CHF. She also has a bilateral pleural effusion (worse on left), interstitial edema (cardiogenic or infectious cause), and hyponatremia.   ___________________________________________________________    Today, the patient presents awake and in no acute distress, sitting up in bed     Patient transferred to the ICU as patient went into bradycardia/complete heart block  Started on dopamine drip    Received atropine and glucagon    Son at the bedside    Cardiac cath in place 1 stent  Patient have significant severe aortic stenosis    Past Medical History:   Diagnosis Date    Depression     DM [facility]diabetes mellitus) ([facility]     Hyperlipidemia     Hypertension     Pancreatitis     SOB (shortness of breath)     Thyroid disease       Past Surgical History:   Procedure Laterality Date    HX HYSTERECTOMY       History reviewed. No pertinent family history. Social History     Tobacco Use    Smoking status: Never Smoker    Smokeless tobacco: Never Used   Substance Use Topics    Alcohol use: No       Prior to Admission medications    Medication Sig Start Date End Date Taking? Authorizing Provider   Insulin Needles, Disposable, 31 X 5/16 \" Ndle by Does Not Apply route. 10/11/10   Adrienne Holman MD   insulin lispro, human, (HUMALOG KWIKPEN) 100 unit/mL flexpen 15 Units by SubCUTAneous route three (3) times daily (with meals). 10/13/10   Adrienne Holman MD   benazepril (LOTENSIN) 40 mg tablet Take 40 mg by mouth daily. 10/11/10   Provider, Historical   LEXAPRO 10 mg tablet Take 10 mg by mouth nightly. 10/11/10   Provider, Historical   famotidine (PEPCID) 20 mg tablet Take 20 mg by mouth two (2) times a day. 10/11/10   Provider, Historical   gabapentin (NEURONTIN) 400 mg capsule  9/3/10   Provider, Historical   hydrocodone-acetaminophen (NORCO) 5-325 mg per tablet Take 1 Tab by mouth every six (6) hours as needed. 10/11/10   Provider, Historical   levothyroxine (SYNTHROID) 150 mcg tablet Take 150 mcg by mouth daily (before breakfast). 10/11/10   Provider, Historical   lorazepam (ATIVAN) 1 mg tablet  8/2/10   Provider, Historical   lovastatin (MEVACOR) 40 mg tablet Take 40 mg by mouth nightly. 10/11/10   Provider, Historical   metoprolol (LOPRESSOR) 100 mg tablet Take 100 mg by mouth two (2) times a day. 10/11/10   Provider, Historical   oxycodone-acetaminophen (PERCOCET 10)  mg per tablet  8/2/10   Provider, Historical   insulin glargine (LANTUS) 100 unit/mL injection 60 Units by SubCUTAneous route nightly. 10/11/10   Ana Suresh MD     Allergies   Allergen Reactions    Nortriptyline Itching    Cymbalta [Duloxetine] Itching    Ivp [Fd And C Blue No.1] Hives    Morphine Itching    Tetracycline Itching        Review of Systems:  A comprehensive review of systems was negative except for that written in the History of Present Illness. Objective: Intake and Output:    No intake/output data recorded. 05/08 1901 - 05/10 0700  In: 424.6 [P.O.:222; I.V.:202.6]  Out: 350 [Urine:350]    Physical Exam:   Visit Vitals  BP (!) 137/32   Pulse (!) 53   Temp 98.1 °F (36.7 °C)   Resp 19   Ht 5' 1\" (1.549 m)   Wt 79.8 kg (175 lb 14.8 oz)   SpO2 97%   BMI 33.24 kg/m²     General:  Alert, cooperative, no distress. Head:  Normocephalic, without obvious abnormality, atraumatic. Eyes:  Conjunctivae/corneas clear. Pupils equal, round, reactive to light. Extraocular movements intact. Lungs:    Decreased breath sounds , crackles, wheezing. Chest wall:  No tenderness or deformity. Heart:  Regular rate and rhythm, S1, S2 normal, no murmur, click, rub, or gallop.  Possible murmur, difficult to auscultate with BiPAP Abdomen:   Soft, non-tender. Bowel sounds normal. No masses. No organomegaly. Extremities: Extremities normal, atraumatic, no cyanosis. 1+ pitting edema of lower extremities    Pulses: 2+ and symmetric all extremities. Skin: Skin color, texture, turgor normal. No rashes or lesions. Lymph nodes: Cervical, supraclavicular, and axillary nodes normal.   Neurologic: CNII-XII intact. Normal strength, sensation, and reflexes throughout.        ECG:  ** Poor data quality, interpretation may be adversely affected**   Normal sinus rhythm   Right bundle branch block   Left anterior fascicular block   ** Bifascicular block **   Left ventricular hypertrophy with repolarization abnormality   Cannot rule out Septal infarct , age undetermined     Data Review:   Recent Results (from the past 24 hour(s))   EKG, 12 LEAD, INITIAL    Collection Time: 05/04/21 10:24 AM   Result Value Ref Range    Ventricular Rate 98 BPM    Atrial Rate 98 BPM    P-R Interval 168 ms    QRS Duration 152 ms    Q-T Interval 416 ms    QTC Calculation (Bezet) 531 ms    Calculated P Axis 68 degrees    Calculated R Axis -52 degrees    Calculated T Axis 94 degrees    Diagnosis       ** Poor data quality, interpretation may be adversely affected  Normal sinus rhythm  Right bundle branch block  Left anterior fascicular block  ** Bifascicular block **  Left ventricular hypertrophy with repolarization abnormality  Cannot rule out Septal infarct , age undetermined  Abnormal ECG  No previous ECGs available  Confirmed by Deborah Frazier MD, Rashi Duncan ([id number]) on 5/4/2021 12:13:56 PM     SARS-COV-2    Collection Time: 05/04/21 10:30 AM   Result Value Ref Range    SARS-CoV-2 Please find results under separate order     COVID-19 RAPID TEST    Collection Time: 05/04/21 10:30 AM   Result Value Ref Range    Specimen source Nasopharyngeal      COVID-19 rapid test Not Detected Not Detected     LACTIC ACID    Collection Time: 05/04/21 11:12 AM   Result Value Ref Range    Lactic acid 0.6 0.4 - 2.0 mmol/L   MAGNESIUM    Collection Time: 05/04/21 11:12 AM   Result Value Ref Range    Magnesium 1.8 1.6 - 2.4 mg/dL   METABOLIC PANEL, COMPREHENSIVE    Collection Time: 05/04/21 11:12 AM   Result Value Ref Range    Sodium 129 (L) 136 - 145 mmol/L    Potassium 4.2 3.5 - 5.1 mmol/L    Chloride 97 97 - 108 mmol/L    CO2 28 21 - 32 mmol/L    Anion gap 4 (L) 5 - 15 mmol/L    Glucose 182 (H) 65 - 100 mg/dL    BUN 25 (H) 6 - 20 mg/dL    Creatinine 0.88 0.55 - 1.02 mg/dL    BUN/Creatinine ratio 28 (H) 12 - 20      GFR est AA >60 >60 ml/min/1.73m2    GFR est non-AA >60 >60 ml/min/1.73m2    Calcium 8.2 (L) 8.5 - 10.1 mg/dL    Bilirubin, total 0.4 0.2 - 1.0 mg/dL    AST (SGOT) 13 (L) 15 - 37 U/L    ALT (SGPT) 17 12 - 78 U/L    Alk.  phosphatase 68 45 - 117 U/L    Protein, total 6.5 6.4 - 8.2 g/dL    Albumin 3.0 (L) 3.5 - 5.0 g/dL    Globulin 3.5 2.0 - 4.0 g/dL    A-G Ratio 0.9 (L) 1.1 - 2.2     BNP    Collection Time: 05/04/21 11:12 AM   Result Value Ref Range    NT pro-BNP 7,360 (H) <450 pg/mL   PROCALCITONIN    Collection Time: 05/04/21 11:12 AM   Result Value Ref Range    Procalcitonin <0.05 (H) 0 ng/mL   TROPONIN I    Collection Time: 05/04/21 11:12 AM   Result Value Ref Range    Troponin-I, Qt. <0.05 <0.05 ng/mL   TSH 3RD GENERATION    Collection Time: 05/04/21 11:12 AM   Result Value Ref Range    TSH 1.62 0.36 - 3.74 uIU/mL   CBC WITH AUTOMATED DIFF    Collection Time: 05/04/21 11:12 AM   Result Value Ref Range    WBC 14.2 (H) 3.6 - 11.0 K/uL    RBC 3.13 (L) 3.80 - 5.20 M/uL    HGB 10.0 (L) 11.5 - 16.0 g/dL    HCT 30.8 (L) 35.0 - 47.0 %    MCV 98.4 80.0 - 99.0 FL    MCH 31.9 26.0 - 34.0 PG    MCHC 32.5 30.0 - 36.5 g/dL    RDW 13.6 11.5 - 14.5 %    PLATELET 698 730 - 962 K/uL    MPV 11.7 8.9 - 12.9 FL    NRBC 0.0 0.0  WBC    ABSOLUTE NRBC 0.00 0.00 - 0.01 K/uL    NEUTROPHILS 87 (H) 32 - 75 %    LYMPHOCYTES 6 (L) 12 - 49 %    MONOCYTES 5 5 - 13 %    EOSINOPHILS 1 0 - 7 %    BASOPHILS 1 0 - 1 %    IMMATURE GRANULOCYTES 0 0 - 0.5 %    ABS. NEUTROPHILS 12.4 (H) 1.8 - 8.0 K/UL    ABS. LYMPHOCYTES 0.8 0.8 - 3.5 K/UL    ABS. MONOCYTES 0.7 0.0 - 1.0 K/UL    ABS. EOSINOPHILS 0.1 0.0 - 0.4 K/UL    ABS. BASOPHILS 0.1 0.0 - 0.1 K/UL    ABS. IMM. GRANS. 0.1 (H) 0.00 - 0.04 K/UL    DF AUTOMATED     PROTHROMBIN TIME + INR    Collection Time: 05/04/21 11:25 AM   Result Value Ref Range    Prothrombin time 13.6 11.9 - 14.7 sec    INR 1.1 0.9 - 1.1     PTT    Collection Time: 05/04/21 11:25 AM   Result Value Ref Range    aPTT 21.8 21.2 - 34.1 sec    aPTT, therapeutic range   82 - 109 sec   D DIMER    Collection Time: 05/04/21 11:25 AM   Result Value Ref Range    D DIMER 0.50 (H) <0.50 ug/ml(FEU)   BLOOD GAS, ARTERIAL    Collection Time: 05/04/21 11:25 AM   Result Value Ref Range    pH 7.36 7.35 - 7.45      PCO2 52 (H) 35 - 45 mmHg    PO2 72 (L) 75 - 100 mmHg    O2 SAT 93 (L) >95 %    BICARBONATE 27 (H) 22 - 26 mmol/L    BASE EXCESS 2.9 (H) 0 - 2 mmol/L    O2 METHOD Nasal Cannula      O2 FLOW RATE 6 L/min    SITE Right Radial      EBEN'S TEST PASS         Chest x-ray:  More conspicuous RUL opacity. Patchy mid and lower lung reticular markings  persist. Cardiac and mediastinal structures unchanged. Thoracic aorta  atherosclerosis. No pneumothorax. Probable small dependent bilateral pleural  effusions.     Assessment:   Complete heart block request atropine and glucagon now on dopamine drip    CAD status post stent LAD  Severe aortic stenosis  Acute hypoxemic respiratory failure  COPD exacerbation   On 3L NC  On Duo-neb, symbicort, solu-medrol  No BiPAP due to restraints    Hyponatremia    Congestive heart failure    Pleural effusions   y    Anemia      Leukocytosis     Diabetes mellitus      Hypertension     Hypothyroidism      Depression     Echo done shows ejection fraction of 40 to 45% and moderate grade 2 diastolic dysfunction    Dopplers negative for DVT  Plan:     Continue medications:   Duo-meb 3mL neb q6hrs  ASA 325mg po qday Lipitor 10mg po qhs  Symbicort 2 puffs inhaled bid   Enoxaparin 40mg subq qday   Lexapro 10mg po qhs  Famotidine 20mg po bid   Lasix 40mg IV bid  Lantus 60 units subq qhs  Humalog subq qid   Synthroid 150mcg po qam   Lisinopril 40mg po qday (on hold due to kidney fx)   Discontinue Solu-Medrol  Start on prednisone 10 mg daily  Zosyn 3.375mg IV q8hrs   Brilinta 90 mg twice a day    Blood cx so far negative    Oxygen room air 94% while ambulating 87%  While ambulating on 2 L 93%    Continue dopamine drip  For pacemaker tomorrow  We will order TSH     consult for discharge home on home O2  PT OT consult    Discussed with the patient's sonr at the bedside regarding discharge planning    Current Facility-Administered Medications:     albuterol-ipratropium (DUO-NEB) 2.5 MG-0.5 MG/3 ML, 3 mL, Nebulization, Q6H PRN, Beny Cheung MD    DOPamine (INTROPIN) 800 mg in dextrose 5% 250 mL infusion, 0-20 mcg/kg/min, IntraVENous, TITRATE, Isai Mendoza MD, Last Rate: 18 mL/hr at 05/10/21 0636, 12 mcg/kg/min at 05/10/21 0636    furosemide (LASIX) injection 40 mg, 40 mg, IntraVENous, DAILY, Noé Bunch MD, 40 mg at 05/10/21 0846    aspirin chewable tablet 81 mg, 81 mg, Oral, DAILY, Noé Bunch MD, 81 mg at 05/10/21 0845    ticagrelor (BRILINTA) tablet 90 mg, 90 mg, Oral, Q12H, Noé Bunch MD, Stopped at 05/10/21 1000    sodium chloride (NS) flush 5-40 mL, 5-40 mL, IntraVENous, Q8H, Noé Bunch MD, 10 mL at 05/10/21 0518    sodium chloride (NS) flush 5-40 mL, 5-40 mL, IntraVENous, PRN, Noé Bunch MD    predniSONE (DELTASONE) tablet 10 mg, 10 mg, Oral, DAILY WITH BREAKFAST, Beny Cheung MD, 10 mg at 05/10/21 0846    hydrOXYzine pamoate (VISTARIL) capsule 25 mg, 25 mg, Oral, Q6H PRN, Beny Cheung MD, 25 mg at 05/08/21 0034    piperacillin-tazobactam (ZOSYN) 3.375 g in 0.9% sodium chloride (MBP/ADV) 100 mL MBP, 3.375 g, IntraVENous, Q8H, Mara Cheung MD, Last Rate: 25 mL/hr at 05/10/21 0846, 3.375 g at 05/10/21 0846    escitalopram oxalate (LEXAPRO) tablet 10 mg, 10 mg, Oral, QHS, Beny Cheung MD, 10 mg at 05/09/21 2155    levothyroxine (SYNTHROID) tablet 150 mcg, 150 mcg, Oral, ACB, Beny Cheung MD, 150 mcg at 05/10/21 0846    insulin glargine (LANTUS) injection 60 Units, 60 Units, SubCUTAneous, QHS, Beny Cheung MD, 60 Units at 05/09/21 2157    famotidine (PEPCID) tablet 20 mg, 20 mg, Oral, BID, Beny Cheung MD, 20 mg at 05/10/21 0845    atorvastatin (LIPITOR) tablet 10 mg, 10 mg, Oral, QHS, Beny Cheung MD, 10 mg at 05/09/21 2155    insulin lispro (HUMALOG) injection, , SubCUTAneous, AC&HS, Richelle Cheung MD, Stopped at 05/09/21 2200    glucose chewable tablet 16 g, 4 Tab, Oral, PRN, Richelle Cheung MD    dextrose (D50W) injection syrg 12.5-25 g, 25-50 mL, IntraVENous, PRN, Richelle Cheung MD    glucagon (GLUCAGEN) injection 1 mg, 1 mg, IntraMUSCular, PRN, Richelle Cheung MD    acetaminophen (TYLENOL) tablet 650 mg, 650 mg, Oral, Q6H PRN, 650 mg at 05/05/21 0018 **OR** acetaminophen (TYLENOL) suppository 650 mg, 650 mg, Rectal, Q6H PRN, Richelle Cheung MD    polyethylene glycol (MIRALAX) packet 17 g, 17 g, Oral, DAILY PRN, Richelle Cheung MD    ondansetron (ZOFRAN ODT) tablet 4 mg, 4 mg, Oral, Q8H PRN **OR** ondansetron (ZOFRAN) injection 4 mg, 4 mg, IntraVENous, Q6H PRN, Beny Cheung MD    enoxaparin (LOVENOX) injection 40 mg, 40 mg, SubCUTAneous, DAILY, Beny Cheung MD, 40 mg at 05/10/21 0879    budesonide-formoteroL (SYMBICORT) 160-4.5 mcg/actuation HFA inhaler 2 Puff, 2 Puff, Inhalation, BID, Denver Macadam, MD, 2 Puff at 05/10/21 8399

## 2021-05-10 NOTE — PROGRESS NOTES
Patient agonal respirations in vfib. Code Blue initiated. MD at bedside. Son Neela Arthur) notified by phone of situation and needing possible intubation.

## 2021-05-11 ENCOUNTER — APPOINTMENT (OUTPATIENT)
Dept: NON INVASIVE DIAGNOSTICS | Age: 81
DRG: 242 | End: 2021-05-11
Attending: INTERNAL MEDICINE
Payer: MEDICARE

## 2021-05-11 ENCOUNTER — APPOINTMENT (OUTPATIENT)
Dept: NON INVASIVE DIAGNOSTICS | Age: 81
DRG: 242 | End: 2021-05-11
Attending: FAMILY MEDICINE
Payer: MEDICARE

## 2021-05-11 LAB
ALBUMIN SERPL-MCNC: 2.4 G/DL (ref 3.5–5)
ALBUMIN/GLOB SERPL: 0.8 {RATIO} (ref 1.1–2.2)
ALP SERPL-CCNC: 49 U/L (ref 45–117)
ALT SERPL-CCNC: 35 U/L (ref 12–78)
ANION GAP SERPL CALC-SCNC: 7 MMOL/L (ref 5–15)
APTT PPP: 30.6 SEC (ref 21.2–34.1)
ARTERIAL PATENCY WRIST A: POSITIVE
AST SERPL W P-5'-P-CCNC: 15 U/L (ref 15–37)
ATRIAL RATE: 117 BPM
ATRIAL RATE: 66 BPM
BACTERIA SPEC CULT: NORMAL
BASE EXCESS BLDA CALC-SCNC: 7.3 MMOL/L (ref 0–2)
BASOPHILS # BLD: 0.1 K/UL (ref 0–0.1)
BASOPHILS NFR BLD: 0 % (ref 0–1)
BDY SITE: ABNORMAL
BILIRUB SERPL-MCNC: 0.4 MG/DL (ref 0.2–1)
BUN SERPL-MCNC: 38 MG/DL (ref 6–20)
BUN/CREAT SERPL: 29 (ref 12–20)
CA-I BLD-MCNC: 8.2 MG/DL (ref 8.5–10.1)
CALCULATED P AXIS, ECG09: 64 DEGREES
CALCULATED R AXIS, ECG10: -67 DEGREES
CALCULATED R AXIS, ECG10: -70 DEGREES
CALCULATED T AXIS, ECG11: -122 DEGREES
CALCULATED T AXIS, ECG11: 114 DEGREES
CHLORIDE SERPL-SCNC: 104 MMOL/L (ref 97–108)
CO2 SERPL-SCNC: 30 MMOL/L (ref 21–32)
CREAT SERPL-MCNC: 1.3 MG/DL (ref 0.55–1.02)
DIAGNOSIS, 93000: NORMAL
DIAGNOSIS, 93000: NORMAL
DIFFERENTIAL METHOD BLD: ABNORMAL
ECHO AO ROOT DIAM: 3.3 CM
ECHO AR MAX VEL PISA: 310 CM/S
ECHO AV AREA PEAK VELOCITY: 0.67 CM2
ECHO AV AREA VTI: 0.71 CM2
ECHO AV AREA/BSA PEAK VELOCITY: 0.4 CM2/M2
ECHO AV AREA/BSA VTI: 0.4 CM2/M2
ECHO AV MEAN GRADIENT: 23 MMHG
ECHO AV MEAN VELOCITY: 225 CM/S
ECHO AV PEAK GRADIENT: 41 MMHG
ECHO AV REGURGITANT PHT: 365 MS
ECHO AV VTI: 60.4 CM
ECHO EST RA PRESSURE: 3 MMHG
ECHO LA MAJOR AXIS: 3.8 CM
ECHO LA MINOR AXIS: 2.17 CM
ECHO LV EDV A2C: 47.4 CM3
ECHO LV EDV A4C: 89 CM3
ECHO LV EJECTION FRACTION A4C: 56 %
ECHO LV EJECTION FRACTION BIPLANE: 56 % (ref 55–100)
ECHO LV ESV A2C: 17.4 CM3
ECHO LV ESV A4C: 39 CM3
ECHO LV INTERNAL DIMENSION DIASTOLIC: 3.62 CM (ref 3.9–5.3)
ECHO LV INTERNAL DIMENSION SYSTOLIC: 2.59 CM
ECHO LV IVSD: 1.99 CM (ref 0.6–0.9)
ECHO LV MASS 2D: 317.8 G (ref 67–162)
ECHO LV MASS INDEX 2D: 181.4 G/M2 (ref 43–95)
ECHO LV POSTERIOR WALL DIASTOLIC: 1.93 CM (ref 0.6–0.9)
ECHO LVOT DIAM: 2 CM
ECHO LVOT PEAK GRADIENT: 2 MMHG
ECHO LVOT SV: 43 CM3
ECHO LVOT VTI: 13.6 CM
ECHO LVOT VTI: 13.6 CM
ECHO MV A VELOCITY: 151 CM/S
ECHO MV AREA PHT: 2.39 CM2
ECHO MV E DECELERATION TIME (DT): 151 MS
ECHO MV E VELOCITY: 109 CM/S
ECHO MV E/A RATIO: 0.72
ECHO MV MAX VELOCITY: 175 CM/S
ECHO MV MEAN GRADIENT: 5 MMHG
ECHO MV PEAK GRADIENT: 12 MMHG
ECHO MV PRESSURE HALF TIME (PHT): 92 MS
ECHO MV VTI: 33.5 CM
ECHO PV REGURGITANT MAX VELOCITY: 320 CM/S
ECHO PV REGURGITANT MAX VELOCITY: 474 CM/S
ECHO PV REGURGITANT MAX VELOCITY: 68.6 CM/S
ECHO RIGHT VENTRICULAR SYSTOLIC PRESSURE (RVSP): 37 MMHG
ECHO TV MAX VELOCITY: 290 CM/S
ECHO TV REGURGITANT PEAK GRADIENT: 34 MMHG
EOSINOPHIL # BLD: 1.4 K/UL (ref 0–0.4)
EOSINOPHIL NFR BLD: 5 % (ref 0–7)
EPAP/CPAP/PEEP, PAPEEP: 5
ERYTHROCYTE [DISTWIDTH] IN BLOOD BY AUTOMATED COUNT: 14.2 % (ref 11.5–14.5)
FIO2 ON VENT: 40 %
GLOBULIN SER CALC-MCNC: 3.2 G/DL (ref 2–4)
GLUCOSE BLD STRIP.AUTO-MCNC: 104 MG/DL (ref 65–117)
GLUCOSE BLD STRIP.AUTO-MCNC: 113 MG/DL (ref 65–117)
GLUCOSE BLD STRIP.AUTO-MCNC: 144 MG/DL (ref 65–117)
GLUCOSE BLD STRIP.AUTO-MCNC: 192 MG/DL (ref 65–117)
GLUCOSE BLD STRIP.AUTO-MCNC: 193 MG/DL (ref 65–117)
GLUCOSE BLD STRIP.AUTO-MCNC: 209 MG/DL (ref 65–117)
GLUCOSE BLD STRIP.AUTO-MCNC: 215 MG/DL (ref 65–117)
GLUCOSE SERPL-MCNC: 127 MG/DL (ref 65–100)
HCO3 BLDA-SCNC: 31 MMOL/L (ref 22–26)
HCT VFR BLD AUTO: 32.6 % (ref 35–47)
HGB BLD-MCNC: 10.8 G/DL (ref 11.5–16)
IMM GRANULOCYTES # BLD AUTO: 0.1 K/UL (ref 0–0.04)
IMM GRANULOCYTES NFR BLD AUTO: 1 % (ref 0–0.5)
INR PPP: 1.3 (ref 0.9–1.1)
IPAP/PIP, IPAPIP: 0
LVOT MG: 1 MMHG
LYMPHOCYTES # BLD: 1.3 K/UL (ref 0.8–3.5)
LYMPHOCYTES NFR BLD: 5 % (ref 12–49)
MCH RBC QN AUTO: 32 PG (ref 26–34)
MCHC RBC AUTO-ENTMCNC: 33.1 G/DL (ref 30–36.5)
MCV RBC AUTO: 96.7 FL (ref 80–99)
MONOCYTES # BLD: 1.4 K/UL (ref 0–1)
MONOCYTES NFR BLD: 5 % (ref 5–13)
MV DEC SLOPE: 3460 MM/S2
MV DEC SLOPE: 3460 MM/S2
NEUTS SEG # BLD: 22.1 K/UL (ref 1.8–8)
NEUTS SEG NFR BLD: 84 % (ref 32–75)
NRBC # BLD: 0 K/UL (ref 0–0.01)
NRBC BLD-RTO: 0 PER 100 WBC
PCO2 BLDA: 41 MMHG (ref 35–45)
PERFORMED BY, TECHID: ABNORMAL
PERFORMED BY, TECHID: NORMAL
PERFORMED BY, TECHID: NORMAL
PH BLDA: 7.49 [PH] (ref 7.35–7.45)
PLATELET # BLD AUTO: 381 K/UL (ref 150–400)
PMV BLD AUTO: 11.7 FL (ref 8.9–12.9)
PO2 BLDA: 120 MMHG (ref 75–100)
POTASSIUM SERPL-SCNC: 3.4 MMOL/L (ref 3.5–5.1)
PROT SERPL-MCNC: 5.6 G/DL (ref 6.4–8.2)
PROTHROMBIN TIME: 16 SEC (ref 11.9–14.7)
Q-T INTERVAL, ECG07: 404 MS
Q-T INTERVAL, ECG07: 664 MS
QRS DURATION, ECG06: 110 MS
QRS DURATION, ECG06: 138 MS
QTC CALCULATION (BEZET), ECG08: 420 MS
QTC CALCULATION (BEZET), ECG08: 554 MS
RBC # BLD AUTO: 3.37 M/UL (ref 3.8–5.2)
SAO2 % BLD: 98 %
SAO2% DEVICE SAO2% SENSOR NAME: ABNORMAL
SODIUM SERPL-SCNC: 141 MMOL/L (ref 136–145)
SPECIAL REQUESTS,SREQ: NORMAL
THERAPEUTIC RANGE,PTTT: NORMAL SEC (ref 82–109)
VENTILATION MODE VENT: ABNORMAL
VENTRICULAR RATE, ECG03: 42 BPM
VENTRICULAR RATE, ECG03: 65 BPM
VT SETTING VENT: 450 ML
WBC # BLD AUTO: 26.3 K/UL (ref 3.6–11)

## 2021-05-11 PROCEDURE — 74011250636 HC RX REV CODE- 250/636: Performed by: INTERNAL MEDICINE

## 2021-05-11 PROCEDURE — 85730 THROMBOPLASTIN TIME PARTIAL: CPT

## 2021-05-11 PROCEDURE — C1751 CATH, INF, PER/CENT/MIDLINE: HCPCS | Performed by: INTERNAL MEDICINE

## 2021-05-11 PROCEDURE — 93925 LOWER EXTREMITY STUDY: CPT

## 2021-05-11 PROCEDURE — 77030019580 HC CBL PACE MEDT -B: Performed by: INTERNAL MEDICINE

## 2021-05-11 PROCEDURE — 74011000258 HC RX REV CODE- 258: Performed by: FAMILY MEDICINE

## 2021-05-11 PROCEDURE — 77030041700 HC CATH BLLN WDG PRES TELE -B: Performed by: INTERNAL MEDICINE

## 2021-05-11 PROCEDURE — 74011636637 HC RX REV CODE- 636/637: Performed by: FAMILY MEDICINE

## 2021-05-11 PROCEDURE — 36415 COLL VENOUS BLD VENIPUNCTURE: CPT

## 2021-05-11 PROCEDURE — 77030002996 HC SUT SLK J&J -A: Performed by: INTERNAL MEDICINE

## 2021-05-11 PROCEDURE — 2709999900 HC NON-CHARGEABLE SUPPLY: Performed by: INTERNAL MEDICINE

## 2021-05-11 PROCEDURE — 74011250636 HC RX REV CODE- 250/636: Performed by: FAMILY MEDICINE

## 2021-05-11 PROCEDURE — 74011000250 HC RX REV CODE- 250: Performed by: INTERNAL MEDICINE

## 2021-05-11 PROCEDURE — 85610 PROTHROMBIN TIME: CPT

## 2021-05-11 PROCEDURE — 74011250637 HC RX REV CODE- 250/637: Performed by: INTERNAL MEDICINE

## 2021-05-11 PROCEDURE — 80053 COMPREHEN METABOLIC PANEL: CPT

## 2021-05-11 PROCEDURE — C1898 LEAD, PMKR, OTHER THAN TRANS: HCPCS | Performed by: INTERNAL MEDICINE

## 2021-05-11 PROCEDURE — A4565 SLINGS: HCPCS | Performed by: INTERNAL MEDICINE

## 2021-05-11 PROCEDURE — 74011250637 HC RX REV CODE- 250/637: Performed by: FAMILY MEDICINE

## 2021-05-11 PROCEDURE — 33225 L VENTRIC PACING LEAD ADD-ON: CPT | Performed by: INTERNAL MEDICINE

## 2021-05-11 PROCEDURE — 33208 INSRT HEART PM ATRIAL & VENT: CPT | Performed by: INTERNAL MEDICINE

## 2021-05-11 PROCEDURE — 74011000636 HC RX REV CODE- 636: Performed by: INTERNAL MEDICINE

## 2021-05-11 PROCEDURE — 82803 BLOOD GASES ANY COMBINATION: CPT

## 2021-05-11 PROCEDURE — 94003 VENT MGMT INPAT SUBQ DAY: CPT

## 2021-05-11 PROCEDURE — 94640 AIRWAY INHALATION TREATMENT: CPT

## 2021-05-11 PROCEDURE — C1887 CATHETER, GUIDING: HCPCS | Performed by: INTERNAL MEDICINE

## 2021-05-11 PROCEDURE — 75820 VEIN X-RAY ARM/LEG: CPT | Performed by: INTERNAL MEDICINE

## 2021-05-11 PROCEDURE — 02HK3JZ INSERTION OF PACEMAKER LEAD INTO RIGHT VENTRICLE, PERCUTANEOUS APPROACH: ICD-10-PCS | Performed by: INTERNAL MEDICINE

## 2021-05-11 PROCEDURE — 02H63JZ INSERTION OF PACEMAKER LEAD INTO RIGHT ATRIUM, PERCUTANEOUS APPROACH: ICD-10-PCS | Performed by: INTERNAL MEDICINE

## 2021-05-11 PROCEDURE — 77030037713 HC CLOSR DEV INCIS ZIP STRY -B: Performed by: INTERNAL MEDICINE

## 2021-05-11 PROCEDURE — C1892 INTRO/SHEATH,FIXED,PEEL-AWAY: HCPCS | Performed by: INTERNAL MEDICINE

## 2021-05-11 PROCEDURE — 0D9770Z DRAINAGE OF STOMACH, PYLORUS WITH DRAINAGE DEVICE, VIA NATURAL OR ARTIFICIAL OPENING: ICD-10-PCS | Performed by: FAMILY MEDICINE

## 2021-05-11 PROCEDURE — 65610000006 HC RM INTENSIVE CARE

## 2021-05-11 PROCEDURE — 0JH607Z INSERTION OF CARDIAC RESYNCHRONIZATION PACEMAKER PULSE GENERATOR INTO CHEST SUBCUTANEOUS TISSUE AND FASCIA, OPEN APPROACH: ICD-10-PCS | Performed by: INTERNAL MEDICINE

## 2021-05-11 PROCEDURE — 77010033678 HC OXYGEN DAILY

## 2021-05-11 PROCEDURE — C2621 PMKR, OTHER THAN SING/DUAL: HCPCS | Performed by: INTERNAL MEDICINE

## 2021-05-11 PROCEDURE — C1900 LEAD, CORONARY VENOUS: HCPCS | Performed by: INTERNAL MEDICINE

## 2021-05-11 PROCEDURE — C1769 GUIDE WIRE: HCPCS | Performed by: INTERNAL MEDICINE

## 2021-05-11 PROCEDURE — 77030036615 HC IMPL ENV ANTIBACT MEDT -G: Performed by: INTERNAL MEDICINE

## 2021-05-11 PROCEDURE — 85025 COMPLETE CBC W/AUTO DIFF WBC: CPT

## 2021-05-11 PROCEDURE — 02H43JZ INSERTION OF PACEMAKER LEAD INTO CORONARY VEIN, PERCUTANEOUS APPROACH: ICD-10-PCS | Performed by: INTERNAL MEDICINE

## 2021-05-11 PROCEDURE — 93321 DOPPLER ECHO F-UP/LMTD STD: CPT

## 2021-05-11 DEVICE — LEAD 5076-52 MRI US RCMCRD
Type: IMPLANTABLE DEVICE | Status: FUNCTIONAL
Brand: CAPSUREFIX NOVUS MRI™ SURESCAN®

## 2021-05-11 DEVICE — LEAD 429888 MRI CANT US
Type: IMPLANTABLE DEVICE | Status: FUNCTIONAL
Brand: ATTAIN PERFORMA™ MRI SURESCAN™

## 2021-05-11 DEVICE — ENVELOPE PACEMKR M W2.5XL2.7IN ABSRB ANTIBACT TYRX: Type: IMPLANTABLE DEVICE | Status: FUNCTIONAL

## 2021-05-11 DEVICE — LEAD 5076-58 MRI US RCMCRD
Type: IMPLANTABLE DEVICE | Status: FUNCTIONAL
Brand: CAPSUREFIX NOVUS MRI™ SURESCAN®

## 2021-05-11 DEVICE — CRTP W4TR01 PERCEPTA QUAD CRTP MRI US
Type: IMPLANTABLE DEVICE | Status: FUNCTIONAL
Brand: PERCEPTA™ QUAD CRT-P MRI SURESCAN™

## 2021-05-11 RX ORDER — LIDOCAINE HYDROCHLORIDE AND EPINEPHRINE 20; 10 MG/ML; UG/ML
INJECTION, SOLUTION INFILTRATION; PERINEURAL AS NEEDED
Status: DISCONTINUED | OUTPATIENT
Start: 2021-05-11 | End: 2021-05-11 | Stop reason: HOSPADM

## 2021-05-11 RX ORDER — FAMOTIDINE 20 MG/1
20 TABLET, FILM COATED ORAL 2 TIMES DAILY
Status: DISCONTINUED | OUTPATIENT
Start: 2021-05-11 | End: 2021-05-19 | Stop reason: HOSPADM

## 2021-05-11 RX ORDER — SODIUM CHLORIDE 9 MG/ML
INJECTION, SOLUTION INTRAVENOUS
Status: COMPLETED | OUTPATIENT
Start: 2021-05-11 | End: 2021-05-11

## 2021-05-11 RX ORDER — DIPHENHYDRAMINE HYDROCHLORIDE 50 MG/ML
INJECTION, SOLUTION INTRAMUSCULAR; INTRAVENOUS AS NEEDED
Status: DISCONTINUED | OUTPATIENT
Start: 2021-05-11 | End: 2021-05-11 | Stop reason: HOSPADM

## 2021-05-11 RX ORDER — HEPARIN SODIUM 200 [USP'U]/100ML
INJECTION, SOLUTION INTRAVENOUS
Status: COMPLETED | OUTPATIENT
Start: 2021-05-11 | End: 2021-05-11

## 2021-05-11 RX ADMIN — PROPOFOL 45 MCG/KG/MIN: 10 INJECTION, EMULSION INTRAVENOUS at 10:11

## 2021-05-11 RX ADMIN — ESCITALOPRAM OXALATE 10 MG: 10 TABLET ORAL at 22:15

## 2021-05-11 RX ADMIN — ENOXAPARIN SODIUM 40 MG: 40 INJECTION SUBCUTANEOUS at 10:20

## 2021-05-11 RX ADMIN — PROPOFOL 35 MCG/KG/MIN: 10 INJECTION, EMULSION INTRAVENOUS at 22:15

## 2021-05-11 RX ADMIN — PIPERACILLIN AND TAZOBACTAM 3.38 G: 3; .375 INJECTION, POWDER, LYOPHILIZED, FOR SOLUTION INTRAVENOUS at 10:24

## 2021-05-11 RX ADMIN — TICAGRELOR 90 MG: 90 TABLET ORAL at 00:03

## 2021-05-11 RX ADMIN — Medication 10 ML: at 00:02

## 2021-05-11 RX ADMIN — ATORVASTATIN CALCIUM 10 MG: 10 TABLET, FILM COATED ORAL at 00:03

## 2021-05-11 RX ADMIN — Medication 10 ML: at 14:44

## 2021-05-11 RX ADMIN — INSULIN LISPRO 6 UNITS: 100 INJECTION, SOLUTION INTRAVENOUS; SUBCUTANEOUS at 16:12

## 2021-05-11 RX ADMIN — LEVOTHYROXINE SODIUM 150 MCG: 75 TABLET ORAL at 10:21

## 2021-05-11 RX ADMIN — Medication 10 ML: at 06:00

## 2021-05-11 RX ADMIN — INSULIN GLARGINE 60 UNITS: 100 INJECTION, SOLUTION SUBCUTANEOUS at 22:27

## 2021-05-11 RX ADMIN — BUDESONIDE 500 MCG: 0.5 INHALANT RESPIRATORY (INHALATION) at 07:05

## 2021-05-11 RX ADMIN — TICAGRELOR 90 MG: 90 TABLET ORAL at 10:00

## 2021-05-11 RX ADMIN — FUROSEMIDE 40 MG: 10 INJECTION, SOLUTION INTRAMUSCULAR; INTRAVENOUS at 10:24

## 2021-05-11 RX ADMIN — PROPOFOL 35 MCG/KG/MIN: 10 INJECTION, EMULSION INTRAVENOUS at 05:07

## 2021-05-11 RX ADMIN — PREDNISONE 10 MG: 5 TABLET ORAL at 10:20

## 2021-05-11 RX ADMIN — ATORVASTATIN CALCIUM 10 MG: 10 TABLET, FILM COATED ORAL at 22:15

## 2021-05-11 RX ADMIN — FAMOTIDINE 20 MG: 20 TABLET ORAL at 10:20

## 2021-05-11 RX ADMIN — PROPOFOL 35 MCG/KG/MIN: 10 INJECTION, EMULSION INTRAVENOUS at 14:39

## 2021-05-11 RX ADMIN — ESCITALOPRAM OXALATE 10 MG: 10 TABLET ORAL at 00:03

## 2021-05-11 RX ADMIN — PIPERACILLIN AND TAZOBACTAM 3.38 G: 3; .375 INJECTION, POWDER, LYOPHILIZED, FOR SOLUTION INTRAVENOUS at 15:07

## 2021-05-11 RX ADMIN — PROPOFOL 35 MCG/KG/MIN: 10 INJECTION, EMULSION INTRAVENOUS at 00:02

## 2021-05-11 RX ADMIN — ASPIRIN 81 MG CHEWABLE TABLET 81 MG: 81 TABLET CHEWABLE at 10:21

## 2021-05-11 RX ADMIN — Medication 10 ML: at 22:00

## 2021-05-11 RX ADMIN — PIPERACILLIN AND TAZOBACTAM 3.38 G: 3; .375 INJECTION, POWDER, LYOPHILIZED, FOR SOLUTION INTRAVENOUS at 00:02

## 2021-05-11 RX ADMIN — BUDESONIDE 500 MCG: 0.5 INHALANT RESPIRATORY (INHALATION) at 20:19

## 2021-05-11 RX ADMIN — FAMOTIDINE 20 MG: 20 TABLET ORAL at 22:15

## 2021-05-11 RX ADMIN — TICAGRELOR 90 MG: 90 TABLET ORAL at 22:15

## 2021-05-11 RX ADMIN — INSULIN LISPRO 2 UNITS: 100 INJECTION, SOLUTION INTRAVENOUS; SUBCUTANEOUS at 01:39

## 2021-05-11 NOTE — PROGRESS NOTES
Hospitalist Progress Note    Subjective:     Andreea Vasques is a 79 yo female with a PMHx significant for DM, HLD, HTN, thyroid disease, and depression, who presents to the ED with shortness of breath for the past 1-2 days due to a severe acute exacerbation of COPD, acute hypoxic/hypercapnic respiratory failure, and CHF. She also has a bilateral pleural effusion (worse on left), interstitial edema (cardiogenic or infectious cause), and hyponatremia.   ___________________________________________________________      [place] patient have V. fib history of cardiogenic shock and intubated temporary pacemaker placed yesterday and cardiac cath was patent stent and today successful biventricular pacing implant successful removal of temporary pace wiring    Bilateral toes are blue cold. Ordered arterial Doppler    Cardiac cath in place 1 stent  Patient have significant severe aortic stenosis    Past Medical History:   Diagnosis Date    Depression     DM (diabetes mellitus) ([address]     Hyperlipidemia     Hypertension     Pancreatitis     SOB (shortness of breath)     Thyroid disease       Past Surgical History:   Procedure Laterality Date    HX HYSTERECTOMY      IR FLUORO GUIDE PLC CVAD  5/10/2021    IR INSERT NON TUNL CVC OVER 5 YRS  5/10/2021     History reviewed. No pertinent family history. Social History     Tobacco Use    Smoking status: Never Smoker    Smokeless tobacco: Never Used   Substance Use Topics    Alcohol use: No       Prior to Admission medications    Medication Sig Start Date End Date Taking? Authorizing Provider   Insulin Needles, Disposable, 31 X 5/16 \" Ndle by Does Not Apply route. 10/11/10   Mike Dawkins MD   insulin lispro, human, (HUMALOG KWIKPEN) 100 unit/mL flexpen 15 Units by SubCUTAneous route three (3) times daily (with meals). 10/13/10   Mike Dawkins MD   benazepril (LOTENSIN) 40 mg tablet Take 40 mg by mouth daily.  10/11/10   Provider, Historical   LEXAPRO 10 mg tablet Take 10 mg by mouth nightly. 10/11/10   Provider, Historical   famotidine (PEPCID) 20 mg tablet Take 20 mg by mouth two (2) times a day. 10/11/10   Provider, Historical   gabapentin (NEURONTIN) 400 mg capsule  9/3/10   Provider, Historical   hydrocodone-acetaminophen (NORCO) 5-325 mg per tablet Take 1 Tab by mouth every six (6) hours as needed. 10/11/10   Provider, Historical   levothyroxine (SYNTHROID) 150 mcg tablet Take 150 mcg by mouth daily (before breakfast). 10/11/10   Provider, Historical   lorazepam (ATIVAN) 1 mg tablet  8/2/10   Provider, Historical   lovastatin (MEVACOR) 40 mg tablet Take 40 mg by mouth nightly. 10/11/10   Provider, Historical   metoprolol (LOPRESSOR) 100 mg tablet Take 100 mg by mouth two (2) times a day. 10/11/10   Provider, Historical   oxycodone-acetaminophen (PERCOCET 10)  mg per tablet  8/2/10   Provider, Historical   insulin glargine (LANTUS) 100 unit/mL injection 60 Units by SubCUTAneous route nightly. 10/11/10   Ezequiel Garza MD     Allergies   Allergen Reactions    Nortriptyline Itching    Cymbalta [Duloxetine] Itching    Ivp [Fd And C Blue No.1] Hives    Morphine Itching    Tetracycline Itching        Review of Systems:  Intubated    Objective: Intake and Output:    No intake/output data recorded. 05/09 1901 - 05/11 0700  In: 424.6 [P.O.:222; I.V.:202.6]  Out: 1850 [Urine:1850]    Physical Exam:   Visit Vitals  BP (!) 107/53   Pulse 87   Temp 99.1 °F (37.3 °C)   Resp 12   Ht 5' 1\" (1.549 m)   Wt 76 kg (167 lb 8.8 oz)   SpO2 99%   BMI 31.66 kg/m²     General:   On ventilator s. Head:  Normocephalic, without obvious abnormality, atraumatic. Eyes:  Conjunctivae/corneas clear. Pupils equal, round, reactive to light. Extraocular movements intact. Lungs:    Decreased breath sounds , crackles, wheezing. Chest wall:  No tenderness or deformity. Heart:  Regular rate and rhythm, S1, S2 normal, no murmur, click, rub, or gallop.  Possible murmur, difficult to auscultate with BiPAP   Abdomen:   Soft, non-tender. Bowel sounds normal. No masses. No organomegaly. Extremities: Extremities normal, atraumatic, no cyanosis. 1+ pitting edema of lower extremities    Pulses: 2+ and symmetric all extremities. Skin: Skin color, texture, turgor normal. No rashes or lesions. Lymph nodes: Cervical, supraclavicular, and axillary nodes normal.   Neurologic: CNII-XII intact. Normal strength, sensation, and reflexes throughout.        ECG:  ** Poor data quality, interpretation may be adversely affected**   Normal sinus rhythm   Right bundle branch block   Left anterior fascicular block   ** Bifascicular block **   Left ventricular hypertrophy with repolarization abnormality   Cannot rule out Septal infarct , age undetermined     Data Review:   Recent Results (from the past 24 hour(s))   EKG, 12 LEAD, INITIAL    Collection Time: 05/04/21 10:24 AM   Result Value Ref Range    Ventricular Rate 98 BPM    Atrial Rate 98 BPM    P-R Interval 168 ms    QRS Duration 152 ms    Q-T Interval 416 ms    QTC Calculation (Bezet) 531 ms    Calculated P Axis 68 degrees    Calculated R Axis -52 degrees    Calculated T Axis 94 degrees    Diagnosis       ** Poor data quality, interpretation may be adversely affected  Normal sinus rhythm  Right bundle branch block  Left anterior fascicular block  ** Bifascicular block **  Left ventricular hypertrophy with repolarization abnormality  Cannot rule out Septal infarct , age undetermined  Abnormal ECG  No previous ECGs available  Confirmed by Yvon Serna MD, Cynthia Abrams ([id number]) on 5/4/2021 12:13:56 PM     SARS-COV-2    Collection Time: 05/04/21 10:30 AM   Result Value Ref Range    SARS-CoV-2 Please find results under separate order     COVID-19 RAPID TEST    Collection Time: 05/04/21 10:30 AM   Result Value Ref Range    Specimen source Nasopharyngeal      COVID-19 rapid test Not Detected Not Detected     LACTIC ACID    Collection Time: 05/04/21 11:12 AM   Result Value Ref Range    Lactic acid 0.6 0.4 - 2.0 mmol/L   MAGNESIUM    Collection Time: 05/04/21 11:12 AM   Result Value Ref Range    Magnesium 1.8 1.6 - 2.4 mg/dL   METABOLIC PANEL, COMPREHENSIVE    Collection Time: 05/04/21 11:12 AM   Result Value Ref Range    Sodium 129 (L) 136 - 145 mmol/L    Potassium 4.2 3.5 - 5.1 mmol/L    Chloride 97 97 - 108 mmol/L    CO2 28 21 - 32 mmol/L    Anion gap 4 (L) 5 - 15 mmol/L    Glucose 182 (H) 65 - 100 mg/dL    BUN 25 (H) 6 - 20 mg/dL    Creatinine 0.88 0.55 - 1.02 mg/dL    BUN/Creatinine ratio 28 (H) 12 - 20      GFR est AA >60 >60 ml/min/1.73m2    GFR est non-AA >60 >60 ml/min/1.73m2    Calcium 8.2 (L) 8.5 - 10.1 mg/dL    Bilirubin, total 0.4 0.2 - 1.0 mg/dL    AST (SGOT) 13 (L) 15 - 37 U/L    ALT (SGPT) 17 12 - 78 U/L    Alk.  phosphatase 68 45 - 117 U/L    Protein, total 6.5 6.4 - 8.2 g/dL    Albumin 3.0 (L) 3.5 - 5.0 g/dL    Globulin 3.5 2.0 - 4.0 g/dL    A-G Ratio 0.9 (L) 1.1 - 2.2     BNP    Collection Time: 05/04/21 11:12 AM   Result Value Ref Range    NT pro-BNP 7,360 (H) <450 pg/mL   PROCALCITONIN    Collection Time: 05/04/21 11:12 AM   Result Value Ref Range    Procalcitonin <0.05 (H) 0 ng/mL   TROPONIN I    Collection Time: 05/04/21 11:12 AM   Result Value Ref Range    Troponin-I, Qt. <0.05 <0.05 ng/mL   TSH 3RD GENERATION    Collection Time: 05/04/21 11:12 AM   Result Value Ref Range    TSH 1.62 0.36 - 3.74 uIU/mL   CBC WITH AUTOMATED DIFF    Collection Time: 05/04/21 11:12 AM   Result Value Ref Range    WBC 14.2 (H) 3.6 - 11.0 K/uL    RBC 3.13 (L) 3.80 - 5.20 M/uL    HGB 10.0 (L) 11.5 - 16.0 g/dL    HCT 30.8 (L) 35.0 - 47.0 %    MCV 98.4 80.0 - 99.0 FL    MCH 31.9 26.0 - 34.0 PG    MCHC 32.5 30.0 - 36.5 g/dL    RDW 13.6 11.5 - 14.5 %    PLATELET 629 460 - 039 K/uL    MPV 11.7 8.9 - 12.9 FL    NRBC 0.0 0.0  WBC    ABSOLUTE NRBC 0.00 0.00 - 0.01 K/uL    NEUTROPHILS 87 (H) 32 - 75 %    LYMPHOCYTES 6 (L) 12 - 49 %    MONOCYTES 5 5 - 13 %    EOSINOPHILS 1 0 - 7 %    BASOPHILS 1 0 - 1 %    IMMATURE GRANULOCYTES 0 0 - 0.5 %    ABS. NEUTROPHILS 12.4 (H) 1.8 - 8.0 K/UL    ABS. LYMPHOCYTES 0.8 0.8 - 3.5 K/UL    ABS. MONOCYTES 0.7 0.0 - 1.0 K/UL    ABS. EOSINOPHILS 0.1 0.0 - 0.4 K/UL    ABS. BASOPHILS 0.1 0.0 - 0.1 K/UL    ABS. IMM. GRANS. 0.1 (H) 0.00 - 0.04 K/UL    DF AUTOMATED     PROTHROMBIN TIME + INR    Collection Time: 05/04/21 11:25 AM   Result Value Ref Range    Prothrombin time 13.6 11.9 - 14.7 sec    INR 1.1 0.9 - 1.1     PTT    Collection Time: 05/04/21 11:25 AM   Result Value Ref Range    aPTT 21.8 21.2 - 34.1 sec    aPTT, therapeutic range   82 - 109 sec   D DIMER    Collection Time: 05/04/21 11:25 AM   Result Value Ref Range    D DIMER 0.50 (H) <0.50 ug/ml(FEU)   BLOOD GAS, ARTERIAL    Collection Time: 05/04/21 11:25 AM   Result Value Ref Range    pH 7.36 7.35 - 7.45      PCO2 52 (H) 35 - 45 mmHg    PO2 72 (L) 75 - 100 mmHg    O2 SAT 93 (L) >95 %    BICARBONATE 27 (H) 22 - 26 mmol/L    BASE EXCESS 2.9 (H) 0 - 2 mmol/L    O2 METHOD Nasal Cannula      O2 FLOW RATE 6 L/min    SITE Right Radial      EBEN'S TEST PASS         Chest x-ray:  More conspicuous RUL opacity. Patchy mid and lower lung reticular markings  persist. Cardiac and mediastinal structures unchanged. Thoracic aorta  atherosclerosis. No pneumothorax. Probable small dependent bilateral pleural  effusions.     Assessment:     Static post V. Fib/received shocks x2 intubated  Static post pacemaker   CAD status post stent LAD  Severe aortic stenosis  Acute hypoxemic respiratory failure  COPD exacerbation     Hyponatremia    Congestive heart failure    Pleural effusions     Anemia      Leukocytosis     Diabetes mellitus      Hypertension     Hypothyroidism      Depression     Echo done shows ejection fraction of 40 to 45% and moderate grade 2 diastolic dysfunction    Dopplers negative for DVT    Toes are blue poor pulse ordered arterial Doppler  Plan:     Continue medications:   Duo-meb 3mL neb q6hrs  ASA 325mg po qday Lipitor 10mg po qhs  Enoxaparin 40mg subq qday   Lexapro 10mg po qhs  Famotidine 20mg po bid   Lasix 40mg IV bid  Lantus 60 units subq qhs  Humalog subq qid   Synthroid 150mcg po qam   Lisinopril 40mg po qday (on hold due to kidney fx)   Discontinue Solu-Medrol  Start on prednisone 10 mg daily  Zosyn 3.375mg IV q8hrs   Brilinta 90 mg twice a day    Arterial Doppler    Discussed with the patient's son and updated patient condition      Current Facility-Administered Medications:     famotidine (PEPCID) tablet 20 mg, 20 mg, Per NG tube, BID, Beny Cheung MD, 20 mg at 05/11/21 1020    albuterol-ipratropium (DUO-NEB) 2.5 MG-0.5 MG/3 ML, 3 mL, Nebulization, Q6H PRN, Cat Cheung MD    EPINEPHrine (ADRENALIN) 0.1 mg/mL syringe, , , CODE BLUE, Rosalinda Engle MD, 1 mg at 05/10/21 1416    propofol (DIPRIVAN) 10 mg/mL infusion, 0-50 mcg/kg/min, IntraVENous, TITRATE, Rosalinda Engle MD, Last Rate: 21.5 mL/hr at 05/11/21 1011, 45 mcg/kg/min at 05/11/21 1011    budesonide (PULMICORT) 500 mcg/2 ml nebulizer suspension, 500 mcg, Nebulization, BID RT, Vishnu Hwang MD, 500 mcg at 05/11/21 0705    DOPamine (INTROPIN) 800 mg in dextrose 5% 250 mL infusion, 0-20 mcg/kg/min, IntraVENous, TITRATE, Isai Mendoza MD, Last Rate: 11.2 mL/hr at 05/10/21 2200, 7.5 mcg/kg/min at 05/10/21 2200    furosemide (LASIX) injection 40 mg, 40 mg, IntraVENous, DAILY, Noé Bunch MD, 40 mg at 05/11/21 1024    aspirin chewable tablet 81 mg, 81 mg, Oral, DAILY, oNé Bunch MD, 81 mg at 05/11/21 1021    ticagrelor (BRILINTA) tablet 90 mg, 90 mg, Oral, Q12H, Noé Bunch MD, 90 mg at 05/11/21 1000    sodium chloride (NS) flush 5-40 mL, 5-40 mL, IntraVENous, Q8H, Noé Bunch MD, 10 mL at 05/11/21 0600    sodium chloride (NS) flush 5-40 mL, 5-40 mL, IntraVENous, PRN, Noé Bunch MD    predniSONE (DELTASONE) tablet 10 mg, 10 mg, Oral, DAILY WITH BREAKFAST, Beny Cheung MD, 10 mg at 05/11/21 1020    hydrOXYzine pamoate (VISTARIL) capsule 25 mg, 25 mg, Oral, Q6H PRN, Beny Cheung MD, 25 mg at 05/08/21 0034    piperacillin-tazobactam (ZOSYN) 3.375 g in 0.9% sodium chloride (MBP/ADV) 100 mL MBP, 3.375 g, IntraVENous, Q8H, Beny Cheung MD, Last Rate: 25 mL/hr at 05/11/21 1024, 3.375 g at 05/11/21 1024    escitalopram oxalate (LEXAPRO) tablet 10 mg, 10 mg, Oral, QHS, Beny Cheung MD, 10 mg at 05/11/21 0003    levothyroxine (SYNTHROID) tablet 150 mcg, 150 mcg, Oral, ACB, Beny Cheung MD, 150 mcg at 05/11/21 1021    insulin glargine (LANTUS) injection 60 Units, 60 Units, SubCUTAneous, QHS, Beny Cheung MD, Stopped at 05/10/21 2200    atorvastatin (LIPITOR) tablet 10 mg, 10 mg, Oral, QHS, Beny Cheung MD, 10 mg at 05/11/21 0003    insulin lispro (HUMALOG) injection, , SubCUTAneous, AC&HS, Kehinde Cheung MD, Stopped at 05/11/21 0730    glucose chewable tablet 16 g, 4 Tab, Oral, PRN, Kehinde Cheung MD    dextrose (D50W) injection syrg 12.5-25 g, 25-50 mL, IntraVENous, PRN, Kehinde Cheung MD    glucagon (GLUCAGEN) injection 1 mg, 1 mg, IntraMUSCular, PRN, Kehinde Cheung MD    acetaminophen (TYLENOL) tablet 650 mg, 650 mg, Oral, Q6H PRN, 650 mg at 05/05/21 0018 **OR** acetaminophen (TYLENOL) suppository 650 mg, 650 mg, Rectal, Q6H PRN, Kehinde Cheung MD    polyethylene glycol (MIRALAX) packet 17 g, 17 g, Oral, DAILY PRN, Kehinde Cheung MD    ondansetron (ZOFRAN ODT) tablet 4 mg, 4 mg, Oral, Q8H PRN **OR** ondansetron (ZOFRAN) injection 4 mg, 4 mg, IntraVENous, Q6H PRN, Beny Cheung MD    enoxaparin (LOVENOX) injection 40 mg, 40 mg, SubCUTAneous, DAILY, Beny Cheung MD, 40 mg at 05/11/21 1027

## 2021-05-11 NOTE — PROGRESS NOTES
Progress Note    Patient: Myesha Ram MRN: [id number]  SSN: [id number]    YOB: 1940  Age: 80 y.o. Sex: female      Admit Date: 5/4/2021    LOS: 7 days     Subjective:     Patient went into V. fib yesterday and she was intubated. She underwent cardiac catheterization that showed widely patent stent. Temporary pacemaker was placed yesterday. Objective:     Vitals:    05/11/21 0500 05/11/21 0532 05/11/21 0600 05/11/21 0655   BP: (!) 102/46  (!) 95/41    Pulse: 80 80 80    Resp: 12 12 12    Temp:       SpO2: 100% 100% 100% 100%   Weight:       Height:            Intake and Output:  Current Shift: No intake/output data recorded. Last three shifts: 05/09 1901 - 05/11 0700  In: 424.6 [P.O.:222; I.V.:202.6]  Out: 1850 [Urine:1850]    Physical Exam:   General:   Intubated   Eyes:  Conjunctivae/corneas clear. PERRL, EOMs intact. Fundi benign   Ears:  Normal TMs and external ear canals both ears. Nose: Nares normal. Septum midline. Mucosa normal. No drainage or sinus tenderness. Mouth/Throat: Lips, mucosa, and tongue normal. Teeth and gums normal.   Neck: Supple, symmetrical, trachea midline, no adenopathy, thyroid: no enlargment/tenderness/nodules, no carotid bruit and no JVD. Back:   Symmetric, no curvature. ROM normal. No CVA tenderness. Lungs:   Clear to auscultation bilaterally. Heart:   Ejection systolic murmur in the aortic area   Abdomen:   Soft, non-tender. Bowel sounds normal. No masses,  No organomegaly. Extremities: Extremities normal, atraumatic, no cyanosis or edema. Pulses: 2+ and symmetric all extremities. Skin: Skin color, texture, turgor normal. No rashes or lesions   Lymph nodes: Cervical, supraclavicular, and axillary nodes normal.   Neurologic:  Intubated. Lab/Data Review: All lab results for the last 24 hours reviewed.          Assessment:     Active Problems:    CHF (congestive heart failure) ([address] (5/4/2021)      COPD (chronic obstructive pulmonary disease) ([facility] (5/4/2021)    Mrs. Hector Hall is a 81-year-old white female with:  1. Heart failure with decompensation  2. Diabetes mellitus  3. Hypertension  4. Hypothyroidism  5. Depression  6. COPD  7. Right bundle branch block with left episode  8. Hyponatremia  9. Acute kidney injury  10. Peripheral vascular disease  11. Right upper lobe opacity  12. Fracture deformity of proximal left humerus    Plan:     She underwent cardiac catheterization yesterday after she went to [facility] and stent was widely patent. She underwent biventricular pacemaker insertion this morning. She remains intubated. She remains on dopamine. We will try to wean off dopamine. Continue dual antiplatelet therapy. Continue atorvastatin.     Signed By: Ernst Weston MD     May 11, 2021

## 2021-05-11 NOTE — PROGRESS NOTES
2110: Decreased FIO2 from 50% to 45%  0039: Decreased FIO2 from 45% to 40%  0534: Decreased FIO2 from 40% to 30%

## 2021-05-11 NOTE — PROGRESS NOTES
Patient was stable overnight. Temp pacer to right groin with no issues. Alert to stimuli. Remains intubated and lightly sedated. Son at bedside at present. Consent obtained per request from EP lab.    0720- Patient leaving off the floor via stretcher accompanied by EP nurses and respiratory. Bedside report was given to MAT Prajapati RN.

## 2021-05-11 NOTE — CONSULTS
PULMONARY NOTE  [facility]    Name: Olivia Berry MRN: [id number]   : 1940 Hospital: [facility]   Date: 2021  Admission date: 2021 Hospital Day: 8       HPI:     Hospital Problems  Never Reviewed          Codes Class Noted POA    CHF (congestive heart failure) ([facility]) ICD-10-CM: I50.9  ICD-9-CM: 428.0  2021 Unknown        COPD (chronic obstructive pulmonary disease) [facility] ICD-10-CM: J44.9  ICD-9-CM: 496  2021 Unknown                   [x] High complexity decision making was performed  [x] See my orders for details      Subjective/Initial History:     I was asked by Kate Maldonado MD to see Olivia Berry  a 80 y.o.  female in consultation     Excerpts from admission 2021 or consult notes as follows:   72-year-old lady came in because of shortness of breath and dyspnea she was told that she has COPD recently and she was giving albuterol inhaler did not seem to help so came to the emergency room she was hypoxic she was put on oxygen 5 L nasal cannula she was having dyspnea minimal exertion and also at rest she is a lifetime non-smoker, but she has been exposed to secondhand smoke her  used to smoke and all her children smoke she used to work on the farm as a farmer no chest pain no fever no chills.      5/10 patient condition got worse yesterday heart rate was in 40s to 50s EKG shows complete heart block patient was transferred to ICU on dopamine drip  Allergies   Allergen Reactions    Nortriptyline Itching    Cymbalta [Duloxetine] Itching    Ivp [Fd And C Blue No.1] Hives    Morphine Itching    Tetracycline Itching        MAR reviewed and pertinent medications noted or modified as needed     Current Facility-Administered Medications   Medication    famotidine (PEPCID) tablet 20 mg    albuterol-ipratropium (DUO-NEB) 2.5 MG-0.5 MG/3 ML    EPINEPHrine (ADRENALIN) 0.1 mg/mL syringe    propofol (DIPRIVAN) 10 mg/mL infusion    budesonide (PULMICORT) 500 mcg/2 ml nebulizer suspension    DOPamine (INTROPIN) 800 mg in dextrose 5% 250 mL infusion    furosemide (LASIX) injection 40 mg    aspirin chewable tablet 81 mg    ticagrelor (BRILINTA) tablet 90 mg    sodium chloride (NS) flush 5-40 mL    sodium chloride (NS) flush 5-40 mL    predniSONE (DELTASONE) tablet 10 mg    hydrOXYzine pamoate (VISTARIL) capsule 25 mg    piperacillin-tazobactam (ZOSYN) 3.375 g in 0.9% sodium chloride (MBP/ADV) 100 mL MBP    escitalopram oxalate (LEXAPRO) tablet 10 mg    levothyroxine (SYNTHROID) tablet 150 mcg    insulin glargine (LANTUS) injection 60 Units    atorvastatin (LIPITOR) tablet 10 mg    insulin lispro (HUMALOG) injection    glucose chewable tablet 16 g    dextrose (D50W) injection syrg 12.5-25 g    glucagon (GLUCAGEN) injection 1 mg    acetaminophen (TYLENOL) tablet 650 mg    Or    acetaminophen (TYLENOL) suppository 650 mg    polyethylene glycol (MIRALAX) packet 17 g    ondansetron (ZOFRAN ODT) tablet 4 mg    Or    ondansetron (ZOFRAN) injection 4 mg    enoxaparin (LOVENOX) injection 40 mg      Patient PCP: Jose Voss MD  PMH:  has a past medical history of Depression, DM (diabetes mellitus) ([address] Hyperlipidemia, Hypertension, Pancreatitis, SOB (shortness of breath), and Thyroid disease. PSH:   has a past surgical history that includes hx hysterectomy; ir fluoro guide plc cvad (5/10/2021); and ir insert non tunl cvc over 5 yrs (5/10/2021). FHX: family history is not on file. SHX:  reports that she has never smoked. She has never used smokeless tobacco. She reports that she does not drink alcohol or use drugs. ROS:    Review of Systems   Constitutional: Negative. HENT: Negative. Eyes: Negative. Respiratory: Positive for cough, sputum production, shortness of breath and wheezing. Cardiovascular: Positive for orthopnea. Gastrointestinal: Negative. Genitourinary: Negative. Musculoskeletal: Negative. Skin: Negative. Neurological: Negative. Psychiatric/Behavioral: Negative. Objective:     Vital Signs: Telemetry:    normal sinus rhythm Intake/Output:   Visit Vitals  BP (!) 91/54   Pulse 86   Temp 99.1 °F (37.3 °C)   Resp 14   Ht 5' 1\" (1.549 m)   Wt 76 kg (167 lb 8.8 oz)   SpO2 100%   BMI 31.66 kg/m²       Temp (24hrs), Av.5 °F (36.9 °C), Min:97.7 °F (36.5 °C), Max:99.1 °F (37.3 °C)        O2 Device: Ventilator O2 Flow Rate (L/min): 2 l/min       Wt Readings from Last 4 Encounters:   21 76 kg (167 lb 8.8 oz)   10/11/10 102.2 kg (225 lb 3.2 oz)          Intake/Output Summary (Last 24 hours) at 2021 1638  Last data filed at 2021 0300  Gross per 24 hour   Intake    Output 1500 ml   Net -1500 ml       Last shift:      No intake/output data recorded. Last 3 shifts:  1901 -  0700  In: 424.6 [P.O.:222; I.V.:202.6]  Out: 1850 [Urine:1850]       Physical Exam:     Physical Exam   Constitutional: She appears well-developed. HENT:   Head: Normocephalic and atraumatic. Eyes: Pupils are equal, round, and reactive to light. Conjunctivae are normal.   Neck: Normal range of motion. Neck supple. Cardiovascular: Normal rate and regular rhythm. Pulmonary/Chest: Breath sounds normal. She is in respiratory distress. Abdominal: Soft. Bowel sounds are normal.   Musculoskeletal: Normal range of motion. Neurological: She is alert. Skin: Skin is warm.         Labs:    Recent Labs     21  0530 05/10/21  0410 21  0640   WBC 26.3* 25.8* 23.7*   HGB 10.8* 10.7* 13.2    366 455*   INR 1.3*  --   --    APTT 30.6  --   --      Recent Labs     21  0530 05/10/21  0410 21  0640    141 141   K 3.4* 4.0 2.6*    106 103   CO2 30 30 32   * 140* 36*   BUN 38* 41* 31*   CREA 1.30* 1.38* 1.08*   CA 8.2* 8.2* 8.4*   MG  --  1.9  --    ALB 2.4* 2.4* 2.7*   ALT 35 46 60     Recent Labs     21  0525 05/10/21  1705   PH 7.49* 7.50*   PCO2 41 32* PO2 120* 327*   HCO3 31* 26   FIO2 40.0 10.0     No results for input(s): CPK, CKNDX, TROIQ in the last 72 hours. No lab exists for component: CPKMB  No results found for: BNPP, BNP   Lab Results   Component Value Date/Time    Culture result: No growth 6 days 05/04/2021 10:30 AM     Lab Results   Component Value Date/Time    TSH 1.62 05/04/2021 11:12 AM       Imaging:    CXR Results  (Last 48 hours)               05/10/21 1455  XR CHEST PORT Final result    Impression:  New right internal jugular catheter. Correlate with function prior   to usage. No pneumothorax. Hydrostatic edema. Diffuse opacities in lungs,   increased on the right. Pleural effusions. Narrative:  Chest, frontal view, 5/10/2021       History: Postintubation. Comparison: Including chest, same day. Findings: Defibrillator pads are in place. New right internal jugular catheter   tip is near the SVC/right atrial junction. New endotracheal tube tip is 3.4 cm   from the brittany. The cardiac silhouette is stable. Lung volumes are improved as compared to the   study performed earlier the same day. There is pulmonary vascular congestion   and hydrostatic edema. Diffuse opacities in the lungs are again noted, right   greater than left. Diffuse opacities in the right lung appears increased as   compared to the study performed the same day. Pleural effusions persist.  No   pneumothorax is identified. The osseous structures are stable. 05/10/21 0938  XR CHEST PORT Final result    Impression:  Hydrostatic edema and diffuse opacities in the lungs, improved. Pleural effusions. Narrative:  Chest, frontal view, 5/10/2021       History: CHF. Comparison: Including chest 5/5/2021. Findings: Defibrillator pad is in place. The cardiac silhouette is partially   obscured and grossly stable. Lung volumes remain low. There is pulmonary   vascular congestion and hydrostatic edema, improved.   Diffuse opacities in the   lungs are most pronounced at the bases. Opacities at the upper lobes have   significantly improved as compared to chest 5/5/2021. Opacity at the left base   is also improved. Pleural effusions persist.  No pneumothorax is identified. Degenerative changes are present in the thoracic spine. Contour deformity of   the left humeral head is again noted. IMPRESSION:     1. Chronic Obstructive Pulmonary Disease she is a lifetime non-smoker  2. Acute hypoxic and hypercapnic respiratory failure   3. Patient is on Brilinta which can also cause shortness of breath  4. Complete heart block status post temporary pacemaker was placed yesterday  5. Arrhythmia on dopamine  6. Hyponatremia  7. Decompensated congestive heart failure  8. Pleural effusion more on the left side  9. Right upper lobe opacity possible pneumonia  10. Severe aortic stenosis      RECOMMENDATIONS/PLAN:     1. Intubated on ventilator assist control mode 40% FiO2 tidal volume 450 PEEP of 5  2. Status post cardiac catheterization status post biventricular pacemaker insertion which was done today  3. Patient on nebulizer treatment and prednisone  4. For permanent pacemaker  5. Repeat arterial blood gases shows normal PCO2   6. Chest x-ray shows congestive changes with right upper lobe opacity we will repeat chest x-ray today continue with Zosyn White count is elevated  7. Supplemental O2 to keep sats > 93%  8. We will start weaning in a.m. SpO2 on room air, at rest=94%. SpO2 on room air, while ambulating=87%.   SpO2 on 2L , while ambulating=93%  We will repeat before discharge again pulse ox on room air and ambulation  Time spent in icu 30 min    Octaviano Mcnamara MD

## 2021-05-12 ENCOUNTER — APPOINTMENT (OUTPATIENT)
Dept: CT IMAGING | Age: 81
DRG: 242 | End: 2021-05-12
Attending: FAMILY MEDICINE
Payer: MEDICARE

## 2021-05-12 ENCOUNTER — APPOINTMENT (OUTPATIENT)
Dept: GENERAL RADIOLOGY | Age: 81
DRG: 242 | End: 2021-05-12
Attending: INTERNAL MEDICINE
Payer: MEDICARE

## 2021-05-12 LAB
ALBUMIN SERPL-MCNC: 2.1 G/DL (ref 3.5–5)
ALBUMIN/GLOB SERPL: 0.6 {RATIO} (ref 1.1–2.2)
ALP SERPL-CCNC: 53 U/L (ref 45–117)
ALT SERPL-CCNC: 25 U/L (ref 12–78)
ANION GAP SERPL CALC-SCNC: 9 MMOL/L (ref 5–15)
AST SERPL W P-5'-P-CCNC: 16 U/L (ref 15–37)
BASE EXCESS BLDA CALC-SCNC: 7.4 MMOL/L (ref 0–2)
BASOPHILS # BLD: 0.1 K/UL (ref 0–0.1)
BASOPHILS NFR BLD: 0 % (ref 0–1)
BDY SITE: ABNORMAL
BILIRUB SERPL-MCNC: 0.4 MG/DL (ref 0.2–1)
BUN SERPL-MCNC: 42 MG/DL (ref 6–20)
BUN/CREAT SERPL: 32 (ref 12–20)
CA-I BLD-MCNC: 8.1 MG/DL (ref 8.5–10.1)
CHLORIDE SERPL-SCNC: 104 MMOL/L (ref 97–108)
CO2 SERPL-SCNC: 28 MMOL/L (ref 21–32)
CREAT SERPL-MCNC: 1.32 MG/DL (ref 0.55–1.02)
DIFFERENTIAL METHOD BLD: ABNORMAL
EOSINOPHIL # BLD: 0.9 K/UL (ref 0–0.4)
EOSINOPHIL NFR BLD: 4 % (ref 0–7)
EPAP/CPAP/PEEP, PAPEEP: 5
ERYTHROCYTE [DISTWIDTH] IN BLOOD BY AUTOMATED COUNT: 14.1 % (ref 11.5–14.5)
FIO2 ON VENT: 30 %
GAS FLOW.O2 SETTING OXYMISER: 12 L/MIN
GLOBULIN SER CALC-MCNC: 3.3 G/DL (ref 2–4)
GLUCOSE BLD STRIP.AUTO-MCNC: 154 MG/DL (ref 65–117)
GLUCOSE BLD STRIP.AUTO-MCNC: 192 MG/DL (ref 65–117)
GLUCOSE BLD STRIP.AUTO-MCNC: 193 MG/DL (ref 65–117)
GLUCOSE BLD STRIP.AUTO-MCNC: 230 MG/DL (ref 65–117)
GLUCOSE BLD STRIP.AUTO-MCNC: 232 MG/DL (ref 65–117)
GLUCOSE BLD STRIP.AUTO-MCNC: 40 MG/DL (ref 65–117)
GLUCOSE BLD STRIP.AUTO-MCNC: 96 MG/DL (ref 65–117)
GLUCOSE SERPL-MCNC: 225 MG/DL (ref 65–100)
HCO3 BLDA-SCNC: 31 MMOL/L (ref 22–26)
HCT VFR BLD AUTO: 31.7 % (ref 35–47)
HGB BLD-MCNC: 10.4 G/DL (ref 11.5–16)
IMM GRANULOCYTES # BLD AUTO: 0.2 K/UL (ref 0–0.04)
IMM GRANULOCYTES NFR BLD AUTO: 1 % (ref 0–0.5)
LYMPHOCYTES # BLD: 1.1 K/UL (ref 0.8–3.5)
LYMPHOCYTES NFR BLD: 4 % (ref 12–49)
MCH RBC QN AUTO: 31.4 PG (ref 26–34)
MCHC RBC AUTO-ENTMCNC: 32.8 G/DL (ref 30–36.5)
MCV RBC AUTO: 95.8 FL (ref 80–99)
MONOCYTES # BLD: 1.4 K/UL (ref 0–1)
MONOCYTES NFR BLD: 6 % (ref 5–13)
NEUTS SEG # BLD: 22.4 K/UL (ref 1.8–8)
NEUTS SEG NFR BLD: 85 % (ref 32–75)
NRBC # BLD: 0 K/UL (ref 0–0.01)
NRBC BLD-RTO: 0 PER 100 WBC
PCO2 BLDA: 40 MMHG (ref 35–45)
PERFORMED BY, TECHID: ABNORMAL
PERFORMED BY, TECHID: NORMAL
PH BLDA: 7.5 [PH] (ref 7.35–7.45)
PLATELET # BLD AUTO: 377 K/UL (ref 150–400)
PMV BLD AUTO: 11.7 FL (ref 8.9–12.9)
PO2 BLDA: 98 MMHG (ref 75–100)
POTASSIUM SERPL-SCNC: 3.3 MMOL/L (ref 3.5–5.1)
PROT SERPL-MCNC: 5.4 G/DL (ref 6.4–8.2)
RBC # BLD AUTO: 3.31 M/UL (ref 3.8–5.2)
SAO2 % BLD: 97 %
SAO2% DEVICE SAO2% SENSOR NAME: ABNORMAL
SODIUM SERPL-SCNC: 141 MMOL/L (ref 136–145)
VENTILATION MODE VENT: ABNORMAL
VT SETTING VENT: 450 ML
WBC # BLD AUTO: 26.1 K/UL (ref 3.6–11)

## 2021-05-12 PROCEDURE — 85025 COMPLETE CBC W/AUTO DIFF WBC: CPT

## 2021-05-12 PROCEDURE — 99222 1ST HOSP IP/OBS MODERATE 55: CPT | Performed by: SURGERY

## 2021-05-12 PROCEDURE — 94003 VENT MGMT INPAT SUBQ DAY: CPT

## 2021-05-12 PROCEDURE — 36415 COLL VENOUS BLD VENIPUNCTURE: CPT

## 2021-05-12 PROCEDURE — 74011250636 HC RX REV CODE- 250/636: Performed by: INTERNAL MEDICINE

## 2021-05-12 PROCEDURE — 70450 CT HEAD/BRAIN W/O DYE: CPT

## 2021-05-12 PROCEDURE — 74011250637 HC RX REV CODE- 250/637: Performed by: FAMILY MEDICINE

## 2021-05-12 PROCEDURE — 71045 X-RAY EXAM CHEST 1 VIEW: CPT

## 2021-05-12 PROCEDURE — 74011250637 HC RX REV CODE- 250/637: Performed by: INTERNAL MEDICINE

## 2021-05-12 PROCEDURE — 77010033678 HC OXYGEN DAILY

## 2021-05-12 PROCEDURE — 65610000006 HC RM INTENSIVE CARE

## 2021-05-12 PROCEDURE — 94640 AIRWAY INHALATION TREATMENT: CPT

## 2021-05-12 PROCEDURE — 92610 EVALUATE SWALLOWING FUNCTION: CPT

## 2021-05-12 PROCEDURE — 74011000250 HC RX REV CODE- 250: Performed by: INTERNAL MEDICINE

## 2021-05-12 PROCEDURE — 80053 COMPREHEN METABOLIC PANEL: CPT

## 2021-05-12 PROCEDURE — 74011000258 HC RX REV CODE- 258: Performed by: FAMILY MEDICINE

## 2021-05-12 PROCEDURE — 74011250636 HC RX REV CODE- 250/636: Performed by: FAMILY MEDICINE

## 2021-05-12 PROCEDURE — 74011636637 HC RX REV CODE- 636/637: Performed by: FAMILY MEDICINE

## 2021-05-12 PROCEDURE — 82803 BLOOD GASES ANY COMBINATION: CPT

## 2021-05-12 RX ORDER — POTASSIUM CHLORIDE 750 MG/1
40 TABLET, FILM COATED, EXTENDED RELEASE ORAL
Status: ACTIVE | OUTPATIENT
Start: 2021-05-12 | End: 2021-05-12

## 2021-05-12 RX ORDER — POTASSIUM CHLORIDE 750 MG/1
40 TABLET, FILM COATED, EXTENDED RELEASE ORAL
Status: COMPLETED | OUTPATIENT
Start: 2021-05-12 | End: 2021-05-12

## 2021-05-12 RX ADMIN — BUDESONIDE 500 MCG: 0.5 INHALANT RESPIRATORY (INHALATION) at 19:55

## 2021-05-12 RX ADMIN — BUDESONIDE 500 MCG: 0.5 INHALANT RESPIRATORY (INHALATION) at 07:31

## 2021-05-12 RX ADMIN — ESCITALOPRAM OXALATE 10 MG: 10 TABLET ORAL at 21:40

## 2021-05-12 RX ADMIN — PROPOFOL 25 MCG/KG/MIN: 10 INJECTION, EMULSION INTRAVENOUS at 05:25

## 2021-05-12 RX ADMIN — ATORVASTATIN CALCIUM 10 MG: 10 TABLET, FILM COATED ORAL at 21:40

## 2021-05-12 RX ADMIN — PIPERACILLIN AND TAZOBACTAM 3.38 G: 3; .375 INJECTION, POWDER, LYOPHILIZED, FOR SOLUTION INTRAVENOUS at 16:28

## 2021-05-12 RX ADMIN — Medication 10 ML: at 06:00

## 2021-05-12 RX ADMIN — ENOXAPARIN SODIUM 40 MG: 40 INJECTION SUBCUTANEOUS at 09:18

## 2021-05-12 RX ADMIN — ACETAMINOPHEN 650 MG: 325 TABLET, FILM COATED ORAL at 10:43

## 2021-05-12 RX ADMIN — INSULIN LISPRO 3 UNITS: 100 INJECTION, SOLUTION INTRAVENOUS; SUBCUTANEOUS at 12:20

## 2021-05-12 RX ADMIN — PREDNISONE 10 MG: 5 TABLET ORAL at 09:25

## 2021-05-12 RX ADMIN — LEVOTHYROXINE SODIUM 150 MCG: 75 TABLET ORAL at 09:19

## 2021-05-12 RX ADMIN — FUROSEMIDE 40 MG: 10 INJECTION, SOLUTION INTRAMUSCULAR; INTRAVENOUS at 09:20

## 2021-05-12 RX ADMIN — PIPERACILLIN AND TAZOBACTAM 3.38 G: 3; .375 INJECTION, POWDER, LYOPHILIZED, FOR SOLUTION INTRAVENOUS at 23:25

## 2021-05-12 RX ADMIN — FAMOTIDINE 20 MG: 20 TABLET ORAL at 20:44

## 2021-05-12 RX ADMIN — ASPIRIN 81 MG CHEWABLE TABLET 81 MG: 81 TABLET CHEWABLE at 09:19

## 2021-05-12 RX ADMIN — FAMOTIDINE 20 MG: 20 TABLET ORAL at 09:19

## 2021-05-12 RX ADMIN — TICAGRELOR 90 MG: 90 TABLET ORAL at 09:19

## 2021-05-12 RX ADMIN — POTASSIUM CHLORIDE 40 MEQ: 750 TABLET, FILM COATED, EXTENDED RELEASE ORAL at 10:43

## 2021-05-12 RX ADMIN — INSULIN LISPRO 2 UNITS: 100 INJECTION, SOLUTION INTRAVENOUS; SUBCUTANEOUS at 21:40

## 2021-05-12 RX ADMIN — TICAGRELOR 90 MG: 90 TABLET ORAL at 21:40

## 2021-05-12 RX ADMIN — INSULIN LISPRO 6 UNITS: 100 INJECTION, SOLUTION INTRAVENOUS; SUBCUTANEOUS at 09:15

## 2021-05-12 RX ADMIN — PIPERACILLIN AND TAZOBACTAM 3.38 G: 3; .375 INJECTION, POWDER, LYOPHILIZED, FOR SOLUTION INTRAVENOUS at 09:25

## 2021-05-12 RX ADMIN — HYDROXYZINE PAMOATE 25 MG: 25 CAPSULE ORAL at 22:39

## 2021-05-12 RX ADMIN — INSULIN GLARGINE 60 UNITS: 100 INJECTION, SOLUTION SUBCUTANEOUS at 21:40

## 2021-05-12 RX ADMIN — PIPERACILLIN AND TAZOBACTAM 3.38 G: 3; .375 INJECTION, POWDER, LYOPHILIZED, FOR SOLUTION INTRAVENOUS at 00:50

## 2021-05-12 RX ADMIN — Medication 10 ML: at 14:10

## 2021-05-12 NOTE — PROGRESS NOTES
Progress Note    Patient: Emelda Dubin MRN: [id number]  SSN: [id number]    YOB: 1940  Age: 80 y.o. Sex: female      Admit Date: 5/4/2021    LOS: 8 days     Subjective:     Patient had pacemaker placed yesterday. She got extubated this morning. Objective:     Vitals:    05/12/21 0402 05/12/21 0500 05/12/21 0600 05/12/21 0726   BP:  (!) 100/51 (!) 99/51    Pulse: 100 90 91 90   Resp: 12 12 12 12   Temp:       SpO2: 100% 100% 100% 99%   Weight:       Height:            Intake and Output:  Current Shift: No intake/output data recorded. Last three shifts: 05/10 1901 - 05/12 0700  In: -   Out: 3450 [Urine:3450]    Physical Exam:   General:   Patient is extubated   Eyes:  Conjunctivae/corneas clear. PERRL, EOMs intact. Fundi benign   Ears:  Normal TMs and external ear canals both ears. Nose: Nares normal. Septum midline. Mucosa normal. No drainage or sinus tenderness. Mouth/Throat: Lips, mucosa, and tongue normal. Teeth and gums normal.   Neck: Supple, symmetrical, trachea midline, no adenopathy, thyroid: no enlargment/tenderness/nodules, no carotid bruit and no JVD. Back:   Symmetric, no curvature. ROM normal. No CVA tenderness. Lungs:   Clear to auscultation bilaterally. Heart:   Ejection systolic murmur in the aortic area   Abdomen:   Soft, non-tender. Bowel sounds normal. No masses,  No organomegaly. Extremities: Extremities normal, atraumatic, no cyanosis or edema. Pulses: 2+ and symmetric all extremities. Skin: Skin color, texture, turgor normal. No rashes or lesions   Lymph nodes: Cervical, supraclavicular, and axillary nodes normal.   Neurologic:  Moving her upper extremities. Lab/Data Review: All lab results for the last 24 hours reviewed. Assessment:     Active Problems:    CHF (congestive heart failure) ([facility]) (5/4/2021)      COPD (chronic obstructive pulmonary disease) ([address] (5/4/2021)    Mrs. Genet iJmenez is a 37-year-old white female with:  1.   Heart failure with decompensation  2. Diabetes mellitus  3. Hypertension  4. Hypothyroidism  5. Depression  6. COPD  7. Right bundle branch block with left episode  8. Hyponatremia  9. Acute kidney injury  10. Peripheral vascular disease  11. Right upper lobe opacity  12. Fracture deformity of proximal left humerus    Plan:     Currently in a paced rhythm. She is extubated. Continue dual antiplatelet therapy, atorvastatin. The plan for now is once she is stable to be discharged she will follow-up as an outpatient for TAVR evaluation.   Discussed with electrophysiologist this morning regarding her EP care    Signed By: Ashlei Macario MD     May 12, 2021

## 2021-05-12 NOTE — CONSULTS
PULMONARY ICU NOTE  [facility]    Name: Robert Castorena MRN: [id number]   : 1940 Hospital: [address] [place] [address]   Date: 2021  Admission date: 2021 Hospital Day: 9       HPI:     Hospital Problems  Never Reviewed          Codes Class Noted POA    CHF (congestive heart failure) ([facility]) ICD-10-CM: I50.9  ICD-9-CM: 428.0  2021 Unknown        [facility] ICD-10-CM: J44.9  ICD-9-CM: 496  2021 Unknown                   [x] High complexity decision making was performed  [x] See my orders for details      Subjective/Initial History:     I was asked by Marilou Barrios MD to see Robert Castorena  a 80 y.o.  female in consultation     Excerpts from admission 2021 or consult notes as follows:   81-year-old lady came in because of shortness of breath and dyspnea she was told that she has COPD recently and she was giving albuterol inhaler did not seem to help so came to the emergency room she was hypoxic she was put on oxygen 5 L nasal cannula she was having dyspnea minimal exertion and also at rest she is a lifetime non-smoker, but she has been exposed to secondhand smoke her  used to smoke and all her children smoke she used to work on the farm as a farmer no chest pain no fever no chills.      5/10 patient condition got worse yesterday heart rate was in 40s to 50s EKG shows complete heart block patient was transferred to ICU on dopamine drip  Allergies   Allergen Reactions    Nortriptyline Itching    Cymbalta [Duloxetine] Itching    Ivp [Fd And C Blue No.1] Hives    Morphine Itching    Tetracycline Itching        MAR reviewed and pertinent medications noted or modified as needed     Current Facility-Administered Medications   Medication    famotidine (PEPCID) tablet 20 mg    albuterol-ipratropium (DUO-NEB) 2.5 MG-0.5 MG/3 ML    EPINEPHrine (ADRENALIN) 0.1 mg/mL syringe    propofol (DIPRIVAN) 10 mg/mL infusion    budesonide (PULMICORT) 500 mcg/2 ml nebulizer suspension    DOPamine (INTROPIN) 800 mg in dextrose 5% 250 mL infusion    furosemide (LASIX) injection 40 mg    aspirin chewable tablet 81 mg    ticagrelor (BRILINTA) tablet 90 mg    sodium chloride (NS) flush 5-40 mL    sodium chloride (NS) flush 5-40 mL    predniSONE (DELTASONE) tablet 10 mg    hydrOXYzine pamoate (VISTARIL) capsule 25 mg    piperacillin-tazobactam (ZOSYN) 3.375 g in 0.9% sodium chloride (MBP/ADV) 100 mL MBP    escitalopram oxalate (LEXAPRO) tablet 10 mg    levothyroxine (SYNTHROID) tablet 150 mcg    insulin glargine (LANTUS) injection 60 Units    atorvastatin (LIPITOR) tablet 10 mg    insulin lispro (HUMALOG) injection    glucose chewable tablet 16 g    dextrose (D50W) injection syrg 12.5-25 g    glucagon (GLUCAGEN) injection 1 mg    acetaminophen (TYLENOL) tablet 650 mg    Or    acetaminophen (TYLENOL) suppository 650 mg    polyethylene glycol (MIRALAX) packet 17 g    ondansetron (ZOFRAN ODT) tablet 4 mg    Or    ondansetron (ZOFRAN) injection 4 mg    enoxaparin (LOVENOX) injection 40 mg      Patient PCP: Tsering Salcido MD  PMH:  has a past medical history of Depression, DM (diabetes mellitus) ([address] Hyperlipidemia, Hypertension, Pancreatitis, SOB (shortness of breath), and Thyroid disease. PSH:   has a past surgical history that includes hx hysterectomy; ir fluoro guide plc cvad (5/10/2021); ir insert non tunl cvc over 5 yrs (5/10/2021); and pr ins new/rplcmt prm pm w/transv eltrd atrial&vent (N/A, 5/11/2021). FHX: family history is not on file. SHX:  reports that she has never smoked. She has never used smokeless tobacco. She reports that she does not drink alcohol or use drugs. ROS:    Review of Systems   Constitutional: Negative. HENT: Negative. Eyes: Negative. Respiratory: Positive for cough, sputum production, shortness of breath and wheezing. Cardiovascular: Positive for orthopnea. Gastrointestinal: Negative. Genitourinary: Negative. Musculoskeletal: Negative. Skin: Negative. Neurological: Negative. Psychiatric/Behavioral: Negative. Objective:     Vital Signs: Telemetry:    normal sinus rhythm Intake/Output:   Visit Vitals  BP (!) 99/51   Pulse 90   Temp 99.5 °F (37.5 °C)   Resp 12   Ht 5' 1\" (1.549 m)   Wt 76 kg (167 lb 8.8 oz)   SpO2 99%   BMI 31.66 kg/m²       Temp (24hrs), Av.7 °F (37.1 °C), Min:97.2 °F (36.2 °C), Max:99.5 °F (37.5 °C)        O2 Device: Ventilator O2 Flow Rate (L/min): 2 l/min       Wt Readings from Last 4 Encounters:   21 76 kg (167 lb 8.8 oz)   10/11/10 102.2 kg (225 lb 3.2 oz)          Intake/Output Summary (Last 24 hours) at 2021 0920  Last data filed at 2021 0300  Gross per 24 hour   Intake    Output 1950 ml   Net -1950 ml       Last shift:      No intake/output data recorded. Last 3 shifts: 05/10 1901 -  0700  In: -   Out: 3450 [Urine:3450]       Physical Exam:     Physical Exam   Constitutional: She appears well-developed. HENT:   Head: Normocephalic and atraumatic. Eyes: Pupils are equal, round, and reactive to light. Conjunctivae are normal.   Neck: Normal range of motion. Neck supple. Cardiovascular: Normal rate and regular rhythm. Pulmonary/Chest: Breath sounds normal. She is in respiratory distress. Abdominal: Soft. Bowel sounds are normal.   Musculoskeletal: Normal range of motion. Neurological: She is alert. Skin: Skin is warm.         Labs:    Recent Labs     21  0430 21  0530 05/10/21  0410   WBC 26.1* 26.3* 25.8*   HGB 10.4* 10.8* 10.7*    381 366   INR  --  1.3*  --    APTT  --  30.6  --      Recent Labs     21  0430 21  0530 05/10/21  0410    141 141   K 3.3* 3.4* 4.0    104 106   CO2 28 30 30   * 127* 140*   BUN 42* 38* 41*   CREA 1.32* 1.30* 1.38*   CA 8.1* 8.2* 8.2*   MG  --   --  1.9   ALB 2.1* 2.4* 2.4*   ALT 25 35 46     Recent Labs 05/12/21  0410 05/11/21  0525 05/10/21  1705   PH 7.50* 7.49* 7.50*   PCO2 40 41 32*   PO2 98 120* 327*   HCO3 31* 31* 26   FIO2 30.0 40.0 10.0     No results for input(s): CPK, CKNDX, TROIQ in the last 72 hours. No lab exists for component: CPKMB  No results found for: BNPP, BNP   Lab Results   Component Value Date/Time    Culture result: No growth 6 days 05/04/2021 10:30 AM     Lab Results   Component Value Date/Time    TSH 1.62 05/04/2021 11:12 AM       Imaging:    CXR Results  (Last 48 hours)               05/12/21 0309  XR CHEST PORT Final result    Narrative:  Chest single view. Comparison single chest May 10, 2021       Tubes and catheter stable position. Mild reduction in hazy reticular markings   suggesting improving interstitial edema. Small dependent pleural effusions. Cardiac and mediastinal structures unchanged noting thoracic aorta   atherosclerosis. No pneumothorax. 05/10/21 1455  XR CHEST PORT Final result    Impression:  New right internal jugular catheter. Correlate with function prior   to usage. No pneumothorax. Hydrostatic edema. Diffuse opacities in lungs,   increased on the right. Pleural effusions. Narrative:  Chest, frontal view, 5/10/2021       History: Postintubation. Comparison: Including chest, same day. Findings: Defibrillator pads are in place. New right internal jugular catheter   tip is near the SVC/right atrial junction. New endotracheal tube tip is 3.4 cm   from the brittany. The cardiac silhouette is stable. Lung volumes are improved as compared to the   study performed earlier the same day. There is pulmonary vascular congestion   and hydrostatic edema. Diffuse opacities in the lungs are again noted, right   greater than left. Diffuse opacities in the right lung appears increased as   compared to the study performed the same day. Pleural effusions persist.  No   pneumothorax is identified. The osseous structures are stable. 05/10/21 0938  XR CHEST PORT Final result    Impression:  Hydrostatic edema and diffuse opacities in the lungs, improved. Pleural effusions. Narrative:  Chest, frontal view, 5/10/2021       History: CHF. Comparison: Including chest 5/5/2021. Findings: Defibrillator pad is in place. The cardiac silhouette is partially   obscured and grossly stable. Lung volumes remain low. There is pulmonary   vascular congestion and hydrostatic edema, improved. Diffuse opacities in the   lungs are most pronounced at the bases. Opacities at the upper lobes have   significantly improved as compared to chest 5/5/2021. Opacity at the left base   is also improved. Pleural effusions persist.  No pneumothorax is identified. Degenerative changes are present in the thoracic spine. Contour deformity of   the left humeral head is again noted. IMPRESSION:     1. Chronic Obstructive Pulmonary Disease she is a lifetime non-smoker  2. Acute hypoxic and hypercapnic respiratory failure   3. Patient is on Brilinta which can also cause shortness of breath  4. Complete heart block status post temporary pacemaker was placed on 5/10  5. Arrhythmia on dopamine  6. Hyponatremia  7. Decompensated congestive heart failure  8. Pleural effusion more on the left side  9. Right upper lobe opacity possible pneumonia  10. Severe aortic stenosis      RECOMMENDATIONS/PLAN:     1. Intubated on ventilator assist control mode 40% FiO2 tidal volume 450 PEEP of 5 we will change vent settings to SIMV then spontaneous and will extubate if any criteria acceptable  2. Status post cardiac catheterization status post biventricular pacemaker insertion which was done today  3. Patient on nebulizer treatment and prednisone  4. Will replace potassium  5. Repeat arterial blood gases shows normal PCO2   6.  Chest x-ray shows congestive changes with right upper lobe opacity we will repeat chest x-ray today continue with Zosyn White count is elevated  7. Supplemental O2 to keep sats > 93%   SpO2 on room air, at rest=94%. SpO2 on room air, while ambulating=87%.   SpO2 on 2L , while ambulating=93%  We will repeat before discharge again pulse ox on room air and ambulation  Time spent in icu 30 min    Farzaneh Pimentel MD

## 2021-05-12 NOTE — PROGRESS NOTES
Discharge plan: home with home health, [facility]. CM is available for any other DC needs.

## 2021-05-12 NOTE — PROGRESS NOTES
Patient extubated to 2lpm nc per MD order. Patient tolerated procedure well. O2 sat on 2lpm nc 100%.

## 2021-05-12 NOTE — CONSULTS
History and Physical    Chief complaints: Right foot vasculopathy. History of Presenting Illness:  Melinda Alonzo is a 80 y.o. pleasant woman currently intensive care unit intubated after patient had a cardiac event. Patient was severely bradycardic with heart block requiring temporary pacemaker placement. I was consulted for change in color on the right foot. Patient examined this morning. Patient currently intubated sedated. Charts are reviewed. Hemodynamics reviewed. I examined the patient's right leg. Patient has femoral sheath in place. Patient does have dopplerable signal noted on the both PT and distal AT with 3+ monophasic signal on both side. Discoloration of the toe does not look too bad. Past Medical History:   Diagnosis Date    Depression     DM (diabetes mellitus) ([facility] [address]     Hyperlipidemia     Hypertension     Pancreatitis     SOB (shortness of breath)     Thyroid disease       Past Surgical History:   Procedure Laterality Date    HX HYSTERECTOMY      IR FLUORO GUIDE PLC CVAD  5/10/2021    IR INSERT NON TUNL CVC OVER 5 YRS  5/10/2021    TX INS NEW/RPLCMT PRM PM W/TRANSV ELTRD ATRIAL&VENT N/A 5/11/2021    INSERT PPM BIV MULTI performed by John Mcdermott MD at [address] [place] [address]     History reviewed. No pertinent family history. Social History     Tobacco Use    Smoking status: Never Smoker    Smokeless tobacco: Never Used   Substance Use Topics    Alcohol use: No       Prior to Admission medications    Medication Sig Start Date End Date Taking? Authorizing Provider   Insulin Needles, Disposable, 31 X 5/16 \" Ndle by Does Not Apply route. 10/11/10   Kaylah Thomas MD   insulin lispro, human, (HUMALOG KWIKPEN) 100 unit/mL flexpen 15 Units by SubCUTAneous route three (3) times daily (with meals). 10/13/10   Kaylah Thomas MD   benazepril (LOTENSIN) 40 mg tablet Take 40 mg by mouth daily.  10/11/10   Provider, Historical   LEXAPRO 10 mg tablet Take 10 mg by mouth nightly. 10/11/10   Provider, Historical   famotidine (PEPCID) 20 mg tablet Take 20 mg by mouth two (2) times a day. 10/11/10   Provider, Historical   gabapentin (NEURONTIN) 400 mg capsule  9/3/10   Provider, Historical   hydrocodone-acetaminophen (NORCO) 5-325 mg per tablet Take 1 Tab by mouth every six (6) hours as needed. 10/11/10   Provider, Historical   levothyroxine (SYNTHROID) 150 mcg tablet Take 150 mcg by mouth daily (before breakfast). 10/11/10   Provider, Historical   lorazepam (ATIVAN) 1 mg tablet  8/2/10   Provider, Historical   lovastatin (MEVACOR) 40 mg tablet Take 40 mg by mouth nightly. 10/11/10   Provider, Historical   metoprolol (LOPRESSOR) 100 mg tablet Take 100 mg by mouth two (2) times a day. 10/11/10   Provider, Historical   oxycodone-acetaminophen (PERCOCET 10)  mg per tablet  8/2/10   Provider, Historical   insulin glargine (LANTUS) 100 unit/mL injection 60 Units by SubCUTAneous route nightly. 10/11/10   Janiya Kelly MD     Allergies   Allergen Reactions    Nortriptyline Itching    Cymbalta [Duloxetine] Itching    Ivp [Fd And C Blue No.1] Hives    Morphine Itching    Tetracycline Itching        Review of Systems:  Pertinent review of systems discussed in HPI, and rest of organ systems personally reviewed and they are negative. Objective:   Vital signs reviewed:      Visit Vitals  BP (!) 99/51   Pulse 90   Temp 99.5 °F (37.5 °C)   Resp 12   Ht 5' 1\" (1.549 m)   Wt 167 lb 8.8 oz (76 kg)   SpO2 99%   BMI 31.66 kg/m²       Physical Exam:   General appearance:   Patient currently intubated sedated. Head and neck atraumatic normocephalic. Eyes unable to exam.  ENT unable to examine. Cardiovascular system sinus rhythm. Pulmonary no audible wheeze. Chest wall excursion is normal with respiration cycle. Abdomen soft not distended. Unable to elicit tenderness. Neurologically unable to assess. Skin is warm and moist.  Hematology is no bruising.   Musculoskeletal system unable to assess. Psychosocial unable to assess. Data Review: Labs are reviewed.  Discussed  Recent Results (from the past 24 hour(s))   GLUCOSE, POC    Collection Time: 05/11/21  9:52 AM   Result Value Ref Range    Glucose (POC) 113 65 - 117 mg/dL    Performed by [facility], POC    Collection Time: 05/11/21 10:55 AM   Result Value Ref Range    Glucose (POC) 104 65 - 117 mg/dL    Performed by JONI WATKINS    ECHO ADULT FOLLOW-UP OR LIMITED    Collection Time: 05/11/21  2:00 PM   Result Value Ref Range    LV ED Vol A4C 89.00 cm3    LV ED Vol A2C 47.40 cm3    LV ES Vol A4C 39.00 cm3    LV ES Vol A2C 17.40 cm3    IVSd 1.99 (A) 0.60 - 0.90 cm    LVIDd 3.62 (A) 3.90 - 5.30 cm    LVIDs 2.59 cm    LVOT d 2.00 cm    Left Ventricular Outflow Tract Mean Gradient 1.00 mmHg    LVOT VTI 13.60 cm    LVOT VTI 13.60 cm    Pulmonic Regurgitant End Max Velocity 68.60 cm/s    LVOT Peak Gradient 2.00 mmHg    LVPWd 1.93 (A) 0.60 - 0.90 cm    BP EF 56.0 55.0 - 100.0 %    LVOT SV 43.0 cm3    Aortic Regurgitant Pressure Half-time 365.00 ms    AR Max [place] 310.00 cm/s    Pulmonic Regurgitant End Max Velocity 320.00 cm/s    AoV PG 41.00 mmHg    Aortic Valve Systolic Mean Gradient 06.65 mmHg    AoV VTI 60.40 cm    Aortic valve mean velocity 225.00 cm/s    Aortic Valve Area by Continuity of VTI 0.71 cm2    Aortic Valve Area by Continuity of Peak Velocity 0.67 cm2    Pulmonic Regurgitant End Max Velocity 474.00 cm/s    Mitral Valve Max Velocity 175.00 cm/s    MV Peak Gradient 12.00 mmHg    Mitral Valve Deceleration [place] 3,460.00 mm/s2    Mitral Valve Deceleration [place] 3,460.00 mm/s2    Mitral Valve E Wave Deceleration Time 151.00 ms    Mitral Valve Pressure Half-time 92.00 ms    MV A [place] 151.00 cm/s    MV E [place] 109.00 cm/s    MV Mean Gradient 5.00 mmHg    Mitral Valve Annulus Velocity Time Integral 33.50 cm    MVA (PHT) 2.39 cm2    MV E/A 0.72     Est. RA Pressure 3.00 mmHg    Tricuspid Valve Max Velocity 290.00 cm/s    Triscuspid Valve Regurgitation Peak Gradient 34.00 mmHg    RVSP 37.00 mmHg    LV Ejection Fraction MOD 4C 56.0 %    LV Mass .8 67.0 - 162.0 g    LV Mass AL Index 181.4 43.0 - 95.0 g/m2    Left Atrium Minor Axis 2.17 cm    Left Atrium Major Axis 3.80 cm    Ao Root D 3.30 cm    KIKO/BSA Pk [place] 0.4 cm2/m2    KIKO/BSA VTI 0.4 cm2/m2   GLUCOSE, POC    Collection Time: 05/11/21  4:05 PM   Result Value Ref Range    Glucose (POC) 192 (H) 65 - 117 mg/dL    Performed by [place], POC    Collection Time: 05/11/21  4:06 PM   Result Value Ref Range    Glucose (POC) 209 (H) 65 - 117 mg/dL    Performed by Corina White    GLUCOSE, POC    Collection Time: 05/11/21  8:50 PM   Result Value Ref Range    Glucose (POC) 144 (H) 65 - 117 mg/dL    Performed by Kolton Mora    BLOOD GAS, ARTERIAL    Collection Time: 05/12/21  4:10 AM   Result Value Ref Range    pH 7.50 (H) 7.35 - 7.45      PCO2 40 35 - 45 mmHg    PO2 98 75 - 100 mmHg    O2 SAT 97 >95 %    BICARBONATE 31 (H) 22 - 26 mmol/L    BASE EXCESS 7.4 (H) 0 - 2 mmol/L    O2 METHOD VENT      FIO2 30.0 %    MODE Assist Control/Volume Control      Tidal volume 450      SET RATE 12      EPAP/CPAP/PEEP 5.0      SITE Arterial Line     CBC WITH AUTOMATED DIFF    Collection Time: 05/12/21  4:30 AM   Result Value Ref Range    WBC 26.1 (H) 3.6 - 11.0 K/uL    RBC 3.31 (L) 3.80 - 5.20 M/uL    HGB 10.4 (L) 11.5 - 16.0 g/dL    HCT 31.7 (L) 35.0 - 47.0 %    MCV 95.8 80.0 - 99.0 FL    MCH 31.4 26.0 - 34.0 PG    MCHC 32.8 30.0 - 36.5 g/dL    RDW 14.1 11.5 - 14.5 %    PLATELET 208 321 - 238 K/uL    MPV 11.7 8.9 - 12.9 FL    NRBC 0.0 0.0  WBC    ABSOLUTE NRBC 0.00 0.00 - 0.01 K/uL    NEUTROPHILS 85 (H) 32 - 75 %    LYMPHOCYTES 4 (L) 12 - 49 %    MONOCYTES 6 5 - 13 %    EOSINOPHILS 4 0 - 7 %    BASOPHILS 0 0 - 1 %    IMMATURE GRANULOCYTES 1 (H) 0 - 0.5 %    ABS. NEUTROPHILS 22.4 (H) 1.8 - 8.0 K/UL    ABS. LYMPHOCYTES 1.1 0.8 - 3.5 K/UL    ABS.  MONOCYTES 1.4 (H) 0.0 - 1.0 K/UL    ABS. EOSINOPHILS 0.9 (H) 0.0 - 0.4 K/UL    ABS. BASOPHILS 0.1 0.0 - 0.1 K/UL    ABS. IMM. GRANS. 0.2 (H) 0.00 - 0.04 K/UL    DF AUTOMATED     METABOLIC PANEL, COMPREHENSIVE    Collection Time: 05/12/21  4:30 AM   Result Value Ref Range    Sodium 141 136 - 145 mmol/L    Potassium 3.3 (L) 3.5 - 5.1 mmol/L    Chloride 104 97 - 108 mmol/L    CO2 28 21 - 32 mmol/L    Anion gap 9 5 - 15 mmol/L    Glucose 225 (H) 65 - 100 mg/dL    BUN 42 (H) 6 - 20 mg/dL    Creatinine 1.32 (H) 0.55 - 1.02 mg/dL    BUN/Creatinine ratio 32 (H) 12 - 20      GFR est AA 47 (L) >60 ml/min/1.73m2    GFR est non-AA 39 (L) >60 ml/min/1.73m2    Calcium 8.1 (L) 8.5 - 10.1 mg/dL    Bilirubin, total 0.4 0.2 - 1.0 mg/dL    AST (SGOT) 16 15 - 37 U/L    ALT (SGPT) 25 12 - 78 U/L    Alk. phosphatase 53 45 - 117 U/L    Protein, total 5.4 (L) 6.4 - 8.2 g/dL    Albumin 2.1 (L) 3.5 - 5.0 g/dL    Globulin 3.3 2.0 - 4.0 g/dL    A-G Ratio 0.6 (L) 1.1 - 2.2           Imagings reviewed: discussed as below. Assessment:     Active Problems:    CHF (congestive heart failure) ([facility]) (5/4/2021)      COPD (chronic obstructive pulmonary disease) ([facility] [address] (5/4/2021)        Plan:     Vascular examination shows the patient does have a strong Doppler signal noted both distal LIONEL and the posterior tibial artery. Foot is viable. There is no signs of ischemia. Patient has a right common femoral artery vascular sheath. I will continue monitor patient's vascular status while patient is hospitalized. I thank you for consultation to me participate for the care of this patient.

## 2021-05-12 NOTE — PROGRESS NOTES
Fortunato Taylor M.D. and Dorothey Osgood. Isabella Shepherd M.D.  [address], [address] [place] [address]  [phone]   [email or web address]                                          Admit Date: 5/4/2021      Assessment/Plan:   Ulysses Caceres is admitted with complete AV block. 1. Complete AV block  2. Cardiomyopathy  3. Severe AS    S/p biV pacer yesterday. Device check with iterative changes shows stable pacing and sensing parameters. Subjective:     Patient denies any complaints.     Visit Vitals  BP (!) 131/56   Pulse 97   Temp 99.5 °F (37.5 °C)   Resp 12   Ht 5' 1\" (1.549 m)   Wt 76 kg (167 lb 8.8 oz)   SpO2 99%   BMI 31.66 kg/m²       Intake/Output Summary (Last 24 hours) at 5/12/2021 1539  Last data filed at 5/12/2021 0300  Gross per 24 hour   Intake    Output 1950 ml   Net -1950 ml       Objective:      Physical Exam:  intubated  HEENT: EOMI  Neck: supple  Resp:  intubated  CV: RRR   Abd:Soft, Nontender  Ext: No edema  Neuro: Nonfocal  Skin: Warm, Dry, Intact      Telemetry: V paced    Current Facility-Administered Medications   Medication Dose Route Frequency    potassium chloride SR (KLOR-CON 10) tablet 40 mEq  40 mEq Oral NOW    famotidine (PEPCID) tablet 20 mg  20 mg Per NG tube BID    albuterol-ipratropium (DUO-NEB) 2.5 MG-0.5 MG/3 ML  3 mL Nebulization Q6H PRN    EPINEPHrine (ADRENALIN) 0.1 mg/mL syringe    CODE BLUE    propofol (DIPRIVAN) 10 mg/mL infusion  0-50 mcg/kg/min IntraVENous TITRATE    budesonide (PULMICORT) 500 mcg/2 ml nebulizer suspension  500 mcg Nebulization BID RT    DOPamine (INTROPIN) 800 mg in dextrose 5% 250 mL infusion  0-20 mcg/kg/min IntraVENous TITRATE    furosemide (LASIX) injection 40 mg  40 mg IntraVENous DAILY    aspirin chewable tablet 81 mg  81 mg Oral DAILY    ticagrelor (BRILINTA) tablet 90 mg  90 mg Oral Q12H    sodium chloride (NS) flush 5-40 mL  5-40 mL IntraVENous Q8H    sodium chloride (NS) flush 5-40 mL  5-40 mL IntraVENous PRN    predniSONE (DELTASONE) tablet 10 mg  10 mg Oral DAILY WITH BREAKFAST    hydrOXYzine pamoate (VISTARIL) capsule 25 mg  25 mg Oral Q6H PRN    piperacillin-tazobactam (ZOSYN) 3.375 g in 0.9% sodium chloride (MBP/ADV) 100 mL MBP  3.375 g IntraVENous Q8H    escitalopram oxalate (LEXAPRO) tablet 10 mg  10 mg Oral QHS    levothyroxine (SYNTHROID) tablet 150 mcg  150 mcg Oral ACB    insulin glargine (LANTUS) injection 60 Units  60 Units SubCUTAneous QHS    atorvastatin (LIPITOR) tablet 10 mg  10 mg Oral QHS    insulin lispro (HUMALOG) injection   SubCUTAneous AC&HS    glucose chewable tablet 16 g  4 Tab Oral PRN    dextrose (D50W) injection syrg 12.5-25 g  25-50 mL IntraVENous PRN    glucagon (GLUCAGEN) injection 1 mg  1 mg IntraMUSCular PRN    acetaminophen (TYLENOL) tablet 650 mg  650 mg Oral Q6H PRN    Or    acetaminophen (TYLENOL) suppository 650 mg  650 mg Rectal Q6H PRN    polyethylene glycol (MIRALAX) packet 17 g  17 g Oral DAILY PRN    ondansetron (ZOFRAN ODT) tablet 4 mg  4 mg Oral Q8H PRN    Or    ondansetron (ZOFRAN) injection 4 mg  4 mg IntraVENous Q6H PRN    enoxaparin (LOVENOX) injection 40 mg  40 mg SubCUTAneous DAILY         Data Review:   Labs:    Recent Results (from the past 24 hour(s))   GLUCOSE, POC    Collection Time: 05/11/21  4:05 PM   Result Value Ref Range    Glucose (POC) 192 (H) 65 - 117 mg/dL    Performed by Glory Councilman    GLUCOSE, POC    Collection Time: 05/11/21  4:05 PM   Result Value Ref Range    Glucose (POC) 192 (H) 65 - 117 mg/dL    Performed by Glroy Councilman    GLUCOSE, POC    Collection Time: 05/11/21  4:06 PM   Result Value Ref Range    Glucose (POC) 209 (H) 65 - 117 mg/dL    Performed by Glory Councilman    GLUCOSE, POC    Collection Time: 05/11/21  8:50 PM   Result Value Ref Range    Glucose (POC) 144 (H) 65 - 117 mg/dL    Performed by Jon Litten    BLOOD GAS, ARTERIAL    Collection Time: 05/12/21  4:10 AM   Result Value Ref Range    pH 7.50 (H) 7.35 - 7.45      PCO2 40 35 - 45 mmHg    PO2 98 75 - 100 mmHg    O2 SAT 97 >95 %    BICARBONATE 31 (H) 22 - 26 mmol/L    BASE EXCESS 7.4 (H) 0 - 2 mmol/L    O2 METHOD VENT      FIO2 30.0 %    MODE Assist Control/Volume Control      Tidal volume 450      SET RATE 12      EPAP/CPAP/PEEP 5.0      SITE Arterial Line     CBC WITH AUTOMATED DIFF    Collection Time: 05/12/21  4:30 AM   Result Value Ref Range    WBC 26.1 (H) 3.6 - 11.0 K/uL    RBC 3.31 (L) 3.80 - 5.20 M/uL    HGB 10.4 (L) 11.5 - 16.0 g/dL    HCT 31.7 (L) 35.0 - 47.0 %    MCV 95.8 80.0 - 99.0 FL    MCH 31.4 26.0 - 34.0 PG    MCHC 32.8 30.0 - 36.5 g/dL    RDW 14.1 11.5 - 14.5 %    PLATELET 011 156 - 563 K/uL    MPV 11.7 8.9 - 12.9 FL    NRBC 0.0 0.0  WBC    ABSOLUTE NRBC 0.00 0.00 - 0.01 K/uL    NEUTROPHILS 85 (H) 32 - 75 %    LYMPHOCYTES 4 (L) 12 - 49 %    MONOCYTES 6 5 - 13 %    EOSINOPHILS 4 0 - 7 %    BASOPHILS 0 0 - 1 %    IMMATURE GRANULOCYTES 1 (H) 0 - 0.5 %    ABS. NEUTROPHILS 22.4 (H) 1.8 - 8.0 K/UL    ABS. LYMPHOCYTES 1.1 0.8 - 3.5 K/UL    ABS. MONOCYTES 1.4 (H) 0.0 - 1.0 K/UL    ABS. EOSINOPHILS 0.9 (H) 0.0 - 0.4 K/UL    ABS. BASOPHILS 0.1 0.0 - 0.1 K/UL    ABS. IMM. GRANS. 0.2 (H) 0.00 - 0.04 K/UL    DF AUTOMATED     METABOLIC PANEL, COMPREHENSIVE    Collection Time: 05/12/21  4:30 AM   Result Value Ref Range    Sodium 141 136 - 145 mmol/L    Potassium 3.3 (L) 3.5 - 5.1 mmol/L    Chloride 104 97 - 108 mmol/L    CO2 28 21 - 32 mmol/L    Anion gap 9 5 - 15 mmol/L    Glucose 225 (H) 65 - 100 mg/dL    BUN 42 (H) 6 - 20 mg/dL    Creatinine 1.32 (H) 0.55 - 1.02 mg/dL    BUN/Creatinine ratio 32 (H) 12 - 20      GFR est AA 47 (L) >60 ml/min/1.73m2    GFR est non-AA 39 (L) >60 ml/min/1.73m2    Calcium 8.1 (L) 8.5 - 10.1 mg/dL    Bilirubin, total 0.4 0.2 - 1.0 mg/dL    AST (SGOT) 16 15 - 37 U/L    ALT (SGPT) 25 12 - 78 U/L    Alk.  phosphatase 53 45 - 117 U/L    Protein, total 5.4 (L) 6.4 - 8.2 g/dL    Albumin 2.1 (L) 3.5 - 5.0 g/dL    Globulin 3.3 2.0 - 4.0 g/dL    A-G Ratio 0.6 (L) 1.1 - 2.2     GLUCOSE, POC    Collection Time: 05/12/21  8:57 AM   Result Value Ref Range    Glucose (POC) 230 (H) 65 - 117 mg/dL    Performed by [place], POC    Collection Time: 05/12/21 12:12 PM   Result Value Ref Range    Glucose (POC) 154 (H) 65 - 117 mg/dL    Performed by Araceli Velazquez

## 2021-05-12 NOTE — PROGRESS NOTES
SPEECH LANGUAGE PATHOLOGY BEDSIDE SWALLOW EVALUATIONS  Patient: Lizet Mcclain  (58 y.o. )  Date: 5/12/2021  Primary Diagnosis: COPD exacerbation, heart blockage  Precautions:  Fall    ASSESSMENT :  Patient alert, oriented x4, and follows basic commands. She is [place]. Son at bedside w/ consent. L facial droop noted w/ intermittent slurred speech. Patient w/ delayed processing. Patient required re-direction to task and repetition of commands. Concerns regarding facial droop, slurred speech and weak L  strength voiced to MD. Orders for nsg to place CT head given. Per nsg, patient to remain 20 degrees s/t sheath removed from groin s/ CC. Patietn placed in reverse trendelenburg per nsg and PO trials given. Poor positioning negatively impacts swallow fxn. Patient presents w/ mild oropharyngeal dysthagia . Solids not attempted s/t positioning. Oral phase c/b mild reduction in bolus control w/ fluctuating delayed vs rapid A-P. Pharyngeal phase c/b rapid swallow w/ thin improved control w/ NTL and HLE is wfl upon palpation. .   Overt s/s of pen/asp c/b throat clearing w/ thin trials. .     Patient will benefit from skilled intervention to address the above impairments. Patients rehabilitation potential is considered to be Good     PLAN :  Recommendations and Planned Interventions:  Rec full NTL w/ strict asp/GERD precautions. Plan to re-assess for solids once able to elevated HOB, per nsg approx [phone] today. Frequency/Duration: Patient will be followed by speech-language pathology 5 times a week to address goals. Discharge Recommendations: To Be Determined     SUBJECTIVE:   Patient and son deny dysphagia. Son denies hx of facial droop reports that is new. OBJECTIVE:   Patient admitted w/ SOB w/ minimal exertion. Per chart review heart block s/p temporary pacemaker and CC. Patient extubated 5/12/21.    Past Medical History:   Diagnosis Date    Depression     DM (diabetes mellitus) ([facility] [address]     Hyperlipidemia Hypertension     Pancreatitis     SOB (shortness of breath)     Thyroid disease        CXR Results  (Last 48 hours)                 05/12/21 0309  XR CHEST PORT Final result    Narrative:  Chest single view. Comparison single chest May 10, 2021       Tubes and catheter stable position. Mild reduction in hazy reticular markings   suggesting improving interstitial edema. Small dependent pleural effusions. Cardiac and mediastinal structures unchanged noting thoracic aorta   atherosclerosis. No pneumothorax. 05/10/21 1455  XR CHEST PORT Final result    Impression:  New right internal jugular catheter. Correlate with function prior   to usage. No pneumothorax. Hydrostatic edema. Diffuse opacities in lungs,   increased on the right. Pleural effusions. Narrative:  Chest, frontal view, 5/10/2021       History: Postintubation. Comparison: Including chest, same day. Findings: Defibrillator pads are in place. New right internal jugular catheter   tip is near the SVC/right atrial junction. New endotracheal tube tip is 3.4 cm   from the brittany. The cardiac silhouette is stable. Lung volumes are improved as compared to the   study performed earlier the same day. There is pulmonary vascular congestion   and hydrostatic edema. Diffuse opacities in the lungs are again noted, right   greater than left. Diffuse opacities in the right lung appears increased as   compared to the study performed the same day. Pleural effusions persist.  No   pneumothorax is identified. The osseous structures are stable.                    Diet prior to admission: Regular  Current Diet:  DIET TUBE FEEDING  DIET FULL LIQUID     Cognitive and Communication Status:  Neurologic State: Alert  Orientation Level: Oriented X4  Cognition: Decreased attention/concentration, Follows commands  Perception: Appears intact  Perseveration: No perseveration noted  Safety/Judgement: Decreased awareness of environment  Swallowing Evaluation:   Oral Assessment:  Oral Assessment  Labial: Left droop  Dentition: Intact  Oral Hygiene: dry mucosa  Lingual: No impairment  Velum: No impairment  Mandible: No impairment  P.O. Trials:  Patient Position: 20 degrees s/t sheath removed from groin s/t CC, placed in reverse trendelenburg   Vocal quality prior to P.O.: Hoarse(s/p extubation)  Consistency Presented: Nectar thick liquid;Puree; Thin liquid  How Presented: SLP-fed/presented;Cup/sip;Spoon;Straw     Bolus Acceptance: No impairment  Bolus Formation/Control: Impaired  Type of Impairment: Delayed  Propulsion: Delayed (# of seconds)  Oral Residue: None  Initiation of Swallow: Delayed (# of seconds)  Laryngeal Elevation: Functional  Aspiration Signs/Symptoms: Clear throat       Oral Phase Severity: Mild  Pharyngeal Phase Severity : Mild  Voice:  Vocal Quality: Hoarse(s/p extubation)       Pain:   0      After treatment:   Patient left in no apparent distress in bed, Call bell within reach, Nursing notified, and Caregiver / family present    COMMUNICATION/EDUCATION:   Patient and son educated regarding s/s aspiration, aspiration precautions, swallow strategies, and diet recs. They demonstrated Good understanding as evidenced by engagement and appropriate questions. The patient's plan of care including recommendations, planned interventions, and recommended diet changes were discussed with: Registered nurse and Physician. Patient/family agree to work toward stated goals and plan of care. Thank you for this referral.  Alice Tamez M.S., M.Ed., CCC-SLP  Time Calculation: 20 mins    Problem: Dysphagia (Adult)  Goal: *Acute Goals and Plan of Care (Insert Text)  Description: Speech Therapy Swallow Goals  Initiated 5/12/2021  -Patient will tolerate full diet with nectar thick liquids without clinical indicators of aspiration given minimal cues within 7 day(s).     -Patient will tolerate PO trials without clinical indicators of aspiration given minimal cues within 7 day(s). -Patient will participate in modified barium swallow study within 7 day(s). -Patient will demonstrate understanding of swallow safety precautions and aspiration precautions, diet recs with minimal cues within 7 day(s).            Outcome: Initial

## 2021-05-12 NOTE — PROGRESS NOTES
Hospitalist Progress Note    Subjective:     Cherry Felton is a 81 yo female with a PMHx significant for DM, HLD, HTN, thyroid disease, and depression, who presents to the ED with shortness of breath for the past 1-2 days due to a severe acute exacerbation of COPD, acute hypoxic/hypercapnic respiratory failure, and CHF. She also has a bilateral pleural effusion (worse on left), interstitial edema (cardiogenic or infectious cause), and hyponatremia.   ___________________________________________________________       patient had V. fib had  shock and intubated temporary pacemaker placed yesterday and cardiac cath shows was patent stent and today successful biventricular pacing implant successful removal of temporary pace wiring        Cardiac cath in place 1 stent  Patient have significant severe aortic stenosis      Patient extubated today alert awake not in distress    Past Medical History:   Diagnosis Date    Depression     DM (diabetes mellitus) ([facility]     Hyperlipidemia     Hypertension     Pancreatitis     SOB (shortness of breath)     Thyroid disease       Past Surgical History:   Procedure Laterality Date    HX HYSTERECTOMY      IR FLUORO GUIDE PLC CVAD  5/10/2021    IR INSERT NON TUNL CVC OVER 5 YRS  5/10/2021    [place] INS NEW/RPLCMT PRM PM W/TRANSV ELTRD ATRIAL&VENT N/A 5/11/2021    INSERT PPM BIV MULTI performed by Liborio Bonner MD at [address] [place] [address]     History reviewed. No pertinent family history. Social History     Tobacco Use    Smoking status: Never Smoker    Smokeless tobacco: Never Used   Substance Use Topics    Alcohol use: No       Prior to Admission medications    Medication Sig Start Date End Date Taking? Authorizing Provider   Insulin Needles, Disposable, 31 X 5/16 \" Ndle by Does Not Apply route. 10/11/10   Siobhan Farias MD   insulin lispro, human, (HUMALOG KWIKPEN) 100 unit/mL flexpen 15 Units by SubCUTAneous route three (3) times daily (with meals).  10/13/10 Andrew Fitch MD   benazepril (LOTENSIN) 40 mg tablet Take 40 mg by mouth daily. 10/11/10   Provider, Historical   LEXAPRO 10 mg tablet Take 10 mg by mouth nightly. 10/11/10   Provider, Historical   famotidine (PEPCID) 20 mg tablet Take 20 mg by mouth two (2) times a day. 10/11/10   Provider, Historical   gabapentin (NEURONTIN) 400 mg capsule  9/3/10   Provider, Historical   hydrocodone-acetaminophen (NORCO) 5-325 mg per tablet Take 1 Tab by mouth every six (6) hours as needed. 10/11/10   Provider, Historical   levothyroxine (SYNTHROID) 150 mcg tablet Take 150 mcg by mouth daily (before breakfast). 10/11/10   Provider, Historical   lorazepam (ATIVAN) 1 mg tablet  8/2/10   Provider, Historical   lovastatin (MEVACOR) 40 mg tablet Take 40 mg by mouth nightly. 10/11/10   Provider, Historical   metoprolol (LOPRESSOR) 100 mg tablet Take 100 mg by mouth two (2) times a day. 10/11/10   Provider, Historical   oxycodone-acetaminophen (PERCOCET 10)  mg per tablet  8/2/10   Provider, Historical   insulin glargine (LANTUS) 100 unit/mL injection 60 Units by SubCUTAneous route nightly. 10/11/10   Andrew Fitch MD     Allergies   Allergen Reactions    Nortriptyline Itching    Cymbalta [Duloxetine] Itching    Ivp [Fd And C Blue No.1] Hives    Morphine Itching    Tetracycline Itching        Review of Systems:  Intubated    Objective: Intake and Output:    No intake/output data recorded. 05/10 1901 - 05/12 0700  In: -   Out: 3450 [Urine:3450]    Physical Exam:   Visit Vitals  BP (!) 131/56   Pulse 97   Temp 99.5 °F (37.5 °C)   Resp 12   Ht 5' 1\" (1.549 m)   Wt 76 kg (167 lb 8.8 oz)   SpO2 99%   BMI 31.66 kg/m²     General:   On ventilator s. Head:  Normocephalic, without obvious abnormality, atraumatic. Eyes:  Conjunctivae/corneas clear. Pupils equal, round, reactive to light. Extraocular movements intact. Lungs:    Decreased breath sounds , crackles, wheezing. Chest wall:  No tenderness or deformity. Heart:  Regular rate and rhythm, S1, S2 normal, no murmur, click, rub, or gallop. Possible murmur, difficult to auscultate with BiPAP   Abdomen:   Soft, non-tender. Bowel sounds normal. No masses. No organomegaly. Extremities: Extremities normal, atraumatic, no cyanosis. 1+ pitting edema of lower extremities    Pulses: 2+ and symmetric all extremities. Skin: Skin color, texture, turgor normal. No rashes or lesions. Lymph nodes: Cervical, supraclavicular, and axillary nodes normal.   Neurologic: CNII-XII intact. Normal strength, sensation, and reflexes throughout.        ECG:  ** Poor data quality, interpretation may be adversely affected**   Normal sinus rhythm   Right bundle branch block   Left anterior fascicular block   ** Bifascicular block **   Left ventricular hypertrophy with repolarization abnormality   Cannot rule out Septal infarct , age undetermined     Data Review:   Recent Results (from the past 24 hour(s))   EKG, 12 LEAD, INITIAL    Collection Time: 05/04/21 10:24 AM   Result Value Ref Range    Ventricular Rate 98 BPM    Atrial Rate 98 BPM    P-R Interval 168 ms    QRS Duration 152 ms    Q-T Interval 416 ms    QTC Calculation (Bezet) 531 ms    Calculated P Axis 68 degrees    Calculated R Axis -52 degrees    Calculated T Axis 94 degrees    Diagnosis       ** Poor data quality, interpretation may be adversely affected  Normal sinus rhythm  Right bundle branch block  Left anterior fascicular block  ** Bifascicular block **  Left ventricular hypertrophy with repolarization abnormality  Cannot rule out Septal infarct , age undetermined  Abnormal ECG  No previous ECGs available  Confirmed by Dellie Severin MD, Dondra Saint ([id number]) on 5/4/2021 12:13:56 PM     SARS-COV-2    Collection Time: 05/04/21 10:30 AM   Result Value Ref Range    SARS-CoV-2 Please find results under separate order     COVID-19 RAPID TEST    Collection Time: 05/04/21 10:30 AM   Result Value Ref Range    Specimen source Nasopharyngeal COVID-19 rapid test Not Detected Not Detected     LACTIC ACID    Collection Time: 05/04/21 11:12 AM   Result Value Ref Range    Lactic acid 0.6 0.4 - 2.0 mmol/L   MAGNESIUM    Collection Time: 05/04/21 11:12 AM   Result Value Ref Range    Magnesium 1.8 1.6 - 2.4 mg/dL   METABOLIC PANEL, COMPREHENSIVE    Collection Time: 05/04/21 11:12 AM   Result Value Ref Range    Sodium 129 (L) 136 - 145 mmol/L    Potassium 4.2 3.5 - 5.1 mmol/L    Chloride 97 97 - 108 mmol/L    CO2 28 21 - 32 mmol/L    Anion gap 4 (L) 5 - 15 mmol/L    Glucose 182 (H) 65 - 100 mg/dL    BUN 25 (H) 6 - 20 mg/dL    Creatinine 0.88 0.55 - 1.02 mg/dL    BUN/Creatinine ratio 28 (H) 12 - 20      GFR est AA >60 >60 ml/min/1.73m2    GFR est non-AA >60 >60 ml/min/1.73m2    Calcium 8.2 (L) 8.5 - 10.1 mg/dL    Bilirubin, total 0.4 0.2 - 1.0 mg/dL    AST (SGOT) 13 (L) 15 - 37 U/L    ALT (SGPT) 17 12 - 78 U/L    Alk.  phosphatase 68 45 - 117 U/L    Protein, total 6.5 6.4 - 8.2 g/dL    Albumin 3.0 (L) 3.5 - 5.0 g/dL    Globulin 3.5 2.0 - 4.0 g/dL    A-G Ratio 0.9 (L) 1.1 - 2.2     BNP    Collection Time: 05/04/21 11:12 AM   Result Value Ref Range    NT pro-BNP 7,360 (H) <450 pg/mL   PROCALCITONIN    Collection Time: 05/04/21 11:12 AM   Result Value Ref Range    Procalcitonin <0.05 (H) 0 ng/mL   TROPONIN I    Collection Time: 05/04/21 11:12 AM   Result Value Ref Range    Troponin-I, Qt. <0.05 <0.05 ng/mL   TSH 3RD GENERATION    Collection Time: 05/04/21 11:12 AM   Result Value Ref Range    TSH 1.62 0.36 - 3.74 uIU/mL   CBC WITH AUTOMATED DIFF    Collection Time: 05/04/21 11:12 AM   Result Value Ref Range    WBC 14.2 (H) 3.6 - 11.0 K/uL    RBC 3.13 (L) 3.80 - 5.20 M/uL    HGB 10.0 (L) 11.5 - 16.0 g/dL    HCT 30.8 (L) 35.0 - 47.0 %    MCV 98.4 80.0 - 99.0 FL    MCH 31.9 26.0 - 34.0 PG    MCHC 32.5 30.0 - 36.5 g/dL    RDW 13.6 11.5 - 14.5 %    PLATELET 765 183 - 062 K/uL    MPV 11.7 8.9 - 12.9 FL    NRBC 0.0 0.0  WBC    ABSOLUTE NRBC 0.00 0.00 - 0.01 K/uL NEUTROPHILS 87 (H) 32 - 75 %    LYMPHOCYTES 6 (L) 12 - 49 %    MONOCYTES 5 5 - 13 %    EOSINOPHILS 1 0 - 7 %    BASOPHILS 1 0 - 1 %    IMMATURE GRANULOCYTES 0 0 - 0.5 %    ABS. NEUTROPHILS 12.4 (H) 1.8 - 8.0 K/UL    ABS. LYMPHOCYTES 0.8 0.8 - 3.5 K/UL    ABS. MONOCYTES 0.7 0.0 - 1.0 K/UL    ABS. EOSINOPHILS 0.1 0.0 - 0.4 K/UL    ABS. BASOPHILS 0.1 0.0 - 0.1 K/UL    ABS. IMM. GRANS. 0.1 (H) 0.00 - 0.04 K/UL    DF AUTOMATED     PROTHROMBIN TIME + INR    Collection Time: 05/04/21 11:25 AM   Result Value Ref Range    Prothrombin time 13.6 11.9 - 14.7 sec    INR 1.1 0.9 - 1.1     PTT    Collection Time: 05/04/21 11:25 AM   Result Value Ref Range    aPTT 21.8 21.2 - 34.1 sec    aPTT, therapeutic range   82 - 109 sec   D DIMER    Collection Time: 05/04/21 11:25 AM   Result Value Ref Range    D DIMER 0.50 (H) <0.50 ug/ml(FEU)   BLOOD GAS, ARTERIAL    Collection Time: 05/04/21 11:25 AM   Result Value Ref Range    pH 7.36 7.35 - 7.45      PCO2 52 (H) 35 - 45 mmHg    PO2 72 (L) 75 - 100 mmHg    O2 SAT 93 (L) >95 %    BICARBONATE 27 (H) 22 - 26 mmol/L    BASE EXCESS 2.9 (H) 0 - 2 mmol/L    O2 METHOD Nasal Cannula      O2 FLOW RATE 6 L/min    SITE Right Radial      EBEN'S TEST PASS         Chest x-ray:  More conspicuous RUL opacity. Patchy mid and lower lung reticular markings  persist. Cardiac and mediastinal structures unchanged. Thoracic aorta  atherosclerosis. No pneumothorax. Probable small dependent bilateral pleural  effusions.     Assessment:     Static post V. Fib/received shocks x2/ intubated and extubated  Static post pacemaker   CAD status post stent LAD  Severe aortic stenosis  Acute hypoxemic respiratory failure  COPD exacerbation     Hyponatremia    Congestive heart failure    Pleural effusions     Anemia      Leukocytosis     Diabetes mellitus      Hypertension     Hypothyroidism      Depression     Echo done shows ejection fraction of 40 to 45% and moderate grade 2 diastolic dysfunction    Dopplers negative for DVT    Toes are blue poor pulse ordered arterial Doppler    Hypokalemia replace potassium  Plan:     Continue medications:   Duo-meb 3mL neb q6hrs  ASA 325mg po qday   Lipitor 10mg po qhs  Enoxaparin 40mg subq qday   Lexapro 10mg po qhs  Famotidine 20mg po bid   Lasix 40mg IV bid  Lantus 60 units subq qhs  Humalog subq qid   Synthroid 150mcg po qam   Lisinopril 40mg po qday (on hold due to kidney fx)   Discontinue Solu-Medrol  Start on prednisone 10 mg daily  Zosyn 3.375mg IV q8hrs   Brilinta 90 mg twice a day    Arterial Doppler  Result pending     PT OT consult        Current Facility-Administered Medications:     famotidine (PEPCID) tablet 20 mg, 20 mg, Per NG tube, BID, Beny Cheung MD, 20 mg at 05/12/21 0919    albuterol-ipratropium (DUO-NEB) 2.5 MG-0.5 MG/3 ML, 3 mL, Nebulization, Q6H PRN, Leon Cheung MD    EPINEPHrine (ADRENALIN) 0.1 mg/mL syringe, , , CODE BLUE, Elizabeth Rodney MD, 1 mg at 05/10/21 1416    propofol (DIPRIVAN) 10 mg/mL infusion, 0-50 mcg/kg/min, IntraVENous, TITRATE, Elizabeth Rodney MD, Last Rate: 12 mL/hr at 05/12/21 0525, 25 mcg/kg/min at 05/12/21 0525    budesonide (PULMICORT) 500 mcg/2 ml nebulizer suspension, 500 mcg, Nebulization, BID RT, Vishnu Hwang MD, 500 mcg at 05/12/21 0731    DOPamine (INTROPIN) 800 mg in dextrose 5% 250 mL infusion, 0-20 mcg/kg/min, IntraVENous, TITRATE, Isai Mendoza MD, Last Rate: 11.2 mL/hr at 05/10/21 2200, 7.5 mcg/kg/min at 05/10/21 2200    furosemide (LASIX) injection 40 mg, 40 mg, IntraVENous, DAILY, Noé Bunch MD, 40 mg at 05/12/21 0920    aspirin chewable tablet 81 mg, 81 mg, Oral, DAILY, Noé Bunch MD, 81 mg at 05/12/21 0919    ticagrelor (BRILINTA) tablet 90 mg, 90 mg, Oral, Q12H, Noé Bunch MD, 90 mg at 05/12/21 0919    sodium chloride (NS) flush 5-40 mL, 5-40 mL, IntraVENous, Q8H, Noé Bunch MD, 10 mL at 05/12/21 0600    sodium chloride (NS) flush 5-40 mL, 5-40 mL, IntraVENous, PRN, Noé Bunch MD    predniSONE (DELTASONE) tablet 10 mg, 10 mg, Oral, DAILY WITH BREAKFAST, Serafin Urban MD, 10 mg at 05/12/21 1980    hydrOXYzine pamoate (VISTARIL) capsule 25 mg, 25 mg, Oral, Q6H PRN, Serafin Urban MD, 25 mg at 05/08/21 0034    piperacillin-tazobactam (ZOSYN) 3.375 g in 0.9% sodium chloride (MBP/ADV) 100 mL MBP, 3.375 g, IntraVENous, Q8H, Beny Cheung MD, Last Rate: 25 mL/hr at 05/12/21 0925, 3.375 g at 05/12/21 0925    escitalopram oxalate (LEXAPRO) tablet 10 mg, 10 mg, Oral, QHS, Beny Cheung MD, 10 mg at 05/11/21 2215    levothyroxine (SYNTHROID) tablet 150 mcg, 150 mcg, Oral, ACB, Serafin Urban MD, 150 mcg at 05/12/21 0919    insulin glargine (LANTUS) injection 60 Units, 60 Units, SubCUTAneous, QHS, Beny Cheung MD, 60 Units at 05/11/21 2227    atorvastatin (LIPITOR) tablet 10 mg, 10 mg, Oral, QHS, Beny Cheung MD, 10 mg at 05/11/21 2215    insulin lispro (HUMALOG) injection, , SubCUTAneous, AC&HS, Serafin Urban MD, 6 Units at 05/12/21 0915    glucose chewable tablet 16 g, 4 Tab, Oral, PRN, Sky Cheung MD    dextrose (D50W) injection syrg 12.5-25 g, 25-50 mL, IntraVENous, PRN, Sky Cheung MD    glucagon (GLUCAGEN) injection 1 mg, 1 mg, IntraMUSCular, PRN, Sky Cheung MD    acetaminophen (TYLENOL) tablet 650 mg, 650 mg, Oral, Q6H PRN, 650 mg at 05/12/21 1043 **OR** acetaminophen (TYLENOL) suppository 650 mg, 650 mg, Rectal, Q6H PRN, Sky Cheung MD    polyethylene glycol (MIRALAX) packet 17 g, 17 g, Oral, DAILY PRN, Sky Cheung MD    ondansetron (ZOFRAN ODT) tablet 4 mg, 4 mg, Oral, Q8H PRN **OR** ondansetron (ZOFRAN) injection 4 mg, 4 mg, IntraVENous, Q6H PRN, Beny Cheung MD    enoxaparin (LOVENOX) injection 40 mg, 40 mg, SubCUTAneous, DAILY, Beny Cheung MD, 40 mg at 05/12/21 3055

## 2021-05-13 LAB
ALBUMIN SERPL-MCNC: 2 G/DL (ref 3.5–5)
ALBUMIN/GLOB SERPL: 0.6 {RATIO} (ref 1.1–2.2)
ALP SERPL-CCNC: 51 U/L (ref 45–117)
ALT SERPL-CCNC: 22 U/L (ref 12–78)
ANION GAP SERPL CALC-SCNC: 5 MMOL/L (ref 5–15)
AST SERPL W P-5'-P-CCNC: 13 U/L (ref 15–37)
BASOPHILS # BLD: 0.1 K/UL (ref 0–0.1)
BASOPHILS NFR BLD: 0 % (ref 0–1)
BILIRUB SERPL-MCNC: 0.4 MG/DL (ref 0.2–1)
BNP SERPL-MCNC: 7126 PG/ML
BUN SERPL-MCNC: 56 MG/DL (ref 6–20)
BUN/CREAT SERPL: 35 (ref 12–20)
CA-I BLD-MCNC: 8.1 MG/DL (ref 8.5–10.1)
CHLORIDE SERPL-SCNC: 108 MMOL/L (ref 97–108)
CO2 SERPL-SCNC: 30 MMOL/L (ref 21–32)
CREAT SERPL-MCNC: 1.62 MG/DL (ref 0.55–1.02)
DIFFERENTIAL METHOD BLD: ABNORMAL
EOSINOPHIL # BLD: 0.6 K/UL (ref 0–0.4)
EOSINOPHIL NFR BLD: 3 % (ref 0–7)
ERYTHROCYTE [DISTWIDTH] IN BLOOD BY AUTOMATED COUNT: 14.1 % (ref 11.5–14.5)
GLOBULIN SER CALC-MCNC: 3.1 G/DL (ref 2–4)
GLUCOSE BLD STRIP.AUTO-MCNC: 107 MG/DL (ref 65–117)
GLUCOSE BLD STRIP.AUTO-MCNC: 247 MG/DL (ref 65–117)
GLUCOSE BLD STRIP.AUTO-MCNC: 295 MG/DL (ref 65–117)
GLUCOSE BLD STRIP.AUTO-MCNC: 429 MG/DL (ref 65–117)
GLUCOSE SERPL-MCNC: 80 MG/DL (ref 65–100)
HCT VFR BLD AUTO: 24.3 % (ref 35–47)
HGB BLD-MCNC: 8 G/DL (ref 11.5–16)
IMM GRANULOCYTES # BLD AUTO: 0.2 K/UL (ref 0–0.04)
IMM GRANULOCYTES NFR BLD AUTO: 1 % (ref 0–0.5)
LYMPHOCYTES # BLD: 1.5 K/UL (ref 0.8–3.5)
LYMPHOCYTES NFR BLD: 7 % (ref 12–49)
MCH RBC QN AUTO: 31.7 PG (ref 26–34)
MCHC RBC AUTO-ENTMCNC: 32.9 G/DL (ref 30–36.5)
MCV RBC AUTO: 96.4 FL (ref 80–99)
MONOCYTES # BLD: 1.5 K/UL (ref 0–1)
MONOCYTES NFR BLD: 7 % (ref 5–13)
NEUTS SEG # BLD: 19.2 K/UL (ref 1.8–8)
NEUTS SEG NFR BLD: 82 % (ref 32–75)
NRBC # BLD: 0 K/UL (ref 0–0.01)
NRBC BLD-RTO: 0 PER 100 WBC
PERFORMED BY, TECHID: ABNORMAL
PERFORMED BY, TECHID: NORMAL
PLATELET # BLD AUTO: 323 K/UL (ref 150–400)
PMV BLD AUTO: 11.6 FL (ref 8.9–12.9)
POTASSIUM SERPL-SCNC: 3.2 MMOL/L (ref 3.5–5.1)
PROT SERPL-MCNC: 5.1 G/DL (ref 6.4–8.2)
RBC # BLD AUTO: 2.52 M/UL (ref 3.8–5.2)
SODIUM SERPL-SCNC: 143 MMOL/L (ref 136–145)
WBC # BLD AUTO: 23.1 K/UL (ref 3.6–11)

## 2021-05-13 PROCEDURE — 74011250637 HC RX REV CODE- 250/637: Performed by: FAMILY MEDICINE

## 2021-05-13 PROCEDURE — 74011250636 HC RX REV CODE- 250/636: Performed by: FAMILY MEDICINE

## 2021-05-13 PROCEDURE — 97530 THERAPEUTIC ACTIVITIES: CPT

## 2021-05-13 PROCEDURE — 85025 COMPLETE CBC W/AUTO DIFF WBC: CPT

## 2021-05-13 PROCEDURE — 92526 ORAL FUNCTION THERAPY: CPT

## 2021-05-13 PROCEDURE — 74011000250 HC RX REV CODE- 250: Performed by: INTERNAL MEDICINE

## 2021-05-13 PROCEDURE — 80053 COMPREHEN METABOLIC PANEL: CPT

## 2021-05-13 PROCEDURE — 65270000029 HC RM PRIVATE

## 2021-05-13 PROCEDURE — 94640 AIRWAY INHALATION TREATMENT: CPT

## 2021-05-13 PROCEDURE — 36415 COLL VENOUS BLD VENIPUNCTURE: CPT

## 2021-05-13 PROCEDURE — 74011250637 HC RX REV CODE- 250/637: Performed by: INTERNAL MEDICINE

## 2021-05-13 PROCEDURE — 83880 ASSAY OF NATRIURETIC PEPTIDE: CPT

## 2021-05-13 PROCEDURE — 82962 GLUCOSE BLOOD TEST: CPT

## 2021-05-13 PROCEDURE — 74011250636 HC RX REV CODE- 250/636: Performed by: INTERNAL MEDICINE

## 2021-05-13 PROCEDURE — 74011000258 HC RX REV CODE- 258: Performed by: FAMILY MEDICINE

## 2021-05-13 PROCEDURE — 74011636637 HC RX REV CODE- 636/637: Performed by: FAMILY MEDICINE

## 2021-05-13 RX ORDER — POTASSIUM CHLORIDE 1.5 G/1.77G
40 POWDER, FOR SOLUTION ORAL ONCE
Status: COMPLETED | OUTPATIENT
Start: 2021-05-13 | End: 2021-05-13

## 2021-05-13 RX ORDER — FUROSEMIDE 40 MG/1
40 TABLET ORAL DAILY
Status: DISCONTINUED | OUTPATIENT
Start: 2021-05-14 | End: 2021-05-19 | Stop reason: HOSPADM

## 2021-05-13 RX ADMIN — TICAGRELOR 90 MG: 90 TABLET ORAL at 21:54

## 2021-05-13 RX ADMIN — PREDNISONE 10 MG: 5 TABLET ORAL at 08:06

## 2021-05-13 RX ADMIN — INSULIN LISPRO 2 UNITS: 100 INJECTION, SOLUTION INTRAVENOUS; SUBCUTANEOUS at 21:54

## 2021-05-13 RX ADMIN — PIPERACILLIN AND TAZOBACTAM 3.38 G: 3; .375 INJECTION, POWDER, LYOPHILIZED, FOR SOLUTION INTRAVENOUS at 08:04

## 2021-05-13 RX ADMIN — Medication 10 ML: at 22:01

## 2021-05-13 RX ADMIN — HYDROXYZINE PAMOATE 25 MG: 25 CAPSULE ORAL at 22:01

## 2021-05-13 RX ADMIN — ASPIRIN 81 MG CHEWABLE TABLET 81 MG: 81 TABLET CHEWABLE at 08:06

## 2021-05-13 RX ADMIN — FUROSEMIDE 40 MG: 10 INJECTION, SOLUTION INTRAMUSCULAR; INTRAVENOUS at 08:05

## 2021-05-13 RX ADMIN — INSULIN LISPRO 15 UNITS: 100 INJECTION, SOLUTION INTRAVENOUS; SUBCUTANEOUS at 15:44

## 2021-05-13 RX ADMIN — POTASSIUM CHLORIDE 40 MEQ: 1.5 FOR SOLUTION ORAL at 09:27

## 2021-05-13 RX ADMIN — PIPERACILLIN AND TAZOBACTAM 3.38 G: 3; .375 INJECTION, POWDER, LYOPHILIZED, FOR SOLUTION INTRAVENOUS at 16:42

## 2021-05-13 RX ADMIN — Medication 10 ML: at 08:06

## 2021-05-13 RX ADMIN — Medication 10 ML: at 14:19

## 2021-05-13 RX ADMIN — ESCITALOPRAM OXALATE 10 MG: 10 TABLET ORAL at 21:54

## 2021-05-13 RX ADMIN — TICAGRELOR 90 MG: 90 TABLET ORAL at 09:27

## 2021-05-13 RX ADMIN — BUDESONIDE 500 MCG: 0.5 INHALANT RESPIRATORY (INHALATION) at 07:23

## 2021-05-13 RX ADMIN — FAMOTIDINE 20 MG: 20 TABLET ORAL at 08:06

## 2021-05-13 RX ADMIN — LEVOTHYROXINE SODIUM 150 MCG: 75 TABLET ORAL at 06:50

## 2021-05-13 RX ADMIN — ENOXAPARIN SODIUM 40 MG: 40 INJECTION SUBCUTANEOUS at 08:05

## 2021-05-13 RX ADMIN — BUDESONIDE 500 MCG: 0.5 INHALANT RESPIRATORY (INHALATION) at 19:43

## 2021-05-13 RX ADMIN — FAMOTIDINE 20 MG: 20 TABLET ORAL at 21:54

## 2021-05-13 RX ADMIN — ATORVASTATIN CALCIUM 10 MG: 10 TABLET, FILM COATED ORAL at 21:54

## 2021-05-13 RX ADMIN — INSULIN LISPRO 10 UNITS: 100 INJECTION, SOLUTION INTRAVENOUS; SUBCUTANEOUS at 12:56

## 2021-05-13 RX ADMIN — INSULIN GLARGINE 60 UNITS: 100 INJECTION, SOLUTION SUBCUTANEOUS at 21:54

## 2021-05-13 NOTE — PROGRESS NOTES
TRANSFER - OUT REPORT:    Verbal report given to Serafin Weaver on Isatu Waters  being transferred to [address] [place] [address] room 985  255 for routine progression of care       Report consisted of patients Situation, Background, Assessment and   Recommendations(SBAR). Information from the following report(s) SBAR, Kardex, ED Summary, Recent Results, Med Rec Status and Cardiac Rhythm ventricular paced was reviewed with the receiving nurse. Lines:   Triple Lumen 05/10/21 Right Internal jugular (Active)   Central Line Being Utilized Yes 05/13/21 1133   Criteria for Appropriate Use Limited/no vessel suitable for conventional peripheral access 05/13/21 1133   Site Assessment Clean, dry, & intact 05/13/21 1133   Infiltration Assessment 0 05/13/21 1133   Affected Extremity/Extremities Color distal to insertion site pink (or appropriate for race) 05/13/21 1133   Date of Last Dressing Change 05/10/21 05/13/21 0306   Dressing Status Clean, dry, & intact 05/13/21 1133   Dressing Type Transparent;Tape;Stabilization/securement device 05/13/21 1133   Action Taken Blood drawn 05/13/21 1133   Proximal Hub Color/Line Status Flushed;Patent;Cap end changed; Capped 05/13/21 1133   Positive Blood Return (Medial Site) Yes 05/13/21 1133   Medial Hub Color/Line Status Flushed;Cap end changed; Capped; Patent 05/13/21 1133   Positive Blood Return (Lateral Site) Yes 05/13/21 1133   Distal Hub Color/Line Status Infusing;Flushed;Patent 05/13/21 1133   Positive Blood Return (Site #3) Yes 05/13/21 1133   Alcohol Cap Used Yes 05/13/21 1133        Opportunity for questions and clarification was provided.       Patient transported with:   Monitor  Registered Nurse  Tech

## 2021-05-13 NOTE — PROGRESS NOTES
Progress Note    Patient: Nikky Christiansen MRN: [id number]  SSN: [id number]    YOB: 1940  Age: 80 y.o. Sex: female      Admit Date: 5/4/2021    LOS: 9 days     Subjective:     No acute events overnight. She is just complaining of some congestion in the chest.    Objective:     Vitals:    05/13/21 0300 05/13/21 0400 05/13/21 0500 05/13/21 0546   BP: 116/78 (!) 124/48 (!) 127/49    Pulse: (!) 103 100 (!) 103    Resp: 22 19 17    Temp: 97.7 °F (36.5 °C)      SpO2: 98% 99% 99%    Weight:    76.3 kg (168 lb 3.4 oz)   Height:            Intake and Output:  Current Shift: 05/12 1901 - 05/13 0700  In: 300 [P.O.:300]  Out: 225 [Urine:225]  Last three shifts: 05/11 0701 - 05/12 1900  In: -   Out: 2300 [Urine:2300]    Physical Exam:   General:   Patient is extubated   Eyes:  Conjunctivae/corneas clear. PERRL, EOMs intact. Fundi benign   Ears:  Normal TMs and external ear canals both ears. Nose: Nares normal. Septum midline. Mucosa normal. No drainage or sinus tenderness. Mouth/Throat: Lips, mucosa, and tongue normal. Teeth and gums normal.   Neck: Supple, symmetrical, trachea midline, no adenopathy, thyroid: no enlargment/tenderness/nodules, no carotid bruit and no JVD. Back:   Symmetric, no curvature. ROM normal. No CVA tenderness. Lungs:   Clear to auscultation bilaterally. Heart:   Ejection systolic murmur in the aortic area   Abdomen:   Soft, non-tender. Bowel sounds normal. No masses,  No organomegaly. Extremities: Extremities normal, atraumatic, no cyanosis or edema. Pulses: 2+ and symmetric all extremities. Skin: Skin color, texture, turgor normal. No rashes or lesions   Lymph nodes: Cervical, supraclavicular, and axillary nodes normal.   Neurologic:  Moving her upper extremities. Lab/Data Review: All lab results for the last 24 hours reviewed.          Assessment:     Active Problems:    CHF (congestive heart failure) ([address] (5/4/2021)      COPD (chronic obstructive pulmonary disease) ([facility] [address] (5/4/2021)    Mrs. Deyanira Cisneros is a 72-year-old white female with:  1. Heart failure with decompensation  2. Diabetes mellitus  3. Hypertension  4. Hypothyroidism  5. Depression  6. COPD  7. Right bundle branch block with left episode  8. Hyponatremia  9. Acute kidney injury  10. Peripheral vascular disease  11. Right upper lobe opacity  12. Fracture deformity of proximal left humerus    Plan:     She is currently paced. Okay to transfer out of the ICU from cardiac standpoint. Continue dual antiplatelet therapy. She is off dopamine. Continue atorvastatin. Currently on IV Lasix. We will consider starting metoprolol tomorrow.     Signed By: Diana Hayden MD     May 13, 2021

## 2021-05-13 NOTE — PROGRESS NOTES
SPEECH LANGUAGE PATHOLOGY DYSPHAGIA TREATMENT  Patient: Myesha Ram (20 y.o. female)  Date: 5/13/2021  Diagnosis: CHF (congestive heart failure) ([facility]) [I50.9]  COPD (chronic obstructive pulmonary disease) ([facility] [J44.9] <principal problem not specified>  Procedure(s) (LRB):  INSERT PPM BIV MULTI (N/A) 2 Days Post-Op  Precautions:      ASSESSMENT :  Patient sitting in chair just finished working w/ PT/OT. Results of CT head noted. L facial droop persists. Notified MD of concerns and recs for neuro consult. Patient continues w/ slow processing and difficulty w/ complex commands. PO trials of thin, NTL and solids. Oral phase w/ improved bolus control, mild reduction in mastication, and mild oral residue of solids she independently clears. Pharyngeal phase appears largely wfl. Overt s/s of pen/asp c/b throat clear and delayed cough w/ thin trials. Patient will benefit from skilled intervention to address the above impairments. Patients rehabilitation potential is considered to be Good     PLAN :  Recommendations and Planned Interventions:  Rec chopped/NTL w/ strict asp/GERD precautions. Rec MBS to further assess oropharyngeal phase of swallow. Rec cognitive linguistic eval. Rec neuro consult. Frequency/Duration: Patient will be followed by speech-language pathology 5 times a week to address goals. Discharge Recommendations: Inpatient Rehab     SUBJECTIVE:   Patient reports she feels good sitting in chair. OBJECTIVE:     Past Medical History:   Diagnosis Date    Depression     DM (diabetes mellitus) ([facility]     Hyperlipidemia     Hypertension     Pancreatitis     SOB (shortness of breath)     Thyroid disease        CT HEAD WO CONT   Final Result   No acute intracranial process. Sinus disease as above. Opacification of the right mastoid air cells. Other findings as above. XR CHEST PORT   Final Result      XR CHEST PORT   Final Result   New right internal jugular catheter.   Correlate with function prior   to usage. No pneumothorax. Hydrostatic edema. Diffuse opacities in lungs,   increased on the right. Pleural effusions. IR INSERT NON TUNL CVC OVER 5 YRS   Final Result   Successful placement of a triple-lumen central venous catheter. The   catheter is ready for use. IR FLUORO GUIDE PLC CVAD   Final Result   Successful placement of a triple-lumen central venous catheter. The   catheter is ready for use. IR US GUIDED VASCULAR ACCESS   Final Result   Successful placement of a triple-lumen central venous catheter. The   catheter is ready for use. XR CHEST PORT   Final Result   Hydrostatic edema and diffuse opacities in the lungs, improved. Pleural effusions. XR CHEST PORT   Final Result      XR CHEST SNGL V   Final Result      XR CHEST PORT    (Results Pending)         Current Diet:  DIET TUBE FEEDING  DIET FULL LIQUID     Cognitive and Communication Status:  Neurologic State: Alert  Orientation Level: Oriented X4  Cognition: Decreased attention/concentration, Impaired decision making  Perception: Appears intact  Perseveration: No perseveration noted  Safety/Judgement: Decreased awareness of environment            Pain:  Pain Scale 1: Numeric (0 - 10)  Pain Intensity 1: 0     After treatment:   Patient left in no apparent distress in bed, Call bell within reach, Nursing notified, and Caregiver / family present     COMMUNICATION/EDUCATION:   Patient and son educated regarding s/s aspiration, aspiration precautions, swallow strategies, and diet recs. They demonstrated Good understanding as evidenced by engagement and appropriate questions. The patient's plan of care including recommendations, planned interventions, and recommended diet changes were discussed with: Registered nurse and Physician. Patient/family agree to work toward stated goals and plan of care.     Thank you for this referral.  Sergo Hale M.S., M.Ed., CCC-SLP  Time Calculation: 18 mins    Problem: Dysphagia (Adult)  Goal: *Acute Goals and Plan of Care (Insert Text)  Description: Speech Therapy Swallow Goals  Initiated 5/12/2021  -Patient will tolerate full diet with nectar thick liquids without clinical indicators of aspiration given minimal cues within 7 day(s). -Patient will tolerate PO trials without clinical indicators of aspiration given minimal cues within 7 day(s). -Patient will participate in modified barium swallow study within 7 day(s). -Patient will demonstrate understanding of swallow safety precautions and aspiration precautions, diet recs with minimal cues within 7 day(s).              Outcome: Progressing Towards Goal

## 2021-05-13 NOTE — PROGRESS NOTES
Hospitalist Progress Note    Subjective:     Kelvin Barksdale is a 81 yo female with a PMHx significant for DM, HLD, HTN, thyroid disease, and depression, who presents to the ED with shortness of breath for the past 1-2 days due to a severe acute exacerbation of COPD, acute hypoxic/hypercapnic respiratory failure, and CHF. She also has a bilateral pleural effusion (worse on left), interstitial edema (cardiogenic or infectious cause), and hyponatremia.   ___________________________________________________________       patient had V. fib had  shock and intubated temporary pacemaker placed yesterday and cardiac cath shows was patent stent and today successful biventricular pacing implant successful removal of temporary pace wiring        Cardiac cath in place 1 stent  Patient have significant severe aortic stenosis      Patient sitting in the recliner alert awake denies any chest pain shortness of breath nausea no vomiting    Past Medical History:   Diagnosis Date    Depression     DM (diabetes mellitus) ([address]     Hyperlipidemia     Hypertension     Pancreatitis     SOB (shortness of breath)     Thyroid disease       Past Surgical History:   Procedure Laterality Date    HX HYSTERECTOMY      IR FLUORO GUIDE PLC CVAD  5/10/2021    IR INSERT NON TUNL CVC OVER 5 YRS  5/10/2021    [place] INS NEW/RPLCMT PRM PM W/TRANSV ELTRD ATRIAL&VENT N/A 5/11/2021    INSERT PPM BIV MULTI performed by Josiah Beal MD at [address] [place] [address]     History reviewed. No pertinent family history. Social History     Tobacco Use    Smoking status: Never Smoker    Smokeless tobacco: Never Used   Substance Use Topics    Alcohol use: No       Prior to Admission medications    Medication Sig Start Date End Date Taking? Authorizing Provider   Insulin Needles, Disposable, 31 X 5/16 \" Ndle by Does Not Apply route.  10/11/10   Magdalena Velarde MD   insulin lispro, human, (HUMALOG KWIKPEN) 100 unit/mL flexpen 15 Units by SubCUTAneous route three (3) times daily (with meals). 10/13/10   Chris Perez MD   benazepril (LOTENSIN) 40 mg tablet Take 40 mg by mouth daily. 10/11/10   Provider, Historical   LEXAPRO 10 mg tablet Take 10 mg by mouth nightly. 10/11/10   Provider, Historical   famotidine (PEPCID) 20 mg tablet Take 20 mg by mouth two (2) times a day. 10/11/10   Provider, Historical   gabapentin (NEURONTIN) 400 mg capsule  9/3/10   Provider, Historical   hydrocodone-acetaminophen (NORCO) 5-325 mg per tablet Take 1 Tab by mouth every six (6) hours as needed. 10/11/10   Provider, Historical   levothyroxine (SYNTHROID) 150 mcg tablet Take 150 mcg by mouth daily (before breakfast). 10/11/10   Provider, Historical   lorazepam (ATIVAN) 1 mg tablet  8/2/10   Provider, Historical   lovastatin (MEVACOR) 40 mg tablet Take 40 mg by mouth nightly. 10/11/10   Provider, Historical   metoprolol (LOPRESSOR) 100 mg tablet Take 100 mg by mouth two (2) times a day. 10/11/10   Provider, Historical   oxycodone-acetaminophen (PERCOCET 10)  mg per tablet  8/2/10   Provider, Historical   insulin glargine (LANTUS) 100 unit/mL injection 60 Units by SubCUTAneous route nightly. 10/11/10   Chris Perez MD     Allergies   Allergen Reactions    Nortriptyline Itching    Cymbalta [Duloxetine] Itching    Ivp [Fd And C Blue No.1] Hives    Morphine Itching    Tetracycline Itching        Review of Systems:  Intubated    Objective: Intake and Output:    05/13 0701 - 05/13 1900  In: -   Out: 250 [Urine:250]  05/11 1901 - 05/13 0700  In: 300 [P.O.:300]  Out: 1075 [Urine:1075]    Physical Exam:   Visit Vitals  BP (!) 125/51 (BP 1 Location: Right upper arm, BP Patient Position: At rest)   Pulse (!) 105   Temp 98.5 °F (36.9 °C)   Resp 20   Ht 5' 1\" (1.549 m)   Wt 76.3 kg (168 lb 3.4 oz)   SpO2 99%   BMI 31.78 kg/m²     General:   On ventilator s. Head:  Normocephalic, without obvious abnormality, atraumatic. Eyes:  Conjunctivae/corneas clear.  Pupils equal, round, reactive to light. Extraocular movements intact. Lungs:    Decreased breath sounds , crackles, wheezing. Chest wall:  No tenderness or deformity. Heart:  Regular rate and rhythm, S1, S2 normal, no murmur, click, rub, or gallop. Possible murmur, difficult to auscultate with BiPAP   Abdomen:   Soft, non-tender. Bowel sounds normal. No masses. No organomegaly. Extremities: Extremities normal, atraumatic, no cyanosis. 1+ pitting edema of lower extremities    Pulses: 2+ and symmetric all extremities. Skin: Skin color, texture, turgor normal. No rashes or lesions. Lymph nodes: Cervical, supraclavicular, and axillary nodes normal.   Neurologic: CNII-XII intact. Normal strength, sensation, and reflexes throughout.        ECG:  ** Poor data quality, interpretation may be adversely affected**   Normal sinus rhythm   Right bundle branch block   Left anterior fascicular block   ** Bifascicular block **   Left ventricular hypertrophy with repolarization abnormality   Cannot rule out Septal infarct , age undetermined     Data Review:   Recent Results (from the past 24 hour(s))   EKG, 12 LEAD, INITIAL    Collection Time: 05/04/21 10:24 AM   Result Value Ref Range    Ventricular Rate 98 BPM    Atrial Rate 98 BPM    P-R Interval 168 ms    QRS Duration 152 ms    Q-T Interval 416 ms    QTC Calculation (Bezet) 531 ms    Calculated P Axis 68 degrees    Calculated R Axis -52 degrees    Calculated T Axis 94 degrees    Diagnosis       ** Poor data quality, interpretation may be adversely affected  Normal sinus rhythm  Right bundle branch block  Left anterior fascicular block  ** Bifascicular block **  Left ventricular hypertrophy with repolarization abnormality  Cannot rule out Septal infarct , age undetermined  Abnormal ECG  No previous ECGs available  Confirmed by Marii Francis MD, Yanira Frankel ([id number]) on 5/4/2021 12:13:56 PM     SARS-COV-2    Collection Time: 05/04/21 10:30 AM   Result Value Ref Range    SARS-CoV-2 Please find results under separate order     COVID-19 RAPID TEST    Collection Time: 05/04/21 10:30 AM   Result Value Ref Range    Specimen source Nasopharyngeal      COVID-19 rapid test Not Detected Not Detected     LACTIC ACID    Collection Time: 05/04/21 11:12 AM   Result Value Ref Range    Lactic acid 0.6 0.4 - 2.0 mmol/L   MAGNESIUM    Collection Time: 05/04/21 11:12 AM   Result Value Ref Range    Magnesium 1.8 1.6 - 2.4 mg/dL   METABOLIC PANEL, COMPREHENSIVE    Collection Time: 05/04/21 11:12 AM   Result Value Ref Range    Sodium 129 (L) 136 - 145 mmol/L    Potassium 4.2 3.5 - 5.1 mmol/L    Chloride 97 97 - 108 mmol/L    CO2 28 21 - 32 mmol/L    Anion gap 4 (L) 5 - 15 mmol/L    Glucose 182 (H) 65 - 100 mg/dL    BUN 25 (H) 6 - 20 mg/dL    Creatinine 0.88 0.55 - 1.02 mg/dL    BUN/Creatinine ratio 28 (H) 12 - 20      GFR est AA >60 >60 ml/min/1.73m2    GFR est non-AA >60 >60 ml/min/1.73m2    Calcium 8.2 (L) 8.5 - 10.1 mg/dL    Bilirubin, total 0.4 0.2 - 1.0 mg/dL    AST (SGOT) 13 (L) 15 - 37 U/L    ALT (SGPT) 17 12 - 78 U/L    Alk.  phosphatase 68 45 - 117 U/L    Protein, total 6.5 6.4 - 8.2 g/dL    Albumin 3.0 (L) 3.5 - 5.0 g/dL    Globulin 3.5 2.0 - 4.0 g/dL    A-G Ratio 0.9 (L) 1.1 - 2.2     BNP    Collection Time: 05/04/21 11:12 AM   Result Value Ref Range    NT pro-BNP 7,360 (H) <450 pg/mL   PROCALCITONIN    Collection Time: 05/04/21 11:12 AM   Result Value Ref Range    Procalcitonin <0.05 (H) 0 ng/mL   TROPONIN I    Collection Time: 05/04/21 11:12 AM   Result Value Ref Range    Troponin-I, Qt. <0.05 <0.05 ng/mL   TSH 3RD GENERATION    Collection Time: 05/04/21 11:12 AM   Result Value Ref Range    TSH 1.62 0.36 - 3.74 uIU/mL   CBC WITH AUTOMATED DIFF    Collection Time: 05/04/21 11:12 AM   Result Value Ref Range    WBC 14.2 (H) 3.6 - 11.0 K/uL    RBC 3.13 (L) 3.80 - 5.20 M/uL    HGB 10.0 (L) 11.5 - 16.0 g/dL    HCT 30.8 (L) 35.0 - 47.0 %    MCV 98.4 80.0 - 99.0 FL    MCH 31.9 26.0 - 34.0 PG    MCHC 32.5 30.0 - 36.5 g/dL    RDW 13.6 11.5 - 14.5 %    PLATELET 044 942 - 201 K/uL    MPV 11.7 8.9 - 12.9 FL    NRBC 0.0 0.0  WBC    ABSOLUTE NRBC 0.00 0.00 - 0.01 K/uL    NEUTROPHILS 87 (H) 32 - 75 %    LYMPHOCYTES 6 (L) 12 - 49 %    MONOCYTES 5 5 - 13 %    EOSINOPHILS 1 0 - 7 %    BASOPHILS 1 0 - 1 %    IMMATURE GRANULOCYTES 0 0 - 0.5 %    ABS. NEUTROPHILS 12.4 (H) 1.8 - 8.0 K/UL    ABS. LYMPHOCYTES 0.8 0.8 - 3.5 K/UL    ABS. MONOCYTES 0.7 0.0 - 1.0 K/UL    ABS. EOSINOPHILS 0.1 0.0 - 0.4 K/UL    ABS. BASOPHILS 0.1 0.0 - 0.1 K/UL    ABS. IMM. GRANS. 0.1 (H) 0.00 - 0.04 K/UL    DF AUTOMATED     PROTHROMBIN TIME + INR    Collection Time: 05/04/21 11:25 AM   Result Value Ref Range    Prothrombin time 13.6 11.9 - 14.7 sec    INR 1.1 0.9 - 1.1     PTT    Collection Time: 05/04/21 11:25 AM   Result Value Ref Range    aPTT 21.8 21.2 - 34.1 sec    aPTT, therapeutic range   82 - 109 sec   D DIMER    Collection Time: 05/04/21 11:25 AM   Result Value Ref Range    D DIMER 0.50 (H) <0.50 ug/ml(FEU)   BLOOD GAS, ARTERIAL    Collection Time: 05/04/21 11:25 AM   Result Value Ref Range    pH 7.36 7.35 - 7.45      PCO2 52 (H) 35 - 45 mmHg    PO2 72 (L) 75 - 100 mmHg    O2 SAT 93 (L) >95 %    BICARBONATE 27 (H) 22 - 26 mmol/L    BASE EXCESS 2.9 (H) 0 - 2 mmol/L    O2 METHOD Nasal Cannula      O2 FLOW RATE 6 L/min    SITE Right Radial      EBEN'S TEST PASS         Chest x-ray:  More conspicuous RUL opacity. Patchy mid and lower lung reticular markings  persist. Cardiac and mediastinal structures unchanged. Thoracic aorta  atherosclerosis. No pneumothorax. Probable small dependent bilateral pleural  effusions.     Assessment:     Static post V. Fib/received shocks x2/ intubated and extubated  Static post pacemaker   CAD status post stent LAD  Severe aortic stenosis  Acute hypoxemic respiratory failure  COPD exacerbation     Hyponatremia    Congestive heart failure    Pleural effusions     Anemia      Leukocytosis     Diabetes mellitus      Hypertension Hypothyroidism      Depression     Echo done shows ejection fraction of 40 to 45% and moderate grade 2 diastolic dysfunction    Dopplers negative for DVT    Toes are blue poor pulse ordered arterial Doppler    Hypokalemia replace potassium  Plan:     Continue medications:   Duo-meb 3mL neb q6hrs  ASA 325mg po qday   Lipitor 10mg po qhs  Enoxaparin 40mg subq qday   Lexapro 10mg po qhs  Famotidine 20mg po bid   Change Lasix to p.o.   Lantus 60 units subq qhs  Humalog subq qid   Synthroid 150mcg po qam   Lisinopril 40mg po qday (on hold due to kidney fx)   Discontinue Solu-Medrol  Start on prednisone 10 mg daily  Zosyn 3.375mg IV q8hrs   Brilinta 90 mg twice a day  Replace potassium   monitor renal function      Arterial Doppler   right lower extremity has diffuse mixture of mild to moderate echodense plaque  Right ankle-brachial index mild peripheral vascular disease  Lower extremity mild peripheral vascular disease  PT OT consult      Transfer to [facility].        Current Facility-Administered Medications:     famotidine (PEPCID) tablet 20 mg, 20 mg, Per NG tube, BID, Beny Cheung MD, 20 mg at 05/13/21 0806    albuterol-ipratropium (DUO-NEB) 2.5 MG-0.5 MG/3 ML, 3 mL, Nebulization, Q6H PRN, Arlet Cheung MD    EPINEPHrine (ADRENALIN) 0.1 mg/mL syringe, , , CODE BLUE, Shannon Loera MD, 1 mg at 05/10/21 1416    propofol (DIPRIVAN) 10 mg/mL infusion, 0-50 mcg/kg/min, IntraVENous, TITRATE, Shannon Loera MD, Last Rate: 12 mL/hr at 05/12/21 0525, 25 mcg/kg/min at 05/12/21 0525    budesonide (PULMICORT) 500 mcg/2 ml nebulizer suspension, 500 mcg, Nebulization, BID RT, Vishnu Hwang MD, 500 mcg at 05/13/21 0723    DOPamine (INTROPIN) 800 mg in dextrose 5% 250 mL infusion, 0-20 mcg/kg/min, IntraVENous, TITRATE, Isai Mendoza MD, Last Rate: 11.2 mL/hr at 05/10/21 2200, 7.5 mcg/kg/min at 05/10/21 2200    furosemide (LASIX) injection 40 mg, 40 mg, IntraVENous, DAILY, Noé Bunch MD, 40 mg at 05/13/21 0805    aspirin chewable tablet 81 mg, 81 mg, Oral, DAILY, Noé Bunch MD, 81 mg at 05/13/21 0806    ticagrelor (BRILINTA) tablet 90 mg, 90 mg, Oral, Q12H, Noé Bunch MD, 90 mg at 05/13/21 0927    sodium chloride (NS) flush 5-40 mL, 5-40 mL, IntraVENous, Q8H, Noé Bunch MD, 10 mL at 05/13/21 0806    sodium chloride (NS) flush 5-40 mL, 5-40 mL, IntraVENous, PRN, Noé Bunch MD    predniSONE (DELTASONE) tablet 10 mg, 10 mg, Oral, DAILY WITH BREAKFAST, Beny Cheung MD, 10 mg at 05/13/21 0806    hydrOXYzine pamoate (VISTARIL) capsule 25 mg, 25 mg, Oral, Q6H PRN, Beny Cheung MD, 25 mg at 05/12/21 2239    piperacillin-tazobactam (ZOSYN) 3.375 g in 0.9% sodium chloride (MBP/ADV) 100 mL MBP, 3.375 g, IntraVENous, Q8H, Beny Cheung MD, Last Rate: 25 mL/hr at 05/13/21 0804, 3.375 g at 05/13/21 0804    escitalopram oxalate (LEXAPRO) tablet 10 mg, 10 mg, Oral, QHS, Beny Cheung MD, 10 mg at 05/12/21 2140    levothyroxine (SYNTHROID) tablet 150 mcg, 150 mcg, Oral, ACB, Beny Cheung MD, 150 mcg at 05/13/21 0650    insulin glargine (LANTUS) injection 60 Units, 60 Units, SubCUTAneous, QHS, Beny Cheung MD, 60 Units at 05/12/21 2140    atorvastatin (LIPITOR) tablet 10 mg, 10 mg, Oral, QHS, Beny Cheung MD, 10 mg at 05/12/21 2140    insulin lispro (HUMALOG) injection, , SubCUTAneous, AC&HS, Chester Cheung MD, Stopped at 05/13/21 0730    glucose chewable tablet 16 g, 4 Tab, Oral, PRN, Chester Cheung MD    dextrose (D50W) injection syrg 12.5-25 g, 25-50 mL, IntraVENous, PRN, Chester Cheung MD    glucagon (GLUCAGEN) injection 1 mg, 1 mg, IntraMUSCular, PRN, Chester Cheung MD    acetaminophen (TYLENOL) tablet 650 mg, 650 mg, Oral, Q6H PRN, 650 mg at 05/12/21 1043 **OR** acetaminophen (TYLENOL) suppository 650 mg, 650 mg, Rectal, Q6H PRN, Beny Cheung MD    polyethylene glycol (MIRALAX) packet 17 g, 17 g, Oral, DAILY PRN, Michele Sue MD    ondansetron [facility] ODT) tablet 4 mg, 4 mg, Oral, Q8H PRN **OR** ondansetron (ZOFRAN) injection 4 mg, 4 mg, IntraVENous, Q6H PRN, Beny Cheung MD    enoxaparin (LOVENOX) injection 40 mg, 40 mg, SubCUTAneous, DAILY, Beny Cheung MD, 40 mg at 05/13/21 416

## 2021-05-13 NOTE — PROGRESS NOTES
PHYSICAL THERAPY REEVALUATION  Patient: Johana Hassan (78 y.o. female)  Date: 5/13/2021  Primary Diagnosis: CHF (congestive heart failure) ([facility]) [I50.9]  COPD (chronic obstructive pulmonary disease) ([facility] [J44.9]  Procedure(s) (LRB):  INSERT PPM BIV MULTI (N/A) 2 Days Post-Op   Precautions: falls, LUE pacemaker precautions         ASSESSMENT  Pt is 81 y/o female came [facility] with SOB for past 1-2 days and adm 5/4/2021 for severe COPD exacerbation, acute hypoxic/hypercapnic respiratory failure, CHF, you pleural effusion worse on left, anemia, leukocytosis, hypothyroidism, interstitial edema, 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 Pt was transferred to ICU on 5/9/21 due to bradycardia. On 5/10/21 CODE BLUE initiated due to agonal respirations with vfib, pt was found to have complete heart block; pt was subsequently intubated and underwent temporary pacemaker placement in right groin. Temporary pacemaker removed and biventricular pacemaker placed on 5/11/21, pt extubated on 5/12/21. New PT orders received on 5/12/21 seen today for resumption of care following ICU transfer. PMH: DM, HLD, HTN, thyroid disease, depression. Pt received semi supine in bed A&O to all but month (believed it was April) and agreeable for PT/OT sessions. Per previous documentation, pt lives alone (son present stating he can stay with pt upon D/C)  in 2 story home (stays on first floor) with ramp to enter and is MI for self care using shower chair and functional transfers/mobility using cane (also has RW and rollator). Based on the objective data described below, patient currently presents with generalized deconditioning, poor activity tolerance, decreased balance, difficulty with progression of LLE during amb, increased verbal cueing required for left UE pacemaker precautions, difficulty with complex multi-step command following, intermittent confusion, increased need for A with amb and functional mobility/transfers. Patient required min to mod Ax2 additional time bed mobility, min to mod Ax2 with additional time sup > sit, min A scooting, mod A x2 with additional time sit <> stand transfers. Pt amb 5 feet with gt belt, no AD (due to pacemaker precautions, may benefit from subhash-walker next session) and mod Ax2; demonstrates WBOS with short, shuffling, unsteady gt pattern with increased difficulty with left LE progression. Pt did fair with session today requiring increased assistance for mobility and amb this session compared to previous evaluation. Pt continues to benefit from continued skilled PT services, continue to progress toward goals. Recommend discharge to IRF when medically appropriate. Current Level of Function Impacting Discharge (mobility/balance): min to mod AX2 with additional time    Other factors to consider for discharge: severity of deficits, acute medical state, I at baseline     Patient will benefit from skilled therapy intervention to address the above noted impairments. PLAN :  Recommendations and Planned Interventions: bed mobility training, transfer training, gait training, therapeutic exercises, neuromuscular re-education, patient and family training/education and therapeutic activities      Frequency/Duration: Patient will be followed by physical therapy:  5 times a week to address goals. Recommendation for discharge: (in order for the patient to meet his/her long term goals)  IRF    This discharge recommendation:  Has been made in collaboration with the attending provider and/or case management    Equipment recommendations for successful discharge (if) home: none         SUBJECTIVE:   Patient stated I'm feeling better.     OBJECTIVE DATA SUMMARY:   HISTORY:    Past Medical History:   Diagnosis Date    Depression     DM (diabetes mellitus) [facility]     Hyperlipidemia     Hypertension     Pancreatitis     SOB (shortness of breath)     Thyroid disease      Past Surgical History: Procedure Laterality Date    HX HYSTERECTOMY      IR FLUORO GUIDE PLC CVAD  5/10/2021    IR INSERT NON TUNL CVC OVER 5 YRS  5/10/2021    [place] INS NEW/RPLCMT PRM PM W/TRANSV ELTRD ATRIAL&VENT N/A 5/11/2021    INSERT PPM BIV MULTI performed by Jimbo Butts MD at [facility]: Private residence  # Steps to Enter: 3  Wheelchair Ramp: Yes  One/Two Story Residence: Two story, live on 1st floor  Lift Chair Available: Yes  Living Alone: Yes(son present stating he can stay with pt upon d/c)  Support Systems: Child(toshia)  Patient Expects to be Discharged to[de-identified] Private residence  Current DME Used/Available at Home: [address], [place], straight, Shower chair, Walker, rollator, Walker, rolling  Tub or Shower Type: Tub/Shower combination    EXAMINATION/PRESENTATION/DECISION MAKING:   Critical Behavior:  Neurologic State: Alert, Confused  Orientation Level: Oriented to person, Oriented to place, Oriented to situation(oriented to year but not month)  Cognition: Impaired decision making, Decreased command following  Safety/Judgement: Decreased awareness of environment  Hearing: Auditory  Auditory Impairment: Hard of hearing, right side(wears hearing assistive device )  Skin:  Redness noted along entire back; blanchable   Edema: none noted   Range Of Motion:  AROM: Generally decreased, functional                       Strength:    Strength: Generally decreased, functional(right hip &knee flexion 3/5, left 4/5; B hip & knee ext 5/5)                    Tone & Sensation:                  Sensation: Intact               Coordination:  Coordination: (unable to follow commands to test )     Functional Mobility:  Bed Mobility:  Rolling: Minimum assistance; Moderate assistance  Supine to Sit: Minimum assistance; Moderate assistance     Scooting: Minimum assistance; Moderate assistance  Transfers:  Sit to Stand: Moderate assistance;Assist x2; Additional time  Stand to Sit: Moderate assistance;Assist x2;Additional time        Bed to Chair: Moderate assistance;Assist x2; Additional time              Balance:   Sitting: Intact; Without support  Sitting - Static: Good (unsupported)  Sitting - Dynamic: Fair (occasional)  Standing: Impaired; With support  Standing - Static: Fair  Standing - Dynamic : Poor;Constant support  Ambulation/Gait Training:  Distance (ft): 5 Feet (ft)  Assistive Device: Gait belt(would benefit from subhash-walker next session)  Ambulation - Level of Assistance: Moderate assistance;Assist x2     Gait Description (WDL): Exceptions to WDL  Gait Abnormalities: Steppage gait; Step to gait(diffficulty with left LE progression)        Base of Support: Widened  Stance: Left decreased;Right increased;Weight shift(WS to right)  Speed/Roya: Slow;Shuffled  Step Length: Left shortened            Therapeutic Exercises:   Not completed this session    Functional Measure:  [facility] Mobility Inpatient Short Form  How much difficulty does the patient currently have. .. Unable A Lot A Little None   1. Turning over in bed (including adjusting bedclothes, sheets and blankets)? [] 1   [x] 2   [] 3   [] 4   2. Sitting down on and standing up from a chair with arms ( e.g., wheelchair, bedside commode, etc.)   [] 1   [x] 2   [] 3   [] 4   3. Moving from lying on back to sitting on the side of the bed? [] 1   [x] 2   [] 3   [] 4          How much help from another person does the patient currently need. .. Total A Lot A Little None   4. Moving to and from a bed to a chair (including a wheelchair)? [] 1   [x] 2   [] 3   [] 4   5. Need to walk in hospital room? [] 1   [x] 2   [] 3   [] 4   6. Climbing 3-5 steps with a railing? [x] 1   [] 2   [] 3   [] 4   © 2007, Trustees of [facility] [employer], under license to [employer].  All rights reserved     Score:  Initial: 11/24 Most Recent: X (Date: 5/13/21 )   Interpretation of Tool:  Represents activities that are increasingly more difficult (i.e. Bed mobility, Transfers, Gait). Score 24 23 22-20 19-15 14-10 9-7 6   Modifier CH CI CJ CK CL CM CN       Pain Ratin/10 reported     Activity Tolerance:   Fair and requires rest breaks    After treatment patient left in no apparent distress:   Sitting in chair, Call bell within reach and Caregiver / family present and nsg updated. Goals:    Problem: Mobility Impaired (Adult and Pediatric)  Goal: *Acute Goals and Plan of Care (Insert Text)  Description: Pt will be I with LE HEP in 7 days. Pt will perform bed mobility with mod I in 7 days. Pt will perform transfers with mod I in 7 days. Pt will amb 25-50 feet with LRAD safely with mod I in 7 days. Pt will verbalize understanding and demonstrate compliance with LUE pacemaker precautions in 7 days. Pt will demonstrate improvement in standing static balance from fair to good in 7 days. Outcome: Progressing Towards Goal       COMMUNICATION/EDUCATION:   The patients plan of care was discussed with: Occupational therapist, Speech therapist and Registered nurse. Fall prevention education was provided and the patient/caregiver indicated understanding., Patient/family have participated as able in goal setting and plan of care. and Patient/family agree to work toward stated goals and plan of care. PT/OT sessions occurred together for increased safety of pt and clinician. Ken Cortez, PT student, present during session to assist and observe with patient verbal consent under direct supervision of Eddy Erickson PT, DPT.     Thank you for this referral.  Cindy Kitchen, PT, DPT   Time Calculation: 32 mins

## 2021-05-13 NOTE — CONSULTS
PULMONARY ICU NOTE  [facility]    Name: Johana Hassan MRN: [id number]   : 1940 Hospital: [address] [place] [address]   Date: 2021  Admission date: 2021 Hospital Day: 10       HPI:     Hospital Problems  Never Reviewed          Codes Class Noted POA    CHF (congestive heart failure) ([facility]) ICD-10-CM: I50.9  ICD-9-CM: 428.0  2021 Unknown        COPD (chronic obstructive pulmonary disease) ([facility] ICD-10-CM: J44.9  ICD-9-CM: 496  2021 Unknown                   [x] High complexity decision making was performed  [x] See my orders for details      Subjective/Initial History:     I was asked by Shiva Albarado MD to see Johana Hassan  a 80 y.o.  female in consultation     Excerpts from admission 2021 or consult notes as follows:   57-year-old lady came in because of shortness of breath and dyspnea she was told that she has COPD recently and she was giving albuterol inhaler did not seem to help so came to the emergency room she was hypoxic she was put on oxygen 5 L nasal cannula she was having dyspnea minimal exertion and also at rest she is a lifetime non-smoker, but she has been exposed to secondhand smoke her  used to smoke and all her children smoke she used to work on the farm as a farmer no chest pain no fever no chills.      5/10 patient condition got worse yesterday heart rate was in 40s to 50s EKG shows complete heart block patient was transferred to ICU on dopamine drip  Allergies   Allergen Reactions    Nortriptyline Itching    Cymbalta [Duloxetine] Itching    Ivp [Fd And C Blue No.1] Hives    Morphine Itching    Tetracycline Itching        MAR reviewed and pertinent medications noted or modified as needed     Current Facility-Administered Medications   Medication    famotidine (PEPCID) tablet 20 mg    albuterol-ipratropium (DUO-NEB) 2.5 MG-0.5 MG/3 ML    EPINEPHrine (ADRENALIN) 0.1 mg/mL syringe    propofol (DIPRIVAN) 10 mg/mL infusion    budesonide (PULMICORT) 500 mcg/2 ml nebulizer suspension    DOPamine (INTROPIN) 800 mg in dextrose 5% 250 mL infusion    furosemide (LASIX) injection 40 mg    aspirin chewable tablet 81 mg    ticagrelor (BRILINTA) tablet 90 mg    sodium chloride (NS) flush 5-40 mL    sodium chloride (NS) flush 5-40 mL    predniSONE (DELTASONE) tablet 10 mg    hydrOXYzine pamoate (VISTARIL) capsule 25 mg    piperacillin-tazobactam (ZOSYN) 3.375 g in 0.9% sodium chloride (MBP/ADV) 100 mL MBP    escitalopram oxalate (LEXAPRO) tablet 10 mg    levothyroxine (SYNTHROID) tablet 150 mcg    insulin glargine (LANTUS) injection 60 Units    atorvastatin (LIPITOR) tablet 10 mg    insulin lispro (HUMALOG) injection    glucose chewable tablet 16 g    dextrose (D50W) injection syrg 12.5-25 g    glucagon (GLUCAGEN) injection 1 mg    acetaminophen (TYLENOL) tablet 650 mg    Or    acetaminophen (TYLENOL) suppository 650 mg    polyethylene glycol (MIRALAX) packet 17 g    ondansetron (ZOFRAN ODT) tablet 4 mg    Or    ondansetron (ZOFRAN) injection 4 mg    enoxaparin (LOVENOX) injection 40 mg      Patient PCP: Frieda Pollard MD  PMH:  has a past medical history of Depression, DM (diabetes mellitus) ([address] Hyperlipidemia, Hypertension, Pancreatitis, SOB (shortness of breath), and Thyroid disease. PSH:   has a past surgical history that includes hx hysterectomy; ir fluoro guide plc cvad (5/10/2021); ir insert non tunl cvc over 5 yrs (5/10/2021); and pr ins new/rplcmt prm pm w/transv eltrd atrial&vent (N/A, 5/11/2021). FHX: family history is not on file. SHX:  reports that she has never smoked. She has never used smokeless tobacco. She reports that she does not drink alcohol or use drugs. ROS:    Review of Systems   Constitutional: Negative. HENT: Negative. Eyes: Negative. Respiratory: Positive for cough, sputum production, shortness of breath and wheezing. Cardiovascular: Positive for orthopnea. Gastrointestinal: Negative. Genitourinary: Negative. Musculoskeletal: Negative. Skin: Negative. Neurological: Negative. Psychiatric/Behavioral: Negative. Objective:     Vital Signs: Telemetry:    normal sinus rhythm Intake/Output:   Visit Vitals  BP (!) 132/47   Pulse 100   Temp 98.5 °F (36.9 °C)   Resp 19   Ht 5' 1\" (1.549 m)   Wt 76.3 kg (168 lb 3.4 oz)   SpO2 98%   BMI 31.78 kg/m²       Temp (24hrs), Av °F (36.7 °C), Min:97.7 °F (36.5 °C), Max:98.5 °F (36.9 °C)        O2 Device: None (Room air) O2 Flow Rate (L/min): 2 l/min       Wt Readings from Last 4 Encounters:   21 76.3 kg (168 lb 3.4 oz)   10/11/10 102.2 kg (225 lb 3.2 oz)          Intake/Output Summary (Last 24 hours) at 2021 0858  Last data filed at 2021 0405  Gross per 24 hour   Intake 300 ml   Output 575 ml   Net -275 ml       Last shift:      No intake/output data recorded. Last 3 shifts:  1901 -  0700  In: 300 [P.O.:300]  Out: 1075 [Urine:1075]       Physical Exam:     Physical Exam   Constitutional: She appears well-developed. HENT:   Head: Normocephalic and atraumatic. Eyes: Pupils are equal, round, and reactive to light. Conjunctivae are normal.   Neck: Normal range of motion. Neck supple. Cardiovascular: Normal rate and regular rhythm. Pulmonary/Chest: Breath sounds normal. She is in respiratory distress. Abdominal: Soft. Bowel sounds are normal.   Musculoskeletal: Normal range of motion. Neurological: She is alert. Skin: Skin is warm.         Labs:    Recent Labs     21  0355 21  0430 21  0530   WBC 23.1* 26.1* 26.3*   HGB 8.0* 10.4* 10.8*    377 381   INR  --   --  1.3*   APTT  --   --  30.6     Recent Labs     21  0355 21  0430 21  0530    141 141   K 3.2* 3.3* 3.4*    104 104   CO2 30 28 30   GLU 80 225* 127*   BUN 56* 42* 38*   CREA 1.62* 1.32* 1.30*   CA 8.1* 8.1* 8.2*   ALB 2.0* 2.1* 2.4*   ALT 22 25 35     Recent Labs 05/12/21  0410 05/11/21  0525 05/10/21  1705   PH 7.50* 7.49* 7.50*   PCO2 40 41 32*   PO2 98 120* 327*   HCO3 31* 31* 26   FIO2 30.0 40.0 10.0     No results for input(s): CPK, CKNDX, TROIQ in the last 72 hours. No lab exists for component: CPKMB  No results found for: BNPP, BNP   Lab Results   Component Value Date/Time    Culture result: No growth 6 days 05/04/2021 10:30 AM     Lab Results   Component Value Date/Time    TSH 1.62 05/04/2021 11:12 AM       Imaging:    CXR Results  (Last 48 hours)               05/12/21 0309  XR CHEST PORT Final result    Narrative:  Chest single view. Comparison single chest May 10, 2021       Tubes and catheter stable position. Mild reduction in hazy reticular markings   suggesting improving interstitial edema. Small dependent pleural effusions. Cardiac and mediastinal structures unchanged noting thoracic aorta   atherosclerosis. No pneumothorax. IMPRESSION:     1. Chronic Obstructive Pulmonary Disease she is a lifetime non-smoker  2. Acute hypoxic and hypercapnic respiratory failure   3. Patient is on Brilinta which can also cause shortness of breath  4. Complete heart block status post temporary pacemaker was placed on 5/10  5. Arrhythmia on dopamine  6. Hyponatremia  7. Decompensated congestive heart failure  8. Pleural effusion more on the left side  9. Right upper lobe opacity possible pneumonia  10. Severe aortic stenosis      RECOMMENDATIONS/PLAN:     1. Patient was extubated yesterday 5/12 now she is on room air  2. Status post cardiac catheterization status post biventricular pacemaker insertion    3. Patient on nebulizer treatment and prednisone  4. Will replace potassium  5. Repeat arterial blood gases shows normal PCO2   6. Chest x-ray shows congestive changes with right upper lobe opacity we will repeat chest x-ray t continue with Zosyn White count is elevated  7. Supplemental O2 to keep sats > 93%   SpO2 on room air, at rest=94%.   SpO2 on room air, while ambulating=87%.   SpO2 on 2L , while ambulating=93%  We will repeat before discharge again pulse ox on room air and ambulation  Time spent in icu 30 min    Carlos Ram MD

## 2021-05-13 NOTE — PROGRESS NOTES
Four eyes skin assessment completed. Sacrum red but blanchable. Zinc paste applied. Bruising bilaterally to arms. Left side chest bandage from pacemaker insertion. Right sided femoral cath site, clean, dry and intact.

## 2021-05-13 NOTE — PROGRESS NOTES
OCCUPATIONAL THERAPY RE-EVALUATION  Patient: Lucia Casarez (89 y.o. female)  Date: 5/13/2021  Diagnosis: CHF (congestive heart failure) ([facility]) [I50.9]  [facility]) [facility] [J44.9] <principal problem not specified>  Procedure(s) (LRB):  INSERT PPM BIV MULTI (N/A) 2 Days Post-Op  Precautions: falls, LUE pacemaker precautions  Chart, occupational therapy assessment, plan of care, and goals were reviewed. 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 Pt was transferred to ICU on 5/9/21 due to bradycardia. On 5/10/21 CODE BLUE initiated due to agonal respirations with vfib, pt was found to have complete heart block; pt was subsequently intubated and underwent temporary pacemaker placement in right groin. Temporary pacemaker removed and biventricular pacemaker placed on 5/11/21, pt extubated on 5/12/21. New OT orders received on 5/12/21 seen today for resumption of care following ICU transfer. PMH: DM, HLD, HTN, thyroid disease, depression. Pt received semi supine in bed A&O to all but month (believed it was April) and agreeable for OT/PT sessions. Per previous documentation, pt lives alone (son present stating he can stay with pt upon D/C)  in 2 story home (stays on first floor) with ramp to enter and is MI for self care using shower chair and functional transfers/mobility using cane (also has RW and rollator).       Based on the objective data described below, patient currently presents with generalized deconditioning, decreased generalized strength, decreased activity tolerance, decreased standing balance, verbal cueing to maintain LUE pacemaker precautions (unable to don sling on LUE due to central line, sling obtained and present in room), intermittent confusion, difficulty with complex command following, increased cueing required for initiation, increased need for A with self care and functional mobility/transfers. Patient required min to mod Ax2 additional time bed mobility, min to mod Ax2 with additional time sup > sit, min A scooting, mod A x2 with additional time sit <> stand transfers. Pt completed bed -> chair transfer with gt belt, no AD (due to pacemaker precautions, may benefit from subhash-walker next session), hand held assist on RUE and mod Ax2. Once sitting in recliner patient setup A grooming to brush teeth and wash face. Patient total A LE dressing sitting EOB, total A toileting as pt noted to have BM in bed upon standing, patient able to stand during toileting with hand held assist on RUE. Pt did fair with session today requiring increased assistance for mobility and amb this session compared to previous evaluation. Pt continues to benefit from continued skilled OT services, continue to progress toward goals. Recommend discharge to IRF when medically appropriate. Current Level of Function Impacting Discharge (ADLs): total A LE dressing and toileting, setup A grooming sitting in recliner    Other factors to consider for discharge: medical course, severity of deficits, PLOF I         PLAN :  Recommendations and Planned Interventions: self care training, functional mobility training, therapeutic exercise, balance training, therapeutic activities, endurance activities, patient education, home safety training and family training/education    Frequency/Duration: Patient will be followed by occupational therapy 5 times a week to address goals.     Recommend with staff: up to chair as able, patient participate in ADLs    Recommend next OT session: commode transfer    Recommendation for discharge: (in order for the patient to meet his/her long term goals)  IRF    This discharge recommendation:  Has been made in collaboration with the attending provider and/or case management    Equipment recommendations for successful discharge (if) home: TBD       SUBJECTIVE:   Patient stated I feel better today.     OBJECTIVE DATA SUMMARY:     Cognitive/Behavioral Status:  Neurologic State: Alert;Confused  Orientation Level: Oriented to person;Oriented to place;Oriented to situation(oriented to year but not month)  Cognition: Impaired decision making;Decreased command following    Hearing: Auditory  Auditory Impairment: Hard of hearing, right side, Hearing aid(s)  Hearing Aids/Status: Right, Functioning    Vision/Perceptual:    No deficits noted at this time    Range of Motion:  AROM: Generally decreased, functional    Strength:  Strength: Generally decreased, functional     RUE Strength  Observation: grossly observed to be 3+/5     LUE Strength  Observation: grossly observed to be 3-/5(limited testing 2/2 pacemaker)    Coordination:  Coordination: (unable to follow commands to test)    Tone & Sensation:  Sensation: Intact  Tone: normal    Functional Mobility and Transfers for ADLs:  Bed Mobility:  Rolling: Minimum assistance; Moderate assistance  Supine to Sit: Minimum assistance; Moderate assistance  Scooting: Minimum assistance; Moderate assistance    Transfers:  Sit to Stand: Moderate assistance;Assist x2; Additional time     Bed to Chair: Moderate assistance;Assist x2; Additional time    Balance:  Sitting: Intact; Without support  Sitting - Static: Good (unsupported)  Sitting - Dynamic: Fair (occasional)  Standing: Impaired; With support  Standing - Static: Fair  Standing - Dynamic : Poor;Constant support    ADL Assessment:    Oral Facial Hygiene/Grooming: Setup    Lower Body Dressing: Total assistance    Toileting:  Total assistance    ADL Intervention and task modifications:    Grooming  Grooming Assistance: Set-up  Position Performed: Seated in chair  Washing Face: Set-up  Brushing Teeth: Set-up    Lower Body Dressing Assistance  Dressing Assistance: Total assistance(dependent)  Socks: Total assistance (dependent)  Leg Crossed Method Used: No  Position Performed: Seated edge of bed    Toileting  Toileting Assistance: Total assistance(dependent)  Bowel Hygiene: Total assistance (dependent)    Therapeutic Exercises:   Patient may benefit from RUE HEP, LUE AROM at elbow and fingers/wrist, limited due to pacemaker precautions    Functional Measure:    [address] [place] [address] AM-PACTM \"6 Clicks\"                                                       Daily Activity Inpatient Short Form  How much help from another person does the patient currently need. .. Total; A Lot A Little None   1. Putting on and taking off regular lower body clothing? [x]  1 []  2 []  3 []  4   2. Bathing (including washing, rinsing, drying)? []  1 [x]  2 []  3 []  4   3. Toileting, which includes using toilet, bedpan or urinal? [x] 1 []  2 []  3 []  4   4. Putting on and taking off regular upper body clothing? []  1 []  2 [x]  3 []  4   5. Taking care of personal grooming such as brushing teeth? []  1 []  2 [x]  3 []  4   6. Eating meals? []  1 []  2 [x]  3 []  4   © 2007, Trustees of [address] [place], [place] [address], under license to [employer]. All rights reserved     Score: 13/24     Interpretation of Tool:  Represents clinically-significant functional categories (i.e. Activities of daily living). Percentage of Impairment CH    0%   CI    1-19% CJ    20-39% CK    40-59% CL    60-79% CM    80-99% CN     100%   AMPA  Score 6-24 24 23 20-22 15-19 10-14 7-9 6     Pain:  0/10    Activity Tolerance:   Fair and requires rest breaks    After treatment patient left in no apparent distress:   Sitting in chair, Call bell within reach and Caregiver / family present    COMMUNICATION/COLLABORATION:   The patients plan of care was discussed with: Physical therapist, Speech therapist and Registered nurse.      OT/PT sessions occurred together for increased patient and clinician safety as pt requires A of 2 for mobility at this time. Problem: Self Care Deficits Care Plan (Adult)  Goal: *Acute Goals and Plan of Care (Insert Text)  Description: Pt will be MI sup<->sit in prep for EOB ADL's  Pt will be MI  LB dressing EOB level  Pt will be Mi  sit<-> prep for toilet transfer  Pt will be MI  toilet transfer with LRAD  Pt will be MI  toileting/cloth mgmt LRAD  Pt will be MI  grooming standing sink  Pt will be MI bathing sitting/standing sink LRAD  Pt will be MI you UE HEP in prep for self care tasks      Outcome: Progressing Towards Goal    Goals reviewed and remain appropriate as pt independent PTA.      Naomi Alvarez OTR/L  Time Calculation: 35 mins

## 2021-05-14 ENCOUNTER — APPOINTMENT (OUTPATIENT)
Dept: GENERAL RADIOLOGY | Age: 81
DRG: 242 | End: 2021-05-14
Attending: INTERNAL MEDICINE
Payer: MEDICARE

## 2021-05-14 ENCOUNTER — APPOINTMENT (OUTPATIENT)
Dept: GENERAL RADIOLOGY | Age: 81
DRG: 242 | End: 2021-05-14
Attending: FAMILY MEDICINE
Payer: MEDICARE

## 2021-05-14 LAB
ALBUMIN SERPL-MCNC: 2.1 G/DL (ref 3.5–5)
ALBUMIN/GLOB SERPL: 0.6 {RATIO} (ref 1.1–2.2)
ALP SERPL-CCNC: 47 U/L (ref 45–117)
ALT SERPL-CCNC: 26 U/L (ref 12–78)
ANION GAP SERPL CALC-SCNC: 4 MMOL/L (ref 5–15)
ANION GAP SERPL CALC-SCNC: 7 MMOL/L (ref 5–15)
AST SERPL W P-5'-P-CCNC: 29 U/L (ref 15–37)
BILIRUB SERPL-MCNC: 0.3 MG/DL (ref 0.2–1)
BUN SERPL-MCNC: 56 MG/DL (ref 6–20)
BUN SERPL-MCNC: 60 MG/DL (ref 6–20)
BUN/CREAT SERPL: 41 (ref 12–20)
BUN/CREAT SERPL: 44 (ref 12–20)
CA-I BLD-MCNC: 8 MG/DL (ref 8.5–10.1)
CA-I BLD-MCNC: 8.3 MG/DL (ref 8.5–10.1)
CHLORIDE SERPL-SCNC: 106 MMOL/L (ref 97–108)
CHLORIDE SERPL-SCNC: 112 MMOL/L (ref 97–108)
CO2 SERPL-SCNC: 27 MMOL/L (ref 21–32)
CO2 SERPL-SCNC: 28 MMOL/L (ref 21–32)
CREAT SERPL-MCNC: 1.35 MG/DL (ref 0.55–1.02)
CREAT SERPL-MCNC: 1.38 MG/DL (ref 0.55–1.02)
ERYTHROCYTE [DISTWIDTH] IN BLOOD BY AUTOMATED COUNT: 14.3 % (ref 11.5–14.5)
GLOBULIN SER CALC-MCNC: 3.3 G/DL (ref 2–4)
GLUCOSE BLD STRIP.AUTO-MCNC: 122 MG/DL (ref 65–117)
GLUCOSE BLD STRIP.AUTO-MCNC: 128 MG/DL (ref 65–117)
GLUCOSE BLD STRIP.AUTO-MCNC: 132 MG/DL (ref 65–117)
GLUCOSE BLD STRIP.AUTO-MCNC: 135 MG/DL (ref 65–117)
GLUCOSE BLD STRIP.AUTO-MCNC: 160 MG/DL (ref 65–117)
GLUCOSE BLD STRIP.AUTO-MCNC: 204 MG/DL (ref 65–117)
GLUCOSE BLD STRIP.AUTO-MCNC: 297 MG/DL (ref 65–117)
GLUCOSE BLD STRIP.AUTO-MCNC: 33 MG/DL (ref 65–117)
GLUCOSE BLD STRIP.AUTO-MCNC: 353 MG/DL (ref 65–117)
GLUCOSE SERPL-MCNC: 214 MG/DL (ref 65–100)
GLUCOSE SERPL-MCNC: 28 MG/DL (ref 65–100)
HCT VFR BLD AUTO: 24.4 % (ref 35–47)
HGB BLD-MCNC: 7.7 G/DL (ref 11.5–16)
MAGNESIUM SERPL-MCNC: 1.9 MG/DL (ref 1.6–2.4)
MCH RBC QN AUTO: 30.9 PG (ref 26–34)
MCHC RBC AUTO-ENTMCNC: 31.6 G/DL (ref 30–36.5)
MCV RBC AUTO: 98 FL (ref 80–99)
NRBC # BLD: 0 K/UL (ref 0–0.01)
NRBC BLD-RTO: 0 PER 100 WBC
PERFORMED BY, TECHID: ABNORMAL
PLATELET # BLD AUTO: 330 K/UL (ref 150–400)
PMV BLD AUTO: 11.4 FL (ref 8.9–12.9)
POTASSIUM SERPL-SCNC: 3.3 MMOL/L (ref 3.5–5.1)
POTASSIUM SERPL-SCNC: 3.7 MMOL/L (ref 3.5–5.1)
PROT SERPL-MCNC: 5.4 G/DL (ref 6.4–8.2)
RBC # BLD AUTO: 2.49 M/UL (ref 3.8–5.2)
SODIUM SERPL-SCNC: 140 MMOL/L (ref 136–145)
SODIUM SERPL-SCNC: 144 MMOL/L (ref 136–145)
WBC # BLD AUTO: 18.6 K/UL (ref 3.6–11)

## 2021-05-14 PROCEDURE — 82962 GLUCOSE BLOOD TEST: CPT

## 2021-05-14 PROCEDURE — 74011000258 HC RX REV CODE- 258: Performed by: FAMILY MEDICINE

## 2021-05-14 PROCEDURE — 74011250636 HC RX REV CODE- 250/636: Performed by: FAMILY MEDICINE

## 2021-05-14 PROCEDURE — 74011000250 HC RX REV CODE- 250: Performed by: INTERNAL MEDICINE

## 2021-05-14 PROCEDURE — 80048 BASIC METABOLIC PNL TOTAL CA: CPT

## 2021-05-14 PROCEDURE — 92611 MOTION FLUOROSCOPY/SWALLOW: CPT

## 2021-05-14 PROCEDURE — 65270000029 HC RM PRIVATE

## 2021-05-14 PROCEDURE — 74011636637 HC RX REV CODE- 636/637: Performed by: FAMILY MEDICINE

## 2021-05-14 PROCEDURE — 74011250636 HC RX REV CODE- 250/636: Performed by: INTERNAL MEDICINE

## 2021-05-14 PROCEDURE — 74230 X-RAY XM SWLNG FUNCJ C+: CPT

## 2021-05-14 PROCEDURE — 85027 COMPLETE CBC AUTOMATED: CPT

## 2021-05-14 PROCEDURE — 74011000258 HC RX REV CODE- 258: Performed by: INTERNAL MEDICINE

## 2021-05-14 PROCEDURE — 94760 N-INVAS EAR/PLS OXIMETRY 1: CPT

## 2021-05-14 PROCEDURE — 83735 ASSAY OF MAGNESIUM: CPT

## 2021-05-14 PROCEDURE — 80053 COMPREHEN METABOLIC PANEL: CPT

## 2021-05-14 PROCEDURE — 97530 THERAPEUTIC ACTIVITIES: CPT

## 2021-05-14 PROCEDURE — 94640 AIRWAY INHALATION TREATMENT: CPT

## 2021-05-14 PROCEDURE — 74011250637 HC RX REV CODE- 250/637: Performed by: FAMILY MEDICINE

## 2021-05-14 PROCEDURE — 36415 COLL VENOUS BLD VENIPUNCTURE: CPT

## 2021-05-14 PROCEDURE — 93005 ELECTROCARDIOGRAM TRACING: CPT

## 2021-05-14 PROCEDURE — 74011000250 HC RX REV CODE- 250: Performed by: FAMILY MEDICINE

## 2021-05-14 PROCEDURE — 71045 X-RAY EXAM CHEST 1 VIEW: CPT

## 2021-05-14 PROCEDURE — 74011250637 HC RX REV CODE- 250/637: Performed by: INTERNAL MEDICINE

## 2021-05-14 PROCEDURE — 99232 SBSQ HOSP IP/OBS MODERATE 35: CPT | Performed by: SURGERY

## 2021-05-14 RX ORDER — LANOLIN ALCOHOL/MO/W.PET/CERES
1 CREAM (GRAM) TOPICAL 2 TIMES DAILY WITH MEALS
Status: DISCONTINUED | OUTPATIENT
Start: 2021-05-14 | End: 2021-05-19 | Stop reason: HOSPADM

## 2021-05-14 RX ORDER — POTASSIUM CHLORIDE 750 MG/1
40 TABLET, FILM COATED, EXTENDED RELEASE ORAL
Status: COMPLETED | OUTPATIENT
Start: 2021-05-14 | End: 2021-05-14

## 2021-05-14 RX ORDER — POTASSIUM CHLORIDE 1.5 G/1.77G
20 POWDER, FOR SOLUTION ORAL
Status: COMPLETED | OUTPATIENT
Start: 2021-05-14 | End: 2021-05-14

## 2021-05-14 RX ORDER — DEXTROSE MONOHYDRATE AND SODIUM CHLORIDE 5; .45 G/100ML; G/100ML
50 INJECTION, SOLUTION INTRAVENOUS CONTINUOUS
Status: DISCONTINUED | OUTPATIENT
Start: 2021-05-14 | End: 2021-05-19 | Stop reason: HOSPADM

## 2021-05-14 RX ADMIN — TICAGRELOR 90 MG: 90 TABLET ORAL at 08:28

## 2021-05-14 RX ADMIN — Medication 10 ML: at 21:51

## 2021-05-14 RX ADMIN — ESCITALOPRAM OXALATE 10 MG: 10 TABLET ORAL at 21:46

## 2021-05-14 RX ADMIN — FERROUS SULFATE TAB 325 MG (65 MG ELEMENTAL FE) 325 MG: 325 (65 FE) TAB at 17:15

## 2021-05-14 RX ADMIN — ATORVASTATIN CALCIUM 10 MG: 10 TABLET, FILM COATED ORAL at 21:45

## 2021-05-14 RX ADMIN — TICAGRELOR 90 MG: 90 TABLET ORAL at 21:46

## 2021-05-14 RX ADMIN — PREDNISONE 10 MG: 5 TABLET ORAL at 08:28

## 2021-05-14 RX ADMIN — POTASSIUM CHLORIDE 20 MEQ: 1.5 FOR SOLUTION ORAL at 13:07

## 2021-05-14 RX ADMIN — ASPIRIN 81 MG CHEWABLE TABLET 81 MG: 81 TABLET CHEWABLE at 08:28

## 2021-05-14 RX ADMIN — POTASSIUM CHLORIDE 10 MEQ: 750 TABLET, EXTENDED RELEASE ORAL at 12:25

## 2021-05-14 RX ADMIN — FAMOTIDINE 20 MG: 20 TABLET ORAL at 08:29

## 2021-05-14 RX ADMIN — Medication 10 ML: at 06:08

## 2021-05-14 RX ADMIN — FUROSEMIDE 40 MG: 40 TABLET ORAL at 08:33

## 2021-05-14 RX ADMIN — ENOXAPARIN SODIUM 40 MG: 40 INJECTION SUBCUTANEOUS at 08:29

## 2021-05-14 RX ADMIN — PIPERACILLIN AND TAZOBACTAM 3.38 G: 3; .375 INJECTION, POWDER, LYOPHILIZED, FOR SOLUTION INTRAVENOUS at 01:06

## 2021-05-14 RX ADMIN — BARIUM SULFATE 30 ML: 400 PASTE ORAL at 11:00

## 2021-05-14 RX ADMIN — INSULIN LISPRO 18 UNITS: 100 INJECTION, SOLUTION INTRAVENOUS; SUBCUTANEOUS at 17:16

## 2021-05-14 RX ADMIN — DEXTROSE MONOHYDRATE 25 G: 25 INJECTION, SOLUTION INTRAVENOUS at 08:13

## 2021-05-14 RX ADMIN — PIPERACILLIN AND TAZOBACTAM 3.38 G: 3; .375 INJECTION, POWDER, LYOPHILIZED, FOR SOLUTION INTRAVENOUS at 08:29

## 2021-05-14 RX ADMIN — BUDESONIDE 500 MCG: 0.5 INHALANT RESPIRATORY (INHALATION) at 08:23

## 2021-05-14 RX ADMIN — AMIODARONE HYDROCHLORIDE 1 MG/MIN: 50 INJECTION, SOLUTION INTRAVENOUS at 19:27

## 2021-05-14 RX ADMIN — INSULIN GLARGINE 60 UNITS: 100 INJECTION, SOLUTION SUBCUTANEOUS at 22:00

## 2021-05-14 RX ADMIN — BUDESONIDE 500 MCG: 0.5 INHALANT RESPIRATORY (INHALATION) at 19:40

## 2021-05-14 RX ADMIN — BARIUM SULFATE 25 ML: 400 SUSPENSION ORAL at 11:00

## 2021-05-14 RX ADMIN — LEVOTHYROXINE SODIUM 150 MCG: 75 TABLET ORAL at 08:28

## 2021-05-14 RX ADMIN — DEXTROSE AND SODIUM CHLORIDE 50 ML/HR: 5; 450 INJECTION, SOLUTION INTRAVENOUS at 08:52

## 2021-05-14 RX ADMIN — Medication 10 ML: at 15:05

## 2021-05-14 RX ADMIN — PIPERACILLIN AND TAZOBACTAM 3.38 G: 3; .375 INJECTION, POWDER, LYOPHILIZED, FOR SOLUTION INTRAVENOUS at 17:16

## 2021-05-14 RX ADMIN — BARIUM SULFATE 70 ML: 0.81 POWDER, FOR SUSPENSION ORAL at 11:00

## 2021-05-14 RX ADMIN — FAMOTIDINE 20 MG: 20 TABLET ORAL at 21:45

## 2021-05-14 NOTE — PROGRESS NOTES
Problem: Self Care Deficits Care Plan (Adult)  Goal: *Acute Goals and Plan of Care (Insert Text)  Description: Pt will be MI sup<->sit in prep for EOB ADL's  Pt will be MI  LB dressing EOB level  Pt will be Mi  sit<-> prep for toilet transfer  Pt will be MI  toilet transfer with LRAD  Pt will be MI  toileting/cloth mgmt LRAD  Pt will be MI  grooming standing sink  Pt will be MI bathing sitting/standing sink LRAD  Pt will be MI you UE HEP in prep for self care tasks      Outcome: Progressing Towards Goal   OCCUPATIONAL THERAPY TREATMENT  Patient: Rudy Orlando (89 y.o. female)  Date: 5/14/2021  Diagnosis: CHF (congestive heart failure) ([facility]) [I50.9]  [facility] [J44.9] <principal problem not specified>  Procedure(s) (LRB):  INSERT PPM BIV MULTI (N/A) 3 Days Post-Op  Precautions: FAll  Chart, occupational therapy assessment, plan of care, and goals were reviewed. ASSESSMENT  Patient continues with skilled OT services and is progressing towards goals. Patient received semi supine in bed upon WOLFF/PTA arrival and agreeable to work with therapist.  Patient required min Ax1 for bed mobility, SBA supine to sit EOB, min Ax1 sit to supine and CGA for scooting. Patient soiled self and required max A for bowel accident and nursing replaced bedding. Max A for donning gown EOB. STS min A x2 using subhash walker , patient required min A x1 and x1 for CGA and line mgmt for bed->toilet->sink->bed with max vc's for correct technique to adhere to pacemaker precautions. CGA for toileting and min A for cloth mgmt with vc's for adhering to pacemaker  precautions. While using subhash walker,. Patient stood at sink ~ 2 minutes to wash hands with no LOB and vc's for poor posture and correct technique for safety. Patient requires increased time to perform task 2/2 to SOB.  Patient/ son were educated on the importance that patient go to IRF for further therapy to reach potential 2/2 needing max cues for adhering to Pacemaker precautions for safety and decreased strength/endurance. Patient would benefit from continued skilled Occupational services while at [facility] in order to increase safety and independence with self care and functional tranfers/mobility. Recommend at discharge to IRF when medically appropriate. Current Level of Function Impacting Discharge (ADLs): Min A for functional l transfers and max cueing for pacemaker precautions     Other factors to consider for discharge: Time of onset, medical prognosis/diagnosis, severity of deficits, PLOF, home environment, and family support         PLAN :  Patient continues to benefit from skilled intervention to address the above impairments. Continue treatment per established plan of care. to address goals. Recommend with staff: Seated in chair for grooming    Recommend next OT session: functional tfs     Recommendation for discharge: (in order for the patient to meet his/her long term goals)  IRF    This discharge recommendation:  Has been made in collaboration with the attending provider and/or case management    IF patient discharges home will need the following DME: TBD       SUBJECTIVE:   Patient stated I can go to the bathroom.     OBJECTIVE DATA SUMMARY:   Cognitive/Behavioral Status:  Neurologic State: Alert  Orientation Level: Oriented X4  Cognition: Decreased attention/concentration;Decreased command following; Impaired decision making; Impulsive;Poor safety awareness             Functional Mobility and Transfers for ADLs:  Bed Mobility:  Rolling: Minimum assistance  Supine to Sit: Stand-by assistance  Sit to Supine: Minimum assistance  Scooting: Contact guard assistance    Transfers:  Sit to Stand: Minimum assistance;Assist x2  Functional Transfers  Bathroom Mobility: Minimum assistance x1 and x1 for CGA and line mgmt  Toilet Transfer : Minimum assistance 1 and x1 for CGA and line mgmt  Bed to Chair: Minimum assistance;Assist x1 and x1 for CGA and line mgmt    Balance:  Sitting: Intact  Sitting - Static: Good (unsupported)  Sitting - Dynamic: Fair (occasional)  Standing: Impaired; With support  Standing - Static: Constant support; Fair  Standing - Dynamic : Constant support; Fair    ADL Intervention:       Grooming  Grooming Assistance: Contact guard assistance  Position Performed: Standing  Washing Hands: Contact guard assistance              Upper Body Dressing Assistance  Dressing Assistance: Maximum assistance  Hospital Gown: Maximum assistance  Cues: Verbal cues provided         Toileting  Toileting Assistance: Contact guard assistance  Bladder Hygiene: Contact guard assistance  Bowel Hygiene: Contact guard assistance  Clothing Management: Minimum assistance  Cues: Verbal cues provided           Pain:  10/10 for R UE and shoulder    Activity Tolerance:   Fair, requires rest breaks, and observed SOB with activity  Please refer to the flowsheet for vital signs taken during this treatment. After treatment patient left in no apparent distress:   Supine in bed, Call bell within reach, Bed / chair alarm activated, Caregiver / family present, and Side rails x 3    COMMUNICATION/COLLABORATION:   The patients plan of care was discussed with: Physical therapy assistant and Registered nurse.      MARIELLA Bryant  Time Calculation: 42 mins

## 2021-05-14 NOTE — PROGRESS NOTES
Physician put in order to ambulation patient. Physical therapy need  to assist RT while ambulating patient. Patient is not capable of walking long distance. Pt informed the respiratory therapist that patient was very sob walking to the rest room and to try later.

## 2021-05-14 NOTE — PROGRESS NOTES
PROGRESS NOTE      Chief Complaints:  Patient examined this morning at bedside. HPI and  Objective:    She looks comfortable without distress. She has mild some pain on the right foot. She denies chest pain shortness of breath. Denies nausea abdominal pain. Review of Systems:  Rest of review of system negative. EXAM:  Visit Vitals  /67 (BP 1 Location: Left upper arm, BP Patient Position: At rest;Lying)   Pulse 95   Temp 97.8 °F (36.6 °C)   Resp 18   Ht 5' 1\" (1.549 m)   Wt 168 lb 3.4 oz (76.3 kg)   SpO2 99%   BMI 31.78 kg/m²     Head and is unremarkable. Atraumatic. Cardiac system regular rate. Pulmonary no audible wheeze. Abdomen soft. Neurologically intact. Vascular examination right foot has Doppler signal noted both DP and PT.   Recent Results (from the past 24 hour(s))   GLUCOSE, POC    Collection Time: 05/13/21  3:23 PM   Result Value Ref Range    Glucose (POC) 429 (H) 65 - 117 mg/dL    Performed by Danitza Stoddard    GLUCOSE, POC    Collection Time: 05/13/21  7:22 PM   Result Value Ref Range    Glucose (POC) 247 (H) 65 - 117 mg/dL    Performed by Jaiden Shin    CBC W/O DIFF    Collection Time: 05/14/21  6:35 AM   Result Value Ref Range    WBC 18.6 (H) 3.6 - 11.0 K/uL    RBC 2.49 (L) 3.80 - 5.20 M/uL    HGB 7.7 (L) 11.5 - 16.0 g/dL    HCT 24.4 (L) 35.0 - 47.0 %    MCV 98.0 80.0 - 99.0 FL    MCH 30.9 26.0 - 34.0 PG    MCHC 31.6 30.0 - 36.5 g/dL    RDW 14.3 11.5 - 14.5 %    PLATELET 812 458 - 671 K/uL    MPV 11.4 8.9 - 12.9 FL    NRBC 0.0 0.0  WBC    ABSOLUTE NRBC 0.00 0.00 - 1.61 K/uL   METABOLIC PANEL, COMPREHENSIVE    Collection Time: 05/14/21  6:35 AM   Result Value Ref Range    Sodium 144 136 - 145 mmol/L    Potassium 3.3 (L) 3.5 - 5.1 mmol/L    Chloride 112 (H) 97 - 108 mmol/L    CO2 28 21 - 32 mmol/L    Anion gap 4 (L) 5 - 15 mmol/L    Glucose 28 (LL) 65 - 100 mg/dL    BUN 60 (H) 6 - 20 mg/dL    Creatinine 1.35 (H) 0.55 - 1.02 mg/dL    BUN/Creatinine ratio 44 (H) 12 - 20      GFR est AA 46 (L) >60 ml/min/1.73m2    GFR est non-AA 38 (L) >60 ml/min/1.73m2    Calcium 8.3 (L) 8.5 - 10.1 mg/dL    Bilirubin, total 0.3 0.2 - 1.0 mg/dL    AST (SGOT) 29 15 - 37 U/L    ALT (SGPT) 26 12 - 78 U/L    Alk. phosphatase 47 45 - 117 U/L    Protein, total 5.4 (L) 6.4 - 8.2 g/dL    Albumin 2.1 (L) 3.5 - 5.0 g/dL    Globulin 3.3 2.0 - 4.0 g/dL    A-G Ratio 0.6 (L) 1.1 - 2.2     GLUCOSE, POC    Collection Time: 05/14/21  8:13 AM   Result Value Ref Range    Glucose (POC) 33 (LL) 65 - 117 mg/dL    Performed by [facility], POC    Collection Time: 05/14/21  8:28 AM   Result Value Ref Range    Glucose (POC) 160 (H) 65 - 117 mg/dL    Performed by [address], POC    Collection Time: 05/14/21  8:44 AM   Result Value Ref Range    Glucose (POC) 128 (H) 65 - 117 mg/dL    Performed by [facility], POC    Collection Time: 05/14/21  9:18 AM   Result Value Ref Range    Glucose (POC) 132 (H) 65 - 117 mg/dL    Performed by [facility], POC    Collection Time: 05/14/21 10:33 AM   Result Value Ref Range    Glucose (POC) 135 (H) 65 - 117 mg/dL    Performed by [facility], POC    Collection Time: 05/14/21 11:57 AM   Result Value Ref Range    Glucose (POC) 122 (H) 65 - 117 mg/dL    Performed by [facility]        ASSESSMENT:   Patient is 80 y.o. with diagnosis of : Active Problems:    CHF (congestive heart failure) ([facility] (5/4/2021)      COPD (chronic obstructive pulmonary disease) ([facility] (5/4/2021)      PLAN:                 Patient's vascular status has not changed. Continue monitor while patient is hospitalized.

## 2021-05-14 NOTE — PROGRESS NOTES
Hospitalist Progress Note    Subjective:     Alina Monday is a 81 yo female with a PMHx significant for DM, HLD, HTN, thyroid disease, and depression, who presents to the ED with shortness of breath for the past 1-2 days due to a severe acute exacerbation of COPD, acute hypoxic/hypercapnic respiratory failure, and CHF. She also has a bilateral pleural effusion (worse on left), interstitial edema (cardiogenic or infectious cause), and hyponatremia.   ___________________________________________________________    Patient had blood gluc of 28 this morning. Given IV D5 1/2 NS at 50ml/hr. Glucose stabilized with IV drip. Blood glucose returned to 130 at 9:30am    Today, the patient appears awake, alert, and in no acute distress. She appears slightly disoriented and sluggish. She states that she is feeling well. She denies any shortness of breath, chest pain, palpitations, lightheaded, nausea, vomiting, or abdominal pain. Gen surg consult:   Strong doppler on distal ant tibial a, post tibial a  Negative for ischemia  R common femoral a already has vasc sheath     DANIEL: mild PAD of R lower extremity (0.89)   Normal DANIEL of L lower extremity (0.92)    PT: recommends IRF     Cardiology consult:   Consider starting metoprolol today  Echo 35-40%    Significant labs:   WBC 18.6 (decreasing)  Hgb 7.7  K 3.3  BNP 7126    Past Medical History:   Diagnosis Date    Depression     DM (diabetes mellitus) ([facility]     Hyperlipidemia     Hypertension     Pancreatitis     SOB (shortness of breath)     Thyroid disease       Past Surgical History:   Procedure Laterality Date    HX HYSTERECTOMY      IR FLUORO GUIDE PLC CVAD  5/10/2021    IR INSERT NON TUNL CVC OVER 5 YRS  5/10/2021    [place] INS NEW/RPLCMT PRM PM W/TRANSV ELTRD ATRIAL&VENT N/A 5/11/2021    INSERT PPM BIV MULTI performed by Soledad Mabry MD at [address] [place] [address]     History reviewed. No pertinent family history.    Social History     Tobacco Use    Smoking status: Never Smoker    Smokeless tobacco: Never Used   Substance Use Topics    Alcohol use: No       Prior to Admission medications    Medication Sig Start Date End Date Taking? Authorizing Provider   Insulin Needles, Disposable, 31 X 5/16 \" Ndle by Does Not Apply route. 10/11/10   Andrew Fitch MD   insulin lispro, human, (HUMALOG KWIKPEN) 100 unit/mL flexpen 15 Units by SubCUTAneous route three (3) times daily (with meals). 10/13/10   Andrew Fitch MD   benazepril (LOTENSIN) 40 mg tablet Take 40 mg by mouth daily. 10/11/10   Provider, Historical   LEXAPRO 10 mg tablet Take 10 mg by mouth nightly. 10/11/10   Provider, Historical   famotidine (PEPCID) 20 mg tablet Take 20 mg by mouth two (2) times a day. 10/11/10   Provider, Historical   gabapentin (NEURONTIN) 400 mg capsule  9/3/10   Provider, Historical   hydrocodone-acetaminophen (NORCO) 5-325 mg per tablet Take 1 Tab by mouth every six (6) hours as needed. 10/11/10   Provider, Historical   levothyroxine (SYNTHROID) 150 mcg tablet Take 150 mcg by mouth daily (before breakfast). 10/11/10   Provider, Historical   lorazepam (ATIVAN) 1 mg tablet  8/2/10   Provider, Historical   lovastatin (MEVACOR) 40 mg tablet Take 40 mg by mouth nightly. 10/11/10   Provider, Historical   metoprolol (LOPRESSOR) 100 mg tablet Take 100 mg by mouth two (2) times a day. 10/11/10   Provider, Historical   oxycodone-acetaminophen (PERCOCET 10)  mg per tablet  8/2/10   Provider, Historical   insulin glargine (LANTUS) 100 unit/mL injection 60 Units by SubCUTAneous route nightly. 10/11/10   Andrew Fitch MD     Allergies   Allergen Reactions    Nortriptyline Itching    Cymbalta [Duloxetine] Itching    Ivp [Fd And C Blue No.1] Hives    Morphine Itching    Tetracycline Itching        Review of Systems:  Intubated    Objective: Intake and Output:    No intake/output data recorded.   05/12 1901 - 05/14 0700  In: 300 [P.O.:300]  Out: 475 [Urine:475]    Physical Exam: Visit Vitals  /62 (BP 1 Location: Left upper arm, BP Patient Position: At rest;Lying)   Pulse 88   Temp 97.5 °F (36.4 °C)   Resp 18   Ht 5' 1\" (1.549 m)   Wt 168 lb 3.4 oz (76.3 kg)   SpO2 99%   BMI 31.78 kg/m²     General:   On ventilator s. Head:  Normocephalic, without obvious abnormality, atraumatic. Eyes:  Conjunctivae/corneas clear. Pupils equal, round, reactive to light. Extraocular movements intact. Lungs:    Decreased breath sounds , crackles, wheezing. Chest wall:  No tenderness or deformity. Heart:  Regular rate and rhythm, S1, S2 normal, grade 3 murmur, click, rub, or gallop. Possible murmur, difficult to auscultate with BiPAP   Abdomen:   Soft, non-tender. Bowel sounds normal. No masses. No organomegaly. Extremities: Extremities normal, atraumatic, no cyanosis. 1+ pitting edema of lower extremities    Pulses: 2+ and symmetric all extremities. Skin: Skin color, texture, turgor normal. No rashes or lesions. Lymph nodes: Cervical, supraclavicular, and axillary nodes normal.   Neurologic: CNII-XII intact. Normal strength, sensation, and reflexes throughout.        ECG:  ** Poor data quality, interpretation may be adversely affected**   Normal sinus rhythm   Right bundle branch block   Left anterior fascicular block   ** Bifascicular block **   Left ventricular hypertrophy with repolarization abnormality   Cannot rule out Septal infarct , age undetermined     Data Review:   Recent Results (from the past 24 hour(s))   EKG, 12 LEAD, INITIAL    Collection Time: 05/04/21 10:24 AM   Result Value Ref Range    Ventricular Rate 98 BPM    Atrial Rate 98 BPM    P-R Interval 168 ms    QRS Duration 152 ms    Q-T Interval 416 ms    QTC Calculation (Bezet) 531 ms    Calculated P Axis 68 degrees    Calculated R Axis -52 degrees    Calculated T Axis 94 degrees    Diagnosis       ** Poor data quality, interpretation may be adversely affected  Normal sinus rhythm  Right bundle branch block  Left anterior fascicular block  ** Bifascicular block **  Left ventricular hypertrophy with repolarization abnormality  Cannot rule out Septal infarct , age undetermined  Abnormal ECG  No previous ECGs available  Confirmed by Trinidad Ruby MD, [facility] ([id number]) on 5/4/2021 12:13:56 PM     SARS-COV-2    Collection Time: 05/04/21 10:30 AM   Result Value Ref Range    SARS-CoV-2 Please find results under separate order     COVID-19 RAPID TEST    Collection Time: 05/04/21 10:30 AM   Result Value Ref Range    Specimen source Nasopharyngeal      COVID-19 rapid test Not Detected Not Detected     LACTIC ACID    Collection Time: 05/04/21 11:12 AM   Result Value Ref Range    Lactic acid 0.6 0.4 - 2.0 mmol/L   MAGNESIUM    Collection Time: 05/04/21 11:12 AM   Result Value Ref Range    Magnesium 1.8 1.6 - 2.4 mg/dL   METABOLIC PANEL, COMPREHENSIVE    Collection Time: 05/04/21 11:12 AM   Result Value Ref Range    Sodium 129 (L) 136 - 145 mmol/L    Potassium 4.2 3.5 - 5.1 mmol/L    Chloride 97 97 - 108 mmol/L    CO2 28 21 - 32 mmol/L    Anion gap 4 (L) 5 - 15 mmol/L    Glucose 182 (H) 65 - 100 mg/dL    BUN 25 (H) 6 - 20 mg/dL    Creatinine 0.88 0.55 - 1.02 mg/dL    BUN/Creatinine ratio 28 (H) 12 - 20      GFR est AA >60 >60 ml/min/1.73m2    GFR est non-AA >60 >60 ml/min/1.73m2    Calcium 8.2 (L) 8.5 - 10.1 mg/dL    Bilirubin, total 0.4 0.2 - 1.0 mg/dL    AST (SGOT) 13 (L) 15 - 37 U/L    ALT (SGPT) 17 12 - 78 U/L    Alk.  phosphatase 68 45 - 117 U/L    Protein, total 6.5 6.4 - 8.2 g/dL    Albumin 3.0 (L) 3.5 - 5.0 g/dL    Globulin 3.5 2.0 - 4.0 g/dL    A-G Ratio 0.9 (L) 1.1 - 2.2     BNP    Collection Time: 05/04/21 11:12 AM   Result Value Ref Range    NT pro-BNP 7,360 (H) <450 pg/mL   PROCALCITONIN    Collection Time: 05/04/21 11:12 AM   Result Value Ref Range    Procalcitonin <0.05 (H) 0 ng/mL   TROPONIN I    Collection Time: 05/04/21 11:12 AM   Result Value Ref Range    Troponin-I, Qt. <0.05 <0.05 ng/mL   TSH 3RD GENERATION    Collection Time: 05/04/21 11:12 AM   Result Value Ref Range    TSH 1.62 0.36 - 3.74 uIU/mL   CBC WITH AUTOMATED DIFF    Collection Time: 05/04/21 11:12 AM   Result Value Ref Range    WBC 14.2 (H) 3.6 - 11.0 K/uL    RBC 3.13 (L) 3.80 - 5.20 M/uL    HGB 10.0 (L) 11.5 - 16.0 g/dL    HCT 30.8 (L) 35.0 - 47.0 %    MCV 98.4 80.0 - 99.0 FL    MCH 31.9 26.0 - 34.0 PG    MCHC 32.5 30.0 - 36.5 g/dL    RDW 13.6 11.5 - 14.5 %    PLATELET 203 092 - 833 K/uL    MPV 11.7 8.9 - 12.9 FL    NRBC 0.0 0.0  WBC    ABSOLUTE NRBC 0.00 0.00 - 0.01 K/uL    NEUTROPHILS 87 (H) 32 - 75 %    LYMPHOCYTES 6 (L) 12 - 49 %    MONOCYTES 5 5 - 13 %    EOSINOPHILS 1 0 - 7 %    BASOPHILS 1 0 - 1 %    IMMATURE GRANULOCYTES 0 0 - 0.5 %    ABS. NEUTROPHILS 12.4 (H) 1.8 - 8.0 K/UL    ABS. LYMPHOCYTES 0.8 0.8 - 3.5 K/UL    ABS. MONOCYTES 0.7 0.0 - 1.0 K/UL    ABS. EOSINOPHILS 0.1 0.0 - 0.4 K/UL    ABS. BASOPHILS 0.1 0.0 - 0.1 K/UL    ABS. IMM. GRANS. 0.1 (H) 0.00 - 0.04 K/UL    DF AUTOMATED     PROTHROMBIN TIME + INR    Collection Time: 05/04/21 11:25 AM   Result Value Ref Range    Prothrombin time 13.6 11.9 - 14.7 sec    INR 1.1 0.9 - 1.1     PTT    Collection Time: 05/04/21 11:25 AM   Result Value Ref Range    aPTT 21.8 21.2 - 34.1 sec    aPTT, therapeutic range   82 - 109 sec   D DIMER    Collection Time: 05/04/21 11:25 AM   Result Value Ref Range    D DIMER 0.50 (H) <0.50 ug/ml(FEU)   BLOOD GAS, ARTERIAL    Collection Time: 05/04/21 11:25 AM   Result Value Ref Range    pH 7.36 7.35 - 7.45      PCO2 52 (H) 35 - 45 mmHg    PO2 72 (L) 75 - 100 mmHg    O2 SAT 93 (L) >95 %    BICARBONATE 27 (H) 22 - 26 mmol/L    BASE EXCESS 2.9 (H) 0 - 2 mmol/L    O2 METHOD Nasal Cannula      O2 FLOW RATE 6 L/min    SITE Right Radial      EBEN'S TEST PASS         Chest x-ray:  More conspicuous RUL opacity. Patchy mid and lower lung reticular markings  persist. Cardiac and mediastinal structures unchanged. Thoracic aorta  atherosclerosis. No pneumothorax.  Probable small dependent bilateral pleural  effusions.     Assessment:     Static post V. Fib/received shocks x2/ intubated and extubated  Static post pacemaker   CAD status post stent LAD  Severe aortic stenosis  Acute hypoxemic respiratory failure  COPD exacerbation     Mild PAD (R lower extremity)    Hyponatremia/resolved    Congestive heart failure    Pleural effusions     Anemia  7.7    Leukocytosis/resolving    Diabetes mellitus    Hypertension/resolved    Hypothyroidism    Depression     Echo done shows ejection fraction of 40 to 45% and moderate grade 2 diastolic dysfunction    Dopplers negative for DVT    Toes are blue poor pulse ordered arterial Doppler    Hypokalemia replace potassium  3.3  Plan:     Continue medications:   ASA 81mg po qday   Lipitor 10mg po qhs  Pulmicort 500mcg neb bid  Enoxaparin 40mg subq qday   Lexapro 10mg po qhs  Famotidine 20mg po bid   Lasix 40m po qday  Lantus 60 units subq qhs  Humalog subq qid   Synthroid 150mcg po qam   Prednisone 10mg po qday   Brilinta 90 mg twice a day  Zosyn 3.375mg IV q8h (day 8)    D5 1/2 NS 50ml/hr IV   Dopamine 800mg D5 250ml infusion  Propofol 10mg/mL infusion     Add KCl 40meq   Add ferrous sulfate    PT OT consult  Continue to follow with SLP, pulmonology          Current Facility-Administered Medications:     dextrose 5 % - 0.45% NaCl infusion, 50 mL/hr, IntraVENous, CONTINUOUS, Beny Cheung MD, Last Rate: 50 mL/hr at 05/14/21 0852, 50 mL/hr at 05/14/21 0852    [COMPLETED] barium sulfate (VARIBAR THIN) 81 % (w/w) oral powder 70 mL, 70 mL, Oral, RAD ONCE, Beny Cheung MD, 70 mL at 05/14/21 1100    [COMPLETED] barium sulfate (VARIBAR PUDDING) 40 % (w/v), 30% (w/w) contrast oral paste 30 mL, 30 mL, Oral, RAD ONCE, Beny Cheung MD, 30 mL at 05/14/21 1100    [COMPLETED] barium sulfate (VARIBAR NECTAR) 40 % (w/v) contrast suspension 30 mL, 30 mL, Oral, RAD ONCE, Beny Cheung MD, 25 mL at 05/14/21 1100    furosemide (LASIX) tablet 40 mg, 40 mg, Oral, DAILY, Beny Cheung MD, 40 mg at 05/14/21 3574    famotidine (PEPCID) tablet 20 mg, 20 mg, Per NG tube, BID, Beny Cheung MD, 20 mg at 05/14/21 0829    albuterol-ipratropium (DUO-NEB) 2.5 MG-0.5 MG/3 ML, 3 mL, Nebulization, Q6H PRN, Sky Cheung MD    EPINEPHrine (ADRENALIN) 0.1 mg/mL syringe, , , CODE BLUE, Esau Nunez MD, 1 mg at 05/10/21 1416    propofol (DIPRIVAN) 10 mg/mL infusion, 0-50 mcg/kg/min, IntraVENous, TITRATE, Esau Nunez MD, Last Rate: 12 mL/hr at 05/12/21 0525, 25 mcg/kg/min at 05/12/21 0525    budesonide (PULMICORT) 500 mcg/2 ml nebulizer suspension, 500 mcg, Nebulization, BID RT, Vishnu Hwang MD, 500 mcg at 05/14/21 0823    DOPamine (INTROPIN) 800 mg in dextrose 5% 250 mL infusion, 0-20 mcg/kg/min, IntraVENous, TITRATE, Isai Mendoza MD, Last Rate: 11.2 mL/hr at 05/10/21 2200, 7.5 mcg/kg/min at 05/10/21 2200    aspirin chewable tablet 81 mg, 81 mg, Oral, DAILY, Noé Bunch MD, 81 mg at 05/14/21 0828    ticagrelor (BRILINTA) tablet 90 mg, 90 mg, Oral, Q12H, Noé Bunch MD, 90 mg at 05/14/21 5099    sodium chloride (NS) flush 5-40 mL, 5-40 mL, IntraVENous, Q8H, Noé Bunch MD, 10 mL at 05/14/21 0608    sodium chloride (NS) flush 5-40 mL, 5-40 mL, IntraVENous, PRN, Noé Bunch MD    predniSONE (DELTASONE) tablet 10 mg, 10 mg, Oral, DAILY WITH BREAKFAST, Beny Cheung MD, 10 mg at 05/14/21 6557    hydrOXYzine pamoate (VISTARIL) capsule 25 mg, 25 mg, Oral, Q6H PRN, Beny Cheung MD, 25 mg at 05/13/21 2201    piperacillin-tazobactam (ZOSYN) 3.375 g in 0.9% sodium chloride (MBP/ADV) 100 mL MBP, 3.375 g, IntraVENous, Q8H, Beny Cheung MD, Last Rate: 25 mL/hr at 05/14/21 0829, 3.375 g at 05/14/21 1914    escitalopram oxalate (LEXAPRO) tablet 10 mg, 10 mg, Oral, QHS, Beny Cheung MD, 10 mg at 05/13/21 2154    levothyroxine (SYNTHROID) tablet 150 mcg, 150 mcg, Oral, ACB, Sky Cheung MD, 150 mcg at 05/14/21 0828    insulin glargine (LANTUS) injection 60 Units, 60 Units, SubCUTAneous, QHS, Beny Cheung MD, 60 Units at 05/13/21 2154    atorvastatin (LIPITOR) tablet 10 mg, 10 mg, Oral, QHS, Beny Cheung MD, 10 mg at 05/13/21 2154    insulin lispro (HUMALOG) injection, , SubCUTAneous, AC&HS, Mitchell Cheung MD, Stopped at 05/14/21 0730    glucose chewable tablet 16 g, 4 Tab, Oral, PRN, Mitchell Cheung MD    dextrose (D50W) injection syrg 12.5-25 g, 25-50 mL, IntraVENous, PRN, Beny Cheung MD, 25 g at 05/14/21 0813    glucagon (GLUCAGEN) injection 1 mg, 1 mg, IntraMUSCular, PRN, Mitchell Cheung MD    acetaminophen (TYLENOL) tablet 650 mg, 650 mg, Oral, Q6H PRN, 650 mg at 05/12/21 1043 **OR** acetaminophen (TYLENOL) suppository 650 mg, 650 mg, Rectal, Q6H PRN, Mitchell Cheung MD    polyethylene glycol (MIRALAX) packet 17 g, 17 g, Oral, DAILY PRN, Mitchell Cheung MD    ondansetron (ZOFRAN ODT) tablet 4 mg, 4 mg, Oral, Q8H PRN **OR** ondansetron (ZOFRAN) injection 4 mg, 4 mg, IntraVENous, Q6H PRN, Beny Cheung MD    enoxaparin (LOVENOX) injection 40 mg, 40 mg, SubCUTAneous, DAILY, Beny Cheung MD, 40 mg at 05/14/21 3133

## 2021-05-14 NOTE — PROGRESS NOTES
[facility] Medication Refill Request Information:  * Please contact your pharmacy regarding ANY request for medication refills.  ** [facility] Prescription Fax = [phone]  * Please allow 3 business days for routine medication refills.  * Please allow 5 business days for controlled substance medication refills.     [facility] Test Result notification information:  *You will be notified with in 7-10 days of your appointment day regarding the results of your test.  If you are on MyChart you will be notified as soon as the provider has reviewed the results and signed off on them.    [facility]: [phone]      Progress Note    Patient: Ilsa Markham MRN: [id number]  SSN: [id number]    YOB: 1940  Age: 80 y.o. Sex: female      Admit Date: 5/4/2021    LOS: 10 days     Subjective:     Patient was transferred out of the ICU yesterday. Objective:     Vitals:    05/13/21 1946 05/14/21 0440 05/14/21 0824 05/14/21 0825   BP: (!) 117/58 (!) 132/52 137/62 137/62   Pulse: 98 95 88 88   Resp: 20 18 18 18   Temp: 97.9 °F (36.6 °C)   97.5 °F (36.4 °C)   SpO2: 99% 95% 98% 99%   Weight:       Height:            Intake and Output:  Current Shift: 05/14 0701 - 05/14 1900  In: -   Out: 400 [Urine:400]  Last three shifts: 05/12 1901 - 05/14 0700  In: 300 [P.O.:300]  Out: 475 [Urine:475]    Physical Exam:   General:   Patient is extubated   Eyes:  Conjunctivae/corneas clear. PERRL, EOMs intact. Fundi benign   Ears:  Normal TMs and external ear canals both ears. Nose: Nares normal. Septum midline. Mucosa normal. No drainage or sinus tenderness. Mouth/Throat: Lips, mucosa, and tongue normal. Teeth and gums normal.   Neck: Supple, symmetrical, trachea midline, no adenopathy, thyroid: no enlargment/tenderness/nodules, no carotid bruit and no JVD. Back:   Symmetric, no curvature. ROM normal. No CVA tenderness. Lungs:   Clear to auscultation bilaterally. Heart:   Ejection systolic murmur in the aortic area   Abdomen:   Soft, non-tender. Bowel sounds normal. No masses,  No organomegaly. Extremities: Extremities normal, atraumatic, no cyanosis or edema. Pulses: 2+ and symmetric all extremities. Skin: Skin color, texture, turgor normal. No rashes or lesions   Lymph nodes: Cervical, supraclavicular, and axillary nodes normal.   Neurologic:  Moving her upper extremities. Lab/Data Review: All lab results for the last 24 hours reviewed. Assessment:     Active Problems:    CHF (congestive heart failure) ([facility]) (5/4/2021)      COPD (chronic obstructive pulmonary disease) ([facility] (5/4/2021)      Maykel Jeffrey is a 30-year-old white female with:  1. Heart failure with decompensation  2. Diabetes mellitus  3. Hypertension  4. Hypothyroidism  5. Depression  6. COPD  7. Right bundle branch block with left episode  8. Hyponatremia  9. Acute kidney injury  10. Peripheral vascular disease  11. Right upper lobe opacity  12. Fracture deformity of proximal left humerus    Plan:     Increase activity with physical therapy. Currently in paced rhythm. Continue aspirin, atorvastatin and ticagrelor. Blood pressure is okay. Continue Lasix with potassium.     Signed By: Sabrina Bennett MD     May 14, 2021

## 2021-05-14 NOTE — PROGRESS NOTES
Patient had difficulty with potassium pills/flavor. Unable to take all 4 pills. 1 10meq Potassium pill taken. Other 3 discarded. Informed Erle Pilling in pharmacy, recommended liquid potassium. Called Dr. Melva Cazares. Agreed. Ordered 20 meq liquid Potassium. See MAR.

## 2021-05-14 NOTE — PROGRESS NOTES
Called Dr. Della Boone at 1745 to inform him of patient's glucose. Spoke to Dr. Della Boone at [phone], orders received for IV dextrose 1/2 NS at 50ml/hr. Patient in no acute distress. Son at the bedside. Glucose stabilized with IV drip. Patient taken off the floor for morning xr at 0919 by Transport.

## 2021-05-14 NOTE — PROGRESS NOTES
Verbally discussed care plan with patient and Son Shiv Wolfe at the bedside. Son Shiv Wolfe verbalized understanding.      Patient verbalized understanding for fall risk precautions

## 2021-05-14 NOTE — CONSULTS
PULMONARY  NOTE  [facility]    Name: Myesha Ram MRN: [id number]   : 1940 Hospital: [address] [place] [address]   Date: 2021  Admission date: 2021 Hospital Day: 11       HPI:     Hospital Problems  Never Reviewed          Codes Class Noted POA    CHF (congestive heart failure) ([facility]) ICD-10-CM: I50.9  ICD-9-CM: 428.0  2021 Unknown        COPD (chronic obstructive pulmonary disease) ([facility] ICD-10-CM: J44.9  ICD-9-CM: 496  2021 Unknown                   [x] High complexity decision making was performed  [x] See my orders for details      Subjective/Initial History:     I was asked by Shania Burrell MD to see Myesha Ram  a 80 y.o.  female in consultation     Excerpts from admission 2021 or consult notes as follows:   79-year-old lady came in because of shortness of breath and dyspnea she was told that she has COPD recently and she was giving albuterol inhaler did not seem to help so came to the emergency room she was hypoxic she was put on oxygen 5 L nasal cannula she was having dyspnea minimal exertion and also at rest she is a lifetime non-smoker, but she has been exposed to secondhand smoke her  used to smoke and all her children smoke she used to work on the farm as a farmer no chest pain no fever no chills.      5/10 patient condition got worse yesterday heart rate was in 40s to 50s EKG shows complete heart block patient was transferred to ICU on dopamine drip  Allergies   Allergen Reactions    Nortriptyline Itching    Cymbalta [Duloxetine] Itching    Ivp [Fd And C Blue No.1] Hives    Morphine Itching    Tetracycline Itching        MAR reviewed and pertinent medications noted or modified as needed     Current Facility-Administered Medications   Medication    dextrose 5 % - 0.45% NaCl infusion    [COMPLETED] barium sulfate (VARIBAR THIN) 81 % (w/w) oral powder 70 mL    [COMPLETED] barium sulfate (VARIBAR PUDDING) 40 % (w/v), 30% (w/w) contrast oral paste 30 mL    [COMPLETED] barium sulfate (VARIBAR NECTAR) 40 % (w/v) contrast suspension 30 mL    furosemide (LASIX) tablet 40 mg    famotidine (PEPCID) tablet 20 mg    albuterol-ipratropium (DUO-NEB) 2.5 MG-0.5 MG/3 ML    EPINEPHrine (ADRENALIN) 0.1 mg/mL syringe    propofol (DIPRIVAN) 10 mg/mL infusion    budesonide (PULMICORT) 500 mcg/2 ml nebulizer suspension    DOPamine (INTROPIN) 800 mg in dextrose 5% 250 mL infusion    aspirin chewable tablet 81 mg    ticagrelor (BRILINTA) tablet 90 mg    sodium chloride (NS) flush 5-40 mL    sodium chloride (NS) flush 5-40 mL    predniSONE (DELTASONE) tablet 10 mg    hydrOXYzine pamoate (VISTARIL) capsule 25 mg    piperacillin-tazobactam (ZOSYN) 3.375 g in 0.9% sodium chloride (MBP/ADV) 100 mL MBP    escitalopram oxalate (LEXAPRO) tablet 10 mg    levothyroxine (SYNTHROID) tablet 150 mcg    insulin glargine (LANTUS) injection 60 Units    atorvastatin (LIPITOR) tablet 10 mg    insulin lispro (HUMALOG) injection    glucose chewable tablet 16 g    dextrose (D50W) injection syrg 12.5-25 g    glucagon (GLUCAGEN) injection 1 mg    acetaminophen (TYLENOL) tablet 650 mg    Or    acetaminophen (TYLENOL) suppository 650 mg    polyethylene glycol (MIRALAX) packet 17 g    ondansetron (ZOFRAN ODT) tablet 4 mg    Or    ondansetron (ZOFRAN) injection 4 mg    enoxaparin (LOVENOX) injection 40 mg      Patient PCP: Odalis Bush MD  PMH:  has a past medical history of Depression, DM (diabetes mellitus) ([address] Hyperlipidemia, Hypertension, Pancreatitis, SOB (shortness of breath), and Thyroid disease. PSH:   has a past surgical history that includes hx hysterectomy; ir fluoro guide plc cvad (5/10/2021); ir insert non tunl cvc over 5 yrs (5/10/2021); and pr ins new/rplcmt prm pm w/transv eltrd atrial&vent (N/A, 5/11/2021). FHX: family history is not on file. SHX:  reports that she has never smoked.  She has never used smokeless tobacco. She reports that she does not drink alcohol or use drugs. ROS:    Review of Systems   Constitutional: Negative. HENT: Negative. Eyes: Negative. Respiratory: Positive for cough, sputum production, shortness of breath and wheezing. Cardiovascular: Positive for orthopnea. Gastrointestinal: Negative. Genitourinary: Negative. Musculoskeletal: Negative. Skin: Negative. Neurological: Negative. Psychiatric/Behavioral: Negative. Objective:     Vital Signs: Telemetry:    normal sinus rhythm Intake/Output:   Visit Vitals  /62 (BP 1 Location: Left upper arm, BP Patient Position: At rest;Lying)   Pulse 88   Temp 97.5 °F (36.4 °C)   Resp 18   Ht 5' 1\" (1.549 m)   Wt 76.3 kg (168 lb 3.4 oz)   SpO2 99%   BMI 31.78 kg/m²       Temp (24hrs), Av.9 °F (36.6 °C), Min:97.5 °F (36.4 °C), Max:98.3 °F (36.8 °C)        O2 Device: None (Room air) O2 Flow Rate (L/min): 2 l/min       Wt Readings from Last 4 Encounters:   21 76.3 kg (168 lb 3.4 oz)   10/11/10 102.2 kg (225 lb 3.2 oz)        No intake or output data in the 24 hours ending 21 1004    Last shift:      No intake/output data recorded. Last 3 shifts:  1901 -  0700  In: 300 [P.O.:300]  Out: 475 [Urine:475]       Physical Exam:     Physical Exam   Constitutional: She appears well-developed. HENT:   Head: Normocephalic and atraumatic. Eyes: Pupils are equal, round, and reactive to light. Conjunctivae are normal.   Neck: Normal range of motion. Neck supple. Cardiovascular: Normal rate and regular rhythm. Pulmonary/Chest: Breath sounds normal. She is in respiratory distress. Abdominal: Soft. Bowel sounds are normal.   Musculoskeletal: Normal range of motion. Neurological: She is alert. Skin: Skin is warm.         Labs:    Recent Labs     21  0635 21  0355 21  0430   WBC 18.6* 23.1* 26.1*   HGB 7.7* 8.0* 10.4*    323 377     Recent Labs     21  0635 21  0355 21  0438  143 141   K 3.3* 3.2* 3.3*   * 108 104   CO2 28 30 28   GLU 28* 80 225*   BUN 60* 56* 42*   CREA 1.35* 1.62* 1.32*   CA 8.3* 8.1* 8.1*   ALB 2.1* 2.0* 2.1*   ALT 26 22 25     Recent Labs     05/12/21  0410   PH 7.50*   PCO2 40   PO2 98   HCO3 31*   FIO2 30.0     No results for input(s): CPK, CKNDX, TROIQ in the last 72 hours. No lab exists for component: CPKMB  No results found for: BNPP, BNP   Lab Results   Component Value Date/Time    Culture result: No growth 6 days 05/04/2021 10:30 AM     Lab Results   Component Value Date/Time    TSH 1.62 05/04/2021 11:12 AM       Imaging:    CXR Results  (Last 48 hours)               05/14/21 0941  XR CHEST PORT Final result    Impression:  The cardiomediastinal silhouette is appropriate for age, technique,   and lung expansion. Pulmonary vasculature is not congested. The lungs are   essentially clear. No effusion or pneumothorax is seen. Narrative:  1 view comparison to 12       ET and NG tube are out. Central line remains                       IMPRESSION:     1. Chronic Obstructive Pulmonary Disease she is a lifetime non-smoker  2. Acute hypoxic and hypercapnic respiratory failure   3. Patient is on Brilinta which can also cause shortness of breath  4. Complete heart block status post temporary pacemaker was placed on 5/10  5. Arrhythmia on dopamine  6. Hyponatremia  7. Decompensated congestive heart failure  8. Pleural effusion more on the left side  9. Right upper lobe opacity possible pneumonia  10. Severe aortic stenosis      RECOMMENDATIONS/PLAN:     1. Patient was extubated yesterday 5/12 now she is on room air  2. Status post cardiac catheterization status post biventricular pacemaker insertion    3. Patient on nebulizer treatment and prednisone  4. Will replace potassium  5. Chest x-ray shows congestive changes with right upper lobe opacity  continue with Zosyn   6. Supplemental O2 to keep sats > 93%   SpO2 on room air, at rest=94%.   SpO2 on room air, while ambulating=87%.   SpO2 on 2L , while ambulating=93%  We will repeat  again pulse ox on room air and ambulation    Blanca Matthews MD

## 2021-05-14 NOTE — PROGRESS NOTES
CM received consult regarding rehab. Due to patient having [employer] she will likely not get approval for IRF. CM met with the patient and her son to discuss at the bedside. Patient hesitant but is agreeable. CM provided she and her son with a SNF list. Son would like to be able to review list with his family and the patient today and get back with CM tomorrow. Due to auth being required we will not have that until Monday. Patient's son stated he will bring list back to hospital tomorrow with 3 to 5 choices. CM will continue to follow.

## 2021-05-14 NOTE — PROGRESS NOTES
Hospitalist Progress Note    Subjective:     Letty Lam is a 81 yo female with a PMHx significant for DM, HLD, HTN, thyroid disease, and depression, who presents to the ED with shortness of breath for the past 1-2 days due to a severe acute exacerbation of COPD, acute hypoxic/hypercapnic respiratory failure, and CHF. She also has a bilateral pleural effusion (worse on left), interstitial edema (cardiogenic or infectious cause), and hyponatremia.   ___________________________________________________________    Patient had blood gluc of 28 this morning. Given IV D5 1/2 NS at 50ml/hr. Glucose stabilized with IV drip. Blood glucose returned to 130 at 9:30am    Today, the patient appears awake, alert, and in no acute distress. She appears slightly disoriented and sluggish. She states that she is feeling well. She denies any shortness of breath, chest pain, palpitations, lightheaded, nausea, vomiting, or abdominal pain. Gen surg consult:   Strong doppler on distal ant tibial a, post tibial a  Negative for ischemia  R common femoral a already has vasc sheath     DANIEL: mild PAD of R lower extremity (0.89)   Normal DANIEL of L lower extremity (0.92)    PT: recommends IRF     Cardiology consult:   Consider starting metoprolol today  Echo 35-40%    Significant labs:   WBC 18.6 (decreasing)  Hgb 7.7  K 3.3  BNP 7126    Past Medical History:   Diagnosis Date    Depression     [facility]     Hyperlipidemia     Hypertension     Pancreatitis     SOB (shortness of breath)     Thyroid disease       Past Surgical History:   Procedure Laterality Date    HX HYSTERECTOMY      IR FLUORO GUIDE PLC CVAD  5/10/2021    IR INSERT NON TUNL CVC OVER 5 YRS  5/10/2021    MN INS NEW/RPLCMT PRM PM W/TRANSV ELTRD ATRIAL&VENT N/A 5/11/2021    INSERT PPM BIV MULTI performed by Yue Galvin MD at [address] [place] [address]     History reviewed. No pertinent family history.    Social History     Tobacco Use    Smoking status: Never Smoker    Smokeless tobacco: Never Used   Substance Use Topics    Alcohol use: No       Prior to Admission medications    Medication Sig Start Date End Date Taking? Authorizing Provider   Insulin Needles, Disposable, 31 X 5/16 \" Ndle by Does Not Apply route. 10/11/10   Author MD Renata   insulin lispro, human, (HUMALOG KWIKPEN) 100 unit/mL flexpen 15 Units by SubCUTAneous route three (3) times daily (with meals). 10/13/10   Author MD Renata   benazepril (LOTENSIN) 40 mg tablet Take 40 mg by mouth daily. 10/11/10   Provider, Historical   LEXAPRO 10 mg tablet Take 10 mg by mouth nightly. 10/11/10   Provider, Historical   famotidine (PEPCID) 20 mg tablet Take 20 mg by mouth two (2) times a day. 10/11/10   Provider, Historical   gabapentin (NEURONTIN) 400 mg capsule  9/3/10   Provider, Historical   hydrocodone-acetaminophen (NORCO) 5-325 mg per tablet Take 1 Tab by mouth every six (6) hours as needed. 10/11/10   Provider, Historical   levothyroxine (SYNTHROID) 150 mcg tablet Take 150 mcg by mouth daily (before breakfast). 10/11/10   Provider, Historical   lorazepam (ATIVAN) 1 mg tablet  8/2/10   Provider, Historical   lovastatin (MEVACOR) 40 mg tablet Take 40 mg by mouth nightly. 10/11/10   Provider, Historical   metoprolol (LOPRESSOR) 100 mg tablet Take 100 mg by mouth two (2) times a day. 10/11/10   Provider, Historical   oxycodone-acetaminophen (PERCOCET 10)  mg per tablet  8/2/10   Provider, Historical   insulin glargine (LANTUS) 100 unit/mL injection 60 Units by SubCUTAneous route nightly. 10/11/10   Author MD Renata     Allergies   Allergen Reactions    Nortriptyline Itching    Cymbalta [Duloxetine] Itching    Ivp [Fd And C Blue No.1] Hives    Morphine Itching    Tetracycline Itching        Review of Systems:  Intubated    Objective:      Intake and Output:    05/14 0701 - 05/14 1900  In: -   Out: 400 [Urine:400]  05/12 1901 - 05/14 0700  In: 300 [P.O.:300]  Out: 475 [Urine:475]    Physical Exam:   Visit Vitals  /62 (BP 1 Location: Left upper arm, BP Patient Position: At rest;Lying)   Pulse 88   Temp 97.5 °F (36.4 °C)   Resp 18   Ht 5' 1\" (1.549 m)   Wt 76.3 kg (168 lb 3.4 oz)   SpO2 99%   BMI 31.78 kg/m²     General:   On ventilator s. Head:  Normocephalic, without obvious abnormality, atraumatic. Eyes:  Conjunctivae/corneas clear. Pupils equal, round, reactive to light. Extraocular movements intact. Lungs:    Decreased breath sounds , crackles, wheezing. Chest wall:  No tenderness or deformity. Heart:  Regular rate and rhythm, S1, S2 normal, grade 3 murmur, click, rub, or gallop. Possible murmur, difficult to auscultate with BiPAP   Abdomen:   Soft, non-tender. Bowel sounds normal. No masses. No organomegaly. Extremities: Extremities normal, atraumatic, no cyanosis. 1+ pitting edema of lower extremities    Pulses: 2+ and symmetric all extremities. Skin: Skin color, texture, turgor normal. No rashes or lesions. Lymph nodes: Cervical, supraclavicular, and axillary nodes normal.   Neurologic: CNII-XII intact. Normal strength, sensation, and reflexes throughout.        ECG:  ** Poor data quality, interpretation may be adversely affected**   Normal sinus rhythm   Right bundle branch block   Left anterior fascicular block   ** Bifascicular block **   Left ventricular hypertrophy with repolarization abnormality   Cannot rule out Septal infarct , age undetermined     Data Review:   Recent Results (from the past 24 hour(s))   EKG, 12 LEAD, INITIAL    Collection Time: 05/04/21 10:24 AM   Result Value Ref Range    Ventricular Rate 98 BPM    Atrial Rate 98 BPM    P-R Interval 168 ms    QRS Duration 152 ms    Q-T Interval 416 ms    QTC Calculation (Bezet) 531 ms    Calculated P Axis 68 degrees    Calculated R Axis -52 degrees    Calculated T Axis 94 degrees    Diagnosis       ** Poor data quality, interpretation may be adversely affected  Normal sinus rhythm  Right bundle branch block  Left anterior fascicular block  ** Bifascicular block **  Left ventricular hypertrophy with repolarization abnormality  Cannot rule out Septal infarct , age undetermined  Abnormal ECG  No previous ECGs available  Confirmed by Jeniffer Pagan MD, Ben Melendez ([id number]) on 5/4/2021 12:13:56 PM     SARS-COV-2    Collection Time: 05/04/21 10:30 AM   Result Value Ref Range    SARS-CoV-2 Please find results under separate order     COVID-19 RAPID TEST    Collection Time: 05/04/21 10:30 AM   Result Value Ref Range    Specimen source Nasopharyngeal      COVID-19 rapid test Not Detected Not Detected     LACTIC ACID    Collection Time: 05/04/21 11:12 AM   Result Value Ref Range    Lactic acid 0.6 0.4 - 2.0 mmol/L   MAGNESIUM    Collection Time: 05/04/21 11:12 AM   Result Value Ref Range    Magnesium 1.8 1.6 - 2.4 mg/dL   METABOLIC PANEL, COMPREHENSIVE    Collection Time: 05/04/21 11:12 AM   Result Value Ref Range    Sodium 129 (L) 136 - 145 mmol/L    Potassium 4.2 3.5 - 5.1 mmol/L    Chloride 97 97 - 108 mmol/L    CO2 28 21 - 32 mmol/L    Anion gap 4 (L) 5 - 15 mmol/L    Glucose 182 (H) 65 - 100 mg/dL    BUN 25 (H) 6 - 20 mg/dL    Creatinine 0.88 0.55 - 1.02 mg/dL    BUN/Creatinine ratio 28 (H) 12 - 20      GFR est AA >60 >60 ml/min/1.73m2    GFR est non-AA >60 >60 ml/min/1.73m2    Calcium 8.2 (L) 8.5 - 10.1 mg/dL    Bilirubin, total 0.4 0.2 - 1.0 mg/dL    AST (SGOT) 13 (L) 15 - 37 U/L    ALT (SGPT) 17 12 - 78 U/L    Alk.  phosphatase 68 45 - 117 U/L    Protein, total 6.5 6.4 - 8.2 g/dL    Albumin 3.0 (L) 3.5 - 5.0 g/dL    Globulin 3.5 2.0 - 4.0 g/dL    A-G Ratio 0.9 (L) 1.1 - 2.2     BNP    Collection Time: 05/04/21 11:12 AM   Result Value Ref Range    NT pro-BNP 7,360 (H) <450 pg/mL   PROCALCITONIN    Collection Time: 05/04/21 11:12 AM   Result Value Ref Range    Procalcitonin <0.05 (H) 0 ng/mL   TROPONIN I    Collection Time: 05/04/21 11:12 AM   Result Value Ref Range    Troponin-I, Qt. <0.05 <0.05 ng/mL   TSH 3RD GENERATION    Collection Time: 05/04/21 11:12 AM   Result Value Ref Range    TSH 1.62 0.36 - 3.74 uIU/mL   CBC WITH AUTOMATED DIFF    Collection Time: 05/04/21 11:12 AM   Result Value Ref Range    WBC 14.2 (H) 3.6 - 11.0 K/uL    RBC 3.13 (L) 3.80 - 5.20 M/uL    HGB 10.0 (L) 11.5 - 16.0 g/dL    HCT 30.8 (L) 35.0 - 47.0 %    MCV 98.4 80.0 - 99.0 FL    MCH 31.9 26.0 - 34.0 PG    MCHC 32.5 30.0 - 36.5 g/dL    RDW 13.6 11.5 - 14.5 %    PLATELET 576 858 - 229 K/uL    MPV 11.7 8.9 - 12.9 FL    NRBC 0.0 0.0  WBC    ABSOLUTE NRBC 0.00 0.00 - 0.01 K/uL    NEUTROPHILS 87 (H) 32 - 75 %    LYMPHOCYTES 6 (L) 12 - 49 %    MONOCYTES 5 5 - 13 %    EOSINOPHILS 1 0 - 7 %    BASOPHILS 1 0 - 1 %    IMMATURE GRANULOCYTES 0 0 - 0.5 %    ABS. NEUTROPHILS 12.4 (H) 1.8 - 8.0 K/UL    ABS. LYMPHOCYTES 0.8 0.8 - 3.5 K/UL    ABS. MONOCYTES 0.7 0.0 - 1.0 K/UL    ABS. EOSINOPHILS 0.1 0.0 - 0.4 K/UL    ABS. BASOPHILS 0.1 0.0 - 0.1 K/UL    ABS. IMM. GRANS. 0.1 (H) 0.00 - 0.04 K/UL    DF AUTOMATED     PROTHROMBIN TIME + INR    Collection Time: 05/04/21 11:25 AM   Result Value Ref Range    Prothrombin time 13.6 11.9 - 14.7 sec    INR 1.1 0.9 - 1.1     PTT    Collection Time: 05/04/21 11:25 AM   Result Value Ref Range    aPTT 21.8 21.2 - 34.1 sec    aPTT, therapeutic range   82 - 109 sec   D DIMER    Collection Time: 05/04/21 11:25 AM   Result Value Ref Range    D DIMER 0.50 (H) <0.50 ug/ml(FEU)   BLOOD GAS, ARTERIAL    Collection Time: 05/04/21 11:25 AM   Result Value Ref Range    pH 7.36 7.35 - 7.45      PCO2 52 (H) 35 - 45 mmHg    PO2 72 (L) 75 - 100 mmHg    O2 SAT 93 (L) >95 %    BICARBONATE 27 (H) 22 - 26 mmol/L    BASE EXCESS 2.9 (H) 0 - 2 mmol/L    O2 METHOD Nasal Cannula      O2 FLOW RATE 6 L/min    SITE Right Radial      EBEN'S TEST PASS         Chest x-ray:  More conspicuous RUL opacity. Patchy mid and lower lung reticular markings  persist. Cardiac and mediastinal structures unchanged. Thoracic aorta  atherosclerosis.  No pneumothorax. Probable small dependent bilateral pleural  effusions.     Assessment:     Static post V. Fib/received shocks x2/ intubated and extubated  Static post pacemaker   CAD status post stent LAD  Severe aortic stenosis  Acute hypoxemic respiratory failure  COPD exacerbation     Mild PAD (R lower extremity)    Hyponatremia/resolved    Congestive heart failure    Pleural effusions     Anemia  7.7    Leukocytosis/resolving    Diabetes mellitus    Hypertension/resolved    Hypothyroidism    Depression     Echo done shows ejection fraction of 40 to 45% and moderate grade 2 diastolic dysfunction    Dopplers negative for DVT    Toes are blue poor pulse ordered arterial Doppler    Hypokalemia replace potassium  3.3  Plan:     Continue medications:   ASA 81mg po qday   Lipitor 10mg po qhs  Pulmicort 500mcg neb bid  Enoxaparin 40mg subq qday   Lexapro 10mg po qhs  Famotidine 20mg po bid   Lasix 40m po qday  Lantus 60 units subq qhs  Humalog subq qid   Synthroid 150mcg po qam   Prednisone 10mg po qday   Brilinta 90 mg twice a day  Zosyn 3.375mg IV q8h (day 8)  Ferrous sulfate 325 twice daily    D5 1/2 NS 50ml/hr IV     Replace potassium  Add KCl 40meq   Add ferrous sulfate    PT OT consult  Continue to follow with SLP, pulmonology          Current Facility-Administered Medications:     dextrose 5 % - 0.45% NaCl infusion, 50 mL/hr, IntraVENous, CONTINUOUS, eBny Cheung MD, Last Rate: 50 mL/hr at 05/14/21 0852, 50 mL/hr at 05/14/21 0852    furosemide (LASIX) tablet 40 mg, 40 mg, Oral, DAILY, Beny Cheung MD, 40 mg at 05/14/21 8709    famotidine (PEPCID) tablet 20 mg, 20 mg, Per NG tube, BID, Beny Cheung MD, 20 mg at 05/14/21 0829    albuterol-ipratropium (DUO-NEB) 2.5 MG-0.5 MG/3 ML, 3 mL, Nebulization, Q6H PRN, Zuleika Cheung MD    EPINEPHrine (ADRENALIN) 0.1 mg/mL syringe, , , CODE BLUENadeem MD, 1 mg at 05/10/21 1416    propofol (DIPRIVAN) 10 mg/mL infusion, 0-50 mcg/kg/min, IntraVENous, TITRATE, Zeyad Alcocer MD, Last Rate: 12 mL/hr at 05/12/21 0525, 25 mcg/kg/min at 05/12/21 0525    budesonide (PULMICORT) 500 mcg/2 ml nebulizer suspension, 500 mcg, Nebulization, BID RT, Vishnu Hwang MD, 500 mcg at 05/14/21 0823    DOPamine (INTROPIN) 800 mg in dextrose 5% 250 mL infusion, 0-20 mcg/kg/min, IntraVENous, TITRATE, Isai Mendoza MD, Last Rate: 11.2 mL/hr at 05/10/21 2200, 7.5 mcg/kg/min at 05/10/21 2200    aspirin chewable tablet 81 mg, 81 mg, Oral, DAILY, Noé Bunch MD, 81 mg at 05/14/21 0828    ticagrelor (BRILINTA) tablet 90 mg, 90 mg, Oral, Q12H, Noé Bunch MD, 90 mg at 05/14/21 4172    sodium chloride (NS) flush 5-40 mL, 5-40 mL, IntraVENous, Q8H, Noé Bunch MD, 10 mL at 05/14/21 0608    sodium chloride (NS) flush 5-40 mL, 5-40 mL, IntraVENous, PRN, Noé Bunch MD    predniSONE (DELTASONE) tablet 10 mg, 10 mg, Oral, DAILY WITH BREAKFAST, Nicol Tran MD, 10 mg at 05/14/21 9189    hydrOXYzine pamoate (VISTARIL) capsule 25 mg, 25 mg, Oral, Q6H PRN, Nicol Tran MD, 25 mg at 05/13/21 2201    piperacillin-tazobactam (ZOSYN) 3.375 g in 0.9% sodium chloride (MBP/ADV) 100 mL MBP, 3.375 g, IntraVENous, Q8H, Beny Cheung MD, Last Rate: 25 mL/hr at 05/14/21 0829, 3.375 g at 05/14/21 0829    escitalopram oxalate (LEXAPRO) tablet 10 mg, 10 mg, Oral, QHS, Beny Cheung MD, 10 mg at 05/13/21 2154    levothyroxine (SYNTHROID) tablet 150 mcg, 150 mcg, Oral, ACB, Beny Cheung MD, 150 mcg at 05/14/21 0828    insulin glargine (LANTUS) injection 60 Units, 60 Units, SubCUTAneous, QHS, Beny Cheung MD, 60 Units at 05/13/21 2154    atorvastatin (LIPITOR) tablet 10 mg, 10 mg, Oral, QHS, Beny Cheung MD, 10 mg at 05/13/21 2154    insulin lispro (HUMALOG) injection, , SubCUTAneous, AC&HS, Malina Cheung MD, Stopped at 05/14/21 0730    glucose chewable tablet 16 g, 4 Tab, Oral, PRN, Malina Cheung MD    dextrose (D50W) injection syrg 12.5-25 g, 25-50 mL, IntraVENous, PRN, Beny Cheung MD, 25 g at 05/14/21 0813    glucagon (GLUCAGEN) injection 1 mg, 1 mg, IntraMUSCular, PRN, Elvin Cheung MD    acetaminophen (TYLENOL) tablet 650 mg, 650 mg, Oral, Q6H PRN, 650 mg at 05/12/21 1043 **OR** acetaminophen (TYLENOL) suppository 650 mg, 650 mg, Rectal, Q6H PRN, Elvin Cheung MD    polyethylene glycol (MIRALAX) packet 17 g, 17 g, Oral, DAILY PRN, Elvin Cheung MD    ondansetron (ZOFRAN ODT) tablet 4 mg, 4 mg, Oral, Q8H PRN **OR** ondansetron (ZOFRAN) injection 4 mg, 4 mg, IntraVENous, Q6H PRN, Beny Cheung MD    enoxaparin (LOVENOX) injection 40 mg, 40 mg, SubCUTAneous, DAILY, Beny Cheung MD, 40 mg at 05/14/21 8314

## 2021-05-14 NOTE — PROGRESS NOTES
Bedside shift change report given to Christo Collins (oncoming nurse) by Marcio Pennington (offgoing nurse). Report included the following information SBAR, MAR, Recent Results, Med Rec Status, Cardiac Rhythm V-paced and Alarm Parameters . Opportunity for questions. No acute distress noted. Otis Alvarez from pharmacy notified of need for Amiodarone, will be sent up shortly.

## 2021-05-14 NOTE — PROGRESS NOTES
Rechecked patient's glucose- 297. Patient c/o palpitations. Tele monitor reads 104 V-paced. Patient assisted to position of comfort, turned on left side per patient request.  Denies pain. Ate 80% of dinner. Called and Left Voicemail with Dr. Alexus Howard. Awaiting call back.

## 2021-05-14 NOTE — PROGRESS NOTES
PHYSICAL THERAPY TREATMENT  Patient: Dominique Singh (4 y.o. female)  Date: 5/14/2021  Diagnosis: CHF (congestive heart failure) ([facility]) [I50.9]  COPD (chronic obstructive pulmonary disease) ([facility]) [J44.9] <principal problem not specified>  Procedure(s) (LRB):  INSERT PPM BIV MULTI (N/A) 3 Days Post-Op  Precautions:    Chart, physical therapy assessment, plan of care and goals were reviewed. ASSESSMENT  Patient continues with skilled PT services and is progressing towards goals. Pt semi supine in bed upon PT/OT arrival and agreeable to session. Pt soiled upon arrival and required pericare, gown change, and linen change. Performed sup>sit this session with SBA and increased time. Sling donned while sitting EOB. Able to perform STS from EOB with min A x 2 and subhash walker for balance upon standing. Pt requires constant cueing for proper use of subhash walker. Pt ambulated to bathroom with min A x 1 +1 for line management and subhash walker, no LOB, however pt requires cueing to not try to reach for subhash walker with LUE. Pt requires constant cueing to abide by pace maker precautions with LUE. Pt performed toileting, required min A for STS from commode. Pt required CGA for balance while standing at sink washing hands. Pt ambulated back to bed, required VC for upright posture. Pt and son educated on d/c recommendations, pt voicing concerns about rehab, but after education from therapists pt and son agreeable to IRF. Current Level of Function Impacting Discharge (mobility/balance): assistance required for all mobility    Other factors to consider for discharge: PLOF, severity of deficits, time since onset, family assist, DME         PLAN :  Patient continues to benefit from skilled intervention to address the above impairments. Continue treatment per established plan of care. to address goals.     Recommendation for discharge: (in order for the patient to meet his/her long term goals)  Therapy 3 hours per day 5-7 days per week    This discharge recommendation:  Has been made in collaboration with the attending provider and/or case management    IF patient discharges home will need the following DME: to be determined (TBD)       SUBJECTIVE:   Patient stated Girish Alves, whatever, I can do that.  when asked pt to perform any task    OBJECTIVE DATA SUMMARY:   Critical Behavior:  Neurologic State: Alert  Orientation Level: Oriented X4  Cognition: Decreased attention/concentration, Decreased command following, Impaired decision making, Impulsive, Poor safety awareness  Safety/Judgement: Decreased awareness of environment    Functional Mobility Training:  Bed Mobility:  Rolling: Minimum assistance  Supine to Sit: Stand-by assistance  Scooting: Contact guard assistance    Transfers:  Sit to Stand: Minimum assistance;Assist x2  Stand to Sit: Minimum assistance;Assist x2  Bed to Chair: Minimum assistance;Assist x1    Balance:  Sitting: Intact  Sitting - Static: Good (unsupported)  Sitting - Dynamic: Fair (occasional)  Standing: Impaired; With support  Standing - Static: Constant support; Fair  Standing - Dynamic : Constant support; Fair    Ambulation/Gait Training:  Distance (ft): 20 Feet (ft)  Assistive Device: Gait belt;Walker subhash  Ambulation - Level of Assistance: Minimal assistance;Assist x1  Gait Abnormalities: Step to gait  Base of Support: Widened  Speed/Roya: Slow  Step Length: Left shortened;Right shortened    Pain Ratin/10    Activity Tolerance:   Fair and requires rest breaks  Please refer to the flowsheet for vital signs taken during this treatment. After treatment patient left in no apparent distress:   Supine in bed, Call bell within reach, Bed / chair alarm activated, Caregiver / family present, and Side rails x 3    COMMUNICATION/COLLABORATION:   The patients plan of care was discussed with: Occupational therapy assistant.      OT/PT sessions occurred together for increased patient and clinician safety    Problem: Mobility Impaired (Adult and Pediatric)  Goal: *Acute Goals and Plan of Care (Insert Text)  Description: Pt will be I with LE HEP in 7 days. Pt will perform bed mobility with mod I in 7 days. Pt will perform transfers with mod I in 7 days. Pt will amb 25-50 feet with LRAD safely with mod I in 7 days. Pt will verbalize understanding and demonstrate compliance with LUE pacemaker precautions in 7 days. Pt will demonstrate improvement in standing static balance from fair to good in 7 days.    Outcome: Progressing Towards Goal       Aby Harris PTA   Time Calculation: 42 mins

## 2021-05-14 NOTE — PROGRESS NOTES
SPEECH PATHOLOGY MODIFIED BARIUM SWALLOW STUDY  Patient: Robert Castorena (6 y.o. female)  Date: 5/14/2021  Primary Diagnosis: CHF (congestive heart failure) ([facility]) [I50.9]  COPD (chronic obstructive pulmonary disease) ([address] [facility] [J44.9]  Procedure(s) (LRB):  INSERT PPM BIV MULTI (N/A) 3 Days Post-Op   Precautions: Fall and aspiration    ASSESSMENT :  Based on the objective data described below, the patient presents with mild pharyngeal dysphagia. Oral phase c/b delayed A-P transit. Premature spillage to the level of the pyriforms w/ thin. Remaining consistencies initiate at the level of the vallecula. Overall, mild swallow delay appreciated. Mild reduction in BOT retraction. HLE and pharyngeal constriction are wfl. Epiglottis is mobile w/ reduced inversion. Weight of bolus improves inversion. Consistent laryngeal penetration w/ all thin trials that does not clear w/ force of the swallow. Delayed cough and throat clear appears to eliminate penetration. Laryngeal penetration x1 w/ large volume NTL. No subsequent aspiration noted. No pen/asp w/ remaining trials. Trace BOT/vallecular residue. Mild cricopharyngeal hypertrophy that does not appear to negatively impact swallow fxn at this time. .  Patient continues w/ delayed processing , reduced command following, and reduced engagement. Patient will benefit from skilled intervention to address the above impairments. Patients rehabilitation potential is considered to be Good     PLAN :  Recommendations and Planned Interventions:  Rec regular/NTL w/ strict asp/GERD precautions. Rec cognitive linguistic eval concerns for anoxic brain injury vs CVA. MD aware of concerns, CT neg. Frequency/Duration: Patient will be followed by speech-language pathology 4 times a week to address goals. Discharge Recommendations: Inpatient Rehab     SUBJECTIVE:   Patient withdrawn and reduced command following.      OBJECTIVE:     Past Medical History:   Diagnosis Date    Depression DM (diabetes mellitus) ([facility]     Hyperlipidemia     Hypertension     Pancreatitis     SOB (shortness of breath)     Thyroid disease      Past Surgical History:   Procedure Laterality Date    HX HYSTERECTOMY      IR FLUORO GUIDE PLC CVAD  5/10/2021    IR INSERT NON TUNL CVC OVER 5 YRS  5/10/2021    MI INS NEW/RPLCMT PRM PM W/TRANSV ELTRD ATRIAL&VENT N/A 5/11/2021    INSERT PPM BIV MULTI performed by Torie Albrecht MD at [address] [place] [address] Results  (Last 48 hours)                 05/14/21 0941  XR CHEST PORT Final result    Impression:  The cardiomediastinal silhouette is appropriate for age, technique,   and lung expansion. Pulmonary vasculature is not congested. The lungs are   essentially clear. No effusion or pneumothorax is seen. Narrative:  1 view comparison to 12       ET and NG tube are out. Central line remains                   Radiologist: Richard Jarvismaid Views: Lateral  Patient Position: upright in chair    Video Flouroscopic Procedures  [x] Lateral View   [] A-P View [] Scanned to level of Sternum    [] Seated at 90 deg.   [] Other:    Presentation:   [x] Spoon   [x] Cup   [x] Straw   [] Syringe   [x] Consecutive Swallows  [] Other:    Consistencies:   [x] Ba+ liquid   [x] Ba+ liquid (nectar)   [] Ba+ liquid (honey)     [x] Ba+ pudding   [] Ba+ crunched cookie   [x] Ba+ cookie   [] Other:       Results  Dysphagia Present:    [x] Yes  [] No    Ratings of Dysphagia:    [x] Mild  [] Moderate [] Severe    Stages of Breakdown:   [] Oral [x] Pharyngeal   [] Esophageal    Aspiration: [] Yes    [x] No  [x] At Risk    [x] Penetration:  Cough: [x] Yes      [] No    Motility Problems with:  [] Lip Closure:   [] Sucking:   [] Mastication:   [] Bolus Formation:   [] Bolus Control:  [x] A-P Transport:  [] Posterior Tongue Elevation:  [x] Swallow Response (delayed):  [] Velopharyngeal Closure:  [] Laryngeal Elevation:  [] Laryngeal Adduction:  [x] Cricopharyngeal Relaxation:  [] Esophageal Peristalsis:  [] Reflux:  [] Other:    Transit Time Delay:  [x] >1 Second  Oral  [x] >1 Second Pharyngeal  [] >20 Second Esophageal     Residuals:  [] Buccal Cavity   [] Velum/posterior pharyngeal wall  [x] Valleculae  [] Pyriforms    Decreased Tongue Base Retraction?: Yes  Laryngeal Elevation: Inadequate epiglottic inversion  Aspiration/Penetration Score: 3 (Penetration/Visible residue-Contrast remains above the folds/cords, but is not cleared)     Pharyngeal-Esophageal Segment: Decreased relaxation of upper esophageal segment  Pharyngeal Dysfunction: Crico-pharyngeal dysfunction;Decreased tongue base retraction    Oral Phase Severity: Minimal  Pharyngeal Phase Severity: Mild    COMMUNICATION/EDUCATION:   Patient  educated regarding MBS results,  s/s aspiration, aspiration precautions, swallow strategies, and diet recs. She demonstrated Fair understanding as evidenced by reduced engagement. The patient's plan of care including recommendations, planned interventions, and recommended diet changes were discussed with: Registered nurse and Physician. Patient/family agree to work toward stated goals and plan of care. Thank you for this referral.  Carol Ann Mcintyre M.S., M.Ed., CCC-SLP  Time Calculation: 22 mins    Problem: Dysphagia (Adult)  Goal: *Acute Goals and Plan of Care (Insert Text)  Description: Speech Therapy Swallow Goals  Initiated 5/12/2021  -Patient will tolerate full diet with nectar thick liquids without clinical indicators of aspiration given minimal cues within 7 day(s). GOAL MET    -Patient will tolerate PO trials without clinical indicators of aspiration given minimal cues within 7 day(s). -Patient will participate in modified barium swallow study within 7 day(s). GOAL MET       -Patient will demonstrate understanding of swallow safety precautions and aspiration precautions, diet recs with minimal cues within 7 day(s).              Outcome: Progressing Towards Goal

## 2021-05-15 LAB
ALBUMIN SERPL-MCNC: 2.1 G/DL (ref 3.5–5)
ALBUMIN/GLOB SERPL: 0.6 {RATIO} (ref 1.1–2.2)
ALP SERPL-CCNC: 52 U/L (ref 45–117)
ALT SERPL-CCNC: 37 U/L (ref 12–78)
ANION GAP SERPL CALC-SCNC: 6 MMOL/L (ref 5–15)
AST SERPL W P-5'-P-CCNC: 32 U/L (ref 15–37)
BASOPHILS # BLD: 0.1 K/UL (ref 0–0.1)
BASOPHILS NFR BLD: 0 % (ref 0–1)
BILIRUB SERPL-MCNC: 0.3 MG/DL (ref 0.2–1)
BUN SERPL-MCNC: 49 MG/DL (ref 6–20)
BUN/CREAT SERPL: 40 (ref 12–20)
CA-I BLD-MCNC: 7.9 MG/DL (ref 8.5–10.1)
CHLORIDE SERPL-SCNC: 109 MMOL/L (ref 97–108)
CO2 SERPL-SCNC: 29 MMOL/L (ref 21–32)
CREAT SERPL-MCNC: 1.24 MG/DL (ref 0.55–1.02)
DIFFERENTIAL METHOD BLD: ABNORMAL
EOSINOPHIL # BLD: 0.6 K/UL (ref 0–0.4)
EOSINOPHIL NFR BLD: 3 % (ref 0–7)
ERYTHROCYTE [DISTWIDTH] IN BLOOD BY AUTOMATED COUNT: 14.3 % (ref 11.5–14.5)
GLOBULIN SER CALC-MCNC: 3.3 G/DL (ref 2–4)
GLUCOSE BLD STRIP.AUTO-MCNC: 119 MG/DL (ref 65–117)
GLUCOSE BLD STRIP.AUTO-MCNC: 158 MG/DL (ref 65–117)
GLUCOSE BLD STRIP.AUTO-MCNC: 324 MG/DL (ref 65–117)
GLUCOSE BLD STRIP.AUTO-MCNC: 49 MG/DL (ref 65–117)
GLUCOSE BLD STRIP.AUTO-MCNC: 50 MG/DL (ref 65–117)
GLUCOSE BLD STRIP.AUTO-MCNC: 75 MG/DL (ref 65–117)
GLUCOSE SERPL-MCNC: 51 MG/DL (ref 65–100)
HCT VFR BLD AUTO: 23 % (ref 35–47)
HGB BLD-MCNC: 7.2 G/DL (ref 11.5–16)
IMM GRANULOCYTES # BLD AUTO: 0.1 K/UL (ref 0–0.04)
IMM GRANULOCYTES NFR BLD AUTO: 1 % (ref 0–0.5)
LYMPHOCYTES # BLD: 2.5 K/UL (ref 0.8–3.5)
LYMPHOCYTES NFR BLD: 14 % (ref 12–49)
MCH RBC QN AUTO: 31.2 PG (ref 26–34)
MCHC RBC AUTO-ENTMCNC: 31.3 G/DL (ref 30–36.5)
MCV RBC AUTO: 99.6 FL (ref 80–99)
MONOCYTES # BLD: 1.4 K/UL (ref 0–1)
MONOCYTES NFR BLD: 8 % (ref 5–13)
NEUTS SEG # BLD: 13 K/UL (ref 1.8–8)
NEUTS SEG NFR BLD: 74 % (ref 32–75)
NRBC # BLD: 0 K/UL (ref 0–0.01)
NRBC BLD-RTO: 0 PER 100 WBC
PERFORMED BY, TECHID: ABNORMAL
PERFORMED BY, TECHID: NORMAL
PLATELET # BLD AUTO: 308 K/UL (ref 150–400)
PMV BLD AUTO: 11 FL (ref 8.9–12.9)
POTASSIUM SERPL-SCNC: 3.1 MMOL/L (ref 3.5–5.1)
PROT SERPL-MCNC: 5.4 G/DL (ref 6.4–8.2)
RBC # BLD AUTO: 2.31 M/UL (ref 3.8–5.2)
SODIUM SERPL-SCNC: 144 MMOL/L (ref 136–145)
WBC # BLD AUTO: 17.7 K/UL (ref 3.6–11)

## 2021-05-15 PROCEDURE — 74011250637 HC RX REV CODE- 250/637: Performed by: FAMILY MEDICINE

## 2021-05-15 PROCEDURE — 65270000029 HC RM PRIVATE

## 2021-05-15 PROCEDURE — 82962 GLUCOSE BLOOD TEST: CPT

## 2021-05-15 PROCEDURE — 74011000258 HC RX REV CODE- 258: Performed by: INTERNAL MEDICINE

## 2021-05-15 PROCEDURE — 94762 N-INVAS EAR/PLS OXIMTRY CONT: CPT

## 2021-05-15 PROCEDURE — 74011000250 HC RX REV CODE- 250: Performed by: FAMILY MEDICINE

## 2021-05-15 PROCEDURE — 74011250637 HC RX REV CODE- 250/637: Performed by: INTERNAL MEDICINE

## 2021-05-15 PROCEDURE — 97530 THERAPEUTIC ACTIVITIES: CPT

## 2021-05-15 PROCEDURE — 94640 AIRWAY INHALATION TREATMENT: CPT

## 2021-05-15 PROCEDURE — 36415 COLL VENOUS BLD VENIPUNCTURE: CPT

## 2021-05-15 PROCEDURE — 85025 COMPLETE CBC W/AUTO DIFF WBC: CPT

## 2021-05-15 PROCEDURE — 74011000250 HC RX REV CODE- 250: Performed by: INTERNAL MEDICINE

## 2021-05-15 PROCEDURE — 74011250636 HC RX REV CODE- 250/636: Performed by: INTERNAL MEDICINE

## 2021-05-15 PROCEDURE — 99232 SBSQ HOSP IP/OBS MODERATE 35: CPT | Performed by: SURGERY

## 2021-05-15 PROCEDURE — 74011250636 HC RX REV CODE- 250/636: Performed by: FAMILY MEDICINE

## 2021-05-15 PROCEDURE — 80053 COMPREHEN METABOLIC PANEL: CPT

## 2021-05-15 PROCEDURE — 92526 ORAL FUNCTION THERAPY: CPT

## 2021-05-15 PROCEDURE — 74011000258 HC RX REV CODE- 258: Performed by: FAMILY MEDICINE

## 2021-05-15 PROCEDURE — 74011636637 HC RX REV CODE- 636/637: Performed by: FAMILY MEDICINE

## 2021-05-15 RX ORDER — METOPROLOL SUCCINATE 25 MG/1
25 TABLET, EXTENDED RELEASE ORAL DAILY
Status: DISCONTINUED | OUTPATIENT
Start: 2021-05-16 | End: 2021-05-19 | Stop reason: HOSPADM

## 2021-05-15 RX ORDER — INSULIN GLARGINE 100 [IU]/ML
40 INJECTION, SOLUTION SUBCUTANEOUS
Status: DISCONTINUED | OUTPATIENT
Start: 2021-05-15 | End: 2021-05-19 | Stop reason: HOSPADM

## 2021-05-15 RX ADMIN — FERROUS SULFATE TAB 325 MG (65 MG ELEMENTAL FE) 325 MG: 325 (65 FE) TAB at 17:05

## 2021-05-15 RX ADMIN — INSULIN GLARGINE 40 UNITS: 100 INJECTION, SOLUTION SUBCUTANEOUS at 23:12

## 2021-05-15 RX ADMIN — PIPERACILLIN AND TAZOBACTAM 3.38 G: 3; .375 INJECTION, POWDER, LYOPHILIZED, FOR SOLUTION INTRAVENOUS at 17:05

## 2021-05-15 RX ADMIN — Medication 10 ML: at 23:13

## 2021-05-15 RX ADMIN — Medication 10 ML: at 06:15

## 2021-05-15 RX ADMIN — FERROUS SULFATE TAB 325 MG (65 MG ELEMENTAL FE) 325 MG: 325 (65 FE) TAB at 09:15

## 2021-05-15 RX ADMIN — TICAGRELOR 90 MG: 90 TABLET ORAL at 09:15

## 2021-05-15 RX ADMIN — PIPERACILLIN AND TAZOBACTAM 3.38 G: 3; .375 INJECTION, POWDER, LYOPHILIZED, FOR SOLUTION INTRAVENOUS at 00:53

## 2021-05-15 RX ADMIN — ESCITALOPRAM OXALATE 10 MG: 10 TABLET ORAL at 23:12

## 2021-05-15 RX ADMIN — DEXTROSE AND SODIUM CHLORIDE 50 ML/HR: 5; 450 INJECTION, SOLUTION INTRAVENOUS at 23:13

## 2021-05-15 RX ADMIN — FAMOTIDINE 20 MG: 20 TABLET ORAL at 09:15

## 2021-05-15 RX ADMIN — DEXTROSE AND SODIUM CHLORIDE 50 ML/HR: 5; 450 INJECTION, SOLUTION INTRAVENOUS at 04:17

## 2021-05-15 RX ADMIN — LEVOTHYROXINE SODIUM 150 MCG: 75 TABLET ORAL at 09:15

## 2021-05-15 RX ADMIN — ENOXAPARIN SODIUM 40 MG: 40 INJECTION SUBCUTANEOUS at 09:15

## 2021-05-15 RX ADMIN — TICAGRELOR 90 MG: 90 TABLET ORAL at 23:12

## 2021-05-15 RX ADMIN — FUROSEMIDE 40 MG: 40 TABLET ORAL at 09:15

## 2021-05-15 RX ADMIN — INSULIN LISPRO 15 UNITS: 100 INJECTION, SOLUTION INTRAVENOUS; SUBCUTANEOUS at 16:30

## 2021-05-15 RX ADMIN — ASPIRIN 81 MG CHEWABLE TABLET 81 MG: 81 TABLET CHEWABLE at 09:00

## 2021-05-15 RX ADMIN — HYDROXYZINE PAMOATE 25 MG: 25 CAPSULE ORAL at 03:48

## 2021-05-15 RX ADMIN — PREDNISONE 10 MG: 5 TABLET ORAL at 09:15

## 2021-05-15 RX ADMIN — ACETAMINOPHEN 650 MG: 325 TABLET, FILM COATED ORAL at 03:48

## 2021-05-15 RX ADMIN — Medication 10 ML: at 15:13

## 2021-05-15 RX ADMIN — ATORVASTATIN CALCIUM 10 MG: 10 TABLET, FILM COATED ORAL at 23:12

## 2021-05-15 RX ADMIN — PIPERACILLIN AND TAZOBACTAM 3.38 G: 3; .375 INJECTION, POWDER, LYOPHILIZED, FOR SOLUTION INTRAVENOUS at 23:12

## 2021-05-15 RX ADMIN — PIPERACILLIN AND TAZOBACTAM 3.38 G: 3; .375 INJECTION, POWDER, LYOPHILIZED, FOR SOLUTION INTRAVENOUS at 09:15

## 2021-05-15 RX ADMIN — BUDESONIDE 500 MCG: 0.5 INHALANT RESPIRATORY (INHALATION) at 08:44

## 2021-05-15 RX ADMIN — FAMOTIDINE 20 MG: 20 TABLET ORAL at 23:12

## 2021-05-15 RX ADMIN — BUDESONIDE 500 MCG: 0.5 INHALANT RESPIRATORY (INHALATION) at 19:32

## 2021-05-15 NOTE — PROGRESS NOTES
Bedside and Verbal shift change report given to Poly Kebede RN (oncoming nurse) by Neda Anderson RN (offgoing nurse). Report included the following information SBAR, Intake/Output, MAR, Recent Results, Med Rec Status and Cardiac Rhythm . Maryuri Mayo

## 2021-05-15 NOTE — PROGRESS NOTES
Progress Note    Patient: Iza Antonio MRN: [id number]  SSN: [id number]    YOB: 1940  Age: 80 y.o. Sex: female      Admit Date: 5/4/2021    LOS: 11 days     Subjective:     Yesterday patient found some palpitations. Heart rate was in the 110s 120s. Objective:     Vitals:    05/14/21 1940 05/15/21 0434 05/15/21 0807 05/15/21 0844   BP:  (!) 125/55 (!) 146/66    Pulse:  94 83    Resp:  20 20    Temp:  98.4 °F (36.9 °C) 98.2 °F (36.8 °C)    SpO2: 96% 97% 96% 99%   Weight:       Height:            Intake and Output:  Current Shift: No intake/output data recorded. Last three shifts: 05/13 1901 - 05/15 0700  In: -   Out: 400 [Urine:400]    Physical Exam:   General:   Patient is extubated   Eyes:  Conjunctivae/corneas clear. PERRL, EOMs intact. Fundi benign   Ears:  Normal TMs and external ear canals both ears. Nose: Nares normal. Septum midline. Mucosa normal. No drainage or sinus tenderness. Mouth/Throat: Lips, mucosa, and tongue normal. Teeth and gums normal.   Neck: Supple, symmetrical, trachea midline, no adenopathy, thyroid: no enlargment/tenderness/nodules, no carotid bruit and no JVD. Back:   Symmetric, no curvature. ROM normal. No CVA tenderness. Lungs:   Clear to auscultation bilaterally. Heart:   Ejection systolic murmur in the aortic area   Abdomen:   Soft, non-tender. Bowel sounds normal. No masses,  No organomegaly. Extremities: Extremities normal, atraumatic, no cyanosis or edema. Pulses: 2+ and symmetric all extremities. Skin: Skin color, texture, turgor normal. No rashes or lesions   Lymph nodes: Cervical, supraclavicular, and axillary nodes normal.   Neurologic:  Moving her upper extremities. Lab/Data Review: All lab results for the last 24 hours reviewed. Assessment:     Active Problems:    CHF (congestive heart failure) ([facility]) (5/4/2021)      COPD (chronic obstructive pulmonary disease) ([address] (5/4/2021)    Mrs. Tristan Trinh is a 31-year-old white female with:  1. Heart failure with decompensation  2. Diabetes mellitus  3. Hypertension  4. Hypothyroidism  5. Depression  6. COPD  7. Right bundle branch block with left episode  8. Hyponatremia  9. Acute kidney injury  10. Peripheral vascular disease  11. Right upper lobe opacity  12. Fracture deformity of proximal left humerus    Plan:     Telemetry showed that the pacemaker is not capturing occasionally. This was communicated with electrophysiologist Dr. Kathie Hidalgo who will have [employer] representative, and recheck the pacemaker parameters. I will discontinue amiodarone. I placed patient on amiodarone for short term thinking that this might have been a PMT or something like that. We will start patient on low-dose metoprolol. Currently on Lasix, aspirin, Brilinta, atorvastatin.   Signed By: Kelly Shin MD     May 15, 2021

## 2021-05-15 NOTE — CONSULTS
PULMONARY  NOTE  [facility]    Name: José Miguel Floyd MRN: [id number]   : 1940 Hospital: [address] [place] [address]   Date: 5/15/2021  Admission date: 2021 Hospital Day: 12       HPI:     Hospital Problems  Never Reviewed          Codes Class Noted POA    CHF (congestive heart failure) ([facility]) ICD-10-CM: I50.9  ICD-9-CM: 428.0  2021 Unknown        COPD (chronic obstructive pulmonary disease) ([facility] ICD-10-CM: J44.9  ICD-9-CM: 496  2021 Unknown                   [x] High complexity decision making was performed  [x] See my orders for details      Subjective/Initial History:     I was asked by Grayson Allen MD to see José Miguel Floyd  a 80 y.o.  female in consultation     Excerpts from admission 2021 or consult notes as follows:   79-year-old lady came in because of shortness of breath and dyspnea she was told that she has COPD recently and she was giving albuterol inhaler did not seem to help so came to the emergency room she was hypoxic she was put on oxygen 5 L nasal cannula she was having dyspnea minimal exertion and also at rest she is a lifetime non-smoker, but she has been exposed to secondhand smoke her  used to smoke and all her children smoke she used to work on the farm as a farmer no chest pain no fever no chills.      5/10 patient condition got worse yesterday heart rate was in 40s to 50s EKG shows complete heart block patient was transferred to ICU on dopamine drip  Allergies   Allergen Reactions    Nortriptyline Itching    Cymbalta [Duloxetine] Itching    Ivp [Fd And C Blue No.1] Hives    Morphine Itching    Tetracycline Itching        MAR reviewed and pertinent medications noted or modified as needed     Current Facility-Administered Medications   Medication    dextrose 5 % - 0.45% NaCl infusion    ferrous sulfate tablet 325 mg    amiodarone (CORDARONE) 900 mg/250 ml D5W infusion    furosemide (LASIX) tablet 40 mg    famotidine (PEPCID) tablet 20 mg    albuterol-ipratropium (DUO-NEB) 2.5 MG-0.5 MG/3 ML    EPINEPHrine (ADRENALIN) 0.1 mg/mL syringe    propofol (DIPRIVAN) 10 mg/mL infusion    budesonide (PULMICORT) 500 mcg/2 ml nebulizer suspension    DOPamine (INTROPIN) 800 mg in dextrose 5% 250 mL infusion    aspirin chewable tablet 81 mg    ticagrelor (BRILINTA) tablet 90 mg    sodium chloride (NS) flush 5-40 mL    sodium chloride (NS) flush 5-40 mL    predniSONE (DELTASONE) tablet 10 mg    hydrOXYzine pamoate (VISTARIL) capsule 25 mg    piperacillin-tazobactam (ZOSYN) 3.375 g in 0.9% sodium chloride (MBP/ADV) 100 mL MBP    escitalopram oxalate (LEXAPRO) tablet 10 mg    levothyroxine (SYNTHROID) tablet 150 mcg    insulin glargine (LANTUS) injection 60 Units    atorvastatin (LIPITOR) tablet 10 mg    insulin lispro (HUMALOG) injection    glucose chewable tablet 16 g    dextrose (D50W) injection syrg 12.5-25 g    glucagon (GLUCAGEN) injection 1 mg    acetaminophen (TYLENOL) tablet 650 mg    Or    acetaminophen (TYLENOL) suppository 650 mg    polyethylene glycol (MIRALAX) packet 17 g    ondansetron (ZOFRAN ODT) tablet 4 mg    Or    ondansetron (ZOFRAN) injection 4 mg    enoxaparin (LOVENOX) injection 40 mg      Patient PCP: Lexus Almaraz MD  PMH:  has a past medical history of Depression, DM (diabetes mellitus) ([address] Hyperlipidemia, Hypertension, Pancreatitis, SOB (shortness of breath), and Thyroid disease. PSH:   has a past surgical history that includes hx hysterectomy; ir fluoro guide plc cvad (5/10/2021); ir insert non tunl cvc over 5 yrs (5/10/2021); and pr ins new/rplcmt prm pm w/transv eltrd atrial&vent (N/A, 5/11/2021). FHX: family history is not on file. SHX:  reports that she has never smoked. She has never used smokeless tobacco. She reports that she does not drink alcohol or use drugs. ROS:    Review of Systems   Constitutional: Negative. HENT: Negative. Eyes: Negative.     Respiratory: Positive for cough, sputum production, shortness of breath and wheezing. Cardiovascular: Positive for orthopnea. Gastrointestinal: Negative. Genitourinary: Negative. Musculoskeletal: Negative. Skin: Negative. Neurological: Negative. Psychiatric/Behavioral: Negative. Objective:     Vital Signs: Telemetry:    normal sinus rhythm Intake/Output:   Visit Vitals  BP (!) 146/66 (BP Patient Position: At rest)   Pulse 83   Temp 98.2 °F (36.8 °C)   Resp 20   Ht 5' 1\" (1.549 m)   Wt 76.3 kg (168 lb 3.4 oz)   SpO2 96%   BMI 31.78 kg/m²       Temp (24hrs), Av.2 °F (36.8 °C), Min:97.8 °F (36.6 °C), Max:98.5 °F (36.9 °C)        O2 Device: None (Room air) O2 Flow Rate (L/min): 2 l/min       Wt Readings from Last 4 Encounters:   21 76.3 kg (168 lb 3.4 oz)   10/11/10 102.2 kg (225 lb 3.2 oz)        No intake or output data in the 24 hours ending 05/15/21 0843    Last shift:      No intake/output data recorded. Last 3 shifts:  1901 - 05/15 0700  In: -   Out: 400 [Urine:400]       Physical Exam:     Physical Exam   Constitutional: She appears well-developed. HENT:   Head: Normocephalic and atraumatic. Eyes: Pupils are equal, round, and reactive to light. Conjunctivae are normal.   Neck: Normal range of motion. Neck supple. Cardiovascular: Normal rate and regular rhythm. Pulmonary/Chest: Breath sounds normal. She is in respiratory distress. Abdominal: Soft. Bowel sounds are normal.   Musculoskeletal: Normal range of motion. Neurological: She is alert. Skin: Skin is warm.         Labs:    Recent Labs     21  0635 21  0355   WBC 18.6* 23.1*   HGB 7.7* 8.0*    323     Recent Labs     213 21  0635 21  0355    144 143   K 3.7 3.3* 3.2*    112* 108   CO2 27 28 30   * 28* 80   BUN 56* 60* 56*   CREA 1.38* 1.35* 1.62*   CA 8.0* 8.3* 8.1*   MG 1.9  --   --    ALB  --  2.1* 2.0*   ALT  --  26 22     No results for input(s): PH, PCO2, PO2, HCO3, FIO2 in the last 72 hours. No results for input(s): CPK, CKNDX, TROIQ in the last 72 hours. No lab exists for component: CPKMB  No results found for: BNPP, BNP   Lab Results   Component Value Date/Time    Culture result: No growth 6 days 05/04/2021 10:30 AM     Lab Results   Component Value Date/Time    TSH 1.62 05/04/2021 11:12 AM       Imaging:    CXR Results  (Last 48 hours)               05/14/21 0941  XR CHEST PORT Final result    Impression:  The cardiomediastinal silhouette is appropriate for age, technique,   and lung expansion. Pulmonary vasculature is not congested. The lungs are   essentially clear. No effusion or pneumothorax is seen. Narrative:  1 view comparison to 12       ET and NG tube are out. Central line remains                       IMPRESSION:     1. Chronic Obstructive Pulmonary Disease she is a lifetime non-smoker  2. Acute hypoxic and hypercapnic respiratory failure resolved  3. Patient is on Brilinta which can also cause shortness of breath  4. Complete heart block status post temporary pacemaker was placed on 5/10  5. Hyponatremia resolved  6. Decompensated congestive heart failure  7. Pleural effusion more on the left side  8. Right upper lobe opacity possible pneumonia  9. Severe aortic stenosis      RECOMMENDATIONS/PLAN:     1. Patient was extubated on 5/12 now she is on room air  2. Status post cardiac catheterization status post biventricular pacemaker insertion    3. Patient on nebulizer treatment and prednisone  4. Potassium corrected  5. Chest x-ray shows congestive changes with right upper lobe opacity cleared  She is on Zosyn  6. Supplemental O2 to keep sats > 93%   SpO2 on room air, at rest=94%. SpO2 on room air, while ambulating=87%.   SpO2 on 2L , while ambulating=93%  We will repeat  again overnight pulse oximetry    Bisi Boucher MD

## 2021-05-15 NOTE — PROGRESS NOTES
PROGRESS NOTE      Chief Complaints:  Patient examined this morning. HPI and  Objective:    Patient looks to be comfortable. Patient not quickwitted however. Patient denies any pain right foot. Patient denies chest pain shortness of breath abdominal pain. Review of Systems:  Unable to assess due to mental status. EXAM:  Visit Vitals  BP (!) 125/55 (BP 1 Location: Left upper arm, BP Patient Position: At rest;Supine)   Pulse 94   Temp 98.4 °F (36.9 °C)   Resp 20   Ht 5' 1\" (1.549 m)   Wt 168 lb 3.4 oz (76.3 kg)   SpO2 97%   BMI 31.78 kg/m²     Patient is awake. Head and neck atraumatic. Cardiovascular system regular rate. Pulmonary no audible wheeze. Abdomen soft. Right with vascular examination Doppler signal noted both DP and PT discolored looking right toe looks better.   Recent Results (from the past 24 hour(s))   GLUCOSE, POC    Collection Time: 05/14/21  8:13 AM   Result Value Ref Range    Glucose (POC) 33 (LL) 65 - 117 mg/dL    Performed by [facility], POC    Collection Time: 05/14/21  8:28 AM   Result Value Ref Range    Glucose (POC) 160 (H) 65 - 117 mg/dL    Performed by [address], POC    Collection Time: 05/14/21  8:44 AM   Result Value Ref Range    Glucose (POC) 128 (H) 65 - 117 mg/dL    Performed by [facility], POC    Collection Time: 05/14/21  9:18 AM   Result Value Ref Range    Glucose (POC) 132 (H) 65 - 117 mg/dL    Performed by [facility], POC    Collection Time: 05/14/21 10:33 AM   Result Value Ref Range    Glucose (POC) 135 (H) 65 - 117 mg/dL    [facility]    Collection Time: 05/14/21 11:57 AM   Result Value Ref Range    Glucose (POC) 122 (H) 65 - 117 mg/dL    Performed by [address], POC    Collection Time: 05/14/21  5:11 PM   Result Value Ref Range    Glucose (POC) 353 (H) 65 - 117 mg/dL    Performed by [facility], POC    Collection Time: 05/14/21  6:37 PM   Result Value Ref Range    Glucose (POC) 297 (H) 65 - 117 mg/dL    Performed by Christina CLAY [place], [address], BASIC    Collection Time: 05/14/21  8:33 PM   Result Value Ref Range    Sodium 140 136 - 145 mmol/L    Potassium 3.7 3.5 - 5.1 mmol/L    Chloride 106 97 - 108 mmol/L    CO2 27 21 - 32 mmol/L    Anion gap 7 5 - 15 mmol/L    Glucose 214 (H) 65 - 100 mg/dL    BUN 56 (H) 6 - 20 mg/dL    Creatinine 1.38 (H) 0.55 - 1.02 mg/dL    BUN/Creatinine ratio 41 (H) 12 - 20      GFR est AA 45 (L) >60 ml/min/1.73m2    GFR est non-AA 37 (L) >60 ml/min/1.73m2    Calcium 8.0 (L) 8.5 - 10.1 mg/dL   MAGNESIUM    Collection Time: 05/14/21  8:33 PM   Result Value Ref Range    Magnesium 1.9 1.6 - 2.4 mg/dL   GLUCOSE, POC    Collection Time: 05/14/21  9:38 PM   Result Value Ref Range    Glucose (POC) 204 (H) 65 - 117 mg/dL    Performed by Shawn Whyte        ASSESSMENT:   Patient is 80 y.o. with diagnosis of : Active Problems:    CHF (congestive heart failure) ([facility] (5/4/2021)      COPD (chronic obstructive pulmonary disease) ([facility] (5/4/2021)        PLAN:                 Right foot the vascular status is improved. There is no signs of ischemia at this time. I will continue monitor her progress.

## 2021-05-15 NOTE — PROGRESS NOTES
Hospitalist Progress Note    Subjective:     Andreea Vasques is a 79 yo female with a PMHx significant for DM, HLD, HTN, thyroid disease, and depression, who presents to the ED with shortness of breath for the past 1-2 days due to a severe acute exacerbation of COPD, acute hypoxic/hypercapnic respiratory failure, and CHF. She also has a bilateral pleural effusion (worse on left), interstitial edema (cardiogenic or infectious cause), and hyponatremia.   ___________________________________________________________    She had palpitation and heart rate went up to 1 10-1 20s seen by the cardiology    She denies any shortness of breath, chest pain, lightheaded, nausea, vomiting, or abdominal pain. Gen surg consult:   Strong doppler on distal ant tibial a, post tibial a  Negative for ischemia  R common femoral a already has vasc sheath     DANIEL: mild PAD of R lower extremity (0.89)   Normal DANIEL of L lower extremity (0.92)    PT: recommends IRF     Cardiology consult:   Consider starting metoprolol today  Echo 35-40%        Past Medical History:   Diagnosis Date    Depression     DM (diabetes mellitus) ([address]     Hyperlipidemia     Hypertension     Pancreatitis     SOB (shortness of breath)     Thyroid disease       Past Surgical History:   Procedure Laterality Date    HX HYSTERECTOMY      IR FLUORO GUIDE PLC CVAD  5/10/2021    IR INSERT NON TUNL CVC OVER 5 YRS  5/10/2021    SC INS NEW/RPLCMT PRM PM W/TRANSV ELTRD ATRIAL&VENT N/A 5/11/2021    INSERT PPM BIV MULTI performed by Lissett Jacobs MD at [address] [place] [address]     History reviewed. No pertinent family history. Social History     Tobacco Use    Smoking status: Never Smoker    Smokeless tobacco: Never Used   Substance Use Topics    Alcohol use: No       Prior to Admission medications    Medication Sig Start Date End Date Taking? Authorizing Provider   Insulin Needles, Disposable, 31 X 5/16 \" Ndle by Does Not Apply route.  10/11/10   Kathleen Jeanette Stewart MD   insulin lispro, human, (HUMALOG KWIKPEN) 100 unit/mL flexpen 15 Units by SubCUTAneous route three (3) times daily (with meals). 10/13/10   Janiya Kelly MD   benazepril (LOTENSIN) 40 mg tablet Take 40 mg by mouth daily. 10/11/10   Provider, Historical   LEXAPRO 10 mg tablet Take 10 mg by mouth nightly. 10/11/10   Provider, Historical   famotidine (PEPCID) 20 mg tablet Take 20 mg by mouth two (2) times a day. 10/11/10   Provider, Historical   gabapentin (NEURONTIN) 400 mg capsule  9/3/10   Provider, Historical   hydrocodone-acetaminophen (NORCO) 5-325 mg per tablet Take 1 Tab by mouth every six (6) hours as needed. 10/11/10   Provider, Historical   levothyroxine (SYNTHROID) 150 mcg tablet Take 150 mcg by mouth daily (before breakfast). 10/11/10   Provider, Historical   lorazepam (ATIVAN) 1 mg tablet  8/2/10   Provider, Historical   lovastatin (MEVACOR) 40 mg tablet Take 40 mg by mouth nightly. 10/11/10   Provider, Historical   metoprolol (LOPRESSOR) 100 mg tablet Take 100 mg by mouth two (2) times a day. 10/11/10   Provider, Historical   oxycodone-acetaminophen (PERCOCET 10)  mg per tablet  8/2/10   Provider, Historical   insulin glargine (LANTUS) 100 unit/mL injection 60 Units by SubCUTAneous route nightly. 10/11/10   Janiya Kelly MD     Allergies   Allergen Reactions    Nortriptyline Itching    Cymbalta [Duloxetine] Itching    Ivp [Fd And C Blue No.1] Hives    Morphine Itching    Tetracycline Itching        Review of Systems:  Intubated    Objective: Intake and Output:    No intake/output data recorded. 05/13 1901 - 05/15 0700  In: -   Out: 400 [Urine:400]    Physical Exam:   Visit Vitals  /62 (BP Patient Position: At rest)   Pulse 85   Temp 98.1 °F (36.7 °C)   Resp 20   Ht 5' 1\" (1.549 m)   Wt 76.3 kg (168 lb 3.4 oz)   SpO2 98%   BMI 31.78 kg/m²     General:   On ventilator s. Head:  Normocephalic, without obvious abnormality, atraumatic.    Eyes:  Conjunctivae/corneas clear. Pupils equal, round, reactive to light. Extraocular movements intact. Lungs:    Decreased breath sounds , crackles, wheezing. Chest wall:  No tenderness or deformity. Heart:  Regular rate and rhythm, S1, S2 normal, grade 3 murmur, click, rub, or gallop. Possible murmur, difficult to auscultate with BiPAP   Abdomen:   Soft, non-tender. Bowel sounds normal. No masses. No organomegaly. Extremities: Extremities normal, atraumatic, no cyanosis. 1+ pitting edema of lower extremities    Pulses: 2+ and symmetric all extremities. Skin: Skin color, texture, turgor normal. No rashes or lesions. Lymph nodes: Cervical, supraclavicular, and axillary nodes normal.   Neurologic: CNII-XII intact. Normal strength, sensation, and reflexes throughout.        ECG:  ** Poor data quality, interpretation may be adversely affected**   Normal sinus rhythm   Right bundle branch block   Left anterior fascicular block   ** Bifascicular block **   Left ventricular hypertrophy with repolarization abnormality   Cannot rule out Septal infarct , age undetermined     Data Review:   Recent Results (from the past 24 hour(s))   EKG, 12 LEAD, INITIAL    Collection Time: 05/04/21 10:24 AM   Result Value Ref Range    Ventricular Rate 98 BPM    Atrial Rate 98 BPM    P-R Interval 168 ms    QRS Duration 152 ms    Q-T Interval 416 ms    QTC Calculation (Bezet) 531 ms    Calculated P Axis 68 degrees    Calculated R Axis -52 degrees    Calculated T Axis 94 degrees    Diagnosis       ** Poor data quality, interpretation may be adversely affected  Normal sinus rhythm  Right bundle branch block  Left anterior fascicular block  ** Bifascicular block **  Left ventricular hypertrophy with repolarization abnormality  Cannot rule out Septal infarct , age undetermined  Abnormal ECG  No previous ECGs available  Confirmed by Germán Taylor MD, Action ([id number]) on 5/4/2021 12:13:56 PM     SARS-COV-2    Collection Time: 05/04/21 10:30 AM   Result Value Ref Range SARS-CoV-2 Please find results under separate order     COVID-19 RAPID TEST    Collection Time: 05/04/21 10:30 AM   Result Value Ref Range    Specimen source Nasopharyngeal      COVID-19 rapid test Not Detected Not Detected     LACTIC ACID    Collection Time: 05/04/21 11:12 AM   Result Value Ref Range    Lactic acid 0.6 0.4 - 2.0 mmol/L   MAGNESIUM    Collection Time: 05/04/21 11:12 AM   Result Value Ref Range    Magnesium 1.8 1.6 - 2.4 mg/dL   METABOLIC PANEL, COMPREHENSIVE    Collection Time: 05/04/21 11:12 AM   Result Value Ref Range    Sodium 129 (L) 136 - 145 mmol/L    Potassium 4.2 3.5 - 5.1 mmol/L    Chloride 97 97 - 108 mmol/L    CO2 28 21 - 32 mmol/L    Anion gap 4 (L) 5 - 15 mmol/L    Glucose 182 (H) 65 - 100 mg/dL    BUN 25 (H) 6 - 20 mg/dL    Creatinine 0.88 0.55 - 1.02 mg/dL    BUN/Creatinine ratio 28 (H) 12 - 20      GFR est AA >60 >60 ml/min/1.73m2    GFR est non-AA >60 >60 ml/min/1.73m2    Calcium 8.2 (L) 8.5 - 10.1 mg/dL    Bilirubin, total 0.4 0.2 - 1.0 mg/dL    AST (SGOT) 13 (L) 15 - 37 U/L    ALT (SGPT) 17 12 - 78 U/L    Alk.  phosphatase 68 45 - 117 U/L    Protein, total 6.5 6.4 - 8.2 g/dL    Albumin 3.0 (L) 3.5 - 5.0 g/dL    Globulin 3.5 2.0 - 4.0 g/dL    A-G Ratio 0.9 (L) 1.1 - 2.2     BNP    Collection Time: 05/04/21 11:12 AM   Result Value Ref Range    NT pro-BNP 7,360 (H) <450 pg/mL   PROCALCITONIN    Collection Time: 05/04/21 11:12 AM   Result Value Ref Range    Procalcitonin <0.05 (H) 0 ng/mL   TROPONIN I    Collection Time: 05/04/21 11:12 AM   Result Value Ref Range    Troponin-I, Qt. <0.05 <0.05 ng/mL   TSH 3RD GENERATION    Collection Time: 05/04/21 11:12 AM   Result Value Ref Range    TSH 1.62 0.36 - 3.74 uIU/mL   CBC WITH AUTOMATED DIFF    Collection Time: 05/04/21 11:12 AM   Result Value Ref Range    WBC 14.2 (H) 3.6 - 11.0 K/uL    RBC 3.13 (L) 3.80 - 5.20 M/uL    HGB 10.0 (L) 11.5 - 16.0 g/dL    HCT 30.8 (L) 35.0 - 47.0 %    MCV 98.4 80.0 - 99.0 FL    MCH 31.9 26.0 - 34.0 PG    MCHC 32.5 30.0 - 36.5 g/dL    RDW 13.6 11.5 - 14.5 %    PLATELET 652 010 - 322 K/uL    MPV 11.7 8.9 - 12.9 FL    NRBC 0.0 0.0  WBC    ABSOLUTE NRBC 0.00 0.00 - 0.01 K/uL    NEUTROPHILS 87 (H) 32 - 75 %    LYMPHOCYTES 6 (L) 12 - 49 %    MONOCYTES 5 5 - 13 %    EOSINOPHILS 1 0 - 7 %    BASOPHILS 1 0 - 1 %    IMMATURE GRANULOCYTES 0 0 - 0.5 %    ABS. NEUTROPHILS 12.4 (H) 1.8 - 8.0 K/UL    ABS. LYMPHOCYTES 0.8 0.8 - 3.5 K/UL    ABS. MONOCYTES 0.7 0.0 - 1.0 K/UL    ABS. EOSINOPHILS 0.1 0.0 - 0.4 K/UL    ABS. BASOPHILS 0.1 0.0 - 0.1 K/UL    ABS. IMM. GRANS. 0.1 (H) 0.00 - 0.04 K/UL    DF AUTOMATED     PROTHROMBIN TIME + INR    Collection Time: 05/04/21 11:25 AM   Result Value Ref Range    Prothrombin time 13.6 11.9 - 14.7 sec    INR 1.1 0.9 - 1.1     PTT    Collection Time: 05/04/21 11:25 AM   Result Value Ref Range    aPTT 21.8 21.2 - 34.1 sec    aPTT, therapeutic range   82 - 109 sec   D DIMER    Collection Time: 05/04/21 11:25 AM   Result Value Ref Range    D DIMER 0.50 (H) <0.50 ug/ml(FEU)   BLOOD GAS, ARTERIAL    Collection Time: 05/04/21 11:25 AM   Result Value Ref Range    pH 7.36 7.35 - 7.45      PCO2 52 (H) 35 - 45 mmHg    PO2 72 (L) 75 - 100 mmHg    O2 SAT 93 (L) >95 %    BICARBONATE 27 (H) 22 - 26 mmol/L    BASE EXCESS 2.9 (H) 0 - 2 mmol/L    O2 METHOD Nasal Cannula      O2 FLOW RATE 6 L/min    SITE Right Radial      EBEN'S TEST PASS         Chest x-ray:  More conspicuous RUL opacity. Patchy mid and lower lung reticular markings  persist. Cardiac and mediastinal structures unchanged. Thoracic aorta  atherosclerosis. No pneumothorax. Probable small dependent bilateral pleural  effusions.     Assessment:     Static post V. Fib/received shocks x2/ intubated and extubated  Static post pacemaker   CAD status post stent LAD  Severe aortic stenosis  Acute hypoxemic respiratory failure  COPD exacerbation     Mild PAD (R lower extremity)    Hyponatremia/resolved    Congestive heart failure    Pleural effusions Anemia  7.7    Leukocytosis/resolving    Diabetes mellitus    Hypertension/resolved    Hypothyroidism    Depression     Echo done shows ejection fraction of 40 to 45% and moderate grade 2 diastolic dysfunction    Dopplers negative for DVT    Toes are blue poor pulse ordered arterial Doppler    Hypokalemia replace potassium  3.3  Plan:     Continue medications:   ASA 81mg po qday   Lipitor 10mg po qhs  Pulmicort 500mcg neb bid  Enoxaparin 40mg subq qday   Lexapro 10mg po qhs  Famotidine 20mg po bid   Lasix 40m po qday  Lantus 60 units subq qhs  Humalog subq qid   Synthroid 150mcg po qam   Prednisone 10mg po qday   Brilinta 90 mg twice a day  Zosyn 3.375mg IV q8h (day 8)  Ferrous sulfate 325 twice daily    D5 1/2 NS 50ml/hr IV     Replace potassium  Add KCl 40meq   Add ferrous sulfate    Monitor blood sugar    PT OT consult  Continue to follow with SLP, pulmonology    Seen by the cardiology pacemaker is not capturing occasionally he consulted electrophysiologist on call in [employer] to recheck the pacemaker parameters    Continue current medication repeat the labs          Current Facility-Administered Medications:     [START ON 5/16/2021] metoprolol succinate (TOPROL-XL) XL tablet 25 mg, 25 mg, Oral, DAILY, Noé Bunch MD    dextrose 5 % - 0.45% NaCl infusion, 50 mL/hr, IntraVENous, CONTINUOUS, Beny Cheung MD, Last Rate: 50 mL/hr at 05/15/21 0417, 50 mL/hr at 05/15/21 0417    ferrous sulfate tablet 325 mg, 1 Tab, Oral, BID WITH MEALS, Beny Cheung MD, 325 mg at 05/15/21 0915    furosemide (LASIX) tablet 40 mg, 40 mg, Oral, DAILY, Beny Cheung MD, 40 mg at 05/15/21 0915    famotidine (PEPCID) tablet 20 mg, 20 mg, Per NG tube, BID, Beny Cheung MD, 20 mg at 05/15/21 0915    albuterol-ipratropium (DUO-NEB) 2.5 MG-0.5 MG/3 ML, 3 mL, Nebulization, Q6H PRN, Primo Cheung MD    EPINEPHrine (ADRENALIN) 0.1 mg/mL syringe, , , CODE CHINO, David Dee MD, 1 mg at 05/10/21 2116   propofol (DIPRIVAN) 10 mg/mL infusion, 0-50 mcg/kg/min, IntraVENous, TITRATE, Elizabeth Rodney MD, Last Rate: 12 mL/hr at 05/12/21 0525, 25 mcg/kg/min at 05/12/21 0525    budesonide (PULMICORT) 500 mcg/2 ml nebulizer suspension, 500 mcg, Nebulization, BID RT, Vishnu Hwang MD, 500 mcg at 05/15/21 0844    DOPamine (INTROPIN) 800 mg in dextrose 5% 250 mL infusion, 0-20 mcg/kg/min, IntraVENous, TITRATE, Isai Mendoza MD, Last Rate: 11.2 mL/hr at 05/10/21 2200, 7.5 mcg/kg/min at 05/10/21 2200    aspirin chewable tablet 81 mg, 81 mg, Oral, DAILY, Noé Bunch MD, 81 mg at 05/15/21 0900    ticagrelor (BRILINTA) tablet 90 mg, 90 mg, Oral, Q12H, Noé Bunch MD, 90 mg at 05/15/21 0915    sodium chloride (NS) flush 5-40 mL, 5-40 mL, IntraVENous, Q8H, Noé Bunch MD, 10 mL at 05/15/21 0615    sodium chloride (NS) flush 5-40 mL, 5-40 mL, IntraVENous, PRN, Noé Bunch MD    predniSONE (DELTASONE) tablet 10 mg, 10 mg, Oral, DAILY WITH BREAKFAST, Beny Cheung MD, 10 mg at 05/15/21 0915    hydrOXYzine pamoate (VISTARIL) capsule 25 mg, 25 mg, Oral, Q6H PRN, Beny Cheung MD, 25 mg at 05/15/21 0348    piperacillin-tazobactam (ZOSYN) 3.375 g in 0.9% sodium chloride (MBP/ADV) 100 mL MBP, 3.375 g, IntraVENous, Q8H, Beny Cheung MD, Last Rate: 25 mL/hr at 05/15/21 0915, 3.375 g at 05/15/21 0915    escitalopram oxalate (LEXAPRO) tablet 10 mg, 10 mg, Oral, QHS, Beny Cheung MD, 10 mg at 05/14/21 2146    levothyroxine (SYNTHROID) tablet 150 mcg, 150 mcg, Oral, ACB, Beny Cheung MD, 150 mcg at 05/15/21 0915    insulin glargine (LANTUS) injection 60 Units, 60 Units, SubCUTAneous, QHS, Beny Cheung MD, 60 Units at 05/14/21 2200    atorvastatin (LIPITOR) tablet 10 mg, 10 mg, Oral, QHS, Beny Cheung MD, 10 mg at 05/14/21 2145    insulin lispro (HUMALOG) injection, , SubCUTAneous, AC&HS, Leon Cheung MD, Stopped at 05/14/21 2200    glucose chewable tablet 16 g, 4 Tab, Oral, PRN, Arian Cheung MD    dextrose (D50W) injection syrg 12.5-25 g, 25-50 mL, IntraVENous, PRN, Beny Cheung MD, 25 g at 05/14/21 0813    glucagon (GLUCAGEN) injection 1 mg, 1 mg, IntraMUSCular, PRN, Arian Cheung MD    acetaminophen (TYLENOL) tablet 650 mg, 650 mg, Oral, Q6H PRN, 650 mg at 05/15/21 0348 **OR** acetaminophen (TYLENOL) suppository 650 mg, 650 mg, Rectal, Q6H PRN, Arian Cheung MD    polyethylene glycol (MIRALAX) packet 17 g, 17 g, Oral, DAILY PRN, Arian Cheung MD    ondansetron (ZOFRAN ODT) tablet 4 mg, 4 mg, Oral, Q8H PRN **OR** ondansetron (ZOFRAN) injection 4 mg, 4 mg, IntraVENous, Q6H PRN, Beny Cheung MD    enoxaparin (LOVENOX) injection 40 mg, 40 mg, SubCUTAneous, DAILY, Beny Cheung MD, 40 mg at 05/15/21 9698

## 2021-05-15 NOTE — PROGRESS NOTES
Problem: Mobility Impaired (Adult and Pediatric)  Goal: *Acute Goals and Plan of Care (Insert Text)  Description: Pt will be I with LE HEP in 7 days. Pt will perform bed mobility with mod I in 7 days. Pt will perform transfers with mod I in 7 days. Pt will amb 25-50 feet with LRAD safely with mod I in 7 days. Pt will verbalize understanding and demonstrate compliance with LUE pacemaker precautions in 7 days. Pt will demonstrate improvement in standing static balance from fair to good in 7 days. Outcome: Progressing Towards Goal   PHYSICAL THERAPY TREATMENT  Patient: Ahsan Felton (19 y.o. female)  Date: 5/15/2021  Diagnosis: CHF (congestive heart failure) ([facility]) [I50.9]  COPD (chronic obstructive pulmonary disease) ([facility] [J44.9] <principal problem not specified>  Procedure(s) (LRB):  INSERT PPM BIV MULTI (N/A) 4 Days Post-Op  Precautions:    Chart, physical therapy assessment, plan of care and goals were reviewed. ASSESSMENT  Patient continues with skilled PT services and is progressing towards goals. Patient eager to work with PT upon arrival. Bed mobility performed with min-modA with more assistance needed to get back into bed. Patient performed LE therex while sitting EOB and then it was noted that patient had a BM. Nurse present at this time to assist with cleaning patient up. She performed 3 stands from EOB with modA x 1 and use of hemiwalker on R side. Total A for pericare and it was noted that patient was continuously having a BM once in standing so patient stood for a few minutes so nurse could clean her. Patient was then impulsive and abruptly sat back down on bed without cues to. She was extremely fatigued from the few stands performed so after she was cleaned up patient returned to bed. Sling was donned on LUE prior to standing. She will benefit from continued skilled PT services to improve her strength, balance and endurance to decrease assistance from caregivers and return to PLOF.  May need assist x 2 for gait training as her LEs became weak when standing for prolonged time. Current Level of Function Impacting Discharge (mobility/balance): decr standing balance, unaware of pacemaker precautions     Other factors to consider for discharge: AMS, poor safety awareness          PLAN :  Patient continues to benefit from skilled intervention to address the above impairments. Continue treatment per established plan of care. to address goals. Recommendation for discharge: (in order for the patient to meet his/her long term goals)  Therapy 3 hours per day 5-7 days per week    This discharge recommendation:  Has been made in collaboration with the attending provider and/or case management    IF patient discharges home will need the following DME: walker and to be determined (TBD)       SUBJECTIVE:   Patient stated I have a pulling in the left side of my chest and I don't know what it is.     OBJECTIVE DATA SUMMARY:   Critical Behavior:  Neurologic State: Alert, Confused  Orientation Level: Oriented to person, Oriented to situation, Oriented to time  Cognition: Poor safety awareness, Impaired decision making  Safety/Judgement: Decreased awareness of environment  Functional Mobility Training:  Bed Mobility:  Rolling: Minimum assistance  Supine to Sit: Minimum assistance  Sit to Supine: Moderate assistance  Scooting: Minimum assistance        Transfers:  Sit to Stand: Moderate assistance;Assist x1  Stand to Sit: Moderate assistance;Assist x1  Increased time to perform stands today and with use of subhash walker on R side for support     Balance:  Sitting: Intact; With support  Sitting - Static: Good (unsupported)  Sitting - Dynamic: Fair (occasional)  Standing: Impaired; With support;Pull to stand  Standing - Static: Fair;Constant support  Standing - Dynamic : Poor;Constant support  Ambulation/Gait Training:  Patient took a few steps away from bedside when cleaning up after BM but she was very unsteady talking steps and both knees appeared to be buckling     Therapeutic Exercises:   2 x 10 bilaterally: LAQ, marching, ankle pumps with verbal and visual cueing for proper performance   Pain Rating:  LUE but no number given     Activity Tolerance:   Fair, requires rest breaks, and observed SOB with activity  Please refer to the flowsheet for vital signs taken during this treatment. After treatment patient left in no apparent distress:   Supine in bed, Call bell within reach, Bed / chair alarm activated, and Side rails x 3    COMMUNICATION/COLLABORATION:   The patients plan of care was discussed with: Physical therapist and Registered nurse. Nurse aware of patient's participation with PT today and assisted with cleaning patient up after very messy BM.      Janeen Mil   Time Calculation: 32 mins

## 2021-05-16 LAB
GLUCOSE BLD STRIP.AUTO-MCNC: 135 MG/DL (ref 65–117)
GLUCOSE BLD STRIP.AUTO-MCNC: 138 MG/DL (ref 65–117)
GLUCOSE BLD STRIP.AUTO-MCNC: 287 MG/DL (ref 65–117)
GLUCOSE BLD STRIP.AUTO-MCNC: 359 MG/DL (ref 65–117)
GLUCOSE BLD STRIP.AUTO-MCNC: 44 MG/DL (ref 65–117)
PERFORMED BY, TECHID: ABNORMAL

## 2021-05-16 PROCEDURE — 74011000258 HC RX REV CODE- 258: Performed by: FAMILY MEDICINE

## 2021-05-16 PROCEDURE — 74011250636 HC RX REV CODE- 250/636: Performed by: FAMILY MEDICINE

## 2021-05-16 PROCEDURE — 74011250637 HC RX REV CODE- 250/637: Performed by: INTERNAL MEDICINE

## 2021-05-16 PROCEDURE — 82962 GLUCOSE BLOOD TEST: CPT

## 2021-05-16 PROCEDURE — 74011000250 HC RX REV CODE- 250: Performed by: FAMILY MEDICINE

## 2021-05-16 PROCEDURE — 65270000029 HC RM PRIVATE

## 2021-05-16 PROCEDURE — 94640 AIRWAY INHALATION TREATMENT: CPT

## 2021-05-16 PROCEDURE — 99232 SBSQ HOSP IP/OBS MODERATE 35: CPT | Performed by: SURGERY

## 2021-05-16 PROCEDURE — 74011250637 HC RX REV CODE- 250/637: Performed by: FAMILY MEDICINE

## 2021-05-16 PROCEDURE — 74011636637 HC RX REV CODE- 636/637: Performed by: FAMILY MEDICINE

## 2021-05-16 PROCEDURE — 93925 LOWER EXTREMITY STUDY: CPT | Performed by: SURGERY

## 2021-05-16 PROCEDURE — 74011000250 HC RX REV CODE- 250: Performed by: INTERNAL MEDICINE

## 2021-05-16 RX ADMIN — PREDNISONE 10 MG: 5 TABLET ORAL at 08:52

## 2021-05-16 RX ADMIN — PIPERACILLIN AND TAZOBACTAM 3.38 G: 3; .375 INJECTION, POWDER, LYOPHILIZED, FOR SOLUTION INTRAVENOUS at 08:51

## 2021-05-16 RX ADMIN — LEVOTHYROXINE SODIUM 150 MCG: 75 TABLET ORAL at 08:52

## 2021-05-16 RX ADMIN — METOPROLOL SUCCINATE 25 MG: 25 TABLET, EXTENDED RELEASE ORAL at 08:52

## 2021-05-16 RX ADMIN — HYDROXYZINE PAMOATE 25 MG: 25 CAPSULE ORAL at 20:18

## 2021-05-16 RX ADMIN — FERROUS SULFATE TAB 325 MG (65 MG ELEMENTAL FE) 325 MG: 325 (65 FE) TAB at 08:52

## 2021-05-16 RX ADMIN — ESCITALOPRAM OXALATE 10 MG: 10 TABLET ORAL at 20:18

## 2021-05-16 RX ADMIN — HYDROXYZINE PAMOATE 25 MG: 25 CAPSULE ORAL at 02:23

## 2021-05-16 RX ADMIN — ATORVASTATIN CALCIUM 10 MG: 10 TABLET, FILM COATED ORAL at 20:18

## 2021-05-16 RX ADMIN — Medication 10 ML: at 20:39

## 2021-05-16 RX ADMIN — ASPIRIN 81 MG CHEWABLE TABLET 81 MG: 81 TABLET CHEWABLE at 08:52

## 2021-05-16 RX ADMIN — DEXTROSE AND SODIUM CHLORIDE 50 ML/HR: 5; 450 INJECTION, SOLUTION INTRAVENOUS at 20:40

## 2021-05-16 RX ADMIN — DEXTROSE MONOHYDRATE 25 G: 25 INJECTION, SOLUTION INTRAVENOUS at 08:01

## 2021-05-16 RX ADMIN — BUDESONIDE 500 MCG: 0.5 INHALANT RESPIRATORY (INHALATION) at 10:16

## 2021-05-16 RX ADMIN — ACETAMINOPHEN 650 MG: 325 TABLET, FILM COATED ORAL at 20:18

## 2021-05-16 RX ADMIN — Medication 10 ML: at 14:11

## 2021-05-16 RX ADMIN — FAMOTIDINE 20 MG: 20 TABLET ORAL at 08:52

## 2021-05-16 RX ADMIN — FUROSEMIDE 40 MG: 40 TABLET ORAL at 08:52

## 2021-05-16 RX ADMIN — INSULIN GLARGINE 40 UNITS: 100 INJECTION, SOLUTION SUBCUTANEOUS at 20:38

## 2021-05-16 RX ADMIN — TICAGRELOR 90 MG: 90 TABLET ORAL at 20:18

## 2021-05-16 RX ADMIN — FAMOTIDINE 20 MG: 20 TABLET ORAL at 20:18

## 2021-05-16 RX ADMIN — PIPERACILLIN AND TAZOBACTAM 3.38 G: 3; .375 INJECTION, POWDER, LYOPHILIZED, FOR SOLUTION INTRAVENOUS at 16:14

## 2021-05-16 RX ADMIN — ENOXAPARIN SODIUM 40 MG: 40 INJECTION SUBCUTANEOUS at 08:51

## 2021-05-16 RX ADMIN — FERROUS SULFATE TAB 325 MG (65 MG ELEMENTAL FE) 325 MG: 325 (65 FE) TAB at 16:15

## 2021-05-16 RX ADMIN — INSULIN LISPRO 10 UNITS: 100 INJECTION, SOLUTION INTRAVENOUS; SUBCUTANEOUS at 16:15

## 2021-05-16 RX ADMIN — INSULIN LISPRO 5 UNITS: 100 INJECTION, SOLUTION INTRAVENOUS; SUBCUTANEOUS at 20:38

## 2021-05-16 RX ADMIN — TICAGRELOR 90 MG: 90 TABLET ORAL at 10:00

## 2021-05-16 NOTE — PROGRESS NOTES
Hospitalist Progress Note    Subjective:     Yifan Hoang is a 81 yo female with a PMHx significant for DM, HLD, HTN, thyroid disease, and depression, who presents to the ED with shortness of breath for the past 1-2 days due to a severe acute exacerbation of COPD, acute hypoxic/hypercapnic respiratory failure, and CHF. She also has a bilateral pleural effusion (worse on left), interstitial edema (cardiogenic or infectious cause), and hyponatremia.   ___________________________________________________________    She had palpitation and heart rate went up to 1 10-1 20s seen by the cardiology    She denies any shortness of breath, chest pain, lightheaded, nausea, vomiting, or abdominal pain. Gen surg consult:   Strong doppler on distal ant tibial a, post tibial a  Negative for ischemia  R common femoral a already has vasc sheath     DANIEL: mild PAD of R lower extremity (0.89)   Normal DANIEL of L lower extremity (0.92)    PT: recommends IRF     Cardiology consult:   Consider starting metoprolol today  Echo 35-40%        Past Medical History:   Diagnosis Date    Depression     DM (diabetes mellitus) ([address]     Hyperlipidemia     Hypertension     Pancreatitis     SOB (shortness of breath)     Thyroid disease       Past Surgical History:   Procedure Laterality Date    HX HYSTERECTOMY      IR FLUORO GUIDE PLC CVAD  5/10/2021    IR INSERT NON TUNL CVC OVER 5 YRS  5/10/2021    IA INS NEW/RPLCMT PRM PM W/TRANSV ELTRD ATRIAL&VENT N/A 5/11/2021    INSERT PPM BIV MULTI performed by Bee De León MD at [address] [place] [address]     History reviewed. No pertinent family history. Social History     Tobacco Use    Smoking status: Never Smoker    Smokeless tobacco: Never Used   Substance Use Topics    Alcohol use: No       Prior to Admission medications    Medication Sig Start Date End Date Taking? Authorizing Provider   Insulin Needles, Disposable, 31 X 5/16 \" Ndle by Does Not Apply route.  10/11/10   Kathleen Jeanette Stewart MD   insulin lispro, human, (HUMALOG KWIKPEN) 100 unit/mL flexpen 15 Units by SubCUTAneous route three (3) times daily (with meals). 10/13/10   Janiya Kelly MD   benazepril (LOTENSIN) 40 mg tablet Take 40 mg by mouth daily. 10/11/10   Provider, Historical   LEXAPRO 10 mg tablet Take 10 mg by mouth nightly. 10/11/10   Provider, Historical   famotidine (PEPCID) 20 mg tablet Take 20 mg by mouth two (2) times a day. 10/11/10   Provider, Historical   gabapentin (NEURONTIN) 400 mg capsule  9/3/10   Provider, Historical   hydrocodone-acetaminophen (NORCO) 5-325 mg per tablet Take 1 Tab by mouth every six (6) hours as needed. 10/11/10   Provider, Historical   levothyroxine (SYNTHROID) 150 mcg tablet Take 150 mcg by mouth daily (before breakfast). 10/11/10   Provider, Historical   lorazepam (ATIVAN) 1 mg tablet  8/2/10   Provider, Historical   lovastatin (MEVACOR) 40 mg tablet Take 40 mg by mouth nightly. 10/11/10   Provider, Historical   metoprolol (LOPRESSOR) 100 mg tablet Take 100 mg by mouth two (2) times a day. 10/11/10   Provider, Historical   oxycodone-acetaminophen (PERCOCET 10)  mg per tablet  8/2/10   Provider, Historical   insulin glargine (LANTUS) 100 unit/mL injection 60 Units by SubCUTAneous route nightly. 10/11/10   Janiya Kelly MD     Allergies   Allergen Reactions    Nortriptyline Itching    Cymbalta [Duloxetine] Itching    Ivp [Fd And C Blue No.1] Hives    Morphine Itching    Tetracycline Itching        Review of Systems:  Intubated    Objective: Intake and Output:    No intake/output data recorded. 05/14 1901 - 05/16 0700  In: 200 [P.O.:200]  Out: 550 [Urine:550]    Physical Exam:   Visit Vitals  BP (!) 148/64 (BP 1 Location: Right upper arm, BP Patient Position: At rest;Semi fowlers)   Pulse 82   Temp 97.4 °F (36.3 °C)   Resp 20   Ht 5' 1\" (1.549 m)   Wt 78.2 kg (172 lb 6.4 oz)   SpO2 98%   BMI 32.57 kg/m²     General:   On ventilator s.    Head:  Normocephalic, without obvious abnormality, atraumatic. Eyes:  Conjunctivae/corneas clear. Pupils equal, round, reactive to light. Extraocular movements intact. Lungs:    Decreased breath sounds , crackles, wheezing. Chest wall:  No tenderness or deformity. Heart:  Regular rate and rhythm, S1, S2 normal, grade 3 murmur, click, rub, or gallop. Possible murmur, difficult to auscultate with BiPAP   Abdomen:   Soft, non-tender. Bowel sounds normal. No masses. No organomegaly. Extremities: Extremities normal, atraumatic, no cyanosis. 1+ pitting edema of lower extremities    Pulses: 2+ and symmetric all extremities. Skin: Skin color, texture, turgor normal. No rashes or lesions. Lymph nodes: Cervical, supraclavicular, and axillary nodes normal.   Neurologic: CNII-XII intact. Normal strength, sensation, and reflexes throughout.        ECG:  ** Poor data quality, interpretation may be adversely affected**   Normal sinus rhythm   Right bundle branch block   Left anterior fascicular block   ** Bifascicular block **   Left ventricular hypertrophy with repolarization abnormality   Cannot rule out Septal infarct , age undetermined     Data Review:   Recent Results (from the past 24 hour(s))   EKG, 12 LEAD, INITIAL    Collection Time: 05/04/21 10:24 AM   Result Value Ref Range    Ventricular Rate 98 BPM    Atrial Rate 98 BPM    P-R Interval 168 ms    QRS Duration 152 ms    Q-T Interval 416 ms    QTC Calculation (Bezet) 531 ms    Calculated P Axis 68 degrees    Calculated R Axis -52 degrees    Calculated T Axis 94 degrees    Diagnosis       ** Poor data quality, interpretation may be adversely affected  Normal sinus rhythm  Right bundle branch block  Left anterior fascicular block  ** Bifascicular block **  Left ventricular hypertrophy with repolarization abnormality  Cannot rule out Septal infarct , age undetermined  Abnormal ECG  No previous ECGs available  Confirmed by Jazmyn Beal MD, Chasity Iniguez ([id number]) on 5/4/2021 12:13:56 PM     SARS-COV-2 Collection Time: 05/04/21 10:30 AM   Result Value Ref Range    SARS-CoV-2 Please find results under separate order     COVID-19 RAPID TEST    Collection Time: 05/04/21 10:30 AM   Result Value Ref Range    Specimen source Nasopharyngeal      COVID-19 rapid test Not Detected Not Detected     LACTIC ACID    Collection Time: 05/04/21 11:12 AM   Result Value Ref Range    Lactic acid 0.6 0.4 - 2.0 mmol/L   MAGNESIUM    Collection Time: 05/04/21 11:12 AM   Result Value Ref Range    Magnesium 1.8 1.6 - 2.4 mg/dL   METABOLIC PANEL, COMPREHENSIVE    Collection Time: 05/04/21 11:12 AM   Result Value Ref Range    Sodium 129 (L) 136 - 145 mmol/L    Potassium 4.2 3.5 - 5.1 mmol/L    Chloride 97 97 - 108 mmol/L    CO2 28 21 - 32 mmol/L    Anion gap 4 (L) 5 - 15 mmol/L    Glucose 182 (H) 65 - 100 mg/dL    BUN 25 (H) 6 - 20 mg/dL    Creatinine 0.88 0.55 - 1.02 mg/dL    BUN/Creatinine ratio 28 (H) 12 - 20      GFR est AA >60 >60 ml/min/1.73m2    GFR est non-AA >60 >60 ml/min/1.73m2    Calcium 8.2 (L) 8.5 - 10.1 mg/dL    Bilirubin, total 0.4 0.2 - 1.0 mg/dL    AST (SGOT) 13 (L) 15 - 37 U/L    ALT (SGPT) 17 12 - 78 U/L    Alk.  phosphatase 68 45 - 117 U/L    Protein, total 6.5 6.4 - 8.2 g/dL    Albumin 3.0 (L) 3.5 - 5.0 g/dL    Globulin 3.5 2.0 - 4.0 g/dL    A-G Ratio 0.9 (L) 1.1 - 2.2     BNP    Collection Time: 05/04/21 11:12 AM   Result Value Ref Range    NT pro-BNP 7,360 (H) <450 pg/mL   PROCALCITONIN    Collection Time: 05/04/21 11:12 AM   Result Value Ref Range    Procalcitonin <0.05 (H) 0 ng/mL   TROPONIN I    Collection Time: 05/04/21 11:12 AM   Result Value Ref Range    Troponin-I, Qt. <0.05 <0.05 ng/mL   TSH 3RD GENERATION    Collection Time: 05/04/21 11:12 AM   Result Value Ref Range    TSH 1.62 0.36 - 3.74 uIU/mL   CBC WITH AUTOMATED DIFF    Collection Time: 05/04/21 11:12 AM   Result Value Ref Range    WBC 14.2 (H) 3.6 - 11.0 K/uL    RBC 3.13 (L) 3.80 - 5.20 M/uL    HGB 10.0 (L) 11.5 - 16.0 g/dL    HCT 30.8 (L) 35.0 - 47.0 %    MCV 98.4 80.0 - 99.0 FL    MCH 31.9 26.0 - 34.0 PG    MCHC 32.5 30.0 - 36.5 g/dL    RDW 13.6 11.5 - 14.5 %    PLATELET 077 673 - 042 K/uL    MPV 11.7 8.9 - 12.9 FL    NRBC 0.0 0.0  WBC    ABSOLUTE NRBC 0.00 0.00 - 0.01 K/uL    NEUTROPHILS 87 (H) 32 - 75 %    LYMPHOCYTES 6 (L) 12 - 49 %    MONOCYTES 5 5 - 13 %    EOSINOPHILS 1 0 - 7 %    BASOPHILS 1 0 - 1 %    IMMATURE GRANULOCYTES 0 0 - 0.5 %    ABS. NEUTROPHILS 12.4 (H) 1.8 - 8.0 K/UL    ABS. LYMPHOCYTES 0.8 0.8 - 3.5 K/UL    ABS. MONOCYTES 0.7 0.0 - 1.0 K/UL    ABS. EOSINOPHILS 0.1 0.0 - 0.4 K/UL    ABS. BASOPHILS 0.1 0.0 - 0.1 K/UL    ABS. IMM. GRANS. 0.1 (H) 0.00 - 0.04 K/UL    DF AUTOMATED     PROTHROMBIN TIME + INR    Collection Time: 05/04/21 11:25 AM   Result Value Ref Range    Prothrombin time 13.6 11.9 - 14.7 sec    INR 1.1 0.9 - 1.1     PTT    Collection Time: 05/04/21 11:25 AM   Result Value Ref Range    aPTT 21.8 21.2 - 34.1 sec    aPTT, therapeutic range   82 - 109 sec   D DIMER    Collection Time: 05/04/21 11:25 AM   Result Value Ref Range    D DIMER 0.50 (H) <0.50 ug/ml(FEU)   BLOOD GAS, ARTERIAL    Collection Time: 05/04/21 11:25 AM   Result Value Ref Range    pH 7.36 7.35 - 7.45      PCO2 52 (H) 35 - 45 mmHg    PO2 72 (L) 75 - 100 mmHg    O2 SAT 93 (L) >95 %    BICARBONATE 27 (H) 22 - 26 mmol/L    BASE EXCESS 2.9 (H) 0 - 2 mmol/L    O2 METHOD Nasal Cannula      O2 FLOW RATE 6 L/min    SITE Right Radial      EBEN'S TEST PASS         Chest x-ray:  More conspicuous RUL opacity. Patchy mid and lower lung reticular markings  persist. Cardiac and mediastinal structures unchanged. Thoracic aorta  atherosclerosis. No pneumothorax. Probable small dependent bilateral pleural  effusions.     Assessment:     Static post V. Fib/received shocks x2/ intubated and extubated  Static post pacemaker   CAD status post stent LAD  Severe aortic stenosis  Acute hypoxemic respiratory failure  COPD exacerbation     Mild PAD (R lower extremity)    Hyponatremia/resolved    Congestive heart failure    Pleural effusions     Anemia  7.7    Leukocytosis/resolving    Diabetes mellitus    Hypertension/resolved    Hypothyroidism    Depression     Echo done shows ejection fraction of 40 to 45% and moderate grade 2 diastolic dysfunction    Dopplers negative for DVT    Toes are blue poor pulse ordered arterial Doppler    Hypokalemia replace potassium  3.3  Plan:     Continue medications:   ASA 81mg po qday   Lipitor 10mg po qhs  Pulmicort 500mcg neb bid  Enoxaparin 40mg subq qday   Lexapro 10mg po qhs  Famotidine 20mg po bid   Lasix 40m po qday  Lantus 60 units subq qhs  Humalog subq qid   Synthroid 150mcg po qam   Prednisone 10mg po qday   Brilinta 90 mg twice a day  Zosyn 3.375mg IV q8h (day 8)  Ferrous sulfate 325 twice daily    D5 1/2 NS 50ml/hr IV     Replace potassium  Add KCl 40meq   Add ferrous sulfate    Monitor blood sugar    PT OT consult  Continue to follow with SLP, pulmonology      Patient feeling much better today possible discharge home tomorrow and follow-up with the EP clinic as an outpatient          Current Facility-Administered Medications:     metoprolol succinate (TOPROL-XL) XL tablet 25 mg, 25 mg, Oral, DAILY, Noé Bunch MD, 25 mg at 05/16/21 0852    insulin glargine (LANTUS) injection 40 Units, 40 Units, SubCUTAneous, QHS, Beny Cheung MD, 40 Units at 05/15/21 2312    dextrose 5 % - 0.45% NaCl infusion, 50 mL/hr, IntraVENous, CONTINUOUS, Beny Cheung MD, Last Rate: 50 mL/hr at 05/15/21 2313, 50 mL/hr at 05/15/21 2313    ferrous sulfate tablet 325 mg, 1 Tab, Oral, BID WITH MEALS, Beny Cheung MD, 325 mg at 05/16/21 1244    furosemide (LASIX) tablet 40 mg, 40 mg, Oral, DAILY, Beny Cheung MD, 40 mg at 05/16/21 0852    famotidine (PEPCID) tablet 20 mg, 20 mg, Per NG tube, BID, Beny Cheung MD, 20 mg at 05/16/21 0852    albuterol-ipratropium (DUO-NEB) 2.5 MG-0.5 MG/3 ML, 3 mL, Nebulization, Q6H PRN, Mohiuddin, Dennis Gilford, MD    EPINEPHrine (ADRENALIN) 0.1 mg/mL syringe, , , CODE BLUE, Sharifa Boss MD, 1 mg at 05/10/21 1416    propofol (DIPRIVAN) 10 mg/mL infusion, 0-50 mcg/kg/min, IntraVENous, TITRATE, Sharifa Boss MD, Last Rate: 12 mL/hr at 05/12/21 0525, 25 mcg/kg/min at 05/12/21 0525    budesonide (PULMICORT) 500 mcg/2 ml nebulizer suspension, 500 mcg, Nebulization, BID RT, Vishnu Hwang MD, 500 mcg at 05/16/21 1016    DOPamine (INTROPIN) 800 mg in dextrose 5% 250 mL infusion, 0-20 mcg/kg/min, IntraVENous, TITRATE, Isai Mendoza MD, Last Rate: 11.2 mL/hr at 05/10/21 2200, 7.5 mcg/kg/min at 05/10/21 2200    aspirin chewable tablet 81 mg, 81 mg, Oral, DAILY, Noé Bunch MD, 81 mg at 05/16/21 2380    ticagrelor (BRILINTA) tablet 90 mg, 90 mg, Oral, Q12H, Noé Bunch MD, 90 mg at 05/16/21 1000    sodium chloride (NS) flush 5-40 mL, 5-40 mL, IntraVENous, Q8H, Noé Bunch MD, Stopped at 05/16/21 0600    sodium chloride (NS) flush 5-40 mL, 5-40 mL, IntraVENous, PRN, Noé Bunch MD    predniSONE (DELTASONE) tablet 10 mg, 10 mg, Oral, DAILY WITH BREAKFAST, Kishan Mar MD, 10 mg at 05/16/21 4470    hydrOXYzine pamoate (VISTARIL) capsule 25 mg, 25 mg, Oral, Q6H PRN, Kishan Mar MD, 25 mg at 05/16/21 0223    piperacillin-tazobactam (ZOSYN) 3.375 g in 0.9% sodium chloride (MBP/ADV) 100 mL MBP, 3.375 g, IntraVENous, Q8H, Beny Cheung MD, Last Rate: 25 mL/hr at 05/16/21 0851, 3.375 g at 05/16/21 0851    escitalopram oxalate (LEXAPRO) tablet 10 mg, 10 mg, Oral, QHS, Beny Cheung MD, 10 mg at 05/15/21 2312    levothyroxine (SYNTHROID) tablet 150 mcg, 150 mcg, Oral, ACB, Beny Cheung MD, 150 mcg at 05/16/21 6909    atorvastatin (LIPITOR) tablet 10 mg, 10 mg, Oral, QHS, Beny Cheung MD, 10 mg at 05/15/21 2312    insulin lispro (HUMALOG) injection, , SubCUTAneous, AC&HS, Mohiuddin, Dennis Gilford, MD, Stopped at 05/15/21 2200    glucose chewable tablet 16 g, 4 Tab, Oral, PRN, Itz Cheung MD    dextrose (D50W) injection syrg 12.5-25 g, 25-50 mL, IntraVENous, PRN, Beny Cheung MD, 25 g at 05/16/21 0801    glucagon (GLUCAGEN) injection 1 mg, 1 mg, IntraMUSCular, PRN, Itz Cheung MD    acetaminophen (TYLENOL) tablet 650 mg, 650 mg, Oral, Q6H PRN, 650 mg at 05/15/21 0348 **OR** acetaminophen (TYLENOL) suppository 650 mg, 650 mg, Rectal, Q6H PRN, Itz Cheung MD    polyethylene glycol (MIRALAX) packet 17 g, 17 g, Oral, DAILY PRN, Itz Cheung MD    ondansetron (ZOFRAN ODT) tablet 4 mg, 4 mg, Oral, Q8H PRN **OR** ondansetron (ZOFRAN) injection 4 mg, 4 mg, IntraVENous, Q6H PRN, Beny Cheung MD    enoxaparin (LOVENOX) injection 40 mg, 40 mg, SubCUTAneous, DAILY, Beny Cheung MD, 40 mg at 05/16/21 8539

## 2021-05-16 NOTE — CONSULTS
PULMONARY  NOTE  [facility]    Name: José Miguel Floyd MRN: [id number]   : 1940 Hospital: [address] [place] [address]   Date: 2021  Admission date: 2021 Hospital Day: 15       HPI:     Hospital Problems  Never Reviewed          Codes Class Noted POA    CHF (congestive heart failure) ([facility]) ICD-10-CM: I50.9  ICD-9-CM: 428.0  2021 Unknown        COPD (chronic obstructive pulmonary disease) ([facility] ICD-10-CM: J44.9  ICD-9-CM: 496  2021 Unknown                   [x] High complexity decision making was performed  [x] See my orders for details      Subjective/Initial History:     I was asked by Grayson Allen MD to see José Miguel Floyd  a 80 y.o.  female in consultation     Excerpts from admission 2021 or consult notes as follows:   65-year-old lady came in because of shortness of breath and dyspnea she was told that she has COPD recently and she was giving albuterol inhaler did not seem to help so came to the emergency room she was hypoxic she was put on oxygen 5 L nasal cannula she was having dyspnea minimal exertion and also at rest she is a lifetime non-smoker, but she has been exposed to secondhand smoke her  used to smoke and all her children smoke she used to work on the farm as a farmer no chest pain no fever no chills.        Allergies   Allergen Reactions    Nortriptyline Itching    Cymbalta [Duloxetine] Itching    Ivp [Fd And C Blue No.1] Hives    Morphine Itching    Tetracycline Itching        MAR reviewed and pertinent medications noted or modified as needed     Current Facility-Administered Medications   Medication    metoprolol succinate (TOPROL-XL) XL tablet 25 mg    insulin glargine (LANTUS) injection 40 Units    dextrose 5 % - 0.45% NaCl infusion    ferrous sulfate tablet 325 mg    furosemide (LASIX) tablet 40 mg    famotidine (PEPCID) tablet 20 mg    albuterol-ipratropium (DUO-NEB) 2.5 MG-0.5 MG/3 ML    EPINEPHrine (ADRENALIN) 0.1 mg/mL syringe    propofol (DIPRIVAN) 10 mg/mL infusion    budesonide (PULMICORT) 500 mcg/2 ml nebulizer suspension    DOPamine (INTROPIN) 800 mg in dextrose 5% 250 mL infusion    aspirin chewable tablet 81 mg    ticagrelor (BRILINTA) tablet 90 mg    sodium chloride (NS) flush 5-40 mL    sodium chloride (NS) flush 5-40 mL    predniSONE (DELTASONE) tablet 10 mg    hydrOXYzine pamoate (VISTARIL) capsule 25 mg    piperacillin-tazobactam (ZOSYN) 3.375 g in 0.9% sodium chloride (MBP/ADV) 100 mL MBP    escitalopram oxalate (LEXAPRO) tablet 10 mg    levothyroxine (SYNTHROID) tablet 150 mcg    atorvastatin (LIPITOR) tablet 10 mg    insulin lispro (HUMALOG) injection    glucose chewable tablet 16 g    dextrose (D50W) injection syrg 12.5-25 g    glucagon (GLUCAGEN) injection 1 mg    acetaminophen (TYLENOL) tablet 650 mg    Or    acetaminophen (TYLENOL) suppository 650 mg    polyethylene glycol (MIRALAX) packet 17 g    ondansetron (ZOFRAN ODT) tablet 4 mg    Or    ondansetron (ZOFRAN) injection 4 mg    enoxaparin (LOVENOX) injection 40 mg      Patient PCP: Irwin Christianson MD  PMH:  has a past medical history of Depression, DM (diabetes mellitus) ([facility] Hyperlipidemia, Hypertension, Pancreatitis, SOB (shortness of breath), and Thyroid disease. PSH:   has a past surgical history that includes hx hysterectomy; ir fluoro guide plc cvad (5/10/2021); ir insert non tunl cvc over 5 yrs (5/10/2021); and pr ins new/rplcmt prm pm w/transv eltrd atrial&vent (N/A, 5/11/2021). FHX: family history is not on file. SHX:  reports that she has never smoked. She has never used smokeless tobacco. She reports that she does not drink alcohol or use drugs. ROS:    Review of Systems   Constitutional: Negative. HENT: Negative. Eyes: Negative. Respiratory: Positive for cough, sputum production, shortness of breath and wheezing. Cardiovascular: Positive for orthopnea. Gastrointestinal: Negative. Genitourinary: Negative. Musculoskeletal: Negative. Skin: Negative. Neurological: Negative. Psychiatric/Behavioral: Negative. Objective:     Vital Signs: Telemetry:    normal sinus rhythm Intake/Output:   Visit Vitals  /63   Pulse 81   Temp 97.6 °F (36.4 °C)   Resp 18   Ht 5' 1\" (1.549 m)   Wt 78.2 kg (172 lb 6.4 oz)   SpO2 100%   BMI 32.57 kg/m²       Temp (24hrs), Av.8 °F (36.6 °C), Min:97.4 °F (36.3 °C), Max:98.2 °F (36.8 °C)        O2 Device: None (Room air) O2 Flow Rate (L/min): 2 l/min       Wt Readings from Last 4 Encounters:   21 78.2 kg (172 lb 6.4 oz)   10/11/10 102.2 kg (225 lb 3.2 oz)          Intake/Output Summary (Last 24 hours) at 2021 1434  Last data filed at 2021 0408  Gross per 24 hour   Intake 200 ml   Output 550 ml   Net -350 ml       Last shift:      No intake/output data recorded. Last 3 shifts:  190 -  0700  In: 200 [P.O.:200]  Out: 550 [Urine:550]       Physical Exam:     Physical Exam   Constitutional: She is oriented to person, place, and time. She appears well-developed and well-nourished. Lying in bed with no distress   HENT:   Head: Normocephalic and atraumatic. Eyes: Pupils are equal, round, and reactive to light. Conjunctivae and EOM are normal.   Neck: Normal range of motion. Neck supple. Cardiovascular: Normal rate and regular rhythm. Pulmonary/Chest: Breath sounds normal.   No distress respirations nonlabored lungs are clear   Abdominal: Soft. Bowel sounds are normal.   Musculoskeletal: Normal range of motion. Neurological: She is alert and oriented to person, place, and time. Skin: Skin is warm and dry. Psychiatric: She has a normal mood and affect.         Labs:    Recent Labs     05/15/21  0854 21  0635   WBC 17.7* 18.6*   HGB 7.2* 7.7*    330     Recent Labs     05/15/21  0854 21  0635    140 144   K 3.1* 3.7 3.3*   * 106 112*   CO2 29 27 28   GLU 51* 214* 28* BUN 49* 56* 60*   CREA 1.24* 1.38* 1.35*   CA 7.9* 8.0* 8.3*   MG  --  1.9  --    ALB 2.1*  --  2.1*   ALT 37  --  26     5/16 overnight oximetry with low oxygen saturation 68% only 1 minute below 88%. 5/14 SpO2 on room air, at rest=94%. SpO2 on room air, while ambulating=87%. SpO2 on 2L , while ambulating=93%  Lab Results   Component Value Date/Time    Culture result: No growth 6 days 05/04/2021 10:30 AM     Lab Results   Component Value Date/Time    TSH 1.62 05/04/2021 11:12 AM       Imaging:    CXR Results  (Last 48 hours)    None                IMPRESSION:     1. Chronic Obstructive Pulmonary Disease she is a lifetime non-smoker's are clear  2. Acute hypoxic and hypercapnic respiratory failure resolved  3. Patient is on Brilinta which can also cause shortness of breath  4. Complete heart block status post temporary pacemaker was placed on 5/10  5. Hyponatremia resolved  6. Decompensated congestive heart failure  7. Pleural effusion more on the left side almost entirely clear on chest x-ray 5/14  8. Right upper lobe opacity possible pneumonia resolved  9. Severe aortic stenosis      RECOMMENDATIONS/PLAN:     1. Patient was extubated on 5/12 now she is on room air overnight oximetry shows no significant desaturation last exercise oxygen did drop will repeat in a.m.  2. Status post cardiac catheterization status post biventricular pacemaker insertion    3. No wheezing will discontinue nebulized Pulmicort and oral prednisone  4. Hypokalemia 5/15 repeat labs in a.m.  5. Chest x-ray shows congestive changes with right upper lobe opacity cleared  She is on Zosyn  6.  Supplemental O2 to keep sats > 93%         Glenn MD Xochilt

## 2021-05-16 NOTE — PROGRESS NOTES
Progress Note    Patient: Myesha Ram MRN: [id number]  SSN: [id number]    YOB: 1940  Age: 80 y.o. Sex: female      Admit Date: 5/4/2021    LOS: 12 days     Subjective:     No acute events overnight. She feels much better. Objective:     Vitals:    05/15/21 2050 05/16/21 0007 05/16/21 0828 05/16/21 1016   BP: (!) 119/58 (!) 146/66 (!) 148/64    Pulse: 89 86 82    Resp: 20 20 20    Temp: 97.9 °F (36.6 °C) 97.8 °F (36.6 °C) 97.4 °F (36.3 °C)    SpO2: 98% 98% 98% 98%   Weight:   78.2 kg (172 lb 6.4 oz)    Height:            Intake and Output:  Current Shift: No intake/output data recorded. Last three shifts: 05/14 1901 - 05/16 0700  In: 200 [P.O.:200]  Out: 550 [Urine:550]    Physical Exam:   General:   Patient is extubated   Eyes:  Conjunctivae/corneas clear. PERRL, EOMs intact. Fundi benign   Ears:  Normal TMs and external ear canals both ears. Nose: Nares normal. Septum midline. Mucosa normal. No drainage or sinus tenderness. Mouth/Throat: Lips, mucosa, and tongue normal. Teeth and gums normal.   Neck: Supple, symmetrical, trachea midline, no adenopathy, thyroid: no enlargment/tenderness/nodules, no carotid bruit and no JVD. Back:   Symmetric, no curvature. ROM normal. No CVA tenderness. Lungs:   Clear to auscultation bilaterally. Heart:   Ejection systolic murmur in the aortic area   Abdomen:   Soft, non-tender. Bowel sounds normal. No masses,  No organomegaly. Extremities: Extremities normal, atraumatic, no cyanosis or edema. Pulses: 2+ and symmetric all extremities. Skin: Skin color, texture, turgor normal. No rashes or lesions   Lymph nodes: Cervical, supraclavicular, and axillary nodes normal.   Neurologic:  Moving her upper extremities. Lab/Data Review: All lab results for the last 24 hours reviewed.          Assessment:     Active Problems:    CHF (congestive heart failure) ([address] (5/4/2021)      COPD (chronic obstructive pulmonary disease) ([facility]) (5/4/2021)    Mrs. Terrance Biancih is a 77-year-old white female with:  1. Heart failure with decompensation  2. Diabetes mellitus  3. Hypertension  4. Hypothyroidism  5. Depression  6. COPD  7. Right bundle branch block with left episode  8. Hyponatremia  9. Acute kidney injury  10. Peripheral vascular disease  11. Right upper lobe opacity  12. Fracture deformity of proximal left humerus    Plan:     No further cardiac testing planned at this time. Currently on aspirin, atorvastatin, Lasix orally, metoprolol and Brilinta. Plans for outpatient TAVR assessment.   She will follow-up with EP clinic upon discharge    Signed By: Lidya Gillis MD     May 16, 2021

## 2021-05-17 LAB
ALBUMIN SERPL-MCNC: 2 G/DL (ref 3.5–5)
ALBUMIN/GLOB SERPL: 0.5 {RATIO} (ref 1.1–2.2)
ALP SERPL-CCNC: 47 U/L (ref 45–117)
ALT SERPL-CCNC: 29 U/L (ref 12–78)
ANION GAP SERPL CALC-SCNC: 5 MMOL/L (ref 5–15)
AST SERPL W P-5'-P-CCNC: 24 U/L (ref 15–37)
ATRIAL RATE: 101 BPM
BASOPHILS # BLD: 0.1 K/UL (ref 0–0.1)
BASOPHILS NFR BLD: 1 % (ref 0–1)
BILIRUB SERPL-MCNC: 0.3 MG/DL (ref 0.2–1)
BUN SERPL-MCNC: 33 MG/DL (ref 6–20)
BUN/CREAT SERPL: 27 (ref 12–20)
CA-I BLD-MCNC: 8.2 MG/DL (ref 8.5–10.1)
CALCULATED P AXIS, ECG09: 62 DEGREES
CALCULATED R AXIS, ECG10: -123 DEGREES
CALCULATED T AXIS, ECG11: 80 DEGREES
CHLORIDE SERPL-SCNC: 108 MMOL/L (ref 97–108)
CO2 SERPL-SCNC: 25 MMOL/L (ref 21–32)
CREAT SERPL-MCNC: 1.24 MG/DL (ref 0.55–1.02)
DIAGNOSIS, 93000: NORMAL
DIFFERENTIAL METHOD BLD: ABNORMAL
EOSINOPHIL # BLD: 0.4 K/UL (ref 0–0.4)
EOSINOPHIL NFR BLD: 3 % (ref 0–7)
ERYTHROCYTE [DISTWIDTH] IN BLOOD BY AUTOMATED COUNT: 14.2 % (ref 11.5–14.5)
GLOBULIN SER CALC-MCNC: 3.7 G/DL (ref 2–4)
GLUCOSE BLD STRIP.AUTO-MCNC: 116 MG/DL (ref 65–117)
GLUCOSE BLD STRIP.AUTO-MCNC: 212 MG/DL (ref 65–117)
GLUCOSE BLD STRIP.AUTO-MCNC: 286 MG/DL (ref 65–117)
GLUCOSE BLD STRIP.AUTO-MCNC: 360 MG/DL (ref 65–117)
GLUCOSE SERPL-MCNC: 98 MG/DL (ref 65–100)
HCT VFR BLD AUTO: 24.9 % (ref 35–47)
HGB BLD-MCNC: 7.8 G/DL (ref 11.5–16)
IMM GRANULOCYTES # BLD AUTO: 0.2 K/UL (ref 0–0.04)
IMM GRANULOCYTES NFR BLD AUTO: 1 % (ref 0–0.5)
LYMPHOCYTES # BLD: 2.4 K/UL (ref 0.8–3.5)
LYMPHOCYTES NFR BLD: 15 % (ref 12–49)
MCH RBC QN AUTO: 31.5 PG (ref 26–34)
MCHC RBC AUTO-ENTMCNC: 31.3 G/DL (ref 30–36.5)
MCV RBC AUTO: 100.4 FL (ref 80–99)
MONOCYTES # BLD: 1.2 K/UL (ref 0–1)
MONOCYTES NFR BLD: 7 % (ref 5–13)
NEUTS SEG # BLD: 12.3 K/UL (ref 1.8–8)
NEUTS SEG NFR BLD: 73 % (ref 32–75)
NRBC # BLD: 0 K/UL (ref 0–0.01)
NRBC BLD-RTO: 0 PER 100 WBC
P-R INTERVAL, ECG05: 156 MS
PERFORMED BY, TECHID: ABNORMAL
PERFORMED BY, TECHID: NORMAL
PLATELET # BLD AUTO: 317 K/UL (ref 150–400)
PMV BLD AUTO: 11.5 FL (ref 8.9–12.9)
POTASSIUM SERPL-SCNC: 3 MMOL/L (ref 3.5–5.1)
PROT SERPL-MCNC: 5.7 G/DL (ref 6.4–8.2)
Q-T INTERVAL, ECG07: 434 MS
QRS DURATION, ECG06: 142 MS
QTC CALCULATION (BEZET), ECG08: 562 MS
RBC # BLD AUTO: 2.48 M/UL (ref 3.8–5.2)
SODIUM SERPL-SCNC: 138 MMOL/L (ref 136–145)
VENTRICULAR RATE, ECG03: 101 BPM
WBC # BLD AUTO: 16.6 K/UL (ref 3.6–11)

## 2021-05-17 PROCEDURE — 74011250637 HC RX REV CODE- 250/637: Performed by: FAMILY MEDICINE

## 2021-05-17 PROCEDURE — 86923 COMPATIBILITY TEST ELECTRIC: CPT

## 2021-05-17 PROCEDURE — 65270000029 HC RM PRIVATE

## 2021-05-17 PROCEDURE — 85025 COMPLETE CBC W/AUTO DIFF WBC: CPT

## 2021-05-17 PROCEDURE — 74011636637 HC RX REV CODE- 636/637: Performed by: FAMILY MEDICINE

## 2021-05-17 PROCEDURE — 92526 ORAL FUNCTION THERAPY: CPT

## 2021-05-17 PROCEDURE — 36415 COLL VENOUS BLD VENIPUNCTURE: CPT

## 2021-05-17 PROCEDURE — 97530 THERAPEUTIC ACTIVITIES: CPT

## 2021-05-17 PROCEDURE — 74011250636 HC RX REV CODE- 250/636: Performed by: FAMILY MEDICINE

## 2021-05-17 PROCEDURE — 86901 BLOOD TYPING SEROLOGIC RH(D): CPT

## 2021-05-17 PROCEDURE — 74011000258 HC RX REV CODE- 258: Performed by: FAMILY MEDICINE

## 2021-05-17 PROCEDURE — 82962 GLUCOSE BLOOD TEST: CPT

## 2021-05-17 PROCEDURE — 74011250637 HC RX REV CODE- 250/637: Performed by: INTERNAL MEDICINE

## 2021-05-17 PROCEDURE — 80053 COMPREHEN METABOLIC PANEL: CPT

## 2021-05-17 RX ORDER — POTASSIUM CHLORIDE 20 MEQ/1
40 TABLET, EXTENDED RELEASE ORAL 2 TIMES DAILY
Status: COMPLETED | OUTPATIENT
Start: 2021-05-17 | End: 2021-05-17

## 2021-05-17 RX ORDER — POTASSIUM CHLORIDE 750 MG/1
40 TABLET, FILM COATED, EXTENDED RELEASE ORAL
Status: DISCONTINUED | OUTPATIENT
Start: 2021-05-17 | End: 2021-05-17

## 2021-05-17 RX ORDER — SODIUM CHLORIDE 9 MG/ML
250 INJECTION, SOLUTION INTRAVENOUS AS NEEDED
Status: DISCONTINUED | OUTPATIENT
Start: 2021-05-17 | End: 2021-05-19 | Stop reason: HOSPADM

## 2021-05-17 RX ORDER — POTASSIUM CHLORIDE 750 MG/1
40 TABLET, FILM COATED, EXTENDED RELEASE ORAL EVERY 4 HOURS
Status: COMPLETED | OUTPATIENT
Start: 2021-05-17 | End: 2021-05-17

## 2021-05-17 RX ADMIN — INSULIN LISPRO 18 UNITS: 100 INJECTION, SOLUTION INTRAVENOUS; SUBCUTANEOUS at 17:48

## 2021-05-17 RX ADMIN — FAMOTIDINE 20 MG: 20 TABLET ORAL at 09:14

## 2021-05-17 RX ADMIN — POTASSIUM CHLORIDE 40 MEQ: 1500 TABLET, EXTENDED RELEASE ORAL at 21:58

## 2021-05-17 RX ADMIN — PIPERACILLIN AND TAZOBACTAM 3.38 G: 3; .375 INJECTION, POWDER, LYOPHILIZED, FOR SOLUTION INTRAVENOUS at 23:43

## 2021-05-17 RX ADMIN — FUROSEMIDE 40 MG: 40 TABLET ORAL at 09:14

## 2021-05-17 RX ADMIN — LEVOTHYROXINE SODIUM 150 MCG: 75 TABLET ORAL at 06:41

## 2021-05-17 RX ADMIN — ACETAMINOPHEN 650 MG: 325 TABLET, FILM COATED ORAL at 04:06

## 2021-05-17 RX ADMIN — POTASSIUM CHLORIDE 40 MEQ: 750 TABLET, FILM COATED, EXTENDED RELEASE ORAL at 16:00

## 2021-05-17 RX ADMIN — ESCITALOPRAM OXALATE 10 MG: 10 TABLET ORAL at 21:58

## 2021-05-17 RX ADMIN — PIPERACILLIN AND TAZOBACTAM 3.38 G: 3; .375 INJECTION, POWDER, LYOPHILIZED, FOR SOLUTION INTRAVENOUS at 00:00

## 2021-05-17 RX ADMIN — FAMOTIDINE 20 MG: 20 TABLET ORAL at 21:58

## 2021-05-17 RX ADMIN — ATORVASTATIN CALCIUM 10 MG: 10 TABLET, FILM COATED ORAL at 21:58

## 2021-05-17 RX ADMIN — POTASSIUM CHLORIDE 40 MEQ: 1500 TABLET, EXTENDED RELEASE ORAL at 09:40

## 2021-05-17 RX ADMIN — FERROUS SULFATE TAB 325 MG (65 MG ELEMENTAL FE) 325 MG: 325 (65 FE) TAB at 17:49

## 2021-05-17 RX ADMIN — POTASSIUM CHLORIDE 40 MEQ: 1500 TABLET, EXTENDED RELEASE ORAL at 17:51

## 2021-05-17 RX ADMIN — INSULIN GLARGINE 40 UNITS: 100 INJECTION, SOLUTION SUBCUTANEOUS at 21:58

## 2021-05-17 RX ADMIN — TICAGRELOR 90 MG: 90 TABLET ORAL at 10:00

## 2021-05-17 RX ADMIN — HYDROXYZINE PAMOATE 25 MG: 25 CAPSULE ORAL at 21:58

## 2021-05-17 RX ADMIN — INSULIN LISPRO 6 UNITS: 100 INJECTION, SOLUTION INTRAVENOUS; SUBCUTANEOUS at 12:15

## 2021-05-17 RX ADMIN — HYDROXYZINE PAMOATE 25 MG: 25 CAPSULE ORAL at 04:03

## 2021-05-17 RX ADMIN — ASPIRIN 81 MG CHEWABLE TABLET 81 MG: 81 TABLET CHEWABLE at 09:14

## 2021-05-17 RX ADMIN — TICAGRELOR 90 MG: 90 TABLET ORAL at 21:58

## 2021-05-17 RX ADMIN — Medication 10 ML: at 22:00

## 2021-05-17 RX ADMIN — INSULIN LISPRO 5 UNITS: 100 INJECTION, SOLUTION INTRAVENOUS; SUBCUTANEOUS at 21:58

## 2021-05-17 RX ADMIN — PIPERACILLIN AND TAZOBACTAM 3.38 G: 3; .375 INJECTION, POWDER, LYOPHILIZED, FOR SOLUTION INTRAVENOUS at 09:34

## 2021-05-17 RX ADMIN — PIPERACILLIN AND TAZOBACTAM 3.38 G: 3; .375 INJECTION, POWDER, LYOPHILIZED, FOR SOLUTION INTRAVENOUS at 17:48

## 2021-05-17 RX ADMIN — POTASSIUM CHLORIDE 40 MEQ: 750 TABLET, FILM COATED, EXTENDED RELEASE ORAL at 12:15

## 2021-05-17 RX ADMIN — METOPROLOL SUCCINATE 25 MG: 25 TABLET, EXTENDED RELEASE ORAL at 09:14

## 2021-05-17 RX ADMIN — FERROUS SULFATE TAB 325 MG (65 MG ELEMENTAL FE) 325 MG: 325 (65 FE) TAB at 09:34

## 2021-05-17 NOTE — PROGRESS NOTES
Upon shift report night shift stated that patient self removed sterile left pacemaker dressing and x3 IJ and peipheral IV. A dry dressing was present during report with a 4x4 and tegaderm. EP MD paged no answer. Cardiologist paged and notified stated he will come see patient. Primary MD paged and notified orders for  CBC and BMP obtained will continue to monitor.

## 2021-05-17 NOTE — PROGRESS NOTES
PROGRESS NOTE      Chief Complaints:  Patient voices no new complaints  HPI and  Objective:    Patient looks comfortable without distress. Patient denies any particular pain in the right foot. Denies any chest pain shortness of breath. Denies nausea no abdominal pain. Review of Systems:  Rest of review of systems negative. EXAM:  Visit Vitals  /61 (BP 1 Location: Right upper arm)   Pulse 91   Temp 98.2 °F (36.8 °C)   Resp 18   Ht 5' 1\" (1.549 m)   Wt 172 lb 6.4 oz (78.2 kg)   SpO2 99%   BMI 32.57 kg/m²     Patient is awake  Head and neck atraumatic no cephalic  Cardiovascular system is regular  Pulmonary no audible wheeze  Abdomen soft  Neurologically intact  Right foot has excellent Doppler signal noted both DP and PT foot is warm. Skin is warm and moist.  Recent Results (from the past 24 hour(s))   GLUCOSE, POC    Collection Time: 05/16/21  7:50 AM   Result Value Ref Range    Glucose (POC) 44 (LL) 65 - 117 mg/dL    Performed by [address] [place] [address], POC    Collection Time: 05/16/21  8:51 AM   Result Value Ref Range    Glucose (POC) 135 (H) 65 - 117 mg/dL    Performed by [address] [place] [address], POC    Collection Time: 05/16/21 12:15 PM   Result Value Ref Range    Glucose (POC) 138 (H) 65 - 117 mg/dL    Performed by [address] [place] [address], POC    Collection Time: 05/16/21  4:08 PM   Result Value Ref Range    Glucose (POC) 287 (H) 65 - 117 mg/dL    Performed by BONY BARILLAS    GLUCOSE, POC    Collection Time: 05/16/21  8:25 PM   Result Value Ref Range    Glucose (POC) 359 (H) 65 - 117 mg/dL    Performed by Adenike Paz        ASSESSMENT:   Patient is 80 y.o. with diagnosis of : Active Problems:    CHF (congestive heart failure) ([facility] (5/4/2021)      COPD (chronic obstructive pulmonary disease) ([facility] (5/4/2021)        PLAN:                 From vascular point of view patient's right foot is stable reverse the cyanotic foot after the angiographic catheter was removed.  DANIEL examination is very reasonable as well. I will continue monitor her progress.

## 2021-05-17 NOTE — PROGRESS NOTES
SPEECH LANGUAGE PATHOLOGY DYSPHAGIA TREATMENT  Patient: Isatu Waters (39 y.o. female)  Date: 5/17/2021  Diagnosis: CHF (congestive heart failure) ([facility]) [I50.9]  COPD (chronic obstructive pulmonary disease) ([facility]) [J44.9] <principal problem not specified>  Procedure(s) (LRB):  INSERT PPM BIV MULTI (N/A) 6 Days Post-Op  Precautions: aspiration      ASSESSMENT:    Administered thin liquids via cup and straw. Swallow initiated w/ minimal delay, HLE and protraction adequate to palpation. Weak audibility in swallow to cervical auscultation. Intermittent clinical indicators of penetration/aspiration after the swallow c/b throat clearing. No overt s/sx of aspiration/penetration present w/ all nectar thick trials. Mastication timely for hard solid. No significant oral or pharyngeal difficulty noted. No overt s/sx of aspiration noted w/ hard solids. Swallow function continues to be reflective of recent MBS results 5/14. Recommend to continue w/ present diet orders. PLAN:  Recommendations and Planned Interventions:  Regular diet, NECTAR thick liquids. Aspiration precautions. Full cog-linguistic evaluation as warranted. Patient continues to benefit from skilled intervention to address the above impairments. Continue treatment per established plan of care. Frequency/Duration: Patient will be followed by speech-language pathology daily to address goals. Discharge Recommendations:  Home Health     SUBJECTIVE:   Patient alert, seen at bedside. She reports tolerating current diet w/o difficulty. She is A&O to self, place and situation.      OBJECTIVE:     CXR Results  (Last 48 hours)      None          CT Results  (Last 48 hours)      None           Cognitive and Communication Status:  Neurologic State: Alert  Orientation Level: Oriented X4  Cognition: Follows commands  Perception: Appears intact  Perseveration: No perseveration noted  Safety/Judgement: Decreased awareness of environment    After treatment: Patient left in no apparent distress in bed and Call bell within reach    COMMUNICATION/EDUCATION:   Patient was educated regarding her deficit(s) of dysphagia, swallow safety precautions, diet recs and POC. She demonstrated Fair understanding as evidenced by AMS. Cirilo Mata, SLP MAguilarS. CCC-SLP  Time Calculation: 12 mins           Problem: Dysphagia (Adult)  Goal: *Acute Goals and Plan of Care (Insert Text)  Description: Speech Therapy Swallow Goals  Initiated 5/12/2021  -Patient will tolerate full diet with nectar thick liquids without clinical indicators of aspiration given minimal cues within 7 day(s). GOAL MET    -Patient will tolerate PO trials without clinical indicators of aspiration given minimal cues within 7 day(s). -Patient will participate in modified barium swallow study within 7 day(s). GOAL MET       -Patient will demonstrate understanding of swallow safety precautions and aspiration precautions, diet recs with minimal cues within 7 day(s).              Outcome: Progressing Towards Goal

## 2021-05-17 NOTE — PROGRESS NOTES
Comprehensive Nutrition Assessment    Type and Reason for Visit: Reassess(Goal)    Nutrition Recommendations/Plan:   Continue current diet    Insulin to promote euglycemia  Allow peanut butter crackers if BG controlled. Monitor % meals, supplements and BM in I/O'S    Nutrition Assessment:  SOB, chest pain. Dx CHF exacerbation, bilat pleural effusion. S/p CHF edu (5/5). Worsening status, EKG (5/1) showing complete heart block- transferred to ICU for dopamine drip. Previously on BiPAP. Previously intubated. Now on floor with nectar thick diet x2days. RD saw pt today and eats 3 meals/day but only consumes~50% at each meal. Pt requested peanut butter crackers. Labs: Gluc -212, H/H 7.8/24.9, K 3.0, BUN 33, Creat 1.24, Meds: statin, furosemide, insulin, zosyn, prednisone, pepcid, FeSu KCl. Malnutrition Assessment:  Malnutrition Status: At risk for malnutrition (specify)(Previous poor PO)    Context:  Acute illness     Findings of the 6 clinical characteristics of malnutrition:   Energy Intake:  Mild decrease in energy intake (specify)  Weight Loss:  No significant weight loss     Body Fat Loss:  No significant body fat loss,     Muscle Mass Loss:  No significant muscle mass loss,    Fluid Accumulation:  No significant fluid accumulation,        Estimated Daily Nutrient Needs:  Energy (kcal): 1668kcal (30kcal/kg); Weight Used for Energy Requirements: Adjusted(55.6kg AdjBW)  Protein (g): 61g (1.1g/kg); Weight Used for Protein Requirements: Adjusted(AdjBW 55.6kg)  Fluid (ml/day): 1668 ml; Method Used for Fluid Requirements: 1 ml/kcal      Nutrition Related Findings:  NFPE - no acute findings. Last BM today typically has 2BM/day. No documented n/v or c/d. No edema documented.     Wounds:    None       Current Nutrition Therapies:  DIET DIABETIC CONSISTENT CARB Regular; 2 [place]/2 Mildly Thick    Anthropometric Measures:  · Height:  5' 1\" (154.9 cm)  · Current Body Wt:  78.2 kg (172 lb 6.4 oz)   · Ideal Body Wt:  105 lbs:  164.2 %   · Adjusted Body Weight:   ; Weight Adjustment for: (55.6kg AdjBW)   · BMI Category:  Obese class 1 (BMI 30.0-34. 9)       Nutrition Diagnosis:   · No nutrition diagnosis at this time related to impaired respiratory function as evidenced by intubation      Nutrition Interventions:   Food and/or Nutrient Delivery: Continue current diet  Nutrition Education and Counseling: Education not indicated  Coordination of Nutrition Care: No recommendation at this time    Goals:  nitiate means of nutrition to meet >75% EENs in 5-7 days, Wt maintenance +/- 0.5kg per week while critically ill, Lytes wnl       Nutrition Monitoring and Evaluation:   Behavioral-Environmental Outcomes: None identified  Food/Nutrient Intake Outcomes: Diet advancement/tolerance, Food and nutrient intake  Physical Signs/Symptoms Outcomes: Meal time behavior    Discharge Planning:    No discharge needs at this time     Electronically signed by Bang Whatley RD on 5/17/2021 at 3:58 PM    Contact: Lydia Parada

## 2021-05-17 NOTE — PROGRESS NOTES
PHYSICAL THERAPY TREATMENT  Patient: Ulysses Caceres (33 y.o. female)  Date: 5/17/2021  Diagnosis: CHF (congestive heart failure) ([facility]) [I50.9]  COPD (chronic obstructive pulmonary disease) ([facility]) [J44.9] <principal problem not specified>  Procedure(s) (LRB):  INSERT PPM BIV MULTI (N/A) 6 Days Post-Op  Precautions:    Chart, physical therapy assessment, plan of care and goals were reviewed. ASSESSMENT  Patient continues with skilled PT services and is progressing towards goals. Pt. Seated in recliner upon arrival and agreeable to therapy session. Able to perform Long sitting, and seated LE TE. Reviewed LUE Protocols. Able to perform five sit to stand transfer with 1UEA, with BLE pushing off recliner to stand. Able to ambulate with Heladio walker and therapist negotiating IV Pole for 76' with RPE 6. SOB noted at end of ambulation trial. Recommend DC to IRF. Allyson Rae Current Level of Function Impacting Discharge (mobility/balance): Endurance, balance, strength, cognition    Other factors to consider for discharge: PMH         PLAN :  Patient continues to benefit from skilled intervention to address the above impairments. Continue treatment per established plan of care. to address goals. Recommendation for discharge: (in order for the patient to meet his/her long term goals)  Therapy 3 hours per day 5-7 days per week    This discharge recommendation:  Has been made in collaboration with the attending provider and/or case management    IF patient discharges home will need the following DME: [employer]EM[employer]ALKER       SUBJECTIVE:   Patient stated IM OKAY.     OBJECTIVE DATA SUMMARY:   Critical Behavior:  Neurologic State: Alert  Orientation Level: Oriented X4  Cognition: Follows commands  Safety/Judgement: Decreased awareness of environment  Functional Mobility Training:  Bed Mobility:  Rolling: Contact guard assistance  Supine to Sit: Minimum assistance     Scooting: Contact guard assistance        Transfers:  Sit to Stand: Stand-by assistance  Stand to Sit: Stand-by assistance  Stand Pivot Transfers: Stand-by assistance     Bed to Chair: Minimum assistance                    Balance:  Sitting: Intact; With support  Sitting - Static: Good (unsupported)  Sitting - Dynamic: Fair (occasional)  Standing: Impaired;Pull to stand; With support  Standing - Static: Constant support; Fair  Standing - Dynamic : Constant support; Fair  Ambulation/Gait Training:  Distance (ft): 75 Feet (ft)  Assistive Device: Walker subhash;Gait belt  Ambulation - Level of Assistance: Contact guard assistance        Gait Abnormalities: Foot drop        Base of Support: Widened  Stance: Time;Right increased  Speed/Roya: Slow  Step Length: Left shortened                    Stairs: Therapeutic Exercises:   Therapeutic Exercises:       EXERCISE   Sets   Reps   Active Active Assist   Passive Self ROM   Comments   Ankle Pumps  20 [x] [] [] []    Quad Sets/Glut Sets   [] [] [] []    Hamstring Sets   [] [] [] []    Short Arc Quads   [] [] [] []    Heel Slides  20 [x] [] [] []    Straight Leg Raises   [] [] [] []    Hip abd/add  20 [x] [] [] []    Long Arc Quads  20 [x] [] [] []    Marching  20 [x] [] [] []    SIT TO STAND  5 [x] [] [] []        Pain Ratin CHEST    Activity Tolerance:   Fair  Please refer to the flowsheet for vital signs taken during this treatment. After treatment patient left in no apparent distress:   Sitting in chair    COMMUNICATION/COLLABORATION:   The patients plan of care was discussed with: Physical therapy assistant.      Yareli Vargas PTA   Time Calculation: 19 mins

## 2021-05-17 NOTE — PROGRESS NOTES
Problem: Self Care Deficits Care Plan (Adult)  Goal: *Acute Goals and Plan of Care (Insert Text)  Description: Pt will be MI sup<->sit in prep for EOB ADL's  Pt will be MI  LB dressing EOB level  Pt will be Mi  sit<-> prep for toilet transfer  Pt will be MI  toilet transfer with LRAD  Pt will be MI  toileting/cloth mgmt LRAD  Pt will be MI  grooming standing sink  Pt will be MI bathing sitting/standing sink LRAD  Pt will be MI you UE HEP in prep for self care tasks      Outcome: Progressing Towards Goal     OCCUPATIONAL THERAPY TREATMENT  Patient: Dylon Gilbert (28 y.o. female)  Date: 5/17/2021  Diagnosis: CHF (congestive heart failure) ([facility]) [I50.9]  COPD (chronic obstructive pulmonary disease) ([facility] [J44.9] <principal problem not specified>  Procedure(s) (LRB):  INSERT PPM BIV MULTI (N/A) 6 Days Post-Op  Precautions:    Chart, occupational therapy assessment, plan of care, and goals were reviewed. ASSESSMENT  Pt received semi supine in bed A&Ox4 and agreeable for OT tx. Pt has made significant progress towards goals since prior treatment and now progressed to assist of 1 for self care and functional transfers/mobility. Pt educated on LUE precautions post pacemaker with pt requiring reminders initially when completing bed mobility and assist to pooja LUE brace. Pt requiring CGA sup->sit and scooting EOB, min A sit<->Stand and min A toilet transfer with gait belt and subhash walker and pt currently is SBA LB dressing EOB, SBA simple grooming chair level and min A toileting/cloth mgmt. Pt would benefit from continued skilled OT services while at [facility] in order to increase safety and independence with self care and functional transfers/mobility. Recommend discharge to IRF when medically appropriate. Other factors to consider for discharge: time since onset, severity of deficits, PLOF         PLAN :  Patient continues to benefit from skilled intervention to address the above impairments.   Continue treatment per established plan of care. to address goals. Recommend with staff: toilet transfers    Recommend next OT session: sink level self care    Recommendation for discharge: (in order for the patient to meet his/her long term goals)  IRF    This discharge recommendation:  Has been made in collaboration with the attending provider and/or case management    IF patient discharges home will need the following DME: TBD       SUBJECTIVE:   Patient stated it feels good sitting up.     OBJECTIVE DATA SUMMARY:   Cognitive/Behavioral Status:  Neurologic State: Alert  Orientation Level: Oriented X4     Functional Mobility and Transfers for ADLs:  Bed Mobility:  Rolling: Contact guard assistance  Supine to Sit: Minimum assistance  Scooting: Contact guard assistance    Transfers:  Sit to Stand: Minimum assistance  Functional Transfers  Bathroom Mobility: Minimum assistance  Toilet Transfer : Minimum assistance  Bed to Chair: Minimum assistance    Balance:  Sitting: Intact; With support  Sitting - Static: Good (unsupported)  Sitting - Dynamic: Fair (occasional)  Standing: Impaired;Pull to stand; With support  Standing - Static: Constant support; Fair  Standing - Dynamic : Constant support; Fair    ADL Intervention:  Feeding  Feeding Assistance: Independent  Drink to Mouth:  Independent    Grooming  Grooming Assistance: Stand-by assistance  Position Performed: Seated in chair  Washing Face: Stand-by assistance  Washing Hands: Stand-by assistance  Brushing/Combing Hair: Stand-by assistance    Lower Body Dressing Assistance  Socks: Stand-by assistance  Leg Crossed Method Used: Yes  Position Performed: Seated edge of bed    Toileting  Toileting Assistance: Independent  Bladder Hygiene: Minimum assistance  Bowel Hygiene: Minimum assistance  Clothing Management: Minimum assistance    Therapeutic Exercises:   Pt educated on RUE HEP with pt demonstrating and verbalizing understanding    Pain:  No pain reported    Activity Tolerance:   Good and requires rest breaks  Please refer to the flowsheet for vital signs taken during this treatment.     After treatment patient left in no apparent distress:   Sitting in chair and Call bell within reach    COMMUNICATION/COLLABORATION:   The patients plan of care was discussed with: Registered nurse and Respiratory therapist.     Ruby Huitron  Time Calculation: 38 mins

## 2021-05-17 NOTE — PROGRESS NOTES
PULMONARY  NOTE  [facility]    Name: Kiya Arango MRN: [id number]   : 1940 Hospital: [address] [place] [address]   Date: 2021  Admission date: 2021 Hospital Day: 14       HPI:     Hospital Problems  Never Reviewed          Codes Class Noted POA    CHF (congestive heart failure) ([facility]) ICD-10-CM: I50.9  ICD-9-CM: 428.0  2021 Unknown        COPD (chronic obstructive pulmonary disease) ([facility] ICD-10-CM: J44.9  ICD-9-CM: 496  2021 Unknown                   [x] High complexity decision making was performed  [x] See my orders for details      Subjective/Initial History:     I was asked by Lucy Coleman MD to see Kiya Arango  a 80 y.o.  female in consultation     Excerpts from admission 2021 or consult notes as follows:   70-year-old lady came in because of shortness of breath and dyspnea she was told that she has COPD recently and she was giving albuterol inhaler did not seem to help so came to the emergency room she was hypoxic she was put on oxygen 5 L nasal cannula she was having dyspnea minimal exertion and also at rest she is a lifetime non-smoker, but she has been exposed to secondhand smoke her  used to smoke and all her children smoke she used to work on the farm as a farmer no chest pain no fever no chills.        Allergies   Allergen Reactions    Nortriptyline Itching    Cymbalta [Duloxetine] Itching    Ivp [Fd And C Blue No.1] Hives    Morphine Itching    Tetracycline Itching        MAR reviewed and pertinent medications noted or modified as needed     Current Facility-Administered Medications   Medication    potassium chloride (K-DUR, KLOR-CON) SR tablet 40 mEq    0.9% sodium chloride infusion 250 mL    metoprolol succinate (TOPROL-XL) XL tablet 25 mg    insulin glargine (LANTUS) injection 40 Units    dextrose 5 % - 0.45% NaCl infusion    ferrous sulfate tablet 325 mg    furosemide (LASIX) tablet 40 mg    famotidine (PEPCID) tablet 20 mg  albuterol-ipratropium (DUO-NEB) 2.5 MG-0.5 MG/3 ML    EPINEPHrine (ADRENALIN) 0.1 mg/mL syringe    propofol (DIPRIVAN) 10 mg/mL infusion    DOPamine (INTROPIN) 800 mg in dextrose 5% 250 mL infusion    aspirin chewable tablet 81 mg    ticagrelor (BRILINTA) tablet 90 mg    sodium chloride (NS) flush 5-40 mL    sodium chloride (NS) flush 5-40 mL    hydrOXYzine pamoate (VISTARIL) capsule 25 mg    piperacillin-tazobactam (ZOSYN) 3.375 g in 0.9% sodium chloride (MBP/ADV) 100 mL MBP    escitalopram oxalate (LEXAPRO) tablet 10 mg    levothyroxine (SYNTHROID) tablet 150 mcg    atorvastatin (LIPITOR) tablet 10 mg    insulin lispro (HUMALOG) injection    glucose chewable tablet 16 g    dextrose (D50W) injection syrg 12.5-25 g    glucagon (GLUCAGEN) injection 1 mg    acetaminophen (TYLENOL) tablet 650 mg    Or    acetaminophen (TYLENOL) suppository 650 mg    polyethylene glycol (MIRALAX) packet 17 g    ondansetron (ZOFRAN ODT) tablet 4 mg    Or    ondansetron (ZOFRAN) injection 4 mg    enoxaparin (LOVENOX) injection 40 mg      Patient PCP: Anh Sousa MD  PMH:  has a past medical history of Depression, DM (diabetes mellitus) ([facility] Hyperlipidemia, Hypertension, Pancreatitis, SOB (shortness of breath), and Thyroid disease. PSH:   has a past surgical history that includes hx hysterectomy; ir fluoro guide plc cvad (5/10/2021); ir insert non tunl cvc over 5 yrs (5/10/2021); and pr ins new/rplcmt prm pm w/transv eltrd atrial&vent (N/A, 5/11/2021). FHX: family history is not on file. SHX:  reports that she has never smoked. She has never used smokeless tobacco. She reports that she does not drink alcohol or use drugs. ROS:    Review of Systems   Constitutional: Negative. HENT: Negative. Eyes: Negative. Respiratory: Positive for cough, sputum production, shortness of breath and wheezing. Cardiovascular: Positive for orthopnea. Gastrointestinal: Negative.     Genitourinary: Negative. Musculoskeletal: Negative. Skin: Negative. Neurological: Negative. Psychiatric/Behavioral: Negative. Objective:     Vital Signs: Telemetry:    normal sinus rhythm Intake/Output:   Visit Vitals  BP (!) 143/65 (BP 1 Location: Right upper arm, BP Patient Position: At rest)   Pulse 79   Temp 97.4 °F (36.3 °C)   Resp 18   Ht 5' 1\" (1.549 m)   Wt 78.2 kg (172 lb 6.4 oz)   SpO2 98%   BMI 32.57 kg/m²       Temp (24hrs), Av.9 °F (36.6 °C), Min:97.4 °F (36.3 °C), Max:98.2 °F (36.8 °C)        O2 Device: None (Room air) O2 Flow Rate (L/min): 2 l/min       Wt Readings from Last 4 Encounters:   21 78.2 kg (172 lb 6.4 oz)   10/11/10 102.2 kg (225 lb 3.2 oz)          Intake/Output Summary (Last 24 hours) at 2021 0853  Last data filed at 2021 2041  Gross per 24 hour   Intake 100 ml   Output 650 ml   Net -550 ml       Last shift:      No intake/output data recorded. Last 3 shifts: 05/15 1901 -  0700  In: 300 [P.O.:300]  Out: 1200 [Urine:1200]       Physical Exam:     Physical Exam   Constitutional: She is oriented to person, place, and time. She appears well-developed and well-nourished. Lying in bed with no distress   HENT:   Head: Normocephalic and atraumatic. Eyes: Pupils are equal, round, and reactive to light. Conjunctivae and EOM are normal.   Neck: Normal range of motion. Neck supple. Cardiovascular: Normal rate and regular rhythm. Pulmonary/Chest: Breath sounds normal.   No distress respirations nonlabored lungs are clear   Abdominal: Soft. Bowel sounds are normal.   Musculoskeletal: Normal range of motion. Neurological: She is alert and oriented to person, place, and time. Skin: Skin is warm and dry. Psychiatric: She has a normal mood and affect.         Labs:    Recent Labs     21  0708 05/15/21  0854   WBC 16.6* 17.7*   HGB 7.8* 7.2*    308     Recent Labs     21  0708 05/15/21  0854 213    144 140   K 3.0* 3.1* 3.7  109* 106   CO2 25 29 27   GLU 98 51* 214*   BUN 33* 49* 56*   CREA 1.24* 1.24* 1.38*   CA 8.2* 7.9* 8.0*   MG  --   --  1.9   ALB 2.0* 2.1*  --    ALT 29 37  --      5/16 overnight oximetry with low oxygen saturation 68% only 1 minute below 88%. 5/14 SpO2 on room air, at rest=94%. SpO2 on room air, while ambulating=87%. SpO2 on 2L , while ambulating=93%  Lab Results   Component Value Date/Time    Culture result: No growth 6 days 05/04/2021 10:30 AM     Lab Results   Component Value Date/Time    TSH 1.62 05/04/2021 11:12 AM       Imaging:    CXR Results  (Last 48 hours)    None                IMPRESSION:     1. Chronic Obstructive Pulmonary Disease she is a lifetime non-smoker's   2. Acute hypoxic and hypercapnic respiratory failure resolved  3. Patient is on Brilinta which can also cause shortness of breath  4. Complete heart block status post temporary pacemaker was placed on 5/10  5. Hyponatremia resolved  6. Hypokalemia  7. Decompensated congestive heart failure  8. Pleural effusion more on the left side almost entirely clear on chest x-ray 5/14  9. Right upper lobe opacity possible pneumonia resolved  10. Severe aortic stenosis  11. Symptomatic anemia      RECOMMENDATIONS/PLAN:     1. Patient was extubated on 5/12 now she is on room air overnight oximetry shows no significant desaturation last exercise oxygen did drop   2. Status post cardiac catheterization status post biventricular pacemaker insertion    3. No wheezing will discontinue nebulized Pulmicort and oral prednisone  4. Hypokalemia 5/15 repeat labs in a.m.  5. Chest x-ray shows congestive changes with right upper lobe opacity cleared  She is on Zosyn  6. Supplemental O2 to keep sats > 93%  7.  Patient is for blood transfusion and replace potassium         Farzaneh Pimentel MD

## 2021-05-17 NOTE — PROGRESS NOTES
Progress Note    Patient: Darnell Rodriguez MRN: [id number]  SSN: [id number]    YOB: 1940  Age: 80 y.o. Sex: female      Admit Date: 5/4/2021    LOS: 13 days     Subjective:   No acute events overnight. She pulled the gauze off pacemaker site yesterday. Objective:     Vitals:    05/16/21 1954 05/16/21 2242 05/17/21 0536 05/17/21 0806   BP: 109/60 126/61 (!) 141/65 (!) 143/65   Pulse: 87 91 81 79   Resp: 18 18 18 18   Temp: 98 °F (36.7 °C) 98.2 °F (36.8 °C) 98.2 °F (36.8 °C) 97.4 °F (36.3 °C)   SpO2: 96% 99% 99% 98%   Weight:       Height:            Intake and Output:  Current Shift: No intake/output data recorded. Last three shifts: 05/15 1901 - 05/17 0700  In: 300 [P.O.:300]  Out: 1200 [Urine:1200]    Physical Exam:   General:   Conscious alert oriented to time place and person   Eyes:  Conjunctivae/corneas clear. PERRL, EOMs intact. Fundi benign   Ears:  Normal TMs and external ear canals both ears. Nose: Nares normal. Septum midline. Mucosa normal. No drainage or sinus tenderness. Mouth/Throat: Lips, mucosa, and tongue normal. Teeth and gums normal.   Neck: Supple, symmetrical, trachea midline, no adenopathy, thyroid: no enlargment/tenderness/nodules, no carotid bruit and no JVD. Back:   Symmetric, no curvature. ROM normal. No CVA tenderness. Lungs:   Clear to auscultation bilaterally. Heart:   Ejection systolic murmur in the aortic area   Abdomen:   Soft, non-tender. Bowel sounds normal. No masses,  No organomegaly. Extremities: Extremities normal, atraumatic, no cyanosis or edema. Pulses: 2+ and symmetric all extremities. Skin: Skin color, texture, turgor normal. No rashes or lesions   Lymph nodes: Cervical, supraclavicular, and axillary nodes normal.   Neurologic:  Moving her upper extremities. Lab/Data Review: All lab results for the last 24 hours reviewed.          Assessment:     Active Problems:    CHF (congestive heart failure) ([facility] (5/4/2021)      COPD (chronic obstructive pulmonary disease) ([facility] (5/4/2021)    Mrs. Zoya Monahan is a 79-year-old white female with:  1. Heart failure with decompensation  2. Diabetes mellitus  3. Hypertension  4. Hypothyroidism  5. Depression  6. COPD  7. Right bundle branch block with left episode  8. Hyponatremia  9. Acute kidney injury  10. Peripheral vascular disease  11. Right upper lobe opacity  12. Fracture deformity of proximal left humerus    Plan:     The pacemaker was reprogrammed. Pacer site was redressed. Images were shared with electrophysiologist.  White count is down to 16. Hemoglobin is 7.8. I will give her 1 unit of blood. Replete potassium. Creatinine 1.2 and BUN of 33. Currently on aspirin, atorvastatin, Brilinta, metoprolol.     Signed By: Kali Tijerina MD     May 17, 2021

## 2021-05-17 NOTE — PROGRESS NOTES
CM attempted to contact [phone] [facility] [phone] admissions dept x'2 to inquire about possibly accepting patient, however no answer at this time, message left twice requesting a callback.     KERRY continues to follow

## 2021-05-17 NOTE — PROGRESS NOTES
Hospitalist Progress Note    Subjective:     Shyam So is a 79 yo female with a PMHx significant for DM, HLD, HTN, thyroid disease, and depression, who presents to the ED with shortness of breath for the past 1-2 days due to a severe acute exacerbation of COPD, acute hypoxic/hypercapnic respiratory failure, and CHF. She also has a bilateral pleural effusion (worse on left), interstitial edema (cardiogenic or infectious cause), and hyponatremia.   ___________________________________________________________    She had palpitation and heart rate went up to 1 10-1 20s seen by the cardiology    She denies any shortness of breath, chest pain, lightheaded, nausea, vomiting, or abdominal pain. Gen surg consult:   Strong doppler on distal ant tibial a, post tibial a  Negative for ischemia  R common femoral a already has vasc sheath     DANIEL: mild PAD of R lower extremity (0.89)   Normal DANIEL of L lower extremity (0.92)    PT: recommends IRF     Cardiology consult:   Consider starting metoprolol today  Echo 35-40%        Past Medical History:   Diagnosis Date    Depression     DM (diabetes mellitus) ([address]     Hyperlipidemia     Hypertension     Pancreatitis     SOB (shortness of breath)     Thyroid disease       Past Surgical History:   Procedure Laterality Date    HX HYSTERECTOMY      IR FLUORO GUIDE PLC CVAD  5/10/2021    IR INSERT NON TUNL CVC OVER 5 YRS  5/10/2021    NH INS NEW/RPLCMT PRM PM W/TRANSV ELTRD ATRIAL&VENT N/A 5/11/2021    INSERT PPM BIV MULTI performed by Sadie Mac MD at [address] [place] [address]     History reviewed. No pertinent family history. Social History     Tobacco Use    Smoking status: Never Smoker    Smokeless tobacco: Never Used   Substance Use Topics    Alcohol use: No       Prior to Admission medications    Medication Sig Start Date End Date Taking? Authorizing Provider   Insulin Needles, Disposable, 31 X 5/16 \" Ndle by Does Not Apply route.  10/11/10   Kathleen Elaine Martinez MD   insulin lispro, human, (HUMALOG KWIKPEN) 100 unit/mL flexpen 15 Units by SubCUTAneous route three (3) times daily (with meals). 10/13/10   Benjy Sagastume MD   benazepril (LOTENSIN) 40 mg tablet Take 40 mg by mouth daily. 10/11/10   Provider, Historical   LEXAPRO 10 mg tablet Take 10 mg by mouth nightly. 10/11/10   Provider, Historical   famotidine (PEPCID) 20 mg tablet Take 20 mg by mouth two (2) times a day. 10/11/10   Provider, Historical   gabapentin (NEURONTIN) 400 mg capsule  9/3/10   Provider, Historical   hydrocodone-acetaminophen (NORCO) 5-325 mg per tablet Take 1 Tab by mouth every six (6) hours as needed. 10/11/10   Provider, Historical   levothyroxine (SYNTHROID) 150 mcg tablet Take 150 mcg by mouth daily (before breakfast). 10/11/10   Provider, Historical   lorazepam (ATIVAN) 1 mg tablet  8/2/10   Provider, Historical   lovastatin (MEVACOR) 40 mg tablet Take 40 mg by mouth nightly. 10/11/10   Provider, Historical   metoprolol (LOPRESSOR) 100 mg tablet Take 100 mg by mouth two (2) times a day. 10/11/10   Provider, Historical   oxycodone-acetaminophen (PERCOCET 10)  mg per tablet  8/2/10   Provider, Historical   insulin glargine (LANTUS) 100 unit/mL injection 60 Units by SubCUTAneous route nightly. 10/11/10   Benjy Sagastume MD     Allergies   Allergen Reactions    Nortriptyline Itching    Cymbalta [Duloxetine] Itching    Ivp [Fd And C Blue No.1] Hives    Morphine Itching    Tetracycline Itching        Review of Systems:  Intubated    Objective: Intake and Output:    No intake/output data recorded. 05/15 1901 - 05/17 0700  In: 300 [P.O.:300]  Out: 1200 [Urine:1200]    Physical Exam:   Visit Vitals  BP (!) 143/65 (BP 1 Location: Right upper arm, BP Patient Position: At rest)   Pulse 79   Temp 97.4 °F (36.3 °C)   Resp 18   Ht 5' 1\" (1.549 m)   Wt 78.2 kg (172 lb 6.4 oz)   SpO2 98%   BMI 32.57 kg/m²     General:   On ventilator s.    Head:  Normocephalic, without obvious abnormality, atraumatic. Eyes:  Conjunctivae/corneas clear. Pupils equal, round, reactive to light. Extraocular movements intact. Lungs:    Decreased breath sounds , crackles, wheezing. Chest wall:  No tenderness or deformity. Heart:  Regular rate and rhythm, S1, S2 normal, grade 3 murmur, click, rub, or gallop. Possible murmur, difficult to auscultate with BiPAP   Abdomen:   Soft, non-tender. Bowel sounds normal. No masses. No organomegaly. Extremities: Extremities normal, atraumatic, no cyanosis. 1+ pitting edema of lower extremities    Pulses: 2+ and symmetric all extremities. Skin: Skin color, texture, turgor normal. No rashes or lesions. Lymph nodes: Cervical, supraclavicular, and axillary nodes normal.   Neurologic: CNII-XII intact. Normal strength, sensation, and reflexes throughout.        ECG:  ** Poor data quality, interpretation may be adversely affected**   Normal sinus rhythm   Right bundle branch block   Left anterior fascicular block   ** Bifascicular block **   Left ventricular hypertrophy with repolarization abnormality   Cannot rule out Septal infarct , age undetermined     Data Review:   Recent Results (from the past 24 hour(s))   EKG, 12 LEAD, INITIAL    Collection Time: 05/04/21 10:24 AM   Result Value Ref Range    Ventricular Rate 98 BPM    Atrial Rate 98 BPM    P-R Interval 168 ms    QRS Duration 152 ms    Q-T Interval 416 ms    QTC Calculation (Bezet) 531 ms    Calculated P Axis 68 degrees    Calculated R Axis -52 degrees    Calculated T Axis 94 degrees    Diagnosis       ** Poor data quality, interpretation may be adversely affected  Normal sinus rhythm  Right bundle branch block  Left anterior fascicular block  ** Bifascicular block **  Left ventricular hypertrophy with repolarization abnormality  Cannot rule out Septal infarct , age undetermined  Abnormal ECG  No previous ECGs available  Confirmed by Rashi Hernandez MD ([id number]) on 5/4/2021 12:13:56 PM     SARS-COV-2 Collection Time: 05/04/21 10:30 AM   Result Value Ref Range    SARS-CoV-2 Please find results under separate order     COVID-19 RAPID TEST    Collection Time: 05/04/21 10:30 AM   Result Value Ref Range    Specimen source Nasopharyngeal      COVID-19 rapid test Not Detected Not Detected     LACTIC ACID    Collection Time: 05/04/21 11:12 AM   Result Value Ref Range    Lactic acid 0.6 0.4 - 2.0 mmol/L   MAGNESIUM    Collection Time: 05/04/21 11:12 AM   Result Value Ref Range    Magnesium 1.8 1.6 - 2.4 mg/dL   METABOLIC PANEL, COMPREHENSIVE    Collection Time: 05/04/21 11:12 AM   Result Value Ref Range    Sodium 129 (L) 136 - 145 mmol/L    Potassium 4.2 3.5 - 5.1 mmol/L    Chloride 97 97 - 108 mmol/L    CO2 28 21 - 32 mmol/L    Anion gap 4 (L) 5 - 15 mmol/L    Glucose 182 (H) 65 - 100 mg/dL    BUN 25 (H) 6 - 20 mg/dL    Creatinine 0.88 0.55 - 1.02 mg/dL    BUN/Creatinine ratio 28 (H) 12 - 20      GFR est AA >60 >60 ml/min/1.73m2    GFR est non-AA >60 >60 ml/min/1.73m2    Calcium 8.2 (L) 8.5 - 10.1 mg/dL    Bilirubin, total 0.4 0.2 - 1.0 mg/dL    AST (SGOT) 13 (L) 15 - 37 U/L    ALT (SGPT) 17 12 - 78 U/L    Alk.  phosphatase 68 45 - 117 U/L    Protein, total 6.5 6.4 - 8.2 g/dL    Albumin 3.0 (L) 3.5 - 5.0 g/dL    Globulin 3.5 2.0 - 4.0 g/dL    A-G Ratio 0.9 (L) 1.1 - 2.2     BNP    Collection Time: 05/04/21 11:12 AM   Result Value Ref Range    NT pro-BNP 7,360 (H) <450 pg/mL   PROCALCITONIN    Collection Time: 05/04/21 11:12 AM   Result Value Ref Range    Procalcitonin <0.05 (H) 0 ng/mL   TROPONIN I    Collection Time: 05/04/21 11:12 AM   Result Value Ref Range    Troponin-I, Qt. <0.05 <0.05 ng/mL   TSH 3RD GENERATION    Collection Time: 05/04/21 11:12 AM   Result Value Ref Range    TSH 1.62 0.36 - 3.74 uIU/mL   CBC WITH AUTOMATED DIFF    Collection Time: 05/04/21 11:12 AM   Result Value Ref Range    WBC 14.2 (H) 3.6 - 11.0 K/uL    RBC 3.13 (L) 3.80 - 5.20 M/uL    HGB 10.0 (L) 11.5 - 16.0 g/dL    HCT 30.8 (L) 35.0 - 47.0 %    MCV 98.4 80.0 - 99.0 FL    MCH 31.9 26.0 - 34.0 PG    MCHC 32.5 30.0 - 36.5 g/dL    RDW 13.6 11.5 - 14.5 %    PLATELET 072 011 - 215 K/uL    MPV 11.7 8.9 - 12.9 FL    NRBC 0.0 0.0  WBC    ABSOLUTE NRBC 0.00 0.00 - 0.01 K/uL    NEUTROPHILS 87 (H) 32 - 75 %    LYMPHOCYTES 6 (L) 12 - 49 %    MONOCYTES 5 5 - 13 %    EOSINOPHILS 1 0 - 7 %    BASOPHILS 1 0 - 1 %    IMMATURE GRANULOCYTES 0 0 - 0.5 %    ABS. NEUTROPHILS 12.4 (H) 1.8 - 8.0 K/UL    ABS. LYMPHOCYTES 0.8 0.8 - 3.5 K/UL    ABS. MONOCYTES 0.7 0.0 - 1.0 K/UL    ABS. EOSINOPHILS 0.1 0.0 - 0.4 K/UL    ABS. BASOPHILS 0.1 0.0 - 0.1 K/UL    ABS. IMM. GRANS. 0.1 (H) 0.00 - 0.04 K/UL    DF AUTOMATED     PROTHROMBIN TIME + INR    Collection Time: 05/04/21 11:25 AM   Result Value Ref Range    Prothrombin time 13.6 11.9 - 14.7 sec    INR 1.1 0.9 - 1.1     PTT    Collection Time: 05/04/21 11:25 AM   Result Value Ref Range    aPTT 21.8 21.2 - 34.1 sec    aPTT, therapeutic range   82 - 109 sec   D DIMER    Collection Time: 05/04/21 11:25 AM   Result Value Ref Range    D DIMER 0.50 (H) <0.50 ug/ml(FEU)   BLOOD GAS, ARTERIAL    Collection Time: 05/04/21 11:25 AM   Result Value Ref Range    pH 7.36 7.35 - 7.45      PCO2 52 (H) 35 - 45 mmHg    PO2 72 (L) 75 - 100 mmHg    O2 SAT 93 (L) >95 %    BICARBONATE 27 (H) 22 - 26 mmol/L    BASE EXCESS 2.9 (H) 0 - 2 mmol/L    O2 METHOD Nasal Cannula      O2 FLOW RATE 6 L/min    SITE Right Radial      EBEN'S TEST PASS         Chest x-ray:  More conspicuous RUL opacity. Patchy mid and lower lung reticular markings  persist. Cardiac and mediastinal structures unchanged. Thoracic aorta  atherosclerosis. No pneumothorax. Probable small dependent bilateral pleural  effusions.     Assessment:     Static post V. Fib/received shocks x2/ intubated and extubated  Static post pacemaker   CAD status post stent LAD  Severe aortic stenosis  Acute hypoxemic respiratory failure  COPD exacerbation     Mild PAD (R lower extremity)    Hyponatremia/resolved    Congestive heart failure    Pleural effusions     Anemia  7.7    Leukocytosis/resolving    Diabetes mellitus    Hypertension/resolved    Hypothyroidism    Depression     Echo done shows ejection fraction of 40 to 45% and moderate grade 2 diastolic dysfunction    Dopplers negative for DVT    Toes are blue poor pulse ordered arterial Doppler    Hypokalemia replace potassium  3.3  Plan:     Continue medications:   ASA 81mg po qday   Lipitor 10mg po qhs  Pulmicort 500mcg neb bid  Enoxaparin 40mg subq qday   Lexapro 10mg po qhs  Famotidine 20mg po bid   Lasix 40m po qday  Lantus 60 units subq qhs  Humalog subq qid   Synthroid 150mcg po qam   Prednisone 10mg po qday   Brilinta 90 mg twice a day  Zosyn 3.375mg IV q8h (day 8)  Ferrous sulfate 325 twice daily    D5 1/2 NS 50ml/hr IV     Replace potassium  Add KCl 40meq   Add ferrous sulfate    Monitor blood sugar    PT OT consult  Continue to follow with SLP, pulmonology      Patient feeling much better today possible discharge home tomorrow and follow-up with the EP clinic as an outpatient    Transfuse 1 unit of packed RBC    Discussed with the patient's son at the bedside and for skilled care rehab    Replace potassium          Current Facility-Administered Medications:     potassium chloride (K-DUR, KLOR-CON) SR tablet 40 mEq, 40 mEq, Oral, BID, Noé Bunch MD, 40 mEq at 05/17/21 0940    0.9% sodium chloride infusion 250 mL, 250 mL, IntraVENous, PRN, Noé Bunch MD    metoprolol succinate (TOPROL-XL) XL tablet 25 mg, 25 mg, Oral, DAILY, Noé Bunch MD, 25 mg at 05/17/21 0914    insulin glargine (LANTUS) injection 40 Units, 40 Units, SubCUTAneous, QHS, Beny Cheung MD, 40 Units at 05/16/21 2038    dextrose 5 % - 0.45% NaCl infusion, 50 mL/hr, IntraVENous, CONTINUOUS, Beny Cheung MD, Last Rate: 50 mL/hr at 05/16/21 2040, 50 mL/hr at 05/16/21 2040    ferrous sulfate tablet 325 mg, 1 Tab, Oral, BID WITH MEALS, Mitchell Cheung MD, 325 mg at 05/17/21 0934    furosemide (LASIX) tablet 40 mg, 40 mg, Oral, DAILY, Beny Cheung MD, 40 mg at 05/17/21 0914    famotidine (PEPCID) tablet 20 mg, 20 mg, Per NG tube, BID, Mitchell Cheung MD, 20 mg at 05/17/21 0914    albuterol-ipratropium (DUO-NEB) 2.5 MG-0.5 MG/3 ML, 3 mL, Nebulization, Q6H PRN, Mitchell Cheung MD    EPINEPHrine (ADRENALIN) 0.1 mg/mL syringe, , , CODE BLUE, Moon Ceron MD, 1 mg at 05/10/21 1416    propofol (DIPRIVAN) 10 mg/mL infusion, 0-50 mcg/kg/min, IntraVENous, TITRATE, Moon Ceron MD, Last Rate: 12 mL/hr at 05/12/21 0525, 25 mcg/kg/min at 05/12/21 0525    DOPamine (INTROPIN) 800 mg in dextrose 5% 250 mL infusion, 0-20 mcg/kg/min, IntraVENous, TITRATE, Isai Mendoza MD, Last Rate: 11.2 mL/hr at 05/10/21 2200, 7.5 mcg/kg/min at 05/10/21 2200    aspirin chewable tablet 81 mg, 81 mg, Oral, DAILY, Noé Bunch MD, 81 mg at 05/17/21 0914    ticagrelor (BRILINTA) tablet 90 mg, 90 mg, Oral, Q12H, Noé Bunch MD, 90 mg at 05/17/21 1000    sodium chloride (NS) flush 5-40 mL, 5-40 mL, IntraVENous, Q8H, Noé Bunch MD, Stopped at 05/17/21 0600    sodium chloride (NS) flush 5-40 mL, 5-40 mL, IntraVENous, PRN, Noé Bunch MD    hydrOXYzine pamoate (VISTARIL) capsule 25 mg, 25 mg, Oral, Q6H PRN, Beny Cheung MD, 25 mg at 05/17/21 0403    piperacillin-tazobactam (ZOSYN) 3.375 g in 0.9% sodium chloride (MBP/ADV) 100 mL MBP, 3.375 g, IntraVENous, Q8H, Beny Cheung MD, Last Rate: 25 mL/hr at 05/17/21 0934, 3.375 g at 05/17/21 0934    escitalopram oxalate (LEXAPRO) tablet 10 mg, 10 mg, Oral, QHS, Beny Cheung MD, 10 mg at 05/16/21 2018    levothyroxine (SYNTHROID) tablet 150 mcg, 150 mcg, Oral, ACB, Beny Cheung MD, 150 mcg at 05/17/21 0641    atorvastatin (LIPITOR) tablet 10 mg, 10 mg, Oral, QHS, Beny Cheung MD, 10 mg at 05/16/21 2018    insulin lispro (HUMALOG) injection, , SubCUTAneous, AC&HS, Severo Spore, MD, Stopped at 05/17/21 0730    glucose chewable tablet 16 g, 4 Tab, Oral, PRN, Arlet Cheung MD    dextrose (D50W) injection syrg 12.5-25 g, 25-50 mL, IntraVENous, PRN, Beny Cheung MD, 25 g at 05/16/21 0801    glucagon (GLUCAGEN) injection 1 mg, 1 mg, IntraMUSCular, PRN, Arlet Cheung MD    acetaminophen (TYLENOL) tablet 650 mg, 650 mg, Oral, Q6H PRN, 650 mg at 05/17/21 0406 **OR** acetaminophen (TYLENOL) suppository 650 mg, 650 mg, Rectal, Q6H PRN, Arlet Cheung MD    polyethylene glycol (MIRALAX) packet 17 g, 17 g, Oral, DAILY PRN, Arlet Cheung MD    ondansetron (ZOFRAN ODT) tablet 4 mg, 4 mg, Oral, Q8H PRN **OR** ondansetron (ZOFRAN) injection 4 mg, 4 mg, IntraVENous, Q6H PRN, Beny Cheung MD    enoxaparin (LOVENOX) injection 40 mg, 40 mg, SubCUTAneous, DAILY, Arlet Cheung MD, Stopped at 05/17/21 0900

## 2021-05-17 NOTE — PROGRESS NOTES
CM approached by patient's son, Mustapha Maradiaga, at [facility]. He reported that he would like for referrals to be sent to [facility] in [place], and [facility] [place]. [facility] is first preference, Mr. Mustapha Maradiaga reiterated this multiple times. Referrals sent via david. Patient will require insurance auth prior to dc, PT/OT updated notes are needed, CM notified therapy department of need for updated notes. CM continues to follow.

## 2021-05-18 ENCOUNTER — APPOINTMENT (OUTPATIENT)
Dept: GENERAL RADIOLOGY | Age: 81
DRG: 242 | End: 2021-05-18
Attending: FAMILY MEDICINE
Payer: MEDICARE

## 2021-05-18 LAB
ALBUMIN SERPL-MCNC: 2.3 G/DL (ref 3.5–5)
ALBUMIN/GLOB SERPL: 0.6 {RATIO} (ref 1.1–2.2)
ALP SERPL-CCNC: 52 U/L (ref 45–117)
ALT SERPL-CCNC: 26 U/L (ref 12–78)
ANION GAP SERPL CALC-SCNC: 2 MMOL/L (ref 5–15)
APPEARANCE UR: CLEAR
AST SERPL W P-5'-P-CCNC: 16 U/L (ref 15–37)
BACTERIA URNS QL MICRO: NEGATIVE /HPF
BASOPHILS # BLD: 0.2 K/UL (ref 0–0.1)
BASOPHILS NFR BLD: 1 % (ref 0–1)
BILIRUB SERPL-MCNC: 0.6 MG/DL (ref 0.2–1)
BILIRUB UR QL: NEGATIVE
BUN SERPL-MCNC: 31 MG/DL (ref 6–20)
BUN/CREAT SERPL: 29 (ref 12–20)
CA-I BLD-MCNC: 8.6 MG/DL (ref 8.5–10.1)
CHLORIDE SERPL-SCNC: 113 MMOL/L (ref 97–108)
CO2 SERPL-SCNC: 27 MMOL/L (ref 21–32)
COLOR UR: NORMAL
COVID-19 RAPID TEST, COVR: NOT DETECTED
CREAT SERPL-MCNC: 1.06 MG/DL (ref 0.55–1.02)
DIFFERENTIAL METHOD BLD: ABNORMAL
EOSINOPHIL # BLD: 0.7 K/UL (ref 0–0.4)
EOSINOPHIL NFR BLD: 4 % (ref 0–7)
ERYTHROCYTE [DISTWIDTH] IN BLOOD BY AUTOMATED COUNT: 16.5 % (ref 11.5–14.5)
GLOBULIN SER CALC-MCNC: 3.8 G/DL (ref 2–4)
GLUCOSE BLD STRIP.AUTO-MCNC: 110 MG/DL (ref 65–117)
GLUCOSE BLD STRIP.AUTO-MCNC: 111 MG/DL (ref 65–117)
GLUCOSE BLD STRIP.AUTO-MCNC: 206 MG/DL (ref 65–117)
GLUCOSE BLD STRIP.AUTO-MCNC: 224 MG/DL (ref 65–117)
GLUCOSE BLD STRIP.AUTO-MCNC: 238 MG/DL (ref 65–117)
GLUCOSE SERPL-MCNC: 105 MG/DL (ref 65–100)
GLUCOSE UR STRIP.AUTO-MCNC: NEGATIVE MG/DL
HCT VFR BLD AUTO: 30.1 % (ref 35–47)
HGB BLD-MCNC: 9.7 G/DL (ref 11.5–16)
HGB UR QL STRIP: NEGATIVE
IMM GRANULOCYTES # BLD AUTO: 0.2 K/UL (ref 0–0.04)
IMM GRANULOCYTES NFR BLD AUTO: 1 % (ref 0–0.5)
KETONES UR QL STRIP.AUTO: NEGATIVE MG/DL
LEUKOCYTE ESTERASE UR QL STRIP.AUTO: NEGATIVE
LYMPHOCYTES # BLD: 1.8 K/UL (ref 0.8–3.5)
LYMPHOCYTES NFR BLD: 9 % (ref 12–49)
MCH RBC QN AUTO: 30.9 PG (ref 26–34)
MCHC RBC AUTO-ENTMCNC: 32.2 G/DL (ref 30–36.5)
MCV RBC AUTO: 95.9 FL (ref 80–99)
MONOCYTES # BLD: 1.3 K/UL (ref 0–1)
MONOCYTES NFR BLD: 6 % (ref 5–13)
NEUTS SEG # BLD: 16.5 K/UL (ref 1.8–8)
NEUTS SEG NFR BLD: 79 % (ref 32–75)
NITRITE UR QL STRIP.AUTO: NEGATIVE
NRBC # BLD: 0 K/UL (ref 0–0.01)
NRBC BLD-RTO: 0 PER 100 WBC
PERFORMED BY, TECHID: ABNORMAL
PERFORMED BY, TECHID: NORMAL
PERFORMED BY, TECHID: NORMAL
PH UR STRIP: 5 [PH] (ref 5–8)
PLATELET # BLD AUTO: 372 K/UL (ref 150–400)
PMV BLD AUTO: 10.6 FL (ref 8.9–12.9)
POTASSIUM SERPL-SCNC: 4.4 MMOL/L (ref 3.5–5.1)
PROT SERPL-MCNC: 6.1 G/DL (ref 6.4–8.2)
PROT UR STRIP-MCNC: NEGATIVE MG/DL
RBC # BLD AUTO: 3.14 M/UL (ref 3.8–5.2)
RBC #/AREA URNS HPF: NORMAL /HPF (ref 0–5)
SODIUM SERPL-SCNC: 142 MMOL/L (ref 136–145)
SP GR UR REFRACTOMETRY: 1.01 (ref 1–1.03)
SPECIMEN SOURCE: NORMAL
UROBILINOGEN UR QL STRIP.AUTO: 0.1 EU/DL (ref 0.1–1)
WBC # BLD AUTO: 20.6 K/UL (ref 3.6–11)
WBC URNS QL MICRO: NORMAL /HPF (ref 0–4)

## 2021-05-18 PROCEDURE — 80053 COMPREHEN METABOLIC PANEL: CPT

## 2021-05-18 PROCEDURE — 74011250636 HC RX REV CODE- 250/636: Performed by: FAMILY MEDICINE

## 2021-05-18 PROCEDURE — 65270000029 HC RM PRIVATE

## 2021-05-18 PROCEDURE — 81001 URINALYSIS AUTO W/SCOPE: CPT

## 2021-05-18 PROCEDURE — 74011000250 HC RX REV CODE- 250: Performed by: FAMILY MEDICINE

## 2021-05-18 PROCEDURE — 87040 BLOOD CULTURE FOR BACTERIA: CPT

## 2021-05-18 PROCEDURE — 87635 SARS-COV-2 COVID-19 AMP PRB: CPT

## 2021-05-18 PROCEDURE — 36430 TRANSFUSION BLD/BLD COMPNT: CPT

## 2021-05-18 PROCEDURE — 85025 COMPLETE CBC W/AUTO DIFF WBC: CPT

## 2021-05-18 PROCEDURE — 82962 GLUCOSE BLOOD TEST: CPT

## 2021-05-18 PROCEDURE — 87086 URINE CULTURE/COLONY COUNT: CPT

## 2021-05-18 PROCEDURE — 74011250637 HC RX REV CODE- 250/637: Performed by: INTERNAL MEDICINE

## 2021-05-18 PROCEDURE — 36415 COLL VENOUS BLD VENIPUNCTURE: CPT

## 2021-05-18 PROCEDURE — 74011636637 HC RX REV CODE- 636/637: Performed by: FAMILY MEDICINE

## 2021-05-18 PROCEDURE — 74011000258 HC RX REV CODE- 258: Performed by: FAMILY MEDICINE

## 2021-05-18 PROCEDURE — 99223 1ST HOSP IP/OBS HIGH 75: CPT | Performed by: INTERNAL MEDICINE

## 2021-05-18 PROCEDURE — 71046 X-RAY EXAM CHEST 2 VIEWS: CPT

## 2021-05-18 PROCEDURE — 97530 THERAPEUTIC ACTIVITIES: CPT

## 2021-05-18 PROCEDURE — P9016 RBC LEUKOCYTES REDUCED: HCPCS

## 2021-05-18 PROCEDURE — 74011250637 HC RX REV CODE- 250/637: Performed by: FAMILY MEDICINE

## 2021-05-18 RX ADMIN — Medication 10 ML: at 22:00

## 2021-05-18 RX ADMIN — FAMOTIDINE 20 MG: 20 TABLET ORAL at 22:40

## 2021-05-18 RX ADMIN — INSULIN LISPRO 6 UNITS: 100 INJECTION, SOLUTION INTRAVENOUS; SUBCUTANEOUS at 13:00

## 2021-05-18 RX ADMIN — INSULIN GLARGINE 40 UNITS: 100 INJECTION, SOLUTION SUBCUTANEOUS at 22:40

## 2021-05-18 RX ADMIN — LEVOTHYROXINE SODIUM 150 MCG: 75 TABLET ORAL at 09:26

## 2021-05-18 RX ADMIN — INSULIN LISPRO 6 UNITS: 100 INJECTION, SOLUTION INTRAVENOUS; SUBCUTANEOUS at 17:16

## 2021-05-18 RX ADMIN — FUROSEMIDE 40 MG: 40 TABLET ORAL at 09:17

## 2021-05-18 RX ADMIN — MEROPENEM 1 G: 1 INJECTION, POWDER, FOR SOLUTION INTRAVENOUS at 13:00

## 2021-05-18 RX ADMIN — FAMOTIDINE 20 MG: 20 TABLET ORAL at 09:17

## 2021-05-18 RX ADMIN — MEROPENEM 1 G: 1 INJECTION, POWDER, FOR SOLUTION INTRAVENOUS at 22:39

## 2021-05-18 RX ADMIN — ATORVASTATIN CALCIUM 10 MG: 10 TABLET, FILM COATED ORAL at 22:40

## 2021-05-18 RX ADMIN — METOPROLOL SUCCINATE 25 MG: 25 TABLET, EXTENDED RELEASE ORAL at 09:17

## 2021-05-18 RX ADMIN — ENOXAPARIN SODIUM 40 MG: 40 INJECTION SUBCUTANEOUS at 09:18

## 2021-05-18 RX ADMIN — FERROUS SULFATE TAB 325 MG (65 MG ELEMENTAL FE) 325 MG: 325 (65 FE) TAB at 17:16

## 2021-05-18 RX ADMIN — ESCITALOPRAM OXALATE 10 MG: 10 TABLET ORAL at 22:40

## 2021-05-18 RX ADMIN — ASPIRIN 81 MG CHEWABLE TABLET 81 MG: 81 TABLET CHEWABLE at 09:17

## 2021-05-18 RX ADMIN — FERROUS SULFATE TAB 325 MG (65 MG ELEMENTAL FE) 325 MG: 325 (65 FE) TAB at 09:26

## 2021-05-18 RX ADMIN — TICAGRELOR 90 MG: 90 TABLET ORAL at 09:17

## 2021-05-18 RX ADMIN — TICAGRELOR 90 MG: 90 TABLET ORAL at 22:40

## 2021-05-18 RX ADMIN — PIPERACILLIN AND TAZOBACTAM 3.38 G: 3; .375 INJECTION, POWDER, LYOPHILIZED, FOR SOLUTION INTRAVENOUS at 09:17

## 2021-05-18 NOTE — PROGRESS NOTES
Problem: Falls - Risk of  Goal: *Absence of Falls  Description: Document [place] Fall Risk and appropriate interventions in the flowsheet.   Outcome: Progressing Towards Goal  Note: Fall Risk Interventions:  Mobility Interventions: Bed/chair exit alarm, OT consult for ADLs, Patient to call before getting OOB, PT Consult for mobility concerns, PT Consult for assist device competence    Mentation Interventions: Bed/chair exit alarm, More frequent rounding, Increase mobility, Room close to nurse's station    Medication Interventions: Bed/chair exit alarm, Teach patient to arise slowly, Patient to call before getting OOB    Elimination Interventions: Bed/chair exit alarm, Call light in reach, Patient to call for help with toileting needs    History of Falls Interventions: Bed/chair exit alarm, Vital signs minimum Q4HRs X 24 hrs (comment for end date), Room close to nurse's station

## 2021-05-18 NOTE — PROGRESS NOTES
Progress Note    Patient: Bridger Acosta MRN: [id number]  SSN: [id number]    YOB: 1940  Age: 80 y.o. Sex: female      Admit Date: 5/4/2021    LOS: 14 days     Subjective:     No acute events overnight    Objective:     Vitals:    05/18/21 0300 05/18/21 0329 05/18/21 0732 05/18/21 1137   BP: 129/81 130/80 (!) 140/70 (!) 102/56   Pulse: 91 92 95 97   Resp: 16 16 18 20   Temp: 97.3 °F (36.3 °C) 97.1 °F (36.2 °C) 98.1 °F (36.7 °C) 98 °F (36.7 °C)   SpO2: 100% 99% 98% 97%   Weight:       Height:            Intake and Output:  Current Shift: No intake/output data recorded. Last three shifts: 05/16 1901 - 05/18 0700  In: 410 [P.O.:100]  Out: 650 [Urine:650]    Physical Exam:   General:   Conscious alert oriented to time place and person   Eyes:  Conjunctivae/corneas clear. PERRL, EOMs intact. Fundi benign   Ears:  Normal TMs and external ear canals both ears. Nose: Nares normal. Septum midline. Mucosa normal. No drainage or sinus tenderness. Mouth/Throat: Lips, mucosa, and tongue normal. Teeth and gums normal.   Neck: Supple, symmetrical, trachea midline, no adenopathy, thyroid: no enlargment/tenderness/nodules, no carotid bruit and no JVD. Back:   Symmetric, no curvature. ROM normal. No CVA tenderness. Lungs:   Clear to auscultation bilaterally. Heart:   Ejection systolic murmur in the aortic area   Abdomen:   Soft, non-tender. Bowel sounds normal. No masses,  No organomegaly. Extremities: Extremities normal, atraumatic, no cyanosis or edema. Pulses: 2+ and symmetric all extremities. Skin: Skin color, texture, turgor normal. No rashes or lesions   Lymph nodes: Cervical, supraclavicular, and axillary nodes normal.   Neurologic:  Moving her upper extremities. Lab/Data Review: All lab results for the last 24 hours reviewed.          Assessment:     Active Problems:    CHF (congestive heart failure) ([address] (5/4/2021)      COPD (chronic obstructive pulmonary disease) (HCC) (5/4/2021)    Mrs. Maykel Murphy is a 53-year-old white female with:  1. Heart failure with decompensation  2. Diabetes mellitus  3. Hypertension  4. Hypothyroidism  5. Depression  6. COPD  7. Right bundle branch block with left episode  8. Hyponatremia  9. Acute kidney injury  10. Peripheral vascular disease  11. Right upper lobe opacity  12. Fracture deformity of proximal left humerus    Plan:     WBC count is trending up. Pacemaker site appears to be intact. We will consult ID. Blood cultures pending. Chest x-ray showed blunting of the posterior costophrenic angle. Currently on meropenem. Hemoglobin improved to 9.7. She is afebrile.     Signed By: Dang Helms MD     May 18, 2021

## 2021-05-18 NOTE — PROGRESS NOTES
PULMONARY  NOTE  [facility]    Name: Lizet Mcclain MRN: [id number]   : 1940 Hospital: [address] [place] [address]   Date: 2021  Admission date: 2021 Hospital Day: 15       HPI:     Hospital Problems  Never Reviewed          Codes Class Noted POA    CHF (congestive heart failure) ([facility]) ICD-10-CM: I50.9  ICD-9-CM: 428.0  2021 Unknown        COPD (chronic obstructive pulmonary disease) ([facility] ICD-10-CM: J44.9  ICD-9-CM: 496  2021 Unknown                   [x] High complexity decision making was performed  [x] See my orders for details      Subjective/Initial History:     I was asked by Meño Molina MD to see Lizet Mcclain  a 80 y.o.  female in consultation     Excerpts from admission 2021 or consult notes as follows:   68-year-old lady came in because of shortness of breath and dyspnea she was told that she has COPD recently and she was giving albuterol inhaler did not seem to help so came to the emergency room she was hypoxic she was put on oxygen 5 L nasal cannula she was having dyspnea minimal exertion and also at rest she is a lifetime non-smoker, but she has been exposed to secondhand smoke her  used to smoke and all her children smoke she used to work on the farm as a farmer no chest pain no fever no chills.        Allergies   Allergen Reactions    Nortriptyline Itching    Cymbalta [Duloxetine] Itching    Ivp [Fd And C Blue No.1] Hives    Morphine Itching    Tetracycline Itching        MAR reviewed and pertinent medications noted or modified as needed     Current Facility-Administered Medications   Medication    0.9% sodium chloride infusion 250 mL    metoprolol succinate (TOPROL-XL) XL tablet 25 mg    insulin glargine (LANTUS) injection 40 Units    dextrose 5 % - 0.45% NaCl infusion    ferrous sulfate tablet 325 mg    furosemide (LASIX) tablet 40 mg    famotidine (PEPCID) tablet 20 mg    albuterol-ipratropium (DUO-NEB) 2.5 MG-0.5 MG/3 ML    EPINEPHrine (ADRENALIN) 0.1 mg/mL syringe    propofol (DIPRIVAN) 10 mg/mL infusion    DOPamine (INTROPIN) 800 mg in dextrose 5% 250 mL infusion    aspirin chewable tablet 81 mg    ticagrelor (BRILINTA) tablet 90 mg    sodium chloride (NS) flush 5-40 mL    sodium chloride (NS) flush 5-40 mL    hydrOXYzine pamoate (VISTARIL) capsule 25 mg    piperacillin-tazobactam (ZOSYN) 3.375 g in 0.9% sodium chloride (MBP/ADV) 100 mL MBP    escitalopram oxalate (LEXAPRO) tablet 10 mg    levothyroxine (SYNTHROID) tablet 150 mcg    atorvastatin (LIPITOR) tablet 10 mg    insulin lispro (HUMALOG) injection    glucose chewable tablet 16 g    dextrose (D50W) injection syrg 12.5-25 g    glucagon (GLUCAGEN) injection 1 mg    acetaminophen (TYLENOL) tablet 650 mg    Or    acetaminophen (TYLENOL) suppository 650 mg    polyethylene glycol (MIRALAX) packet 17 g    ondansetron (ZOFRAN ODT) tablet 4 mg    Or    ondansetron (ZOFRAN) injection 4 mg    enoxaparin (LOVENOX) injection 40 mg      Patient PCP: Ninfa Pagan MD  PMH:  has a past medical history of Depression, DM (diabetes mellitus) ([address] Hyperlipidemia, Hypertension, Pancreatitis, SOB (shortness of breath), and Thyroid disease. PSH:   has a past surgical history that includes hx hysterectomy; ir fluoro guide plc cvad (5/10/2021); ir insert non tunl cvc over 5 yrs (5/10/2021); and pr ins new/rplcmt prm pm w/transv eltrd atrial&vent (N/A, 5/11/2021). FHX: family history is not on file. SHX:  reports that she has never smoked. She has never used smokeless tobacco. She reports that she does not drink alcohol or use drugs. ROS:    Review of Systems   Constitutional: Negative. HENT: Negative. Eyes: Negative. Respiratory: Positive for cough, sputum production, shortness of breath and wheezing. Cardiovascular: Positive for orthopnea. Gastrointestinal: Negative. Genitourinary: Negative. Musculoskeletal: Negative. Skin: Negative. Neurological: Negative. Psychiatric/Behavioral: Negative. Objective:     Vital Signs: Telemetry:    normal sinus rhythm Intake/Output:   Visit Vitals  BP (!) 140/70 (BP Patient Position: At rest;Supine)   Pulse 95   Temp 98.1 °F (36.7 °C)   Resp 18   Ht 5' 1\" (1.549 m)   Wt 78.2 kg (172 lb 6.4 oz)   SpO2 98%   BMI 32.57 kg/m²       Temp (24hrs), Av.8 °F (36.6 °C), Min:97.1 °F (36.2 °C), Max:98.3 °F (36.8 °C)        O2 Device: None (Room air) O2 Flow Rate (L/min): 2 l/min       Wt Readings from Last 4 Encounters:   21 78.2 kg (172 lb 6.4 oz)   10/11/10 102.2 kg (225 lb 3.2 oz)          Intake/Output Summary (Last 24 hours) at 2021 1009  Last data filed at 2021 0329  Gross per 24 hour   Intake 310 ml   Output    Net 310 ml       Last shift:      No intake/output data recorded. Last 3 shifts:  190 -  0700  In: 80 [P.O.:100]  Out: 650 [Urine:650]       Physical Exam:     Physical Exam   Constitutional: She is oriented to person, place, and time. She appears well-developed and well-nourished. Lying in bed with no distress   HENT:   Head: Normocephalic and atraumatic. Eyes: Pupils are equal, round, and reactive to light. Conjunctivae and EOM are normal.   Neck: Normal range of motion. Neck supple. Cardiovascular: Normal rate and regular rhythm. Pulmonary/Chest: Breath sounds normal.   No distress respirations nonlabored lungs are clear   Abdominal: Soft. Bowel sounds are normal.   Musculoskeletal: Normal range of motion. Neurological: She is alert and oriented to person, place, and time. Skin: Skin is warm and dry. Psychiatric: She has a normal mood and affect.         Labs:    Recent Labs     21  0700 21  0708   WBC 20.6* 16.6*   HGB 9.7* 7.8*    317     Recent Labs     21  0700 21  0708    138   K 4.4 3.0*   * 108   CO2 27 25   * 98   BUN 31* 33*   CREA 1.06* 1.24*   CA 8.6 8.2*   ALB 2.3* 2.0*   ALT 26 29 5/16 overnight oximetry with low oxygen saturation 68% only 1 minute below 88%. 5/14 SpO2 on room air, at rest=94%. SpO2 on room air, while ambulating=87%. SpO2 on 2L , while ambulating=93%  Lab Results   Component Value Date/Time    Culture result: No growth 6 days 05/04/2021 10:30 AM     Lab Results   Component Value Date/Time    TSH 1.62 05/04/2021 11:12 AM       Imaging:    CXR Results  (Last 48 hours)    None                IMPRESSION:     1. Chronic Obstructive Pulmonary Disease she is a lifetime non-smoker's   2. Acute hypoxic and hypercapnic respiratory failure resolved  3. Patient is on Brilinta which can also cause shortness of breath  4. Complete heart block status post temporary pacemaker was placed on 5/10  5. Hyponatremia resolved  6. Hypokalemia resolved  7. congestive heart failure  8. Pleural effusion more on the left side almost entirely clear on chest x-ray 5/14  9. Right upper lobe opacity possible pneumonia resolved  10. Severe aortic stenosis  11. Symptomatic anemia      RECOMMENDATIONS/PLAN:     1. Patient was extubated on 5/12 now she is on room air overnight oximetry shows no significant desaturation last exercise oxygen did drop   2. Status post cardiac catheterization status post biventricular pacemaker insertion    3. No wheezing will discontinue nebulized Pulmicort and oral prednisone  4. Hypokalemia 5/15 repeat labs in a.m.  5. Chest x-ray shows congestive changes with right upper lobe opacity cleared  She is on Zosyn  6. Supplemental O2 to keep sats > 93%  7.  Patient received blood transfusion hemoglobin stable         Marry Cummings MD

## 2021-05-18 NOTE — PROGRESS NOTES
KERRY spoke with Néstor Valerioin in admissions at [facility] [phone] to discuss patient. She reports that she will review patient for admission however they are short on beds and may not be able to take patient at this time. Patient will require insurance auth prior to d/c.        1:15 PM - Padmini reported that they should be able to accept patient and will need a COVID test and [id number], DMAS95 completed and faxed to [place] [address]. Nurse notified of need for COVID test.    At this time patient is pending insurance auth to [facility], son has been updated on DCP. KERRY continues to follow. 03:22 PM - KERRY approached by patient's son, he reported that [place] [address] would not allow visitors until after a 14 day quarantine and that he would now like for patient to go to [facility] in [place] as they will allow visitors immediately. KERRY has communicated with both facilities. Patient is pending auth at this time.

## 2021-05-18 NOTE — PROGRESS NOTES
Hospitalist Progress Note    Subjective:     Alina Monday is a 81 yo female with a PMHx significant for DM, HLD, HTN, thyroid disease, and depression, who presents to the ED with shortness of breath for the past 1-2 days due to a severe acute exacerbation of COPD, acute hypoxic/hypercapnic respiratory failure, and CHF. She also has a bilateral pleural effusion (worse on left), interstitial edema (cardiogenic or infectious cause), and hyponatremia.   ___________________________________________________________    She had palpitation and heart rate went up to 1 10-1 20s seen by the cardiology    She denies any shortness of breath, chest pain, lightheaded, nausea, vomiting, or abdominal pain. WBC count up to 20,000 no fever no chills no source of infection at this time patient already on Zosyn    Gen surg consult:   Strong doppler on distal ant tibial a, post tibial a  Negative for ischemia  R common femoral a already has vasc sheath     DANIEL: mild PAD of R lower extremity (0.89)   Normal DANIEL of L lower extremity (0.92)    PT: recommends IRF     Cardiology consult:   Consider starting metoprolol today  Echo 35-40%        Past Medical History:   Diagnosis Date    Depression     DM (diabetes mellitus) ([address]     Hyperlipidemia     Hypertension     Pancreatitis     SOB (shortness of breath)     Thyroid disease       Past Surgical History:   Procedure Laterality Date    HX HYSTERECTOMY      IR FLUORO GUIDE PLC CVAD  5/10/2021    IR INSERT NON TUNL CVC OVER 5 YRS  5/10/2021    AK INS NEW/RPLCMT PRM PM W/TRANSV ELTRD ATRIAL&VENT N/A 5/11/2021    INSERT PPM BIV MULTI performed by Soledad Mabry MD at [address] [place] [address]     History reviewed. No pertinent family history. Social History     Tobacco Use    Smoking status: Never Smoker    Smokeless tobacco: Never Used   Substance Use Topics    Alcohol use: No       Prior to Admission medications    Medication Sig Start Date End Date Taking?  Authorizing Provider   Insulin Needles, Disposable, 31 X 5/16 \" Ndle by Does Not Apply route. 10/11/10   Elayne Rutherford MD   insulin lispro, human, (HUMALOG KWIKPEN) 100 unit/mL flexpen 15 Units by SubCUTAneous route three (3) times daily (with meals). 10/13/10   Elayne Rutherford MD   benazepril (LOTENSIN) 40 mg tablet Take 40 mg by mouth daily. 10/11/10   Provider, Historical   LEXAPRO 10 mg tablet Take 10 mg by mouth nightly. 10/11/10   Provider, Historical   famotidine (PEPCID) 20 mg tablet Take 20 mg by mouth two (2) times a day. 10/11/10   Provider, Historical   gabapentin (NEURONTIN) 400 mg capsule  9/3/10   Provider, Historical   hydrocodone-acetaminophen (NORCO) 5-325 mg per tablet Take 1 Tab by mouth every six (6) hours as needed. 10/11/10   Provider, Historical   levothyroxine (SYNTHROID) 150 mcg tablet Take 150 mcg by mouth daily (before breakfast). 10/11/10   Provider, Historical   lorazepam (ATIVAN) 1 mg tablet  8/2/10   Provider, Historical   lovastatin (MEVACOR) 40 mg tablet Take 40 mg by mouth nightly. 10/11/10   Provider, Historical   metoprolol (LOPRESSOR) 100 mg tablet Take 100 mg by mouth two (2) times a day. 10/11/10   Provider, Historical   oxycodone-acetaminophen (PERCOCET 10)  mg per tablet  8/2/10   Provider, Historical   insulin glargine (LANTUS) 100 unit/mL injection 60 Units by SubCUTAneous route nightly. 10/11/10   Elayne Rutherford MD     Allergies   Allergen Reactions    Nortriptyline Itching    Cymbalta [Duloxetine] Itching    Ivp [Fd And C Blue No.1] Hives    Morphine Itching    Tetracycline Itching        Review of Systems:  Intubated    Objective: Intake and Output:    No intake/output data recorded.   05/16 1901 - 05/18 0700  In: 410 [P.O.:100]  Out: 650 [Urine:650]    Physical Exam:   Visit Vitals  BP (!) 140/70 (BP Patient Position: At rest;Supine)   Pulse 95   Temp 98.1 °F (36.7 °C)   Resp 18   Ht 5' 1\" (1.549 m)   Wt 78.2 kg (172 lb 6.4 oz)   SpO2 98%   BMI 32.57 kg/m² General:   On ventilator s. Head:  Normocephalic, without obvious abnormality, atraumatic. Eyes:  Conjunctivae/corneas clear. Pupils equal, round, reactive to light. Extraocular movements intact. Lungs:    Decreased breath sounds , crackles, wheezing. Chest wall:  No tenderness or deformity. Heart:  Regular rate and rhythm, S1, S2 normal, grade 3 murmur, click, rub, or gallop. Possible murmur, difficult to auscultate with BiPAP   Abdomen:   Soft, non-tender. Bowel sounds normal. No masses. No organomegaly. Extremities: Extremities normal, atraumatic, no cyanosis. 1+ pitting edema of lower extremities    Pulses: 2+ and symmetric all extremities. Skin: Skin color, texture, turgor normal. No rashes or lesions. Lymph nodes: Cervical, supraclavicular, and axillary nodes normal.   Neurologic: CNII-XII intact. Normal strength, sensation, and reflexes throughout.        ECG:  ** Poor data quality, interpretation may be adversely affected**   Normal sinus rhythm   Right bundle branch block   Left anterior fascicular block   ** Bifascicular block **   Left ventricular hypertrophy with repolarization abnormality   Cannot rule out Septal infarct , age undetermined     Data Review:   Recent Results (from the past 24 hour(s))   EKG, 12 LEAD, INITIAL    Collection Time: 05/04/21 10:24 AM   Result Value Ref Range    Ventricular Rate 98 BPM    Atrial Rate 98 BPM    P-R Interval 168 ms    QRS Duration 152 ms    Q-T Interval 416 ms    QTC Calculation (Bezet) 531 ms    Calculated P Axis 68 degrees    Calculated R Axis -52 degrees    Calculated T Axis 94 degrees    Diagnosis       ** Poor data quality, interpretation may be adversely affected  Normal sinus rhythm  Right bundle branch block  Left anterior fascicular block  ** Bifascicular block **  Left ventricular hypertrophy with repolarization abnormality  Cannot rule out Septal infarct , age undetermined  Abnormal ECG  No previous ECGs available  Confirmed by Yvon Serna MD, Cynthia Jose Alberto ([id number]) on 5/4/2021 12:13:56 PM     SARS-COV-2    Collection Time: 05/04/21 10:30 AM   Result Value Ref Range    SARS-CoV-2 Please find results under separate order     COVID-19 RAPID TEST    Collection Time: 05/04/21 10:30 AM   Result Value Ref Range    Specimen source Nasopharyngeal      COVID-19 rapid test Not Detected Not Detected     LACTIC ACID    Collection Time: 05/04/21 11:12 AM   Result Value Ref Range    Lactic acid 0.6 0.4 - 2.0 mmol/L   MAGNESIUM    Collection Time: 05/04/21 11:12 AM   Result Value Ref Range    Magnesium 1.8 1.6 - 2.4 mg/dL   METABOLIC PANEL, COMPREHENSIVE    Collection Time: 05/04/21 11:12 AM   Result Value Ref Range    Sodium 129 (L) 136 - 145 mmol/L    Potassium 4.2 3.5 - 5.1 mmol/L    Chloride 97 97 - 108 mmol/L    CO2 28 21 - 32 mmol/L    Anion gap 4 (L) 5 - 15 mmol/L    Glucose 182 (H) 65 - 100 mg/dL    BUN 25 (H) 6 - 20 mg/dL    Creatinine 0.88 0.55 - 1.02 mg/dL    BUN/Creatinine ratio 28 (H) 12 - 20      GFR est AA >60 >60 ml/min/1.73m2    GFR est non-AA >60 >60 ml/min/1.73m2    Calcium 8.2 (L) 8.5 - 10.1 mg/dL    Bilirubin, total 0.4 0.2 - 1.0 mg/dL    AST (SGOT) 13 (L) 15 - 37 U/L    ALT (SGPT) 17 12 - 78 U/L    Alk.  phosphatase 68 45 - 117 U/L    Protein, total 6.5 6.4 - 8.2 g/dL    Albumin 3.0 (L) 3.5 - 5.0 g/dL    Globulin 3.5 2.0 - 4.0 g/dL    A-G Ratio 0.9 (L) 1.1 - 2.2     BNP    Collection Time: 05/04/21 11:12 AM   Result Value Ref Range    NT pro-BNP 7,360 (H) <450 pg/mL   PROCALCITONIN    Collection Time: 05/04/21 11:12 AM   Result Value Ref Range    Procalcitonin <0.05 (H) 0 ng/mL   TROPONIN I    Collection Time: 05/04/21 11:12 AM   Result Value Ref Range    Troponin-I, Qt. <0.05 <0.05 ng/mL   TSH 3RD GENERATION    Collection Time: 05/04/21 11:12 AM   Result Value Ref Range    TSH 1.62 0.36 - 3.74 uIU/mL   CBC WITH AUTOMATED DIFF    Collection Time: 05/04/21 11:12 AM   Result Value Ref Range    WBC 14.2 (H) 3.6 - 11.0 K/uL    RBC 3.13 (L) 3.80 - 5.20 M/uL    HGB 10.0 (L) 11.5 - 16.0 g/dL    HCT 30.8 (L) 35.0 - 47.0 %    MCV 98.4 80.0 - 99.0 FL    MCH 31.9 26.0 - 34.0 PG    MCHC 32.5 30.0 - 36.5 g/dL    RDW 13.6 11.5 - 14.5 %    PLATELET 875 016 - 391 K/uL    MPV 11.7 8.9 - 12.9 FL    NRBC 0.0 0.0  WBC    ABSOLUTE NRBC 0.00 0.00 - 0.01 K/uL    NEUTROPHILS 87 (H) 32 - 75 %    LYMPHOCYTES 6 (L) 12 - 49 %    MONOCYTES 5 5 - 13 %    EOSINOPHILS 1 0 - 7 %    BASOPHILS 1 0 - 1 %    IMMATURE GRANULOCYTES 0 0 - 0.5 %    ABS. NEUTROPHILS 12.4 (H) 1.8 - 8.0 K/UL    ABS. LYMPHOCYTES 0.8 0.8 - 3.5 K/UL    ABS. MONOCYTES 0.7 0.0 - 1.0 K/UL    ABS. EOSINOPHILS 0.1 0.0 - 0.4 K/UL    ABS. BASOPHILS 0.1 0.0 - 0.1 K/UL    ABS. IMM. GRANS. 0.1 (H) 0.00 - 0.04 K/UL    DF AUTOMATED     PROTHROMBIN TIME + INR    Collection Time: 05/04/21 11:25 AM   Result Value Ref Range    Prothrombin time 13.6 11.9 - 14.7 sec    INR 1.1 0.9 - 1.1     PTT    Collection Time: 05/04/21 11:25 AM   Result Value Ref Range    aPTT 21.8 21.2 - 34.1 sec    aPTT, therapeutic range   82 - 109 sec   D DIMER    Collection Time: 05/04/21 11:25 AM   Result Value Ref Range    D DIMER 0.50 (H) <0.50 ug/ml(FEU)   BLOOD GAS, ARTERIAL    Collection Time: 05/04/21 11:25 AM   Result Value Ref Range    pH 7.36 7.35 - 7.45      PCO2 52 (H) 35 - 45 mmHg    PO2 72 (L) 75 - 100 mmHg    O2 SAT 93 (L) >95 %    BICARBONATE 27 (H) 22 - 26 mmol/L    BASE EXCESS 2.9 (H) 0 - 2 mmol/L    O2 METHOD Nasal Cannula      O2 FLOW RATE 6 L/min    SITE Right Radial      EBEN'S TEST PASS         Chest x-ray:  More conspicuous RUL opacity. Patchy mid and lower lung reticular markings  persist. Cardiac and mediastinal structures unchanged. Thoracic aorta  atherosclerosis. No pneumothorax. Probable small dependent bilateral pleural  effusions.     Assessment:     Static post V. Fib/received shocks x2/ intubated and extubated  Static post pacemaker   CAD status post stent LAD  Severe aortic stenosis  Acute hypoxemic respiratory failure  COPD exacerbation     Mild PAD (R lower extremity)    Hyponatremia/resolved    Congestive heart failure    Pleural effusions     Anemia  7.7    Leukocytosis/resolving    Diabetes mellitus    Hypertension/resolved    Hypothyroidism    Depression     Echo done shows ejection fraction of 40 to 45% and moderate grade 2 diastolic dysfunction    Dopplers negative for DVT    Toes are blue poor pulse ordered arterial Doppler    Hypokalemia replace potassium  3.3  Plan:     Continue medications:   ASA 81mg po qday   Lipitor 10mg po qhs  Pulmicort 500mcg neb bid  Enoxaparin 40mg subq qday   Lexapro 10mg po qhs  Famotidine 20mg po bid   Lasix 40m po qday  Lantus 60 units subq qhs  Humalog subq qid   Synthroid 150mcg po qam   Prednisone 10mg po qday   Brilinta 90 mg twice a day  Discontinue Zosyn start on IV meropenem for leukocytosis  Ferrous sulfate 325 twice daily    D5 1/2 NS 50ml/hr IV     Replace potassium  Add KCl 40meq   Add ferrous sulfate    Monitor blood sugar    PT OT consult  Continue to follow with SLP, pulmonology      Patient feeling much better today possible discharge home tomorrow and follow-up with the EP clinic as an outpatient    Transfuse 1 unit of packed RBC    Discussed with the patient's son at the bedside and for skilled care rehab    Replace potassium    Start on IV meropenem ordered repeat blood cultures urine cultures and chest x-ray          Current Facility-Administered Medications:     0.9% sodium chloride infusion 250 mL, 250 mL, IntraVENous, PRN, Noé Bunch MD    metoprolol succinate (TOPROL-XL) XL tablet 25 mg, 25 mg, Oral, DAILY, Noé Bunch MD, 25 mg at 05/18/21 0917    insulin glargine (LANTUS) injection 40 Units, 40 Units, SubCUTAneous, QHS, Beny Cheung MD, 40 Units at 05/17/21 2158    dextrose 5 % - 0.45% NaCl infusion, 50 mL/hr, IntraVENous, CONTINUOUS, Beny Cheung MD, Last Rate: 50 mL/hr at 05/16/21 2040, 50 mL/hr at 05/16/21 2040    ferrous sulfate tablet 325 mg, 1 Tab, Oral, BID WITH MEALS, Beny Cheung MD, 325 mg at 05/18/21 0489    furosemide (LASIX) tablet 40 mg, 40 mg, Oral, DAILY, Beny Cheung MD, 40 mg at 05/18/21 0917    famotidine (PEPCID) tablet 20 mg, 20 mg, Per NG tube, BID, Beny Cheung MD, 20 mg at 05/18/21 0917    albuterol-ipratropium (DUO-NEB) 2.5 MG-0.5 MG/3 ML, 3 mL, Nebulization, Q6H PRN, Yee Cheung MD    EPINEPHrine (ADRENALIN) 0.1 mg/mL syringe, , , CODE BLUE, Axel Sicard, MD, 1 mg at 05/10/21 1416    propofol (DIPRIVAN) 10 mg/mL infusion, 0-50 mcg/kg/min, IntraVENous, TITRATE, Axel Sicard, MD, Last Rate: 12 mL/hr at 05/12/21 0525, 25 mcg/kg/min at 05/12/21 0525    DOPamine (INTROPIN) 800 mg in dextrose 5% 250 mL infusion, 0-20 mcg/kg/min, IntraVENous, TITRATE, Isai Mendoza MD, Last Rate: 11.2 mL/hr at 05/10/21 2200, 7.5 mcg/kg/min at 05/10/21 2200    aspirin chewable tablet 81 mg, 81 mg, Oral, DAILY, Noé Bunch MD, 81 mg at 05/18/21 0917    ticagrelor (BRILINTA) tablet 90 mg, 90 mg, Oral, Q12H, Noé Bunch MD, 90 mg at 05/18/21 0917    sodium chloride (NS) flush 5-40 mL, 5-40 mL, IntraVENous, Q8H, Noé Bunch MD, Stopped at 05/18/21 0600    sodium chloride (NS) flush 5-40 mL, 5-40 mL, IntraVENous, PRN, Noé Bunch MD    hydrOXYzine pamoate (VISTARIL) capsule 25 mg, 25 mg, Oral, Q6H PRN, Beny Cheung MD, 25 mg at 05/17/21 2158    piperacillin-tazobactam (ZOSYN) 3.375 g in 0.9% sodium chloride (MBP/ADV) 100 mL MBP, 3.375 g, IntraVENous, Q8H, Beny Cheung MD, Last Rate: 25 mL/hr at 05/18/21 0917, 3.375 g at 05/18/21 2512    escitalopram oxalate (LEXAPRO) tablet 10 mg, 10 mg, Oral, QHS, Beny Cheung MD, 10 mg at 05/17/21 2158    levothyroxine (SYNTHROID) tablet 150 mcg, 150 mcg, Oral, ACB, Beny Cheung MD, 150 mcg at 05/18/21 3926    atorvastatin (LIPITOR) tablet 10 mg, 10 mg, Oral, QHS, Beny Cheung MD, 10 mg at 05/17/21 2158    insulin lispro (HUMALOG) injection, , SubCUTAneous, AC&HS, Yee Cheung MD, Stopped at 05/18/21 0730    glucose chewable tablet 16 g, 4 Tab, Oral, PRN, Yee Cheung MD    dextrose (D50W) injection syrg 12.5-25 g, 25-50 mL, IntraVENous, PRN, Beny Cheung MD, 25 g at 05/16/21 0801    glucagon (GLUCAGEN) injection 1 mg, 1 mg, IntraMUSCular, PRN, Yee Cheung MD    acetaminophen (TYLENOL) tablet 650 mg, 650 mg, Oral, Q6H PRN, 650 mg at 05/17/21 0406 **OR** acetaminophen (TYLENOL) suppository 650 mg, 650 mg, Rectal, Q6H PRN, Yee Cheung MD    polyethylene glycol (MIRALAX) packet 17 g, 17 g, Oral, DAILY PRN, Yee Cheung MD    ondansetron (ZOFRAN ODT) tablet 4 mg, 4 mg, Oral, Q8H PRN **OR** ondansetron (ZOFRAN) injection 4 mg, 4 mg, IntraVENous, Q6H PRN, Beny Cheung MD    enoxaparin (LOVENOX) injection 40 mg, 40 mg, SubCUTAneous, DAILY, Beny Cheung MD, 40 mg at 05/18/21 0157

## 2021-05-18 NOTE — CONSULTS
Consult Date: 5/18/2021    Consults  Increasing WBC    Subjective   This is a 80year old female, diabetic with COPD, who initially presented with SOB. She was seen by Pulmonary and felt to have a severe acute exacerbation with CXR showing congestion. WBC on admission was 14,200 and since that time her WBC has been persistently elevated. She had been on Prednisone from 5/7-5/16. She has been afebrile. Initial blood cultures were negative but were repeated earlier today. She received doses of Ceftriaxone and Zosyn early in hospital course. CXR today showed blunting of the posterior costophrenic angle consistent with small pleural effusions or atelectasis. Started on Meropenem today and blood/urine cultures ordered. WBC has increased further today to 20,600. ID has been consulted for this reason. Patient lying in bed, awake and responsive. She denies any symptoms at this time. Past Medical History:   Diagnosis Date    Depression     DM (diabetes mellitus) ([address]     Hyperlipidemia     Hypertension     Pancreatitis     SOB (shortness of breath)     Thyroid disease       Past Surgical History:   Procedure Laterality Date    HX HYSTERECTOMY      IR FLUORO GUIDE PLC CVAD  5/10/2021    IR INSERT NON TUNL CVC OVER 5 YRS  5/10/2021    NH INS NEW/RPLCMT PRM PM W/TRANSV ELTRD ATRIAL&VENT N/A 5/11/2021    INSERT PPM BIV MULTI performed by Warren Lopes MD at [address] [place] [address]     History reviewed. No pertinent family history.    Social History     Tobacco Use    Smoking status: Never Smoker    Smokeless tobacco: Never Used   Substance Use Topics    Alcohol use: No       Current Facility-Administered Medications   Medication Dose Route Frequency Provider Last Rate Last Admin    meropenem (MERREM) 1 g in sterile water (preservative free) 20 mL IV syringe  1 g IntraVENous Q12H Beny Cheung MD   1 g at 05/18/21 1300    0.9% sodium chloride infusion 250 mL  250 mL IntraVENous PRN Bee Nieto MD        metoprolol succinate (TOPROL-XL) XL tablet 25 mg  25 mg Oral DAILY Noé Bunch MD   25 mg at 05/18/21 0917    insulin glargine (LANTUS) injection 40 Units  40 Units SubCUTAneous QHS Beny Cheung MD   40 Units at 05/17/21 2158    dextrose 5 % - 0.45% NaCl infusion  50 mL/hr IntraVENous CONTINUOUS Beny Cheung MD 50 mL/hr at 05/16/21 2040 50 mL/hr at 05/16/21 2040    ferrous sulfate tablet 325 mg  1 Tab Oral BID WITH MEALS Beny Cheung MD   325 mg at 05/18/21 6955    furosemide (LASIX) tablet 40 mg  40 mg Oral DAILY Beny Cheung MD   40 mg at 05/18/21 1698    famotidine (PEPCID) tablet 20 mg  20 mg Per NG tube BID Beny Cheung MD   20 mg at 05/18/21 0917    albuterol-ipratropium (DUO-NEB) 2.5 MG-0.5 MG/3 ML  3 mL Nebulization Q6H PRN Malina Cheung MD        EPINEPHrine (ADRENALIN) 0.1 mg/mL syringe    CODE Blayne Doss MD   1 mg at 05/10/21 1416    propofol (DIPRIVAN) 10 mg/mL infusion  0-50 mcg/kg/min IntraVENous TITRATE Zeyad Alcocer MD 12 mL/hr at 05/12/21 0525 25 mcg/kg/min at 05/12/21 0525    DOPamine (INTROPIN) 800 mg in dextrose 5% 250 mL infusion  0-20 mcg/kg/min IntraVENous TITRATE Isai Mendoza MD 11.2 mL/hr at 05/10/21 2200 7.5 mcg/kg/min at 05/10/21 2200    aspirin chewable tablet 81 mg  81 mg Oral DAILY Noé Bunch MD   81 mg at 05/18/21 0917    ticagrelor (BRILINTA) tablet 90 mg  90 mg Oral Q12H Noé Bunch MD   90 mg at 05/18/21 0917    sodium chloride (NS) flush 5-40 mL  5-40 mL IntraVENous Q8H Noé Bunch MD   Stopped at 05/18/21 0600    sodium chloride (NS) flush 5-40 mL  5-40 mL IntraVENous PRN Noé Bunch MD        hydrOXYzine pamoate (VISTARIL) capsule 25 mg  25 mg Oral Q6H PRN Beny Cheung MD   25 mg at 05/17/21 2158    escitalopram oxalate (LEXAPRO) tablet 10 mg  10 mg Oral QHS Beny Cheung MD   10 mg at 05/17/21 2158    levothyroxine (SYNTHROID) tablet 150 mcg  150 mcg Oral ACB Beny Cheung MD   150 mcg at 05/18/21 2986    atorvastatin (LIPITOR) tablet 10 mg  10 mg Oral QHS Beny Cheung MD   10 mg at 05/17/21 2158    insulin lispro (HUMALOG) injection   SubCUTAneous AC&HS Liam Javed MD   6 Units at 05/18/21 1300    glucose chewable tablet 16 g  4 Tab Oral PRN Molly Cheung MD        dextrose (D50W) injection syrg 12.5-25 g  25-50 mL IntraVENous PRN Molly Cheung MD   25 g at 05/16/21 0801    glucagon (GLUCAGEN) injection 1 mg  1 mg IntraMUSCular PRN Molly Cheung MD        acetaminophen (TYLENOL) tablet 650 mg  650 mg Oral Q6H PRN Molly Cheung MD   650 mg at 05/17/21 0406    Or    acetaminophen (TYLENOL) suppository 650 mg  650 mg Rectal Q6H PRN Molly Cheung MD        polyethylene glycol (MIRALAX) packet 17 g  17 g Oral DAILY PRN Molly Cheung MD        ondansetron (ZOFRAN ODT) tablet 4 mg  4 mg Oral Q8H PRN Beny Cheung MD        Or    ondansetron [facility]) injection 4 mg  4 mg IntraVENous Q6H PRN Molly Cheung MD        enoxaparin (LOVENOX) injection 40 mg  40 mg SubCUTAneous DAILY Beny Cheung MD   40 mg at 05/18/21 2870        Review of Systems   Constitutional: Negative for chills and fever. HENT: Negative. Eyes: Negative. Respiratory: Negative for cough and shortness of breath. Cardiovascular: Negative for chest pain, palpitations and leg swelling. Gastrointestinal: Negative for abdominal pain, diarrhea, nausea and vomiting. Endocrine: Negative. Genitourinary: Negative. External urinary catheter   Musculoskeletal: Negative. Skin: Negative. Allergic/Immunologic: Negative. Neurological: Negative. Hematological: Negative. Psychiatric/Behavioral: Negative.         Objective     Vital signs for last 24 hours:  Visit Vitals  /62 (BP Patient Position: At rest;Supine)   Pulse 92   Temp 97.8 °F (36.6 °C)   Resp 20   Ht 5' 1\" (1.549 m)   Wt 172 lb 6.4 oz (78.2 kg)   SpO2 100%   BMI 32.57 kg/m²       Intake/Output this shift:  Current Shift: No intake/output data recorded. Last 3 Shifts: 05/16 1901 - 05/18 0700  In: 410 [P.O.:100]  Out: 650 [Urine:650]    Data Review:   Recent Results (from the past 24 hour(s))   GLUCOSE, POC    Collection Time: 05/17/21  5:27 PM   Result Value Ref Range    Glucose (POC) 360 (H) 65 - 117 mg/dL    Performed by Amilcar Lopez    GLUCOSE, POC    Collection Time: 05/17/21  9:34 PM   Result Value Ref Range    Glucose (POC) 286 (H) 65 - 117 mg/dL    Performed by [place] Amira HERZOG    CBC WITH AUTOMATED DIFF    Collection Time: 05/18/21  7:00 AM   Result Value Ref Range    WBC 20.6 (H) 3.6 - 11.0 K/uL    RBC 3.14 (L) 3.80 - 5.20 M/uL    HGB 9.7 (L) 11.5 - 16.0 g/dL    HCT 30.1 (L) 35.0 - 47.0 %    MCV 95.9 80.0 - 99.0 FL    MCH 30.9 26.0 - 34.0 PG    MCHC 32.2 30.0 - 36.5 g/dL    RDW 16.5 (H) 11.5 - 14.5 %    PLATELET 299 929 - 560 K/uL    MPV 10.6 8.9 - 12.9 FL    NRBC 0.0 0.0  WBC    ABSOLUTE NRBC 0.00 0.00 - 0.01 K/uL    NEUTROPHILS 79 (H) 32 - 75 %    LYMPHOCYTES 9 (L) 12 - 49 %    MONOCYTES 6 5 - 13 %    EOSINOPHILS 4 0 - 7 %    BASOPHILS 1 0 - 1 %    IMMATURE GRANULOCYTES 1 (H) 0 - 0.5 %    ABS. NEUTROPHILS 16.5 (H) 1.8 - 8.0 K/UL    ABS. LYMPHOCYTES 1.8 0.8 - 3.5 K/UL    ABS. MONOCYTES 1.3 (H) 0.0 - 1.0 K/UL    ABS. EOSINOPHILS 0.7 (H) 0.0 - 0.4 K/UL    ABS. BASOPHILS 0.2 (H) 0.0 - 0.1 K/UL    ABS. IMM.  GRANS. 0.2 (H) 0.00 - 0.04 K/UL    DF AUTOMATED     METABOLIC PANEL, COMPREHENSIVE    Collection Time: 05/18/21  7:00 AM   Result Value Ref Range    Sodium 142 136 - 145 mmol/L    Potassium 4.4 3.5 - 5.1 mmol/L    Chloride 113 (H) 97 - 108 mmol/L    CO2 27 21 - 32 mmol/L    Anion gap 2 (L) 5 - 15 mmol/L    Glucose 105 (H) 65 - 100 mg/dL    BUN 31 (H) 6 - 20 mg/dL    Creatinine 1.06 (H) 0.55 - 1.02 mg/dL    BUN/Creatinine ratio 29 (H) 12 - 20      GFR est AA >60 >60 ml/min/1.73m2    GFR est non-AA 50 (L) >60 ml/min/1.73m2    Calcium 8.6 8.5 - 10.1 mg/dL    Bilirubin, total 0.6 0.2 - 1.0 mg/dL    AST (SGOT) 16 15 - 37 U/L    ALT (SGPT) 26 12 - 78 U/L    Alk. phosphatase 52 45 - 117 U/L    Protein, total 6.1 (L) 6.4 - 8.2 g/dL    Albumin 2.3 (L) 3.5 - 5.0 g/dL    Globulin 3.8 2.0 - 4.0 g/dL    A-G Ratio 0.6 (L) 1.1 - 2.2     GLUCOSE, POC    Collection Time: 05/18/21  7:37 AM   Result Value Ref Range    Glucose (POC) 110 65 - 117 mg/dL    Performed by [address], POC    Collection Time: 05/18/21 11:43 AM   Result Value Ref Range    Glucose (POC) 206 (H) 65 - 117 mg/dL    Performed by [address], POC    Collection Time: 05/18/21 12:49 PM   Result Value Ref Range    Glucose (POC) 224 (H) 65 - 117 mg/dL    Performed by Bradly Pea    COVID-19 RAPID TEST    Collection Time: 05/18/21  1:30 PM   Result Value Ref Range    Specimen source Nasopharyngeal      COVID-19 rapid test Not Detected Not Detected     GLUCOSE, POC    Collection Time: 05/18/21  4:01 PM   Result Value Ref Range    Glucose (POC) 238 (H) 65 - 117 mg/dL    Performed by Manny Gomez    CXR (5/18) Personally reviewed by me        Physical Exam  Vitals signs and nursing note reviewed. Constitutional:       Appearance: She is ill-appearing. HENT:      Head: Normocephalic and atraumatic. Nose: Nose normal.      Mouth/Throat:      Pharynx: Oropharynx is clear. Eyes:      Pupils: Pupils are equal, round, and reactive to light. Neck:      Musculoskeletal: Neck supple. Cardiovascular:      Rate and Rhythm: Normal rate and regular rhythm. Heart sounds: Murmur (Gr 2/6 TREVOR LLSB, RLSB (TTE with severe aortic stenosis)) present. Comments: Pacemaker incision left chest intact with mild erythema  Pulmonary:      Breath sounds: No wheezing, rhonchi or rales. Abdominal:      General: Abdomen is flat. Bowel sounds are normal.      Palpations: Abdomen is soft. Tenderness: There is no abdominal tenderness.    Genitourinary:     Comments: External urinary catheter with clear straw colored urine  Musculoskeletal:      Right lower leg: No edema. Left lower leg: No edema. Skin:     Findings: No rash. Neurological:      General: No focal deficit present. Mental Status: She is alert. Psychiatric:         Behavior: Behavior normal.     ASSESSMENT/PLAN    1. Leukocytosis, neutrophilic, etiology unclear. 2. COPD with exacerbation, resolving  3. Severe aortic stenosis  4. Pacemaker in situ  5. Penicillin allergy    Comment:  No obvious focus of infection by my evaluation. CXR unremarkable for pneumonia and urine appears clear though no urinalysis identified. Pacemaker site appears benign. Steroids would be most likely explanation but she has been off for two days and today WBC is increasing. That being said she remains afebrile. Will further assess with CRP and procal.    1. Reasonable to continue Meropenem (penicillin allergy) pending blood and urine cultures  2. Follow-up blood and urine cultures  3. Order urinalysis as well  4. In am, repeat CBC, check CRP and procal    Kevin Milner MD

## 2021-05-18 NOTE — PROGRESS NOTES
PHYSICAL THERAPY TREATMENT  Patient: Iza Antonio (79 y.o. female)  Date: 5/18/2021  Diagnosis: CHF (congestive heart failure) ([facility]) [I50.9]  COPD (chronic obstructive pulmonary disease) ([facility] [J44.9] <principal problem not specified>  Procedure(s) (LRB):  INSERT PPM BIV MULTI (N/A) 7 Days Post-Op  Precautions:    Chart, physical therapy assessment, plan of care and goals were reviewed. ASSESSMENT  Patient continues with skilled PT services and is progressing towards goals. Pt. Semi supine in bed upon arrival and agreeable to therapy session. Just returned from xray imaging. Overall very groggy and lethargic with todays therapy session. Tolerated supine LE TE. Able to perform partial bridge. Min a for Rolling and supine to sit transfers. Tolerated sit to stand and took three side steps to reposition in bed.  with supine to sit and sit to stand transfer. Slight bout of dizziness with sit to supine. Reports no other symptoms. Recommend DC to IRF. Current Level of Function Impacting Discharge (mobility/balance): Endurance, strength, balance, coordination    Other factors to consider for discharge: PMH         PLAN :  Patient continues to benefit from skilled intervention to address the above impairments. Continue treatment per established plan of care. to address goals. Recommendation for discharge: (in order for the patient to meet his/her long term goals)  Therapy 3 hours per day 5-7 days per week    This discharge recommendation:  Has been made in collaboration with the attending provider and/or case management    IF patient discharges home will need the following DME: [employer]       SUBJECTIVE:   Patient stated IM TIRED.     OBJECTIVE DATA SUMMARY:   Critical Behavior:  Neurologic State: Alert, Confused, Drowsy  Orientation Level: Oriented to person  Cognition: Follows commands  Safety/Judgement: Decreased awareness of environment  Functional Mobility Training:  Bed Mobility:  Rolling: Minimum assistance  Supine to Sit: Minimum assistance  Sit to Supine: Moderate assistance  Scooting: Stand-by assistance        Transfers:  Sit to Stand: Minimum assistance  Stand to Sit: Minimum assistance                             Balance:  Sitting: Intact  Sitting - Static: Good (unsupported)  Sitting - Dynamic: Good (unsupported)  Standing: Impaired;Pull to stand; With support  Standing - Static: Good;Constant support  Standing - Dynamic : Poor;Constant support  Ambulation/Gait Training:                                                        Stairs: Therapeutic Exercises:   Therapeutic Exercises:       EXERCISE   Sets   Reps   Active Active Assist   Passive Self ROM   Comments   Ankle Pumps  30 [x] [] [] []    Quad Sets/Glut Sets   [] [] [] []    Hamstring Sets   [] [] [] []    Short Arc Quads   [] [] [] []    Heel Slides  30 [x] [] [] []    Straight Leg Raises   [] [] [] []    Hip abd/add  30 [x] [] [] []    Long Arc Quads   [] [] [] []    Marching   [] [] [] []    BRIDGE  10 [x] [] [] []        Pain Ratin    Activity Tolerance:   Fair  Please refer to the flowsheet for vital signs taken during this treatment. After treatment patient left in no apparent distress:   Supine in bed    COMMUNICATION/COLLABORATION:   The patients plan of care was discussed with: Physical therapy assistant.      Piotr Hernandez PTA   Time Calculation: 27 mins

## 2021-05-19 VITALS
WEIGHT: 172.4 LBS | DIASTOLIC BLOOD PRESSURE: 54 MMHG | BODY MASS INDEX: 32.55 KG/M2 | OXYGEN SATURATION: 98 % | SYSTOLIC BLOOD PRESSURE: 102 MMHG | HEIGHT: 61 IN | TEMPERATURE: 97.5 F | HEART RATE: 94 BPM | RESPIRATION RATE: 20 BRPM

## 2021-05-19 LAB
ABO + RH BLD: NORMAL
ALBUMIN SERPL-MCNC: 2 G/DL (ref 3.5–5)
ALBUMIN/GLOB SERPL: 0.5 {RATIO} (ref 1.1–2.2)
ALP SERPL-CCNC: 50 U/L (ref 45–117)
ALT SERPL-CCNC: 21 U/L (ref 12–78)
ANION GAP SERPL CALC-SCNC: 5 MMOL/L (ref 5–15)
AST SERPL W P-5'-P-CCNC: 12 U/L (ref 15–37)
BASOPHILS # BLD: 0.2 K/UL (ref 0–0.1)
BASOPHILS NFR BLD: 1 % (ref 0–1)
BILIRUB SERPL-MCNC: 0.5 MG/DL (ref 0.2–1)
BLD PROD TYP BPU: NORMAL
BLOOD GROUP ANTIBODIES SERPL: NEGATIVE
BPU ID: NORMAL
BUN SERPL-MCNC: 31 MG/DL (ref 6–20)
BUN/CREAT SERPL: 30 (ref 12–20)
CA-I BLD-MCNC: 8.7 MG/DL (ref 8.5–10.1)
CHLORIDE SERPL-SCNC: 108 MMOL/L (ref 97–108)
CO2 SERPL-SCNC: 29 MMOL/L (ref 21–32)
CREAT SERPL-MCNC: 1.03 MG/DL (ref 0.55–1.02)
CROSSMATCH RESULT,%XM: NORMAL
CRP SERPL-MCNC: 6.83 MG/DL (ref 0–0.6)
DIFFERENTIAL METHOD BLD: ABNORMAL
EOSINOPHIL # BLD: 0.6 K/UL (ref 0–0.4)
EOSINOPHIL NFR BLD: 4 % (ref 0–7)
ERYTHROCYTE [DISTWIDTH] IN BLOOD BY AUTOMATED COUNT: 16.8 % (ref 11.5–14.5)
GLOBULIN SER CALC-MCNC: 3.9 G/DL (ref 2–4)
GLUCOSE BLD STRIP.AUTO-MCNC: 149 MG/DL (ref 65–117)
GLUCOSE BLD STRIP.AUTO-MCNC: 67 MG/DL (ref 65–117)
GLUCOSE SERPL-MCNC: 62 MG/DL (ref 65–100)
HCT VFR BLD AUTO: 29.5 % (ref 35–47)
HGB BLD-MCNC: 9.6 G/DL (ref 11.5–16)
IMM GRANULOCYTES # BLD AUTO: 0.1 K/UL (ref 0–0.04)
IMM GRANULOCYTES NFR BLD AUTO: 1 % (ref 0–0.5)
LYMPHOCYTES # BLD: 1.7 K/UL (ref 0.8–3.5)
LYMPHOCYTES NFR BLD: 12 % (ref 12–49)
MCH RBC QN AUTO: 31.4 PG (ref 26–34)
MCHC RBC AUTO-ENTMCNC: 32.5 G/DL (ref 30–36.5)
MCV RBC AUTO: 96.4 FL (ref 80–99)
MONOCYTES # BLD: 1 K/UL (ref 0–1)
MONOCYTES NFR BLD: 7 % (ref 5–13)
NEUTS SEG # BLD: 11.1 K/UL (ref 1.8–8)
NEUTS SEG NFR BLD: 75 % (ref 32–75)
NRBC # BLD: 0 K/UL (ref 0–0.01)
NRBC BLD-RTO: 0 PER 100 WBC
PERFORMED BY, TECHID: ABNORMAL
PERFORMED BY, TECHID: NORMAL
PLATELET # BLD AUTO: 363 K/UL (ref 150–400)
PMV BLD AUTO: 11 FL (ref 8.9–12.9)
POTASSIUM SERPL-SCNC: 3.6 MMOL/L (ref 3.5–5.1)
PROCALCITONIN SERPL-MCNC: <0.05 NG/ML
PROT SERPL-MCNC: 5.9 G/DL (ref 6.4–8.2)
RBC # BLD AUTO: 3.06 M/UL (ref 3.8–5.2)
SODIUM SERPL-SCNC: 142 MMOL/L (ref 136–145)
SPECIMEN EXP DATE BLD: NORMAL
STATUS OF UNIT,%ST: NORMAL
TRANSFUSION STATUS PATIENT QL: NORMAL
UNIT DIVISION, %UDIV: 0
WBC # BLD AUTO: 14.7 K/UL (ref 3.6–11)

## 2021-05-19 PROCEDURE — 74011250636 HC RX REV CODE- 250/636: Performed by: FAMILY MEDICINE

## 2021-05-19 PROCEDURE — 74011250637 HC RX REV CODE- 250/637: Performed by: FAMILY MEDICINE

## 2021-05-19 PROCEDURE — 36415 COLL VENOUS BLD VENIPUNCTURE: CPT

## 2021-05-19 PROCEDURE — 74011250637 HC RX REV CODE- 250/637: Performed by: INTERNAL MEDICINE

## 2021-05-19 PROCEDURE — 97530 THERAPEUTIC ACTIVITIES: CPT

## 2021-05-19 PROCEDURE — 82962 GLUCOSE BLOOD TEST: CPT

## 2021-05-19 PROCEDURE — 84145 PROCALCITONIN (PCT): CPT

## 2021-05-19 PROCEDURE — 85025 COMPLETE CBC W/AUTO DIFF WBC: CPT

## 2021-05-19 PROCEDURE — 99232 SBSQ HOSP IP/OBS MODERATE 35: CPT | Performed by: INTERNAL MEDICINE

## 2021-05-19 PROCEDURE — 74011000250 HC RX REV CODE- 250: Performed by: FAMILY MEDICINE

## 2021-05-19 PROCEDURE — 80053 COMPREHEN METABOLIC PANEL: CPT

## 2021-05-19 PROCEDURE — 74011636637 HC RX REV CODE- 636/637: Performed by: FAMILY MEDICINE

## 2021-05-19 PROCEDURE — 86140 C-REACTIVE PROTEIN: CPT

## 2021-05-19 RX ORDER — HYDROXYZINE PAMOATE 25 MG/1
25 CAPSULE ORAL
Qty: 30 CAPSULE | Refills: 0 | Status: SHIPPED | OUTPATIENT
Start: 2021-05-19 | End: 2021-06-02

## 2021-05-19 RX ORDER — INSULIN GLARGINE 100 [IU]/ML
INJECTION, SOLUTION SUBCUTANEOUS
Qty: 1 VIAL | Refills: 1 | Status: SHIPPED | OUTPATIENT
Start: 2021-05-19

## 2021-05-19 RX ORDER — FUROSEMIDE 40 MG/1
TABLET ORAL
Qty: 60 TABLET | Refills: 0 | Status: SHIPPED | OUTPATIENT
Start: 2021-05-20 | End: 2022-04-08

## 2021-05-19 RX ORDER — LANOLIN ALCOHOL/MO/W.PET/CERES
325 CREAM (GRAM) TOPICAL 2 TIMES DAILY WITH MEALS
Qty: 60 TABLET | Refills: 0 | Status: SHIPPED | OUTPATIENT
Start: 2021-05-19

## 2021-05-19 RX ORDER — GUAIFENESIN 100 MG/5ML
81 LIQUID (ML) ORAL DAILY
Qty: 30 TABLET | Refills: 0 | Status: SHIPPED | OUTPATIENT
Start: 2021-05-20 | End: 2022-03-29

## 2021-05-19 RX ORDER — METOPROLOL SUCCINATE 25 MG/1
25 TABLET, EXTENDED RELEASE ORAL DAILY
Qty: 30 TABLET | Refills: 0 | Status: SHIPPED | OUTPATIENT
Start: 2021-05-20

## 2021-05-19 RX ADMIN — METOPROLOL SUCCINATE 25 MG: 25 TABLET, EXTENDED RELEASE ORAL at 10:28

## 2021-05-19 RX ADMIN — INSULIN LISPRO 3 UNITS: 100 INJECTION, SOLUTION INTRAVENOUS; SUBCUTANEOUS at 12:34

## 2021-05-19 RX ADMIN — FERROUS SULFATE TAB 325 MG (65 MG ELEMENTAL FE) 325 MG: 325 (65 FE) TAB at 10:27

## 2021-05-19 RX ADMIN — MEROPENEM 1 G: 1 INJECTION, POWDER, FOR SOLUTION INTRAVENOUS at 10:27

## 2021-05-19 RX ADMIN — ACETAMINOPHEN 650 MG: 325 TABLET, FILM COATED ORAL at 10:28

## 2021-05-19 RX ADMIN — LEVOTHYROXINE SODIUM 150 MCG: 75 TABLET ORAL at 10:28

## 2021-05-19 RX ADMIN — TICAGRELOR 90 MG: 90 TABLET ORAL at 10:28

## 2021-05-19 RX ADMIN — ENOXAPARIN SODIUM 40 MG: 40 INJECTION SUBCUTANEOUS at 10:28

## 2021-05-19 RX ADMIN — FUROSEMIDE 40 MG: 40 TABLET ORAL at 10:28

## 2021-05-19 RX ADMIN — ASPIRIN 81 MG CHEWABLE TABLET 81 MG: 81 TABLET CHEWABLE at 10:27

## 2021-05-19 RX ADMIN — FAMOTIDINE 20 MG: 20 TABLET ORAL at 10:28

## 2021-05-19 NOTE — PROGRESS NOTES
Patient has received insurance auth to d/c to [facility] in [place], room 108, nurse report can be called to [phone]. CM met with patient's son to notify of dc, he is agreeable. Medicare pt has received, reviewed, and signed 2nd IM letter informing them of their right to appeal the discharge. Signed copied has been placed on pt bedside chart. Discharge checklist completed and placed on chart, nurse notified.

## 2021-05-19 NOTE — PROGRESS NOTES
OCCUPATIONAL THERAPY TREATMENT  Patient: Katie Ramirez (49 y.o. female)  Date: 5/19/2021  Diagnosis: CHF (congestive heart failure) ([facility]) [I50.9]  COPD (chronic obstructive pulmonary disease) ([facility] [J44.9] <principal problem not specified>  Procedure(s) (LRB):  INSERT PPM BIV MULTI (N/A) 8 Days Post-Op  Precautions:    Chart, occupational therapy assessment, plan of care, and goals were reviewed. ASSESSMENT  Patient continues with skilled OT services and is progressing towards goals. Pt. Received semi-supine in bed and agreeable to therapy session. Therapist assisted with total A with donning Left UE sling. Pt. Overall completed bed mobility and transfer with Min A- increased assistance needed d/t pt not able to use Left UE to assist with transfers d/t precautions. Pt. Performing toilet transfer with Min A, increased time to perform toileting and toilet hygiene while standing at RW with total A. During OOB transfer pt demonstrated increased flexed posterior while performing functional ambulation with subhash-walker requiring cues for up-right position to reduce poor posture and risk for falls. Pt agreeable to sitting in chair to eat lunch. Pt. Requires increased verbal cueing and tactile cues to complete tasks and follow commands d/t pt [place]. Pt. educated on UE therex to perform throughout the day to increased overall strength and tolerance. Pt notably Sob with activity and transfers and therapist provided education on PLB and deep breathing exercises. Pt. Left reclined in chair with needs met and call bell within reach. Pt would benefit from continued skilled OT services while at [facility] in order to increase safety and independence with self care and functional transfers/mobility. Recommend discharge to IRF when medically appropriate. Other factors to consider for discharge: PLOF, time since onset         PLAN :  Patient continues to benefit from skilled intervention to address the above impairments.   Continue treatment per established plan of care. to address goals. Recommendation for discharge: (in order for the patient to meet his/her long term goals)  Therapy 3 hours per day 5-7 days per week    This discharge recommendation:  Has been made in collaboration with the attending provider and/or case management    IF patient discharges home will need the following DME: TBD       SUBJECTIVE:   Patient stated  Ill try to go to the bathroom.     OBJECTIVE DATA SUMMARY:   Cognitive/Behavioral Status:  Neurologic State: Alert  Orientation Level: Oriented X4  Cognition: Follows commands      Functional Mobility and Transfers for ADLs:  Bed Mobility:  Supine to Sit: Minimum assistance  Scooting: Minimum assistance    Transfers:  Sit to Stand: Minimum assistance  Functional Transfers  Bathroom Mobility: Minimum assistance  Toilet Transfer : Minimum assistance       Balance:  Sitting: Intact  Sitting - Static: Good (unsupported)  Sitting - Dynamic: Good (unsupported)  Standing: Impaired;Pull to stand; With support  Standing - Static: Constant support;Good  Standing - Dynamic : Constant support; Fair    ADL Intervention:    Toileting  Toileting Assistance: Stand-by assistance  Bladder Hygiene: Total assistance (dependent)  Bowel Hygiene: Total assistance (dependent)         Therapeutic Exercises:   Pt. Educated on UE therex to perform throughout the day, pt verbalizing understanding. Pain:  0/ 10 pain     Activity Tolerance:   Fair and requires rest breaks  Please refer to the flowsheet for vital signs taken during this treatment. After treatment patient left in no apparent distress:   Sitting in chair, Heels elevated for pressure relief, and Call bell within reach    COMMUNICATION/COLLABORATION:   The patients plan of care was discussed with: Registered nurseAguilar Mackey  Time Calculation: 26 mins    Problem: Self Care Deficits Care Plan (Adult)  Goal: *Acute Goals and Plan of Care (Insert Text)  Description: Pt will be MI sup<->sit in prep for EOB ADL's  Pt will be MI  LB dressing EOB level  Pt will be Mi  sit<-> prep for toilet transfer  Pt will be MI  toilet transfer with LRAD  Pt will be MI  toileting/cloth mgmt LRAD  Pt will be MI  grooming standing sink  Pt will be MI bathing sitting/standing sink LRAD  Pt will be MI you UE HEP in prep for self care tasks      Outcome: Progressing Towards Goal

## 2021-05-19 NOTE — PROGRESS NOTES
Hospitalist Progress Note    Subjective:     Jaja Echevarria is a 79 yo female with a PMHx significant for DM, HLD, HTN, thyroid disease, and depression, who presents to the ED with shortness of breath for the past 1-2 days due to a severe acute exacerbation of COPD, acute hypoxic/hypercapnic respiratory failure, and CHF. She also has a bilateral pleural effusion (worse on left), interstitial edema (cardiogenic or infectious cause), and hyponatremia.   ___________________________________________________________    She had palpitation and heart rate went up to 1 10-1 20s seen by the cardiology    She denies any shortness of breath, chest pain, lightheaded, nausea, vomiting, or abdominal pain. WBC count up to 20,000 no fever no chills no source of infection at this time patient already on Zosyn    Gen surg consult:   Strong doppler on distal ant tibial a, post tibial a  Negative for ischemia  R common femoral a already has vasc sheath     DANIEL: mild PAD of R lower extremity (0.89)   Normal DANIEL of L lower extremity (0.92)    PT: recommends IRF     Cardiology consult:   Consider starting metoprolol today  Echo 35-40%        Past Medical History:   Diagnosis Date    Depression     DM (diabetes mellitus) ([address]     Hyperlipidemia     Hypertension     Pancreatitis     SOB (shortness of breath)     Thyroid disease       Past Surgical History:   Procedure Laterality Date    HX HYSTERECTOMY      IR FLUORO GUIDE PLC CVAD  5/10/2021    IR INSERT NON TUNL CVC OVER 5 YRS  5/10/2021    CT INS NEW/RPLCMT PRM PM W/TRANSV ELTRD ATRIAL&VENT N/A 5/11/2021    INSERT PPM BIV MULTI performed by Zia Boothe MD at [address] [place] [address]     History reviewed. No pertinent family history. Social History     Tobacco Use    Smoking status: Never Smoker    Smokeless tobacco: Never Used   Substance Use Topics    Alcohol use: No       Prior to Admission medications    Medication Sig Start Date End Date Taking?  Authorizing Provider   Insulin Needles, Disposable, 31 X 5/16 \" Ndle by Does Not Apply route. 10/11/10   Elayne Rutherford MD   insulin lispro, human, (HUMALOG KWIKPEN) 100 unit/mL flexpen 15 Units by SubCUTAneous route three (3) times daily (with meals). 10/13/10   Elayne Rutherford MD   benazepril (LOTENSIN) 40 mg tablet Take 40 mg by mouth daily. 10/11/10   Provider, Historical   LEXAPRO 10 mg tablet Take 10 mg by mouth nightly. 10/11/10   Provider, Historical   famotidine (PEPCID) 20 mg tablet Take 20 mg by mouth two (2) times a day. 10/11/10   Provider, Historical   gabapentin (NEURONTIN) 400 mg capsule  9/3/10   Provider, Historical   hydrocodone-acetaminophen (NORCO) 5-325 mg per tablet Take 1 Tab by mouth every six (6) hours as needed. 10/11/10   Provider, Historical   levothyroxine (SYNTHROID) 150 mcg tablet Take 150 mcg by mouth daily (before breakfast). 10/11/10   Provider, Historical   lorazepam (ATIVAN) 1 mg tablet  8/2/10   Provider, Historical   lovastatin (MEVACOR) 40 mg tablet Take 40 mg by mouth nightly. 10/11/10   Provider, Historical   metoprolol (LOPRESSOR) 100 mg tablet Take 100 mg by mouth two (2) times a day. 10/11/10   Provider, Historical   oxycodone-acetaminophen (PERCOCET 10)  mg per tablet  8/2/10   Provider, Historical   insulin glargine (LANTUS) 100 unit/mL injection 60 Units by SubCUTAneous route nightly. 10/11/10   Elayne Rutherford MD     Allergies   Allergen Reactions    Nortriptyline Itching    Cymbalta [Duloxetine] Itching    Ivp [Fd And C Blue No.1] Hives    Morphine Itching    Tetracycline Itching        Review of Systems:  Intubated    Objective: Intake and Output:    No intake/output data recorded.   05/17 1901 - 05/19 0700  In: 310   Out: 350 [Urine:350]    Physical Exam:   Visit Vitals  BP (!) 102/54 (BP Patient Position: At rest;Supine)   Pulse 94   Temp 97.5 °F (36.4 °C)   Resp 20   Ht 5' 1\" (1.549 m)   Wt 78.2 kg (172 lb 6.4 oz)   SpO2 98%   BMI 32.57 kg/m²     General:   On ventilator s. Head:  Normocephalic, without obvious abnormality, atraumatic. Eyes:  Conjunctivae/corneas clear. Pupils equal, round, reactive to light. Extraocular movements intact. Lungs:    Decreased breath sounds , crackles, wheezing. Chest wall:  No tenderness or deformity. Heart:  Regular rate and rhythm, S1, S2 normal, grade 3 murmur, click, rub, or gallop. Possible murmur, difficult to auscultate with BiPAP   Abdomen:   Soft, non-tender. Bowel sounds normal. No masses. No organomegaly. Extremities: Extremities normal, atraumatic, no cyanosis. 1+ pitting edema of lower extremities    Pulses: 2+ and symmetric all extremities. Skin: Skin color, texture, turgor normal. No rashes or lesions. Lymph nodes: Cervical, supraclavicular, and axillary nodes normal.   Neurologic: CNII-XII intact. Normal strength, sensation, and reflexes throughout.        ECG:  ** Poor data quality, interpretation may be adversely affected**   Normal sinus rhythm   Right bundle branch block   Left anterior fascicular block   ** Bifascicular block **   Left ventricular hypertrophy with repolarization abnormality   Cannot rule out Septal infarct , age undetermined     Data Review:   Recent Results (from the past 24 hour(s))   EKG, 12 LEAD, INITIAL    Collection Time: 05/04/21 10:24 AM   Result Value Ref Range    Ventricular Rate 98 BPM    Atrial Rate 98 BPM    P-R Interval 168 ms    QRS Duration 152 ms    Q-T Interval 416 ms    QTC Calculation (Bezet) 531 ms    Calculated P Axis 68 degrees    Calculated R Axis -52 degrees    Calculated T Axis 94 degrees    Diagnosis       ** Poor data quality, interpretation may be adversely affected  Normal sinus rhythm  Right bundle branch block  Left anterior fascicular block  ** Bifascicular block **  Left ventricular hypertrophy with repolarization abnormality  Cannot rule out Septal infarct , age undetermined  Abnormal ECG  No previous ECGs available  Confirmed by Jazmyn Beal MD, Chasity Iniguez ([id number]) on 5/4/2021 12:13:56 PM     SARS-COV-2    Collection Time: 05/04/21 10:30 AM   Result Value Ref Range    SARS-CoV-2 Please find results under separate order     COVID-19 RAPID TEST    Collection Time: 05/04/21 10:30 AM   Result Value Ref Range    Specimen source Nasopharyngeal      COVID-19 rapid test Not Detected Not Detected     LACTIC ACID    Collection Time: 05/04/21 11:12 AM   Result Value Ref Range    Lactic acid 0.6 0.4 - 2.0 mmol/L   MAGNESIUM    Collection Time: 05/04/21 11:12 AM   Result Value Ref Range    Magnesium 1.8 1.6 - 2.4 mg/dL   METABOLIC PANEL, COMPREHENSIVE    Collection Time: 05/04/21 11:12 AM   Result Value Ref Range    Sodium 129 (L) 136 - 145 mmol/L    Potassium 4.2 3.5 - 5.1 mmol/L    Chloride 97 97 - 108 mmol/L    CO2 28 21 - 32 mmol/L    Anion gap 4 (L) 5 - 15 mmol/L    Glucose 182 (H) 65 - 100 mg/dL    BUN 25 (H) 6 - 20 mg/dL    Creatinine 0.88 0.55 - 1.02 mg/dL    BUN/Creatinine ratio 28 (H) 12 - 20      GFR est AA >60 >60 ml/min/1.73m2    GFR est non-AA >60 >60 ml/min/1.73m2    Calcium 8.2 (L) 8.5 - 10.1 mg/dL    Bilirubin, total 0.4 0.2 - 1.0 mg/dL    AST (SGOT) 13 (L) 15 - 37 U/L    ALT (SGPT) 17 12 - 78 U/L    Alk.  phosphatase 68 45 - 117 U/L    Protein, total 6.5 6.4 - 8.2 g/dL    Albumin 3.0 (L) 3.5 - 5.0 g/dL    Globulin 3.5 2.0 - 4.0 g/dL    A-G Ratio 0.9 (L) 1.1 - 2.2     BNP    Collection Time: 05/04/21 11:12 AM   Result Value Ref Range    NT pro-BNP 7,360 (H) <450 pg/mL   PROCALCITONIN    Collection Time: 05/04/21 11:12 AM   Result Value Ref Range    Procalcitonin <0.05 (H) 0 ng/mL   TROPONIN I    Collection Time: 05/04/21 11:12 AM   Result Value Ref Range    Troponin-I, Qt. <0.05 <0.05 ng/mL   TSH 3RD GENERATION    Collection Time: 05/04/21 11:12 AM   Result Value Ref Range    TSH 1.62 0.36 - 3.74 uIU/mL   CBC WITH AUTOMATED DIFF    Collection Time: 05/04/21 11:12 AM   Result Value Ref Range    WBC 14.2 (H) 3.6 - 11.0 K/uL    RBC 3.13 (L) 3.80 - 5.20 M/uL    HGB 10.0 (L) 11.5 - 16.0 g/dL    HCT 30.8 (L) 35.0 - 47.0 %    MCV 98.4 80.0 - 99.0 FL    MCH 31.9 26.0 - 34.0 PG    MCHC 32.5 30.0 - 36.5 g/dL    RDW 13.6 11.5 - 14.5 %    PLATELET 870 106 - 243 K/uL    MPV 11.7 8.9 - 12.9 FL    NRBC 0.0 0.0  WBC    ABSOLUTE NRBC 0.00 0.00 - 0.01 K/uL    NEUTROPHILS 87 (H) 32 - 75 %    LYMPHOCYTES 6 (L) 12 - 49 %    MONOCYTES 5 5 - 13 %    EOSINOPHILS 1 0 - 7 %    BASOPHILS 1 0 - 1 %    IMMATURE GRANULOCYTES 0 0 - 0.5 %    ABS. NEUTROPHILS 12.4 (H) 1.8 - 8.0 K/UL    ABS. LYMPHOCYTES 0.8 0.8 - 3.5 K/UL    ABS. MONOCYTES 0.7 0.0 - 1.0 K/UL    ABS. EOSINOPHILS 0.1 0.0 - 0.4 K/UL    ABS. BASOPHILS 0.1 0.0 - 0.1 K/UL    ABS. IMM. GRANS. 0.1 (H) 0.00 - 0.04 K/UL    DF AUTOMATED     PROTHROMBIN TIME + INR    Collection Time: 05/04/21 11:25 AM   Result Value Ref Range    Prothrombin time 13.6 11.9 - 14.7 sec    INR 1.1 0.9 - 1.1     PTT    Collection Time: 05/04/21 11:25 AM   Result Value Ref Range    aPTT 21.8 21.2 - 34.1 sec    aPTT, therapeutic range   82 - 109 sec   D DIMER    Collection Time: 05/04/21 11:25 AM   Result Value Ref Range    D DIMER 0.50 (H) <0.50 ug/ml(FEU)   BLOOD GAS, ARTERIAL    Collection Time: 05/04/21 11:25 AM   Result Value Ref Range    pH 7.36 7.35 - 7.45      PCO2 52 (H) 35 - 45 mmHg    PO2 72 (L) 75 - 100 mmHg    O2 SAT 93 (L) >95 %    BICARBONATE 27 (H) 22 - 26 mmol/L    BASE EXCESS 2.9 (H) 0 - 2 mmol/L    O2 METHOD Nasal Cannula      O2 FLOW RATE 6 L/min    SITE Right Radial      EBEN'S TEST PASS         Chest x-ray:  More conspicuous RUL opacity. Patchy mid and lower lung reticular markings  persist. Cardiac and mediastinal structures unchanged. Thoracic aorta  atherosclerosis. No pneumothorax. Probable small dependent bilateral pleural  effusions.     Assessment:     Static post V. Fib/received shocks x2/ intubated and extubated  Static post pacemaker   CAD status post stent LAD  Severe aortic stenosis  Acute hypoxemic respiratory failure  COPD exacerbation Mild PAD (R lower extremity)    Hyponatremia/resolved    Congestive heart failure    Pleural effusions     Anemia  7.7    Leukocytosis/resolving    Diabetes mellitus    Hypertension/resolved    Hypothyroidism    Depression     Echo done shows ejection fraction of 40 to 45% and moderate grade 2 diastolic dysfunction    Dopplers negative for DVT    Toes are blue poor pulse ordered arterial Doppler    Hypokalemia replace potassium  3.3  Plan:     Continue medications:   ASA 81mg po qday   Lipitor 10mg po qhs  Pulmicort 500mcg neb bid  Enoxaparin 40mg subq qday   Lexapro 10mg po qhs  Famotidine 20mg po bid   Lasix 40m po qday  Lantus 60 units subq qhs  Humalog subq qid   Synthroid 150mcg po qam   Prednisone 10mg po qday   Brilinta 90 mg twice a day  Discontinue Zosyn start on IV meropenem for leukocytosis  Ferrous sulfate 325 twice daily    D5 1/2 NS 50ml/hr IV     Replace potassium  Add KCl 40meq   Add ferrous sulfate    Monitor blood sugar    PT OT consult  Continue to follow with [facility], pulmonology      Patient feeling much better today possible discharge home tomorrow and follow-up with the EP clinic as an outpatient    S/p tranfusion     Discussed with the patient's son at the bedside and for skilled care rehab    Replace potassium    Start on IV meropenem ordered repeat blood cultures urine cultures and chest x-ray    Patient cleared for discharge waiting for authorization to go to nursing home          Current Facility-Administered Medications:     meropenem (MERREM) 1 g in sterile water (preservative free) 20 mL IV syringe, 1 g, IntraVENous, Q12H, Beny Cheung MD, 1 g at 05/19/21 1027    0.9% sodium chloride infusion 250 mL, 250 mL, IntraVENous, PRN, Noé Bunch MD    metoprolol succinate (TOPROL-XL) XL tablet 25 mg, 25 mg, Oral, DAILY, Noé Bunch MD, 25 mg at 05/19/21 1028    insulin glargine (LANTUS) injection 40 Units, 40 Units, SubCUTAneous, QHS, Beny Cheung MD, 40 Units at 05/18/21 2240    dextrose 5 % - 0.45% NaCl infusion, 50 mL/hr, IntraVENous, CONTINUOUS, Beny Cheung MD, Last Rate: 50 mL/hr at 05/16/21 2040, 50 mL/hr at 05/16/21 2040    ferrous sulfate tablet 325 mg, 1 Tablet, Oral, BID WITH MEALS, Beny Cheung MD, 325 mg at 05/19/21 1027    furosemide (LASIX) tablet 40 mg, 40 mg, Oral, DAILY, Beny Cheung MD, 40 mg at 05/19/21 1028    famotidine (PEPCID) tablet 20 mg, 20 mg, Per NG tube, BID, Beny Cheung MD, 20 mg at 05/19/21 1028    albuterol-ipratropium (DUO-NEB) 2.5 MG-0.5 MG/3 ML, 3 mL, Nebulization, Q6H PRN, Yee Cheung MD    EPINEPHrine (ADRENALIN) 0.1 mg/mL syringe, , , CODE BLUE, Axel Sicard, MD, 1 mg at 05/10/21 1416    propofol (DIPRIVAN) 10 mg/mL infusion, 0-50 mcg/kg/min, IntraVENous, TITRATE, Axel Sicard, MD, Last Rate: 12 mL/hr at 05/12/21 0525, 25 mcg/kg/min at 05/12/21 0525    DOPamine (INTROPIN) 800 mg in dextrose 5% 250 mL infusion, 0-20 mcg/kg/min, IntraVENous, TITRATE, Isai Mendoza MD, Last Rate: 11.2 mL/hr at 05/10/21 2200, 7.5 mcg/kg/min at 05/10/21 2200    aspirin chewable tablet 81 mg, 81 mg, Oral, DAILY, Noé Bunch MD, 81 mg at 05/19/21 1027    ticagrelor (BRILINTA) tablet 90 mg, 90 mg, Oral, Q12H, Noé Bunch MD, 90 mg at 05/19/21 1028    sodium chloride (NS) flush 5-40 mL, 5-40 mL, IntraVENous, Q8H, Noé Bunch MD, 10 mL at 05/18/21 2200    sodium chloride (NS) flush 5-40 mL, 5-40 mL, IntraVENous, PRN, Al-Noé Otto MD    hydrOXYzine pamoate (VISTARIL) capsule 25 mg, 25 mg, Oral, Q6H PRN, Beny Cheung MD, 25 mg at 05/17/21 2158    escitalopram oxalate (LEXAPRO) tablet 10 mg, 10 mg, Oral, QHS, Beny Cheung MD, 10 mg at 05/18/21 2240    levothyroxine (SYNTHROID) tablet 150 mcg, 150 mcg, Oral, ACB, Beny Cheung MD, 150 mcg at 05/19/21 1028    atorvastatin (LIPITOR) tablet 10 mg, 10 mg, Oral, QHS, Beny Cheung MD, 10 mg at 05/18/21 2240    insulin lispro (HUMALOG) injection, , SubCUTAneous, AC&HS, Eulalia Rayo MD, 6 Units at 05/18/21 1716    glucose chewable tablet 16 g, 4 Tablet, Oral, PRN, Isa Cheung MD    dextrose (D50W) injection syrg 12.5-25 g, 25-50 mL, IntraVENous, PRN, Beny Cheung MD, 25 g at 05/16/21 0801    glucagon (GLUCAGEN) injection 1 mg, 1 mg, IntraMUSCular, PRN, Isa Cheung MD    acetaminophen (TYLENOL) tablet 650 mg, 650 mg, Oral, Q6H PRN, 650 mg at 05/19/21 1028 **OR** acetaminophen (TYLENOL) suppository 650 mg, 650 mg, Rectal, Q6H PRN, Isa Cheung MD    polyethylene glycol (MIRALAX) packet 17 g, 17 g, Oral, DAILY PRN, Isa Cheung MD    ondansetron (ZOFRAN ODT) tablet 4 mg, 4 mg, Oral, Q8H PRN **OR** ondansetron (ZOFRAN) injection 4 mg, 4 mg, IntraVENous, Q6H PRN, Beny Cheung MD    enoxaparin (LOVENOX) injection 40 mg, 40 mg, SubCUTAneous, DAILY, Beny Cheung MD, 40 mg at 05/19/21 1028

## 2021-05-19 NOTE — DISCHARGE SUMMARY
Discharge Summary       PATIENT ID: Sindhu Shanks  MRN: [id number]   YOB: 1940    DATE OF ADMISSION: 5/4/2021 10:15 AM    DATE OF DISCHARGE:   PRIMARY CARE PROVIDER: Car Cheung MD     ATTENDING PHYSICIAN: Mara Cheung  DISCHARGING PROVIDER: Mara Cheung      CONSULTATIONS: IP CONSULT TO CARDIOLOGY  IP CONSULT TO CARDIOLOGY  IP CONSULT TO PULMONOLOGY  IP CONSULT TO PULMONOLOGY  IP CONSULT TO VASCULAR SURGERY  IP CONSULT TO INFECTIOUS DISEASES    PROCEDURES/SURGERIES: Procedure(s):  INSERT PPM BIV MULTI    ADMITTING DIAGNOSES:    Patient Active Problem List    Diagnosis Date Noted    CHF (congestive heart failure) ([facility] 05/04/2021    COPD (chronic obstructive pulmonary disease) ([facility] 05/04/2021    Pancreatitis     Hypertension     Thyroid disease     DM (diabetes mellitus) ([facility]        DISCHARGE DIAGNOSES / PLAN:      Static post V. Fib/received shocks x2/ intubated and extubated  Static post pacemaker   CAD status post stent LAD  Severe aortic stenosis  Acute hypoxemic respiratory failure  COPD exacerbation      Mild PAD (R lower extremity)     Hyponatremia/resolved     Congestive heart failure     Pleural effusions      Anemia  7.7     Leukocytosis/resolving     Diabetes mellitus     Hypertension/resolved     Hypothyroidism     Depression      Echo done shows ejection fraction of 40 to 45% and moderate grade 2 diastolic dysfunction     Dopplers negative for DVT     Toes are blue poor pulse ordered arterial Doppler     Hypokalemia replace potassium  3.3  Plan:                DISCHARGE MEDICATIONS:  Current Discharge Medication List      START taking these medications    Details   aspirin 81 mg chewable tablet Take 1 Tablet by mouth daily. Qty: 30 Tablet, Refills: 0  Start date: 5/20/2021      ferrous sulfate 325 mg (65 mg iron) tablet Take 1 Tablet by mouth two (2) times daily (with meals).   Qty: 60 Tablet, Refills: 0  Start date: 5/19/2021      furosemide (LASIX) 40 mg tablet 1 tab daily  Qty: 60 Tablet, Refills: 0  Start date: 5/20/2021      hydrOXYzine pamoate (VISTARIL) 25 mg capsule Take 1 Capsule by mouth every six (6) hours as needed for Anxiety for up to 14 days. Qty: 30 Capsule, Refills: 0  Start date: 5/19/2021, End date: 6/2/2021      metoprolol succinate (TOPROL-XL) 25 mg XL tablet Take 1 Tablet by mouth daily. Qty: 30 Tablet, Refills: 0  Start date: 5/20/2021      ticagrelor (BRILINTA) 90 mg tablet Take 1 Tablet by mouth every twelve (12) hours every twelve (12) hours. Qty: 60 Tablet, Refills: 1  Start date: 5/19/2021         CONTINUE these medications which have CHANGED    Details   insulin glargine (Lantus U-100 Insulin) 100 unit/mL injection 40 unit daily  Qty: 1 Vial, Refills: 1  Start date: 5/19/2021         CONTINUE these medications which have NOT CHANGED    Details   LEXAPRO 10 mg tablet Take 10 mg by mouth nightly. Associated Diagnoses: DM (diabetes mellitus) ([facility] Type II or unspecified type diabetes mellitus without mention of complication, uncontrolled; HTN (hypertension); Hyperlipidemia      famotidine (PEPCID) 20 mg tablet Take 20 mg by mouth two (2) times a day. Associated Diagnoses: DM (diabetes mellitus) ([facility] Type II or unspecified type diabetes mellitus without mention of complication, uncontrolled; HTN (hypertension); Hyperlipidemia      levothyroxine (SYNTHROID) 150 mcg tablet Take 150 mcg by mouth daily (before breakfast). Associated Diagnoses: DM (diabetes mellitus) ([facility] Type II or unspecified type diabetes mellitus without mention of complication, uncontrolled; HTN (hypertension); Hyperlipidemia      lovastatin (MEVACOR) 40 mg tablet Take 40 mg by mouth nightly. Associated Diagnoses: DM (diabetes mellitus) ([facility] Type II or unspecified type diabetes mellitus without mention of complication, uncontrolled; HTN (hypertension);  Hyperlipidemia         STOP taking these medications       Insulin Needles, Disposable, 31 X 5/16 \" Ndle Comments:   Reason for Stopping:         insulin lispro, human, (HUMALOG KWIKPEN) 100 unit/mL flexpen Comments:   Reason for Stopping:         benazepril (LOTENSIN) 40 mg tablet Comments:   Reason for Stopping:         gabapentin (NEURONTIN) 400 mg capsule Comments:   Reason for Stopping:         hydrocodone-acetaminophen (NORCO) 5-325 mg per tablet Comments:   Reason for Stopping:         lorazepam (ATIVAN) 1 mg tablet Comments:   Reason for Stopping:         metoprolol (LOPRESSOR) 100 mg tablet Comments:   Reason for Stopping:         oxycodone-acetaminophen (PERCOCET 10)  mg per tablet Comments:   Reason for Stopping:                 NOTIFY YOUR PHYSICIAN FOR ANY OF THE FOLLOWING:   Fever over 101 degrees for 24 hours. Chest pain, shortness of breath, fever, chills, nausea, vomiting, diarrhea, change in mentation, falling, weakness, bleeding. Severe pain or pain not relieved by medications. Or, any other signs or symptoms that you may have questions about. DISPOSITION:  x  Home With:   OT  PT  HH  RN       Long term SNF/Inpatient Rehab    Independent/assisted living    Hospice    Other:       PATIENT CONDITION AT DISCHARGE: Stable      PHYSICAL EXAMINATION AT DISCHARGE:  General:          Alert, cooperative, no distress, appears stated age. HEENT:           Atraumatic, anicteric sclerae, pink conjunctivae                          No oral ulcers, mucosa moist, throat clear, dentition fair  Neck:               Supple, symmetrical  Lungs:             Clear to auscultation bilaterally. No Wheezing or Rhonchi. No rales. Chest wall:      No tenderness  No Accessory muscle use. Heart:              Regular  rhythm,  No  murmur   No edema  Abdomen:        Soft, non-tender. Not distended. Bowel sounds normal  Extremities:     No cyanosis. No clubbing,                            Skin turgor normal, Capillary refill normal  Skin:                Not pale.   Not Jaundiced  No rashes   Psych:             Not anxious or agitated. Neurologic:      Alert, moves all extremities, answers questions appropriately and responds to commands     XR CHEST PA LAT   Final Result   Blunting of the posterior costophrenic angle indicates small pleural effusions   or atelectasis. XR SWALLOW FUNC VIDEO   Final Result      XR CHEST PORT   Final Result   The cardiomediastinal silhouette is appropriate for age, technique,   and lung expansion. Pulmonary vasculature is not congested. The lungs are   essentially clear. No effusion or pneumothorax is seen. CT HEAD WO CONT   Final Result   No acute intracranial process. Sinus disease as above. Opacification of the right mastoid air cells. Other findings as above. XR CHEST PORT   Final Result      XR CHEST PORT   Final Result   New right internal jugular catheter. Correlate with function prior   to usage. No pneumothorax. Hydrostatic edema. Diffuse opacities in lungs,   increased on the right. Pleural effusions. IR INSERT NON TUNL CVC OVER 5 YRS   Final Result   Successful placement of a triple-lumen central venous catheter. The   catheter is ready for use. IR FLUORO GUIDE PLC CVAD   Final Result   Successful placement of a triple-lumen central venous catheter. The   catheter is ready for use. IR US GUIDED VASCULAR ACCESS   Final Result   Successful placement of a triple-lumen central venous catheter. The   catheter is ready for use. XR CHEST PORT   Final Result   Hydrostatic edema and diffuse opacities in the lungs, improved. Pleural effusions.       XR CHEST PORT   Final Result      XR CHEST SNGL V   Final Result           Recent Results (from the past 24 hour(s))   COVID-19 RAPID TEST    Collection Time: 05/18/21  1:30 PM   Result Value Ref Range    Specimen source Nasopharyngeal      COVID-19 rapid test Not Detected Not Detected     GLUCOSE, POC    Collection Time: 05/18/21  4:01 PM   Result Value Ref Range    Glucose (POC) 238 (H) 65 - 117 mg/dL    Performed by Chinyere Martinez W/MICROSCOPIC    Collection Time: 05/18/21  5:00 PM   Result Value Ref Range    Color Yellow/Straw      Appearance Clear Clear      Specific gravity 1.010 1.003 - 1.030      pH (UA) 5.0 5.0 - 8.0      Protein Negative Negative mg/dL    Glucose Negative Negative mg/dL    Ketone Negative Negative mg/dL    Bilirubin Negative Negative      Blood Negative Negative      Urobilinogen 0.1 0.1 - 1.0 EU/dL    Nitrites Negative Negative      Leukocyte Esterase Negative Negative      WBC 0-4 0 - 4 /hpf    RBC 0-5 0 - 5 /hpf    Bacteria Negative Negative /hpf   GLUCOSE, POC    Collection Time: 05/18/21 10:24 PM   Result Value Ref Range    Glucose (POC) 111 65 - 117 mg/dL    Performed by BOBBY ROBBINS    CBC WITH AUTOMATED DIFF    Collection Time: 05/19/21  6:45 AM   Result Value Ref Range    WBC 14.7 (H) 3.6 - 11.0 K/uL    RBC 3.06 (L) 3.80 - 5.20 M/uL    HGB 9.6 (L) 11.5 - 16.0 g/dL    HCT 29.5 (L) 35.0 - 47.0 %    MCV 96.4 80.0 - 99.0 FL    MCH 31.4 26.0 - 34.0 PG    MCHC 32.5 30.0 - 36.5 g/dL    RDW 16.8 (H) 11.5 - 14.5 %    PLATELET 784 701 - 456 K/uL    MPV 11.0 8.9 - 12.9 FL    NRBC 0.0 0.0  WBC    ABSOLUTE NRBC 0.00 0.00 - 0.01 K/uL    NEUTROPHILS 75 32 - 75 %    LYMPHOCYTES 12 12 - 49 %    MONOCYTES 7 5 - 13 %    EOSINOPHILS 4 0 - 7 %    BASOPHILS 1 0 - 1 %    IMMATURE GRANULOCYTES 1 (H) 0 - 0.5 %    ABS. NEUTROPHILS 11.1 (H) 1.8 - 8.0 K/UL    ABS. LYMPHOCYTES 1.7 0.8 - 3.5 K/UL    ABS. MONOCYTES 1.0 0.0 - 1.0 K/UL    ABS. EOSINOPHILS 0.6 (H) 0.0 - 0.4 K/UL    ABS. BASOPHILS 0.2 (H) 0.0 - 0.1 K/UL    ABS. IMM.  GRANS. 0.1 (H) 0.00 - 0.04 K/UL    DF AUTOMATED     METABOLIC PANEL, COMPREHENSIVE    Collection Time: 05/19/21  6:45 AM   Result Value Ref Range    Sodium 142 136 - 145 mmol/L    Potassium 3.6 3.5 - 5.1 mmol/L    Chloride 108 97 - 108 mmol/L    CO2 29 21 - 32 mmol/L    Anion gap 5 5 - 15 mmol/L    Glucose 62 (L) 65 - 100 mg/dL    BUN 31 (H) 6 - 20 mg/dL Creatinine 1.03 (H) 0.55 - 1.02 mg/dL    BUN/Creatinine ratio 30 (H) 12 - 20      GFR est AA >60 >60 ml/min/1.73m2    GFR est non-AA 52 (L) >60 ml/min/1.73m2    Calcium 8.7 8.5 - 10.1 mg/dL    Bilirubin, total 0.5 0.2 - 1.0 mg/dL    AST (SGOT) 12 (L) 15 - 37 U/L    ALT (SGPT) 21 12 - 78 U/L    Alk. phosphatase 50 45 - 117 U/L    Protein, total 5.9 (L) 6.4 - 8.2 g/dL    Albumin 2.0 (L) 3.5 - 5.0 g/dL    Globulin 3.9 2.0 - 4.0 g/dL    A-G Ratio 0.5 (L) 1.1 - 2.2     C REACTIVE PROTEIN, QT    Collection Time: 05/19/21  6:45 AM   Result Value Ref Range    C-Reactive protein 6.83 (H) 0.00 - 0.60 mg/dL   PROCALCITONIN    Collection Time: 05/19/21  6:45 AM   Result Value Ref Range    Procalcitonin <0.05 (H) 0 ng/mL   GLUCOSE, POC    Collection Time: 05/19/21  7:50 AM   Result Value Ref Range    Glucose (POC) 67 65 - 117 mg/dL    Performed by Stacy Tim    GLUCOSE, POC    Collection Time: 05/19/21 11:14 AM   Result Value Ref Range    Glucose (POC) 149 (H) 65 - 117 mg/dL    Performed by Alma 6 COURSE:  Naveed Varghese is a 81 yo female with a PMHx significant for DM, HLD, HTN, thyroid disease, and depression, who presents to the ED with shortness of breath for the past 1-2 days. She stated that she was told she had the \"beginnings of COPD\" and was recently given an albuterol inhaler. She was at her doctor's office and was sent to the ER and was given 5L O2 and 10mg decadron via EMS.     She states that at home she is unable to walk without shortness of breath. She can ambulate around her house if she is holding onto railings or objects. She is occasionally short of breath at rest as well. She denies orthopnea but states that she has had episodes of PND in the last few days.      She states that she has never smoked but her sons do. However, they refrain from smoking when they are near her. She also denies any alcohol or drug use.  Her previous occupation was as a  and denies any chronic exposures. She currently lives alone.      She denies any chest pain, nausea, vomiting, fever, chills, abdomen pain, recent illnesses, allergies, or sick contacts.      Her O2 sat was 93% with NC. She currently has a BiPAP machine on.     Patient was seen by the cardiology and seen by the pulmonologist during this admission    While in the hospital patient have complete heart block transferred to the ICU where patient have V. fib have shock intubated patient have a pacemaker was placed and permanent pacemaker was placed after stabilizing the patient sent to the telemetry floor    Patient is alert awake denies any chest pain shortness of breath nausea no vomiting patient have leukocytosis likely from steroids patient have a history of COPD patient has no fever no chills no cough no congestion    Temperature 97.5 pulse 94 blood pressure 102/54 oxygen saturation 98% on room air patient son at the bedside    Patient denies any chest pain shortness breath nausea vomiting diarrhea no constipation patient discharged to skilled care rehab on the following medication    Medication reconciliation done    Signed:   0104 Nilesh Garcia MD  5/19/2021  12:53 PM

## 2021-05-19 NOTE — PROGRESS NOTES
Progress Note    Patient: Lizet Mcclain MRN: [id number]  SSN: [id number]    YOB: 1940  Age: 80 y.o. Sex: female      Admit Date: 5/4/2021    LOS: 15 days     Subjective:   Patient followed for leukocytosis with no obvious focus of infection. Currently on empiric Meropenem. She remains afebrile and today her WBC is decreasing. Her procal is normal but CRP is moderately elevated. Blood cultures and urine culture are pending. Objective:     Vitals:    05/18/21 1557 05/18/21 2227 05/19/21 0400 05/19/21 0745   BP: 115/62 111/65 121/65 116/65   Pulse: 92 99 91 85   Resp: 20 20 20 20   Temp: 97.8 °F (36.6 °C) 98.4 °F (36.9 °C) 98 °F (36.7 °C) 97.8 °F (36.6 °C)   SpO2: 100% 98% 97% 96%   Weight:       Height:            Intake and Output:  Current Shift: No intake/output data recorded. Last three shifts: 05/17 1901 - 05/19 0700  In: 310   Out: 350 [Urine:350]    Physical Exam:   Vitals signs and nursing note reviewed. Constitutional:       Appearance: She is ill-appearing. HENT:      Head: Normocephalic and atraumatic. Nose: Nose normal.      Mouth/Throat:      Pharynx: Oropharynx is clear. Eyes:      Pupils: Pupils are equal, round, and reactive to light. Neck:      Musculoskeletal: Neck supple. Cardiovascular:      Rate and Rhythm: Normal rate and regular rhythm. Heart sounds: Murmur (Gr 2/6 TREVOR LLSB, RLSB (TTE with severe aortic stenosis)) present. Comments: Pacemaker incision left chest intact with mild erythema  Pulmonary:      Breath sounds: No wheezing, rhonchi or rales. Abdominal:      General: Abdomen is flat. Bowel sounds are normal.      Palpations: Abdomen is soft. Tenderness: There is no abdominal tenderness. Genitourinary:     Comments: External urinary catheter with clear straw colored urine  Musculoskeletal:      Right lower leg: No edema. Left lower leg: No edema. Skin:     Findings: No rash.    Neurological:      General: No focal deficit present. Mental Status: She is alert. Psychiatric:         Behavior: Behavior normal.      Lab/Data Review:       Assessment:     Active Problems:    CHF (congestive heart failure) ([facility]) (5/4/2021)      COPD (chronic obstructive pulmonary disease) ([facility] (5/4/2021)    1. Leukocytosis, neutrophilic, etiology unclear, ? resolving on empiric Meropenem  2. COPD with exacerbation, resolving  3. Severe aortic stenosis  4. Pacemaker in situ  5. Penicillin allergy     Comment:  Still no identified site of infection. Nonetheless WBC has decreased today; this could be related to discontinuation of Prednisone, however, elevated CRP supports underlying infection. Normal urinalysis mitigates again UTI. Plan:   1. Continue Meropenem (penicillin allergy) pending blood and urine cultures  2. Follow-up blood and urine cultures  3.  In am, repeat CBC,CRP and procal       Signed By: Ishmael Dela Cruz MD     May 19, 2021

## 2021-05-19 NOTE — PROGRESS NOTES
Progress Note    Patient: Johana Hassan MRN: [id number]  SSN: [id number]    YOB: 1940  Age: 80 y.o. Sex: female      Admit Date: 5/4/2021    LOS: 15 days     Subjective:   Patient followed for unexplained leukocytosis for which she was started on Meropenem. It appears that she is being discharged to SNF today. She is sitting up in bedside chair with no complaints. Son at the bedside. Objective:     Vitals:    05/19/21 0400 05/19/21 0745 05/19/21 1113 05/19/21 1143   BP: 121/65 116/65 (!) 102/54    Pulse: 91 85 94    Resp: 20 20 20    Temp: 98 °F (36.7 °C) 97.8 °F (36.6 °C) 97.5 °F (36.4 °C)    SpO2: 97% 96% 99% 98%   Weight:       Height:            Intake and Output:  Current Shift: No intake/output data recorded. Last three shifts: 05/17 1901 - 05/19 0700  In: 310   Out: 350 [Urine:350]    Physical Exam:    Vitals signs and nursing note reviewed. Constitutional:       Appearance: She is ill-appearing. HENT:      Head: Normocephalic and atraumatic. Nose: Nose normal.      Mouth/Throat:      Pharynx: Oropharynx is clear. Eyes:      Pupils: Pupils are equal, round, and reactive to light. Neck:      Musculoskeletal: Neck supple. Cardiovascular:      Rate and Rhythm: Normal rate and regular rhythm. Heart sounds: Murmur (Gr 2/6 TREVOR LLSB, RLSB (TTE with severe aortic stenosis)) present. Comments: Pacemaker incision left chest intact with mild erythema  Pulmonary:      Breath sounds: No wheezing, rhonchi or rales. Abdominal:      General: Abdomen is flat. Bowel sounds are normal.      Palpations: Abdomen is soft. Tenderness: There is no abdominal tenderness. Genitourinary:     Comments: External urinary catheter with clear straw colored urine  Musculoskeletal:      Right lower leg: No edema. Left lower leg: No edema. Skin:     Findings: No rash. Neurological:      General: No focal deficit present. Mental Status: She is alert.    Psychiatric: Behavior: Behavior normal.        Lab/Data Review:     WBC 14,700    Procal <0.05  CRP 6.83     Blood cultures (5/18) No growth so far  Urine culture (5/18) Pending  Assessment:     Active Problems:    CHF (congestive heart failure) ([facility]) (5/4/2021)      COPD (chronic obstructive pulmonary disease) ([facility] (5/4/2021)    1. Leukocytosis, neutrophilic, etiology unclear. 2. COPD with exacerbation, resolving  3. Severe aortic stenosis  4. Pacemaker in situ  5. Penicillin allergy     Comment:  No obvious focus of infection, however, leukocytosis and elevated CRP support this clinical diagnosis. Urinalysis normal, mitigating against UTI. Plan:   1. Reasonable to continue Meropenem (penicillin allergy) pending blood and urine cultures  2.  Follow-up blood and urine cultures       Signed By: Poornima Mo MD     May 19, 2021

## 2021-05-19 NOTE — PROGRESS NOTES
Progress Note    Patient: Jc Greer MRN: [id number]  SSN: [id number]    YOB: 1940  Age: 80 y.o. Sex: female      Admit Date: 5/4/2021    LOS: 15 days     Subjective:     No acute events overnight. Objective:     Vitals:    05/18/21 1557 05/18/21 2227 05/19/21 0400 05/19/21 0745   BP: 115/62 111/65 121/65 116/65   Pulse: 92 99 91 85   Resp: 20 20 20 20   Temp: 97.8 °F (36.6 °C) 98.4 °F (36.9 °C) 98 °F (36.7 °C) 97.8 °F (36.6 °C)   SpO2: 100% 98% 97% 96%   Weight:       Height:            Intake and Output:  Current Shift: No intake/output data recorded. Last three shifts: 05/17 1901 - 05/19 0700  In: 310   Out: 350 [Urine:350]    Physical Exam:   General:   Conscious alert oriented to time place and person   Eyes:  Conjunctivae/corneas clear. PERRL, EOMs intact. Fundi benign   Ears:  Normal TMs and external ear canals both ears. Nose: Nares normal. Septum midline. Mucosa normal. No drainage or sinus tenderness. Mouth/Throat: Lips, mucosa, and tongue normal. Teeth and gums normal.   Neck: Supple, symmetrical, trachea midline, no adenopathy, thyroid: no enlargment/tenderness/nodules, no carotid bruit and no JVD. Back:   Symmetric, no curvature. ROM normal. No CVA tenderness. Lungs:   Clear to auscultation bilaterally. Heart:   Ejection systolic murmur in the aortic area   Abdomen:   Soft, non-tender. Bowel sounds normal. No masses,  No organomegaly. Extremities: Extremities normal, atraumatic, no cyanosis or edema. Pulses: 2+ and symmetric all extremities. Skin: Skin color, texture, turgor normal. No rashes or lesions   Lymph nodes: Cervical, supraclavicular, and axillary nodes normal.   Neurologic:  Moving her upper extremities. Lab/Data Review: All lab results for the last 24 hours reviewed. Assessment:     Active Problems:    CHF (congestive heart failure) ([facility]) (5/4/2021)      COPD (chronic obstructive pulmonary disease) ([address] (5/4/2021)    Mrs. Lindsey Graft is a 72-year-old white female with:  1. Heart failure with decompensation  2. Diabetes mellitus  3. Hypertension  4. Hypothyroidism  5. Depression  6. COPD  7. Right bundle branch block with left episode  8. Hyponatremia  9. Acute kidney injury  10. Peripheral vascular disease  11. Right upper lobe opacity  12. Fracture deformity of proximal left humerus    Plan:     No acute events overnight. She feels better. She appears to be better. Okay to discharge from cardiac standpoint. Continue dual antiplatelet therapy. I will see her in 2 weeks in the office or sooner if needed. We will refer patient for evaluation for TAVR.     Signed By: Everardo Victoria MD     May 19, 2021

## 2021-05-19 NOTE — PROGRESS NOTES
PULMONARY  NOTE  [facility]    Name: Arianna Ch MRN: [id number]   : 1940 Hospital: [address] [place] [address]   Date: 2021  Admission date: 2021 Hospital Day: 16       HPI:     Hospital Problems  Never Reviewed        Codes Class Noted POA    CHF (congestive heart failure) ([facility]) ICD-10-CM: I50.9  ICD-9-CM: 428.0  2021 Unknown        COPD (chronic obstructive pulmonary disease) ([facility] ICD-10-CM: J44.9  ICD-9-CM: 496  2021 Unknown                   [x] High complexity decision making was performed  [x] See my orders for details      Subjective/Initial History:     I was asked by Ladd Holter, MD to see Arianna Ch  a 80 y.o.  female in consultation     Excerpts from admission 2021 or consult notes as follows:   45-year-old lady came in because of shortness of breath and dyspnea she was told that she has COPD recently and she was giving albuterol inhaler did not seem to help so came to the emergency room she was hypoxic she was put on oxygen 5 L nasal cannula she was having dyspnea minimal exertion and also at rest she is a lifetime non-smoker, but she has been exposed to secondhand smoke her  used to smoke and all her children smoke she used to work on the farm as a farmer no chest pain no fever no chills.        Allergies   Allergen Reactions    Nortriptyline Itching    Cymbalta [Duloxetine] Itching    Ivp [Fd And C Blue No.1] Hives    Morphine Itching    Tetracycline Itching        MAR reviewed and pertinent medications noted or modified as needed     Current Facility-Administered Medications   Medication    meropenem (MERREM) 1 g in sterile water (preservative free) 20 mL IV syringe    0.9% sodium chloride infusion 250 mL    metoprolol succinate (TOPROL-XL) XL tablet 25 mg    insulin glargine (LANTUS) injection 40 Units    dextrose 5 % - 0.45% NaCl infusion    ferrous sulfate tablet 325 mg    furosemide (LASIX) tablet 40 mg    famotidine (PEPCID) tablet 20 mg    albuterol-ipratropium (DUO-NEB) 2.5 MG-0.5 MG/3 ML    EPINEPHrine (ADRENALIN) 0.1 mg/mL syringe    propofol (DIPRIVAN) 10 mg/mL infusion    DOPamine (INTROPIN) 800 mg in dextrose 5% 250 mL infusion    aspirin chewable tablet 81 mg    ticagrelor (BRILINTA) tablet 90 mg    sodium chloride (NS) flush 5-40 mL    sodium chloride (NS) flush 5-40 mL    hydrOXYzine pamoate (VISTARIL) capsule 25 mg    escitalopram oxalate (LEXAPRO) tablet 10 mg    levothyroxine (SYNTHROID) tablet 150 mcg    atorvastatin (LIPITOR) tablet 10 mg    insulin lispro (HUMALOG) injection    glucose chewable tablet 16 g    dextrose (D50W) injection syrg 12.5-25 g    glucagon (GLUCAGEN) injection 1 mg    acetaminophen (TYLENOL) tablet 650 mg    Or    acetaminophen (TYLENOL) suppository 650 mg    polyethylene glycol (MIRALAX) packet 17 g    ondansetron (ZOFRAN ODT) tablet 4 mg    Or    ondansetron (ZOFRAN) injection 4 mg    enoxaparin (LOVENOX) injection 40 mg      Patient PCP: Tsering Salcido MD  PMH:  has a past medical history of Depression, DM (diabetes mellitus) ([address] Hyperlipidemia, Hypertension, Pancreatitis, SOB (shortness of breath), and Thyroid disease. PSH:   has a past surgical history that includes hx hysterectomy; ir fluoro guide plc cvad (5/10/2021); ir insert non tunl cvc over 5 yrs (5/10/2021); and pr ins new/rplcmt prm pm w/transv eltrd atrial&vent (N/A, 5/11/2021). FHX: family history is not on file. SHX:  reports that she has never smoked. She has never used smokeless tobacco. She reports that she does not drink alcohol and does not use drugs. ROS:    Review of Systems   Constitutional: Negative. HENT: Negative. Eyes: Negative. Respiratory: Positive for cough, sputum production, shortness of breath and wheezing. Cardiovascular: Positive for orthopnea. Gastrointestinal: Negative. Genitourinary: Negative. Musculoskeletal: Negative. Skin: Negative. Neurological: Negative. Psychiatric/Behavioral: Negative. Objective:     Vital Signs: Telemetry:    normal sinus rhythm Intake/Output:   Visit Vitals  /65 (BP Patient Position: At rest;Lying)   Pulse 85   Temp 97.8 °F (36.6 °C)   Resp 20   Ht 5' 1\" (1.549 m)   Wt 78.2 kg (172 lb 6.4 oz)   SpO2 96%   BMI 32.57 kg/m²       Temp (24hrs), Av °F (36.7 °C), Min:97.8 °F (36.6 °C), Max:98.4 °F (36.9 °C)        O2 Device: None (Room air) O2 Flow Rate (L/min): 2 l/min       Wt Readings from Last 4 Encounters:   21 78.2 kg (172 lb 6.4 oz)   10/11/10 102.2 kg (225 lb 3.2 oz)          Intake/Output Summary (Last 24 hours) at 2021 1012  Last data filed at 2021 1847  Gross per 24 hour   Intake    Output 350 ml   Net -350 ml       Last shift:      No intake/output data recorded. Last 3 shifts:  190 -  0700  In: 310   Out: 350 [Urine:350]       Physical Exam:     Physical Exam  Constitutional:       Appearance: She is well-developed. Comments: Lying in bed with no distress   HENT:      Head: Normocephalic and atraumatic. Eyes:      Conjunctiva/sclera: Conjunctivae normal.      Pupils: Pupils are equal, round, and reactive to light. Cardiovascular:      Rate and Rhythm: Normal rate and regular rhythm. Pulmonary:      Breath sounds: Normal breath sounds. Abdominal:      General: Bowel sounds are normal.      Palpations: Abdomen is soft. Musculoskeletal:         General: Normal range of motion. Cervical back: Normal range of motion and neck supple. Skin:     General: Skin is warm and dry. Neurological:      Mental Status: She is alert and oriented to person, place, and time.           Labs:    Recent Labs     21  0645 21  0700 21  0708   WBC 14.7* 20.6* 16.6*   HGB 9.6* 9.7* 7.8*    372 317     Recent Labs     21  0645 21  0700 21  0708    142 138   K 3.6 4.4 3.0*    113* 108   CO2 29 27 25   GLU 62* 105* 98   BUN 31* 31* 33*   CREA 1.03* 1.06* 1.24*   CA 8.7 8.6 8.2*   ALB 2.0* 2.3* 2.0*   ALT 21 26 29     5/16 overnight oximetry with low oxygen saturation 68% only 1 minute below 88%. 5/14 SpO2 on room air, at rest=94%. SpO2 on room air, while ambulating=87%. SpO2 on 2L , while ambulating=93%  Lab Results   Component Value Date/Time    Culture result: No growth 6 days 05/04/2021 10:30 AM     Lab Results   Component Value Date/Time    TSH 1.62 05/04/2021 11:12 AM       Imaging:    CXR Results  (Last 48 hours)               05/18/21 1419  XR CHEST PA LAT Final result    Impression:  Blunting of the posterior costophrenic angle indicates small pleural effusions   or atelectasis. Narrative:  Examination: Chest Radiograph, 2 views       Indication: Leukocytosis       Comparison: Chest Radiograph  5/14/2021       Findings:       Pacing device overlying the left chest. Hypoventilatory changes. Blunting of the   posterior costophrenic angles which can indicate small pleural effusions or   atelectasis. No pneumothorax. Stable cardiomediastinal silhouette. IMPRESSION:     1. Chronic Obstructive Pulmonary Disease she is a lifetime non-smoker's   2. Acute hypoxic and hypercapnic respiratory failure resolved  3. Patient is on Brilinta which can also cause shortness of breath  4. Complete heart block status post temporary pacemaker was placed on 5/10  5. Hyponatremia resolved  6. Hypokalemia resolved  7. congestive heart failure  8. Pleural effusion more on the left side almost entirely clear on chest x-ray 5/14  9. Right upper lobe opacity possible pneumonia resolved  10. Severe aortic stenosis  11. Symptomatic anemia      RECOMMENDATIONS/PLAN:     1. Patient was extubated on 5/12 now she is on room air overnight oximetry shows no significant desaturation last exercise oxygen did drop   2. Status post cardiac catheterization status post biventricular pacemaker insertion    3.  No wheezing will discontinue nebulized Pulmicort and oral prednisone  4. Hypokalemia   5. Chest x-ray shows congestive changes with right upper lobe opacity cleared  She is on Zosyn  6. Supplemental O2 to keep sats > 93%  7.  Patient received blood transfusion hemoglobin stable         Rambo Gonzalez MD

## 2021-05-20 LAB
BACTERIA SPEC CULT: NORMAL
SPECIAL REQUESTS,SREQ: NORMAL

## 2021-05-25 LAB
BACTERIA SPEC CULT: NORMAL
SPECIAL REQUESTS,SREQ: NORMAL

## 2021-07-30 ENCOUNTER — HOSPITAL ENCOUNTER (EMERGENCY)
Age: 81
Discharge: HOME | End: 2021-07-31
Payer: MEDICARE

## 2021-07-30 DIAGNOSIS — T78.40XA ALLERGIC REACTION, INITIAL ENCOUNTER: Primary | ICD-10-CM

## 2021-07-30 PROCEDURE — 74011636637 HC RX REV CODE- 636/637: Performed by: NURSE PRACTITIONER

## 2021-07-30 PROCEDURE — 74011250637 HC RX REV CODE- 250/637: Performed by: NURSE PRACTITIONER

## 2021-07-30 PROCEDURE — 99284 EMERGENCY DEPT VISIT MOD MDM: CPT

## 2021-07-30 RX ORDER — FAMOTIDINE 20 MG/1
20 TABLET, FILM COATED ORAL
Status: COMPLETED | OUTPATIENT
Start: 2021-07-30 | End: 2021-07-30

## 2021-07-30 RX ORDER — DIPHENHYDRAMINE HCL 25 MG
25 CAPSULE ORAL
Status: COMPLETED | OUTPATIENT
Start: 2021-07-30 | End: 2021-07-30

## 2021-07-30 RX ORDER — PREDNISONE 20 MG/1
60 TABLET ORAL ONCE
Status: COMPLETED | OUTPATIENT
Start: 2021-07-30 | End: 2021-07-30

## 2021-07-30 RX ADMIN — DIPHENHYDRAMINE HYDROCHLORIDE 25 MG: 25 CAPSULE ORAL at 23:28

## 2021-07-30 RX ADMIN — PREDNISONE 60 MG: 20 TABLET ORAL at 23:28

## 2021-07-30 RX ADMIN — FAMOTIDINE 20 MG: 20 TABLET ORAL at 23:28

## 2021-07-31 VITALS
HEART RATE: 107 BPM | RESPIRATION RATE: 18 BRPM | TEMPERATURE: 98.5 F | OXYGEN SATURATION: 98 % | SYSTOLIC BLOOD PRESSURE: 137 MMHG | BODY MASS INDEX: 31.15 KG/M2 | WEIGHT: 165 LBS | DIASTOLIC BLOOD PRESSURE: 54 MMHG | HEIGHT: 61 IN

## 2021-07-31 RX ORDER — DIPHENHYDRAMINE HCL 25 MG
25 CAPSULE ORAL
Qty: 12 CAPSULE | Refills: 0 | Status: SHIPPED | OUTPATIENT
Start: 2021-07-31 | End: 2021-08-10

## 2021-07-31 RX ORDER — FAMOTIDINE 20 MG/1
20 TABLET, FILM COATED ORAL 2 TIMES DAILY
Qty: 20 TABLET | Refills: 0 | Status: SHIPPED | OUTPATIENT
Start: 2021-07-31 | End: 2021-08-10

## 2021-07-31 RX ORDER — PREDNISONE 20 MG/1
TABLET ORAL
Qty: 18 TABLET | Refills: 0 | Status: SHIPPED | OUTPATIENT
Start: 2021-07-31 | End: 2022-04-08

## 2021-07-31 NOTE — ED PROVIDER NOTES
EMERGENCY DEPARTMENT HISTORY AND PHYSICAL EXAM      Date: 7/30/2021  Patient Name: Mary Kay Acosta    History of Presenting Illness     Chief Complaint   Patient presents with    Allergic Reaction       History Provided By: Patient    HPI: Mary Kay Acosta, 80 y.o. female with a past medical history significant diabetes, hypertension, obesity and COPD presents to the ED with cc of allergic reaction. Patient states she had a CT with contrast at [facility] today. Originally CTs dye on patient allergy list but patient received CT anyway. Patient comes in with hives all over her body. Patient denies any difficulty breathing. Moderate severity, no known exacerbating or relieving factors, no other associated signs and symptoms    There are no other complaints, changes, or physical findings at this time. PCP: Alfred Christie MD    No current facility-administered medications on file prior to encounter. Current Outpatient Medications on File Prior to Encounter   Medication Sig Dispense Refill    insulin glargine (Lantus U-100 Insulin) 100 unit/mL injection 40 unit daily 1 Vial 1    aspirin 81 mg chewable tablet Take 1 Tablet by mouth daily. 30 Tablet 0    ferrous sulfate 325 mg (65 mg iron) tablet Take 1 Tablet by mouth two (2) times daily (with meals). 60 Tablet 0    furosemide (LASIX) 40 mg tablet 1 tab daily 60 Tablet 0    metoprolol succinate (TOPROL-XL) 25 mg XL tablet Take 1 Tablet by mouth daily. 30 Tablet 0    ticagrelor (BRILINTA) 90 mg tablet Take 1 Tablet by mouth every twelve (12) hours every twelve (12) hours. 60 Tablet 1    LEXAPRO 10 mg tablet Take 10 mg by mouth nightly.  famotidine (PEPCID) 20 mg tablet Take 20 mg by mouth two (2) times a day.  levothyroxine (SYNTHROID) 150 mcg tablet Take 150 mcg by mouth daily (before breakfast).  lovastatin (MEVACOR) 40 mg tablet Take 40 mg by mouth nightly.          Past History     Past Medical History:  Past Medical History:   Diagnosis Date    Depression     DM [facility]di[facility] mellitus) [facility]     Hyperlipidemia     Hypertension     Pancreatitis     SOB (shortness of breath)     Thyroid disease        Past Surgical History:  Past Surgical History:   Procedure Laterality Date    HX HYSTERECTOMY      IR FLUORO GUIDE PLC CVAD  5/10/2021    IR INSERT NON TUNL CVC OVER 5 YRS  5/10/2021    WA INS NEW/RPLCMT PRM PM W/TRANSV ELTRD ATRIAL&VENT N/A 5/11/2021    INSERT PPM BIV MULTI performed by Corbin Loaiza MD at [address] [place] [address]       Family History:  History reviewed. No pertinent family history. Social History:  Social History     Tobacco Use    Smoking status: Never Smoker    Smokeless tobacco: Never Used   Substance Use Topics    Alcohol use: No    Drug use: No       Allergies: Allergies   Allergen Reactions    Nortriptyline Itching    Cymbalta [Duloxetine] Itching    Ivp [Fd And C Blue No.1] Hives    Morphine Itching    Tetracycline Itching         Review of Systems     Review of Systems   Constitutional: Negative for chills, fatigue and fever. HENT: Negative for congestion, sinus pressure and trouble swallowing. Eyes: Negative for photophobia and pain. Respiratory: Negative for cough and shortness of breath. Cardiovascular: Negative for chest pain and leg swelling. Gastrointestinal: Negative for abdominal pain, diarrhea, nausea and vomiting. Endocrine: Negative for polydipsia, polyphagia and polyuria. Genitourinary: Negative for decreased urine volume, difficulty urinating, dysuria, hematuria and urgency. Musculoskeletal: Negative for back pain, gait problem, myalgias and neck pain. Skin: Positive for rash. Negative for pallor. Allergic/Immunologic: Negative for environmental allergies and food allergies. Neurological: Negative for dizziness, facial asymmetry, speech difficulty, numbness and headaches. Hematological: Negative for adenopathy. Does not bruise/bleed easily.    Psychiatric/Behavioral: Negative for agitation, self-injury and suicidal ideas. The patient is not nervous/anxious. Physical Exam     Physical Exam  Vitals and nursing note reviewed. Constitutional:       Appearance: Normal appearance. HENT:      Head: Atraumatic. Right Ear: Tympanic membrane and external ear normal.      Left Ear: Tympanic membrane and external ear normal.      Nose: Nose normal.      Mouth/Throat:      Mouth: Mucous membranes are moist.   Eyes:      Extraocular Movements: Extraocular movements intact. Pupils: Pupils are equal, round, and reactive to light. Cardiovascular:      Rate and Rhythm: Normal rate and regular rhythm. Pulses: Normal pulses. Heart sounds: Normal heart sounds. Pulmonary:      Breath sounds: Normal breath sounds. Abdominal:      General: Abdomen is flat. Palpations: Abdomen is soft. Musculoskeletal:         General: Normal range of motion. Cervical back: Normal range of motion and neck supple. Skin:     General: Skin is warm and dry. Capillary Refill: Capillary refill takes less than 2 seconds. Findings: Rash present. Rash is urticarial.   Neurological:      General: No focal deficit present. Mental Status: She is alert and oriented to person, place, and time. Mental status is at baseline. Psychiatric:         Mood and Affect: Mood normal.         Behavior: Behavior normal.         Lab and Diagnostic Study Results     Labs -   No results found for this or any previous visit (from the past 12 hour(s)). Radiologic Studies -   @lastxrresult@  CT Results  (Last 48 hours)    None        CXR Results  (Last 48 hours)    None            Medical Decision Making   - I am the first provider for this patient. - I reviewed the vital signs, available nursing notes, past medical history, past surgical history, family history and social history. - Initial assessment performed.  The patients presenting problems have been discussed, and they are in agreement with the care plan formulated and outlined with them. I have encouraged them to ask questions as they arise throughout their visit. Vital Signs-Reviewed the patient's vital signs. No data found. Records Reviewed: Nursing Notes and Old Medical Records          ED Course:          Provider Notes (Medical Decision Making):   Patient presents with AllergicReaction. Differential diagnosis include food allergies, environmental allergies, medication allergies, anaphylaxis, angioedema. Patient given steroids, antihistamine, H2 blocker. Patient monitored in the ER for 2 hours. Patient states is feeling better and no further swelling or difficulty breathing. Patient will be at this time with a short course of steroids. MDM       Procedures   Medical Decision Makingedical Decision Making  Performed by: Valeri Frazier NP  PROCEDURES:  Procedures       Disposition   Disposition: DC-The patient was given verbal allergic reaction instructions    Discharged    DISCHARGE PLAN:  1. Current Discharge Medication List      CONTINUE these medications which have NOT CHANGED    Details   insulin glargine (Lantus U-100 Insulin) 100 unit/mL injection 40 unit daily  Qty: 1 Vial, Refills: 1      aspirin 81 mg chewable tablet Take 1 Tablet by mouth daily. Qty: 30 Tablet, Refills: 0      ferrous sulfate 325 mg (65 mg iron) tablet Take 1 Tablet by mouth two (2) times daily (with meals). Qty: 60 Tablet, Refills: 0      furosemide (LASIX) 40 mg tablet 1 tab daily  Qty: 60 Tablet, Refills: 0      metoprolol succinate (TOPROL-XL) 25 mg XL tablet Take 1 Tablet by mouth daily. Qty: 30 Tablet, Refills: 0      ticagrelor (BRILINTA) 90 mg tablet Take 1 Tablet by mouth every twelve (12) hours every twelve (12) hours. Qty: 60 Tablet, Refills: 1      LEXAPRO 10 mg tablet Take 10 mg by mouth nightly. Associated Diagnoses: DM (diabetes mellitus) ([facility]  Type II or unspecified type diabetes mellitus without mention of complication, uncontrolled; HTN (hypertension); Hyperlipidemia      famotidine (PEPCID) 20 mg tablet Take 20 mg by mouth two (2) times a day. Associated Diagnoses: DM (diabetes mellitus) ([facility] Type II or unspecified type diabetes mellitus without mention of complication, uncontrolled; HTN (hypertension); Hyperlipidemia      levothyroxine (SYNTHROID) 150 mcg tablet Take 150 mcg by mouth daily (before breakfast). Associated Diagnoses: DM (diabetes mellitus) ([facility] Type II or unspecified type diabetes mellitus without mention of complication, uncontrolled; HTN (hypertension); Hyperlipidemia      lovastatin (MEVACOR) 40 mg tablet Take 40 mg by mouth nightly. Associated Diagnoses: DM (diabetes mellitus) ([facility] Type II or unspecified type diabetes mellitus without mention of complication, uncontrolled; HTN (hypertension); Hyperlipidemia           2. Follow-up Information     Follow up With Specialties Details Why Earl Ramirez MD [address]  [place] [address]  [phone]          3. Return to ED if worse   4. Discharge Medication List as of 7/31/2021  1:07 AM      START taking these medications    Details   predniSONE (DELTASONE) 20 mg tablet 3 tabs for 3 days, 2 tabs for 3 days, 1 tab for 3 days, Normal, Disp-18 Tablet, R-0      diphenhydrAMINE (BenadryL) 25 mg capsule Take 1 Capsule by mouth every six (6) hours as needed for Allergies for up to 10 days. , Normal, Disp-12 Capsule, R-0      !! famotidine (Pepcid) 20 mg tablet Take 1 Tablet by mouth two (2) times a day for 10 days. , Normal, Disp-20 Tablet, R-0       !! - Potential duplicate medications found. Please discuss with provider. CONTINUE these medications which have NOT CHANGED    Details   insulin glargine (Lantus U-100 Insulin) 100 unit/mL injection 40 unit daily, Normal, Disp-1 Vial, R-1      aspirin 81 mg chewable tablet Take 1 Tablet by mouth daily. , Normal, Disp-30 Tablet, R-0 ferrous sulfate 325 mg (65 mg iron) tablet Take 1 Tablet by mouth two (2) times daily (with meals). , Normal, Disp-60 Tablet, R-0      furosemide (LASIX) 40 mg tablet 1 tab daily, Normal, Disp-60 Tablet, R-0      metoprolol succinate (TOPROL-XL) 25 mg XL tablet Take 1 Tablet by mouth daily. , Normal, Disp-30 Tablet, R-0      ticagrelor (BRILINTA) 90 mg tablet Take 1 Tablet by mouth every twelve (12) hours every twelve (12) hours. , Normal, Disp-60 Tablet, R-1      LEXAPRO 10 mg tablet 10 mg, Oral, EVERY BEDTIME Starting 10/11/2010, Until Discontinued, Historical Med      !! famotidine (PEPCID) 20 mg tablet 20 mg, Oral, 2 TIMES DAILY Starting 10/11/2010, Until Discontinued, Historical Med      levothyroxine (SYNTHROID) 150 mcg tablet 150 mcg, Oral, DAILY BEFORE BREAKFAST Starting 10/11/2010, Until Discontinued, Historical Med      lovastatin (MEVACOR) 40 mg tablet 40 mg, Oral, EVERY BEDTIME Starting 10/11/2010, Until Discontinued, Historical Med       !! - Potential duplicate medications found. Please discuss with provider. Diagnosis     Clinical Impression:   1. Allergic reaction, initial encounter        Attestations:    Too Arredondo NP    Please note that this dictation was completed with Dheere Bolo, the [employer] voice recognition software. Quite often unanticipated grammatical, syntax, homophones, and other interpretive errors are inadvertently transcribed by the computer software. Please disregard these errors. Please excuse any errors that have escaped final proofreading. Thank you.

## 2021-07-31 NOTE — ED TRIAGE NOTES
Patient arrived via EMS. Had CT with contrast today at [facility] around 1400. Patient started noticing some itching, redness, and hives. No respiratory distress. Patient took 50mg  PO benadryl before EMS arrived.

## 2022-03-19 PROBLEM — J44.9 COPD (CHRONIC OBSTRUCTIVE PULMONARY DISEASE) (HCC): Status: ACTIVE | Noted: 2021-05-04

## 2022-03-19 PROBLEM — I50.9 CHF (CONGESTIVE HEART FAILURE) (HCC): Status: ACTIVE | Noted: 2021-05-04

## 2022-03-29 ENCOUNTER — HOSPITAL ENCOUNTER (INPATIENT)
Age: 82
LOS: 10 days | Discharge: HOME HEALTH | DRG: 330 | End: 2022-04-08
Attending: EMERGENCY MEDICINE | Admitting: HOSPITALIST
Payer: MEDICARE

## 2022-03-29 ENCOUNTER — APPOINTMENT (OUTPATIENT)
Dept: CT IMAGING | Age: 82
DRG: 330 | End: 2022-03-29
Attending: EMERGENCY MEDICINE
Payer: MEDICARE

## 2022-03-29 ENCOUNTER — APPOINTMENT (OUTPATIENT)
Dept: GENERAL RADIOLOGY | Age: 82
DRG: 330 | End: 2022-03-29
Attending: EMERGENCY MEDICINE
Payer: MEDICARE

## 2022-03-29 DIAGNOSIS — K63.89 COLONIC MASS: ICD-10-CM

## 2022-03-29 DIAGNOSIS — K56.609 SBO (SMALL BOWEL OBSTRUCTION) (HCC): Primary | ICD-10-CM

## 2022-03-29 PROBLEM — I10 HTN (HYPERTENSION), MALIGNANT: Status: ACTIVE | Noted: 2022-03-29

## 2022-03-29 LAB
ALBUMIN SERPL-MCNC: 3.5 G/DL (ref 3.5–5)
ALBUMIN/GLOB SERPL: 1 {RATIO} (ref 1.1–2.2)
ALP SERPL-CCNC: 57 U/L (ref 45–117)
ALT SERPL-CCNC: 20 U/L (ref 12–78)
ANION GAP SERPL CALC-SCNC: 5 MMOL/L (ref 5–15)
APPEARANCE UR: CLEAR
AST SERPL W P-5'-P-CCNC: 34 U/L (ref 15–37)
BACTERIA URNS QL MICRO: NEGATIVE /HPF
BACTERIA URNS QL MICRO: NEGATIVE /HPF
BASOPHILS # BLD: 0.1 K/UL (ref 0–0.1)
BASOPHILS NFR BLD: 1 % (ref 0–1)
BILIRUB SERPL-MCNC: 0.6 MG/DL (ref 0.2–1)
BILIRUB UR QL: NEGATIVE
BUN SERPL-MCNC: 21 MG/DL (ref 6–20)
BUN/CREAT SERPL: 22 (ref 12–20)
CA-I BLD-MCNC: 9.5 MG/DL (ref 8.5–10.1)
CHLORIDE SERPL-SCNC: 106 MMOL/L (ref 97–108)
CO2 SERPL-SCNC: 28 MMOL/L (ref 21–32)
COLOR UR: ABNORMAL
CREAT SERPL-MCNC: 0.97 MG/DL (ref 0.55–1.02)
DIFFERENTIAL METHOD BLD: ABNORMAL
EOSINOPHIL # BLD: 0.3 K/UL (ref 0–0.4)
EOSINOPHIL NFR BLD: 3 % (ref 0–7)
ERYTHROCYTE [DISTWIDTH] IN BLOOD BY AUTOMATED COUNT: 13.7 % (ref 11.5–14.5)
GLOBULIN SER CALC-MCNC: 3.5 G/DL (ref 2–4)
GLUCOSE SERPL-MCNC: 161 MG/DL (ref 65–100)
GLUCOSE UR STRIP.AUTO-MCNC: NEGATIVE MG/DL
HCT VFR BLD AUTO: 35.3 % (ref 35–47)
HGB BLD-MCNC: 11.4 G/DL (ref 11.5–16)
HGB UR QL STRIP: NEGATIVE
IMM GRANULOCYTES # BLD AUTO: 0 K/UL (ref 0–0.04)
IMM GRANULOCYTES NFR BLD AUTO: 0 % (ref 0–0.5)
KETONES UR QL STRIP.AUTO: NEGATIVE MG/DL
LEUKOCYTE ESTERASE UR QL STRIP.AUTO: NEGATIVE
LIPASE SERPL-CCNC: 77 U/L (ref 73–393)
LYMPHOCYTES # BLD: 1.5 K/UL (ref 0.8–3.5)
LYMPHOCYTES NFR BLD: 12 % (ref 12–49)
MCH RBC QN AUTO: 31.7 PG (ref 26–34)
MCHC RBC AUTO-ENTMCNC: 32.3 G/DL (ref 30–36.5)
MCV RBC AUTO: 98.1 FL (ref 80–99)
MONOCYTES # BLD: 0.8 K/UL (ref 0–1)
MONOCYTES NFR BLD: 7 % (ref 5–13)
MRSA DNA SPEC QL NAA+PROBE: NOT DETECTED
MUCOUS THREADS URNS QL MICRO: NEGATIVE /LPF
NEUTS SEG # BLD: 9.3 K/UL (ref 1.8–8)
NEUTS SEG NFR BLD: 77 % (ref 32–75)
NITRITE UR QL STRIP.AUTO: POSITIVE
NRBC # BLD: 0 K/UL (ref 0–0.01)
NRBC BLD-RTO: 0 PER 100 WBC
PH UR STRIP: 8 [PH] (ref 5–8)
PLATELET # BLD AUTO: 350 K/UL (ref 150–400)
PMV BLD AUTO: 11.4 FL (ref 8.9–12.9)
POTASSIUM SERPL-SCNC: 4.2 MMOL/L (ref 3.5–5.1)
PROT SERPL-MCNC: 7 G/DL (ref 6.4–8.2)
PROT UR STRIP-MCNC: NEGATIVE MG/DL
RBC # BLD AUTO: 3.6 M/UL (ref 3.8–5.2)
RBC #/AREA URNS HPF: ABNORMAL /HPF (ref 0–5)
RBC #/AREA URNS HPF: NORMAL /HPF (ref 0–5)
SODIUM SERPL-SCNC: 139 MMOL/L (ref 136–145)
SP GR UR REFRACTOMETRY: 1.01 (ref 1–1.03)
TROPONIN-HIGH SENSITIVITY: 25 NG/L (ref 0–51)
UROBILINOGEN UR QL STRIP.AUTO: 0.1 EU/DL (ref 0.1–1)
WBC # BLD AUTO: 12.1 K/UL (ref 3.6–11)
WBC URNS QL MICRO: ABNORMAL /HPF (ref 0–4)
WBC URNS QL MICRO: NORMAL /HPF (ref 0–4)

## 2022-03-29 PROCEDURE — 74176 CT ABD & PELVIS W/O CONTRAST: CPT

## 2022-03-29 PROCEDURE — 71045 X-RAY EXAM CHEST 1 VIEW: CPT

## 2022-03-29 PROCEDURE — 74011000250 HC RX REV CODE- 250: Performed by: EMERGENCY MEDICINE

## 2022-03-29 PROCEDURE — 36415 COLL VENOUS BLD VENIPUNCTURE: CPT

## 2022-03-29 PROCEDURE — 96374 THER/PROPH/DIAG INJ IV PUSH: CPT

## 2022-03-29 PROCEDURE — 99221 1ST HOSP IP/OBS SF/LOW 40: CPT | Performed by: COLON & RECTAL SURGERY

## 2022-03-29 PROCEDURE — 87086 URINE CULTURE/COLONY COUNT: CPT

## 2022-03-29 PROCEDURE — 65270000029 HC RM PRIVATE

## 2022-03-29 PROCEDURE — 85025 COMPLETE CBC W/AUTO DIFF WBC: CPT

## 2022-03-29 PROCEDURE — 80053 COMPREHEN METABOLIC PANEL: CPT

## 2022-03-29 PROCEDURE — 93005 ELECTROCARDIOGRAM TRACING: CPT

## 2022-03-29 PROCEDURE — 74011250636 HC RX REV CODE- 250/636: Performed by: INTERNAL MEDICINE

## 2022-03-29 PROCEDURE — 99285 EMERGENCY DEPT VISIT HI MDM: CPT

## 2022-03-29 PROCEDURE — 81001 URINALYSIS AUTO W/SCOPE: CPT

## 2022-03-29 PROCEDURE — 83690 ASSAY OF LIPASE: CPT

## 2022-03-29 PROCEDURE — 87641 MR-STAPH DNA AMP PROBE: CPT

## 2022-03-29 PROCEDURE — 84484 ASSAY OF TROPONIN QUANT: CPT

## 2022-03-29 PROCEDURE — 74011250636 HC RX REV CODE- 250/636: Performed by: EMERGENCY MEDICINE

## 2022-03-29 PROCEDURE — 96375 TX/PRO/DX INJ NEW DRUG ADDON: CPT

## 2022-03-29 RX ORDER — ACETAMINOPHEN 325 MG/1
325 TABLET ORAL AS NEEDED
COMMUNITY

## 2022-03-29 RX ORDER — ASPIRIN 81 MG/1
81 TABLET ORAL DAILY
COMMUNITY

## 2022-03-29 RX ORDER — CLOPIDOGREL BISULFATE 75 MG/1
75 TABLET ORAL DAILY
COMMUNITY

## 2022-03-29 RX ORDER — HYDROMORPHONE HYDROCHLORIDE 1 MG/ML
0.5 INJECTION, SOLUTION INTRAMUSCULAR; INTRAVENOUS; SUBCUTANEOUS
Status: DISPENSED | OUTPATIENT
Start: 2022-03-29 | End: 2022-04-01

## 2022-03-29 RX ORDER — LANOLIN ALCOHOL/MO/W.PET/CERES
1000 CREAM (GRAM) TOPICAL DAILY
COMMUNITY

## 2022-03-29 RX ORDER — HYDROMORPHONE HYDROCHLORIDE 1 MG/ML
0.5 INJECTION, SOLUTION INTRAMUSCULAR; INTRAVENOUS; SUBCUTANEOUS ONCE
Status: COMPLETED | OUTPATIENT
Start: 2022-03-29 | End: 2022-03-29

## 2022-03-29 RX ORDER — DULOXETIN HYDROCHLORIDE 30 MG/1
30 CAPSULE, DELAYED RELEASE ORAL DAILY
COMMUNITY

## 2022-03-29 RX ORDER — DIPHENHYDRAMINE HCL 25 MG
25 CAPSULE ORAL AS NEEDED
COMMUNITY

## 2022-03-29 RX ORDER — ONDANSETRON 2 MG/ML
4 INJECTION INTRAMUSCULAR; INTRAVENOUS
Status: COMPLETED | OUTPATIENT
Start: 2022-03-29 | End: 2022-03-29

## 2022-03-29 RX ADMIN — HYDROMORPHONE HYDROCHLORIDE 0.5 MG: 1 INJECTION, SOLUTION INTRAMUSCULAR; INTRAVENOUS; SUBCUTANEOUS at 16:37

## 2022-03-29 RX ADMIN — HYDROMORPHONE HYDROCHLORIDE 0.5 MG: 1 INJECTION, SOLUTION INTRAMUSCULAR; INTRAVENOUS; SUBCUTANEOUS at 09:17

## 2022-03-29 RX ADMIN — ONDANSETRON 4 MG: 2 INJECTION INTRAMUSCULAR; INTRAVENOUS at 09:16

## 2022-03-29 RX ADMIN — WATER 1 G: 1 INJECTION INTRAMUSCULAR; INTRAVENOUS; SUBCUTANEOUS at 10:47

## 2022-03-29 RX ADMIN — SODIUM CHLORIDE 1000 ML: 9 INJECTION, SOLUTION INTRAVENOUS at 09:17

## 2022-03-29 NOTE — MED STUDENT NOTES
History & Physical    Primary Care Provider: Yany Lopez MD  Source of Information: Patient/family     History of Presenting Illness:   González Nagy is a [de-identified]  female who presents to the ED today with chief complaint of right-sided lower abdominal pain. Patient states that the pain started yesterday morning and progressively worsened throughout the night. She has never experienced pain like this before she states that in the morning her pain was a 5 out of 10 and in the evening it progressed to it 8 or 9 out of 10. She describes the pain as sharp and it does not radiate to any other part of the abdomen or body. She admits to right-sided abdominal pain. She denies headache, blurry vision, chest pain, palpitations, shortness of breath, nausea, vomiting, diarrhea, constipation, urinary frequency, urinary urgency, or any other subjective symptoms. She has a past medical history of diabetes, hypertension, hyperlipidemia, COPD, obesity. She states that she has a pacemaker, AICD. In addition she states that she has cardiac stents that were placed last year as well as aortic valve replacement due to aortic stenosis her CBC was remarkable for mild leukocytosis with a white blood cell count of 12.1 chemistries were unremarkable urinalysis was positive for nitrites negative for leukocyte esterase. CT without contrast shows the following; 1. Abnormal masslike thickening within the ascending colon highly concerning for malignancy with suspected lymphovascular invasion. Tumor results in low-grade obstruction. 2.  Trace ascites within the right abdomen as well as liver fibrosis and cholelithiasis. Additionally, patient had a blood pressure of 205/82 on presentation and was started on a nicardipine drip by the ED physician. Most recent blood pressure down to 157/64       Review of Systems:  Review of Systems   Constitutional: Negative. Eyes: Negative. Respiratory: Negative. Cardiovascular: Negative. Gastrointestinal: Negative. Genitourinary: Negative. Neurological: Negative. Past Medical History:   Diagnosis Date    Depression     DM (diabetes mellitus) ([facility] [address]     Hyperlipidemia     Hypertension     Pancreatitis     Thyroid disease         Past Surgical History:   Procedure Laterality Date    HX AORTIC VALVE REPLACEMENT      HX HYSTERECTOMY      IR FLUORO GUIDE PLC CVAD  5/10/2021    IR INSERT NON TUNL CVC OVER 5 YRS  5/10/2021    DC INS NEW/RPLCMT PRM PM W/TRANSV ELTRD ATRIAL&VENT N/A 5/11/2021    INSERT PPM BIV MULTI performed by Ruiz Ortega MD at [address] [place] [address]       Prior to Admission medications    Medication Sig Start Date End Date Taking? Authorizing Provider   predniSONE (DELTASONE) 20 mg tablet 3 tabs for 3 days, 2 tabs for 3 days, 1 tab for 3 days 7/31/21   Lila Henry NP   insulin glargine (Lantus U-100 Insulin) 100 unit/mL injection 40 unit daily 5/19/21   Suri Last MD   aspirin 81 mg chewable tablet Take 1 Tablet by mouth daily. 5/20/21   Monique Cheung MD   ferrous sulfate 325 mg (65 mg iron) tablet Take 1 Tablet by mouth two (2) times daily (with meals). 5/19/21   Monique Cheung MD   furosemide (LASIX) 40 mg tablet 1 tab daily 5/20/21   Monique Cheung MD   metoprolol succinate (TOPROL-XL) 25 mg XL tablet Take 1 Tablet by mouth daily. 5/20/21   Monique Cheung MD   ticagrelor (BRILINTA) 90 mg tablet Take 1 Tablet by mouth every twelve (12) hours every twelve (12) hours. 5/19/21   Monique Cheung MD   LEXAPRO 10 mg tablet Take 10 mg by mouth nightly. 10/11/10   Provider, Historical   famotidine (PEPCID) 20 mg tablet Take 20 mg by mouth two (2) times a day. 10/11/10   Provider, Historical   levothyroxine (SYNTHROID) 150 mcg tablet Take 150 mcg by mouth daily (before breakfast). 10/11/10   Provider, Historical   lovastatin (MEVACOR) 40 mg tablet Take 40 mg by mouth nightly.  10/11/10 Provider, Historical       Allergies   Allergen Reactions    Nortriptyline Itching    Contrast Agent [Iodine] Unknown (comments)    Cymbalta [Duloxetine] Itching    Ivp [Fd And C Blue No.1] Hives    Morphine Itching    Tetracycline Itching        History reviewed. No pertinent family history. Social History     Socioeconomic History    Marital status:    Tobacco Use    Smoking status: Never Smoker    Smokeless tobacco: Never Used   Substance and Sexual Activity    Alcohol use: No    Drug use: No            CODE STATUS:  DNR    Full x   Other      Objective:     Physical Exam:     Visit Vitals  BP (!) 157/64 (BP Patient Position: At rest)   Pulse 77   Temp 98.4 °F (36.9 °C)   Resp 18   Ht 5' 1\" (1.549 m)   Wt 74.8 kg (165 lb)   SpO2 99%   BMI 31.18 kg/m²      O2 Device: None    General:  Alert, cooperative, no distress, appears stated age. Head:  Normocephalic, without obvious abnormality, atraumatic. Eyes:  Conjunctivae/corneas clear. PERRL, EOMs intact. Nose: Nares normal. Septum midline. Mucosa normal. No drainage or sinus tenderness. Throat: Lips, mucosa, and tongue normal. Teeth and gums normal.   Neck: Supple, symmetrical, trachea midline, no adenopathy, thyroid: no enlargement/tenderness/nodules, no carotid bruit and no JVD. Back:   Symmetric, no curvature. ROM normal. No CVA tenderness. Lungs:   Clear to auscultation bilaterally. Chest wall:  No tenderness or deformity. Heart:  Regular rate and rhythm, S1, S2 normal, no murmur, click, rub or gallop. Abdomen:    Upon light palpation of the abdomen there was marked tenderness in the right lower quadrant. Extremities: Extremities normal, atraumatic, no cyanosis or edema. Pulses: 2+ and symmetric all extremities. Skin: Skin color, texture, turgor normal. No rashes or lesions   Neurologic: CNII-XII intact. No motor or sensory deficits.         24 Hour Results:    Recent Results (from the past 24 hour(s))   CBC WITH AUTOMATED DIFF    Collection Time: 03/29/22  9:00 AM   Result Value Ref Range    WBC 12.1 (H) 3.6 - 11.0 K/uL    RBC 3.60 (L) 3.80 - 5.20 M/uL    HGB 11.4 (L) 11.5 - 16.0 g/dL    HCT 35.3 35.0 - 47.0 %    MCV 98.1 80.0 - 99.0 FL    MCH 31.7 26.0 - 34.0 PG    MCHC 32.3 30.0 - 36.5 g/dL    RDW 13.7 11.5 - 14.5 %    PLATELET 749 513 - 424 K/uL    MPV 11.4 8.9 - 12.9 FL    NRBC 0.0 0.0  WBC    ABSOLUTE NRBC 0.00 0.00 - 0.01 K/uL    NEUTROPHILS 77 (H) 32 - 75 %    LYMPHOCYTES 12 12 - 49 %    MONOCYTES 7 5 - 13 %    EOSINOPHILS 3 0 - 7 %    BASOPHILS 1 0 - 1 %    IMMATURE GRANULOCYTES 0 0 - 0.5 %    ABS. NEUTROPHILS 9.3 (H) 1.8 - 8.0 K/UL    ABS. LYMPHOCYTES 1.5 0.8 - 3.5 K/UL    ABS. MONOCYTES 0.8 0.0 - 1.0 K/UL    ABS. EOSINOPHILS 0.3 0.0 - 0.4 K/UL    ABS. BASOPHILS 0.1 0.0 - 0.1 K/UL    ABS. IMM. GRANS. 0.0 0.00 - 0.04 K/UL    DF AUTOMATED     METABOLIC PANEL, COMPREHENSIVE    Collection Time: 03/29/22  9:00 AM   Result Value Ref Range    Sodium 139 136 - 145 mmol/L    Potassium 4.2 3.5 - 5.1 mmol/L    Chloride 106 97 - 108 mmol/L    CO2 28 21 - 32 mmol/L    Anion gap 5 5 - 15 mmol/L    Glucose 161 (H) 65 - 100 mg/dL    BUN 21 (H) 6 - 20 mg/dL    Creatinine 0.97 0.55 - 1.02 mg/dL    BUN/Creatinine ratio 22 (H) 12 - 20      GFR est AA >60 >60 ml/min/1.73m2    GFR est non-AA 55 (L) >60 ml/min/1.73m2    Calcium 9.5 8.5 - 10.1 mg/dL    Bilirubin, total 0.6 0.2 - 1.0 mg/dL    AST (SGOT) 34 15 - 37 U/L    ALT (SGPT) 20 12 - 78 U/L    Alk.  phosphatase 57 45 - 117 U/L    Protein, total 7.0 6.4 - 8.2 g/dL    Albumin 3.5 3.5 - 5.0 g/dL    Globulin 3.5 2.0 - 4.0 g/dL    A-G Ratio 1.0 (L) 1.1 - 2.2     TROPONIN-HIGH SENSITIVITY    Collection Time: 03/29/22  9:00 AM   Result Value Ref Range    Troponin-High Sensitivity 25 0 - 51 ng/L   LIPASE    Collection Time: 03/29/22  9:00 AM   Result Value Ref Range    Lipase 77 73 - 393 U/L   URINALYSIS W/ RFLX MICROSCOPIC    Collection Time: 03/29/22  9:00 AM   Result Value Ref Range Color Yellow/Straw      Appearance Clear Clear      Specific gravity 1.011 1.003 - 1.030      pH (UA) 8.0 5.0 - 8.0      Protein Negative Negative mg/dL    Glucose Negative Negative mg/dL    Ketone Negative Negative mg/dL    Bilirubin Negative Negative      Blood Negative Negative      Urobilinogen 0.1 0.1 - 1.0 EU/dL    Nitrites Positive (A) Negative      Leukocyte Esterase Negative Negative      WBC 5-10 0 - 4 /hpf    RBC 0-5 0 - 5 /hpf    Bacteria Negative Negative /hpf   URINE MICROSCOPIC    Collection Time: 03/29/22  9:00 AM   Result Value Ref Range    WBC 5-10 0 - 4 /hpf    RBC 0-5 0 - 5 /hpf    Bacteria Negative Negative /hpf    Mucus Negative Negative /lpf         Imaging:   CT ABD PELV WO CONT   Final Result   1. Abnormal masslike thickening within the ascending colon highly concerning for   malignancy with suspected lymphovascular invasion. Tumor results in low-grade   obstruction. 2. Trace ascites within the right abdomen. 3. Subtle morphologic changes suggestive of hepatic fibrosis/early cirrhosis. 4. Cholelithiasis.       Notification: Findings were discussed with Dr. 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 Timmy Byrnes, will follow his recommendation regards to surgery  -Due to patient's past extensive cardiac history he will need to be evaluated by cardiology.  -Discontinue Brilinta, keep patient on clear liquid diet for the next 4 to 5 days  -Pain and nausea control    Pulmonology has also been consulted to assist with preoperative medical evaluation    -Continue ceftriaxone for UTI    we will continue to monitor patient's progress    Her CODE STATUS is full.   Her son is DURABLE POWER OF       Signed By: Bella Caceres MD     March 29, 2022

## 2022-03-29 NOTE — ACP (ADVANCE CARE PLANNING)
Advance Care Planning   Healthcare Decision Maker:       Primary Decision Maker: Long Obrien - [phone]

## 2022-03-29 NOTE — Clinical Note
Status[de-identified] INPATIENT [101]   Type of Bed: Surgical [18]   Inpatient Hospitalization Certified Necessary for the Following Reasons: 3.  Patient receiving treatment that can only be provided in an inpatient setting (further clarification in H&P documentation)   Admitting Diagnosis: Colonic mass [[id number]]   Admitting Physician: Shobha Gomez [[id number]]   Attending Physician: Shobha Gomez [[id number]]   Estimated Length of Stay: 3-4 Midnights   Discharge Plan[de-identified] Extended Care Facility (e.g. Adult Home, Nursing Home, etc.)

## 2022-03-29 NOTE — PROGRESS NOTES
Reason for Admission:  SBO                     RUR Score:  9%                  Plan for utilizing home health:  nonee @ this time/uses cane/walker. PCP: First and Last name:  Dr. Freda Connelly     Name of Practice:    Are you a current patient: Yes/No: Yes   Approximate date of last visit: Seen a month ago. Can you participate in a virtual visit with your PCP:                   Yes/call/has cell phone. Current Advanced Directive/Advance Care Plan: Prior      Healthcare Decision Maker:             Primary Decision Maker: Khurram Lagos - [phone]                  Transition of Care Plan:   D/C Plan is home with son & he will transport pt home upon discharge.

## 2022-03-29 NOTE — PROGRESS NOTES
3/29/22. PCP Is Dr. Ally Orozco - seen a month ago. D/C Plan is home with son Damian Nobles @ [phone]) & son/daughter will transport pt home upon discharge. Pt uses cane/walker/no home health @ this time.

## 2022-03-29 NOTE — MED STUDENT NOTES
History & Physical    Primary Care Provider: Ghislaine Coreas MD  Source of Information: Patient/family     History of Presenting Illness:   Johnson Sanches is a 61-year-old  female who presents to the ED today with chief complaint of right-sided lower abdominal pain. Patient states that the pain started yesterday morning and progressively worsened throughout the night. She has never experienced pain like this before she states that in the morning her pain was a 5 out of 10 and in the evening it progressed to it 8 or 9 out of 10. She describes the pain as sharp and it does not radiate to any other part of the abdomen or body. She admits to right-sided abdominal pain. She denies headache, blurry vision, chest pain, palpitations, shortness of breath, nausea, vomiting, diarrhea, constipation, urinary frequency, urinary urgency, or any other subjective symptoms. She has a past medical history of diabetes, hypertension, hyperlipidemia, COPD, obesity. She states that she has a pacemaker, AICD. In addition she states that she has cardiac stents that were placed last year as well as aortic valve replacement due to aortic stenosis her CBC was remarkable for mild leukocytosis with a white blood cell count of 12.1 chemistries were unremarkable urinalysis was positive for nitrites negative for leukocyte esterase. CT without contrast shows the following; 1. Abnormal masslike thickening within the ascending colon highly concerning for malignancy with suspected lymphovascular invasion. Tumor results in low-grade obstruction. 2.  Trace ascites within the right abdomen as well as liver fibrosis and cholelithiasis. Review of Systems:  Review of Systems   Constitutional: Negative. Eyes: Negative. Respiratory: Negative. Cardiovascular: Negative. Gastrointestinal: Negative. Genitourinary: Negative. Neurological: Negative.         Past Medical History:   Diagnosis Date    Depression     DM (diabetes mellitus) ([facility] [address]     Hyperlipidemia     Hypertension     Pancreatitis     SOB (shortness of breath)     Thyroid disease         Past Surgical History:   Procedure Laterality Date    HX HYSTERECTOMY      IR FLUORO GUIDE PLC CVAD  5/10/2021    IR INSERT NON TUNL CVC OVER 5 YRS  5/10/2021    IA INS NEW/RPLCMT PRM PM W/TRANSV ELTRD ATRIAL&VENT N/A 5/11/2021    INSERT PPM BIV MULTI performed by Klaudia Garsia MD at [address] [place] [address]       Prior to Admission medications    Medication Sig Start Date End Date Taking? Authorizing Provider   predniSONE (DELTASONE) 20 mg tablet 3 tabs for 3 days, 2 tabs for 3 days, 1 tab for 3 days 7/31/21   Rivera Salazar NP   insulin glargine (Lantus U-100 Insulin) 100 unit/mL injection 40 unit daily 5/19/21   Claudette Anis, MD   aspirin 81 mg chewable tablet Take 1 Tablet by mouth daily. 5/20/21   Nick Cheung MD   ferrous sulfate 325 mg (65 mg iron) tablet Take 1 Tablet by mouth two (2) times daily (with meals). 5/19/21   Nick Cheung MD   furosemide (LASIX) 40 mg tablet 1 tab daily 5/20/21   Nick Cheung MD   metoprolol succinate (TOPROL-XL) 25 mg XL tablet Take 1 Tablet by mouth daily. 5/20/21   Nick Cheung MD   ticagrelor (BRILINTA) 90 mg tablet Take 1 Tablet by mouth every twelve (12) hours every twelve (12) hours. 5/19/21   Nick Cheung MD   LEXAPRO 10 mg tablet Take 10 mg by mouth nightly. 10/11/10   Provider, Historical   famotidine (PEPCID) 20 mg tablet Take 20 mg by mouth two (2) times a day. 10/11/10   Provider, Historical   levothyroxine (SYNTHROID) 150 mcg tablet Take 150 mcg by mouth daily (before breakfast). 10/11/10   Provider, Historical   lovastatin (MEVACOR) 40 mg tablet Take 40 mg by mouth nightly.  10/11/10   Provider, Historical       Allergies   Allergen Reactions    Nortriptyline Itching    Contrast Agent [Iodine] Unknown (comments)    Cymbalta [Duloxetine] Itching    Ivp [Fd And C Blue No.1] Hives    Morphine Itching    Tetracycline Itching        History reviewed. No pertinent family history. Social History     Socioeconomic History    Marital status:    Tobacco Use    Smoking status: Never Smoker    Smokeless tobacco: Never Used   Substance and Sexual Activity    Alcohol use: No    Drug use: No            CODE STATUS:  DNR    Full    Other      Objective:     Physical Exam:     Visit Vitals  BP (!) 185/56   Pulse 77   Temp 98.4 °F (36.9 °C)   Resp 18   Ht 5' 1\" (1.549 m)   Wt 165 lb (74.8 kg)   SpO2 99%   BMI 31.18 kg/m²      O2 Device: None    General:  Alert, cooperative, no distress, appears stated age. Head:  Normocephalic, without obvious abnormality, atraumatic. Eyes:  Conjunctivae/corneas clear. PERRL, EOMs intact. Nose: Nares normal. Septum midline. Mucosa normal. No drainage or sinus tenderness. Throat: Lips, mucosa, and tongue normal. Teeth and gums normal.   Neck: Supple, symmetrical, trachea midline, no adenopathy, thyroid: no enlargement/tenderness/nodules, no carotid bruit and no JVD. Back:   Symmetric, no curvature. ROM normal. No CVA tenderness. Lungs:   Clear to auscultation bilaterally. Chest wall:  No tenderness or deformity. Heart:  Regular rate and rhythm, S1, S2 normal, no murmur, click, rub or gallop. Abdomen:    Upon light palpation of the abdomen there was marked tenderness in the right lower quadrant. Extremities: Extremities normal, atraumatic, no cyanosis or edema. Pulses: 2+ and symmetric all extremities. Skin: Skin color, texture, turgor normal. No rashes or lesions   Neurologic: CNII-XII intact. No motor or sensory deficits.         24 Hour Results:    Recent Results (from the past 24 hour(s))   CBC WITH AUTOMATED DIFF    Collection Time: 03/29/22  9:00 AM   Result Value Ref Range    WBC 12.1 (H) 3.6 - 11.0 K/uL    RBC 3.60 (L) 3.80 - 5.20 M/uL    HGB 11.4 (L) 11.5 - 16.0 g/dL    HCT 35.3 35.0 - 47.0 %    MCV 98.1 80.0 - 99.0 FL    MCH 31.7 26.0 - 34.0 PG    MCHC 32.3 30.0 - 36.5 g/dL    RDW 13.7 11.5 - 14.5 %    PLATELET 792 516 - 640 K/uL    MPV 11.4 8.9 - 12.9 FL    NRBC 0.0 0.0  WBC    ABSOLUTE NRBC 0.00 0.00 - 0.01 K/uL    NEUTROPHILS 77 (H) 32 - 75 %    LYMPHOCYTES 12 12 - 49 %    MONOCYTES 7 5 - 13 %    EOSINOPHILS 3 0 - 7 %    BASOPHILS 1 0 - 1 %    IMMATURE GRANULOCYTES 0 0 - 0.5 %    ABS. NEUTROPHILS 9.3 (H) 1.8 - 8.0 K/UL    ABS. LYMPHOCYTES 1.5 0.8 - 3.5 K/UL    ABS. MONOCYTES 0.8 0.0 - 1.0 K/UL    ABS. EOSINOPHILS 0.3 0.0 - 0.4 K/UL    ABS. BASOPHILS 0.1 0.0 - 0.1 K/UL    ABS. IMM. GRANS. 0.0 0.00 - 0.04 K/UL    DF AUTOMATED     METABOLIC PANEL, COMPREHENSIVE    Collection Time: 03/29/22  9:00 AM   Result Value Ref Range    Sodium 139 136 - 145 mmol/L    Potassium 4.2 3.5 - 5.1 mmol/L    Chloride 106 97 - 108 mmol/L    CO2 28 21 - 32 mmol/L    Anion gap 5 5 - 15 mmol/L    Glucose 161 (H) 65 - 100 mg/dL    BUN 21 (H) 6 - 20 mg/dL    Creatinine 0.97 0.55 - 1.02 mg/dL    BUN/Creatinine ratio 22 (H) 12 - 20      GFR est AA >60 >60 ml/min/1.73m2    GFR est non-AA 55 (L) >60 ml/min/1.73m2    Calcium 9.5 8.5 - 10.1 mg/dL    Bilirubin, total 0.6 0.2 - 1.0 mg/dL    AST (SGOT) 34 15 - 37 U/L    ALT (SGPT) 20 12 - 78 U/L    Alk.  phosphatase 57 45 - 117 U/L    Protein, total 7.0 6.4 - 8.2 g/dL    Albumin 3.5 3.5 - 5.0 g/dL    Globulin 3.5 2.0 - 4.0 g/dL    A-G Ratio 1.0 (L) 1.1 - 2.2     TROPONIN-HIGH SENSITIVITY    Collection Time: 03/29/22  9:00 AM   Result Value Ref Range    Troponin-High Sensitivity 25 0 - 51 ng/L   LIPASE    Collection Time: 03/29/22  9:00 AM   Result Value Ref Range    Lipase 77 73 - 393 U/L   URINALYSIS W/ RFLX MICROSCOPIC    Collection Time: 03/29/22  9:00 AM   Result Value Ref Range    Color Yellow/Straw      Appearance Clear Clear      Specific gravity 1.011 1.003 - 1.030      pH (UA) 8.0 5.0 - 8.0      Protein Negative Negative mg/dL    Glucose Negative Negative mg/dL    Ketone Negative Negative mg/dL    Bilirubin Negative Negative      Blood Negative Negative      Urobilinogen 0.1 0.1 - 1.0 EU/dL    Nitrites Positive (A) Negative      Leukocyte Esterase Negative Negative      WBC 5-10 0 - 4 /hpf    RBC 0-5 0 - 5 /hpf    Bacteria Negative Negative /hpf   URINE MICROSCOPIC    Collection Time: 03/29/22  9:00 AM   Result Value Ref Range    WBC 5-10 0 - 4 /hpf    RBC 0-5 0 - 5 /hpf    Bacteria Negative Negative /hpf    Mucus Negative Negative /lpf         Imaging:   CT ABD PELV WO CONT   Final Result   1. Abnormal masslike thickening within the ascending colon highly concerning for   malignancy with suspected lymphovascular invasion. Tumor results in low-grade   obstruction. 2. Trace ascites within the right abdomen. 3. Subtle morphologic changes suggestive of hepatic fibrosis/early cirrhosis. 4. Cholelithiasis. Notification: Findings were discussed with Dr. Daniel Espinoza at 11:30 AM on 3/29/2022      XR CHEST PORT   Final Result              Assessment:   Colon cancer  Partial large bowel obstruction  Urinary tract infection      Plan: Bowel obstruction  -Patient was already seen by Dr. Virgen Castillo, will follow his recommendation regards to surgery  -Due to patient's past extensive cardiac history he will need to be evaluated by cardiology.  -Discontinue Brilinta, keep patient on clear liquid diet for the next 4 to 5 days  -Pain and nausea control    Urinary tract infection  -Continue ceftriaxone we will continue to monitor patient's progress      Signed By: Brandon Benedict     March 29, 2022       *ATTENTION:  This note has been created by a medical student for educational purposes only. Please do not refer to the content of this note for clinical decision-making, billing, or other purposes. Please see attending physicians note to obtain clinical information on this patient. *

## 2022-03-29 NOTE — PROGRESS NOTES
Problem: Falls - Risk of  Goal: *Absence of Falls  Description: Document [facility] Helms Fall Risk and appropriate interventions in the flowsheet. Outcome: Progressing Towards Goal  Note: Fall Risk Interventions:  Mobility Interventions: Patient to call before getting OOB              Elimination Interventions: Call light in reach              Problem: Pressure Injury - Risk of  Goal: *Prevention of pressure injury  Description: Document Delta Scale and appropriate interventions in the flowsheet.   Outcome: Progressing Towards Goal  Note: Pressure Injury Interventions:       Moisture Interventions: Absorbent underpads    Activity Interventions: Increase time out of bed    Mobility Interventions: HOB 30 degrees or less    Nutrition Interventions: Document food/fluid/supplement intake    Friction and Shear Interventions: HOB 30 degrees or less                Problem: Pain  Goal: *Control of Pain  Outcome: Progressing Towards Goal

## 2022-03-29 NOTE — ED PROVIDER NOTES
EMERGENCY DEPARTMENT HISTORY AND PHYSICAL EXAM      Date: 3/29/2022  Patient Name: Paz Harden    History of Presenting Illness     Chief Complaint   Patient presents with    Abdominal Pain       History Provided By: Patient and Patient's Son    HPI: Paz Harden, [de-identified] y.o. female with a past medical history significant diabetes and hypertension presents to the ED with chief complaint of Abdominal Pain  . 53-year-old female with a history of coronary disease cardiac stents, diabetes. Patient with significant abdominal pain that started last night. Right-sided abdomen. Moderate to severe pain. No nausea vomiting diarrhea or constipation. No prior abdominal surgeries that she can recall. No fevers. Pain intensified. Presents with her son. There are no other complaints, changes, or physical findings at this time. PCP: Kelly White MD    Current Outpatient Medications   Medication Sig Dispense Refill    predniSONE (DELTASONE) 20 mg tablet 3 tabs for 3 days, 2 tabs for 3 days, 1 tab for 3 days 18 Tablet 0    insulin glargine (Lantus U-100 Insulin) 100 unit/mL injection 40 unit daily 1 Vial 1    aspirin 81 mg chewable tablet Take 1 Tablet by mouth daily. 30 Tablet 0    ferrous sulfate 325 mg (65 mg iron) tablet Take 1 Tablet by mouth two (2) times daily (with meals). 60 Tablet 0    furosemide (LASIX) 40 mg tablet 1 tab daily 60 Tablet 0    metoprolol succinate (TOPROL-XL) 25 mg XL tablet Take 1 Tablet by mouth daily. 30 Tablet 0    ticagrelor (BRILINTA) 90 mg tablet Take 1 Tablet by mouth every twelve (12) hours every twelve (12) hours. 60 Tablet 1    LEXAPRO 10 mg tablet Take 10 mg by mouth nightly.  famotidine (PEPCID) 20 mg tablet Take 20 mg by mouth two (2) times a day.  levothyroxine (SYNTHROID) 150 mcg tablet Take 150 mcg by mouth daily (before breakfast).  lovastatin (MEVACOR) 40 mg tablet Take 40 mg by mouth nightly.          Past History     Past Medical History:  Past Medical History:   Diagnosis Date    Depression     DM (diabetes mellitus) ([facility] [address]     Hyperlipidemia     Hypertension     Pancreatitis     Thyroid disease        Past Surgical History:  Past Surgical History:   Procedure Laterality Date    HX AORTIC VALVE REPLACEMENT      HX HYSTERECTOMY      IR FLUORO GUIDE PLC CVAD  5/10/2021    IR INSERT NON TUNL CVC OVER 5 YRS  5/10/2021    MN INS NEW/RPLCMT PRM PM W/TRANSV ELTRD ATRIAL&VENT N/A 5/11/2021    INSERT PPM BIV MULTI performed by Luz Thomas MD at [address] [place] [address]       Family History:  History reviewed. No pertinent family history. Social History:  Social History     Tobacco Use    Smoking status: Never Smoker    Smokeless tobacco: Never Used   Substance Use Topics    Alcohol use: No    Drug use: No       Allergies: Allergies   Allergen Reactions    Nortriptyline Itching    Contrast Agent [Iodine] Unknown (comments)    Cymbalta [Duloxetine] Itching    Ivp [Fd And C Blue No.1] Hives    Morphine Itching    Tetracycline Itching         Review of Systems   Review of Systems   Constitutional: Negative. Negative for chills, fatigue and fever. HENT: Negative. Negative for congestion, nosebleeds and sore throat. Eyes: Negative. Negative for pain, discharge and visual disturbance. Respiratory: Negative. Negative for cough, chest tightness and shortness of breath. Cardiovascular: Negative for chest pain, palpitations and leg swelling. Gastrointestinal: Positive for abdominal pain. Negative for blood in stool, constipation, diarrhea, nausea and vomiting. Endocrine: Negative. Genitourinary: Negative. Negative for difficulty urinating, dysuria, pelvic pain and vaginal bleeding. Musculoskeletal: Negative. Negative for arthralgias, back pain and myalgias. Skin: Negative. Negative for rash and wound. Allergic/Immunologic: Negative. Neurological: Negative.   Negative for dizziness, syncope, weakness, numbness and headaches. Hematological: Negative. Psychiatric/Behavioral: Negative. Negative for agitation, confusion and suicidal ideas. All other systems reviewed and are negative. Physical Exam   Physical Exam  Vitals and nursing note reviewed. Exam conducted with a chaperone present. Constitutional:       Appearance: Normal appearance. She is normal weight. HENT:      Head: Normocephalic and atraumatic. Nose: Nose normal.      Mouth/Throat:      Mouth: Mucous membranes are moist.      Pharynx: Oropharynx is clear. Eyes:      Extraocular Movements: Extraocular movements intact. Conjunctiva/sclera: Conjunctivae normal.      Pupils: Pupils are equal, round, and reactive to light. Cardiovascular:      Rate and Rhythm: Normal rate and regular rhythm. Pulses: Normal pulses. Heart sounds: Normal heart sounds. Pulmonary:      Effort: Pulmonary effort is normal. No respiratory distress. Breath sounds: Normal breath sounds. Abdominal:      General: Abdomen is flat. Bowel sounds are normal. There is no distension. Palpations: Abdomen is soft. Tenderness: There is abdominal tenderness in the right upper quadrant and epigastric area. There is right CVA tenderness. There is no guarding. Musculoskeletal:         General: No swelling, tenderness, deformity or signs of injury. Normal range of motion. Cervical back: Normal range of motion and neck supple. Right lower leg: No edema. Left lower leg: No edema. Skin:     General: Skin is warm and dry. Capillary Refill: Capillary refill takes less than 2 seconds. Findings: No lesion or rash. Neurological:      General: No focal deficit present. Mental Status: She is alert and oriented to person, place, and time. Mental status is at baseline. Cranial Nerves: No cranial nerve deficit.    Psychiatric:         Mood and Affect: Mood normal.         Behavior: Behavior normal.         Thought Content: Thought content normal.         Judgment: Judgment normal.         Diagnostic Study Results     Labs -     Recent Results (from the past 12 hour(s))   CBC WITH AUTOMATED DIFF    Collection Time: 03/29/22  9:00 AM   Result Value Ref Range    WBC 12.1 (H) 3.6 - 11.0 K/uL    RBC 3.60 (L) 3.80 - 5.20 M/uL    HGB 11.4 (L) 11.5 - 16.0 g/dL    HCT 35.3 35.0 - 47.0 %    MCV 98.1 80.0 - 99.0 FL    MCH 31.7 26.0 - 34.0 PG    MCHC 32.3 30.0 - 36.5 g/dL    RDW 13.7 11.5 - 14.5 %    PLATELET 292 028 - 854 K/uL    MPV 11.4 8.9 - 12.9 FL    NRBC 0.0 0.0  WBC    ABSOLUTE NRBC 0.00 0.00 - 0.01 K/uL    NEUTROPHILS 77 (H) 32 - 75 %    LYMPHOCYTES 12 12 - 49 %    MONOCYTES 7 5 - 13 %    EOSINOPHILS 3 0 - 7 %    BASOPHILS 1 0 - 1 %    IMMATURE GRANULOCYTES 0 0 - 0.5 %    ABS. NEUTROPHILS 9.3 (H) 1.8 - 8.0 K/UL    ABS. LYMPHOCYTES 1.5 0.8 - 3.5 K/UL    ABS. MONOCYTES 0.8 0.0 - 1.0 K/UL    ABS. EOSINOPHILS 0.3 0.0 - 0.4 K/UL    ABS. BASOPHILS 0.1 0.0 - 0.1 K/UL    ABS. IMM. GRANS. 0.0 0.00 - 0.04 K/UL    DF AUTOMATED     METABOLIC PANEL, COMPREHENSIVE    Collection Time: 03/29/22  9:00 AM   Result Value Ref Range    Sodium 139 136 - 145 mmol/L    Potassium 4.2 3.5 - 5.1 mmol/L    Chloride 106 97 - 108 mmol/L    CO2 28 21 - 32 mmol/L    Anion gap 5 5 - 15 mmol/L    Glucose 161 (H) 65 - 100 mg/dL    BUN 21 (H) 6 - 20 mg/dL    Creatinine 0.97 0.55 - 1.02 mg/dL    BUN/Creatinine ratio 22 (H) 12 - 20      GFR est AA >60 >60 ml/min/1.73m2    GFR est non-AA 55 (L) >60 ml/min/1.73m2    Calcium 9.5 8.5 - 10.1 mg/dL    Bilirubin, total 0.6 0.2 - 1.0 mg/dL    AST (SGOT) 34 15 - 37 U/L    ALT (SGPT) 20 12 - 78 U/L    Alk.  phosphatase 57 45 - 117 U/L    Protein, total 7.0 6.4 - 8.2 g/dL    Albumin 3.5 3.5 - 5.0 g/dL    Globulin 3.5 2.0 - 4.0 g/dL    A-G Ratio 1.0 (L) 1.1 - 2.2     TROPONIN-HIGH SENSITIVITY    Collection Time: 03/29/22  9:00 AM   Result Value Ref Range    Troponin-High Sensitivity 25 0 - 51 ng/L   LIPASE    Collection Time: 03/29/22  9:00 AM   Result Value Ref Range    Lipase 77 73 - 393 U/L   URINALYSIS W/ RFLX MICROSCOPIC    Collection Time: 03/29/22  9:00 AM   Result Value Ref Range    Color Yellow/Straw      Appearance Clear Clear      Specific gravity 1.011 1.003 - 1.030      pH (UA) 8.0 5.0 - 8.0      Protein Negative Negative mg/dL    Glucose Negative Negative mg/dL    Ketone Negative Negative mg/dL    Bilirubin Negative Negative      Blood Negative Negative      Urobilinogen 0.1 0.1 - 1.0 EU/dL    Nitrites Positive (A) Negative      Leukocyte Esterase Negative Negative      WBC 5-10 0 - 4 /hpf    RBC 0-5 0 - 5 /hpf    Bacteria Negative Negative /hpf   URINE MICROSCOPIC    Collection Time: 03/29/22  9:00 AM   Result Value Ref Range    WBC 5-10 0 - 4 /hpf    RBC 0-5 0 - 5 /hpf    Bacteria Negative Negative /hpf    Mucus Negative Negative /lpf         Radiologic Studies -   CT ABD PELV WO CONT   Final Result   1. Abnormal masslike thickening within the ascending colon highly concerning for   malignancy with suspected lymphovascular invasion. Tumor results in low-grade   obstruction. 2. Trace ascites within the right abdomen. 3. Subtle morphologic changes suggestive of hepatic fibrosis/early cirrhosis. 4. Cholelithiasis. Notification: Findings were discussed with Dr. Rufino Holland at 11:30 AM on 3/29/2022      XR CHEST PORT   Final Result        CT Results  (Last 48 hours)               03/29/22 1039  CT ABD PELV WO CONT Final result    Impression:  1. Abnormal masslike thickening within the ascending colon highly concerning for   malignancy with suspected lymphovascular invasion. Tumor results in low-grade   obstruction. 2. Trace ascites within the right abdomen. 3. Subtle morphologic changes suggestive of hepatic fibrosis/early cirrhosis. 4. Cholelithiasis.        Notification: Findings were discussed with Dr. Rufino Holland at 11:30 AM on 3/29/2022       Narrative:  Exam: CT ABD PELV WO CONT       TECHNIQUE: Multiple transaxial CT images of the abdomen, and pelvis were   obtained without contrast. Coronal and sagittal reformatted images were   provided. Dose reduction: All CT scans at this facility are performed using dose reduction   optimization techniques as appropriate to a performed exam including the   following: Automated exposure control, adjustments of the mA and/or kV according   to patient size, or use of iterative reconstruction technique. COMPARISON: None       HISTORY: pain R sided       FINDINGS:       Chest:   Severe calcifications of the mitral valve annulus. Status post aortic valve   replacement. 3 chamber pacemaker/AICD. Trace bilateral pleural effusions. Lungs   are otherwise grossly clear. Abdomen and pelvis:   Liver: Liver surface contours are mildly nodular. No focal hepatic lesion is   identified. Gallbladder: Gallstones. Gallbladder is nondistended   Biliary system: Nondilated. Pancreas: Unremarkable. Spleen: Unremarkable. Adrenal glands: Normal.   Kidneys and ureters: Kidneys are symmetric in size. Left cortical renal cyst   measures 4.2 x 3.2 cm left parapelvic cyst measures 2.2 cm. Nonobstructing right   renal calculus. No hydronephrosis. Urinary bladder: Normal   Reproductive organs: Surgically absent uterus. No adnexal mass. Bowel: Colonic diverticulosis. No acute diverticulitis. There is irregular   circumferential thickening (apple core lesion) within the ascending colon   measuring over a segment of approximately 5.5 cm. This is highly concerning for   tumor. There is adjacent stranding and prominence of the pericolonic vessels and   lymph nodes suspicious for lymphovascular invasion. This mass results in at   least mild obstruction as there is a prominent stool burden proximal within the   cecum and terminal ileum. Peritoneum: Small fat-containing periumbilical hernia. Trace free fluid in the right abdomen and pelvis. No pneumoperitoneum.    Lymph nodes: No abdominal/pelvic lymphadenopathy. Aorta and other vessels: Calcific atherosclerotic changes within the aorta. No   aneurysm. Bones: No findings of acute or aggressive osseous abnormality. Advanced lumbar   spondylosis. Superficial soft tissues: Unremarkable. CXR Results  (Last 48 hours)               03/29/22 0914  XR CHEST PORT Final result    Narrative:  Chest single view. Comparison single view chest May 18, 2021. Lungs clear; no interstitial or alveolar pulmonary edema. Left-sided cardiac   device with similar positioned leads overlying the heart. Cardiac and   mediastinal structures unchanged noting thoracic aorta atherosclerosis. No   pneumothorax or pleural effusion. Bone remodeling related to nonacute fracture proximal left humerus unchanged. Medical Decision Making and ED Course   I am the first provider for this patient. I reviewed the vital signs, available nursing notes, past medical history, past surgical history, family history and social history. Vital Signs-Reviewed the patient's vital signs. Patient Vitals for the past 12 hrs:   Temp Pulse Resp BP SpO2   03/29/22 1330  77 18 (!) 157/64 99 %   03/29/22 1300  72 14 (!) 181/56 98 %   03/29/22 1230  72 14 (!) 183/53 99 %   03/29/22 1200  75 19 (!) 183/57 99 %   03/29/22 1130  74 15 (!) 180/56 98 %   03/29/22 1100  77 18 (!) 185/56 99 %   03/29/22 1051  67 12 (!) 168/57 99 %   03/29/22 1016  78  (!) 173/65 98 %   03/29/22 0924  75 19 (!) 195/64 100 %   03/29/22 0856 98.4 °F (36.9 °C) 76 18 (!) 205/82 100 %       EKG interpretation:   EKG at 853 normal sinus rhythm. LVH rate is 74. Inferior Q waves. A paced. Please rule out dysrhythmia. Interpreted by ER physician. Records Reviewed: Previous Hospital chart. EMS run report      ED Course:   Initial assessment performed.  The patients presenting problems have been discussed, and they are in agreement with the care plan formulated and outlined with them. I have encouraged them to ask questions as they arise throughout their visit. Orders Placed This Encounter    XR CHEST PORT     Standing Status:   Standing     Number of Occurrences:   1     Order Specific Question:   Reason for Exam     Answer:   sob, abd pain    CT ABD PELV WO CONT     Standing Status:   Standing     Number of Occurrences:   1     Order Specific Question:   Transport     Answer:   BED [2]     Order Specific Question:   Reason for Exam     Answer:   pain R sided     Order Specific Question:   Type of contrast.  PLEASE NOTE: IV contrast is NOT utilized with this order. Answer:   Per Radiologist Protocol    CBC WITH AUTOMATED DIFF     Standing Status:   Standing     Number of Occurrences:   1    METABOLIC PANEL, COMPREHENSIVE     Standing Status:   Standing     Number of Occurrences:   1    TROPONIN-HIGH SENSITIVITY     Standing Status:   Standing     Number of Occurrences:   1    LIPASE     Standing Status:   Standing     Number of Occurrences:   1    URINALYSIS W/ RFLX MICROSCOPIC     Standing Status:   Standing     Number of Occurrences:   1    URINE MICROSCOPIC     Standing Status:   Standing     Number of Occurrences:   1    EKG, 12 LEAD, INITIAL     Standing Status:   Standing     Number of Occurrences:   1     Order Specific Question:   Reason for Exam:     Answer:   Pain    SAMPLE TO BLOOD BANK     Standing Status:   Standing     Number of Occurrences:   1    HYDROmorphone (DILAUDID) syringe 0.5 mg    ondansetron (ZOFRAN) injection 4 mg    sodium chloride 0.9 % bolus infusion 1,000 mL    cefTRIAXone (ROCEPHIN) 1 g in sterile water (preservative free) 10 mL IV syringe     Order Specific Question:   Antibiotic Indications     Answer:   Urinary Tract Infection    DISCONTD: niCARdipine (CARDENE) 25 mg in 0.9% sodium chloride 250 mL infusion     Order Specific Question:   Titrate Infusion?      Answer:   Yes     Order Specific Question: Initial Infusion Rate: Answer:   5 mg/hr     Order Specific Question:   Titrate Every 10 Minutes in Increments of: Answer:   2.5 mg/hr     Order Specific Question:   Goal of Therapy is: Answer:   SBP less than 160 mmHg     Order Specific Question:   Contact Physician for     Answer:   SBP less than 90 mmHg     Order Specific Question:   Hold for     Answer:   HR less than 60 bpm    IP CONSULT TO CARDIOLOGY     Standing Status:   Standing     Number of Occurrences:   1     Order Specific Question:   Reason for Consult: Answer:   Cardiac clearance for surgery, colon mass     Order Specific Question:   Did you call or speak to the consulting provider? Answer:   No     Order Specific Question:   Consult To     Answer:   Cardiology     Order Specific Question:   Schedule When? Answer:   TODAY    IP CONSULT TO PULMONOLOGY     Standing Status:   Standing     Number of Occurrences:   1     Order Specific Question:   Reason for Consult: Answer:   COPD history, clearance for surgery     Order Specific Question:   Did you call or speak to the consulting provider? Answer:   No     Order Specific Question:   Consult To     Answer:   Pulmonary     Order Specific Question:   Schedule When? Answer:   TODAY    INITIAL PHYSICIAN ORDER: INPATIENT Intensive Care; 9. Other (further clarification in H&P documentation)     Standing Status:   Standing     Number of Occurrences:   1     Order Specific Question:   Status: Answer:   INPATIENT [101]     Order Specific Question:   Type of Bed     Answer:   Intensive Care [6]     Order Specific Question:   Inpatient Hospitalization Certified Necessary for the Following Reasons     Answer:   9.  Other (further clarification in H&P documentation)     Order Specific Question:   Admitting Diagnosis     Answer:   SBO (small bowel obstruction) ([facility] [address] [[id number]]     Order Specific Question:   Admitting Diagnosis     Answer:   HTN (hypertension), malignant [607505] Order Specific Question:   Admitting Physician     Answer:   Goel Oms [[id number]]     Order Specific Question:   Attending Physician     Answer:   [facility] [[id number]]     Order Specific Question:   Estimated Length of Stay     Answer:   5-7 Midnights     Order Specific Question:   Discharge Plan:     Answer:   [facility] (e.g. Adult Home, Nursing Home, etc.)                 Provider Notes (Medical Decision Making):   Significant right-sided abdominal pain. Will evaluate for acute appendicitis versus acute cholecystitis versus colitis versus pyelonephritis. CAT scan does show a colonic mass with concern for malignancy with metastasis. Also with an apple core lesion causing a bowel obstruction. Surgery aware will admit. Consults    zunilda admit               Procedures                       Disposition       Emergency Department Disposition:  admit      Diagnosis     Clinical Impression:   1. SBO (small bowel obstruction) ([address]    2. Colonic mass        Attestations:    Camila Leavitt MD    Please note that this dictation was completed with [employer], the computer voice recognition software. Quite often unanticipated grammatical, syntax, homophones, and other interpretive errors are inadvertently transcribed by the computer software. Please disregard these errors. Please excuse any errors that have escaped final proofreading. Thank you.

## 2022-03-29 NOTE — CONSULTS
Consult Date: 3/29/2022    Consults    Subjective   The patient is a very pleasant 61-year-old  female who presents to emergency department today with a chief complaint of abdominal pain. She states the pain is in the right side of her abdomen and the lower abdomen. She started experiencing pain last night however she states that she has been having discomfort for a few weeks. The pain she experienced she describes as being sharp colicky and rates it as a 6 out of 10. She denies any nausea or vomiting or change in stools. Her blood work was remarkable for mild leukocytosis with a white blood cell count of 12,100. Chemistries unremarkable. She had a CT scan of the abdomen pelvis which demonstrated a apple core lesion within the ascending colon over a segment of 5.5 cm. There is adjacent stranding of the pericolonic tissues and prominence of lymph nodes. Partial obstruction with prominent stool within the cecum and terminal ileum. No small bowel dilation noted. Patient reports her last colonoscopy was many years ago. She has a very extensive past medical history including history of diabetes on insulin, hyperlipidemia, hypertension, coronary artery disease status post PTCA with stent placement, aortic stenosis status post valve replacement, COPD/shortness of breath, pacemaker, hypothyroidism. She states her cardiac stents and valve were performed in May of last year.   Past Medical History:   Diagnosis Date    Depression     DM (diabetes mellitus) ([address]     Hyperlipidemia     Hypertension     Pancreatitis     SOB (shortness of breath)     Thyroid disease       Past Surgical History:   Procedure Laterality Date    HX HYSTERECTOMY      IR FLUORO GUIDE PLC CVAD  5/10/2021    IR INSERT NON TUNL CVC OVER 5 YRS  5/10/2021    KS INS NEW/RPLCMT PRM PM W/TRANSV ELTRD ATRIAL&VENT N/A 5/11/2021    INSERT PPM BIV MULTI performed by Alfa Davies MD at [facility] reviewed. No pertinent family history. Social History     Tobacco Use    Smoking status: Never Smoker    Smokeless tobacco: Never Used   Substance Use Topics    Alcohol use: No       Current Facility-Administered Medications   Medication Dose Route Frequency Provider Last Rate Last Admin    niCARdipine (CARDENE) 25 mg in 0.9% sodium chloride 250 mL infusion  5 mg/hr IntraVENous TITRATE Jono Madrid MD         Current Outpatient Medications   Medication Sig Dispense Refill    predniSONE (DELTASONE) 20 mg tablet 3 tabs for 3 days, 2 tabs for 3 days, 1 tab for 3 days 18 Tablet 0    insulin glargine (Lantus U-100 Insulin) 100 unit/mL injection 40 unit daily 1 Vial 1    aspirin 81 mg chewable tablet Take 1 Tablet by mouth daily. 30 Tablet 0    ferrous sulfate 325 mg (65 mg iron) tablet Take 1 Tablet by mouth two (2) times daily (with meals). 60 Tablet 0    furosemide (LASIX) 40 mg tablet 1 tab daily 60 Tablet 0    metoprolol succinate (TOPROL-XL) 25 mg XL tablet Take 1 Tablet by mouth daily. 30 Tablet 0    ticagrelor (BRILINTA) 90 mg tablet Take 1 Tablet by mouth every twelve (12) hours every twelve (12) hours. 60 Tablet 1    LEXAPRO 10 mg tablet Take 10 mg by mouth nightly.  famotidine (PEPCID) 20 mg tablet Take 20 mg by mouth two (2) times a day.  levothyroxine (SYNTHROID) 150 mcg tablet Take 150 mcg by mouth daily (before breakfast).  lovastatin (MEVACOR) 40 mg tablet Take 40 mg by mouth nightly. Review of Systems   All other systems reviewed and are negative. Objective     Vital signs for last 24 hours:  Visit Vitals  BP (!) 168/57   Pulse 67   Temp 98.4 °F (36.9 °C)   Resp 12   Ht 5' 1\" (1.549 m)   Wt 165 lb (74.8 kg)   SpO2 99%   BMI 31.18 kg/m²       Intake/Output this shift:  Current Shift: 03/29 0701 - 03/29 1900  In: 1000 [I.V.:1000]  Out: -   Last 3 Shifts: No intake/output data recorded.     Data Review:   Recent Results (from the past 24 hour(s))   CBC WITH AUTOMATED DIFF    Collection Time: 03/29/22  9:00 AM   Result Value Ref Range    WBC 12.1 (H) 3.6 - 11.0 K/uL    RBC 3.60 (L) 3.80 - 5.20 M/uL    HGB 11.4 (L) 11.5 - 16.0 g/dL    HCT 35.3 35.0 - 47.0 %    MCV 98.1 80.0 - 99.0 FL    MCH 31.7 26.0 - 34.0 PG    MCHC 32.3 30.0 - 36.5 g/dL    RDW 13.7 11.5 - 14.5 %    PLATELET 649 857 - 440 K/uL    MPV 11.4 8.9 - 12.9 FL    NRBC 0.0 0.0  WBC    ABSOLUTE NRBC 0.00 0.00 - 0.01 K/uL    NEUTROPHILS 77 (H) 32 - 75 %    LYMPHOCYTES 12 12 - 49 %    MONOCYTES 7 5 - 13 %    EOSINOPHILS 3 0 - 7 %    BASOPHILS 1 0 - 1 %    IMMATURE GRANULOCYTES 0 0 - 0.5 %    ABS. NEUTROPHILS 9.3 (H) 1.8 - 8.0 K/UL    ABS. LYMPHOCYTES 1.5 0.8 - 3.5 K/UL    ABS. MONOCYTES 0.8 0.0 - 1.0 K/UL    ABS. EOSINOPHILS 0.3 0.0 - 0.4 K/UL    ABS. BASOPHILS 0.1 0.0 - 0.1 K/UL    ABS. IMM. GRANS. 0.0 0.00 - 0.04 K/UL    DF AUTOMATED     METABOLIC PANEL, COMPREHENSIVE    Collection Time: 03/29/22  9:00 AM   Result Value Ref Range    Sodium 139 136 - 145 mmol/L    Potassium 4.2 3.5 - 5.1 mmol/L    Chloride 106 97 - 108 mmol/L    CO2 28 21 - 32 mmol/L    Anion gap 5 5 - 15 mmol/L    Glucose 161 (H) 65 - 100 mg/dL    BUN 21 (H) 6 - 20 mg/dL    Creatinine 0.97 0.55 - 1.02 mg/dL    BUN/Creatinine ratio 22 (H) 12 - 20      GFR est AA >60 >60 ml/min/1.73m2    GFR est non-AA 55 (L) >60 ml/min/1.73m2    Calcium 9.5 8.5 - 10.1 mg/dL    Bilirubin, total 0.6 0.2 - 1.0 mg/dL    AST (SGOT) 34 15 - 37 U/L    ALT (SGPT) 20 12 - 78 U/L    Alk.  phosphatase 57 45 - 117 U/L    Protein, total 7.0 6.4 - 8.2 g/dL    Albumin 3.5 3.5 - 5.0 g/dL    Globulin 3.5 2.0 - 4.0 g/dL    A-G Ratio 1.0 (L) 1.1 - 2.2     TROPONIN-HIGH SENSITIVITY    Collection Time: 03/29/22  9:00 AM   Result Value Ref Range    Troponin-High Sensitivity 25 0 - 51 ng/L   LIPASE    Collection Time: 03/29/22  9:00 AM   Result Value Ref Range    Lipase 77 73 - 393 U/L   URINALYSIS W/ RFLX MICROSCOPIC    Collection Time: 03/29/22  9:00 AM   Result Value Ref Range Color Yellow/Straw      Appearance Clear Clear      Specific gravity 1.011 1.003 - 1.030      pH (UA) 8.0 5.0 - 8.0      Protein Negative Negative mg/dL    Glucose Negative Negative mg/dL    Ketone Negative Negative mg/dL    Bilirubin Negative Negative      Blood Negative Negative      Urobilinogen 0.1 0.1 - 1.0 EU/dL    Nitrites Positive (A) Negative      Leukocyte Esterase Negative Negative      WBC 5-10 0 - 4 /hpf    RBC 0-5 0 - 5 /hpf    Bacteria Negative Negative /hpf   URINE MICROSCOPIC    Collection Time: 03/29/22  9:00 AM   Result Value Ref Range    WBC 5-10 0 - 4 /hpf    RBC 0-5 0 - 5 /hpf    Bacteria Negative Negative /hpf    Mucus Negative Negative /lpf       Physical Exam  Constitutional:       General: She is not in acute distress. Appearance: Normal appearance. She is obese. She is not ill-appearing. HENT:      Head: Normocephalic and atraumatic. Cardiovascular:      Rate and Rhythm: Normal rate and regular rhythm. Pulmonary:      Breath sounds: Normal breath sounds. Abdominal:      General: There is no distension. Palpations: Abdomen is soft. There is no mass. Tenderness: There is abdominal tenderness. Hernia: No hernia is present. Comments: Tender to palpation   Musculoskeletal:         General: Normal range of motion. Cervical back: Normal range of motion and neck supple. Skin:     General: Skin is warm and dry. Neurological:      General: No focal deficit present. Mental Status: She is alert and oriented to person, place, and time. Psychiatric:         Mood and Affect: Mood normal.         Thought Content: Thought content normal.         Judgment: Judgment normal.         Assessment and plan:  81-year-old female with new diagnosis of likely ascending colon cancer with proximal partial bowel obstruction. Patient has a significant cardiac history.   Will need to be admitted to the medical service for cardiac and pulmonary clearance prior to surgery. Hold antiplatelet agents, patient is currently on dual antiplatelet therapy. Clear liquid diet only.   Hopefully plan on operating in 4 to 5 days per

## 2022-03-30 LAB
ANION GAP SERPL CALC-SCNC: 4 MMOL/L (ref 5–15)
APTT PPP: 38.4 SEC (ref 21.2–34.1)
ARTERIAL PATENCY WRIST A: ABNORMAL
ATRIAL RATE: 74 BPM
BASE EXCESS BLDA CALC-SCNC: 2.8 MMOL/L (ref 0–2)
BDY SITE: ABNORMAL
BUN SERPL-MCNC: 23 MG/DL (ref 6–20)
BUN/CREAT SERPL: 23 (ref 12–20)
CA-I BLD-MCNC: 8.3 MG/DL (ref 8.5–10.1)
CALCULATED P AXIS, ECG09: 63 DEGREES
CALCULATED R AXIS, ECG10: -86 DEGREES
CALCULATED T AXIS, ECG11: 96 DEGREES
CHLORIDE SERPL-SCNC: 99 MMOL/L (ref 97–108)
CO2 SERPL-SCNC: 30 MMOL/L (ref 21–32)
CREAT SERPL-MCNC: 1.02 MG/DL (ref 0.55–1.02)
DIAGNOSIS, 93000: NORMAL
ERYTHROCYTE [DISTWIDTH] IN BLOOD BY AUTOMATED COUNT: 14.5 % (ref 11.5–14.5)
EST. AVERAGE GLUCOSE BLD GHB EST-MCNC: 177 MG/DL
FIO2 ON VENT: 21 %
GLUCOSE BLD STRIP.AUTO-MCNC: 129 MG/DL (ref 65–117)
GLUCOSE BLD STRIP.AUTO-MCNC: 180 MG/DL (ref 65–117)
GLUCOSE BLD STRIP.AUTO-MCNC: 247 MG/DL (ref 65–117)
GLUCOSE BLD STRIP.AUTO-MCNC: 247 MG/DL (ref 65–117)
GLUCOSE SERPL-MCNC: 138 MG/DL (ref 65–100)
HBA1C MFR BLD: 7.8 % (ref 4–5.6)
HCO3 BLDA-SCNC: 27 MMOL/L (ref 22–26)
HCT VFR BLD AUTO: 31.2 % (ref 35–47)
HGB BLD-MCNC: 9.9 G/DL (ref 11.5–16)
INR PPP: 1.3 (ref 0.9–1.1)
MCH RBC QN AUTO: 31.7 PG (ref 26–34)
MCHC RBC AUTO-ENTMCNC: 31.7 G/DL (ref 30–36.5)
MCV RBC AUTO: 100 FL (ref 80–99)
NRBC # BLD: 0 K/UL (ref 0–0.01)
NRBC BLD-RTO: 0 PER 100 WBC
P-R INTERVAL, ECG05: 140 MS
PCO2 BLDA: 43 MMHG (ref 35–45)
PERFORMED BY, TECHID: ABNORMAL
PH BLDA: 7.42 [PH] (ref 7.35–7.45)
PLATELET # BLD AUTO: 305 K/UL (ref 150–400)
PMV BLD AUTO: 11.7 FL (ref 8.9–12.9)
PO2 BLDA: 69 MMHG (ref 75–100)
POTASSIUM SERPL-SCNC: 4.2 MMOL/L (ref 3.5–5.1)
PROTHROMBIN TIME: 15.5 SEC (ref 11.9–14.6)
Q-T INTERVAL, ECG07: 428 MS
QRS DURATION, ECG06: 140 MS
QTC CALCULATION (BEZET), ECG08: 475 MS
RBC # BLD AUTO: 3.12 M/UL (ref 3.8–5.2)
SAO2 % BLD: 94 %
SAO2% DEVICE SAO2% SENSOR NAME: ABNORMAL
SODIUM SERPL-SCNC: 133 MMOL/L (ref 136–145)
SPECIMEN SITE: ABNORMAL
THERAPEUTIC RANGE,PTTT: ABNORMAL SEC (ref 82–109)
VENTRICULAR RATE, ECG03: 74 BPM
WBC # BLD AUTO: 12.3 K/UL (ref 3.6–11)

## 2022-03-30 PROCEDURE — 82962 GLUCOSE BLOOD TEST: CPT

## 2022-03-30 PROCEDURE — 80048 BASIC METABOLIC PNL TOTAL CA: CPT

## 2022-03-30 PROCEDURE — 74011636637 HC RX REV CODE- 636/637: Performed by: STUDENT IN AN ORGANIZED HEALTH CARE EDUCATION/TRAINING PROGRAM

## 2022-03-30 PROCEDURE — 99232 SBSQ HOSP IP/OBS MODERATE 35: CPT | Performed by: COLON & RECTAL SURGERY

## 2022-03-30 PROCEDURE — 74011250636 HC RX REV CODE- 250/636: Performed by: STUDENT IN AN ORGANIZED HEALTH CARE EDUCATION/TRAINING PROGRAM

## 2022-03-30 PROCEDURE — 85610 PROTHROMBIN TIME: CPT

## 2022-03-30 PROCEDURE — 74011250636 HC RX REV CODE- 250/636: Performed by: INTERNAL MEDICINE

## 2022-03-30 PROCEDURE — 36415 COLL VENOUS BLD VENIPUNCTURE: CPT

## 2022-03-30 PROCEDURE — 74011000250 HC RX REV CODE- 250: Performed by: INTERNAL MEDICINE

## 2022-03-30 PROCEDURE — 85730 THROMBOPLASTIN TIME PARTIAL: CPT

## 2022-03-30 PROCEDURE — 74011000250 HC RX REV CODE- 250: Performed by: STUDENT IN AN ORGANIZED HEALTH CARE EDUCATION/TRAINING PROGRAM

## 2022-03-30 PROCEDURE — 74011250637 HC RX REV CODE- 250/637: Performed by: STUDENT IN AN ORGANIZED HEALTH CARE EDUCATION/TRAINING PROGRAM

## 2022-03-30 PROCEDURE — 83036 HEMOGLOBIN GLYCOSYLATED A1C: CPT

## 2022-03-30 PROCEDURE — 82803 BLOOD GASES ANY COMBINATION: CPT

## 2022-03-30 PROCEDURE — 36600 WITHDRAWAL OF ARTERIAL BLOOD: CPT

## 2022-03-30 PROCEDURE — 65270000029 HC RM PRIVATE

## 2022-03-30 PROCEDURE — 85027 COMPLETE CBC AUTOMATED: CPT

## 2022-03-30 RX ORDER — ASPIRIN 81 MG/1
81 TABLET ORAL DAILY
Status: CANCELLED | OUTPATIENT
Start: 2022-03-30

## 2022-03-30 RX ORDER — ACETAMINOPHEN 325 MG/1
650 TABLET ORAL
Status: DISCONTINUED | OUTPATIENT
Start: 2022-03-30 | End: 2022-04-08 | Stop reason: HOSPADM

## 2022-03-30 RX ORDER — ASPIRIN 81 MG/1
81 TABLET ORAL DAILY
Status: DISCONTINUED | OUTPATIENT
Start: 2022-03-30 | End: 2022-04-08 | Stop reason: HOSPADM

## 2022-03-30 RX ORDER — LANOLIN ALCOHOL/MO/W.PET/CERES
325 CREAM (GRAM) TOPICAL 2 TIMES DAILY WITH MEALS
Status: CANCELLED | OUTPATIENT
Start: 2022-03-30

## 2022-03-30 RX ORDER — ALBUTEROL SULFATE 90 UG/1
2 AEROSOL, METERED RESPIRATORY (INHALATION)
Status: DISCONTINUED | OUTPATIENT
Start: 2022-03-30 | End: 2022-04-08 | Stop reason: HOSPADM

## 2022-03-30 RX ORDER — METOPROLOL SUCCINATE 25 MG/1
25 TABLET, EXTENDED RELEASE ORAL DAILY
Status: DISCONTINUED | OUTPATIENT
Start: 2022-03-30 | End: 2022-04-08 | Stop reason: HOSPADM

## 2022-03-30 RX ORDER — SODIUM CHLORIDE 0.9 % (FLUSH) 0.9 %
5-40 SYRINGE (ML) INJECTION EVERY 8 HOURS
Status: DISCONTINUED | OUTPATIENT
Start: 2022-03-30 | End: 2022-04-02

## 2022-03-30 RX ORDER — INSULIN GLARGINE 100 [IU]/ML
16 INJECTION, SOLUTION SUBCUTANEOUS
Status: DISCONTINUED | OUTPATIENT
Start: 2022-03-30 | End: 2022-04-03

## 2022-03-30 RX ORDER — SODIUM CHLORIDE 0.9 % (FLUSH) 0.9 %
5-40 SYRINGE (ML) INJECTION AS NEEDED
Status: DISCONTINUED | OUTPATIENT
Start: 2022-03-30 | End: 2022-04-02

## 2022-03-30 RX ORDER — MAGNESIUM SULFATE 100 %
4 CRYSTALS MISCELLANEOUS AS NEEDED
Status: DISCONTINUED | OUTPATIENT
Start: 2022-03-30 | End: 2022-04-08 | Stop reason: HOSPADM

## 2022-03-30 RX ORDER — INSULIN GLARGINE 100 [IU]/ML
40 INJECTION, SOLUTION SUBCUTANEOUS
Status: DISCONTINUED | OUTPATIENT
Start: 2022-03-30 | End: 2022-03-30

## 2022-03-30 RX ORDER — ONDANSETRON 4 MG/1
4 TABLET, ORALLY DISINTEGRATING ORAL
Status: DISCONTINUED | OUTPATIENT
Start: 2022-03-30 | End: 2022-04-08 | Stop reason: HOSPADM

## 2022-03-30 RX ORDER — DULOXETIN HYDROCHLORIDE 30 MG/1
30 CAPSULE, DELAYED RELEASE ORAL DAILY
Status: DISCONTINUED | OUTPATIENT
Start: 2022-03-30 | End: 2022-04-08 | Stop reason: HOSPADM

## 2022-03-30 RX ORDER — POLYETHYLENE GLYCOL 3350 17 G/17G
17 POWDER, FOR SOLUTION ORAL DAILY PRN
Status: DISCONTINUED | OUTPATIENT
Start: 2022-03-30 | End: 2022-03-30

## 2022-03-30 RX ORDER — INSULIN LISPRO 100 [IU]/ML
INJECTION, SOLUTION INTRAVENOUS; SUBCUTANEOUS
Status: DISCONTINUED | OUTPATIENT
Start: 2022-03-30 | End: 2022-04-08 | Stop reason: HOSPADM

## 2022-03-30 RX ORDER — ATORVASTATIN CALCIUM 10 MG/1
10 TABLET, FILM COATED ORAL
Status: DISCONTINUED | OUTPATIENT
Start: 2022-03-30 | End: 2022-04-08 | Stop reason: HOSPADM

## 2022-03-30 RX ORDER — ACETAMINOPHEN 650 MG/1
650 SUPPOSITORY RECTAL
Status: DISCONTINUED | OUTPATIENT
Start: 2022-03-30 | End: 2022-04-08 | Stop reason: HOSPADM

## 2022-03-30 RX ORDER — METOPROLOL SUCCINATE 25 MG/1
25 TABLET, EXTENDED RELEASE ORAL DAILY
Status: CANCELLED | OUTPATIENT
Start: 2022-03-30

## 2022-03-30 RX ORDER — OXYCODONE HYDROCHLORIDE 5 MG/1
5 TABLET ORAL
Status: DISCONTINUED | OUTPATIENT
Start: 2022-03-30 | End: 2022-04-08 | Stop reason: HOSPADM

## 2022-03-30 RX ORDER — LEVOTHYROXINE SODIUM 75 UG/1
150 TABLET ORAL
Status: DISCONTINUED | OUTPATIENT
Start: 2022-03-30 | End: 2022-04-08 | Stop reason: HOSPADM

## 2022-03-30 RX ORDER — CLOPIDOGREL BISULFATE 75 MG/1
75 TABLET ORAL DAILY
Status: DISCONTINUED | OUTPATIENT
Start: 2022-03-30 | End: 2022-04-08 | Stop reason: HOSPADM

## 2022-03-30 RX ORDER — LANOLIN ALCOHOL/MO/W.PET/CERES
325 CREAM (GRAM) TOPICAL 2 TIMES DAILY WITH MEALS
Status: DISCONTINUED | OUTPATIENT
Start: 2022-03-30 | End: 2022-04-08 | Stop reason: HOSPADM

## 2022-03-30 RX ORDER — CLOPIDOGREL BISULFATE 75 MG/1
75 TABLET ORAL DAILY
Status: CANCELLED | OUTPATIENT
Start: 2022-03-30

## 2022-03-30 RX ORDER — ENOXAPARIN SODIUM 100 MG/ML
40 INJECTION SUBCUTANEOUS DAILY
Status: DISCONTINUED | OUTPATIENT
Start: 2022-03-30 | End: 2022-04-02 | Stop reason: SDUPTHER

## 2022-03-30 RX ORDER — DULOXETIN HYDROCHLORIDE 30 MG/1
30 CAPSULE, DELAYED RELEASE ORAL DAILY
Status: CANCELLED | OUTPATIENT
Start: 2022-03-30

## 2022-03-30 RX ORDER — SODIUM CHLORIDE, SODIUM LACTATE, POTASSIUM CHLORIDE, CALCIUM CHLORIDE 600; 310; 30; 20 MG/100ML; MG/100ML; MG/100ML; MG/100ML
75 INJECTION, SOLUTION INTRAVENOUS CONTINUOUS
Status: DISCONTINUED | OUTPATIENT
Start: 2022-03-30 | End: 2022-03-31

## 2022-03-30 RX ORDER — LEVOTHYROXINE SODIUM 75 UG/1
150 TABLET ORAL
Status: CANCELLED | OUTPATIENT
Start: 2022-03-30

## 2022-03-30 RX ORDER — DEXTROSE MONOHYDRATE 100 MG/ML
0-250 INJECTION, SOLUTION INTRAVENOUS AS NEEDED
Status: DISCONTINUED | OUTPATIENT
Start: 2022-03-30 | End: 2022-04-08 | Stop reason: HOSPADM

## 2022-03-30 RX ORDER — ONDANSETRON 2 MG/ML
4 INJECTION INTRAMUSCULAR; INTRAVENOUS
Status: DISCONTINUED | OUTPATIENT
Start: 2022-03-30 | End: 2022-04-08 | Stop reason: HOSPADM

## 2022-03-30 RX ADMIN — HYDROMORPHONE HYDROCHLORIDE 0.5 MG: 1 INJECTION, SOLUTION INTRAMUSCULAR; INTRAVENOUS; SUBCUTANEOUS at 12:33

## 2022-03-30 RX ADMIN — CEFTRIAXONE SODIUM 1 G: 1 INJECTION, POWDER, FOR SOLUTION INTRAMUSCULAR; INTRAVENOUS at 09:59

## 2022-03-30 RX ADMIN — METOPROLOL SUCCINATE 25 MG: 25 TABLET, EXTENDED RELEASE ORAL at 08:35

## 2022-03-30 RX ADMIN — SODIUM CHLORIDE, PRESERVATIVE FREE 10 ML: 5 INJECTION INTRAVENOUS at 21:17

## 2022-03-30 RX ADMIN — HYDROMORPHONE HYDROCHLORIDE 0.5 MG: 1 INJECTION, SOLUTION INTRAMUSCULAR; INTRAVENOUS; SUBCUTANEOUS at 06:25

## 2022-03-30 RX ADMIN — INSULIN GLARGINE 16 UNITS: 100 INJECTION, SOLUTION SUBCUTANEOUS at 22:00

## 2022-03-30 RX ADMIN — SODIUM CHLORIDE, PRESERVATIVE FREE 10 ML: 5 INJECTION INTRAVENOUS at 08:39

## 2022-03-30 RX ADMIN — HYDROMORPHONE HYDROCHLORIDE 0.5 MG: 1 INJECTION, SOLUTION INTRAMUSCULAR; INTRAVENOUS; SUBCUTANEOUS at 01:53

## 2022-03-30 RX ADMIN — FERROUS SULFATE TAB 325 MG (65 MG ELEMENTAL FE) 325 MG: 325 (65 FE) TAB at 17:54

## 2022-03-30 RX ADMIN — LEVOTHYROXINE SODIUM 150 MCG: 0.07 TABLET ORAL at 08:34

## 2022-03-30 RX ADMIN — SODIUM CHLORIDE, POTASSIUM CHLORIDE, SODIUM LACTATE AND CALCIUM CHLORIDE 75 ML/HR: 600; 310; 30; 20 INJECTION, SOLUTION INTRAVENOUS at 11:56

## 2022-03-30 RX ADMIN — FERROUS SULFATE TAB 325 MG (65 MG ELEMENTAL FE) 325 MG: 325 (65 FE) TAB at 08:35

## 2022-03-30 RX ADMIN — SODIUM CHLORIDE, POTASSIUM CHLORIDE, SODIUM LACTATE AND CALCIUM CHLORIDE 75 ML/HR: 600; 310; 30; 20 INJECTION, SOLUTION INTRAVENOUS at 23:38

## 2022-03-30 RX ADMIN — DULOXETINE HYDROCHLORIDE 30 MG: 30 CAPSULE, DELAYED RELEASE ORAL at 08:35

## 2022-03-30 RX ADMIN — HYDROMORPHONE HYDROCHLORIDE 0.5 MG: 1 INJECTION, SOLUTION INTRAMUSCULAR; INTRAVENOUS; SUBCUTANEOUS at 19:25

## 2022-03-30 RX ADMIN — INSULIN LISPRO 1 UNITS: 100 INJECTION, SOLUTION INTRAVENOUS; SUBCUTANEOUS at 22:00

## 2022-03-30 RX ADMIN — ATORVASTATIN CALCIUM 10 MG: 10 TABLET, FILM COATED ORAL at 21:16

## 2022-03-30 RX ADMIN — SODIUM CHLORIDE, PRESERVATIVE FREE 10 ML: 5 INJECTION INTRAVENOUS at 14:58

## 2022-03-30 RX ADMIN — OXYCODONE 5 MG: 5 TABLET ORAL at 11:04

## 2022-03-30 RX ADMIN — INSULIN LISPRO 2 UNITS: 100 INJECTION, SOLUTION INTRAVENOUS; SUBCUTANEOUS at 16:30

## 2022-03-30 NOTE — MED STUDENT NOTES
PULMONARY CONSULT  [facility]    Name: Xavier Grant MRN: [id number]   : 1940 Hospital: [address] [place] [address]   Date: 3/30/2022  Admission date: 3/29/2022 Hospital Day: 2       HPI:     Hospital Problems  Never Reviewed          Codes Class Noted POA    Colonic mass ICD-10-CM: A69.31  ICD-9-CM: 569.89  3/29/2022 Unknown        SBO (small bowel obstruction) ([address] ICD-10-CM: C79.926  ICD-9-CM: 560.9  3/29/2022 Unknown        HTN (hypertension), malignant ICD-10-CM: I10  ICD-9-CM: 401.0  3/29/2022 Unknown                   [x] High complexity decision making was performed  [x] See my orders for details      Subjective/Initial History:     I was asked by Monica Darden MD to see Xavier Grant  a [de-identified] y.o.  female in consultation      HPI: Emani Brandt is a [de-identified]year old female PMH of COPD, diabetes, HTN, HLD, Pacemaker, AICD, HX of cardiac stents  who initially presented with right sided abdominal pain. States abdominal discomfort for the last few weeks. While is in the hospital patient received a diagnosis of likely ascending colon cancer with proximal partial bowel obstruction. Patient is on ceftriaxone for UTI. Patient to have possible surgical resection this week  Patient denies shortness of breath, exertional dyspnea, smoking history, wheezing or cough. Patient reports no at home oxygen use and no use of inhalers for COPD. Excerpts from admission 3/29/2022 or consult notes as follows:          Allergies   Allergen Reactions    Nortriptyline Itching    Contrast Agent [Iodine] Unknown (comments)    Cymbalta [Duloxetine] Itching    Ivp [Fd And C Blue No.1] Hives    Morphine Itching    Tetracycline Itching        MAR reviewed and pertinent medications noted or modified as needed     Current Facility-Administered Medications   Medication    glucose chewable tablet 16 g    dextrose 10% infusion 0-250 mL    glucagon (GLUCAGEN) injection 1 mg    sodium chloride (NS) flush 5-40 mL    sodium chloride (NS) flush 5-40 mL    acetaminophen (TYLENOL) tablet 650 mg    Or    acetaminophen (TYLENOL) suppository 650 mg    ondansetron (ZOFRAN ODT) tablet 4 mg    Or    ondansetron (ZOFRAN) injection 4 mg    enoxaparin (LOVENOX) injection 40 mg    insulin glargine (LANTUS) injection 40 Units    insulin lispro (HUMALOG) injection    [Held by provider] aspirin delayed-release tablet 81 mg    [Held by provider] clopidogreL (PLAVIX) tablet 75 mg    DULoxetine (CYMBALTA) capsule 30 mg    ferrous sulfate tablet 325 mg    levothyroxine (SYNTHROID) tablet 150 mcg    metoprolol succinate (TOPROL-XL) XL tablet 25 mg    atorvastatin (LIPITOR) tablet 10 mg    albuterol (PROVENTIL HFA, VENTOLIN HFA, PROAIR HFA) inhaler 2 Puff    cefTRIAXone (ROCEPHIN) 1 g in sterile water (preservative free) 10 mL IV syringe    HYDROmorphone (DILAUDID) syringe 0.5 mg      Patient PCP: Catalina Esquivel MD  PMH:  has a past medical history of Depression, DM (diabetes mellitus) ([facility] [address] Hyperlipidemia, Hypertension, Pancreatitis, and Thyroid disease. PSH:   has a past surgical history that includes hx hysterectomy; ir fluoro guide plc cvad (5/10/2021); ir insert non tunl cvc over 5 yrs (5/10/2021); pr ins new/rplcmt prm pm w/transv eltrd atrial&vent (N/A, 5/11/2021); and hx aortic valve replacement (08/2021). FHX: family history is not on file. SHX:  reports that she has never smoked. She has never used smokeless tobacco. She reports that she does not drink alcohol and does not use drugs. ROS:    Review of Systems   Constitutional: Negative. HENT: Negative. Eyes: Negative. Respiratory: Negative for shortness of breath. Cardiovascular: Negative. Gastrointestinal: Positive for abdominal pain. Genitourinary: Negative. Musculoskeletal: Negative. Skin: Negative. Neurological: Negative. Psychiatric/Behavioral: Negative.          Objective:     Vital Signs: Telemetry: Ventricular-paced rhythm   Left axis deviation   Left ventricular hypertrophy with QRS widening  Intake/Output:   Visit Vitals  BP (!) 119/52   Pulse 86   Temp 98.6 °F (37 °C)   Resp 16   Ht 5' 1\" (1.549 m)   Wt 74.8 kg (165 lb)   SpO2 97%   BMI 31.18 kg/m²       Temp (24hrs), Av.8 °F (37.1 °C), Min:97.8 °F (36.6 °C), Max:99.6 °F (37.6 °C)        O2 Device: None (Room air)         Wt Readings from Last 4 Encounters:   22 74.8 kg (165 lb)   21 74.8 kg (165 lb)   21 78.2 kg (172 lb 6.4 oz)   10/11/10 102.2 kg (225 lb 3.2 oz)          Intake/Output Summary (Last 24 hours) at 3/30/2022 0945  Last data filed at 3/30/2022 0430  Gross per 24 hour   Intake 1100 ml   Output 150 ml   Net 950 ml       Last shift:      No intake/output data recorded. Last 3 shifts:  190 -  0700  In: 1100 [P.O.:100; I.V.:1000]  Out: 150 [Urine:150]       Physical Exam:     Physical Exam  Constitutional:       Appearance: Normal appearance. HENT:      Head: Normocephalic and atraumatic. Nose: Nose normal.      Mouth/Throat:      Mouth: Mucous membranes are moist.   Eyes:      Pupils: Pupils are equal, round, and reactive to light. Cardiovascular:      Rate and Rhythm: Normal rate. Rhythm irregular. Pulses: Normal pulses. Heart sounds: Normal heart sounds. Pulmonary:      Effort: Pulmonary effort is normal.      Breath sounds: Normal breath sounds. Abdominal:      General: There is distension. Tenderness: There is abdominal tenderness. Musculoskeletal:         General: Normal range of motion. Cervical back: Normal range of motion and neck supple. Skin:     General: Skin is warm. Neurological:      General: No focal deficit present. Mental Status: She is alert.    Psychiatric:         Mood and Affect: Mood normal.          Labs:    Recent Labs     22  0900   WBC 12.1*   HGB 11.4*        Recent Labs     22  0900      K 4.2      CO2 28   GLU 161*   BUN 21*   CREA 0.97   CA 9.5   ALB 3.5   ALT 20   LPSE 77     No results for input(s): PH, PCO2, PO2, HCO3, FIO2 in the last 72 hours. No results for input(s): CPK, CKNDX, TROIQ in the last 72 hours. No lab exists for component: CPKMB  No results found for: BNPP, BNP   Lab Results   Component Value Date/Time    Culture result: No Growth (<1000 cfu/mL) 05/18/2021 05:00 PM    Culture result: No growth 6 days 05/18/2021 12:45 PM    Culture result: No growth 6 days 05/04/2021 10:30 AM     Lab Results   Component Value Date/Time    TSH 1.62 05/04/2021 11:12 AM       Imaging:    CXR Results  (Last 48 hours)               03/29/22 0914  XR CHEST PORT Final result    Narrative:  Chest single view. Comparison single view chest May 18, 2021. Lungs clear; no interstitial or alveolar pulmonary edema. Left-sided cardiac   device with similar positioned leads overlying the heart. Cardiac and   mediastinal structures unchanged noting thoracic aorta atherosclerosis. No   pneumothorax or pleural effusion. Bone remodeling related to nonacute fracture proximal left humerus unchanged. Results from [place] encounter on 03/29/22    XR CHEST PORT    Narrative  Chest single view. Comparison single view chest May 18, 2021. Lungs clear; no interstitial or alveolar pulmonary edema. Left-sided cardiac  device with similar positioned leads overlying the heart. Cardiac and  mediastinal structures unchanged noting thoracic aorta atherosclerosis. No  pneumothorax or pleural effusion. Bone remodeling related to nonacute fracture proximal left humerus unchanged. Results from Hospital Encounter encounter on 05/04/21    XR CHEST PA LAT    Narrative  Examination: Chest Radiograph, 2 views    Indication: Leukocytosis    Comparison: Chest Radiograph  5/14/2021    Findings:    Pacing device overlying the left chest. Hypoventilatory changes.  Blunting of the  posterior costophrenic angles which can indicate small pleural effusions or  atelectasis. No pneumothorax. Stable cardiomediastinal silhouette. Impression  Blunting of the posterior costophrenic angle indicates small pleural effusions  or atelectasis. XR SWALLOW FUNC VIDEO    Narrative  One image and 37 seconds    The oral phase was normal with all consistencies. Swallowing is initiated  without delay. Laryngeal epiglottic motion are strong. Minor penetration  occurred on the first swallow but did not recur. There is no aspiration or other  abnormality    Results from Hospital Encounter encounter on 03/29/22    CT ABD PELV WO CONT    Narrative  Exam: CT ABD PELV WO CONT    TECHNIQUE: Multiple transaxial CT images of the abdomen, and pelvis were  obtained without contrast. Coronal and sagittal reformatted images were  provided. Dose reduction: All CT scans at this facility are performed using dose reduction  optimization techniques as appropriate to a performed exam including the  following: Automated exposure control, adjustments of the mA and/or kV according  to patient size, or use of iterative reconstruction technique. COMPARISON: None    HISTORY: pain R sided    FINDINGS:    Chest:  Severe calcifications of the mitral valve annulus. Status post aortic valve  replacement. 3 chamber pacemaker/AICD. Trace bilateral pleural effusions. Lungs  are otherwise grossly clear. Abdomen and pelvis:  Liver: Liver surface contours are mildly nodular. No focal hepatic lesion is  identified. Gallbladder: Gallstones. Gallbladder is nondistended  Biliary system: Nondilated. Pancreas: Unremarkable. Spleen: Unremarkable. Adrenal glands: Normal.  Kidneys and ureters: Kidneys are symmetric in size. Left cortical renal cyst  measures 4.2 x 3.2 cm left parapelvic cyst measures 2.2 cm. Nonobstructing right  renal calculus. No hydronephrosis. Urinary bladder: Normal  Reproductive organs: Surgically absent uterus. No adnexal mass.     Bowel: Colonic diverticulosis. No acute diverticulitis. There is irregular  circumferential thickening (apple core lesion) within the ascending colon  measuring over a segment of approximately 5.5 cm. This is highly concerning for  tumor. There is adjacent stranding and prominence of the pericolonic vessels and  lymph nodes suspicious for lymphovascular invasion. This mass results in at  least mild obstruction as there is a prominent stool burden proximal within the  cecum and terminal ileum. Peritoneum: Small fat-containing periumbilical hernia. Trace free fluid in the right abdomen and pelvis. No pneumoperitoneum. Lymph nodes: No abdominal/pelvic lymphadenopathy. Aorta and other vessels: Calcific atherosclerotic changes within the aorta. No  aneurysm. Bones: No findings of acute or aggressive osseous abnormality. Advanced lumbar  spondylosis. Superficial soft tissues: Unremarkable. Impression  1. Abnormal masslike thickening within the ascending colon highly concerning for  malignancy with suspected lymphovascular invasion. Tumor results in low-grade  obstruction. 2. Trace ascites within the right abdomen. 3. Subtle morphologic changes suggestive of hepatic fibrosis/early cirrhosis. 4. Cholelithiasis. Notification: Findings were discussed with Dr. Bright Ortiz at 11:30 AM on 3/29/2022        IMPRESSION:     1. Chronic Obstructive Pulmonary Disease without excebation  2. Partial large Bowel Obstruction  3. Diabetes Mellitus type 2  4. Essential Hypertension  5. Thyroid Disease   6.  Urinary Tract infection      RECOMMENDATIONS/PLAN:     3 82-year-old lady no history of smoking not on any inhalers or oxygen at home, she had a history of secondhand smoke as her  used to smoke and all her children used to smoke she used to work in the form as a farmer significant history of congestive heart failure ejection fraction about 35 to 40% she had aortic valve replaced previously also she has stent placement in the past chest x-ray no acute infiltrate or lung mass  2. Patient is low risk for acute respiratory distress or issues during surgery, patient is having possible surgery in the upcoming days  3. monitor respiratory status and O2 PRN  4.  Follow up with Pulmonary outpatient as needed for COPD management            Parveen Kwong MD

## 2022-03-30 NOTE — PROGRESS NOTES
Progress Note    Patient: Nazario Walsh MRN: [id number]  SSN: [id number]    YOB: 1940  Age: [de-identified] y.o. Sex: female      Admit Date: 3/29/2022    LOS: 1 day     Subjective:   Hospital day 2 cecal mass with partial obstruction  Patient seen in bed  Continues to report lower abdominal pain  Denies nausea vomiting    Objective:     Vitals:    03/29/22 1934 03/30/22 0148 03/30/22 0624 03/30/22 0714   BP: (!) 103/53 (!) 103/59 (!) 124/58 (!) 119/52   Pulse: 92 93 86 86   Resp: 16 16 16 16   Temp: 99 °F (37.2 °C) 99.6 °F (37.6 °C) 99.2 °F (37.3 °C) 98.6 °F (37 °C)   SpO2: 97% 98% 97% 97%   Weight:       Height:            Intake and Output:  Current Shift: No intake/output data recorded. Last three shifts: 03/28 1901 - 03/30 0700  In: 1100 [P.O.:100;  I.V.:1000]  Out: 150 [Urine:150]    Review of Systems:  ROS     Physical Exam:   Abdomen is soft, nondistended, tender palpation lower abdomen with no rebound or guarding    Lab/Data Review:  Recent Results (from the past 24 hour(s))   EKG, 12 LEAD, INITIAL    Collection Time: 03/29/22  8:53 AM   Result Value Ref Range    Ventricular Rate 74 BPM    Atrial Rate 74 BPM    P-R Interval 140 ms    QRS Duration 140 ms    Q-T Interval 428 ms    QTC Calculation (Bezet) 475 ms    Calculated P Axis 63 degrees    Calculated R Axis -86 degrees    Calculated T Axis 96 degrees    Diagnosis       Ventricular-paced rhythm  Left axis deviation  Left ventricular hypertrophy with QRS widening  T wave abnormality, consider lateral ischemia  Abnormal ECG  When compared with ECG of 14-MAY-2021 18:49,  No significant change was found  Confirmed by Mary Grijalva ([id number]) on 3/30/2022 7:11:42 AM     CBC WITH AUTOMATED DIFF    Collection Time: 03/29/22  9:00 AM   Result Value Ref Range    WBC 12.1 (H) 3.6 - 11.0 K/uL    RBC 3.60 (L) 3.80 - 5.20 M/uL    HGB 11.4 (L) 11.5 - 16.0 g/dL    HCT 35.3 35.0 - 47.0 %    MCV 98.1 80.0 - 99.0 FL    MCH 31.7 26.0 - 34.0 PG    MCHC 32.3 30.0 - 36.5 g/dL    RDW 13.7 11.5 - 14.5 %    PLATELET 206 259 - 053 K/uL    MPV 11.4 8.9 - 12.9 FL    NRBC 0.0 0.0  WBC    ABSOLUTE NRBC 0.00 0.00 - 0.01 K/uL    NEUTROPHILS 77 (H) 32 - 75 %    LYMPHOCYTES 12 12 - 49 %    MONOCYTES 7 5 - 13 %    EOSINOPHILS 3 0 - 7 %    BASOPHILS 1 0 - 1 %    IMMATURE GRANULOCYTES 0 0 - 0.5 %    ABS. NEUTROPHILS 9.3 (H) 1.8 - 8.0 K/UL    ABS. LYMPHOCYTES 1.5 0.8 - 3.5 K/UL    ABS. MONOCYTES 0.8 0.0 - 1.0 K/UL    ABS. EOSINOPHILS 0.3 0.0 - 0.4 K/UL    ABS. BASOPHILS 0.1 0.0 - 0.1 K/UL    ABS. IMM. GRANS. 0.0 0.00 - 0.04 K/UL    DF AUTOMATED     METABOLIC PANEL, COMPREHENSIVE    Collection Time: 03/29/22  9:00 AM   Result Value Ref Range    Sodium 139 136 - 145 mmol/L    Potassium 4.2 3.5 - 5.1 mmol/L    Chloride 106 97 - 108 mmol/L    CO2 28 21 - 32 mmol/L    Anion gap 5 5 - 15 mmol/L    Glucose 161 (H) 65 - 100 mg/dL    BUN 21 (H) 6 - 20 mg/dL    Creatinine 0.97 0.55 - 1.02 mg/dL    BUN/Creatinine ratio 22 (H) 12 - 20      GFR est AA >60 >60 ml/min/1.73m2    GFR est non-AA 55 (L) >60 ml/min/1.73m2    Calcium 9.5 8.5 - 10.1 mg/dL    Bilirubin, total 0.6 0.2 - 1.0 mg/dL    AST (SGOT) 34 15 - 37 U/L    ALT (SGPT) 20 12 - 78 U/L    Alk.  phosphatase 57 45 - 117 U/L    Protein, total 7.0 6.4 - 8.2 g/dL    Albumin 3.5 3.5 - 5.0 g/dL    Globulin 3.5 2.0 - 4.0 g/dL    A-G Ratio 1.0 (L) 1.1 - 2.2     TROPONIN-HIGH SENSITIVITY    Collection Time: 03/29/22  9:00 AM   Result Value Ref Range    Troponin-High Sensitivity 25 0 - 51 ng/L   LIPASE    Collection Time: 03/29/22  9:00 AM   Result Value Ref Range    Lipase 77 73 - 393 U/L   URINALYSIS W/ RFLX MICROSCOPIC    Collection Time: 03/29/22  9:00 AM   Result Value Ref Range    Color Yellow/Straw      Appearance Clear Clear      Specific gravity 1.011 1.003 - 1.030      pH (UA) 8.0 5.0 - 8.0      Protein Negative Negative mg/dL    Glucose Negative Negative mg/dL    Ketone Negative Negative mg/dL    Bilirubin Negative Negative      Blood Negative Negative      Urobilinogen 0.1 0.1 - 1.0 EU/dL    Nitrites Positive (A) Negative      Leukocyte Esterase Negative Negative      WBC 5-10 0 - 4 /hpf    RBC 0-5 0 - 5 /hpf    Bacteria Negative Negative /hpf   URINE MICROSCOPIC    Collection Time: 03/29/22  9:00 AM   Result Value Ref Range    WBC 5-10 0 - 4 /hpf    RBC 0-5 0 - 5 /hpf    Bacteria Negative Negative /hpf    Mucus Negative Negative /lpf   MRSA SCREEN - PCR (NASAL)    Collection Time: 03/29/22  4:06 PM   Result Value Ref Range    MRSA by PCR, Nasal Not Detected Not Detected     GLUCOSE, POC    Collection Time: 03/30/22  7:54 AM   Result Value Ref Range    Glucose (POC) 129 (H) 65 - 117 mg/dL    Performed by Edwina FUNG           Assessment:     Active Problems:    Colonic mass (3/29/2022)      SBO (small bowel obstruction) ([address] (3/29/2022)      HTN (hypertension), malignant (3/29/2022)        Plan:   Plan for surgery for resection, cardiology seeing patient today for risk stratification. Pulmonary to see patient as well for history of COPD for clearance for surgery   patient has been on dual antiplatelet therapy, continue to hold Brilinta.   Continue clear liquids only  Follow-up a.m. labs not drawn yet      Signed By: Phillip Aguilar MD     March 30, 2022

## 2022-03-30 NOTE — MED STUDENT NOTES
PULMONARY CONSULT  [facility]    Name: Charley Yeager MRN: [id number]   : 1940 Hospital: [address] [place] [address]   Date: 3/30/2022  Admission date: 3/29/2022 Hospital Day: 2       HPI:     Hospital Problems  Never Reviewed          Codes Class Noted POA    Colonic mass ICD-10-CM: B57.53  ICD-9-CM: 569.89  3/29/2022 Unknown        SBO (small bowel obstruction) ([facility] [address] ICD-10-CM: X46.341  ICD-9-CM: 560.9  3/29/2022 Unknown        HTN (hypertension), malignant ICD-10-CM: I10  ICD-9-CM: 401.0  3/29/2022 Unknown                   [x] High complexity decision making was performed  [x] See my orders for details      Subjective/Initial History:     I was asked by Rylan Mcleod MD to see Charley Yeager  a [de-identified] y.o.  female in consultation      HPI: Nikkysherrell Leiva is a [de-identified]year old female PMH of COPD, diabetes, HTN, HLD, Pacemaker, AICD, HX of cardiac stents  who initially presented with right sided abdominal pain. States abdominal discomfort for the last few weeks. While is in the hospital patient received a diagnosis of likely ascending colon cancer with proximal partial bowel obstruction. Patient is on ceftriaxone for UTI. Patient to have possible surgical resection this week  Patient denies shortness of breath, exertional dyspnea, smoking history, wheezing or cough. Patient reports no at home oxygen use and no use of inhalers for COPD. Excerpts from admission 3/29/2022 or consult notes as follows:          Allergies   Allergen Reactions    Nortriptyline Itching    Contrast Agent [Iodine] Unknown (comments)    Cymbalta [Duloxetine] Itching    Ivp [Fd And C Blue No.1] Hives    Morphine Itching    Tetracycline Itching        MAR reviewed and pertinent medications noted or modified as needed     Current Facility-Administered Medications   Medication    glucose chewable tablet 16 g    dextrose 10% infusion 0-250 mL    glucagon (GLUCAGEN) injection 1 mg    sodium chloride (NS) flush 5-40 mL    sodium chloride (NS) flush 5-40 mL    acetaminophen (TYLENOL) tablet 650 mg    Or    acetaminophen (TYLENOL) suppository 650 mg    ondansetron (ZOFRAN ODT) tablet 4 mg    Or    ondansetron (ZOFRAN) injection 4 mg    enoxaparin (LOVENOX) injection 40 mg    insulin glargine (LANTUS) injection 40 Units    insulin lispro (HUMALOG) injection    [Held by provider] aspirin delayed-release tablet 81 mg    [Held by provider] clopidogreL (PLAVIX) tablet 75 mg    DULoxetine (CYMBALTA) capsule 30 mg    ferrous sulfate tablet 325 mg    levothyroxine (SYNTHROID) tablet 150 mcg    metoprolol succinate (TOPROL-XL) XL tablet 25 mg    atorvastatin (LIPITOR) tablet 10 mg    albuterol (PROVENTIL HFA, VENTOLIN HFA, PROAIR HFA) inhaler 2 Puff    cefTRIAXone (ROCEPHIN) 1 g in sterile water (preservative free) 10 mL IV syringe    HYDROmorphone (DILAUDID) syringe 0.5 mg      Patient PCP: Bacilio Kunz MD  PMH:  has a past medical history of Depression, DM (diabetes mellitus) ([address] Hyperlipidemia, Hypertension, Pancreatitis, and Thyroid disease. PSH:   has a past surgical history that includes hx hysterectomy; ir fluoro guide plc cvad (5/10/2021); ir insert non tunl cvc over 5 yrs (5/10/2021); pr ins new/rplcmt prm pm w/transv eltrd atrial&vent (N/A, 2021); and hx aortic valve replacement (2021). FHX: family history is not on file. SHX:  reports that she has never smoked. She has never used smokeless tobacco. She reports that she does not drink alcohol and does not use drugs.      ROS:    ROS     Objective:     Vital Signs: Telemetry:   Ventricular-paced rhythm   Left axis deviation   Left ventricular hypertrophy with QRS widening  Intake/Output:   Visit Vitals  BP (!) 119/52   Pulse 86   Temp 98.6 °F (37 °C)   Resp 16   Ht 5' 1\" (1.549 m)   Wt 165 lb (74.8 kg)   SpO2 97%   BMI 31.18 kg/m²       Temp (24hrs), Av.8 °F (37.1 °C), Min:97.8 °F (36.6 °C), Max:99.6 °F (37.6 °C)        O2 Device: None (Room air)         Wt Readings from Last 4 Encounters:   03/29/22 165 lb (74.8 kg)   07/30/21 165 lb (74.8 kg)   05/16/21 172 lb 6.4 oz (78.2 kg)   10/11/10 225 lb 3.2 oz (102.2 kg)          Intake/Output Summary (Last 24 hours) at 3/30/2022 0918  Last data filed at 3/30/2022 0430  Gross per 24 hour   Intake 1100 ml   Output 150 ml   Net 950 ml       Last shift:      No intake/output data recorded. Last 3 shifts: 03/28 1901 - 03/30 0700  In: 1100 [P.O.:100; I.V.:1000]  Out: 150 [Urine:150]       Physical Exam:     Physical Exam     Labs:    Recent Labs     03/29/22  0900   WBC 12.1*   HGB 11.4*        Recent Labs     03/29/22  0900      K 4.2      CO2 28   *   BUN 21*   CREA 0.97   CA 9.5   ALB 3.5   ALT 20   LPSE 77     No results for input(s): PH, PCO2, PO2, HCO3, FIO2 in the last 72 hours. No results for input(s): CPK, CKNDX, TROIQ in the last 72 hours. No lab exists for component: CPKMB  No results found for: BNPP, BNP   Lab Results   Component Value Date/Time    Culture result: No Growth (<1000 cfu/mL) 05/18/2021 05:00 PM    Culture result: No growth 6 days 05/18/2021 12:45 PM    Culture result: No growth 6 days 05/04/2021 10:30 AM     Lab Results   Component Value Date/Time    TSH 1.62 05/04/2021 11:12 AM       Imaging:    CXR Results  (Last 48 hours)               03/29/22 0914  XR CHEST PORT Final result    Narrative:  Chest single view. Comparison single view chest May 18, 2021. Lungs clear; no interstitial or alveolar pulmonary edema. Left-sided cardiac   device with similar positioned leads overlying the heart. Cardiac and   mediastinal structures unchanged noting thoracic aorta atherosclerosis. No   pneumothorax or pleural effusion. Bone remodeling related to nonacute fracture proximal left humerus unchanged. Results from [place] encounter on 03/29/22    XR CHEST PORT    Narrative  Chest single view.     Comparison single view chest May 18, 2021. Lungs clear; no interstitial or alveolar pulmonary edema. Left-sided cardiac  device with similar positioned leads overlying the heart. Cardiac and  mediastinal structures unchanged noting thoracic aorta atherosclerosis. No  pneumothorax or pleural effusion. Bone remodeling related to nonacute fracture proximal left humerus unchanged. Results from Hospital Encounter encounter on 05/04/21    XR CHEST PA LAT    Narrative  Examination: Chest Radiograph, 2 views    Indication: Leukocytosis    Comparison: Chest Radiograph  5/14/2021    Findings:    Pacing device overlying the left chest. Hypoventilatory changes. Blunting of the  posterior costophrenic angles which can indicate small pleural effusions or  atelectasis. No pneumothorax. Stable cardiomediastinal silhouette. Impression  Blunting of the posterior costophrenic angle indicates small pleural effusions  or atelectasis. XR SWALLOW FUNC VIDEO    Narrative  One image and 37 seconds    The oral phase was normal with all consistencies. Swallowing is initiated  without delay. Laryngeal epiglottic motion are strong. Minor penetration  occurred on the first swallow but did not recur. There is no aspiration or other  abnormality    Results from Hospital Encounter encounter on 03/29/22    CT ABD PELV WO CONT    Narrative  Exam: CT ABD PELV WO CONT    TECHNIQUE: Multiple transaxial CT images of the abdomen, and pelvis were  obtained without contrast. Coronal and sagittal reformatted images were  provided. Dose reduction: All CT scans at this facility are performed using dose reduction  optimization techniques as appropriate to a performed exam including the  following: Automated exposure control, adjustments of the mA and/or kV according  to patient size, or use of iterative reconstruction technique. COMPARISON: None    HISTORY: pain R sided    FINDINGS:    Chest:  Severe calcifications of the mitral valve annulus.  Status post aortic valve  replacement. 3 chamber pacemaker/AICD. Trace bilateral pleural effusions. Lungs  are otherwise grossly clear. Abdomen and pelvis:  Liver: Liver surface contours are mildly nodular. No focal hepatic lesion is  identified. Gallbladder: Gallstones. Gallbladder is nondistended  Biliary system: Nondilated. Pancreas: Unremarkable. Spleen: Unremarkable. Adrenal glands: Normal.  Kidneys and ureters: Kidneys are symmetric in size. Left cortical renal cyst  measures 4.2 x 3.2 cm left parapelvic cyst measures 2.2 cm. Nonobstructing right  renal calculus. No hydronephrosis. Urinary bladder: Normal  Reproductive organs: Surgically absent uterus. No adnexal mass. Bowel: Colonic diverticulosis. No acute diverticulitis. There is irregular  circumferential thickening (apple core lesion) within the ascending colon  measuring over a segment of approximately 5.5 cm. This is highly concerning for  tumor. There is adjacent stranding and prominence of the pericolonic vessels and  lymph nodes suspicious for lymphovascular invasion. This mass results in at  least mild obstruction as there is a prominent stool burden proximal within the  cecum and terminal ileum. Peritoneum: Small fat-containing periumbilical hernia. Trace free fluid in the right abdomen and pelvis. No pneumoperitoneum. Lymph nodes: No abdominal/pelvic lymphadenopathy. Aorta and other vessels: Calcific atherosclerotic changes within the aorta. No  aneurysm. Bones: No findings of acute or aggressive osseous abnormality. Advanced lumbar  spondylosis. Superficial soft tissues: Unremarkable. Impression  1. Abnormal masslike thickening within the ascending colon highly concerning for  malignancy with suspected lymphovascular invasion. Tumor results in low-grade  obstruction. 2. Trace ascites within the right abdomen. 3. Subtle morphologic changes suggestive of hepatic fibrosis/early cirrhosis. 4. Cholelithiasis.     Notification: Findings were discussed with Dr. Patsy Mcintyre at 11:30 AM on 3/29/2022        IMPRESSION:     1. Chronic Obstructive Pulmonary Disease without excebation  2. Partial large Bowel Obstruction  3. Diabetes Mellitus type 2  4. Essential Hypertension  5. Thyroid Disease   6. Urinary Tract infection      RECOMMENDATIONS/PLAN:     1. Patient is low risk for acute respiratory distress or issues during surgery, patient is having possible surgery in the upcoming days  2. monitor respiratory status and O2 PRN  3.  Follow up with Pulmonary outpatient as needed for COPD management              Shun Garcia   S-4  Patient management is followed by Dr. Joanne Ascencio

## 2022-03-30 NOTE — PROGRESS NOTES
Case Management reviewed chart. Patient awaiting clearance for surgical procedure. PT and OT are holding off until after procedure and then will evaluate for discharge planning. Original plan was home self, anticipating at very least home with home health but will wait for after procedure and recommendations.     DC plan TBD

## 2022-03-30 NOTE — PROGRESS NOTES
OT evaluation order received and acknowledged. Per medical chart, pt admitted for cecal mass with partial obstruction and is pending surgical resection. OT to hold at this time. Will continue to follow and re-attempt OT eval following surgical intervention as medically appropriate. Thank you.

## 2022-03-30 NOTE — CONSULTS
Consult    Patient: Soren Thapa MRN: [id number]  SSN: [id number]    YOB: 1940  Age: [de-identified] y.o. Sex: female      Subjective:      Soren Thapa is a [de-identified] y.o. female who is being seen for preoperative clearance. Patient is an 72-year-old white female who was admitted in May 2021 with congestive heart failure. She has a history of depression, diabetes mellitus, hyperlipidemia, hypertension who presented with worsening shortness of breath. She has a history of COPD. Echocardiogram showed EF of 35 to 40% with apical hypokinesis, grade 1 diastolic dysfunction, mild TR with moderate mitral annular calcification, mild mitral stenosis, aortic valve area of 0.7 cm² with mean gradient of 23 mmHg, and cardiac catheterization subsequently showed calcified mid LAD disease in the 60% range with positive IFR that was treated with 3.5 x 22 mm drug-eluting stent, ejection fraction was 35 to 40%. Minimal irregularities of RCA, pulmonary capillary wedge pressure was elevated as well as LVEDP, aortic stenosis with peak to peak gradient of 47 mmHg. 2 days later after PCI of the LAD she went to complete heart block with ventricular escape rhythm and subsequently cardiac catheterization revealed a patent stent and that eventually she received dual-chamber [employer] pacemaker. Blood count was down to 7.8 and she received 1 unit of blood. Patient was in her usual state of health until Monday morning when she started complaining of right lower quadrant pain. It is one of the severe and worst pain she ever experienced before. It was intermittent. She had a bowel movement about 2 days ago. She denied any chest pain or shortness of breath, palpitation, dizziness or syncope. She denied orthopnea or PND. She denied lower extremity swelling. Her volume status has been well controlled since she received her TAVR.     Past Medical History:   Diagnosis Date    Depression     DM (diabetes mellitus) ([facility] [address]     Hyperlipidemia     Hypertension     Pancreatitis     Thyroid disease      Past Surgical History:   Procedure Laterality Date    HX AORTIC VALVE REPLACEMENT  08/2021    HX HYSTERECTOMY      IR FLUORO GUIDE PLC CVAD  5/10/2021    IR INSERT NON TUNL CVC OVER 5 YRS  5/10/2021    NE INS NEW/RPLCMT PRM PM W/TRANSV ELTRD ATRIAL&VENT N/A 5/11/2021    INSERT PPM BIV MULTI performed by Elliott Dang MD at [address] [place] [address]      History reviewed. No pertinent family history.   Social History     Tobacco Use    Smoking status: Never Smoker    Smokeless tobacco: Never Used   Substance Use Topics    Alcohol use: No      Current Facility-Administered Medications   Medication Dose Route Frequency Provider Last Rate Last Admin    glucose chewable tablet 16 g  4 Tablet Oral PRN Ekta Euceda PA        dextrose 10% infusion 0-250 mL  0-250 mL IntraVENous PRN Ekta Euceda PA        glucagon (GLUCAGEN) injection 1 mg  1 mg IntraMUSCular PRN Ekta Euceda PA        sodium chloride (NS) flush 5-40 mL  5-40 mL IntraVENous Q8H Ekta Euceda [address]        sodium chloride (NS) flush 5-40 mL  5-40 mL IntraVENous PRN Ekta Euceda PA        acetaminophen (TYLENOL) tablet 650 mg  650 mg Oral Q6H PRN Ekta Euceda PA        Or    acetaminophen (TYLENOL) suppository 650 mg  650 mg Rectal Q6H PRN Ekta Euceda PA        ondansetron (ZOFRAN ODT) tablet 4 mg  4 mg Oral Q8H PRN Ekta Euceda PA        Or    ondansetron (ZOFRAN) injection 4 mg  4 mg IntraVENous Q6H PRN Ekta Euceda PA        enoxaparin (LOVENOX) injection 40 mg  40 mg SubCUTAneous DAILY Ekta Euceda [address]        insulin glargine (LANTUS) injection 40 Units  40 Units SubCUTAneous QHS Ekta Euceda [address]        insulin lispro (HUMALOG) injection   SubCUTAneous AC&HS Ekta Euceda [address]        [Held by provider] aspirin delayed-release tablet 81 mg  81 mg Oral DAILY Yasir Euceda, PA        [Held by provider] clopidogreL (PLAVIX) tablet 75 mg  75 mg Oral DAILY Ekta Euceda [place]        DULoxetine (CYMBALTA) capsule 30 mg  30 mg Oral DAILY Ekta Euceda [place]        ferrous sulfate tablet 325 mg  325 mg Oral BID WITH MEALS Ekta Euceda [place]        levothyroxine (SYNTHROID) tablet 150 mcg  150 mcg Oral ACB Ekta Euceda [place]        metoprolol succinate (TOPROL-XL) XL tablet 25 mg  25 mg Oral DAILY Ekta Euceda PA        atorvastatin (LIPITOR) tablet 10 mg  10 mg Oral QHS Ekta Euceda PA        albuterol (PROVENTIL HFA, VENTOLIN HFA, PROAIR HFA) inhaler 2 Puff  2 Puff Inhalation Q6H PRN Ekta Euceda PA        cefTRIAXone (ROCEPHIN) 1 g in sterile water (preservative free) 10 mL IV syringe  1 g IntraVENous Q24H Isi Stone MD        HYDROmorphone (DILAUDID) syringe 0.5 mg  0.5 mg IntraVENous Q6H PRN Isi Stone MD   0.5 mg at 03/30/22 3832        Allergies   Allergen Reactions    Nortriptyline Itching    Contrast Agent [Iodine] Unknown (comments)    Cymbalta [Duloxetine] Itching    Ivp [Fd And C Blue No.1] Hives    Morphine Itching    Tetracycline Itching       Review of Systems:  A comprehensive review of systems was negative except for that written in the History of Present Illness. Objective:     Vitals:    03/29/22 1934 03/30/22 0148 03/30/22 0624 03/30/22 0714   BP: (!) 103/53 (!) 103/59 (!) 124/58 (!) 119/52   Pulse: 92 93 86 86   Resp: 16 16 16 16   Temp: 99 °F (37.2 °C) 99.6 °F (37.6 °C) 99.2 °F (37.3 °C) 98.6 °F (37 °C)   SpO2: 97% 98% 97% 97%   Weight:       Height:            Physical Exam:  General:  Alert, cooperative, no distress, appears stated age. Eyes:  Conjunctivae/corneas clear. PERRL, EOMs intact. Fundi benign   Ears:  Normal TMs and external ear canals both ears. Nose: Nares normal. Septum midline. Mucosa normal. No drainage or sinus tenderness.    Mouth/Throat: Lips, mucosa, and tongue normal. Teeth and gums normal.   Neck: Supple, symmetrical, trachea midline, no adenopathy, thyroid: no enlargment/tenderness/nodules, no carotid bruit and no JVD. Back:   Symmetric, no curvature. ROM normal. No CVA tenderness. Lungs:   Clear to auscultation bilaterally. Heart:  Regular rate and rhythm, S1, S2 normal, no murmur, click, rub or gallop. Abdomen:   Soft, non-tender. Bowel sounds normal. No masses,  No organomegaly. Extremities: Extremities normal, atraumatic, no cyanosis or edema. Pulses: 2+ and symmetric all extremities. Skin: Skin color, texture, turgor normal. No rashes or lesions   Lymph nodes: Cervical, supraclavicular, and axillary nodes normal.   Neurologic: CNII-XII intact. Normal strength, sensation and reflexes throughout. Assessment:     Hospital Problems  Never Reviewed          Codes Class Noted POA    Colonic mass ICD-10-CM: Q08.28  ICD-9-CM: 569.89  3/29/2022 Unknown        SBO (small bowel obstruction) ([facility] [address] ICD-10-CM: L44.149  ICD-9-CM: 560.9  3/29/2022 Unknown        HTN (hypertension), malignant ICD-10-CM: I10  ICD-9-CM: 401.0  3/29/2022 Unknown            Patient is an 63-year-old white female with:  1. Heart failure with reduced ejection fraction  2. Coronary disease status post PCI of LAD May 2021  3. Severe aortic stenosis status post TAVR  4. Hypertension with hypertensive heart disease  5. Sick sinus syndrome status post dual-chamber pacemaker May 2021  6. Peripheral vascular disease  7. Fracture deformity of proximal left humerus  8. Diabetes mellitus  9. Hypothyroidism  10. COPD  6. Depression    Plan:     Patient is laying flat and appears to be comfortable. She appears to be euvolemic. Creatinine is 0.9, BUN 21. Sodium 139 and potassium is 4.2. Troponin is negative. Hemoglobin 11.4, white count 12.1. CT scan results noted.   She was noticed to have abnormal masslike thickening within the ascending colon concerning for malignancy with suspected lymphovascular invasion. Low-grade obstruction. Trace ascites. Cholelithiasis. Last echo from September of last year showed moderate LVH, EF of 50 to 55% with grade 2 diastolic dysfunction. We will repeat echocardiogram    She is low to moderate risk for nonvascular surgery. Okay to proceed as indicated. Continue aspirin per my discussion with surgeon. I agree to hold off the second antiplatelet agent. Her stent was back in May of last year. Currently on metoprolol and atorvastatin I will continue    Thank you  For involving me in Mrs. Logan pinedo.     Signed By: Jana Rosales MD     March 30, 2022

## 2022-03-30 NOTE — PROGRESS NOTES
Problem: Falls - Risk of  Goal: *Absence of Falls  Description: Document Arnol Jane Fall Risk and appropriate interventions in the flowsheet.   Outcome: Progressing Towards Goal  Note: Fall Risk Interventions:  Mobility Interventions: Patient to call before getting OOB    Elimination Interventions: Call light in reach

## 2022-03-30 NOTE — PROGRESS NOTES
Hospitalist Progress Note    Subjective:   Daily Progress Note: 3/30/2022 7:38 AM    Patient was able to tolerate clear liquid diet today. Patient says that the pain is somewhat controlled but does have some breakthrough in between scheduled doses. Patient denies nausea, vomiting, diarrhea, shortness of breath, chest pain or dizziness. Current Facility-Administered Medications   Medication Dose Route Frequency    glucose chewable tablet 16 g  4 Tablet Oral PRN    dextrose 10% infusion 0-250 mL  0-250 mL IntraVENous PRN    glucagon (GLUCAGEN) injection 1 mg  1 mg IntraMUSCular PRN    sodium chloride (NS) flush 5-40 mL  5-40 mL IntraVENous Q8H    sodium chloride (NS) flush 5-40 mL  5-40 mL IntraVENous PRN    acetaminophen (TYLENOL) tablet 650 mg  650 mg Oral Q6H PRN    Or    acetaminophen (TYLENOL) suppository 650 mg  650 mg Rectal Q6H PRN    polyethylene glycol (MIRALAX) packet 17 g  17 g Oral DAILY PRN    ondansetron (ZOFRAN ODT) tablet 4 mg  4 mg Oral Q8H PRN    Or    ondansetron (ZOFRAN) injection 4 mg  4 mg IntraVENous Q6H PRN    enoxaparin (LOVENOX) injection 40 mg  40 mg SubCUTAneous DAILY    insulin glargine (LANTUS) injection 40 Units  40 Units SubCUTAneous QHS    insulin lispro (HUMALOG) injection   SubCUTAneous AC&HS    [Held by provider] aspirin delayed-release tablet 81 mg  81 mg Oral DAILY    [Held by provider] clopidogreL (PLAVIX) tablet 75 mg  75 mg Oral DAILY    DULoxetine (CYMBALTA) capsule 30 mg  30 mg Oral DAILY    ferrous sulfate tablet 325 mg  325 mg Oral BID WITH MEALS    levothyroxine (SYNTHROID) tablet 150 mcg  150 mcg Oral ACB    metoprolol succinate (TOPROL-XL) XL tablet 25 mg  25 mg Oral DAILY    . PHARMACY TO SUBSTITUTE PER PROTOCOL (Reordered from: lovastatin (MEVACOR) 40 mg tablet)    Per Protocol    albuterol (PROVENTIL HFA, VENTOLIN HFA, PROAIR HFA) inhaler 2 Puff  2 Puff Inhalation Q6H PRN    cefTRIAXone (ROCEPHIN) 1 g in sterile water (preservative free) 10 mL IV syringe  1 g IntraVENous Q24H    HYDROmorphone (DILAUDID) syringe 0.5 mg  0.5 mg IntraVENous Q6H PRN        Review of Systems  Review of Systems   Constitutional: Negative. Respiratory: Negative. Cardiovascular: Negative. Gastrointestinal: Positive for abdominal pain. Negative for nausea and vomiting. Neurological:        + gait instability   All other systems reviewed and are negative. Objective:     Visit Vitals  BP (!) 119/52   Pulse 86   Temp 98.6 °F (37 °C)   Resp 16   Ht 5' 1\" (1.549 m)   Wt 74.8 kg (165 lb)   SpO2 97%   BMI 31.18 kg/m²      O2 Device: None (Room air)    Temp (24hrs), Av.8 °F (37.1 °C), Min:97.8 °F (36.6 °C), Max:99.6 °F (37.6 °C)      No intake/output data recorded.  1901 -  0700  In: 1100 [P.O.:100;  I.V.:1000]  Out: 150 [Urine:150]    Recent Results (from the past 24 hour(s))   EKG, 12 LEAD, INITIAL    Collection Time: 22  8:53 AM   Result Value Ref Range    Ventricular Rate 74 BPM    Atrial Rate 74 BPM    P-R Interval 140 ms    QRS Duration 140 ms    Q-T Interval 428 ms    QTC Calculation (Bezet) 475 ms    Calculated P Axis 63 degrees    Calculated R Axis -86 degrees    Calculated T Axis 96 degrees    Diagnosis       Ventricular-paced rhythm  Left axis deviation  Left ventricular hypertrophy with QRS widening  T wave abnormality, consider lateral ischemia  Abnormal ECG  When compared with ECG of 14-MAY-2021 18:49,  No significant change was found  Confirmed by Beronica Starkey ([id number]) on 3/30/2022 7:11:42 AM     CBC WITH AUTOMATED DIFF    Collection Time: 22  9:00 AM   Result Value Ref Range    WBC 12.1 (H) 3.6 - 11.0 K/uL    RBC 3.60 (L) 3.80 - 5.20 M/uL    HGB 11.4 (L) 11.5 - 16.0 g/dL    HCT 35.3 35.0 - 47.0 %    MCV 98.1 80.0 - 99.0 FL    MCH 31.7 26.0 - 34.0 PG    MCHC 32.3 30.0 - 36.5 g/dL    RDW 13.7 11.5 - 14.5 %    PLATELET 868 537 - 639 K/uL    MPV 11.4 8.9 - 12.9 FL    NRBC 0.0 0.0  WBC    ABSOLUTE NRBC 0.00 0.00 - 0.01 K/uL    NEUTROPHILS 77 (H) 32 - 75 %    LYMPHOCYTES 12 12 - 49 %    MONOCYTES 7 5 - 13 %    EOSINOPHILS 3 0 - 7 %    BASOPHILS 1 0 - 1 %    IMMATURE GRANULOCYTES 0 0 - 0.5 %    ABS. NEUTROPHILS 9.3 (H) 1.8 - 8.0 K/UL    ABS. LYMPHOCYTES 1.5 0.8 - 3.5 K/UL    ABS. MONOCYTES 0.8 0.0 - 1.0 K/UL    ABS. EOSINOPHILS 0.3 0.0 - 0.4 K/UL    ABS. BASOPHILS 0.1 0.0 - 0.1 K/UL    ABS. IMM. GRANS. 0.0 0.00 - 0.04 K/UL    DF AUTOMATED     METABOLIC PANEL, COMPREHENSIVE    Collection Time: 03/29/22  9:00 AM   Result Value Ref Range    Sodium 139 136 - 145 mmol/L    Potassium 4.2 3.5 - 5.1 mmol/L    Chloride 106 97 - 108 mmol/L    CO2 28 21 - 32 mmol/L    Anion gap 5 5 - 15 mmol/L    Glucose 161 (H) 65 - 100 mg/dL    BUN 21 (H) 6 - 20 mg/dL    Creatinine 0.97 0.55 - 1.02 mg/dL    BUN/Creatinine ratio 22 (H) 12 - 20      GFR est AA >60 >60 ml/min/1.73m2    GFR est non-AA 55 (L) >60 ml/min/1.73m2    Calcium 9.5 8.5 - 10.1 mg/dL    Bilirubin, total 0.6 0.2 - 1.0 mg/dL    AST (SGOT) 34 15 - 37 U/L    ALT (SGPT) 20 12 - 78 U/L    Alk.  phosphatase 57 45 - 117 U/L    Protein, total 7.0 6.4 - 8.2 g/dL    Albumin 3.5 3.5 - 5.0 g/dL    Globulin 3.5 2.0 - 4.0 g/dL    A-G Ratio 1.0 (L) 1.1 - 2.2     TROPONIN-HIGH SENSITIVITY    Collection Time: 03/29/22  9:00 AM   Result Value Ref Range    Troponin-High Sensitivity 25 0 - 51 ng/L   LIPASE    Collection Time: 03/29/22  9:00 AM   Result Value Ref Range    Lipase 77 73 - 393 U/L   URINALYSIS W/ RFLX MICROSCOPIC    Collection Time: 03/29/22  9:00 AM   Result Value Ref Range    Color Yellow/Straw      Appearance Clear Clear      Specific gravity 1.011 1.003 - 1.030      pH (UA) 8.0 5.0 - 8.0      Protein Negative Negative mg/dL    Glucose Negative Negative mg/dL    Ketone Negative Negative mg/dL    Bilirubin Negative Negative      Blood Negative Negative      Urobilinogen 0.1 0.1 - 1.0 EU/dL    Nitrites Positive (A) Negative      Leukocyte Esterase Negative Negative      WBC 5-10 0 - 4 /hpf    RBC 0-5 0 - 5 /hpf    Bacteria Negative Negative /hpf   URINE MICROSCOPIC    Collection Time: 03/29/22  9:00 AM   Result Value Ref Range    WBC 5-10 0 - 4 /hpf    RBC 0-5 0 - 5 /hpf    Bacteria Negative Negative /hpf    Mucus Negative Negative /lpf   MRSA SCREEN - PCR (NASAL)    Collection Time: 03/29/22  4:06 PM   Result Value Ref Range    MRSA by PCR, Nasal Not Detected Not Detected     GLUCOSE, POC    Collection Time: 03/30/22  7:54 AM   Result Value Ref Range    Glucose (POC) 129 (H) 65 - 117 mg/dL    [facility]         CT ABD PELV WO CONT   Final Result   1. Abnormal masslike thickening within the ascending colon highly concerning for   malignancy with suspected lymphovascular invasion. Tumor results in low-grade   obstruction. 2. Trace ascites within the right abdomen. 3. Subtle morphologic changes suggestive of hepatic fibrosis/early cirrhosis. 4. Cholelithiasis. Notification: Findings were discussed with Dr. Rufino Holland at 11:30 AM on 3/29/2022      XR CHEST PORT   Final Result           PHYSICAL EXAM:    Physical Exam  Vitals and nursing note reviewed. Constitutional:       Appearance: She is obese. HENT:      Head: Normocephalic and atraumatic. Mouth/Throat:      Mouth: Mucous membranes are moist.      Comments: Poor dentition  Cardiovascular:      Rate and Rhythm: Normal rate and regular rhythm. Pulmonary:      Effort: No respiratory distress. Breath sounds: No wheezing. Abdominal:      General: Bowel sounds are normal. There is no distension. Palpations: Abdomen is soft. There is no mass. Tenderness: There is abdominal tenderness. Comments: Tenderness to palpation of right upper and lower quadrant   Musculoskeletal:      Right lower leg: No edema. Left lower leg: No edema. Skin:     General: Skin is warm. Neurological:      Mental Status: She is alert and oriented to person, place, and time.    Psychiatric:         Mood and Affect: Mood normal. Data Review    Recent Results (from the past 24 hour(s))   EKG, 12 LEAD, INITIAL    Collection Time: 03/29/22  8:53 AM   Result Value Ref Range    Ventricular Rate 74 BPM    Atrial Rate 74 BPM    P-R Interval 140 ms    QRS Duration 140 ms    Q-T Interval 428 ms    QTC Calculation (Bezet) 475 ms    Calculated P Axis 63 degrees    Calculated R Axis -86 degrees    Calculated T Axis 96 degrees    Diagnosis       Ventricular-paced rhythm  Left axis deviation  Left ventricular hypertrophy with QRS widening  T wave abnormality, consider lateral ischemia  Abnormal ECG  When compared with ECG of 14-MAY-2021 18:49,  No significant change was found  Confirmed by Reymundo Rodas ([id number]) on 3/30/2022 7:11:42 AM     CBC WITH AUTOMATED DIFF    Collection Time: 03/29/22  9:00 AM   Result Value Ref Range    WBC 12.1 (H) 3.6 - 11.0 K/uL    RBC 3.60 (L) 3.80 - 5.20 M/uL    HGB 11.4 (L) 11.5 - 16.0 g/dL    HCT 35.3 35.0 - 47.0 %    MCV 98.1 80.0 - 99.0 FL    MCH 31.7 26.0 - 34.0 PG    MCHC 32.3 30.0 - 36.5 g/dL    RDW 13.7 11.5 - 14.5 %    PLATELET 508 376 - 910 K/uL    MPV 11.4 8.9 - 12.9 FL    NRBC 0.0 0.0  WBC    ABSOLUTE NRBC 0.00 0.00 - 0.01 K/uL    NEUTROPHILS 77 (H) 32 - 75 %    LYMPHOCYTES 12 12 - 49 %    MONOCYTES 7 5 - 13 %    EOSINOPHILS 3 0 - 7 %    BASOPHILS 1 0 - 1 %    IMMATURE GRANULOCYTES 0 0 - 0.5 %    ABS. NEUTROPHILS 9.3 (H) 1.8 - 8.0 K/UL    ABS. LYMPHOCYTES 1.5 0.8 - 3.5 K/UL    ABS. MONOCYTES 0.8 0.0 - 1.0 K/UL    ABS. EOSINOPHILS 0.3 0.0 - 0.4 K/UL    ABS. BASOPHILS 0.1 0.0 - 0.1 K/UL    ABS. IMM.  GRANS. 0.0 0.00 - 0.04 K/UL    DF AUTOMATED     METABOLIC PANEL, COMPREHENSIVE    Collection Time: 03/29/22  9:00 AM   Result Value Ref Range    Sodium 139 136 - 145 mmol/L    Potassium 4.2 3.5 - 5.1 mmol/L    Chloride 106 97 - 108 mmol/L    CO2 28 21 - 32 mmol/L    Anion gap 5 5 - 15 mmol/L    Glucose 161 (H) 65 - 100 mg/dL    BUN 21 (H) 6 - 20 mg/dL    Creatinine 0.97 0.55 - 1.02 mg/dL    BUN/Creatinine ratio 22 (H) 12 - 20      GFR est AA >60 >60 ml/min/1.73m2    GFR est non-AA 55 (L) >60 ml/min/1.73m2    Calcium 9.5 8.5 - 10.1 mg/dL    Bilirubin, total 0.6 0.2 - 1.0 mg/dL    AST (SGOT) 34 15 - 37 U/L    ALT (SGPT) 20 12 - 78 U/L    Alk.  phosphatase 57 45 - 117 U/L    Protein, total 7.0 6.4 - 8.2 g/dL    Albumin 3.5 3.5 - 5.0 g/dL    Globulin 3.5 2.0 - 4.0 g/dL    A-G Ratio 1.0 (L) 1.1 - 2.2     TROPONIN-HIGH SENSITIVITY    Collection Time: 03/29/22  9:00 AM   Result Value Ref Range    Troponin-High Sensitivity 25 0 - 51 ng/L   LIPASE    Collection Time: 03/29/22  9:00 AM   Result Value Ref Range    Lipase 77 73 - 393 U/L   URINALYSIS W/ RFLX MICROSCOPIC    Collection Time: 03/29/22  9:00 AM   Result Value Ref Range    Color Yellow/Straw      Appearance Clear Clear      Specific gravity 1.011 1.003 - 1.030      pH (UA) 8.0 5.0 - 8.0      Protein Negative Negative mg/dL    Glucose Negative Negative mg/dL    Ketone Negative Negative mg/dL    Bilirubin Negative Negative      Blood Negative Negative      Urobilinogen 0.1 0.1 - 1.0 EU/dL    Nitrites Positive (A) Negative      Leukocyte Esterase Negative Negative      WBC 5-10 0 - 4 /hpf    RBC 0-5 0 - 5 /hpf    Bacteria Negative Negative /hpf   URINE MICROSCOPIC    Collection Time: 03/29/22  9:00 AM   Result Value Ref Range    WBC 5-10 0 - 4 /hpf    RBC 0-5 0 - 5 /hpf    Bacteria Negative Negative /hpf    Mucus Negative Negative /lpf   MRSA SCREEN - PCR (NASAL)    Collection Time: 03/29/22  4:06 PM   Result Value Ref Range    MRSA by PCR, Nasal Not Detected Not Detected     GLUCOSE, POC    Collection Time: 03/30/22  7:54 AM   Result Value Ref Range    Glucose (POC) 129 (H) 65 - 117 mg/dL    Performed by Jade Lee         Assessment/Plan:     Active Problems:    Colonic mass (3/29/2022)      SBO (small bowel obstruction) ([address] (3/29/2022)      HTN (hypertension), malignant (3/29/2022)        Hospital Course:    Ghislaine Baugh is a [de-identified]year old female with a PMH of diabetes, hypertension, COPD,  AICD, CAD with stents, who presented with right-sided lower abdominal pain. CT abdomen showed masslike thickening within the ascending colon, trace ascites within the right abdomen, cholelithiasis, and subtle morphologic changes of the liver. Initial labs significant for WBC of 12.1. UA + nitrites with 5 -10 WBC. Urine culture pending. Chest x-ray showed no acute cardiopulmonary abnormality. Patient to be admitted for further management. General surgery, cardiology, and pulmonology consulted. Patient started on ceftriaxone and placed on clear liquid diet. General surgery to follow and perform surgery pending progress. Partial large bowel obstruction due to cecal mass  Clear liquid diet  Pain control  Surgery following    Abnormal UA  UA + nitrites with 5 -10 WBC  Continue on ceftriaxone  3/29 urine: pending    Hypertension  Continue on metoprolol     Coronary artery disease with history of PCI to LAD   History of aortic valve replacement for severe aortic stenosis  AICD/pacemaker in situ  ECHO LVEF 50-55% with grade 2 diastolic dysfunction  ECHO pending  Hold ASA/plavix  Continue on lovastatin  Cardiology following     Diabetes mellitus, type 2  A1c pending  Continue on 40 units of lantus and SSI as needed     Hypothyroidism   Continue on levothyroxine    COPD without exacerbation  Continue on albuterol as needed  Pulmonology following    DVT Prophylaxis: lovenox  GI Prophylaxis: none  Discharge and disposition barriers: surgery, ECHO > 48 hrs    Care Plan discussed with: Patient/Family, Nurse and     Total time spent with patient: 35 minutes.

## 2022-03-30 NOTE — PROGRESS NOTES
Problem: Falls - Risk of  Goal: *Absence of Falls  Description: Document Yecenia Angeles Fall Risk and appropriate interventions in the flowsheet. Outcome: Progressing Towards Goal  Note: Fall Risk Interventions:  Mobility Interventions: Patient to call before getting OOB         Medication Interventions: Patient to call before getting OOB    Elimination Interventions: Call light in reach              Problem: Pressure Injury - Risk of  Goal: *Prevention of pressure injury  Description: Document Delta Scale and appropriate interventions in the flowsheet.   Outcome: Progressing Towards Goal  Note: Pressure Injury Interventions:       Moisture Interventions: Absorbent underpads    Activity Interventions: Increase time out of bed    Mobility Interventions: HOB 30 degrees or less    Nutrition Interventions: Document food/fluid/supplement intake    Friction and Shear Interventions: HOB 30 degrees or less                Problem: Pain  Goal: *Control of Pain  Outcome: Progressing Towards Goal

## 2022-03-31 ENCOUNTER — APPOINTMENT (OUTPATIENT)
Dept: NON INVASIVE DIAGNOSTICS | Age: 82
DRG: 330 | End: 2022-03-31
Attending: INTERNAL MEDICINE
Payer: MEDICARE

## 2022-03-31 LAB
ALBUMIN SERPL-MCNC: 2.3 G/DL (ref 3.5–5)
ALBUMIN/GLOB SERPL: 0.9 {RATIO} (ref 1.1–2.2)
ALP SERPL-CCNC: 49 U/L (ref 45–117)
ALT SERPL-CCNC: 11 U/L (ref 12–78)
ANION GAP SERPL CALC-SCNC: 7 MMOL/L (ref 5–15)
AST SERPL W P-5'-P-CCNC: 18 U/L (ref 15–37)
BACTERIA SPEC CULT: NORMAL
BASOPHILS # BLD: 0.1 K/UL (ref 0–0.1)
BASOPHILS NFR BLD: 0 % (ref 0–1)
BILIRUB SERPL-MCNC: 0.4 MG/DL (ref 0.2–1)
BUN SERPL-MCNC: 19 MG/DL (ref 6–20)
BUN/CREAT SERPL: 26 (ref 12–20)
CA-I BLD-MCNC: 7.8 MG/DL (ref 8.5–10.1)
CHLORIDE SERPL-SCNC: 96 MMOL/L (ref 97–108)
CO2 SERPL-SCNC: 24 MMOL/L (ref 21–32)
CREAT SERPL-MCNC: 0.73 MG/DL (ref 0.55–1.02)
DIFFERENTIAL METHOD BLD: ABNORMAL
ECHO AO ROOT DIAM: 2.9 CM
ECHO AO ROOT INDEX: 1.67 CM/M2
ECHO AV AREA PEAK VELOCITY: 1.2 CM2
ECHO AV AREA VTI: 1.4 CM2
ECHO AV AREA/BSA PEAK VELOCITY: 0.7 CM2/M2
ECHO AV AREA/BSA VTI: 0.8 CM2/M2
ECHO AV MEAN GRADIENT: 16 MMHG
ECHO AV MEAN VELOCITY: 1.8 M/S
ECHO AV PEAK GRADIENT: 34 MMHG
ECHO AV PEAK VELOCITY: 2.9 M/S
ECHO AV VELOCITY RATIO: 0.38
ECHO AV VTI: 54.4 CM
ECHO EST RA PRESSURE: 3 MMHG
ECHO LA DIAMETER INDEX: 2.82 CM/M2
ECHO LA DIAMETER: 4.9 CM
ECHO LA TO AORTIC ROOT RATIO: 1.69
ECHO LV E' LATERAL VELOCITY: 6 CM/S
ECHO LV E' SEPTAL VELOCITY: 4 CM/S
ECHO LV EJECTION FRACTION BIPLANE: 57 % (ref 55–100)
ECHO LV FRACTIONAL SHORTENING: 28 % (ref 28–44)
ECHO LV INTERNAL DIMENSION DIASTOLE INDEX: 2.47 CM/M2
ECHO LV INTERNAL DIMENSION DIASTOLIC: 4.3 CM (ref 3.9–5.3)
ECHO LV INTERNAL DIMENSION SYSTOLIC INDEX: 1.78 CM/M2
ECHO LV INTERNAL DIMENSION SYSTOLIC: 3.1 CM
ECHO LV IVSD: 1.7 CM (ref 0.6–0.9)
ECHO LV MASS 2D: 299.7 G (ref 67–162)
ECHO LV MASS INDEX 2D: 172.2 G/M2 (ref 43–95)
ECHO LV POSTERIOR WALL DIASTOLIC: 1.6 CM (ref 0.6–0.9)
ECHO LV RELATIVE WALL THICKNESS RATIO: 0.74
ECHO LVOT AREA: 3.1 CM2
ECHO LVOT AV VTI INDEX: 0.45
ECHO LVOT DIAM: 2 CM
ECHO LVOT MEAN GRADIENT: 2 MMHG
ECHO LVOT PEAK GRADIENT: 5 MMHG
ECHO LVOT PEAK VELOCITY: 1.1 M/S
ECHO LVOT STROKE VOLUME INDEX: 44.6 ML/M2
ECHO LVOT SV: 77.6 ML
ECHO LVOT VTI: 24.7 CM
ECHO MV A VELOCITY: 1.78 M/S
ECHO MV AREA VTI: 1.2 CM2
ECHO MV E DECELERATION TIME (DT): 310 MS
ECHO MV E VELOCITY: 1.44 M/S
ECHO MV E/A RATIO: 0.81
ECHO MV E/E' LATERAL: 24
ECHO MV E/E' RATIO (AVERAGED): 30
ECHO MV E/E' SEPTAL: 36
ECHO MV LVOT VTI INDEX: 2.57
ECHO MV MAX VELOCITY: 2.3 M/S
ECHO MV MEAN GRADIENT: 6 MMHG
ECHO MV MEAN VELOCITY: 1.1 M/S
ECHO MV PEAK GRADIENT: 21 MMHG
ECHO MV VTI: 63.6 CM
ECHO PV MAX VELOCITY: 0.8 M/S
ECHO PV PEAK GRADIENT: 3 MMHG
ECHO PVEIN A DURATION: 109 MS
ECHO PVEIN A VELOCITY: 0.3 M/S
ECHO RIGHT VENTRICULAR SYSTOLIC PRESSURE (RVSP): 33 MMHG
ECHO RV INTERNAL DIMENSION: 3.4 CM
ECHO TV REGURGITANT MAX VELOCITY: 2.72 M/S
ECHO TV REGURGITANT PEAK GRADIENT: 30 MMHG
EOSINOPHIL # BLD: 0.4 K/UL (ref 0–0.4)
EOSINOPHIL NFR BLD: 3 % (ref 0–7)
ERYTHROCYTE [DISTWIDTH] IN BLOOD BY AUTOMATED COUNT: 13.3 % (ref 11.5–14.5)
GLOBULIN SER CALC-MCNC: 2.6 G/DL (ref 2–4)
GLUCOSE BLD STRIP.AUTO-MCNC: 131 MG/DL (ref 65–117)
GLUCOSE BLD STRIP.AUTO-MCNC: 145 MG/DL (ref 65–117)
GLUCOSE BLD STRIP.AUTO-MCNC: 163 MG/DL (ref 65–117)
GLUCOSE BLD STRIP.AUTO-MCNC: 170 MG/DL (ref 65–117)
GLUCOSE SERPL-MCNC: 156 MG/DL (ref 65–100)
HCT VFR BLD AUTO: 27.5 % (ref 35–47)
HGB BLD-MCNC: 9.1 G/DL (ref 11.5–16)
IMM GRANULOCYTES # BLD AUTO: 0.1 K/UL (ref 0–0.04)
IMM GRANULOCYTES NFR BLD AUTO: 0 % (ref 0–0.5)
LYMPHOCYTES # BLD: 1.2 K/UL (ref 0.8–3.5)
LYMPHOCYTES NFR BLD: 9 % (ref 12–49)
MCH RBC QN AUTO: 32 PG (ref 26–34)
MCHC RBC AUTO-ENTMCNC: 33.1 G/DL (ref 30–36.5)
MCV RBC AUTO: 96.8 FL (ref 80–99)
MONOCYTES # BLD: 1.2 K/UL (ref 0–1)
MONOCYTES NFR BLD: 9 % (ref 5–13)
NEUTS SEG # BLD: 9.8 K/UL (ref 1.8–8)
NEUTS SEG NFR BLD: 79 % (ref 32–75)
NRBC # BLD: 0 K/UL (ref 0–0.01)
NRBC BLD-RTO: 0 PER 100 WBC
PERFORMED BY, TECHID: ABNORMAL
PLATELET # BLD AUTO: 273 K/UL (ref 150–400)
PMV BLD AUTO: 12 FL (ref 8.9–12.9)
POTASSIUM SERPL-SCNC: 4 MMOL/L (ref 3.5–5.1)
PROT SERPL-MCNC: 4.9 G/DL (ref 6.4–8.2)
RBC # BLD AUTO: 2.84 M/UL (ref 3.8–5.2)
SODIUM SERPL-SCNC: 127 MMOL/L (ref 136–145)
SPECIAL REQUESTS,SREQ: NORMAL
WBC # BLD AUTO: 12.5 K/UL (ref 3.6–11)

## 2022-03-31 PROCEDURE — 93306 TTE W/DOPPLER COMPLETE: CPT

## 2022-03-31 PROCEDURE — 74011250636 HC RX REV CODE- 250/636: Performed by: INTERNAL MEDICINE

## 2022-03-31 PROCEDURE — 74011250637 HC RX REV CODE- 250/637: Performed by: STUDENT IN AN ORGANIZED HEALTH CARE EDUCATION/TRAINING PROGRAM

## 2022-03-31 PROCEDURE — 74011636637 HC RX REV CODE- 636/637: Performed by: STUDENT IN AN ORGANIZED HEALTH CARE EDUCATION/TRAINING PROGRAM

## 2022-03-31 PROCEDURE — 65270000029 HC RM PRIVATE

## 2022-03-31 PROCEDURE — 82962 GLUCOSE BLOOD TEST: CPT

## 2022-03-31 PROCEDURE — 85025 COMPLETE CBC W/AUTO DIFF WBC: CPT

## 2022-03-31 PROCEDURE — 36415 COLL VENOUS BLD VENIPUNCTURE: CPT

## 2022-03-31 PROCEDURE — 80053 COMPREHEN METABOLIC PANEL: CPT

## 2022-03-31 PROCEDURE — 74011000250 HC RX REV CODE- 250: Performed by: STUDENT IN AN ORGANIZED HEALTH CARE EDUCATION/TRAINING PROGRAM

## 2022-03-31 PROCEDURE — 74011000250 HC RX REV CODE- 250: Performed by: INTERNAL MEDICINE

## 2022-03-31 PROCEDURE — 99232 SBSQ HOSP IP/OBS MODERATE 35: CPT | Performed by: COLON & RECTAL SURGERY

## 2022-03-31 PROCEDURE — 74011250636 HC RX REV CODE- 250/636: Performed by: STUDENT IN AN ORGANIZED HEALTH CARE EDUCATION/TRAINING PROGRAM

## 2022-03-31 RX ORDER — SODIUM CHLORIDE 9 MG/ML
100 INJECTION, SOLUTION INTRAVENOUS CONTINUOUS
Status: DISCONTINUED | OUTPATIENT
Start: 2022-03-31 | End: 2022-04-02

## 2022-03-31 RX ADMIN — METOPROLOL SUCCINATE 25 MG: 25 TABLET, EXTENDED RELEASE ORAL at 09:05

## 2022-03-31 RX ADMIN — FERROUS SULFATE TAB 325 MG (65 MG ELEMENTAL FE) 325 MG: 325 (65 FE) TAB at 16:38

## 2022-03-31 RX ADMIN — HYDROMORPHONE HYDROCHLORIDE 0.5 MG: 1 INJECTION, SOLUTION INTRAMUSCULAR; INTRAVENOUS; SUBCUTANEOUS at 03:01

## 2022-03-31 RX ADMIN — SODIUM CHLORIDE, PRESERVATIVE FREE 10 ML: 5 INJECTION INTRAVENOUS at 23:15

## 2022-03-31 RX ADMIN — HYDROMORPHONE HYDROCHLORIDE 0.5 MG: 1 INJECTION, SOLUTION INTRAMUSCULAR; INTRAVENOUS; SUBCUTANEOUS at 09:05

## 2022-03-31 RX ADMIN — INSULIN GLARGINE 16 UNITS: 100 INJECTION, SOLUTION SUBCUTANEOUS at 22:01

## 2022-03-31 RX ADMIN — ENOXAPARIN SODIUM 40 MG: 40 INJECTION SUBCUTANEOUS at 09:05

## 2022-03-31 RX ADMIN — ATORVASTATIN CALCIUM 10 MG: 10 TABLET, FILM COATED ORAL at 22:02

## 2022-03-31 RX ADMIN — CEFTRIAXONE SODIUM 1 G: 1 INJECTION, POWDER, FOR SOLUTION INTRAMUSCULAR; INTRAVENOUS at 09:04

## 2022-03-31 RX ADMIN — FERROUS SULFATE TAB 325 MG (65 MG ELEMENTAL FE) 325 MG: 325 (65 FE) TAB at 09:05

## 2022-03-31 RX ADMIN — ONDANSETRON 4 MG: 2 INJECTION INTRAMUSCULAR; INTRAVENOUS at 16:38

## 2022-03-31 RX ADMIN — SODIUM CHLORIDE, PRESERVATIVE FREE 10 ML: 5 INJECTION INTRAVENOUS at 06:52

## 2022-03-31 RX ADMIN — SODIUM CHLORIDE 100 ML/HR: 9 INJECTION, SOLUTION INTRAVENOUS at 19:11

## 2022-03-31 RX ADMIN — HYDROMORPHONE HYDROCHLORIDE 0.5 MG: 1 INJECTION, SOLUTION INTRAMUSCULAR; INTRAVENOUS; SUBCUTANEOUS at 22:01

## 2022-03-31 RX ADMIN — HYDROMORPHONE HYDROCHLORIDE 0.5 MG: 1 INJECTION, SOLUTION INTRAMUSCULAR; INTRAVENOUS; SUBCUTANEOUS at 16:38

## 2022-03-31 RX ADMIN — SODIUM CHLORIDE 100 ML/HR: 9 INJECTION, SOLUTION INTRAVENOUS at 09:07

## 2022-03-31 RX ADMIN — LEVOTHYROXINE SODIUM 150 MCG: 0.07 TABLET ORAL at 07:29

## 2022-03-31 RX ADMIN — DULOXETINE HYDROCHLORIDE 30 MG: 30 CAPSULE, DELAYED RELEASE ORAL at 09:05

## 2022-03-31 NOTE — PROGRESS NOTES
Progress Note    Patient: Jaci Peterson MRN: [id number]  SSN: [id number]    YOB: 1940  Age: [de-identified] y.o.   Sex: female      Admit Date: 3/29/2022    LOS: 2 days     Subjective:   Hospital day 3 cecal mass with partial obstruction  Patient continues to report lower abdominal pain  Denies nausea vomiting    Objective:     Vitals:    03/30/22 1920 03/31/22 0113 03/31/22 0701 03/31/22 1012   BP: (!) 150/59 (!) 137/56 (!) 135/59 (!) 130/57   Pulse: 89 88 76 79   Resp: 16 16 16 16   Temp: 98.9 °F (37.2 °C) 98.9 °F (37.2 °C) 98.8 °F (37.1 °C) 98.9 °F (37.2 °C)   SpO2: 99% 98% 97% 94%   Weight:       Height:            Intake and Output:  Current Shift: 03/31 0701 - 03/31 1900  In: -   Out: 200 [Urine:200]  Last three shifts: 03/29 1901 - 03/31 0700  In: 100 [P.O.:100]  Out: 450 [Urine:450]    Review of Systems:  ROS     Physical Exam:   Abdomen is soft, slightly more distended in the lower abdomen, tender palpation lower abdomen infraumbilical with no rebound or guarding    Lab/Data Review:  Recent Results (from the past 24 hour(s))   GLUCOSE, POC    Collection Time: 03/30/22 10:46 AM   Result Value Ref Range    Glucose (POC) 180 (H) 65 - 117 mg/dL    Performed by Kd Kirkland    BLOOD GAS, ARTERIAL    Collection Time: 03/30/22 11:10 AM   Result Value Ref Range    pH 7.42 7.35 - 7.45      PCO2 43 35 - 45 mmHg    PO2 69 (L) 75 - 100 mmHg    O2 SAT 94 (L) >95 %    BICARBONATE 27 (H) 22 - 26 mmol/L    BASE EXCESS 2.8 (H) 0 - 2 mmol/L    O2 METHOD Room air      FIO2 21.0 %    Sample source Arterial      SITE Right Brachial      EBEN'S TEST PASS     GLUCOSE, POC    Collection Time: 03/30/22  5:15 PM   Result Value Ref Range    Glucose (POC) 247 (H) 65 - 117 mg/dL    Performed by Rodolfo Mano, POC    Collection Time: 03/30/22  9:05 PM   Result Value Ref Range    Glucose (POC) 247 (H) 65 - 117 mg/dL    Performed by [address] [place] [address] [facility]    Collection Time: 03/31/22  5:15 AM Result Value Ref Range    Sodium 127 (L) 136 - 145 mmol/L    Potassium 4.0 3.5 - 5.1 mmol/L    Chloride 96 (L) 97 - 108 mmol/L    CO2 24 21 - 32 mmol/L    Anion gap 7 5 - 15 mmol/L    Glucose 156 (H) 65 - 100 mg/dL    BUN 19 6 - 20 mg/dL    Creatinine 0.73 0.55 - 1.02 mg/dL    BUN/Creatinine ratio 26 (H) 12 - 20      GFR est AA >60 >60 ml/min/1.73m2    GFR est non-AA >60 >60 ml/min/1.73m2    Calcium 7.8 (L) 8.5 - 10.1 mg/dL    Bilirubin, total 0.4 0.2 - 1.0 mg/dL    AST (SGOT) 18 15 - 37 U/L    ALT (SGPT) 11 (L) 12 - 78 U/L    Alk. phosphatase 49 45 - 117 U/L    Protein, total 4.9 (L) 6.4 - 8.2 g/dL    Albumin 2.3 (L) 3.5 - 5.0 g/dL    Globulin 2.6 2.0 - 4.0 g/dL    A-G Ratio 0.9 (L) 1.1 - 2.2     CBC WITH AUTOMATED DIFF    Collection Time: 03/31/22  5:15 AM   Result Value Ref Range    WBC 12.5 (H) 3.6 - 11.0 K/uL    RBC 2.84 (L) 3.80 - 5.20 M/uL    HGB 9.1 (L) 11.5 - 16.0 g/dL    HCT 27.5 (L) 35.0 - 47.0 %    MCV 96.8 80.0 - 99.0 FL    MCH 32.0 26.0 - 34.0 PG    MCHC 33.1 30.0 - 36.5 g/dL    RDW 13.3 11.5 - 14.5 %    PLATELET 831 436 - 760 K/uL    MPV 12.0 8.9 - 12.9 FL    NRBC 0.0 0.0  WBC    ABSOLUTE NRBC 0.00 0.00 - 0.01 K/uL    NEUTROPHILS 79 (H) 32 - 75 %    LYMPHOCYTES 9 (L) 12 - 49 %    MONOCYTES 9 5 - 13 %    EOSINOPHILS 3 0 - 7 %    BASOPHILS 0 0 - 1 %    IMMATURE GRANULOCYTES 0 0 - 0.5 %    ABS. NEUTROPHILS 9.8 (H) 1.8 - 8.0 K/UL    ABS. LYMPHOCYTES 1.2 0.8 - 3.5 K/UL    ABS. MONOCYTES 1.2 (H) 0.0 - 1.0 K/UL    ABS. EOSINOPHILS 0.4 0.0 - 0.4 K/UL    ABS. BASOPHILS 0.1 0.0 - 0.1 K/UL    ABS. IMM.  GRANS. 0.1 (H) 0.00 - 0.04 K/UL    DF AUTOMATED     GLUCOSE, POC    Collection Time: 03/31/22  7:35 AM   Result Value Ref Range    Glucose (POC) 131 (H) 65 - 117 mg/dL    Performed by Josias Lower    ECHO ADULT COMPLETE    Collection Time: 03/31/22 10:05 AM   Result Value Ref Range    AV Mean Gradient 16 mmHg    AV VTI 54.4 cm    AV Mean Velocity 1.8 m/s    AV Peak Velocity 2.9 m/s    AV Peak Gradient 34 mmHg    AV Area by VTI 1.4 cm2    AV Area by Peak Velocity 1.2 cm2    Aortic Root 2.9 cm    IVSd 1.7 (A) 0.6 - 0.9 cm    LVIDd 4.3 3.9 - 5.3 cm    LVIDs 3.1 cm    LVOT Diameter 2.0 cm    LVOT Mean Gradient 2 mmHg    LVOT VTI 24.7 cm    LVOT Peak Velocity 1.1 m/s    LVOT Peak Gradient 5 mmHg    LVPWd 1.6 (A) 0.6 - 0.9 cm    LV E' Lateral Velocity 6 cm/s    LV E' Septal Velocity 4 cm/s    LVOT Area 3.1 cm2    LVOT SV 78.0 ml    LA Diameter 4.9 cm    MV E Wave Deceleration Time 310.0 ms    MV A Velocity 1.78 m/s    MV E Velocity 1.44 m/s    MV Mean Gradient 6 mmHg    MV VTI 63.6 cm    MV Mean Velocity 1.1 m/s    MV Max Velocity 2.3 m/s    MV Peak Gradient 21 mmHg    MV Area by PHT 2.2 cm2    MV Area by VTI 1.2 cm2    PV Max Velocity 0.8 m/s    PV Peak Gradient 3 mmHg    Pulm Vein A Duration 109.0 ms    Pulm Vein A Velocity 0.3 m/s    Est. RA Pressure 3 mmHg    RVIDd 3.4 cm    TR Max Velocity 2.72 m/s    TR Peak Gradient 30 mmHg          Assessment:     Active Problems:    Colonic mass (3/29/2022)      SBO (small bowel obstruction) ([address] (3/29/2022)      HTN (hypertension), malignant (3/29/2022)        Plan:   I had extensive discussion with the patient and believe that we should proceed with surgery tomorrow. She is more distended at this time. It is suboptimal because she has not been off of her antiplatelet agents for for 5 days however I do not feel that we can wait any further. I reviewed the procedure with her and the risk and benefits. She is amenable to surgery tomorrow. N.p.o. after midnight.     Signed By: Concepcion Arzate MD     March 31, 2022

## 2022-03-31 NOTE — PROGRESS NOTES
PT eval order received and acknowledged. Per medical chart pt pending abdominal surgery following surgical clearance, will hold skilled PT at this time and perform OOB mobility following procedure. Thank you.

## 2022-03-31 NOTE — PROGRESS NOTES
Spiritual Care Assessment/Progress Note  [facility]      NAME: Mor Tenorio      MRN: [id number]  AGE: [de-identified] y.o.  SEX: female  Yarsani Affiliation: No Rastafarian   Language: English     3/31/2022     Total Time (in minutes): 19     Spiritual Assessment begun in [facility] SURG[facility] through conversation with:         [x]Patient        [] Family    [] Friend(s)        Reason for Consult: Initial/Spiritual assessment, patient floor     Spiritual beliefs: (Please include comment if needed)     [x] Identifies with a amaya tradition: Hinduism        [x] Supported by a amaya community:            [] Claims no spiritual orientation:           [] Seeking spiritual identity:                [] Adheres to an individual form of spirituality:           [] Not able to assess:                           Identified resources for coping:      [x] Prayer                               [] Music                  [] Guided Imagery     [x] Family/friends                 [] Pet visits     [] Devotional reading                         [] Unknown     [] Other:                                              Interventions offered during this visit: (See comments for more details)    Patient Interventions: Affirmation of emotions/emotional suffering,Affirmation of amaya,Bridging,Catharsis/review of pertinent events in supportive environment,Coping skills reviewed/reinforced,Iconic (affirming the presence of God/Higher Power),Initial/Spiritual assessment, patient floor,Life review/legacy,Normalization of emotional/spiritual concerns,Prayer (actual),Yarsani beliefs/image of God discussed           Plan of Care:     [] Support spiritual and/or cultural needs    [] Support AMD and/or advance care planning process      [] Support grieving process   [] Coordinate Rites and/or Rituals    [] Coordination with community clergy   [] No spiritual needs identified at this time   [] Detailed Plan of Care below (See Comments)  [] Make referral to Music Therapy  [] Make referral to Pet Therapy     [] Make referral to Addiction services  [] Make referral to [facility]  [] Make referral to Spiritual Care Partner  [] No future visits requested        [x] Contact Spiritual Care for further referrals     Comments:  Visited patient in [address] [place] [address] for initial assessment. Patient was alone during the visit. Patient shared about her health condition and events which led to her being hospitalized and her procedure tomorrow. Stated she is cared for by her son as well as daughter who live by. Shared about her late  to whom she was  for many decades and shared a good marriage. Seemed to process her emotions verbally. Provided supportive presence while facilitating storytelling as well as listening with empathy and reflection. Normalized her emotions and affirmed her familial support, health care needs and spiritual resources. Offered and provided prayer per her request and advised of  availability. Contact chaplains for further referrals.  Miguel Hernandez M.Div.    can be reached by calling the  at [facility]  [phone]

## 2022-03-31 NOTE — PROGRESS NOTES
Progress Note    Patient: Cindy Boyd MRN: [id number]  SSN: [id number]    YOB: 1940  Age: [de-identified] y.o. Sex: female      Admit Date: 3/29/2022    LOS: 2 days     Subjective:     No acute events overnight    Objective:     Vitals:    03/30/22 1920 03/31/22 0113 03/31/22 0701 03/31/22 1012   BP: (!) 150/59 (!) 137/56 (!) 135/59 (!) 130/57   Pulse: 89 88 76 79   Resp: 16 16 16 16   Temp: 98.9 °F (37.2 °C) 98.9 °F (37.2 °C) 98.8 °F (37.1 °C) 98.9 °F (37.2 °C)   SpO2: 99% 98% 97% 94%   Weight:       Height:            Intake and Output:  Current Shift: 03/31 0701 - 03/31 1900  In: -   Out: 200 [Urine:200]  Last three shifts: 03/29 1901 - 03/31 0700  In: 100 [P.O.:100]  Out: 450 [Urine:450]    Physical Exam:   General:  Alert, cooperative, no distress, appears stated age. Eyes:  Conjunctivae/corneas clear. PERRL, EOMs intact. Fundi benign   Ears:  Normal TMs and external ear canals both ears. Nose: Nares normal. Septum midline. Mucosa normal. No drainage or sinus tenderness. Mouth/Throat: Lips, mucosa, and tongue normal. Teeth and gums normal.   Neck: Supple, symmetrical, trachea midline, no adenopathy, thyroid: no enlargment/tenderness/nodules, no carotid bruit and no JVD. Back:   Symmetric, no curvature. ROM normal. No CVA tenderness. Lungs:   Clear to auscultation bilaterally. Heart:  Regular rate and rhythm, S1, S2 normal, no murmur, click, rub or gallop. Abdomen:   Soft, non-tender. Bowel sounds normal. No masses,  No organomegaly. Extremities: Extremities normal, atraumatic, no cyanosis or edema. Pulses: 2+ and symmetric all extremities. Skin: Skin color, texture, turgor normal. No rashes or lesions   Lymph nodes: Cervical, supraclavicular, and axillary nodes normal.   Neurologic: CNII-XII intact. Normal strength, sensation and reflexes throughout. Lab/Data Review: All lab results for the last 24 hours reviewed.          Assessment:     Active Problems:    Colonic mass (3/29/2022)      SBO (small bowel obstruction) ([address] (3/29/2022)      HTN (hypertension), malignant (3/29/2022)    Patient is an 79-year-old white female with:  1. Heart failure with reduced ejection fraction  2. Coronary disease status post PCI of LAD May 2021  3. Severe aortic stenosis status post TAVR  4. Hypertension with hypertensive heart disease  5. Sick sinus syndrome status post dual-chamber pacemaker May 2021  6. Peripheral vascular disease  7. Fracture deformity of proximal left humerus  8. Diabetes mellitus  9. Hypothyroidism  10. COPD  6. Depression    Plan:     She did not have any chest pain or shortness of breath. She denied having abdominal pain. She appears to be euvolemic. Plans noted for possible procedure tomorrow 3 afternoon. We will continue to follow along with the team around the time of surgery.   Continue aspirin    Signed By: Kadeem Cook MD     March 31, 2022

## 2022-03-31 NOTE — PROGRESS NOTES
Hospitalist Progress Note    Subjective:   Daily Progress Note: 3/31/2022 7:38 AM    Patient is feeling better. Patient's abdominal pain has decreased but still is having it on her right side.     Current Facility-Administered Medications   Medication Dose Route Frequency    glucose chewable tablet 16 g  4 Tablet Oral PRN    dextrose 10% infusion 0-250 mL  0-250 mL IntraVENous PRN    glucagon (GLUCAGEN) injection 1 mg  1 mg IntraMUSCular PRN    sodium chloride (NS) flush 5-40 mL  5-40 mL IntraVENous Q8H    sodium chloride (NS) flush 5-40 mL  5-40 mL IntraVENous PRN    acetaminophen (TYLENOL) tablet 650 mg  650 mg Oral Q6H PRN    Or    acetaminophen (TYLENOL) suppository 650 mg  650 mg Rectal Q6H PRN    ondansetron (ZOFRAN ODT) tablet 4 mg  4 mg Oral Q8H PRN    Or    ondansetron (ZOFRAN) injection 4 mg  4 mg IntraVENous Q6H PRN    enoxaparin (LOVENOX) injection 40 mg  40 mg SubCUTAneous DAILY    insulin lispro (HUMALOG) injection   SubCUTAneous AC&HS    [Held by provider] aspirin delayed-release tablet 81 mg  81 mg Oral DAILY    [Held by provider] clopidogreL (PLAVIX) tablet 75 mg  75 mg Oral DAILY    DULoxetine (CYMBALTA) capsule 30 mg  30 mg Oral DAILY    ferrous sulfate tablet 325 mg  325 mg Oral BID WITH MEALS    levothyroxine (SYNTHROID) tablet 150 mcg  150 mcg Oral ACB    metoprolol succinate (TOPROL-XL) XL tablet 25 mg  25 mg Oral DAILY    atorvastatin (LIPITOR) tablet 10 mg  10 mg Oral QHS    albuterol (PROVENTIL HFA, VENTOLIN HFA, PROAIR HFA) inhaler 2 Puff  2 Puff Inhalation Q6H PRN    oxyCODONE IR (ROXICODONE) tablet 5 mg  5 mg Oral Q4H PRN    insulin glargine (LANTUS) injection 16 Units  16 Units SubCUTAneous QHS    lactated Ringers infusion  75 mL/hr IntraVENous CONTINUOUS    cefTRIAXone (ROCEPHIN) 1 g in sterile water (preservative free) 10 mL IV syringe  1 g IntraVENous Q24H    HYDROmorphone (DILAUDID) syringe 0.5 mg  0.5 mg IntraVENous Q6H PRN        Review of Systems  Review of Systems   Constitutional: Negative. Respiratory: Negative. Cardiovascular: Negative. Gastrointestinal: Positive for abdominal pain. Negative for nausea and vomiting. Neurological:        + gait instability   All other systems reviewed and are negative.            Objective:     Visit Vitals  BP (!) 135/59 (BP 1 Location: Left upper arm, BP Patient Position: Supine) Comment: Primary nurse notified   Pulse 76   Temp 98.8 °F (37.1 °C)   Resp 16   Ht 5' 1\" (1.549 m)   Wt 74.8 kg (165 lb)   SpO2 97%   BMI 31.18 kg/m²      O2 Device: None (Room air)    Temp (24hrs), Av.9 °F (37.2 °C), Min:98.8 °F (37.1 °C), Max:98.9 °F (37.2 °C)      701 - 1900  In: -   Out: 200 [Urine:200]  1901 -  07  In: 100 [P.O.:100]  Out: 450 [Urine:450]    Recent Results (from the past 24 hour(s))   CBC W/O DIFF    Collection Time: 22  9:01 AM   Result Value Ref Range    WBC 12.3 (H) 3.6 - 11.0 K/uL    RBC 3.12 (L) 3.80 - 5.20 M/uL    HGB 9.9 (L) 11.5 - 16.0 g/dL    HCT 31.2 (L) 35.0 - 47.0 %    .0 (H) 80.0 - 99.0 FL    MCH 31.7 26.0 - 34.0 PG    MCHC 31.7 30.0 - 36.5 g/dL    RDW 14.5 11.5 - 14.5 %    PLATELET 409 688 - 585 K/uL    MPV 11.7 8.9 - 12.9 FL    NRBC 0.0 0.0  WBC    ABSOLUTE NRBC 0.00 0.00 - 5.18 K/uL   METABOLIC PANEL, BASIC    Collection Time: 22  9:01 AM   Result Value Ref Range    Sodium 133 (L) 136 - 145 mmol/L    Potassium 4.2 3.5 - 5.1 mmol/L    Chloride 99 97 - 108 mmol/L    CO2 30 21 - 32 mmol/L    Anion gap 4 (L) 5 - 15 mmol/L    Glucose 138 (H) 65 - 100 mg/dL    BUN 23 (H) 6 - 20 mg/dL    Creatinine 1.02 0.55 - 1.02 mg/dL    BUN/Creatinine ratio 23 (H) 12 - 20      GFR est AA >60 >60 ml/min/1.73m2    GFR est non-AA 52 (L) >60 ml/min/1.73m2    Calcium 8.3 (L) 8.5 - 10.1 mg/dL   HEMOGLOBIN A1C WITH EAG    Collection Time: 22  9:01 AM   Result Value Ref Range    Hemoglobin A1c 7.8 (H) 4.0 - 5.6 %    Est. average glucose 177 mg/dL   PROTHROMBIN TIME + INR Collection Time: 03/30/22  9:01 AM   Result Value Ref Range    Prothrombin time 15.5 (H) 11.9 - 14.6 sec    INR 1.3 (H) 0.9 - 1.1     PTT    Collection Time: 03/30/22  9:01 AM   Result Value Ref Range    aPTT 38.4 (H) 21.2 - 34.1 sec    aPTT, therapeutic range   82 - 109 sec   GLUCOSE, POC    Collection Time: 03/30/22 10:46 AM   Result Value Ref Range    Glucose (POC) 180 (H) 65 - 117 mg/dL    Performed by Yamil Troncoso    BLOOD GAS, ARTERIAL    Collection Time: 03/30/22 11:10 AM   Result Value Ref Range    pH 7.42 7.35 - 7.45      PCO2 43 35 - 45 mmHg    PO2 69 (L) 75 - 100 mmHg    O2 SAT 94 (L) >95 %    BICARBONATE 27 (H) 22 - 26 mmol/L    BASE EXCESS 2.8 (H) 0 - 2 mmol/L    O2 METHOD Room air      FIO2 21.0 %    Sample source Arterial      SITE Right Brachial      EBEN'S TEST PASS     GLUCOSE, POC    Collection Time: 03/30/22  5:15 PM   Result Value Ref Range    Glucose (POC) 247 (H) 65 - 117 mg/dL    Performed by Wenceslao Matters    GLUCOSE, POC    Collection Time: 03/30/22  9:05 PM   Result Value Ref Range    Glucose (POC) 247 (H) 65 - 117 mg/dL    Performed by Kaye Kruse    METABOLIC PANEL, COMPREHENSIVE    Collection Time: 03/31/22  5:15 AM   Result Value Ref Range    Sodium 127 (L) 136 - 145 mmol/L    Potassium 4.0 3.5 - 5.1 mmol/L    Chloride 96 (L) 97 - 108 mmol/L    CO2 24 21 - 32 mmol/L    Anion gap 7 5 - 15 mmol/L    Glucose 156 (H) 65 - 100 mg/dL    BUN 19 6 - 20 mg/dL    Creatinine 0.73 0.55 - 1.02 mg/dL    BUN/Creatinine ratio 26 (H) 12 - 20      GFR est AA >60 >60 ml/min/1.73m2    GFR est non-AA >60 >60 ml/min/1.73m2    Calcium 7.8 (L) 8.5 - 10.1 mg/dL    Bilirubin, total 0.4 0.2 - 1.0 mg/dL    AST (SGOT) 18 15 - 37 U/L    ALT (SGPT) 11 (L) 12 - 78 U/L    Alk.  phosphatase 49 45 - 117 U/L    Protein, total 4.9 (L) 6.4 - 8.2 g/dL    Albumin 2.3 (L) 3.5 - 5.0 g/dL    Globulin 2.6 2.0 - 4.0 g/dL    A-G Ratio 0.9 (L) 1.1 - 2.2     CBC WITH AUTOMATED DIFF    Collection Time: 03/31/22  5:15 AM   Result Value Ref Range    WBC 12.5 (H) 3.6 - 11.0 K/uL    RBC 2.84 (L) 3.80 - 5.20 M/uL    HGB 9.1 (L) 11.5 - 16.0 g/dL    HCT 27.5 (L) 35.0 - 47.0 %    MCV 96.8 80.0 - 99.0 FL    MCH 32.0 26.0 - 34.0 PG    MCHC 33.1 30.0 - 36.5 g/dL    RDW 13.3 11.5 - 14.5 %    PLATELET 164 882 - 096 K/uL    MPV 12.0 8.9 - 12.9 FL    NRBC 0.0 0.0  WBC    ABSOLUTE NRBC 0.00 0.00 - 0.01 K/uL    NEUTROPHILS 79 (H) 32 - 75 %    LYMPHOCYTES 9 (L) 12 - 49 %    MONOCYTES 9 5 - 13 %    EOSINOPHILS 3 0 - 7 %    BASOPHILS 0 0 - 1 %    IMMATURE GRANULOCYTES 0 0 - 0.5 %    ABS. NEUTROPHILS 9.8 (H) 1.8 - 8.0 K/UL    ABS. LYMPHOCYTES 1.2 0.8 - 3.5 K/UL    ABS. MONOCYTES 1.2 (H) 0.0 - 1.0 K/UL    ABS. EOSINOPHILS 0.4 0.0 - 0.4 K/UL    ABS. BASOPHILS 0.1 0.0 - 0.1 K/UL    ABS. IMM. GRANS. 0.1 (H) 0.00 - 0.04 K/UL    DF AUTOMATED     GLUCOSE, POC    Collection Time: 03/31/22  7:35 AM   Result Value Ref Range    Glucose (POC) 131 (H) 65 - 117 mg/dL    Performed by Sharda Santizo         CT ABD PELV WO CONT   Final Result   1. Abnormal masslike thickening within the ascending colon highly concerning for   malignancy with suspected lymphovascular invasion. Tumor results in low-grade   obstruction. 2. Trace ascites within the right abdomen. 3. Subtle morphologic changes suggestive of hepatic fibrosis/early cirrhosis. 4. Cholelithiasis. Notification: Findings were discussed with Dr. Halina Shahid at 11:30 AM on 3/29/2022      XR CHEST PORT   Final Result           PHYSICAL EXAM:    Physical Exam  Vitals and nursing note reviewed. Constitutional:       Appearance: She is obese. HENT:      Head: Normocephalic and atraumatic. Mouth/Throat:      Mouth: Mucous membranes are moist.      Comments: Poor dentition  Cardiovascular:      Rate and Rhythm: Normal rate and regular rhythm. Pulmonary:      Effort: No respiratory distress. Breath sounds: No wheezing. Abdominal:      General: Bowel sounds are normal. There is no distension. Palpations: Abdomen is soft. There is no mass. Tenderness: There is abdominal tenderness. Comments: Tenderness to palpation of right upper quadrant   Musculoskeletal:      Right lower leg: No edema. Left lower leg: No edema. Skin:     General: Skin is warm. Neurological:      Mental Status: She is alert and oriented to person, place, and time.    Psychiatric:         Mood and Affect: Mood normal.          Data Review    Recent Results (from the past 24 hour(s))   CBC W/O DIFF    Collection Time: 03/30/22  9:01 AM   Result Value Ref Range    WBC 12.3 (H) 3.6 - 11.0 K/uL    RBC 3.12 (L) 3.80 - 5.20 M/uL    HGB 9.9 (L) 11.5 - 16.0 g/dL    HCT 31.2 (L) 35.0 - 47.0 %    .0 (H) 80.0 - 99.0 FL    MCH 31.7 26.0 - 34.0 PG    MCHC 31.7 30.0 - 36.5 g/dL    RDW 14.5 11.5 - 14.5 %    PLATELET 313 438 - 408 K/uL    MPV 11.7 8.9 - 12.9 FL    NRBC 0.0 0.0  WBC    ABSOLUTE NRBC 0.00 0.00 - 4.83 K/uL   METABOLIC PANEL, BASIC    Collection Time: 03/30/22  9:01 AM   Result Value Ref Range    Sodium 133 (L) 136 - 145 mmol/L    Potassium 4.2 3.5 - 5.1 mmol/L    Chloride 99 97 - 108 mmol/L    CO2 30 21 - 32 mmol/L    Anion gap 4 (L) 5 - 15 mmol/L    Glucose 138 (H) 65 - 100 mg/dL    BUN 23 (H) 6 - 20 mg/dL    Creatinine 1.02 0.55 - 1.02 mg/dL    BUN/Creatinine ratio 23 (H) 12 - 20      GFR est AA >60 >60 ml/min/1.73m2    GFR est non-AA 52 (L) >60 ml/min/1.73m2    Calcium 8.3 (L) 8.5 - 10.1 mg/dL   HEMOGLOBIN A1C WITH EAG    Collection Time: 03/30/22  9:01 AM   Result Value Ref Range    Hemoglobin A1c 7.8 (H) 4.0 - 5.6 %    Est. average glucose 177 mg/dL   PROTHROMBIN TIME + INR    Collection Time: 03/30/22  9:01 AM   Result Value Ref Range    Prothrombin time 15.5 (H) 11.9 - 14.6 sec    INR 1.3 (H) 0.9 - 1.1     PTT    Collection Time: 03/30/22  9:01 AM   Result Value Ref Range    aPTT 38.4 (H) 21.2 - 34.1 sec    aPTT, therapeutic range   82 - 109 sec   GLUCOSE, POC    Collection Time: 03/30/22 10:46 AM Result Value Ref Range    Glucose (POC) 180 (H) 65 - 117 mg/dL    Performed by Aaron Latham    BLOOD GAS, ARTERIAL    Collection Time: 03/30/22 11:10 AM   Result Value Ref Range    pH 7.42 7.35 - 7.45      PCO2 43 35 - 45 mmHg    PO2 69 (L) 75 - 100 mmHg    O2 SAT 94 (L) >95 %    BICARBONATE 27 (H) 22 - 26 mmol/L    BASE EXCESS 2.8 (H) 0 - 2 mmol/L    O2 METHOD Room air      FIO2 21.0 %    Sample source Arterial      SITE Right Brachial      EBEN'S TEST PASS     GLUCOSE, POC    Collection Time: 03/30/22  5:15 PM   Result Value Ref Range    Glucose (POC) 247 (H) 65 - 117 mg/dL    Performed by Cristy Mcneill    GLUCOSE, POC    Collection Time: 03/30/22  9:05 PM   Result Value Ref Range    Glucose (POC) 247 (H) 65 - 117 mg/dL    Performed by Cyndi Clark    METABOLIC PANEL, COMPREHENSIVE    Collection Time: 03/31/22  5:15 AM   Result Value Ref Range    Sodium 127 (L) 136 - 145 mmol/L    Potassium 4.0 3.5 - 5.1 mmol/L    Chloride 96 (L) 97 - 108 mmol/L    CO2 24 21 - 32 mmol/L    Anion gap 7 5 - 15 mmol/L    Glucose 156 (H) 65 - 100 mg/dL    BUN 19 6 - 20 mg/dL    Creatinine 0.73 0.55 - 1.02 mg/dL    BUN/Creatinine ratio 26 (H) 12 - 20      GFR est AA >60 >60 ml/min/1.73m2    GFR est non-AA >60 >60 ml/min/1.73m2    Calcium 7.8 (L) 8.5 - 10.1 mg/dL    Bilirubin, total 0.4 0.2 - 1.0 mg/dL    AST (SGOT) 18 15 - 37 U/L    ALT (SGPT) 11 (L) 12 - 78 U/L    Alk.  phosphatase 49 45 - 117 U/L    Protein, total 4.9 (L) 6.4 - 8.2 g/dL    Albumin 2.3 (L) 3.5 - 5.0 g/dL    Globulin 2.6 2.0 - 4.0 g/dL    A-G Ratio 0.9 (L) 1.1 - 2.2     CBC WITH AUTOMATED DIFF    Collection Time: 03/31/22  5:15 AM   Result Value Ref Range    WBC 12.5 (H) 3.6 - 11.0 K/uL    RBC 2.84 (L) 3.80 - 5.20 M/uL    HGB 9.1 (L) 11.5 - 16.0 g/dL    HCT 27.5 (L) 35.0 - 47.0 %    MCV 96.8 80.0 - 99.0 FL    MCH 32.0 26.0 - 34.0 PG    MCHC 33.1 30.0 - 36.5 g/dL    RDW 13.3 11.5 - 14.5 %    PLATELET 584 635 - 086 K/uL    MPV 12.0 8.9 - 12.9 FL    NRBC 0.0 0.0  WBC    ABSOLUTE NRBC 0.00 0.00 - 0.01 K/uL    NEUTROPHILS 79 (H) 32 - 75 %    LYMPHOCYTES 9 (L) 12 - 49 %    MONOCYTES 9 5 - 13 %    EOSINOPHILS 3 0 - 7 %    BASOPHILS 0 0 - 1 %    IMMATURE GRANULOCYTES 0 0 - 0.5 %    ABS. NEUTROPHILS 9.8 (H) 1.8 - 8.0 K/UL    ABS. LYMPHOCYTES 1.2 0.8 - 3.5 K/UL    ABS. MONOCYTES 1.2 (H) 0.0 - 1.0 K/UL    ABS. EOSINOPHILS 0.4 0.0 - 0.4 K/UL    ABS. BASOPHILS 0.1 0.0 - 0.1 K/UL    ABS. IMM. GRANS. 0.1 (H) 0.00 - 0.04 K/UL    DF AUTOMATED     GLUCOSE, POC    Collection Time: 03/31/22  7:35 AM   Result Value Ref Range    Glucose (POC) 131 (H) 65 - 117 mg/dL    Performed by Joanne Domingo         Assessment/Plan:     Active Problems:    Colonic mass (3/29/2022)      SBO (small bowel obstruction) ([address] (3/29/2022)      HTN (hypertension), malignant (3/29/2022)        Hospital Course:    Emani Brandt is a [de-identified]year old female with a PMH of diabetes, hypertension, COPD,  AICD, CAD with stents, who presented with right-sided lower abdominal pain. CT abdomen showed masslike thickening within the ascending colon, trace ascites within the right abdomen, cholelithiasis, and subtle morphologic changes of the liver. Initial labs significant for WBC of 12.1. UA + nitrites with 5 -10 WBC. Urine culture pending. Chest x-ray showed no acute cardiopulmonary abnormality. Patient to be admitted for further management. General surgery, cardiology, and pulmonology consulted. Patient started on ceftriaxone and placed on clear liquid diet. General surgery to follow and perform surgery tomorrow.      Partial large bowel obstruction due to cecal mass  Clear liquid diet  Pain control  Surgery following    Abnormal UA  UA + nitrites with 5 -10 WBC  Continue on ceftriaxone  3/29 urine: pending    Hypertension  Continue on metoprolol    Hyponatremia  Na 127, will trend  On normal saline IVF     Coronary artery disease with history of PCI to LAD   History of aortic valve replacement for severe aortic stenosis  AICD/pacemaker in situ  ECHO LVEF 55 - 60% with abnormal diastolic dysfunction with mild MR and MS  Hold ASA/plavix  Continue on lovastatin  Cardiology following     Diabetes mellitus, type 2  A1c 7.8  Continue on 16 units of lantus and SSI as needed     Hypothyroidism   Continue on levothyroxine    COPD without exacerbation  Continue on albuterol as needed  Pulmonology following    DVT Prophylaxis: lovenox  GI Prophylaxis: none  Discharge and disposition barriers: surgery tomorrow, 24-48 hrs    Care Plan discussed with: Patient/Family, Nurse and     Total time spent with patient: 35 minutes.

## 2022-03-31 NOTE — PROGRESS NOTES
Pulmonary Progress Note    Subjective:   Daily Progress Note: 3/31/2022 10:10 AM    Neil Oppenheim is a [de-identified]year old female PMH of COPD, diabetes, HTN, HLD, Pacemaker, AICD, HX of cardiac stents to have possible surgical resection tomorrow for partial bowel obstruction. Patient is alert and oriented and doing well. No respiratory distress. Review of Systems     Constitutional: Negative. HENT: Negative. Eyes: Negative. Respiratory: Negative for shortness of breath. Cardiovascular: Negative. Gastrointestinal: Positive for abdominal pain. Genitourinary: Negative. Musculoskeletal: Negative. Skin: Negative. Neurological: Negative. Psychiatric/Behavioral: Negative. Objective:     Visit Vitals  BP (!) 135/59 (BP 1 Location: Left upper arm, BP Patient Position: Supine) Comment: Primary nurse notified   Pulse 76   Temp 98.8 °F (37.1 °C)   Resp 16   Ht 5' 1\" (1.549 m)   Wt 74.8 kg (165 lb)   SpO2 97%   BMI 31.18 kg/m²      O2 Device: None (Room air)    Temp (24hrs), Av.9 °F (37.2 °C), Min:98.8 °F (37.1 °C), Max:98.9 °F (37.2 °C)      701 - 1900  In: -   Out: 200 [Urine:200]  1901 -  07  In: 100 [P.O.:100]  Out: 450 [Urine:450]    Physical Exam  Constitutional:       Appearance: Normal appearance. HENT:      Head: Normocephalic and atraumatic. Nose: Nose normal.      Mouth/Throat:      Mouth: Mucous membranes are moist.   Eyes:      Pupils: Pupils are equal, round, and reactive to light. Cardiovascular:      Rate and Rhythm: Normal rate. Rhythm irregular. Pulses: Normal pulses. Heart sounds: Normal heart sounds. Pulmonary:      Effort: Pulmonary effort is normal.      Breath sounds: Normal breath sounds. Abdominal:      General: There is distension. Tenderness: There is abdominal tenderness. Musculoskeletal:         General: Normal range of motion. Cervical back: Normal range of motion and neck supple.    Skin:     General: Skin is warm. Neurological:      General: No focal deficit present. Mental Status: She is alert. Psychiatric:         Mood and Affect: Mood normal.         Data Review    Recent Results (from the past 24 hour(s))   GLUCOSE, POC    Collection Time: 03/30/22 10:46 AM   Result Value Ref Range    Glucose (POC) 180 (H) 65 - 117 mg/dL    Performed by Soni Oquendo    BLOOD GAS, ARTERIAL    Collection Time: 03/30/22 11:10 AM   Result Value Ref Range    pH 7.42 7.35 - 7.45      PCO2 43 35 - 45 mmHg    PO2 69 (L) 75 - 100 mmHg    O2 SAT 94 (L) >95 %    BICARBONATE 27 (H) 22 - 26 mmol/L    BASE EXCESS 2.8 (H) 0 - 2 mmol/L    O2 METHOD Room air      FIO2 21.0 %    Sample source Arterial      SITE Right Brachial      EBEN'S TEST PASS     GLUCOSE, POC    Collection Time: 03/30/22  5:15 PM   Result Value Ref Range    Glucose (POC) 247 (H) 65 - 117 mg/dL    Performed by Kristofer Felipe    GLUCOSE, POC    Collection Time: 03/30/22  9:05 PM   Result Value Ref Range    Glucose (POC) 247 (H) 65 - 117 mg/dL    Per[facility] by [facility]    METABOLIC PANEL, COMPREHENSIVE    Collection Time: 03/31/22  5:15 AM   Result Value Ref Range    Sodium 127 (L) 136 - 145 mmol/L    Potassium 4.0 3.5 - 5.1 mmol/L    Chloride 96 (L) 97 - 108 mmol/L    CO2 24 21 - 32 mmol/L    Anion gap 7 5 - 15 mmol/L    Glucose 156 (H) 65 - 100 mg/dL    BUN 19 6 - 20 mg/dL    Creatinine 0.73 0.55 - 1.02 mg/dL    BUN/Creatinine ratio 26 (H) 12 - 20      GFR est AA >60 >60 ml/min/1.73m2    GFR est non-AA >60 >60 ml/min/1.73m2    Calcium 7.8 (L) 8.5 - 10.1 mg/dL    Bilirubin, total 0.4 0.2 - 1.0 mg/dL    AST (SGOT) 18 15 - 37 U/L    ALT (SGPT) 11 (L) 12 - 78 U/L    Alk.  phosphatase 49 45 - 117 U/L    Protein, total 4.9 (L) 6.4 - 8.2 g/dL    Albumin 2.3 (L) 3.5 - 5.0 g/dL    Globulin 2.6 2.0 - 4.0 g/dL    A-G Ratio 0.9 (L) 1.1 - 2.2     CBC WITH AUTOMATED DIFF    Collection Time: 03/31/22  5:15 AM   Result Value Ref Range    WBC 12.5 (H) 3.6 - 11.0 K/uL    RBC 2.84 (L) 3.80 - 5.20 M/uL    HGB 9.1 (L) 11.5 - 16.0 g/dL    HCT 27.5 (L) 35.0 - 47.0 %    MCV 96.8 80.0 - 99.0 FL    MCH 32.0 26.0 - 34.0 PG    MCHC 33.1 30.0 - 36.5 g/dL    RDW 13.3 11.5 - 14.5 %    PLATELET 162 158 - 104 K/uL    MPV 12.0 8.9 - 12.9 FL    NRBC 0.0 0.0  WBC    ABSOLUTE NRBC 0.00 0.00 - 0.01 K/uL    NEUTROPHILS 79 (H) 32 - 75 %    LYMPHOCYTES 9 (L) 12 - 49 %    MONOCYTES 9 5 - 13 %    EOSINOPHILS 3 0 - 7 %    BASOPHILS 0 0 - 1 %    IMMATURE GRANULOCYTES 0 0 - 0.5 %    ABS. NEUTROPHILS 9.8 (H) 1.8 - 8.0 K/UL    ABS. LYMPHOCYTES 1.2 0.8 - 3.5 K/UL    ABS. MONOCYTES 1.2 (H) 0.0 - 1.0 K/UL    ABS. EOSINOPHILS 0.4 0.0 - 0.4 K/UL    ABS. BASOPHILS 0.1 0.0 - 0.1 K/UL    ABS. IMM.  GRANS. 0.1 (H) 0.00 - 0.04 K/UL    DF AUTOMATED     GLUCOSE, POC    Collection Time: 03/31/22  7:35 AM   Result Value Ref Range    Glucose (POC) 131 (H) 65 - 117 mg/dL    Performed by Jazmin Granados    ECHO ADULT COMPLETE    Collection Time: 03/31/22 10:05 AM   Result Value Ref Range    AV Mean Gradient 16 mmHg    AV VTI 54.4 cm    AV Mean Velocity 1.8 m/s    AV Peak Velocity 2.9 m/s    AV Peak Gradient 34 mmHg    AV Area by VTI 1.4 cm2    AV Area by Peak Velocity 1.2 cm2    Aortic Root 2.9 cm    IVSd 1.7 (A) 0.6 - 0.9 cm    LVIDd 4.3 3.9 - 5.3 cm    LVIDs 3.1 cm    LVOT Diameter 2.0 cm    LVOT Mean Gradient 2 mmHg    LVOT VTI 24.7 cm    LVOT Peak Velocity 1.1 m/s    LVOT Peak Gradient 5 mmHg    LVPWd 1.6 (A) 0.6 - 0.9 cm    LV E' Lateral Velocity 6 cm/s    LV E' Septal Velocity 4 cm/s    LVOT Area 3.1 cm2    LVOT SV 78.0 ml    LA Diameter 4.9 cm    MV E Wave Deceleration Time 310.0 ms    MV A Velocity 1.78 m/s    MV E Velocity 1.44 m/s    MV Mean Gradient 6 mmHg    MV VTI 63.6 cm    MV Mean Velocity 1.1 m/s    MV Max Velocity 2.3 m/s    MV Peak Gradient 21 mmHg    MV Area by PHT 2.2 cm2    MV Area by VTI 1.2 cm2    PV Max Velocity 0.8 m/s    PV Peak Gradient 3 mmHg    Pulm Vein A Duration 109.0 ms    Pulm Vein A Velocity 0.3 m/s    Est. RA Pressure 3 mmHg    RVIDd 3.4 cm    TR Max Velocity 2.72 m/s    TR Peak Gradient 30 mmHg       Current Facility-Administered Medications   Medication Dose Route Frequency    0.9% sodium chloride infusion  100 mL/hr IntraVENous CONTINUOUS    glucose chewable tablet 16 g  4 Tablet Oral PRN    dextrose 10% infusion 0-250 mL  0-250 mL IntraVENous PRN    glucagon (GLUCAGEN) injection 1 mg  1 mg IntraMUSCular PRN    sodium chloride (NS) flush 5-40 mL  5-40 mL IntraVENous Q8H    sodium chloride (NS) flush 5-40 mL  5-40 mL IntraVENous PRN    acetaminophen (TYLENOL) tablet 650 mg  650 mg Oral Q6H PRN    Or    acetaminophen (TYLENOL) suppository 650 mg  650 mg Rectal Q6H PRN    ondansetron (ZOFRAN ODT) tablet 4 mg  4 mg Oral Q8H PRN    Or    ondansetron (ZOFRAN) injection 4 mg  4 mg IntraVENous Q6H PRN    enoxaparin (LOVENOX) injection 40 mg  40 mg SubCUTAneous DAILY    insulin lispro (HUMALOG) injection   SubCUTAneous AC&HS    [Held by provider] aspirin delayed-release tablet 81 mg  81 mg Oral DAILY    [Held by provider] clopidogreL (PLAVIX) tablet 75 mg  75 mg Oral DAILY    DULoxetine (CYMBALTA) capsule 30 mg  30 mg Oral DAILY    ferrous sulfate tablet 325 mg  325 mg Oral BID WITH MEALS    levothyroxine (SYNTHROID) tablet 150 mcg  150 mcg Oral ACB    metoprolol succinate (TOPROL-XL) XL tablet 25 mg  25 mg Oral DAILY    atorvastatin (LIPITOR) tablet 10 mg  10 mg Oral QHS    albuterol (PROVENTIL HFA, VENTOLIN HFA, PROAIR HFA) inhaler 2 Puff  2 Puff Inhalation Q6H PRN    oxyCODONE IR (ROXICODONE) tablet 5 mg  5 mg Oral Q4H PRN    insulin glargine (LANTUS) injection 16 Units  16 Units SubCUTAneous QHS    cefTRIAXone (ROCEPHIN) 1 g in sterile water (preservative free) 10 mL IV syringe  1 g IntraVENous Q24H    HYDROmorphone (DILAUDID) syringe 0.5 mg  0.5 mg IntraVENous Q6H PRN         Assessment/Plan:        1. Chronic Obstructive Pulmonary Disease without excebation  2.  Partial large Bowel Obstruction  3. Diabetes Mellitus type 2  4. Essential Hypertension  5. Thyroid Disease   6. Urinary Tract infection       RECOMMENDATIONS/PLAN:      3 82-year-old lady no history of smoking not on any inhalers or oxygen at home, she had a history of secondhand smoke as her  used to smoke and all her children used to smoke she used to work in the form as a farmer significant history of congestive heart failure ejection fraction about 35 to 40% she had aortic valve replaced previously also she has stent placement in the past chest x-ray no acute infiltrate or lung mass  2. Patient is low risk for acute respiratory distress or issues during surgery, patient is having possible surgery on friday  3. monitor respiratory status and O2 PRN  4.  Follow up with Pulmonary outpatient as needed for COPD management

## 2022-04-01 ENCOUNTER — ANESTHESIA (OUTPATIENT)
Dept: SURGERY | Age: 82
DRG: 330 | End: 2022-04-01
Payer: MEDICARE

## 2022-04-01 ENCOUNTER — ANESTHESIA EVENT (OUTPATIENT)
Dept: SURGERY | Age: 82
DRG: 330 | End: 2022-04-01
Payer: MEDICARE

## 2022-04-01 LAB
ABO + RH BLD: NORMAL
ALBUMIN SERPL-MCNC: 2.2 G/DL (ref 3.5–5)
ALBUMIN/GLOB SERPL: 0.8 {RATIO} (ref 1.1–2.2)
ALP SERPL-CCNC: 53 U/L (ref 45–117)
ALT SERPL-CCNC: 12 U/L (ref 12–78)
ANION GAP SERPL CALC-SCNC: 7 MMOL/L (ref 5–15)
AST SERPL W P-5'-P-CCNC: 20 U/L (ref 15–37)
BASOPHILS # BLD: 0.1 K/UL (ref 0–0.1)
BASOPHILS NFR BLD: 1 % (ref 0–1)
BILIRUB SERPL-MCNC: 0.4 MG/DL (ref 0.2–1)
BLOOD GROUP ANTIBODIES SERPL: NEGATIVE
BUN SERPL-MCNC: 12 MG/DL (ref 6–20)
BUN/CREAT SERPL: 19 (ref 12–20)
CA-I BLD-MCNC: 8.2 MG/DL (ref 8.5–10.1)
CHLORIDE SERPL-SCNC: 100 MMOL/L (ref 97–108)
CO2 SERPL-SCNC: 27 MMOL/L (ref 21–32)
CREAT SERPL-MCNC: 0.64 MG/DL (ref 0.55–1.02)
DIFFERENTIAL METHOD BLD: ABNORMAL
EOSINOPHIL # BLD: 0.3 K/UL (ref 0–0.4)
EOSINOPHIL NFR BLD: 4 % (ref 0–7)
ERYTHROCYTE [DISTWIDTH] IN BLOOD BY AUTOMATED COUNT: 13.2 % (ref 11.5–14.5)
GLOBULIN SER CALC-MCNC: 2.7 G/DL (ref 2–4)
GLUCOSE BLD STRIP.AUTO-MCNC: 106 MG/DL (ref 65–117)
GLUCOSE BLD STRIP.AUTO-MCNC: 124 MG/DL (ref 65–117)
GLUCOSE BLD STRIP.AUTO-MCNC: 127 MG/DL (ref 65–117)
GLUCOSE BLD STRIP.AUTO-MCNC: 139 MG/DL (ref 65–117)
GLUCOSE SERPL-MCNC: 109 MG/DL (ref 65–100)
HCT VFR BLD AUTO: 27.6 % (ref 35–47)
HGB BLD-MCNC: 9.1 G/DL (ref 11.5–16)
IMM GRANULOCYTES # BLD AUTO: 0 K/UL (ref 0–0.04)
IMM GRANULOCYTES NFR BLD AUTO: 0 % (ref 0–0.5)
LYMPHOCYTES # BLD: 0.8 K/UL (ref 0.8–3.5)
LYMPHOCYTES NFR BLD: 9 % (ref 12–49)
MCH RBC QN AUTO: 31.7 PG (ref 26–34)
MCHC RBC AUTO-ENTMCNC: 33 G/DL (ref 30–36.5)
MCV RBC AUTO: 96.2 FL (ref 80–99)
MONOCYTES # BLD: 1.1 K/UL (ref 0–1)
MONOCYTES NFR BLD: 12 % (ref 5–13)
NEUTS SEG # BLD: 6.6 K/UL (ref 1.8–8)
NEUTS SEG NFR BLD: 74 % (ref 32–75)
NRBC # BLD: 0 K/UL (ref 0–0.01)
NRBC BLD-RTO: 0 PER 100 WBC
PERFORMED BY, TECHID: ABNORMAL
PERFORMED BY, TECHID: NORMAL
PLATELET # BLD AUTO: 295 K/UL (ref 150–400)
PMV BLD AUTO: 12.3 FL (ref 8.9–12.9)
POTASSIUM SERPL-SCNC: 3.8 MMOL/L (ref 3.5–5.1)
PROT SERPL-MCNC: 4.9 G/DL (ref 6.4–8.2)
RBC # BLD AUTO: 2.87 M/UL (ref 3.8–5.2)
SODIUM SERPL-SCNC: 134 MMOL/L (ref 136–145)
SPECIMEN EXP DATE BLD: NORMAL
WBC # BLD AUTO: 8.9 K/UL (ref 3.6–11)

## 2022-04-01 PROCEDURE — 85025 COMPLETE CBC W/AUTO DIFF WBC: CPT

## 2022-04-01 PROCEDURE — 88342 IMHCHEM/IMCYTCHM 1ST ANTB: CPT

## 2022-04-01 PROCEDURE — 88341 IMHCHEM/IMCYTCHM EA ADD ANTB: CPT

## 2022-04-01 PROCEDURE — 65270000029 HC RM PRIVATE

## 2022-04-01 PROCEDURE — 74011250636 HC RX REV CODE- 250/636: Performed by: COLON & RECTAL SURGERY

## 2022-04-01 PROCEDURE — 77030031139 HC SUT VCRL2 J&J -A: Performed by: COLON & RECTAL SURGERY

## 2022-04-01 PROCEDURE — 74011250636 HC RX REV CODE- 250/636: Performed by: STUDENT IN AN ORGANIZED HEALTH CARE EDUCATION/TRAINING PROGRAM

## 2022-04-01 PROCEDURE — 74011000250 HC RX REV CODE- 250: Performed by: STUDENT IN AN ORGANIZED HEALTH CARE EDUCATION/TRAINING PROGRAM

## 2022-04-01 PROCEDURE — 77030008462 HC STPLR SKN PROX J&J -A: Performed by: COLON & RECTAL SURGERY

## 2022-04-01 PROCEDURE — 99232 SBSQ HOSP IP/OBS MODERATE 35: CPT | Performed by: COLON & RECTAL SURGERY

## 2022-04-01 PROCEDURE — 36415 COLL VENOUS BLD VENIPUNCTURE: CPT

## 2022-04-01 PROCEDURE — 77030040361 HC SLV COMPR DVT MDII -B: Performed by: COLON & RECTAL SURGERY

## 2022-04-01 PROCEDURE — 88309 TISSUE EXAM BY PATHOLOGIST: CPT

## 2022-04-01 PROCEDURE — 82378 CARCINOEMBRYONIC ANTIGEN: CPT

## 2022-04-01 PROCEDURE — 86900 BLOOD TYPING SEROLOGIC ABO: CPT

## 2022-04-01 PROCEDURE — 74011250636 HC RX REV CODE- 250/636: Performed by: INTERNAL MEDICINE

## 2022-04-01 PROCEDURE — 74011000250 HC RX REV CODE- 250: Performed by: NURSE ANESTHETIST, CERTIFIED REGISTERED

## 2022-04-01 PROCEDURE — 0DTF0ZZ RESECTION OF RIGHT LARGE INTESTINE, OPEN APPROACH: ICD-10-PCS | Performed by: COLON & RECTAL SURGERY

## 2022-04-01 PROCEDURE — 77030009979 HC RELD STPLR TCR J&J -C: Performed by: COLON & RECTAL SURGERY

## 2022-04-01 PROCEDURE — 44140 PARTIAL REMOVAL OF COLON: CPT | Performed by: COLON & RECTAL SURGERY

## 2022-04-01 PROCEDURE — 80053 COMPREHEN METABOLIC PANEL: CPT

## 2022-04-01 PROCEDURE — 74011000250 HC RX REV CODE- 250: Performed by: INTERNAL MEDICINE

## 2022-04-01 PROCEDURE — 76210000017 HC OR PH I REC 1.5 TO 2 HR: Performed by: COLON & RECTAL SURGERY

## 2022-04-01 PROCEDURE — 82962 GLUCOSE BLOOD TEST: CPT

## 2022-04-01 PROCEDURE — 77030011264 HC ELECTRD BLD EXT COVD -A: Performed by: COLON & RECTAL SURGERY

## 2022-04-01 PROCEDURE — 77030036731 HC STPLR ENDOSC J&J -F: Performed by: COLON & RECTAL SURGERY

## 2022-04-01 PROCEDURE — 77030003029 HC SUT VCRL J&J -B: Performed by: COLON & RECTAL SURGERY

## 2022-04-01 PROCEDURE — 2709999900 HC NON-CHARGEABLE SUPPLY: Performed by: COLON & RECTAL SURGERY

## 2022-04-01 PROCEDURE — 74011250636 HC RX REV CODE- 250/636: Performed by: NURSE ANESTHETIST, CERTIFIED REGISTERED

## 2022-04-01 PROCEDURE — 76010000162 HC OR TIME 1.5 TO 2 HR INTENSV-TIER 1: Performed by: COLON & RECTAL SURGERY

## 2022-04-01 PROCEDURE — 77030002966 HC SUT PDS J&J -A: Performed by: COLON & RECTAL SURGERY

## 2022-04-01 PROCEDURE — 74011250637 HC RX REV CODE- 250/637: Performed by: STUDENT IN AN ORGANIZED HEALTH CARE EDUCATION/TRAINING PROGRAM

## 2022-04-01 PROCEDURE — 87086 URINE CULTURE/COLONY COUNT: CPT

## 2022-04-01 PROCEDURE — 76060000034 HC ANESTHESIA 1.5 TO 2 HR: Performed by: COLON & RECTAL SURGERY

## 2022-04-01 RX ORDER — SODIUM CHLORIDE 9 MG/ML
INJECTION, SOLUTION INTRAVENOUS
Status: DISCONTINUED | OUTPATIENT
Start: 2022-04-01 | End: 2022-04-01 | Stop reason: HOSPADM

## 2022-04-01 RX ORDER — LIDOCAINE HYDROCHLORIDE 20 MG/ML
INJECTION, SOLUTION EPIDURAL; INFILTRATION; INTRACAUDAL; PERINEURAL AS NEEDED
Status: DISCONTINUED | OUTPATIENT
Start: 2022-04-01 | End: 2022-04-01 | Stop reason: HOSPADM

## 2022-04-01 RX ORDER — MIDAZOLAM HYDROCHLORIDE 1 MG/ML
0.5 INJECTION, SOLUTION INTRAMUSCULAR; INTRAVENOUS
Status: DISCONTINUED | OUTPATIENT
Start: 2022-04-01 | End: 2022-04-01 | Stop reason: HOSPADM

## 2022-04-01 RX ORDER — SODIUM CHLORIDE 0.9 % (FLUSH) 0.9 %
5-40 SYRINGE (ML) INJECTION AS NEEDED
Status: CANCELLED | OUTPATIENT
Start: 2022-04-01

## 2022-04-01 RX ORDER — FENTANYL CITRATE 50 UG/ML
INJECTION, SOLUTION INTRAMUSCULAR; INTRAVENOUS AS NEEDED
Status: DISCONTINUED | OUTPATIENT
Start: 2022-04-01 | End: 2022-04-01 | Stop reason: HOSPADM

## 2022-04-01 RX ORDER — HYDROCODONE BITARTRATE AND ACETAMINOPHEN 5; 325 MG/1; MG/1
1 TABLET ORAL AS NEEDED
Status: DISCONTINUED | OUTPATIENT
Start: 2022-04-01 | End: 2022-04-01 | Stop reason: HOSPADM

## 2022-04-01 RX ORDER — PROPOFOL 10 MG/ML
INJECTION, EMULSION INTRAVENOUS AS NEEDED
Status: DISCONTINUED | OUTPATIENT
Start: 2022-04-01 | End: 2022-04-01 | Stop reason: HOSPADM

## 2022-04-01 RX ORDER — HYDROMORPHONE HYDROCHLORIDE 1 MG/ML
INJECTION, SOLUTION INTRAMUSCULAR; INTRAVENOUS; SUBCUTANEOUS AS NEEDED
Status: DISCONTINUED | OUTPATIENT
Start: 2022-04-01 | End: 2022-04-01 | Stop reason: HOSPADM

## 2022-04-01 RX ORDER — SODIUM CHLORIDE 0.9 % (FLUSH) 0.9 %
5-40 SYRINGE (ML) INJECTION EVERY 8 HOURS
Status: CANCELLED | OUTPATIENT
Start: 2022-04-01

## 2022-04-01 RX ORDER — MIDAZOLAM HYDROCHLORIDE 1 MG/ML
1 INJECTION, SOLUTION INTRAMUSCULAR; INTRAVENOUS AS NEEDED
Status: CANCELLED | OUTPATIENT
Start: 2022-04-01

## 2022-04-01 RX ORDER — FENTANYL CITRATE 50 UG/ML
50 INJECTION, SOLUTION INTRAMUSCULAR; INTRAVENOUS AS NEEDED
Status: CANCELLED | OUTPATIENT
Start: 2022-04-01

## 2022-04-01 RX ORDER — HYDROMORPHONE HYDROCHLORIDE 1 MG/ML
1 INJECTION, SOLUTION INTRAMUSCULAR; INTRAVENOUS; SUBCUTANEOUS
Status: DISCONTINUED | OUTPATIENT
Start: 2022-04-01 | End: 2022-04-08 | Stop reason: HOSPADM

## 2022-04-01 RX ORDER — DEXAMETHASONE SODIUM PHOSPHATE 4 MG/ML
INJECTION, SOLUTION INTRA-ARTICULAR; INTRALESIONAL; INTRAMUSCULAR; INTRAVENOUS; SOFT TISSUE AS NEEDED
Status: DISCONTINUED | OUTPATIENT
Start: 2022-04-01 | End: 2022-04-01 | Stop reason: HOSPADM

## 2022-04-01 RX ORDER — DIPHENHYDRAMINE HYDROCHLORIDE 50 MG/ML
12.5 INJECTION, SOLUTION INTRAMUSCULAR; INTRAVENOUS AS NEEDED
Status: DISCONTINUED | OUTPATIENT
Start: 2022-04-01 | End: 2022-04-01 | Stop reason: HOSPADM

## 2022-04-01 RX ORDER — HYDRALAZINE HYDROCHLORIDE 20 MG/ML
10 INJECTION INTRAMUSCULAR; INTRAVENOUS
Status: DISCONTINUED | OUTPATIENT
Start: 2022-04-01 | End: 2022-04-01 | Stop reason: HOSPADM

## 2022-04-01 RX ORDER — HYDROMORPHONE HYDROCHLORIDE 1 MG/ML
0.4 INJECTION, SOLUTION INTRAMUSCULAR; INTRAVENOUS; SUBCUTANEOUS
Status: DISCONTINUED | OUTPATIENT
Start: 2022-04-01 | End: 2022-04-01 | Stop reason: HOSPADM

## 2022-04-01 RX ORDER — ONDANSETRON 2 MG/ML
INJECTION INTRAMUSCULAR; INTRAVENOUS AS NEEDED
Status: DISCONTINUED | OUTPATIENT
Start: 2022-04-01 | End: 2022-04-01 | Stop reason: HOSPADM

## 2022-04-01 RX ORDER — SODIUM CHLORIDE 0.9 % (FLUSH) 0.9 %
5-40 SYRINGE (ML) INJECTION AS NEEDED
Status: DISCONTINUED | OUTPATIENT
Start: 2022-04-01 | End: 2022-04-01 | Stop reason: HOSPADM

## 2022-04-01 RX ORDER — LABETALOL HCL 20 MG/4 ML
5 SYRINGE (ML) INTRAVENOUS
Status: DISCONTINUED | OUTPATIENT
Start: 2022-04-01 | End: 2022-04-01 | Stop reason: HOSPADM

## 2022-04-01 RX ORDER — SODIUM CHLORIDE, SODIUM LACTATE, POTASSIUM CHLORIDE, CALCIUM CHLORIDE 600; 310; 30; 20 MG/100ML; MG/100ML; MG/100ML; MG/100ML
INJECTION, SOLUTION INTRAVENOUS
Status: DISCONTINUED | OUTPATIENT
Start: 2022-04-01 | End: 2022-04-01 | Stop reason: HOSPADM

## 2022-04-01 RX ORDER — FENTANYL CITRATE 50 UG/ML
INJECTION, SOLUTION INTRAMUSCULAR; INTRAVENOUS
Status: COMPLETED
Start: 2022-04-01 | End: 2022-04-01

## 2022-04-01 RX ORDER — METOPROLOL TARTRATE 5 MG/5ML
2.5 INJECTION INTRAVENOUS
Status: DISCONTINUED | OUTPATIENT
Start: 2022-04-01 | End: 2022-04-01 | Stop reason: HOSPADM

## 2022-04-01 RX ORDER — EPHEDRINE SULFATE/0.9% NACL/PF 50 MG/5 ML
5 SYRINGE (ML) INTRAVENOUS AS NEEDED
Status: DISCONTINUED | OUTPATIENT
Start: 2022-04-01 | End: 2022-04-01 | Stop reason: HOSPADM

## 2022-04-01 RX ORDER — SUCCINYLCHOLINE CHLORIDE 20 MG/ML
INJECTION INTRAMUSCULAR; INTRAVENOUS AS NEEDED
Status: DISCONTINUED | OUTPATIENT
Start: 2022-04-01 | End: 2022-04-01 | Stop reason: HOSPADM

## 2022-04-01 RX ORDER — LIDOCAINE HYDROCHLORIDE 10 MG/ML
0.1 INJECTION, SOLUTION EPIDURAL; INFILTRATION; INTRACAUDAL; PERINEURAL AS NEEDED
Status: CANCELLED | OUTPATIENT
Start: 2022-04-01

## 2022-04-01 RX ORDER — SODIUM CHLORIDE 0.9 % (FLUSH) 0.9 %
5-40 SYRINGE (ML) INJECTION EVERY 8 HOURS
Status: DISCONTINUED | OUTPATIENT
Start: 2022-04-01 | End: 2022-04-01 | Stop reason: HOSPADM

## 2022-04-01 RX ORDER — FENTANYL CITRATE 50 UG/ML
50 INJECTION, SOLUTION INTRAMUSCULAR; INTRAVENOUS
Status: DISCONTINUED | OUTPATIENT
Start: 2022-04-01 | End: 2022-04-01 | Stop reason: HOSPADM

## 2022-04-01 RX ORDER — METRONIDAZOLE 500 MG/100ML
500 INJECTION, SOLUTION INTRAVENOUS ONCE
Status: COMPLETED | OUTPATIENT
Start: 2022-04-01 | End: 2022-04-01

## 2022-04-01 RX ORDER — CEFAZOLIN SODIUM 1 G/3ML
INJECTION, POWDER, FOR SOLUTION INTRAMUSCULAR; INTRAVENOUS AS NEEDED
Status: DISCONTINUED | OUTPATIENT
Start: 2022-04-01 | End: 2022-04-01 | Stop reason: HOSPADM

## 2022-04-01 RX ORDER — ROCURONIUM BROMIDE 10 MG/ML
INJECTION, SOLUTION INTRAVENOUS AS NEEDED
Status: DISCONTINUED | OUTPATIENT
Start: 2022-04-01 | End: 2022-04-01 | Stop reason: HOSPADM

## 2022-04-01 RX ORDER — ONDANSETRON 2 MG/ML
4 INJECTION INTRAMUSCULAR; INTRAVENOUS AS NEEDED
Status: DISCONTINUED | OUTPATIENT
Start: 2022-04-01 | End: 2022-04-01 | Stop reason: HOSPADM

## 2022-04-01 RX ORDER — HYDROMORPHONE HYDROCHLORIDE 1 MG/ML
0.5 INJECTION, SOLUTION INTRAMUSCULAR; INTRAVENOUS; SUBCUTANEOUS
Status: DISCONTINUED | OUTPATIENT
Start: 2022-04-01 | End: 2022-04-08 | Stop reason: HOSPADM

## 2022-04-01 RX ORDER — CEFOXITIN 2 G/1
INJECTION, POWDER, FOR SOLUTION INTRAVENOUS AS NEEDED
Status: DISCONTINUED | OUTPATIENT
Start: 2022-04-01 | End: 2022-04-01

## 2022-04-01 RX ADMIN — SODIUM CHLORIDE, PRESERVATIVE FREE 10 ML: 5 INJECTION INTRAVENOUS at 21:59

## 2022-04-01 RX ADMIN — DULOXETINE HYDROCHLORIDE 30 MG: 30 CAPSULE, DELAYED RELEASE ORAL at 09:27

## 2022-04-01 RX ADMIN — METRONIDAZOLE 500 MG: 500 INJECTION, SOLUTION INTRAVENOUS at 16:19

## 2022-04-01 RX ADMIN — SODIUM CHLORIDE, POTASSIUM CHLORIDE, SODIUM LACTATE AND CALCIUM CHLORIDE: 600; 310; 30; 20 INJECTION, SOLUTION INTRAVENOUS at 15:11

## 2022-04-01 RX ADMIN — LEVOTHYROXINE SODIUM 150 MCG: 0.07 TABLET ORAL at 09:27

## 2022-04-01 RX ADMIN — SUGAMMADEX 200 MG: 100 INJECTION, SOLUTION INTRAVENOUS at 16:51

## 2022-04-01 RX ADMIN — HYDROMORPHONE HYDROCHLORIDE 0.5 MG: 1 INJECTION, SOLUTION INTRAMUSCULAR; INTRAVENOUS; SUBCUTANEOUS at 03:48

## 2022-04-01 RX ADMIN — SUCCINYLCHOLINE CHLORIDE 100 MG: 20 INJECTION, SOLUTION INTRAMUSCULAR; INTRAVENOUS at 15:29

## 2022-04-01 RX ADMIN — SODIUM CHLORIDE 100 ML/HR: 9 INJECTION, SOLUTION INTRAVENOUS at 06:10

## 2022-04-01 RX ADMIN — HYDROMORPHONE HYDROCHLORIDE 1 MG: 1 INJECTION, SOLUTION INTRAMUSCULAR; INTRAVENOUS; SUBCUTANEOUS at 15:59

## 2022-04-01 RX ADMIN — ROCURONIUM BROMIDE 50 MG: 50 INJECTION, SOLUTION INTRAVENOUS at 15:50

## 2022-04-01 RX ADMIN — FENTANYL CITRATE 100 MCG: 50 INJECTION, SOLUTION INTRAMUSCULAR; INTRAVENOUS at 15:29

## 2022-04-01 RX ADMIN — FERROUS SULFATE TAB 325 MG (65 MG ELEMENTAL FE) 325 MG: 325 (65 FE) TAB at 09:31

## 2022-04-01 RX ADMIN — METOPROLOL SUCCINATE 25 MG: 25 TABLET, EXTENDED RELEASE ORAL at 09:27

## 2022-04-01 RX ADMIN — LIDOCAINE HYDROCHLORIDE 100 MG: 20 INJECTION, SOLUTION EPIDURAL; INFILTRATION; INTRACAUDAL; PERINEURAL at 15:29

## 2022-04-01 RX ADMIN — DEXAMETHASONE SODIUM PHOSPHATE 4 MG: 4 INJECTION, SOLUTION INTRA-ARTICULAR; INTRALESIONAL; INTRAMUSCULAR; INTRAVENOUS; SOFT TISSUE at 16:00

## 2022-04-01 RX ADMIN — HYDROMORPHONE HYDROCHLORIDE 0.5 MG: 1 INJECTION, SOLUTION INTRAMUSCULAR; INTRAVENOUS; SUBCUTANEOUS at 09:27

## 2022-04-01 RX ADMIN — HYDROMORPHONE HYDROCHLORIDE 0.5 MG: 1 INJECTION, SOLUTION INTRAMUSCULAR; INTRAVENOUS; SUBCUTANEOUS at 20:25

## 2022-04-01 RX ADMIN — CEFTRIAXONE SODIUM 1 G: 1 INJECTION, POWDER, FOR SOLUTION INTRAMUSCULAR; INTRAVENOUS at 09:27

## 2022-04-01 RX ADMIN — CEFAZOLIN SODIUM 2 G: 1 INJECTION, POWDER, FOR SOLUTION INTRAMUSCULAR; INTRAVENOUS at 15:58

## 2022-04-01 RX ADMIN — PROPOFOL 80 MG: 10 INJECTION, EMULSION INTRAVENOUS at 15:29

## 2022-04-01 RX ADMIN — SODIUM CHLORIDE, PRESERVATIVE FREE 10 ML: 5 INJECTION INTRAVENOUS at 05:12

## 2022-04-01 RX ADMIN — FENTANYL CITRATE 100 MCG: 50 INJECTION, SOLUTION INTRAMUSCULAR; INTRAVENOUS at 17:52

## 2022-04-01 RX ADMIN — SODIUM CHLORIDE: 9 INJECTION, SOLUTION INTRAVENOUS at 15:37

## 2022-04-01 RX ADMIN — ONDANSETRON 4 MG: 2 INJECTION INTRAMUSCULAR; INTRAVENOUS at 16:00

## 2022-04-01 NOTE — OP NOTES
Operative Note    Patient: Yu Kiser  YOB: 1940  MRN: [id number]    Date of Procedure: 4/1/2022     Pre-Op Diagnosis: Cecal Cancer    Post-Op Diagnosis: Same as preoperative diagnosis. Procedure(s):  LAPAROTOMY EXPLORATORY, extended right hemicolectomy    Surgeon(s):  Beau Bingham MD    Surgical Assistant: Surg Asst-1: Anna Section    Anesthesia: General     Estimated Blood Loss (mL):  39NN    Complications: None    Specimens:   ID Type Source Tests Collected by Time Destination   1 : Right Colon and Proximal Transverse Colon Preservative Colon  Beau Bingham MD 4/1/2022 1632 Pathology   1 : Urine Culture Urine Marie Specimen CULTURE, URINE, URINALYSIS W/MICROSCOPIC Beau Bingham MD 4/1/2022 1604 Microbiology   1 : Peritoneal Fluid Body Fluid Peritoneal Fluid  Beau Bingham MD 4/1/2022 1605 Cytology        Implants: * No implants in log *    Drains:   [REMOVED] External Urinary Catheter 03/29/22 (Removed)       Findings: Near obstructing tumor at the proximal ascending colon with distended cecum with areas of gangrene in the wall    Detailed Description of Procedure: Patient was operating room and positioned on the OR table in the supine position. Following the induction of general anesthesia the abdomen was prepped and draped in standard sterile fashion. Surgical timeout was performed #the patient's identity and surgical procedure were once again confirmed. A midline incision was made and taken down through subcutaneous tissues electrocautery. The fascia was sharply incised and the abdominal cavity entered. Incision was then opened along its entire length. Upon general laparotomy a very distended cecum was encountered. There is also some bloody ascites that was sent for cytology. A mass at the proximal ascending colon could palpate. No other lesions of the abdomen were palpated.     We began by mobilizing the cecum and ascending colon by dividing the lateral peritoneal reflection. The hepatic flexure was mobilized by dividing the hepatocolic ligament with the Enseal device. We continued mobilization to beyond the right branch of the middle colic. At this point an appropriate window was created between the transverse colon and mesocolon using a Rajani clamp and a Penrose drain passed through it. Also the appropriate site was selected on the terminal ileum and again a window was created between the bowel and the mesentery and the Penrose drain passed through this window. A stapled side-to-side functional end-to-end anastomosis/between the ileum and transverse colon using MAMI 75 stapler. The same stapler was used to transect the bowel close the antral colotomy. A 3-0 Vicryl suture was placed across the anastomosis. The mesentery of the bowel was then taken with the Enseal device except for the ileocolic vessel which was taking hiatus origin with Rajani clamps and 2-0 Vicryl suture ligatures. Once the mesenteric completed by the LigaSure device specimen was passed off the table. The abdomen was thoroughly inspected for hemostasis and meticulous hemostasis noted. Fascia was closed with #1 PDS suture in running fashion. Subcutaneous tissues was inspected for hemostasis and attention mitigate hemostasis skin was closed with 3-0 Vicryl deep dermal sutures and skin clips to the skin. Patient this point was awakened, extubated, taken recovery in stable condition. All counts were correct at the close of the case.   Electronically Signed by Amena Shah MD on 4/1/2022 at 4:52 PM

## 2022-04-01 NOTE — PROGRESS NOTES
Hospitalist Progress Note    Subjective:   Daily Progress Note: 4/1/2022 7:38 AM    Patient is in no acute distress. Patient has abdominal pain but only to palpation and is looking forward to surgery.     Current Facility-Administered Medications   Medication Dose Route Frequency    0.9% sodium chloride infusion  100 mL/hr IntraVENous CONTINUOUS    glucose chewable tablet 16 g  4 Tablet Oral PRN    dextrose 10% infusion 0-250 mL  0-250 mL IntraVENous PRN    glucagon (GLUCAGEN) injection 1 mg  1 mg IntraMUSCular PRN    sodium chloride (NS) flush 5-40 mL  5-40 mL IntraVENous Q8H    sodium chloride (NS) flush 5-40 mL  5-40 mL IntraVENous PRN    acetaminophen (TYLENOL) tablet 650 mg  650 mg Oral Q6H PRN    Or    acetaminophen (TYLENOL) suppository 650 mg  650 mg Rectal Q6H PRN    ondansetron (ZOFRAN ODT) tablet 4 mg  4 mg Oral Q8H PRN    Or    ondansetron (ZOFRAN) injection 4 mg  4 mg IntraVENous Q6H PRN    [Held by provider] enoxaparin (LOVENOX) injection 40 mg  40 mg SubCUTAneous DAILY    insulin lispro (HUMALOG) injection   SubCUTAneous AC&HS    [Held by provider] aspirin delayed-release tablet 81 mg  81 mg Oral DAILY    [Held by provider] clopidogreL (PLAVIX) tablet 75 mg  75 mg Oral DAILY    DULoxetine (CYMBALTA) capsule 30 mg  30 mg Oral DAILY    ferrous sulfate tablet 325 mg  325 mg Oral BID WITH MEALS    levothyroxine (SYNTHROID) tablet 150 mcg  150 mcg Oral ACB    metoprolol succinate (TOPROL-XL) XL tablet 25 mg  25 mg Oral DAILY    atorvastatin (LIPITOR) tablet 10 mg  10 mg Oral QHS    albuterol (PROVENTIL HFA, VENTOLIN HFA, PROAIR HFA) inhaler 2 Puff  2 Puff Inhalation Q6H PRN    oxyCODONE IR (ROXICODONE) tablet 5 mg  5 mg Oral Q4H PRN    insulin glargine (LANTUS) injection 16 Units  16 Units SubCUTAneous QHS    cefTRIAXone (ROCEPHIN) 1 g in sterile water (preservative free) 10 mL IV syringe  1 g IntraVENous Q24H    HYDROmorphone (DILAUDID) syringe 0.5 mg  0.5 mg IntraVENous Q6H PRN Review of Systems  Review of Systems   Constitutional: Negative. Respiratory: Negative. Cardiovascular: Negative. Gastrointestinal: Positive for abdominal pain. Negative for nausea and vomiting. Neurological:        + gait instability   All other systems reviewed and are negative. Objective:     Visit Vitals  BP (!) 147/61 (BP Patient Position: At rest)   Pulse 94   Temp 96.8 °F (36 °C)   Resp 16   Ht 5' 1\" (1.549 m)   Wt 74.8 kg (165 lb)   SpO2 94%   BMI 31.18 kg/m²      O2 Device: None (Room air)    Temp (24hrs), Av.4 °F (36.9 °C), Min:96.8 °F (36 °C), Max:99 °F (37.2 °C)      No intake/output data recorded.    1901 -  0700  In: 1193.3 [P.O.:360; I.V.:833.3]  Out: 1650 [Urine:1650]    Recent Results (from the past 24 hour(s))   ECHO ADULT COMPLETE    Collection Time: 22  9:45 AM   Result Value Ref Range    AV Mean Gradient 16 mmHg    AV VTI 54.4 cm    AV Mean Velocity 1.8 m/s    AV Peak Velocity 2.9 m/s    AV Peak Gradient 34 mmHg    AV Area by VTI 1.4 cm2    AV Area by Peak Velocity 1.2 cm2    Aortic Root 2.9 cm    IVSd 1.7 (A) 0.6 - 0.9 cm    LVIDd 4.3 3.9 - 5.3 cm    LVIDs 3.1 cm    LVOT Diameter 2.0 cm    LVOT Mean Gradient 2 mmHg    LVOT VTI 24.7 cm    LVOT Peak Velocity 1.1 m/s    LVOT Peak Gradient 5 mmHg    LVPWd 1.6 (A) 0.6 - 0.9 cm    LV E' Lateral Velocity 6 cm/s    LV E' Septal Velocity 4 cm/s    LVOT Area 3.1 cm2    LVOT SV 77.6 ml    LA Diameter 4.9 cm    MV E Wave Deceleration Time 310.0 ms    MV A Velocity 1.78 m/s    MV E Velocity 1.44 m/s    MV Mean Gradient 6 mmHg    MV VTI 63.6 cm    MV Mean Velocity 1.1 m/s    MV Max Velocity 2.3 m/s    MV Peak Gradient 21 mmHg    MV Area by VTI 1.2 cm2    PV Max Velocity 0.8 m/s    PV Peak Gradient 3 mmHg    Pulm Vein A Duration 109.0 ms    Pulm Vein A Velocity 0.3 m/s    Est. RA Pressure 3 mmHg    RVIDd 3.4 cm    TR Max Velocity 2.72 m/s    TR Peak Gradient 30 mmHg    Fractional Shortening 2D 28 28 - 44 % LVIDd Index 2.47 cm/m2    LVIDs Index 1.78 cm/m2    LV RWT Ratio 0.74     LV Mass 2D 299.7 (A) 67 - 162 g    LV Mass 2D Index 172.2 (A) 43 - 95 g/m2    MV E/A 0.81     E/E' Ratio (Averaged) 30.00     E/E' Lateral 24.00     E/E' Septal 36.00     LVOT Stroke Volume Index 44.6 mL/m2    LA Size Index 2.82 cm/m2    LA/AO Root Ratio 1.69     Ao Root Index 1.67 cm/m2    AV Velocity Ratio 0.38     LVOT:AV VTI Index 0.45     KIKO/BSA VTI 0.8 cm2/m2    KIKO/BSA Peak Velocity 0.7 cm2/m2    MV:LVOT VTI Index 2.57     RVSP 33 mmHg    EF BP 57 55 - 100 %   GLUCOSE, POC    Collection Time: 03/31/22 10:54 AM   Result Value Ref Range    Glucose (POC) 163 (H) 65 - 117 mg/dL    Performed by Jaskaran, POC    Collection Time: 03/31/22  3:35 PM   Result Value Ref Range    Glucose (POC) 170 (H) 65 - 117 mg/dL    Performed by Eddi Turner    GLUCOSE, POC    Collection Time: 03/31/22  9:25 PM   Result Value Ref Range    Glucose (POC) 145 (H) 65 - 117 mg/dL    Performed by Onetha Hunger    METABOLIC PANEL, COMPREHENSIVE    Collection Time: 04/01/22  5:10 AM   Result Value Ref Range    Sodium 134 (L) 136 - 145 mmol/L    Potassium 3.8 3.5 - 5.1 mmol/L    Chloride 100 97 - 108 mmol/L    CO2 27 21 - 32 mmol/L    Anion gap 7 5 - 15 mmol/L    Glucose 109 (H) 65 - 100 mg/dL    BUN 12 6 - 20 mg/dL    Creatinine 0.64 0.55 - 1.02 mg/dL    BUN/Creatinine ratio 19 12 - 20      GFR est AA >60 >60 ml/min/1.73m2    GFR est non-AA >60 >60 ml/min/1.73m2    Calcium 8.2 (L) 8.5 - 10.1 mg/dL    Bilirubin, total 0.4 0.2 - 1.0 mg/dL    AST (SGOT) 20 15 - 37 U/L    ALT (SGPT) 12 12 - 78 U/L    Alk.  phosphatase 53 45 - 117 U/L    Protein, total 4.9 (L) 6.4 - 8.2 g/dL    Albumin 2.2 (L) 3.5 - 5.0 g/dL    Globulin 2.7 2.0 - 4.0 g/dL    A-G Ratio 0.8 (L) 1.1 - 2.2     CBC WITH AUTOMATED DIFF    Collection Time: 04/01/22  5:10 AM   Result Value Ref Range    WBC 8.9 3.6 - 11.0 K/uL    RBC 2.87 (L) 3.80 - 5.20 M/uL    HGB 9.1 (L) 11.5 - 16.0 g/dL    HCT 27.6 (L) 35.0 - 47.0 %    MCV 96.2 80.0 - 99.0 FL    MCH 31.7 26.0 - 34.0 PG    MCHC 33.0 30.0 - 36.5 g/dL    RDW 13.2 11.5 - 14.5 %    PLATELET 902 823 - 174 K/uL    MPV 12.3 8.9 - 12.9 FL    NRBC 0.0 0.0  WBC    ABSOLUTE NRBC 0.00 0.00 - 0.01 K/uL    NEUTROPHILS 74 32 - 75 %    LYMPHOCYTES 9 (L) 12 - 49 %    MONOCYTES 12 5 - 13 %    EOSINOPHILS 4 0 - 7 %    BASOPHILS 1 0 - 1 %    IMMATURE GRANULOCYTES 0 0 - 0.5 %    ABS. NEUTROPHILS 6.6 1.8 - 8.0 K/UL    ABS. LYMPHOCYTES 0.8 0.8 - 3.5 K/UL    ABS. MONOCYTES 1.1 (H) 0.0 - 1.0 K/UL    ABS. EOSINOPHILS 0.3 0.0 - 0.4 K/UL    ABS. BASOPHILS 0.1 0.0 - 0.1 K/UL    ABS. IMM. GRANS. 0.0 0.00 - 0.04 K/UL    DF AUTOMATED     GLUCOSE, POC    Collection Time: 04/01/22  7:30 AM   Result Value Ref Range    Glucose (POC) 127 (H) 65 - 117 mg/dL    Performed by [place] [facility]         CT ABD PELV WO CONT   Final Result   1. Abnormal masslike thickening within the ascending colon highly concerning for   malignancy with suspected lymphovascular invasion. Tumor results in low-grade   obstruction. 2. Trace ascites within the right abdomen. 3. Subtle morphologic changes suggestive of hepatic fibrosis/early cirrhosis. 4. Cholelithiasis. Notification: Findings were discussed with Dr. Ford Martínez at 11:30 AM on 3/29/2022      XR CHEST PORT   Final Result           PHYSICAL EXAM:    Physical Exam  Vitals and nursing note reviewed. Constitutional:       Appearance: She is obese. HENT:      Head: Normocephalic and atraumatic. Mouth/Throat:      Mouth: Mucous membranes are moist.      Comments: Poor dentition  Cardiovascular:      Rate and Rhythm: Normal rate and regular rhythm. Pulmonary:      Effort: No respiratory distress. Breath sounds: No wheezing. Abdominal:      General: Bowel sounds are normal. There is no distension. Palpations: Abdomen is soft. There is no mass. Tenderness: There is abdominal tenderness.       Comments: Tenderness to palpation of right upper quadrant   Musculoskeletal:      Right lower leg: No edema. Left lower leg: No edema. Skin:     General: Skin is warm. Neurological:      Mental Status: She is alert and oriented to person, place, and time.    Psychiatric:         Mood and Affect: Mood normal.          Data Review    Recent Results (from the past 24 hour(s))   ECHO ADULT COMPLETE    Collection Time: 03/31/22  9:45 AM   Result Value Ref Range    AV Mean Gradient 16 mmHg    AV VTI 54.4 cm    AV Mean Velocity 1.8 m/s    AV Peak Velocity 2.9 m/s    AV Peak Gradient 34 mmHg    AV Area by VTI 1.4 cm2    AV Area by Peak Velocity 1.2 cm2    Aortic Root 2.9 cm    IVSd 1.7 (A) 0.6 - 0.9 cm    LVIDd 4.3 3.9 - 5.3 cm    LVIDs 3.1 cm    LVOT Diameter 2.0 cm    LVOT Mean Gradient 2 mmHg    LVOT VTI 24.7 cm    LVOT Peak Velocity 1.1 m/s    LVOT Peak Gradient 5 mmHg    LVPWd 1.6 (A) 0.6 - 0.9 cm    LV E' Lateral Velocity 6 cm/s    LV E' Septal Velocity 4 cm/s    LVOT Area 3.1 cm2    LVOT SV 77.6 ml    LA Diameter 4.9 cm    MV E Wave Deceleration Time 310.0 ms    MV A Velocity 1.78 m/s    MV E Velocity 1.44 m/s    MV Mean Gradient 6 mmHg    MV VTI 63.6 cm    MV Mean Velocity 1.1 m/s    MV Max Velocity 2.3 m/s    MV Peak Gradient 21 mmHg    MV Area by VTI 1.2 cm2    PV Max Velocity 0.8 m/s    PV Peak Gradient 3 mmHg    Pulm Vein A Duration 109.0 ms    Pulm Vein A Velocity 0.3 m/s    Est. RA Pressure 3 mmHg    RVIDd 3.4 cm    TR Max Velocity 2.72 m/s    TR Peak Gradient 30 mmHg    Fractional Shortening 2D 28 28 - 44 %    LVIDd Index 2.47 cm/m2    LVIDs Index 1.78 cm/m2    LV RWT Ratio 0.74     LV Mass 2D 299.7 (A) 67 - 162 g    LV Mass 2D Index 172.2 (A) 43 - 95 g/m2    MV E/A 0.81     E/E' Ratio (Averaged) 30.00     E/E' Lateral 24.00     E/E' Septal 36.00     LVOT Stroke Volume Index 44.6 mL/m2    LA Size Index 2.82 cm/m2    LA/AO Root Ratio 1.69     Ao Root Index 1.67 cm/m2    AV Velocity Ratio 0.38     LVOT:AV VTI Index 0.45     KIKO/BSA VTI 0.8 cm2/m2    KIKO/BSA Peak Velocity 0.7 cm2/m2    MV:LVOT VTI Index 2.57     RVSP 33 mmHg    EF BP 57 55 - 100 %   GLUCOSE, POC    Collection Time: 03/31/22 10:54 AM   Result Value Ref Range    Glucose (POC) 163 (H) 65 - 117 mg/dL    Performed by Jaskaran, POC    Collection Time: 03/31/22  3:35 PM   Result Value Ref Range    Glucose (POC) 170 (H) 65 - 117 mg/dL    Performed by Catherine Forde    GLUCOSE, POC    Collection Time: 03/31/22  9:25 PM   Result Value Ref Range    Glucose (POC) 145 (H) 65 - 117 mg/dL    Performed by Rosi Dickson    METABOLIC PANEL, COMPREHENSIVE    Collection Time: 04/01/22  5:10 AM   Result Value Ref Range    Sodium 134 (L) 136 - 145 mmol/L    Potassium 3.8 3.5 - 5.1 mmol/L    Chloride 100 97 - 108 mmol/L    CO2 27 21 - 32 mmol/L    Anion gap 7 5 - 15 mmol/L    Glucose 109 (H) 65 - 100 mg/dL    BUN 12 6 - 20 mg/dL    Creatinine 0.64 0.55 - 1.02 mg/dL    BUN/Creatinine ratio 19 12 - 20      GFR est AA >60 >60 ml/min/1.73m2    GFR est non-AA >60 >60 ml/min/1.73m2    Calcium 8.2 (L) 8.5 - 10.1 mg/dL    Bilirubin, total 0.4 0.2 - 1.0 mg/dL    AST (SGOT) 20 15 - 37 U/L    ALT (SGPT) 12 12 - 78 U/L    Alk.  phosphatase 53 45 - 117 U/L    Protein, total 4.9 (L) 6.4 - 8.2 g/dL    Albumin 2.2 (L) 3.5 - 5.0 g/dL    Globulin 2.7 2.0 - 4.0 g/dL    A-G Ratio 0.8 (L) 1.1 - 2.2     CBC WITH AUTOMATED DIFF    Collection Time: 04/01/22  5:10 AM   Result Value Ref Range    WBC 8.9 3.6 - 11.0 K/uL    RBC 2.87 (L) 3.80 - 5.20 M/uL    HGB 9.1 (L) 11.5 - 16.0 g/dL    HCT 27.6 (L) 35.0 - 47.0 %    MCV 96.2 80.0 - 99.0 FL    MCH 31.7 26.0 - 34.0 PG    MCHC 33.0 30.0 - 36.5 g/dL    RDW 13.2 11.5 - 14.5 %    PLATELET 186 451 - 953 K/uL    MPV 12.3 8.9 - 12.9 FL    NRBC 0.0 0.0  WBC    ABSOLUTE NRBC 0.00 0.00 - 0.01 K/uL    NEUTROPHILS 74 32 - 75 %    LYMPHOCYTES 9 (L) 12 - 49 %    MONOCYTES 12 5 - 13 %    EOSINOPHILS 4 0 - 7 %    BASOPHILS 1 0 - 1 %    IMMATURE GRANULOCYTES 0 0 - 0.5 % ABS. NEUTROPHILS 6.6 1.8 - 8.0 K/UL    ABS. LYMPHOCYTES 0.8 0.8 - 3.5 K/UL    ABS. MONOCYTES 1.1 (H) 0.0 - 1.0 K/UL    ABS. EOSINOPHILS 0.3 0.0 - 0.4 K/UL    ABS. BASOPHILS 0.1 0.0 - 0.1 K/UL    ABS. IMM. GRANS. 0.0 0.00 - 0.04 K/UL    DF AUTOMATED     GLUCOSE, POC    Collection Time: 04/01/22  7:30 AM   Result Value Ref Range    Glucose (POC) 127 (H) 65 - 117 mg/dL    Performed by Akhil Warner         Assessment/Plan:     Active Problems:    Colonic mass (3/29/2022)      SBO (small bowel obstruction) ([address] (3/29/2022)      HTN (hypertension), malignant (3/29/2022)        Hospital Course:    Sadiq Stone is a [de-identified]year old female with a PMH of diabetes, hypertension, COPD,  AICD, CAD with stents, who presented with right-sided lower abdominal pain. CT abdomen showed masslike thickening within the ascending colon, trace ascites within the right abdomen, cholelithiasis, and subtle morphologic changes of the liver. Initial labs significant for WBC of 12.1. UA + nitrites with 5 -10 WBC. Urine culture negative. Chest x-ray showed no acute cardiopulmonary abnormality. Patient to be admitted for further management. General surgery, cardiology, and pulmonology consulted. Patient started on ceftriaxone and placed on clear liquid diet. Surgery performed 4/1.      Partial large bowel obstruction due to cecal mass  Surgery today, 4/1  Pain control  Surgery following    Abnormal UA  UA + nitrites with 5 -10 WBC  Continue on ceftriaxone #3 days  3/29 urine: negative    Hypertension  Continue on metoprolol     Coronary artery disease with history of PCI to LAD   History of aortic valve replacement for severe aortic stenosis  AICD/pacemaker in situ  ECHO LVEF 55 - 60% with abnormal diastolic dysfunction with mild MR and MS  Hold ASA/plavix  Continue on lovastatin  Cardiology following     Diabetes mellitus, type 2  A1c 7.8  Continue on 16 units of lantus and SSI as needed     Hypothyroidism   Continue on levothyroxine    COPD without exacerbation  Continue on albuterol as needed  Pulmonology following    DVT Prophylaxis: lovenox  GI Prophylaxis: none  Discharge and disposition barriers: surgery, 48 hrs    Care Plan discussed with: Patient/Family, Nurse and     Total time spent with patient: 35 minutes.

## 2022-04-01 NOTE — PROGRESS NOTES
Problem: Falls - Risk of  Goal: *Absence of Falls  Description: Document Yasmene Roman Fall Risk and appropriate interventions in the flowsheet.   Outcome: Progressing Towards Goal  Note: Fall Risk Interventions:  Mobility Interventions: Patient to call before getting OOB,Utilize walker, cane, or other assistive device         Medication Interventions: Patient to call before getting OOB    Elimination Interventions: Call light in reach              Problem: Patient Education: Go to Patient Education Activity  Goal: Patient/Family Education  Outcome: Progressing Towards Goal

## 2022-04-01 NOTE — PROGRESS NOTES
Progress Note    Patient: Nazario Walsh MRN: [id number]  SSN: [id number]    YOB: 1940  Age: [de-identified] y.o. Sex: female      Admit Date: 3/29/2022    LOS: 3 days     Subjective:     No acute events overnight    Objective:     Vitals:    03/31/22 0701 03/31/22 1012 03/31/22 1436 03/31/22 1940   BP: (!) 135/59 (!) 130/57 (!) 148/62 (!) 145/59   Pulse: 76 79 72 78   Resp: 16 16 16 16   Temp: 98.8 °F (37.1 °C) 98.9 °F (37.2 °C) 99 °F (37.2 °C) 98.7 °F (37.1 °C)   SpO2: 97% 94% 98% 98%   Weight:       Height:            Intake and Output:  Current Shift: No intake/output data recorded. Last three shifts: 03/30 1901 - 04/01 0700  In: 1193.3 [P.O.:360; I.V.:833.3]  Out: 1650 [Urine:1650]    Physical Exam:   General:  Alert, cooperative, no distress, appears stated age. Eyes:  Conjunctivae/corneas clear. PERRL, EOMs intact. Fundi benign   Ears:  Normal TMs and external ear canals both ears. Nose: Nares normal. Septum midline. Mucosa normal. No drainage or sinus tenderness. Mouth/Throat: Lips, mucosa, and tongue normal. Teeth and gums normal.   Neck: Supple, symmetrical, trachea midline, no adenopathy, thyroid: no enlargment/tenderness/nodules, no carotid bruit and no JVD. Back:   Symmetric, no curvature. ROM normal. No CVA tenderness. Lungs:   Clear to auscultation bilaterally. Heart:  Regular rate and rhythm, S1, S2 normal, no murmur, click, rub or gallop. Abdomen:   Soft, non-tender. Bowel sounds normal. No masses,  No organomegaly. Extremities: Extremities normal, atraumatic, no cyanosis or edema. Pulses: 2+ and symmetric all extremities. Skin: Skin color, texture, turgor normal. No rashes or lesions   Lymph nodes: Cervical, supraclavicular, and axillary nodes normal.   Neurologic: CNII-XII intact. Normal strength, sensation and reflexes throughout. Lab/Data Review: All lab results for the last 24 hours reviewed.          Assessment:     Active Problems:    Colonic mass (3/29/2022)      SBO (small bowel obstruction) ([address] (3/29/2022)      HTN (hypertension), malignant (3/29/2022)    Patient is an 74-year-old white female with:  1. Heart failure with reduced ejection fraction  2. Coronary disease status post PCI of LAD May 2021  3. Severe aortic stenosis status post TAVR  4. Hypertension with hypertensive heart disease  5. Sick sinus syndrome status post dual-chamber pacemaker May 2021  6. Peripheral vascular disease  7. Fracture deformity of proximal left humerus  8. Diabetes mellitus  9. Hypothyroidism  10. COPD  6. Depression    Plan:     Patient is going for surgery today. Blood pressure is acceptable. No further cardiac testing planned at this time. Echocardiogram showed EF of 55 to 60% with grade 1 diastolic dysfunction, aortic valve is bioprosthetic valve that is well-seated without significant regurgitation. Mild to moderate MR.     Signed By: Christo Head MD     April 1, 2022

## 2022-04-01 NOTE — PROGRESS NOTES
1800: Spoke to son via telephone and updated. Informed pt. Is finished with surgery, has been in pacu for about an hour, confused, pulling clothes off, no pain, awake. Son states pt. Has done that and seemed to have sundowners last 2 times in hospital and was fine after going home. Informed son will monitor a little longer to make sure everything okay prior to heading back to room, will probably come to room in approx. 30 min. Son going to step out for a few minutes. Informed will call him when heading to room. Verbalizes understanding. [address], son notified coming back to room.

## 2022-04-01 NOTE — PROGRESS NOTES
1730:Dr. Harshal Morris notified via phone and requests come to bedside to evaluate pt. Pt. Confused, pulling clothes off, anxious. 1735: Dr. Harshal Morris to bedside and evaluated pt. Aware of vitals; gave pain med.

## 2022-04-01 NOTE — PROGRESS NOTES
OT eval order received and acknowledged. OT eval attempted at 1031 however per chart review pt scheduled for ex lap with right colectomy today. Will continue to follow patient and attempt OT eval after procedure when medically stable.  Thank you

## 2022-04-01 NOTE — PROGRESS NOTES
Pulmonary Progress Note    Subjective:   Daily Progress Note: 2022 10:10 AM    Randi Harrison is a [de-identified]year old female PMH of COPD, diabetes, HTN, HLD, Pacemaker, AICD, HX of cardiac stents to have possible surgical resection today for partial bowel obstruction. Patient is alert and oriented and doing well. No respiratory distress. Review of Systems     Constitutional: Negative. HENT: Negative. Eyes: Negative. Respiratory: Negative for shortness of breath. Cardiovascular: Negative. Gastrointestinal: Positive for abdominal pain. Genitourinary: Negative. Musculoskeletal: Negative. Skin: Negative. Neurological: Negative. Psychiatric/Behavioral: Negative. Objective:     Visit Vitals  BP (!) 147/61 (BP Patient Position: At rest)   Pulse 94   Temp 96.8 °F (36 °C)   Resp 16   Ht 5' 1\" (1.549 m)   Wt 165 lb (74.8 kg)   SpO2 94%   BMI 31.18 kg/m²      O2 Device: None (Room air)    Temp (24hrs), Av.4 °F (36.9 °C), Min:96.8 °F (36 °C), Max:99 °F (37.2 °C)      No intake/output data recorded.  1901 -  0700  In: 1193.3 [P.O.:360; I.V.:833.3]  Out: 9678 [Urine:1650]    Physical Exam  Constitutional:       Appearance: Normal appearance. HENT:      Head: Normocephalic and atraumatic. Nose: Nose normal.      Mouth/Throat:      Mouth: Mucous membranes are moist.   Eyes:      Pupils: Pupils are equal, round, and reactive to light. Cardiovascular:      Rate and Rhythm: Normal rate. Rhythm irregular. Pulses: Normal pulses. Heart sounds: Normal heart sounds. Pulmonary:      Effort: Pulmonary effort is normal.      Breath sounds: Normal breath sounds. Abdominal:      General: There is distension. Tenderness: There is abdominal tenderness. Musculoskeletal:         General: Normal range of motion. Cervical back: Normal range of motion and neck supple. Skin:     General: Skin is warm. Neurological:      General: No focal deficit present. Mental Status: She is alert. Psychiatric:         Mood and Affect: Mood normal.         Data Review    Recent Results (from the past 24 hour(s))   GLUCOSE, POC    Collection Time: 03/31/22 10:54 AM   Result Value Ref Range    Glucose (POC) 163 (H) 65 - 117 mg/dL    Performed by Jaskaran, POC    Collection Time: 03/31/22  3:35 PM   Result Value Ref Range    Glucose (POC) 170 (H) 65 - 117 mg/dL    Performed by Daquan Meyers    GLUCOSE, POC    Collection Time: 03/31/22  9:25 PM   Result Value Ref Range    Glucose (POC) 145 (H) 65 - 117 mg/dL    Performed by Edmund Franco    METABOLIC PANEL, COMPREHENSIVE    Collection Time: 04/01/22  5:10 AM   Result Value Ref Range    Sodium 134 (L) 136 - 145 mmol/L    Potassium 3.8 3.5 - 5.1 mmol/L    Chloride 100 97 - 108 mmol/L    CO2 27 21 - 32 mmol/L    Anion gap 7 5 - 15 mmol/L    Glucose 109 (H) 65 - 100 mg/dL    BUN 12 6 - 20 mg/dL    Creatinine 0.64 0.55 - 1.02 mg/dL    BUN/Creatinine ratio 19 12 - 20      GFR est AA >60 >60 ml/min/1.73m2    GFR est non-AA >60 >60 ml/min/1.73m2    Calcium 8.2 (L) 8.5 - 10.1 mg/dL    Bilirubin, total 0.4 0.2 - 1.0 mg/dL    AST (SGOT) 20 15 - 37 U/L    ALT (SGPT) 12 12 - 78 U/L    Alk.  phosphatase 53 45 - 117 U/L    Protein, total 4.9 (L) 6.4 - 8.2 g/dL    Albumin 2.2 (L) 3.5 - 5.0 g/dL    Globulin 2.7 2.0 - 4.0 g/dL    A-G Ratio 0.8 (L) 1.1 - 2.2     CBC WITH AUTOMATED DIFF    Collection Time: 04/01/22  5:10 AM   Result Value Ref Range    WBC 8.9 3.6 - 11.0 K/uL    RBC 2.87 (L) 3.80 - 5.20 M/uL    HGB 9.1 (L) 11.5 - 16.0 g/dL    HCT 27.6 (L) 35.0 - 47.0 %    MCV 96.2 80.0 - 99.0 FL    MCH 31.7 26.0 - 34.0 PG    MCHC 33.0 30.0 - 36.5 g/dL    RDW 13.2 11.5 - 14.5 %    PLATELET 108 553 - 939 K/uL    MPV 12.3 8.9 - 12.9 FL    NRBC 0.0 0.0  WBC    ABSOLUTE NRBC 0.00 0.00 - 0.01 K/uL    NEUTROPHILS 74 32 - 75 %    LYMPHOCYTES 9 (L) 12 - 49 %    MONOCYTES 12 5 - 13 %    EOSINOPHILS 4 0 - 7 %    BASOPHILS 1 0 - 1 % IMMATURE GRANULOCYTES 0 0 - 0.5 %    ABS. NEUTROPHILS 6.6 1.8 - 8.0 K/UL    ABS. LYMPHOCYTES 0.8 0.8 - 3.5 K/UL    ABS. MONOCYTES 1.1 (H) 0.0 - 1.0 K/UL    ABS. EOSINOPHILS 0.3 0.0 - 0.4 K/UL    ABS. BASOPHILS 0.1 0.0 - 0.1 K/UL    ABS. IMM.  GRANS. 0.0 0.00 - 0.04 K/UL    DF AUTOMATED     GLUCOSE, POC    Collection Time: 04/01/22  7:30 AM   Result Value Ref Range    Glucose (POC) 127 (H) 65 - 117 mg/dL    Performed by Mita Garvin        Current Facility-Administered Medications   Medication Dose Route Frequency    0.9% sodium chloride infusion  100 mL/hr IntraVENous CONTINUOUS    glucose chewable tablet 16 g  4 Tablet Oral PRN    dextrose 10% infusion 0-250 mL  0-250 mL IntraVENous PRN    glucagon (GLUCAGEN) injection 1 mg  1 mg IntraMUSCular PRN    sodium chloride (NS) flush 5-40 mL  5-40 mL IntraVENous Q8H    sodium chloride (NS) flush 5-40 mL  5-40 mL IntraVENous PRN    acetaminophen (TYLENOL) tablet 650 mg  650 mg Oral Q6H PRN    Or    acetaminophen (TYLENOL) suppository 650 mg  650 mg Rectal Q6H PRN    ondansetron (ZOFRAN ODT) tablet 4 mg  4 mg Oral Q8H PRN    Or    ondansetron (ZOFRAN) injection 4 mg  4 mg IntraVENous Q6H PRN    [Held by provider] enoxaparin (LOVENOX) injection 40 mg  40 mg SubCUTAneous DAILY    insulin lispro (HUMALOG) injection   SubCUTAneous AC&HS    [Held by provider] aspirin delayed-release tablet 81 mg  81 mg Oral DAILY    [Held by provider] clopidogreL (PLAVIX) tablet 75 mg  75 mg Oral DAILY    DULoxetine (CYMBALTA) capsule 30 mg  30 mg Oral DAILY    ferrous sulfate tablet 325 mg  325 mg Oral BID WITH MEALS    levothyroxine (SYNTHROID) tablet 150 mcg  150 mcg Oral ACB    metoprolol succinate (TOPROL-XL) XL tablet 25 mg  25 mg Oral DAILY    atorvastatin (LIPITOR) tablet 10 mg  10 mg Oral QHS    albuterol (PROVENTIL HFA, VENTOLIN HFA, PROAIR HFA) inhaler 2 Puff  2 Puff Inhalation Q6H PRN    oxyCODONE IR (ROXICODONE) tablet 5 mg  5 mg Oral Q4H PRN    insulin glargine (LANTUS) injection 16 Units  16 Units SubCUTAneous QHS    cefTRIAXone (ROCEPHIN) 1 g in sterile water (preservative free) 10 mL IV syringe  1 g IntraVENous Q24H    HYDROmorphone (DILAUDID) syringe 0.5 mg  0.5 mg IntraVENous Q6H PRN         Assessment/Plan:        1. Chronic Obstructive Pulmonary Disease without excebation  2. Partial large Bowel Obstruction  3. Diabetes Mellitus type 2  4. Essential Hypertension  5. Thyroid Disease   6. Urinary Tract infection       RECOMMENDATIONS/PLAN:      3 21-year-old lady no history of smoking not on any inhalers or oxygen at home, she had a history of secondhand smoke as her  used to smoke and all her children used to smoke she used to work in the form as a farmer significant history of congestive heart failure ejection fraction about 35 to 40%. She had aortic valve replaced previously also she has stent placement in the past chest x-ray no acute infiltrate or lung mass. Last showed Echocardiogram showed EF of 55 to 60% with grade 1 diastolic dysfunction. 2. Patient is low risk for acute respiratory distress or issues during surgery, patient is having possible surgery today  3. monitor respiratory status and O2 PRN  4.  Follow up with Pulmonary outpatient as needed for COPD management

## 2022-04-01 NOTE — ANESTHESIA PREPROCEDURE EVALUATION
Relevant Problems   RESPIRATORY SYSTEM   (+) COPD (chronic obstructive pulmonary disease) (HCC)      CARDIOVASCULAR   (+) CHF (congestive heart failure) ([facility])   (+) HTN (hypertension), malignant   (+) Hypertension      ENDOCRINE   (+) DM (diabetes mellitus) ([facility])       Anesthetic History   No history of anesthetic complications            Review of Systems / Medical History  Patient summary reviewed, nursing notes reviewed and pertinent labs reviewed    Pulmonary    COPD               Neuro/Psych         Psychiatric history     Cardiovascular    Hypertension          Hyperlipidemia      Comments: Echo:    Left Ventricle: Left ventricle size is normal. Increased wall thickness. Normal wall motion. Normal left ventricular systolic function with a visually estimated EF of 55 - 60%. Abnormal diastolic function.   Aortic Valve: Appropriately positioned transcatheter bioprosthetic valve that is well-seated.   Mitral Valve: Mild to moderate transvalvular regurgitation. Mild stenosis noted.   Left Atrium: Left atrium is dilated.    GI/Hepatic/Renal  Within defined limits              Endo/Other    Diabetes  Hypothyroidism       Other Findings            Past Medical History:   Diagnosis Date    Depression     DM (diabetes mellitus) ([facility] [address]     Hyperlipidemia     Hypertension     Pancreatitis     Thyroid disease        Past Surgical History:   Procedure Laterality Date    HX AORTIC VALVE REPLACEMENT  08/2021    HX HYSTERECTOMY      IR FLUORO GUIDE PLC CVAD  5/10/2021    IR INSERT NON TUNL CVC OVER 5 YRS  5/10/2021    GA INS NEW/RPLCMT PRM PM W/TRANSV ELTRD ATRIAL&VENT N/A 5/11/2021    INSERT PPM BIV MULTI performed by Barber Benz MD at [address] [place] [address]       Current Outpatient Medications   Medication Instructions    acetaminophen (TYLENOL) 325 mg, Oral, AS NEEDED    aspirin delayed-release 81 mg, Oral, DAILY    clopidogreL (PLAVIX) 75 mg, Oral, DAILY    cyanocobalamin (VITAMIN B-12) 1,000 mcg, Oral, DAILY    diphenhydrAMINE (BENADRYL) 25 mg, Oral, AS NEEDED    DULoxetine (CYMBALTA) 30 mg, Oral, DAILY    famotidine (PEPCID) 20 mg, 2 TIMES DAILY    ferrous sulfate 325 mg, Oral, 2 TIMES DAILY WITH MEALS    furosemide (LASIX) 40 mg tablet 1 tab daily    insulin glargine (Lantus U-100 Insulin) 100 unit/mL injection 40 unit daily    levothyroxine (SYNTHROID) 150 mcg, Oral, DAILY BEFORE BREAKFAST    Lexapro 10 mg, EVERY BEDTIME    lovastatin (MEVACOR) 40 mg, Oral, EVERY BEDTIME    metoprolol succinate (TOPROL-XL) 25 mg, Oral, DAILY    predniSONE (DELTASONE) 20 mg tablet 3 tabs for 3 days, 2 tabs for 3 days, 1 tab for 3 days    ticagrelor (BRILINTA) 90 mg, Oral, EVERY 12 HOURS       Current Facility-Administered Medications   Medication Dose Route Frequency    0.9% sodium chloride infusion  100 mL/hr IntraVENous CONTINUOUS    glucose chewable tablet 16 g  4 Tablet Oral PRN    dextrose 10% infusion 0-250 mL  0-250 mL IntraVENous PRN    glucagon (GLUCAGEN) injection 1 mg  1 mg IntraMUSCular PRN    sodium chloride (NS) flush 5-40 mL  5-40 mL IntraVENous Q8H    sodium chloride (NS) flush 5-40 mL  5-40 mL IntraVENous PRN    acetaminophen (TYLENOL) tablet 650 mg  650 mg Oral Q6H PRN    Or    acetaminophen (TYLENOL) suppository 650 mg  650 mg Rectal Q6H PRN    ondansetron (ZOFRAN ODT) tablet 4 mg  4 mg Oral Q8H PRN    Or    ondansetron (ZOFRAN) injection 4 mg  4 mg IntraVENous Q6H PRN    [Held by provider] enoxaparin (LOVENOX) injection 40 mg  40 mg SubCUTAneous DAILY    insulin lispro (HUMALOG) injection   SubCUTAneous AC&HS    [Held by provider] aspirin delayed-release tablet 81 mg  81 mg Oral DAILY    [Held by provider] clopidogreL (PLAVIX) tablet 75 mg  75 mg Oral DAILY    DULoxetine (CYMBALTA) capsule 30 mg  30 mg Oral DAILY    ferrous sulfate tablet 325 mg  325 mg Oral BID WITH MEALS    levothyroxine (SYNTHROID) tablet 150 mcg  150 mcg Oral ACB    metoprolol succinate (TOPROL-XL) XL tablet 25 mg  25 mg Oral DAILY    atorvastatin (LIPITOR) tablet 10 mg  10 mg Oral QHS    albuterol (PROVENTIL HFA, VENTOLIN HFA, PROAIR HFA) inhaler 2 Puff  2 Puff Inhalation Q6H PRN    oxyCODONE IR (ROXICODONE) tablet 5 mg  5 mg Oral Q4H PRN    insulin glargine (LANTUS) injection 16 Units  16 Units SubCUTAneous QHS    cefTRIAXone (ROCEPHIN) 1 g in sterile water (preservative free) 10 mL IV syringe  1 g IntraVENous Q24H       Patient Vitals for the past 24 hrs:   Temp Pulse Resp BP SpO2   04/01/22 0829 36 °C (96.8 °F) 94 16 (!) 147/61 94 %   03/31/22 1940 37.1 °C (98.7 °F) 78 16 (!) 145/59 98 %   03/31/22 1436 37.2 °C (99 °F) 72 16 (!) 148/62 98 %       Lab Results   Component Value Date/Time    WBC 8.9 04/01/2022 05:10 AM    Hemoglobin (POC) 15.6 06/19/2009 10:55 AM    HGB 9.1 (L) 04/01/2022 05:10 AM    Hematocrit (POC) 46 06/19/2009 10:55 AM    HCT 27.6 (L) 04/01/2022 05:10 AM    PLATELET 193 93/80/5293 05:10 AM    MCV 96.2 04/01/2022 05:10 AM     Lab Results   Component Value Date/Time    Sodium 134 (L) 04/01/2022 05:10 AM    Potassium 3.8 04/01/2022 05:10 AM    Chloride 100 04/01/2022 05:10 AM    CO2 27 04/01/2022 05:10 AM    Anion gap 7 04/01/2022 05:10 AM    Glucose 109 (H) 04/01/2022 05:10 AM    BUN 12 04/01/2022 05:10 AM    Creatinine 0.64 04/01/2022 05:10 AM    BUN/Creatinine ratio 19 04/01/2022 05:10 AM    GFR est AA >60 04/01/2022 05:10 AM    GFR est non-AA >60 04/01/2022 05:10 AM    Calcium 8.2 (L) 04/01/2022 05:10 AM     No results found for: APTT, PTP, INR, INREXT  Lab Results   Component Value Date/Time    Glucose 109 (H) 04/01/2022 05:10 AM    Glucose (POC) 124 (H) 04/01/2022 12:12 PM     Physical Exam    Airway  Mallampati: II  TM Distance: 4 - 6 cm  Neck ROM: normal range of motion   Mouth opening: Normal     Cardiovascular    Rhythm: regular  Rate: normal         Dental         Pulmonary  Breath sounds clear to auscultation               Abdominal  GI exam deferred       Other Findings Anesthetic Plan    ASA: 3  Anesthesia type: general          Induction: Intravenous  Anesthetic plan and risks discussed with: Patient and Family

## 2022-04-01 NOTE — PROGRESS NOTES
CM reviewed chart. Patient has plans for surgery today. Awaiting for PT/OT to evaluate for discharge needs. Plans for PT/OT after surgery. DC plan TBD after surgery.

## 2022-04-01 NOTE — PROGRESS NOTES
Progress Note    Patient: Sunil Alicia MRN: [id number]  SSN: [id number]    YOB: 1940  Age: [de-identified] y.o. Sex: female      Admit Date: 3/29/2022    LOS: 3 days     Subjective:   Patient seen in bed  Continues to report some lower abdominal pain  Denies nausea    Objective:     Vitals:    03/31/22 1436 03/31/22 1940 04/01/22 0829 04/01/22 1124   BP: (!) 148/62 (!) 145/59 (!) 147/61    Pulse: 72 78 94    Resp: 16 16 16    Temp: 99 °F (37.2 °C) 98.7 °F (37.1 °C) 96.8 °F (36 °C)    SpO2: 98% 98% 94%    Weight:    164 lb 14.5 oz (74.8 kg)   Height:            Intake and Output:  Current Shift: No intake/output data recorded. Last three shifts: 03/30 1901 - 04/01 0700  In: 1193.3 [P.O.:360; I.V.:833.3]  Out: 1650 [Urine:1650]    Review of Systems:  ROS     Physical Exam:   Abdomen is soft, mildly tender palpation lower abdomen, fullness/distention in the lower abdomen    Lab/Data Review:  Recent Results (from the past 24 hour(s))   GLUCOSE, POC    Collection Time: 03/31/22  3:35 PM   Result Value Ref Range    Glucose (POC) 170 (H) 65 - 117 mg/dL    Performed by Lubna Lanier    GLUCOSE, POC    Collection Time: 03/31/22  9:25 PM   Result Value Ref Range    Glucose (POC) 145 (H) 65 - 117 mg/dL    Performed by [facility]    METABOLIC PANEL, COMPREHENSIVE    Collection Time: 04/01/22  5:10 AM   Result Value Ref Range    Sodium 134 (L) 136 - 145 mmol/L    Potassium 3.8 3.5 - 5.1 mmol/L    Chloride 100 97 - 108 mmol/L    CO2 27 21 - 32 mmol/L    Anion gap 7 5 - 15 mmol/L    Glucose 109 (H) 65 - 100 mg/dL    BUN 12 6 - 20 mg/dL    Creatinine 0.64 0.55 - 1.02 mg/dL    BUN/Creatinine ratio 19 12 - 20      GFR est AA >60 >60 ml/min/1.73m2    GFR est non-AA >60 >60 ml/min/1.73m2    Calcium 8.2 (L) 8.5 - 10.1 mg/dL    Bilirubin, total 0.4 0.2 - 1.0 mg/dL    AST (SGOT) 20 15 - 37 U/L    ALT (SGPT) 12 12 - 78 U/L    Alk.  phosphatase 53 45 - 117 U/L    Protein, total 4.9 (L) 6.4 - 8.2 g/dL    Albumin 2.2 (L) 3.5 - 5.0 g/dL    Globulin 2.7 2.0 - 4.0 g/dL    A-G Ratio 0.8 (L) 1.1 - 2.2     CBC WITH AUTOMATED DIFF    Collection Time: 04/01/22  5:10 AM   Result Value Ref Range    WBC 8.9 3.6 - 11.0 K/uL    RBC 2.87 (L) 3.80 - 5.20 M/uL    HGB 9.1 (L) 11.5 - 16.0 g/dL    HCT 27.6 (L) 35.0 - 47.0 %    MCV 96.2 80.0 - 99.0 FL    MCH 31.7 26.0 - 34.0 PG    MCHC 33.0 30.0 - 36.5 g/dL    RDW 13.2 11.5 - 14.5 %    PLATELET 023 070 - 641 K/uL    MPV 12.3 8.9 - 12.9 FL    NRBC 0.0 0.0  WBC    ABSOLUTE NRBC 0.00 0.00 - 0.01 K/uL    NEUTROPHILS 74 32 - 75 %    LYMPHOCYTES 9 (L) 12 - 49 %    MONOCYTES 12 5 - 13 %    EOSINOPHILS 4 0 - 7 %    BASOPHILS 1 0 - 1 %    IMMATURE GRANULOCYTES 0 0 - 0.5 %    ABS. NEUTROPHILS 6.6 1.8 - 8.0 K/UL    ABS. LYMPHOCYTES 0.8 0.8 - 3.5 K/UL    ABS. MONOCYTES 1.1 (H) 0.0 - 1.0 K/UL    ABS. EOSINOPHILS 0.3 0.0 - 0.4 K/UL    ABS. BASOPHILS 0.1 0.0 - 0.1 K/UL    ABS. IMM. GRANS. 0.0 0.00 - 0.04 K/UL    DF AUTOMATED     GLUCOSE, POC    Collection Time: 04/01/22  7:30 AM   Result Value Ref Range    Glucose (POC) 127 (H) 65 - 117 mg/dL    Performed by Miguelito    Collection Time: 04/01/22 10:32 AM   Result Value Ref Range    Crossmatch Expiration 04/04/2022,2359     ABO/Rh(D) A Positive     Antibody screen Negative    GLUCOSE, POC    Collection Time: 04/01/22 12:12 PM   Result Value Ref Range    Glucose (POC) 124 (H) 65 - 117 mg/dL    Performed by Regine Spencer           Assessment:     Active Problems:    Colonic mass (3/29/2022)      SBO (small bowel obstruction) ([facility] [address] (3/29/2022)      HTN (hypertension), malignant (3/29/2022)        Plan:   Plan for OR today for exploratory laparotomy, right colectomy. Discussed the procedure with the patient and reviewed the risk and benefits and the risk of infection, bleeding, injury to intra-abdominal organs, anastomotic leak, wound complications. Patient wishes to proceed and consent is obtained and on the chart.     Signed By: Adam Adam MD     April 1, 2022

## 2022-04-01 NOTE — PROGRESS NOTES
Skin assessment:  Purplish bruise on R thigh, scab R shin, thick broken nail on R great toe, green bruise L shin. Unable to assess posterior side, pt. Pulling gown off, states does not want to be messed with.

## 2022-04-01 NOTE — ANESTHESIA POSTPROCEDURE EVALUATION
Procedure(s):  LAPAROTOMY EXPLORATORY And Right Colectomy.     general    Anesthesia Post Evaluation      Multimodal analgesia: multimodal analgesia used between 6 hours prior to anesthesia start to PACU discharge  Patient location during evaluation: PACU  Patient participation: complete - patient participated  Level of consciousness: awake  Pain score: 0  Pain management: adequate  Airway patency: patent  Anesthetic complications: no  Cardiovascular status: acceptable  Respiratory status: acceptable  Hydration status: acceptable  Post anesthesia nausea and vomiting:  controlled  Final Post Anesthesia Temperature Assessment:  Normothermia (36.0-37.5 degrees C)      INITIAL Post-op Vital signs:   Vitals Value Taken Time   /97 04/01/22 1730   Temp 36.4 °C (97.5 °F) 04/01/22 1718   Pulse 97 04/01/22 1730   Resp 20 04/01/22 1730   SpO2 97 % 04/01/22 1730
Yes

## 2022-04-01 NOTE — PROGRESS NOTES
PT eval order received and acknowledged. PT eval attempted at 1031 however per chart review pt scheduled for ex lap with right colectomy today. Will continue to follow patient and attempt PT eval after procedure when medically stable. Thank you.

## 2022-04-01 NOTE — PROGRESS NOTES
Pulmonary Progress Note    Subjective:   Daily Progress Note: 2022 10:10 AM    Chevy Rendon is a [de-identified]year old female PMH of COPD, diabetes, HTN, HLD, Pacemaker, AICD, HX of cardiac stents to have possible surgical resection today for partial bowel obstruction. Patient is alert and oriented and doing well. No respiratory distress. Review of Systems     Constitutional: Negative. HENT: Negative. Eyes: Negative. Respiratory: Negative for shortness of breath. Cardiovascular: Negative. Gastrointestinal: Positive for abdominal pain. Genitourinary: Negative. Musculoskeletal: Negative. Skin: Negative. Neurological: Negative. Psychiatric/Behavioral: Negative. Objective:     Visit Vitals  BP (!) 147/61 (BP Patient Position: At rest)   Pulse 94   Temp 96.8 °F (36 °C)   Resp 16   Ht 5' 1\" (1.549 m)   Wt 74.8 kg (165 lb)   SpO2 94%   BMI 31.18 kg/m²      O2 Device: None (Room air)    Temp (24hrs), Av.2 °F (36.8 °C), Min:96.8 °F (36 °C), Max:99 °F (37.2 °C)      No intake/output data recorded.  1901 -  0700  In: 1193.3 [P.O.:360; I.V.:833.3]  Out: 8300 [Urine:1650]    Physical Exam  Constitutional:       Appearance: Normal appearance. HENT:      Head: Normocephalic and atraumatic. Nose: Nose normal.      Mouth/Throat:      Mouth: Mucous membranes are moist.   Eyes:      Pupils: Pupils are equal, round, and reactive to light. Cardiovascular:      Rate and Rhythm: Normal rate. Rhythm irregular. Pulses: Normal pulses. Heart sounds: Normal heart sounds. Pulmonary:      Effort: Pulmonary effort is normal.      Breath sounds: Normal breath sounds. Abdominal:      General: There is distension. Tenderness: There is abdominal tenderness. Musculoskeletal:         General: Normal range of motion. Cervical back: Normal range of motion and neck supple. Skin:     General: Skin is warm. Neurological:      General: No focal deficit present. Mental Status: She is alert. Psychiatric:         Mood and Affect: Mood normal.         Data Review    Recent Results (from the past 24 hour(s))   GLUCOSE, POC    Collection Time: 03/31/22  3:35 PM   Result Value Ref Range    Glucose (POC) 170 (H) 65 - 117 mg/dL    Performed by Arun Donnelly    GLUCOSE, POC    Collection Time: 03/31/22  9:25 PM   Result Value Ref Range    Glucose (POC) 145 (H) 65 - 117 mg/dL    Performed by Neo Cuenca    METABOLIC PANEL, COMPREHENSIVE    Collection Time: 04/01/22  5:10 AM   Result Value Ref Range    Sodium 134 (L) 136 - 145 mmol/L    Potassium 3.8 3.5 - 5.1 mmol/L    Chloride 100 97 - 108 mmol/L    CO2 27 21 - 32 mmol/L    Anion gap 7 5 - 15 mmol/L    Glucose 109 (H) 65 - 100 mg/dL    BUN 12 6 - 20 mg/dL    Creatinine 0.64 0.55 - 1.02 mg/dL    BUN/Creatinine ratio 19 12 - 20      GFR est AA >60 >60 ml/min/1.73m2    GFR est non-AA >60 >60 ml/min/1.73m2    Calcium 8.2 (L) 8.5 - 10.1 mg/dL    Bilirubin, total 0.4 0.2 - 1.0 mg/dL    AST (SGOT) 20 15 - 37 U/L    ALT (SGPT) 12 12 - 78 U/L    Alk. phosphatase 53 45 - 117 U/L    Protein, total 4.9 (L) 6.4 - 8.2 g/dL    Albumin 2.2 (L) 3.5 - 5.0 g/dL    Globulin 2.7 2.0 - 4.0 g/dL    A-G Ratio 0.8 (L) 1.1 - 2.2     CBC WITH AUTOMATED DIFF    Collection Time: 04/01/22  5:10 AM   Result Value Ref Range    WBC 8.9 3.6 - 11.0 K/uL    RBC 2.87 (L) 3.80 - 5.20 M/uL    HGB 9.1 (L) 11.5 - 16.0 g/dL    HCT 27.6 (L) 35.0 - 47.0 %    MCV 96.2 80.0 - 99.0 FL    MCH 31.7 26.0 - 34.0 PG    MCHC 33.0 30.0 - 36.5 g/dL    RDW 13.2 11.5 - 14.5 %    PLATELET 089 546 - 983 K/uL    MPV 12.3 8.9 - 12.9 FL    NRBC 0.0 0.0  WBC    ABSOLUTE NRBC 0.00 0.00 - 0.01 K/uL    NEUTROPHILS 74 32 - 75 %    LYMPHOCYTES 9 (L) 12 - 49 %    MONOCYTES 12 5 - 13 %    EOSINOPHILS 4 0 - 7 %    BASOPHILS 1 0 - 1 %    IMMATURE GRANULOCYTES 0 0 - 0.5 %    ABS. NEUTROPHILS 6.6 1.8 - 8.0 K/UL    ABS. LYMPHOCYTES 0.8 0.8 - 3.5 K/UL    ABS.  MONOCYTES 1.1 (H) 0.0 - 1.0 K/UL ABS. EOSINOPHILS 0.3 0.0 - 0.4 K/UL    ABS. BASOPHILS 0.1 0.0 - 0.1 K/UL    ABS. IMM.  GRANS. 0.0 0.00 - 0.04 K/UL    DF AUTOMATED     GLUCOSE, POC    Collection Time: 04/01/22  7:30 AM   Result Value Ref Range    Glucose (POC) 127 (H) 65 - 117 mg/dL    Performed by Romeo Malik        Current Facility-Administered Medications   Medication Dose Route Frequency    0.9% sodium chloride infusion  100 mL/hr IntraVENous CONTINUOUS    glucose chewable tablet 16 g  4 Tablet Oral PRN    dextrose 10% infusion 0-250 mL  0-250 mL IntraVENous PRN    glucagon (GLUCAGEN) injection 1 mg  1 mg IntraMUSCular PRN    sodium chloride (NS) flush 5-40 mL  5-40 mL IntraVENous Q8H    sodium chloride (NS) flush 5-40 mL  5-40 mL IntraVENous PRN    acetaminophen (TYLENOL) tablet 650 mg  650 mg Oral Q6H PRN    Or    acetaminophen (TYLENOL) suppository 650 mg  650 mg Rectal Q6H PRN    ondansetron (ZOFRAN ODT) tablet 4 mg  4 mg Oral Q8H PRN    Or    ondansetron (ZOFRAN) injection 4 mg  4 mg IntraVENous Q6H PRN    [Held by provider] enoxaparin (LOVENOX) injection 40 mg  40 mg SubCUTAneous DAILY    insulin lispro (HUMALOG) injection   SubCUTAneous AC&HS    [Held by provider] aspirin delayed-release tablet 81 mg  81 mg Oral DAILY    [Held by provider] clopidogreL (PLAVIX) tablet 75 mg  75 mg Oral DAILY    DULoxetine (CYMBALTA) capsule 30 mg  30 mg Oral DAILY    ferrous sulfate tablet 325 mg  325 mg Oral BID WITH MEALS    levothyroxine (SYNTHROID) tablet 150 mcg  150 mcg Oral ACB    metoprolol succinate (TOPROL-XL) XL tablet 25 mg  25 mg Oral DAILY    atorvastatin (LIPITOR) tablet 10 mg  10 mg Oral QHS    albuterol (PROVENTIL HFA, VENTOLIN HFA, PROAIR HFA) inhaler 2 Puff  2 Puff Inhalation Q6H PRN    oxyCODONE IR (ROXICODONE) tablet 5 mg  5 mg Oral Q4H PRN    insulin glargine (LANTUS) injection 16 Units  16 Units SubCUTAneous QHS    cefTRIAXone (ROCEPHIN) 1 g in sterile water (preservative free) 10 mL IV syringe  1 g IntraVENous Q24H    HYDROmorphone (DILAUDID) syringe 0.5 mg  0.5 mg IntraVENous Q6H PRN         Assessment/Plan:        1. Chronic Obstructive Pulmonary Disease without excebation  2. Partial large Bowel Obstruction  3. Diabetes Mellitus type 2  4. Essential Hypertension  5. Thyroid Disease   6. Urinary Tract infection       RECOMMENDATIONS/PLAN:      3 62-year-old lady no history of smoking not on any inhalers or oxygen at home, she had a history of secondhand smoke as her  used to smoke and all her children used to smoke she used to work in the form as a farmer significant history of congestive heart failure ejection fraction about 35 to 40%. She had aortic valve replaced previously also she has stent placement in the past chest x-ray no acute infiltrate or lung mass. Last showed Echocardiogram showed EF of 55 to 60% with grade 1 diastolic dysfunction. 2. Patient is low risk for acute respiratory distress or issues during surgery, patient is having possible surgery today  3. monitor respiratory status and O2 PRN  4.  Follow up with Pulmonary outpatient as needed for COPD management

## 2022-04-02 LAB
ALBUMIN SERPL-MCNC: 2.1 G/DL (ref 3.5–5)
ALBUMIN/GLOB SERPL: 0.8 {RATIO} (ref 1.1–2.2)
ALP SERPL-CCNC: 51 U/L (ref 45–117)
ALT SERPL-CCNC: 10 U/L (ref 12–78)
ANION GAP SERPL CALC-SCNC: 10 MMOL/L (ref 5–15)
AST SERPL W P-5'-P-CCNC: 19 U/L (ref 15–37)
BASOPHILS # BLD: 0 K/UL (ref 0–0.1)
BASOPHILS NFR BLD: 0 % (ref 0–1)
BILIRUB SERPL-MCNC: 0.2 MG/DL (ref 0.2–1)
BUN SERPL-MCNC: 18 MG/DL (ref 6–20)
BUN/CREAT SERPL: 27 (ref 12–20)
CA-I BLD-MCNC: 8.2 MG/DL (ref 8.5–10.1)
CEA SERPL-MCNC: 0.5 NG/ML
CHLORIDE SERPL-SCNC: 108 MMOL/L (ref 97–108)
CO2 SERPL-SCNC: 22 MMOL/L (ref 21–32)
CREAT SERPL-MCNC: 0.67 MG/DL (ref 0.55–1.02)
DIFFERENTIAL METHOD BLD: ABNORMAL
EOSINOPHIL # BLD: 0 K/UL (ref 0–0.4)
EOSINOPHIL NFR BLD: 0 % (ref 0–7)
ERYTHROCYTE [DISTWIDTH] IN BLOOD BY AUTOMATED COUNT: 13.2 % (ref 11.5–14.5)
GLOBULIN SER CALC-MCNC: 2.7 G/DL (ref 2–4)
GLUCOSE BLD STRIP.AUTO-MCNC: 145 MG/DL (ref 65–117)
GLUCOSE BLD STRIP.AUTO-MCNC: 155 MG/DL (ref 65–117)
GLUCOSE BLD STRIP.AUTO-MCNC: 191 MG/DL (ref 65–117)
GLUCOSE BLD STRIP.AUTO-MCNC: 230 MG/DL (ref 65–117)
GLUCOSE SERPL-MCNC: 144 MG/DL (ref 65–100)
HCT VFR BLD AUTO: 27.9 % (ref 35–47)
HGB BLD-MCNC: 9.2 G/DL (ref 11.5–16)
IMM GRANULOCYTES # BLD AUTO: 0 K/UL (ref 0–0.04)
IMM GRANULOCYTES NFR BLD AUTO: 0 % (ref 0–0.5)
LYMPHOCYTES # BLD: 0.4 K/UL (ref 0.8–3.5)
LYMPHOCYTES NFR BLD: 4 % (ref 12–49)
MCH RBC QN AUTO: 31.7 PG (ref 26–34)
MCHC RBC AUTO-ENTMCNC: 33 G/DL (ref 30–36.5)
MCV RBC AUTO: 96.2 FL (ref 80–99)
MONOCYTES # BLD: 0.8 K/UL (ref 0–1)
MONOCYTES NFR BLD: 7 % (ref 5–13)
NEUTS SEG # BLD: 9.9 K/UL (ref 1.8–8)
NEUTS SEG NFR BLD: 89 % (ref 32–75)
NRBC # BLD: 0 K/UL (ref 0–0.01)
NRBC BLD-RTO: 0 PER 100 WBC
PERFORMED BY, TECHID: ABNORMAL
PLATELET # BLD AUTO: 379 K/UL (ref 150–400)
PMV BLD AUTO: 11.6 FL (ref 8.9–12.9)
POTASSIUM SERPL-SCNC: 4.5 MMOL/L (ref 3.5–5.1)
PROT SERPL-MCNC: 4.8 G/DL (ref 6.4–8.2)
RBC # BLD AUTO: 2.9 M/UL (ref 3.8–5.2)
SODIUM SERPL-SCNC: 140 MMOL/L (ref 136–145)
WBC # BLD AUTO: 11.1 K/UL (ref 3.6–11)

## 2022-04-02 PROCEDURE — 80053 COMPREHEN METABOLIC PANEL: CPT

## 2022-04-02 PROCEDURE — 97530 THERAPEUTIC ACTIVITIES: CPT

## 2022-04-02 PROCEDURE — 74011000250 HC RX REV CODE- 250: Performed by: INTERNAL MEDICINE

## 2022-04-02 PROCEDURE — 36415 COLL VENOUS BLD VENIPUNCTURE: CPT

## 2022-04-02 PROCEDURE — 65270000029 HC RM PRIVATE

## 2022-04-02 PROCEDURE — 74011250637 HC RX REV CODE- 250/637: Performed by: STUDENT IN AN ORGANIZED HEALTH CARE EDUCATION/TRAINING PROGRAM

## 2022-04-02 PROCEDURE — 82962 GLUCOSE BLOOD TEST: CPT

## 2022-04-02 PROCEDURE — 74011250636 HC RX REV CODE- 250/636: Performed by: HOSPITALIST

## 2022-04-02 PROCEDURE — 97116 GAIT TRAINING THERAPY: CPT

## 2022-04-02 PROCEDURE — 85025 COMPLETE CBC W/AUTO DIFF WBC: CPT

## 2022-04-02 PROCEDURE — 97161 PT EVAL LOW COMPLEX 20 MIN: CPT

## 2022-04-02 PROCEDURE — 74011250636 HC RX REV CODE- 250/636: Performed by: INTERNAL MEDICINE

## 2022-04-02 PROCEDURE — 74011250636 HC RX REV CODE- 250/636: Performed by: STUDENT IN AN ORGANIZED HEALTH CARE EDUCATION/TRAINING PROGRAM

## 2022-04-02 PROCEDURE — 74011000250 HC RX REV CODE- 250: Performed by: COLON & RECTAL SURGERY

## 2022-04-02 PROCEDURE — 99024 POSTOP FOLLOW-UP VISIT: CPT | Performed by: COLON & RECTAL SURGERY

## 2022-04-02 PROCEDURE — 74011250636 HC RX REV CODE- 250/636: Performed by: COLON & RECTAL SURGERY

## 2022-04-02 PROCEDURE — 74011636637 HC RX REV CODE- 636/637: Performed by: STUDENT IN AN ORGANIZED HEALTH CARE EDUCATION/TRAINING PROGRAM

## 2022-04-02 RX ORDER — DEXTROSE, SODIUM CHLORIDE, AND POTASSIUM CHLORIDE 5; .45; .15 G/100ML; G/100ML; G/100ML
50 INJECTION INTRAVENOUS CONTINUOUS
Status: DISCONTINUED | OUTPATIENT
Start: 2022-04-02 | End: 2022-04-08 | Stop reason: HOSPADM

## 2022-04-02 RX ORDER — HYDRALAZINE HYDROCHLORIDE 20 MG/ML
10 INJECTION INTRAMUSCULAR; INTRAVENOUS
Status: DISCONTINUED | OUTPATIENT
Start: 2022-04-02 | End: 2022-04-08 | Stop reason: HOSPADM

## 2022-04-02 RX ORDER — ENOXAPARIN SODIUM 100 MG/ML
40 INJECTION SUBCUTANEOUS EVERY 24 HOURS
Status: DISCONTINUED | OUTPATIENT
Start: 2022-04-02 | End: 2022-04-08 | Stop reason: HOSPADM

## 2022-04-02 RX ORDER — KETOROLAC TROMETHAMINE 15 MG/ML
15 INJECTION, SOLUTION INTRAMUSCULAR; INTRAVENOUS
Status: DISPENSED | OUTPATIENT
Start: 2022-04-02 | End: 2022-04-07

## 2022-04-02 RX ORDER — TRAMADOL HYDROCHLORIDE 50 MG/1
50 TABLET ORAL
Status: DISCONTINUED | OUTPATIENT
Start: 2022-04-02 | End: 2022-04-08 | Stop reason: HOSPADM

## 2022-04-02 RX ORDER — METRONIDAZOLE 500 MG/100ML
500 INJECTION, SOLUTION INTRAVENOUS EVERY 8 HOURS
Status: DISCONTINUED | OUTPATIENT
Start: 2022-04-02 | End: 2022-04-03

## 2022-04-02 RX ADMIN — ATORVASTATIN CALCIUM 10 MG: 10 TABLET, FILM COATED ORAL at 21:07

## 2022-04-02 RX ADMIN — FERROUS SULFATE TAB 325 MG (65 MG ELEMENTAL FE) 325 MG: 325 (65 FE) TAB at 08:18

## 2022-04-02 RX ADMIN — KETOROLAC TROMETHAMINE 15 MG: 15 INJECTION, SOLUTION INTRAMUSCULAR; INTRAVENOUS at 12:53

## 2022-04-02 RX ADMIN — HYDROMORPHONE HYDROCHLORIDE 1 MG: 1 INJECTION, SOLUTION INTRAMUSCULAR; INTRAVENOUS; SUBCUTANEOUS at 00:21

## 2022-04-02 RX ADMIN — POTASSIUM CHLORIDE, DEXTROSE MONOHYDRATE AND SODIUM CHLORIDE 100 ML/HR: 150; 5; 450 INJECTION, SOLUTION INTRAVENOUS at 21:13

## 2022-04-02 RX ADMIN — SODIUM CHLORIDE 100 ML/HR: 9 INJECTION, SOLUTION INTRAVENOUS at 04:10

## 2022-04-02 RX ADMIN — FERROUS SULFATE TAB 325 MG (65 MG ELEMENTAL FE) 325 MG: 325 (65 FE) TAB at 16:22

## 2022-04-02 RX ADMIN — INSULIN LISPRO 1 UNITS: 100 INJECTION, SOLUTION INTRAVENOUS; SUBCUTANEOUS at 22:00

## 2022-04-02 RX ADMIN — POTASSIUM CHLORIDE, DEXTROSE MONOHYDRATE AND SODIUM CHLORIDE 100 ML/HR: 150; 5; 450 INJECTION, SOLUTION INTRAVENOUS at 11:23

## 2022-04-02 RX ADMIN — HYDROMORPHONE HYDROCHLORIDE 1 MG: 1 INJECTION, SOLUTION INTRAMUSCULAR; INTRAVENOUS; SUBCUTANEOUS at 16:22

## 2022-04-02 RX ADMIN — CEFAZOLIN SODIUM 2 G: 1 INJECTION, POWDER, FOR SOLUTION INTRAMUSCULAR; INTRAVENOUS at 11:23

## 2022-04-02 RX ADMIN — METRONIDAZOLE 500 MG: 500 INJECTION, SOLUTION INTRAVENOUS at 21:07

## 2022-04-02 RX ADMIN — HYDROMORPHONE HYDROCHLORIDE 1 MG: 1 INJECTION, SOLUTION INTRAMUSCULAR; INTRAVENOUS; SUBCUTANEOUS at 04:12

## 2022-04-02 RX ADMIN — ENOXAPARIN SODIUM 40 MG: 40 INJECTION SUBCUTANEOUS at 11:23

## 2022-04-02 RX ADMIN — METOPROLOL SUCCINATE 25 MG: 25 TABLET, EXTENDED RELEASE ORAL at 09:00

## 2022-04-02 RX ADMIN — HYDRALAZINE HYDROCHLORIDE 10 MG: 20 INJECTION, SOLUTION INTRAMUSCULAR; INTRAVENOUS at 01:38

## 2022-04-02 RX ADMIN — LEVOTHYROXINE SODIUM 150 MCG: 0.07 TABLET ORAL at 08:18

## 2022-04-02 RX ADMIN — HYDROMORPHONE HYDROCHLORIDE 1 MG: 1 INJECTION, SOLUTION INTRAMUSCULAR; INTRAVENOUS; SUBCUTANEOUS at 08:18

## 2022-04-02 RX ADMIN — CEFTRIAXONE SODIUM 1 G: 1 INJECTION, POWDER, FOR SOLUTION INTRAMUSCULAR; INTRAVENOUS at 10:11

## 2022-04-02 RX ADMIN — CEFAZOLIN SODIUM 2 G: 1 INJECTION, POWDER, FOR SOLUTION INTRAMUSCULAR; INTRAVENOUS at 21:07

## 2022-04-02 RX ADMIN — DULOXETINE HYDROCHLORIDE 30 MG: 30 CAPSULE, DELAYED RELEASE ORAL at 08:18

## 2022-04-02 RX ADMIN — INSULIN GLARGINE 16 UNITS: 100 INJECTION, SOLUTION SUBCUTANEOUS at 22:00

## 2022-04-02 RX ADMIN — METRONIDAZOLE 500 MG: 500 INJECTION, SOLUTION INTRAVENOUS at 11:23

## 2022-04-02 NOTE — PROGRESS NOTES
Progress Note    Patient: Tanisha Harper MRN: [id number]  SSN: [id number]    YOB: 1940  Age: [de-identified] y.o. Sex: female      Admit Date: 3/29/2022    LOS: 4 days     Subjective:   Postoperative day 1 status post exploratory laparotomy, extended right hemicolectomy  Patient seen in bed  Has no complaints this a.m. Objective:     Vitals:    04/01/22 2011 04/02/22 0015 04/02/22 0315 04/02/22 0707   BP: (!) 176/72 (!) 178/66 (!) 110/53 (!) 114/52   Pulse: 87 83 97 96   Resp: 16 18 18 18   Temp: 98.3 °F (36.8 °C) 98.2 °F (36.8 °C) 98.4 °F (36.9 °C) 98.1 °F (36.7 °C)   SpO2: 95% 96%  94%   Weight:       Height:            Intake and Output:  Current Shift: No intake/output data recorded.   Last three shifts: 03/31 1901 - 04/02 0700  In: 1300 [I.V.:1300]  Out: 1200 [Urine:1150]    Review of Systems:  ROS     Physical Exam:    abdomen is soft, appropriately tender, nondistended, midline incision dressing is clean and intact    Lab/Data Review:  Recent Results (from the past 24 hour(s))   GLUCOSE, POC    Collection Time: 04/01/22 12:12 PM   Result Value Ref Range    Glucose (POC) 124 (H) 65 - 117 mg/dL    Performed by Omar An    CEA    Collection Time: 04/01/22  1:29 PM   Result Value Ref Range    CEA 0.5 ng/mL   GLUCOSE, POC    Collection Time: 04/01/22  5:55 PM   Result Value Ref Range    Glucose (POC) 106 65 - 117 mg/dL    Performed by Lavonne Cruz ([facility])    GLUCOSE, POC    Collection Time: 04/01/22  9:58 PM   Result Value Ref Range    Glucose (POC) 139 (H) 65 - 117 mg/dL    Performed by Kate Munoz    METABOLIC PANEL, COMPREHENSIVE    Collection Time: 04/02/22  6:08 AM   Result Value Ref Range    Sodium 140 136 - 145 mmol/L    Potassium 4.5 3.5 - 5.1 mmol/L    Chloride 108 97 - 108 mmol/L    CO2 22 21 - 32 mmol/L    Anion gap 10 5 - 15 mmol/L    Glucose 144 (H) 65 - 100 mg/dL    BUN 18 6 - 20 mg/dL    Creatinine 0.67 0.55 - 1.02 mg/dL    BUN/Creatinine ratio 27 (H) 12 - 20      GFR est AA >60 >60 ml/min/1.73m2    GFR est non-AA >60 >60 ml/min/1.73m2    Calcium 8.2 (L) 8.5 - 10.1 mg/dL    Bilirubin, total 0.2 0.2 - 1.0 mg/dL    AST (SGOT) 19 15 - 37 U/L    ALT (SGPT) 10 (L) 12 - 78 U/L    Alk. phosphatase 51 45 - 117 U/L    Protein, total 4.8 (L) 6.4 - 8.2 g/dL    Albumin 2.1 (L) 3.5 - 5.0 g/dL    Globulin 2.7 2.0 - 4.0 g/dL    A-G Ratio 0.8 (L) 1.1 - 2.2     CBC WITH AUTOMATED DIFF    Collection Time: 04/02/22  6:08 AM   Result Value Ref Range    WBC 11.1 (H) 3.6 - 11.0 K/uL    RBC 2.90 (L) 3.80 - 5.20 M/uL    HGB 9.2 (L) 11.5 - 16.0 g/dL    HCT 27.9 (L) 35.0 - 47.0 %    MCV 96.2 80.0 - 99.0 FL    MCH 31.7 26.0 - 34.0 PG    MCHC 33.0 30.0 - 36.5 g/dL    RDW 13.2 11.5 - 14.5 %    PLATELET 453 640 - 893 K/uL    MPV 11.6 8.9 - 12.9 FL    NRBC 0.0 0.0  WBC    ABSOLUTE NRBC 0.00 0.00 - 0.01 K/uL    NEUTROPHILS 89 (H) 32 - 75 %    LYMPHOCYTES 4 (L) 12 - 49 %    MONOCYTES 7 5 - 13 %    EOSINOPHILS 0 0 - 7 %    BASOPHILS 0 0 - 1 %    IMMATURE GRANULOCYTES 0 0 - 0.5 %    ABS. NEUTROPHILS 9.9 (H) 1.8 - 8.0 K/UL    ABS. LYMPHOCYTES 0.4 (L) 0.8 - 3.5 K/UL    ABS. MONOCYTES 0.8 0.0 - 1.0 K/UL    ABS. EOSINOPHILS 0.0 0.0 - 0.4 K/UL    ABS. BASOPHILS 0.0 0.0 - 0.1 K/UL    ABS. IMM.  GRANS. 0.0 0.00 - 0.04 K/UL    DF AUTOMATED     GLUCOSE, POC    Collection Time: 04/02/22  7:21 AM   Result Value Ref Range    Glucose (POC) 145 (H) 65 - 117 mg/dL    Performed by Elina Casarez           Assessment:     Active Problems:    Colonic mass (3/29/2022)      SBO (small bowel obstruction) ([address] (3/29/2022)      HTN (hypertension), malignant (3/29/2022)        Plan:   Doing well postop day 1  We will change to maintenance IV fluids  Start Lovenox for DVT prophylaxis  Continue n.p.o., awaiting return of bowel from    Signed By: Sky Allan MD     April 2, 2022

## 2022-04-02 NOTE — PROGRESS NOTES
Pulmonary Progress Note    Subjective:   Daily Progress Note: 2022 10:10 AM    Miki Smith is a [de-identified]year old female PMH of COPD, diabetes, HTN, HLD, Pacemaker, AICD, HX of cardiac stents to have possible surgical resection today for partial bowel obstruction. Patient is alert and oriented and doing well. No respiratory distress. Review of Systems     Constitutional: Negative. HENT: Negative. Eyes: Negative. Respiratory: Negative for shortness of breath. Cardiovascular: Negative. Gastrointestinal: Positive for abdominal pain. Status post surgery bandage in place  Genitourinary: Negative. Musculoskeletal: Negative. Skin: Negative. Neurological: Negative. Psychiatric/Behavioral: Negative. Objective:     Visit Vitals  BP (!) 114/52 (BP 1 Location: Left upper arm, BP Patient Position: At rest)   Pulse 96   Temp 98.1 °F (36.7 °C)   Resp 18   Ht 5' 1\" (1.549 m)   Wt 74.8 kg (164 lb 14.5 oz)   SpO2 94%   BMI 31.16 kg/m²      O2 Device: None (Room air)    Temp (24hrs), Av.9 °F (36.6 °C), Min:97.4 °F (36.3 °C), Max:98.4 °F (36.9 °C)      No intake/output data recorded.  1901 -  0700  In: 1300 [I.V.:1300]  Out: 1200 [Urine:1150]    Physical Exam  Constitutional:       Appearance: Normal appearance. HENT:      Head: Normocephalic and atraumatic. Nose: Nose normal.      Mouth/Throat:      Mouth: Mucous membranes are moist.   Eyes:      Pupils: Pupils are equal, round, and reactive to light. Cardiovascular:      Rate and Rhythm: Normal rate. Rhythm irregular. Pulses: Normal pulses. Heart sounds: Normal heart sounds. Pulmonary:      Effort: Pulmonary effort is normal.      Breath sounds: Normal breath sounds. Abdominal:      General: There is distension. Tenderness: There is abdominal tenderness. Musculoskeletal:         General: Normal range of motion. Cervical back: Normal range of motion and neck supple.    Skin:     General: Skin is warm.   Neurological:      General: No focal deficit present. Mental Status: She is alert. Psychiatric:         Mood and Affect: Mood normal.         Data Review    Recent Results (from the past 24 hour(s))   GLUCOSE, POC    Collection Time: 04/01/22 12:12 PM   Result Value Ref Range    Glucose (POC) 124 (H) 65 - 117 mg/dL    Performed by Vincent Wilhelm    CEA    Collection Time: 04/01/22  1:29 PM   Result Value Ref Range    CEA 0.5 ng/mL   GLUCOSE, POC    Collection Time: 04/01/22  5:55 PM   Result Value Ref Range    Glucose (POC) 106 65 - 117 mg/dL    Performed by Maldonado Neri ([facility])    GLUCOSE, POC    Collection Time: 04/01/22  9:58 PM   Result Value Ref Range    Glucose (POC) 139 (H) 65 - 117 mg/dL    Performed by Clyde Younger    METABOLIC PANEL, COMPREHENSIVE    Collection Time: 04/02/22  6:08 AM   Result Value Ref Range    Sodium 140 136 - 145 mmol/L    Potassium 4.5 3.5 - 5.1 mmol/L    Chloride 108 97 - 108 mmol/L    CO2 22 21 - 32 mmol/L    Anion gap 10 5 - 15 mmol/L    Glucose 144 (H) 65 - 100 mg/dL    BUN 18 6 - 20 mg/dL    Creatinine 0.67 0.55 - 1.02 mg/dL    BUN/Creatinine ratio 27 (H) 12 - 20      GFR est AA >60 >60 ml/min/1.73m2    GFR est non-AA >60 >60 ml/min/1.73m2    Calcium 8.2 (L) 8.5 - 10.1 mg/dL    Bilirubin, total 0.2 0.2 - 1.0 mg/dL    AST (SGOT) 19 15 - 37 U/L    ALT (SGPT) 10 (L) 12 - 78 U/L    Alk.  phosphatase 51 45 - 117 U/L    Protein, total 4.8 (L) 6.4 - 8.2 g/dL    Albumin 2.1 (L) 3.5 - 5.0 g/dL    Globulin 2.7 2.0 - 4.0 g/dL    A-G Ratio 0.8 (L) 1.1 - 2.2     CBC WITH AUTOMATED DIFF    Collection Time: 04/02/22  6:08 AM   Result Value Ref Range    WBC 11.1 (H) 3.6 - 11.0 K/uL    RBC 2.90 (L) 3.80 - 5.20 M/uL    HGB 9.2 (L) 11.5 - 16.0 g/dL    HCT 27.9 (L) 35.0 - 47.0 %    MCV 96.2 80.0 - 99.0 FL    MCH 31.7 26.0 - 34.0 PG    MCHC 33.0 30.0 - 36.5 g/dL    RDW 13.2 11.5 - 14.5 %    PLATELET 164 758 - 509 K/uL    MPV 11.6 8.9 - 12.9 FL    NRBC 0.0 0.0  WBC    ABSOLUTE NRBC 0. 00 0.00 - 0.01 K/uL    NEUTROPHILS 89 (H) 32 - 75 %    LYMPHOCYTES 4 (L) 12 - 49 %    MONOCYTES 7 5 - 13 %    EOSINOPHILS 0 0 - 7 %    BASOPHILS 0 0 - 1 %    IMMATURE GRANULOCYTES 0 0 - 0.5 %    ABS. NEUTROPHILS 9.9 (H) 1.8 - 8.0 K/UL    ABS. LYMPHOCYTES 0.4 (L) 0.8 - 3.5 K/UL    ABS. MONOCYTES 0.8 0.0 - 1.0 K/UL    ABS. EOSINOPHILS 0.0 0.0 - 0.4 K/UL    ABS. BASOPHILS 0.0 0.0 - 0.1 K/UL    ABS. IMM.  GRANS. 0.0 0.00 - 0.04 K/UL    DF AUTOMATED     GLUCOSE, POC    Collection Time: 04/02/22  7:21 AM   Result Value Ref Range    Glucose (POC) 145 (H) 65 - 117 mg/dL    Performed by KELSIE BRIONES        Current Facility-Administered Medications   Medication Dose Route Frequency    hydrALAZINE (APRESOLINE) 20 mg/mL injection 10 mg  10 mg IntraVENous Q4H PRN    ceFAZolin (ANCEF) 2 g in sterile water (preservative free) 20 mL IV syringe  2 g IntraVENous Q8H    metroNIDAZOLE (FLAGYL) IVPB premix 500 mg  500 mg IntraVENous Q8H    enoxaparin (LOVENOX) injection 40 mg  40 mg SubCUTAneous Q24H    dextrose 5% - 0.45% NaCl with KCl 20 mEq/L infusion  100 mL/hr IntraVENous CONTINUOUS    HYDROmorphone (DILAUDID) injection 1 mg  1 mg IntraVENous Q4H PRN    HYDROmorphone (DILAUDID) syringe 0.5 mg  0.5 mg IntraVENous Q4H PRN    glucose chewable tablet 16 g  4 Tablet Oral PRN    dextrose 10% infusion 0-250 mL  0-250 mL IntraVENous PRN    glucagon (GLUCAGEN) injection 1 mg  1 mg IntraMUSCular PRN    acetaminophen (TYLENOL) tablet 650 mg  650 mg Oral Q6H PRN    Or    acetaminophen (TYLENOL) suppository 650 mg  650 mg Rectal Q6H PRN    ondansetron (ZOFRAN ODT) tablet 4 mg  4 mg Oral Q8H PRN    Or    ondansetron (ZOFRAN) injection 4 mg  4 mg IntraVENous Q6H PRN    enoxaparin (LOVENOX) injection 40 mg  40 mg SubCUTAneous DAILY    insulin lispro (HUMALOG) injection   SubCUTAneous AC&HS    [Held by provider] aspirin delayed-release tablet 81 mg  81 mg Oral DAILY    [Held by provider] clopidogreL (PLAVIX) tablet 75 mg  75 mg Oral DAILY    DULoxetine (CYMBALTA) capsule 30 mg  30 mg Oral DAILY    ferrous sulfate tablet 325 mg  325 mg Oral BID WITH MEALS    levothyroxine (SYNTHROID) tablet 150 mcg  150 mcg Oral ACB    metoprolol succinate (TOPROL-XL) XL tablet 25 mg  25 mg Oral DAILY    atorvastatin (LIPITOR) tablet 10 mg  10 mg Oral QHS    albuterol (PROVENTIL HFA, VENTOLIN HFA, PROAIR HFA) inhaler 2 Puff  2 Puff Inhalation Q6H PRN    oxyCODONE IR (ROXICODONE) tablet 5 mg  5 mg Oral Q4H PRN    insulin glargine (LANTUS) injection 16 Units  16 Units SubCUTAneous QHS         Assessment/Plan:        1. Chronic Obstructive Pulmonary Disease without excebation  2. Partial large Bowel Obstruction  3. Status post right hemicolectomy  4. Diabetes Mellitus type 2  5. Essential Hypertension  6. Thyroid Disease   7. Urinary Tract infection       RECOMMENDATIONS/PLAN:      3 27-year-old lady no history of smoking not on any inhalers or oxygen at home, she had a history of secondhand smoke as her  used to smoke and all her children used to smoke she used to work in the form as a farmer significant history of congestive heart failure ejection fraction about 35 to 40%. She had aortic valve replaced previously also she has stent placement in the past chest x-ray no acute infiltrate or lung mass. Last showed Echocardiogram showed EF of 55 to 60% with grade 1 diastolic dysfunction. 2. 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 monitor respiratory status and O2 PRN  4.  Follow up with Pulmonary outpatient as needed for COPD management

## 2022-04-02 NOTE — PROGRESS NOTES
Hospitalist Progress Note    Subjective:   Daily Progress Note: 4/2/2022 7:38 AM    Patient is feeling good. Patient does not have any abdominal pain.     Current Facility-Administered Medications   Medication Dose Route Frequency    hydrALAZINE (APRESOLINE) 20 mg/mL injection 10 mg  10 mg IntraVENous Q4H PRN    HYDROmorphone (DILAUDID) injection 1 mg  1 mg IntraVENous Q4H PRN    HYDROmorphone (DILAUDID) syringe 0.5 mg  0.5 mg IntraVENous Q4H PRN    0.9% sodium chloride infusion  100 mL/hr IntraVENous CONTINUOUS    glucose chewable tablet 16 g  4 Tablet Oral PRN    dextrose 10% infusion 0-250 mL  0-250 mL IntraVENous PRN    glucagon (GLUCAGEN) injection 1 mg  1 mg IntraMUSCular PRN    acetaminophen (TYLENOL) tablet 650 mg  650 mg Oral Q6H PRN    Or    acetaminophen (TYLENOL) suppository 650 mg  650 mg Rectal Q6H PRN    ondansetron (ZOFRAN ODT) tablet 4 mg  4 mg Oral Q8H PRN    Or    ondansetron (ZOFRAN) injection 4 mg  4 mg IntraVENous Q6H PRN    [Held by provider] enoxaparin (LOVENOX) injection 40 mg  40 mg SubCUTAneous DAILY    insulin lispro (HUMALOG) injection   SubCUTAneous AC&HS    [Held by provider] aspirin delayed-release tablet 81 mg  81 mg Oral DAILY    [Held by provider] clopidogreL (PLAVIX) tablet 75 mg  75 mg Oral DAILY    DULoxetine (CYMBALTA) capsule 30 mg  30 mg Oral DAILY    ferrous sulfate tablet 325 mg  325 mg Oral BID WITH MEALS    levothyroxine (SYNTHROID) tablet 150 mcg  150 mcg Oral ACB    metoprolol succinate (TOPROL-XL) XL tablet 25 mg  25 mg Oral DAILY    atorvastatin (LIPITOR) tablet 10 mg  10 mg Oral QHS    albuterol (PROVENTIL HFA, VENTOLIN HFA, PROAIR HFA) inhaler 2 Puff  2 Puff Inhalation Q6H PRN    oxyCODONE IR (ROXICODONE) tablet 5 mg  5 mg Oral Q4H PRN    insulin glargine (LANTUS) injection 16 Units  16 Units SubCUTAneous QHS    cefTRIAXone (ROCEPHIN) 1 g in sterile water (preservative free) 10 mL IV syringe  1 g IntraVENous Q24H        Review of Systems  Review of Systems   Constitutional: Negative. Respiratory: Negative. Cardiovascular: Negative. Gastrointestinal: Negative. Neurological:        + gait instability   All other systems reviewed and are negative. Objective:     Visit Vitals  BP (!) 114/52 (BP 1 Location: Left upper arm, BP Patient Position: At rest)   Pulse 96   Temp 98.1 °F (36.7 °C)   Resp 18   Ht 5' 1\" (1.549 m)   Wt 74.8 kg (164 lb 14.5 oz)   SpO2 94%   BMI 31.16 kg/m²      O2 Device: None (Room air)    Temp (24hrs), Av.9 °F (36.6 °C), Min:97.4 °F (36.3 °C), Max:98.4 °F (36.9 °C)      No intake/output data recorded.    1901 -  0700  In: 1300 [I.V.:1300]  Out: 1200 [Urine:1150]    Recent Results (from the past 24 hour(s))   TYPE & SCREEN    Collection Time: 22 10:32 AM   Result Value Ref Range    Crossmatch Expiration 2022,2359     ABO/Rh(D) A Positive     Antibody screen Negative    GLUCOSE, POC    Collection Time: 22 12:12 PM   Result Value Ref Range    Glucose (POC) 124 (H) 65 - 117 mg/dL    Performed by Romeo Malik    CEA    Collection Time: 22  1:29 PM   Result Value Ref Range    CEA 0.5 ng/mL   GLUCOSE, POC    Collection Time: 22  5:55 PM   Result Value Ref Range    Glucose (POC) 106 65 - 117 mg/dL    Performed by Qamar Lopez (SON)    GLUCOSE, POC    Collection Time: 22  9:58 PM   Result Value Ref Range    Glucose (POC) 139 (H) 65 - 117 mg/dL    Performed by Robyn Garza    METABOLIC PANEL, COMPREHENSIVE    Collection Time: 22  6:08 AM   Result Value Ref Range    Sodium 140 136 - 145 mmol/L    Potassium 4.5 3.5 - 5.1 mmol/L    Chloride 108 97 - 108 mmol/L    CO2 22 21 - 32 mmol/L    Anion gap 10 5 - 15 mmol/L    Glucose 144 (H) 65 - 100 mg/dL    BUN 18 6 - 20 mg/dL    Creatinine 0.67 0.55 - 1.02 mg/dL    BUN/Creatinine ratio 27 (H) 12 - 20      GFR est AA >60 >60 ml/min/1.73m2    GFR est non-AA >60 >60 ml/min/1.73m2    Calcium 8.2 (L) 8.5 - 10.1 mg/dL Bilirubin, total 0.2 0.2 - 1.0 mg/dL    AST (SGOT) 19 15 - 37 U/L    ALT (SGPT) 10 (L) 12 - 78 U/L    Alk. phosphatase 51 45 - 117 U/L    Protein, total 4.8 (L) 6.4 - 8.2 g/dL    Albumin 2.1 (L) 3.5 - 5.0 g/dL    Globulin 2.7 2.0 - 4.0 g/dL    A-G Ratio 0.8 (L) 1.1 - 2.2     CBC WITH AUTOMATED DIFF    Collection Time: 04/02/22  6:08 AM   Result Value Ref Range    WBC 11.1 (H) 3.6 - 11.0 K/uL    RBC 2.90 (L) 3.80 - 5.20 M/uL    HGB 9.2 (L) 11.5 - 16.0 g/dL    HCT 27.9 (L) 35.0 - 47.0 %    MCV 96.2 80.0 - 99.0 FL    MCH 31.7 26.0 - 34.0 PG    MCHC 33.0 30.0 - 36.5 g/dL    RDW 13.2 11.5 - 14.5 %    PLATELET 411 303 - 338 K/uL    MPV 11.6 8.9 - 12.9 FL    NRBC 0.0 0.0  WBC    ABSOLUTE NRBC 0.00 0.00 - 0.01 K/uL    NEUTROPHILS 89 (H) 32 - 75 %    LYMPHOCYTES 4 (L) 12 - 49 %    MONOCYTES 7 5 - 13 %    EOSINOPHILS 0 0 - 7 %    BASOPHILS 0 0 - 1 %    IMMATURE GRANULOCYTES 0 0 - 0.5 %    ABS. NEUTROPHILS 9.9 (H) 1.8 - 8.0 K/UL    ABS. LYMPHOCYTES 0.4 (L) 0.8 - 3.5 K/UL    ABS. MONOCYTES 0.8 0.0 - 1.0 K/UL    ABS. EOSINOPHILS 0.0 0.0 - 0.4 K/UL    ABS. BASOPHILS 0.0 0.0 - 0.1 K/UL    ABS. IMM. GRANS. 0.0 0.00 - 0.04 K/UL    DF AUTOMATED     GLUCOSE, POC    Collection Time: 04/02/22  7:21 AM   Result Value Ref Range    Glucose (POC) 145 (H) 65 - 117 mg/dL    Performed by TABAMO CHELLEN         CT ABD PELV WO CONT   Final Result   1. Abnormal masslike thickening within the ascending colon highly concerning for   malignancy with suspected lymphovascular invasion. Tumor results in low-grade   obstruction. 2. Trace ascites within the right abdomen. 3. Subtle morphologic changes suggestive of hepatic fibrosis/early cirrhosis. 4. Cholelithiasis. Notification: Findings were discussed with Dr. Al Alston at 11:30 AM on 3/29/2022      XR CHEST PORT   Final Result           PHYSICAL EXAM:    Physical Exam  Vitals and nursing note reviewed. Constitutional:       Appearance: She is obese.    HENT:      Head: Normocephalic and atraumatic. Mouth/Throat:      Mouth: Mucous membranes are moist.      Comments: Poor dentition  Cardiovascular:      Rate and Rhythm: Normal rate and regular rhythm. Pulmonary:      Effort: No respiratory distress. Breath sounds: No wheezing. Abdominal:      General: Bowel sounds are normal. There is no distension. Palpations: Abdomen is soft. There is no mass. Tenderness: There is no abdominal tenderness. Comments: Lateral incision clean dry and intact   Musculoskeletal:      Right lower leg: No edema. Left lower leg: No edema. Skin:     General: Skin is warm. Neurological:      Mental Status: She is alert and oriented to person, place, and time.    Psychiatric:         Mood and Affect: Mood normal.          Data Review    Recent Results (from the past 24 hour(s))   TYPE & SCREEN    Collection Time: 04/01/22 10:32 AM   Result Value Ref Range    Crossmatch Expiration 04/04/2022,2359     ABO/Rh(D) A Positive     Antibody screen Negative    GLUCOSE, POC    Collection Time: 04/01/22 12:12 PM   Result Value Ref Range    Glucose (POC) 124 (H) 65 - 117 mg/dL    Performed by Scott Stone    CEA    Collection Time: 04/01/22  1:29 PM   Result Value Ref Range    CEA 0.5 ng/mL   GLUCOSE, POC    Collection Time: 04/01/22  5:55 PM   Result Value Ref Range    Glucose (POC) 106 65 - 117 mg/dL    Performed by Deidra Rogers ([facility])    GLUCOSE, POC    Collection Time: 04/01/22  9:58 PM   Result Value Ref Range    Glucose (POC) 139 (H) 65 - 117 mg/dL    Performed by Adriana Pierre    METABOLIC PANEL, COMPREHENSIVE    Collection Time: 04/02/22  6:08 AM   Result Value Ref Range    Sodium 140 136 - 145 mmol/L    Potassium 4.5 3.5 - 5.1 mmol/L    Chloride 108 97 - 108 mmol/L    CO2 22 21 - 32 mmol/L    Anion gap 10 5 - 15 mmol/L    Glucose 144 (H) 65 - 100 mg/dL    BUN 18 6 - 20 mg/dL    Creatinine 0.67 0.55 - 1.02 mg/dL    BUN/Creatinine ratio 27 (H) 12 - 20      GFR est AA >60 >60 ml/min/1.73m2 GFR est non-AA >60 >60 ml/min/1.73m2    Calcium 8.2 (L) 8.5 - 10.1 mg/dL    Bilirubin, total 0.2 0.2 - 1.0 mg/dL    AST (SGOT) 19 15 - 37 U/L    ALT (SGPT) 10 (L) 12 - 78 U/L    Alk. phosphatase 51 45 - 117 U/L    Protein, total 4.8 (L) 6.4 - 8.2 g/dL    Albumin 2.1 (L) 3.5 - 5.0 g/dL    Globulin 2.7 2.0 - 4.0 g/dL    A-G Ratio 0.8 (L) 1.1 - 2.2     CBC WITH AUTOMATED DIFF    Collection Time: 04/02/22  6:08 AM   Result Value Ref Range    WBC 11.1 (H) 3.6 - 11.0 K/uL    RBC 2.90 (L) 3.80 - 5.20 M/uL    HGB 9.2 (L) 11.5 - 16.0 g/dL    HCT 27.9 (L) 35.0 - 47.0 %    MCV 96.2 80.0 - 99.0 FL    MCH 31.7 26.0 - 34.0 PG    MCHC 33.0 30.0 - 36.5 g/dL    RDW 13.2 11.5 - 14.5 %    PLATELET 541 845 - 939 K/uL    MPV 11.6 8.9 - 12.9 FL    NRBC 0.0 0.0  WBC    ABSOLUTE NRBC 0.00 0.00 - 0.01 K/uL    NEUTROPHILS 89 (H) 32 - 75 %    LYMPHOCYTES 4 (L) 12 - 49 %    MONOCYTES 7 5 - 13 %    EOSINOPHILS 0 0 - 7 %    BASOPHILS 0 0 - 1 %    IMMATURE GRANULOCYTES 0 0 - 0.5 %    ABS. NEUTROPHILS 9.9 (H) 1.8 - 8.0 K/UL    ABS. LYMPHOCYTES 0.4 (L) 0.8 - 3.5 K/UL    ABS. MONOCYTES 0.8 0.0 - 1.0 K/UL    ABS. EOSINOPHILS 0.0 0.0 - 0.4 K/UL    ABS. BASOPHILS 0.0 0.0 - 0.1 K/UL    ABS. IMM. GRANS. 0.0 0.00 - 0.04 K/UL    DF AUTOMATED     GLUCOSE, POC    Collection Time: 04/02/22  7:21 AM   Result Value Ref Range    Glucose (POC) 145 (H) 65 - 117 mg/dL    Performed by Saba Ledesma         Assessment/Plan:     Active Problems:    Colonic mass (3/29/2022)      SBO (small bowel obstruction) ([address] (3/29/2022)      HTN (hypertension), malignant (3/29/2022)        Hospital Course:    John James is a [de-identified]year old female with a PMH of diabetes, hypertension, COPD,  AICD, CAD with stents, who presented with right-sided lower abdominal pain. CT abdomen showed masslike thickening within the ascending colon, trace ascites within the right abdomen, cholelithiasis, and subtle morphologic changes of the liver.  Initial labs significant for WBC of 12.1. UA + nitrites with 5 -10 WBC. Urine culture negative. Chest x-ray showed no acute cardiopulmonary abnormality. Patient to be admitted for further management. General surgery, cardiology, and pulmonology consulted. Patient started on ceftriaxone and placed on clear liquid diet. Exploratory laparotomy with right colectomy on 4/1. Surgery found near obstructing tumor at proximal ascending colon with distended cecum with areas of gangrene in the wall. Ascitic fluid pending. Partial large bowel obstruction due to cecal mass  Exploratory laparotomy with right colectomy on 4/1, POD#1  Intraoperative fluid pending  Pain control  N.P.O. - Advance diet as per general surgery  Surgery following    Abnormal UA  UA + nitrites with 5 -10 WBC  Continue on ceftriaxone #4/5 days  3/29 urine: negative    Hypertension  Continue on metoprolol     Coronary artery disease with history of PCI to LAD   History of aortic valve replacement for severe aortic stenosis  AICD/pacemaker in situ  ECHO LVEF 55 - 60% with abnormal diastolic dysfunction with mild MR and MS  Hold ASA/plavix  Continue on lovastatin  Cardiology following     Diabetes mellitus, type 2  A1c 7.8  Continue on 16 units of lantus and SSI as needed     Hypothyroidism   Continue on levothyroxine    COPD without exacerbation  Continue on albuterol as needed  Pulmonology following    DVT Prophylaxis: lovenox  GI Prophylaxis: none  Discharge and disposition barriers: PT/OT eval, tolerating diet, >48 hours    Care Plan discussed with: Patient/Family, Nurse and     Total time spent with patient: 35 minutes.

## 2022-04-02 NOTE — PROGRESS NOTES
Problem: Mobility Impaired (Adult and Pediatric)  Goal: *Acute Goals and Plan of Care (Insert Text)  Description: Physical Therapy Goals  Initiated 4/2/22  1)  I with HEP in 7 days to improve overall functional mobility. 2)  Bed mobility and transfers I in 7 days to prevent skin breakdown. 3)  Pt to amb 300ft with LRAD and sup in 7 days    Pt. Goal:  Pt to be able to walk safely without falls. Outcome: Not Met  PHYSICAL THERAPY EVALUATION  Patient: Sis Current (89 y.o. female)  Date: 4/2/2022  Primary Diagnosis: Colonic mass [K63.89]  SBO (small bowel obstruction) (HCC) [K56.609]  HTN (hypertension), malignant [I10]  Procedure(s) (LRB):  LAPAROTOMY EXPLORATORY And Right Colectomy (Right) 1 Day Post-Op   Precautions: log roll technique for OOB, fall       ASSESSMENT  Pt admitted for abdominal pain and is s/p exploratory lap with extended R hemicolectomy due to colonic mass and SBO. Pt with hx of COPD, DM, HTN, HLD, AICD, CHF with EF 35-40%, aortic stenosis s/p valve replacement. Pt reports she lives in a 2 story home but resides on the first floor with a ramp entrance. Pt reports her son has been living with her since her CABG surgery last May. Pt reports she is I with ADL's and ambulates with a SPC most of the time, though sometimes \"furniture surfs\" through the house. Pt reports no falls in the past 3 months and reports 3/10 pain in her R side right now. Pt reports she can do some cooking and light cleaning around the home. Based on the objective data described below, the patient presents with good LE strength, impaired balance, difficulty with bed mobility requiring min A for log rolling technique for sup to sit transfer, able to perform transfers with CGA, and difficulty with gait requiring RW and CGA for safety. Pt attempted ambulation with SPC; however, pt was slightly unsteady with shuffling steps and feeling the need to also hold onto IV pole, so pt given RW.   Pt amb 150ft around the nursing unit with slow bianca and decreased step length, requiring cues to keep walker close to her. Pt fatiguing after 75ft and requesting to head back to her room. Pt's O2 sats at 98-99% during entire treatment. Pt assisted to bedside recliner with CGA and set up with tray table and call bell within reach. Pt would benefit from skilled PT services to improve balance, overall functional mobility and progress safe gait so pt can return home safely to [facility]. Recommend d/c to home with family care and HHPT. Other factors to consider for discharge: impaired mobility, level of assist     Patient will benefit from skilled therapy intervention to address the above noted impairments. PLAN :  Recommendations and Planned Interventions: bed mobility training, transfer training, gait training, therapeutic exercises, patient and family training/education, and therapeutic activities      Frequency/Duration: Patient will be followed by physical therapy:  3-5x/week to address goals. Recommendation for discharge: (in order for the patient to meet his/her long term goals)  Home with [address] [place] [address]    This discharge recommendation:  Has been made in collaboration with the attending provider and/or case management    IF patient discharges home will need the following DME: none         SUBJECTIVE:   Patient stated i'm feeling much better.     OBJECTIVE DATA SUMMARY:   HISTORY:    Past Medical History:   Diagnosis Date    Depression     DM (diabetes mellitus) ([address]     Hyperlipidemia     Hypertension     Pancreatitis     Thyroid disease      Past Surgical History:   Procedure Laterality Date    HX AORTIC VALVE REPLACEMENT  08/2021    HX HYSTERECTOMY      IR FLUORO GUIDE PLC CVAD  5/10/2021    IR INSERT NON TUNL CVC OVER 5 YRS  5/10/2021    ND INS NEW/RPLCMT PRM PM W/TRANSV ELTRD ATRIAL&VENT N/A 5/11/2021    INSERT PPM BIV MULTI performed by Osei Rendon MD at [address] factors and/or comorbidities impacting plan of care: impaired mobility, level of assist    Home Situation  Home Environment: Private residence  Wheelchair Ramp: Yes  One/Two Story Residence: Two story, live on 1st floor  Living Alone: No  Support Systems: Child(toshia)  Patient Expects to be Discharged to[de-identified] Home with home health  Current DME Used/Available at Home: Walker, rolling,Shower chair,Commode, bedside,Cane, straight    PLOF: Pt I for ADLS/IADLS, MI with mobility prior to admission. EXAMINATION/PRESENTATION/DECISION MAKING:   Critical Behavior:  Neurologic State: Alert  Orientation Level: Oriented X4  Cognition: Follows commands     Hearing: Auditory  Auditory Impairment: Hard of hearing, right side,Hearing aid(s)  Hearing Aids/Status: With patient  Range Of Motion:  AROM: Within functional limits                       Strength:    Strength: Within functional limits      4+/5 throughout B LE's                 Functional Mobility:  Bed Mobility:  Rolling: Minimum assistance  Supine to Sit: Minimum assistance     Scooting: Contact guard assistance  Transfers:  Sit to Stand: Contact guard assistance  Stand to Sit: Contact guard assistance                       Balance:   Sitting: Impaired  Sitting - Static: Good (unsupported)  Sitting - Dynamic: Fair (occasional)  Standing: Impaired  Standing - Static: Good;Constant support  Standing - Dynamic : Fair;Constant support  Ambulation/Gait Training:  Distance (ft): 150 Feet (ft)  Assistive Device: Walker, rolling;Gait belt  Ambulation - Level of Assistance: Contact guard assistance     Gait Description (WDL): Exceptions to WDL                 Speed/Roya: Slow  Step Length: Left shortened;Right shortened                    Functional Measure:    [facility] AM-PAC 6 Clicks         Basic Mobility Inpatient Short Form  How much difficulty does the patient currently have. .. Unable A Lot A Little None   1.   Turning over in bed (including adjusting bedclothes, sheets and blankets)? [] 1   [] 2   [x] 3   [] 4   2. Sitting down on and standing up from a chair with arms ( e.g., wheelchair, bedside commode, etc.)   [] 1   [] 2   [] 3   [x] 4   3. Moving from lying on back to sitting on the side of the bed? [] 1   [] 2   [x] 3   [] 4          How much help from another person does the patient currently need. .. Total A Lot A Little None   4. Moving to and from a bed to a chair (including a wheelchair)? [] 1   [] 2   [x] 3   [] 4   5. Need to walk in hospital room? [] 1   [] 2   [x] 3   [] 4   6. Climbing 3-5 steps with a railing? [] 1   [] 2   [x] 3   [] 4   © 2007, Trustees of [facility], under license to [employer]. All rights reserved     Score:  Initial:19 Most Recent: X (Date:4/2/22)   Interpretation of Tool:  Represents activities that are increasingly more difficult (i.e. Bed mobility, Transfers, Gait). Score 24 23 22-20 19-15 14-10 9-7 6   Modifier CH CI CJ CK CL CM CN          Physical Therapy Evaluation Charge Determination   History Examination Presentation Decision-Making   MEDIUM  Complexity : 1-2 comorbidities / personal factors will impact the outcome/ POC  MEDIUM Complexity : 3 Standardized tests and measures addressing body structure, function, activity limitation and / or participation in recreation  LOW Complexity : Stable, uncomplicated  Other Functional Measure [facility] 6 low      Based on the above components, the patient evaluation is determined to be of the following complexity level: LOW     Pain Rating:  3/10 in R side    Activity Tolerance:   Good and SpO2 stable on RA  Please refer to the flowsheet for vital signs taken during this treatment. After treatment patient left in no apparent distress:   Sitting in chair and Call bell within reach    COMMUNICATION/EDUCATION:   The patients plan of care was discussed with: Registered nurse. Patient/family agree to work toward stated goals and plan of care.     Thank you for this referral.  Alen Martínez   Time Calculation: 43 mins

## 2022-04-03 LAB
ALBUMIN SERPL-MCNC: 2.1 G/DL (ref 3.5–5)
ALBUMIN/GLOB SERPL: 0.9 {RATIO} (ref 1.1–2.2)
ALP SERPL-CCNC: 44 U/L (ref 45–117)
ALT SERPL-CCNC: 9 U/L (ref 12–78)
ANION GAP SERPL CALC-SCNC: 5 MMOL/L (ref 5–15)
AST SERPL W P-5'-P-CCNC: 21 U/L (ref 15–37)
BACTERIA SPEC CULT: NORMAL
BASOPHILS # BLD: 0.1 K/UL (ref 0–0.1)
BASOPHILS NFR BLD: 1 % (ref 0–1)
BILIRUB SERPL-MCNC: 0.2 MG/DL (ref 0.2–1)
BUN SERPL-MCNC: 24 MG/DL (ref 6–20)
BUN/CREAT SERPL: 33 (ref 12–20)
CA-I BLD-MCNC: 8.1 MG/DL (ref 8.5–10.1)
CHLORIDE SERPL-SCNC: 106 MMOL/L (ref 97–108)
CO2 SERPL-SCNC: 26 MMOL/L (ref 21–32)
CREAT SERPL-MCNC: 0.72 MG/DL (ref 0.55–1.02)
DIFFERENTIAL METHOD BLD: ABNORMAL
EOSINOPHIL # BLD: 0.1 K/UL (ref 0–0.4)
EOSINOPHIL NFR BLD: 1 % (ref 0–7)
ERYTHROCYTE [DISTWIDTH] IN BLOOD BY AUTOMATED COUNT: 13.5 % (ref 11.5–14.5)
GLOBULIN SER CALC-MCNC: 2.4 G/DL (ref 2–4)
GLUCOSE BLD STRIP.AUTO-MCNC: 159 MG/DL (ref 65–117)
GLUCOSE BLD STRIP.AUTO-MCNC: 184 MG/DL (ref 65–117)
GLUCOSE BLD STRIP.AUTO-MCNC: 198 MG/DL (ref 65–117)
GLUCOSE BLD STRIP.AUTO-MCNC: 258 MG/DL (ref 65–117)
GLUCOSE SERPL-MCNC: 234 MG/DL (ref 65–100)
HCT VFR BLD AUTO: 25.4 % (ref 35–47)
HGB BLD-MCNC: 8.3 G/DL (ref 11.5–16)
IMM GRANULOCYTES # BLD AUTO: 0.1 K/UL (ref 0–0.04)
IMM GRANULOCYTES NFR BLD AUTO: 1 % (ref 0–0.5)
IRON SATN MFR SERPL: 11 % (ref 20–50)
IRON SERPL-MCNC: 16 UG/DL (ref 35–150)
LYMPHOCYTES # BLD: 0.9 K/UL (ref 0.8–3.5)
LYMPHOCYTES NFR BLD: 9 % (ref 12–49)
MCH RBC QN AUTO: 31.9 PG (ref 26–34)
MCHC RBC AUTO-ENTMCNC: 32.7 G/DL (ref 30–36.5)
MCV RBC AUTO: 97.7 FL (ref 80–99)
MONOCYTES # BLD: 1.1 K/UL (ref 0–1)
MONOCYTES NFR BLD: 11 % (ref 5–13)
NEUTS SEG # BLD: 8.1 K/UL (ref 1.8–8)
NEUTS SEG NFR BLD: 77 % (ref 32–75)
NRBC # BLD: 0 K/UL (ref 0–0.01)
NRBC BLD-RTO: 0 PER 100 WBC
PERFORMED BY, TECHID: ABNORMAL
PLATELET # BLD AUTO: 361 K/UL (ref 150–400)
PMV BLD AUTO: 11.5 FL (ref 8.9–12.9)
POTASSIUM SERPL-SCNC: 4.3 MMOL/L (ref 3.5–5.1)
PROT SERPL-MCNC: 4.5 G/DL (ref 6.4–8.2)
RBC # BLD AUTO: 2.6 M/UL (ref 3.8–5.2)
SODIUM SERPL-SCNC: 137 MMOL/L (ref 136–145)
SPECIAL REQUESTS,SREQ: NORMAL
TIBC SERPL-MCNC: 150 UG/DL (ref 250–450)
WBC # BLD AUTO: 10.3 K/UL (ref 3.6–11)

## 2022-04-03 PROCEDURE — 74011250636 HC RX REV CODE- 250/636: Performed by: HOSPITALIST

## 2022-04-03 PROCEDURE — 74011250636 HC RX REV CODE- 250/636: Performed by: COLON & RECTAL SURGERY

## 2022-04-03 PROCEDURE — 74011250636 HC RX REV CODE- 250/636: Performed by: INTERNAL MEDICINE

## 2022-04-03 PROCEDURE — 65270000029 HC RM PRIVATE

## 2022-04-03 PROCEDURE — 99024 POSTOP FOLLOW-UP VISIT: CPT | Performed by: COLON & RECTAL SURGERY

## 2022-04-03 PROCEDURE — 36415 COLL VENOUS BLD VENIPUNCTURE: CPT

## 2022-04-03 PROCEDURE — 74011250637 HC RX REV CODE- 250/637: Performed by: STUDENT IN AN ORGANIZED HEALTH CARE EDUCATION/TRAINING PROGRAM

## 2022-04-03 PROCEDURE — 74011636637 HC RX REV CODE- 636/637: Performed by: STUDENT IN AN ORGANIZED HEALTH CARE EDUCATION/TRAINING PROGRAM

## 2022-04-03 PROCEDURE — 82962 GLUCOSE BLOOD TEST: CPT

## 2022-04-03 PROCEDURE — 85025 COMPLETE CBC W/AUTO DIFF WBC: CPT

## 2022-04-03 PROCEDURE — 80053 COMPREHEN METABOLIC PANEL: CPT

## 2022-04-03 PROCEDURE — 74011000250 HC RX REV CODE- 250: Performed by: COLON & RECTAL SURGERY

## 2022-04-03 PROCEDURE — 83540 ASSAY OF IRON: CPT

## 2022-04-03 RX ORDER — INSULIN GLARGINE 100 [IU]/ML
25 INJECTION, SOLUTION SUBCUTANEOUS
Status: DISCONTINUED | OUTPATIENT
Start: 2022-04-03 | End: 2022-04-08 | Stop reason: HOSPADM

## 2022-04-03 RX ADMIN — HYDROMORPHONE HYDROCHLORIDE 1 MG: 1 INJECTION, SOLUTION INTRAMUSCULAR; INTRAVENOUS; SUBCUTANEOUS at 07:06

## 2022-04-03 RX ADMIN — HYDRALAZINE HYDROCHLORIDE 10 MG: 20 INJECTION, SOLUTION INTRAMUSCULAR; INTRAVENOUS at 11:36

## 2022-04-03 RX ADMIN — HYDROMORPHONE HYDROCHLORIDE 0.5 MG: 1 INJECTION, SOLUTION INTRAMUSCULAR; INTRAVENOUS; SUBCUTANEOUS at 13:37

## 2022-04-03 RX ADMIN — METRONIDAZOLE 500 MG: 500 INJECTION, SOLUTION INTRAVENOUS at 02:00

## 2022-04-03 RX ADMIN — METRONIDAZOLE 500 MG: 500 INJECTION, SOLUTION INTRAVENOUS at 11:36

## 2022-04-03 RX ADMIN — METOPROLOL SUCCINATE 25 MG: 25 TABLET, EXTENDED RELEASE ORAL at 08:03

## 2022-04-03 RX ADMIN — INSULIN LISPRO 3 UNITS: 100 INJECTION, SOLUTION INTRAVENOUS; SUBCUTANEOUS at 08:03

## 2022-04-03 RX ADMIN — HYDROMORPHONE HYDROCHLORIDE 0.5 MG: 1 INJECTION, SOLUTION INTRAMUSCULAR; INTRAVENOUS; SUBCUTANEOUS at 17:18

## 2022-04-03 RX ADMIN — FERROUS SULFATE TAB 325 MG (65 MG ELEMENTAL FE) 325 MG: 325 (65 FE) TAB at 17:00

## 2022-04-03 RX ADMIN — FERROUS SULFATE TAB 325 MG (65 MG ELEMENTAL FE) 325 MG: 325 (65 FE) TAB at 08:03

## 2022-04-03 RX ADMIN — HYDROMORPHONE HYDROCHLORIDE 0.5 MG: 1 INJECTION, SOLUTION INTRAMUSCULAR; INTRAVENOUS; SUBCUTANEOUS at 21:45

## 2022-04-03 RX ADMIN — ENOXAPARIN SODIUM 40 MG: 40 INJECTION SUBCUTANEOUS at 11:35

## 2022-04-03 RX ADMIN — OXYCODONE 5 MG: 5 TABLET ORAL at 11:34

## 2022-04-03 RX ADMIN — POTASSIUM CHLORIDE, DEXTROSE MONOHYDRATE AND SODIUM CHLORIDE 50 ML/HR: 150; 5; 450 INJECTION, SOLUTION INTRAVENOUS at 21:45

## 2022-04-03 RX ADMIN — INSULIN GLARGINE 25 UNITS: 100 INJECTION, SOLUTION SUBCUTANEOUS at 21:45

## 2022-04-03 RX ADMIN — LEVOTHYROXINE SODIUM 150 MCG: 0.07 TABLET ORAL at 08:03

## 2022-04-03 RX ADMIN — CEFAZOLIN SODIUM 2 G: 1 INJECTION, POWDER, FOR SOLUTION INTRAMUSCULAR; INTRAVENOUS at 02:00

## 2022-04-03 RX ADMIN — ATORVASTATIN CALCIUM 10 MG: 10 TABLET, FILM COATED ORAL at 21:45

## 2022-04-03 RX ADMIN — DULOXETINE HYDROCHLORIDE 30 MG: 30 CAPSULE, DELAYED RELEASE ORAL at 08:03

## 2022-04-03 RX ADMIN — CEFAZOLIN SODIUM 2 G: 1 INJECTION, POWDER, FOR SOLUTION INTRAMUSCULAR; INTRAVENOUS at 11:36

## 2022-04-03 NOTE — PROGRESS NOTES
Pulmonary Progress Note    Subjective:   Daily Progress Note: 4/3/2022 10:10 AM    Nikky Leiva is a [de-identified]year old female PMH of COPD, diabetes, HTN, HLD, Pacemaker, AICD, HX of cardiac stents to have possible surgical resection today for partial bowel obstruction. Patient is alert and oriented and doing well. No respiratory distress. Review of Systems     Constitutional: Negative. HENT: Negative. Eyes: Negative. Respiratory: Negative for shortness of breath. Cardiovascular: Negative. Gastrointestinal: Positive for abdominal pain. Status post surgery bandage in place  Genitourinary: Negative. Musculoskeletal: Negative. Skin: Negative. Neurological: Negative. Psychiatric/Behavioral: Negative. Objective:     Visit Vitals  BP (!) 167/52 (BP 1 Location: Left upper arm, BP Patient Position: At rest;Supine)   Pulse 74   Temp 98.9 °F (37.2 °C)   Resp 22   Ht 5' 1\" (1.549 m)   Wt 74.8 kg (164 lb 14.5 oz)   SpO2 97%   BMI 31.16 kg/m²      O2 Device: None (Room air)    Temp (24hrs), Av.4 °F (36.9 °C), Min:98.1 °F (36.7 °C), Max:98.9 °F (37.2 °C)      No intake/output data recorded.  1901 -  0700  In: 898.3 [I.V.:898.3]  Out: 650 [Urine:650]    Physical Exam  Constitutional:       Appearance: Normal appearance. HENT:      Head: Normocephalic and atraumatic. Nose: Nose normal.      Mouth/Throat:      Mouth: Mucous membranes are moist.   Eyes:      Pupils: Pupils are equal, round, and reactive to light. Cardiovascular:      Rate and Rhythm: Normal rate. Regular rhythm     Pulses: Normal pulses. Heart sounds: Normal heart sounds. Pulmonary:      Effort: Pulmonary effort is normal.      Breath sounds: Normal breath sounds. Abdominal:      General: Bowel sounds are present not distended     Tenderness: There is mild abdominal tenderness. Musculoskeletal:         General: Normal range of motion. Cervical back: Normal range of motion and neck supple. Skin:     General: Skin is warm. Neurological:      General: No focal deficit present. Mental Status: She is alert. Psychiatric:         Mood and Affect: Mood normal.         Data Review    Recent Results (from the past 24 hour(s))   GLUCOSE, POC    Collection Time: 04/02/22  4:11 PM   Result Value Ref Range    Glucose (POC) 191 (H) 65 - 117 mg/dL    Performed by Mackenzie BRIONES    GLUCOSE, POC    Collection Time: 04/02/22  9:00 PM   Result Value Ref Range    Glucose (POC) 230 (H) 65 - 117 mg/dL    Performed by Dalila Bun    METABOLIC PANEL, COMPREHENSIVE    Collection Time: 04/03/22  6:34 AM   Result Value Ref Range    Sodium 137 136 - 145 mmol/L    Potassium 4.3 3.5 - 5.1 mmol/L    Chloride 106 97 - 108 mmol/L    CO2 26 21 - 32 mmol/L    Anion gap 5 5 - 15 mmol/L    Glucose 234 (H) 65 - 100 mg/dL    BUN 24 (H) 6 - 20 mg/dL    Creatinine 0.72 0.55 - 1.02 mg/dL    BUN/Creatinine ratio 33 (H) 12 - 20      GFR est AA >60 >60 ml/min/1.73m2    GFR est non-AA >60 >60 ml/min/1.73m2    Calcium 8.1 (L) 8.5 - 10.1 mg/dL    Bilirubin, total 0.2 0.2 - 1.0 mg/dL    AST (SGOT) 21 15 - 37 U/L    ALT (SGPT) 9 (L) 12 - 78 U/L    Alk. phosphatase 44 (L) 45 - 117 U/L    Protein, total 4.5 (L) 6.4 - 8.2 g/dL    Albumin 2.1 (L) 3.5 - 5.0 g/dL    Globulin 2.4 2.0 - 4.0 g/dL    A-G Ratio 0.9 (L) 1.1 - 2.2     CBC WITH AUTOMATED DIFF    Collection Time: 04/03/22  6:34 AM   Result Value Ref Range    WBC 10.3 3.6 - 11.0 K/uL    RBC 2.60 (L) 3.80 - 5.20 M/uL    HGB 8.3 (L) 11.5 - 16.0 g/dL    HCT 25.4 (L) 35.0 - 47.0 %    MCV 97.7 80.0 - 99.0 FL    MCH 31.9 26.0 - 34.0 PG    MCHC 32.7 30.0 - 36.5 g/dL    RDW 13.5 11.5 - 14.5 %    PLATELET 718 224 - 447 K/uL    MPV 11.5 8.9 - 12.9 FL    NRBC 0.0 0.0  WBC    ABSOLUTE NRBC 0.00 0.00 - 0.01 K/uL    NEUTROPHILS 77 (H) 32 - 75 %    LYMPHOCYTES 9 (L) 12 - 49 %    MONOCYTES 11 5 - 13 %    EOSINOPHILS 1 0 - 7 %    BASOPHILS 1 0 - 1 %    IMMATURE GRANULOCYTES 1 (H) 0 - 0.5 %    ABS. NEUTROPHILS 8.1 (H) 1.8 - 8.0 K/UL    ABS. LYMPHOCYTES 0.9 0.8 - 3.5 K/UL    ABS. MONOCYTES 1.1 (H) 0.0 - 1.0 K/UL    ABS. EOSINOPHILS 0.1 0.0 - 0.4 K/UL    ABS. BASOPHILS 0.1 0.0 - 0.1 K/UL    ABS. IMM.  GRANS. 0.1 (H) 0.00 - 0.04 K/UL    DF AUTOMATED     GLUCOSE, POC    Collection Time: 04/03/22  7:01 AM   Result Value Ref Range    Glucose (POC) 258 (H) 65 - 117 mg/dL    Performed by KELSIE BRIONES    GLUCOSE, POC    Collection Time: 04/03/22 11:02 AM   Result Value Ref Range    Glucose (POC) 184 (H) 65 - 117 mg/dL    Performed by Dasha Hastings        Current Facility-Administered Medications   Medication Dose Route Frequency    insulin glargine (LANTUS) injection 25 Units  25 Units SubCUTAneous QHS    hydrALAZINE (APRESOLINE) 20 mg/mL injection 10 mg  10 mg IntraVENous Q4H PRN    enoxaparin (LOVENOX) injection 40 mg  40 mg SubCUTAneous Q24H    dextrose 5% - 0.45% NaCl with KCl 20 mEq/L infusion  100 mL/hr IntraVENous CONTINUOUS    ketorolac (TORADOL) injection 15 mg  15 mg IntraVENous Q6H PRN    traMADoL (ULTRAM) tablet 50 mg  50 mg Oral Q6H PRN    HYDROmorphone (DILAUDID) injection 1 mg  1 mg IntraVENous Q4H PRN    HYDROmorphone (DILAUDID) syringe 0.5 mg  0.5 mg IntraVENous Q4H PRN    glucose chewable tablet 16 g  4 Tablet Oral PRN    dextrose 10% infusion 0-250 mL  0-250 mL IntraVENous PRN    glucagon (GLUCAGEN) injection 1 mg  1 mg IntraMUSCular PRN    acetaminophen (TYLENOL) tablet 650 mg  650 mg Oral Q6H PRN    Or    acetaminophen (TYLENOL) suppository 650 mg  650 mg Rectal Q6H PRN    ondansetron (ZOFRAN ODT) tablet 4 mg  4 mg Oral Q8H PRN    Or    ondansetron (ZOFRAN) injection 4 mg  4 mg IntraVENous Q6H PRN    insulin lispro (HUMALOG) injection   SubCUTAneous AC&HS    [Held by provider] aspirin delayed-release tablet 81 mg  81 mg Oral DAILY    [Held by provider] clopidogreL (PLAVIX) tablet 75 mg  75 mg Oral DAILY    DULoxetine (CYMBALTA) capsule 30 mg  30 mg Oral DAILY    ferrous sulfate tablet 325 mg  325 mg Oral BID WITH MEALS    levothyroxine (SYNTHROID) tablet 150 mcg  150 mcg Oral ACB    metoprolol succinate (TOPROL-XL) XL tablet 25 mg  25 mg Oral DAILY    atorvastatin (LIPITOR) tablet 10 mg  10 mg Oral QHS    albuterol (PROVENTIL HFA, VENTOLIN HFA, PROAIR HFA) inhaler 2 Puff  2 Puff Inhalation Q6H PRN    oxyCODONE IR (ROXICODONE) tablet 5 mg  5 mg Oral Q4H PRN         Assessment/Plan:        1. Chronic Obstructive Pulmonary Disease without excebation  2. Partial large Bowel Obstruction  3. Status post right hemicolectomy  4. Diabetes Mellitus type 2  5. Essential Hypertension  6. Thyroid Disease   7. Urinary Tract infection       RECOMMENDATIONS/PLAN:      3 73-year-old lady no history of smoking not on any inhalers or oxygen at home, she had a history of secondhand smoke as her  used to smoke and all her children used to smoke she used to work in the form as a farmer significant history of congestive heart failure ejection fraction about 35 to 40%. She had aortic valve replaced previously also she has stent placement in the past chest x-ray no acute infiltrate or lung mass. Last showed Echocardiogram showed EF of 55 to 60% with grade 1 diastolic dysfunction. 2. Creatinine normal decrease IV fluids  3. Patient underwent right hemicolectomy doing fairly well no flatus yet  4. monitor respiratory status and O2 PRN  5.  Follow up with Pulmonary outpatient as needed for COPD management

## 2022-04-03 NOTE — PROGRESS NOTES
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 She is doing well post-operatively, pain is well controlled. No overnight events.     Visit Vitals  BP (!) 145/55 (BP 1 Location: Left upper arm, BP Patient Position: At rest;Supine)   Pulse 70   Temp 98.4 °F (36.9 °C)   Resp 20   Ht 5' 1\" (1.549 m)   Wt 74.8 kg (164 lb 14.5 oz)   SpO2 99%   BMI 31.16 kg/m²     Current Facility-Administered Medications   Medication Dose Route Frequency    insulin glargine (LANTUS) injection 25 Units  25 Units SubCUTAneous QHS    hydrALAZINE (APRESOLINE) 20 mg/mL injection 10 mg  10 mg IntraVENous Q4H PRN    enoxaparin (LOVENOX) injection 40 mg  40 mg SubCUTAneous Q24H    dextrose 5% - 0.45% NaCl with KCl 20 mEq/L infusion  50 mL/hr IntraVENous CONTINUOUS    ketorolac (TORADOL) injection 15 mg  15 mg IntraVENous Q6H PRN    traMADoL (ULTRAM) tablet 50 mg  50 mg Oral Q6H PRN    HYDROmorphone (DILAUDID) injection 1 mg  1 mg IntraVENous Q4H PRN    HYDROmorphone (DILAUDID) syringe 0.5 mg  0.5 mg IntraVENous Q4H PRN    glucose chewable tablet 16 g  4 Tablet Oral PRN    dextrose 10% infusion 0-250 mL  0-250 mL IntraVENous PRN    glucagon (GLUCAGEN) injection 1 mg  1 mg IntraMUSCular PRN    acetaminophen (TYLENOL) tablet 650 mg  650 mg Oral Q6H PRN    Or    acetaminophen (TYLENOL) suppository 650 mg  650 mg Rectal Q6H PRN    ondansetron (ZOFRAN ODT) tablet 4 mg  4 mg Oral Q8H PRN    Or    ondansetron (ZOFRAN) injection 4 mg  4 mg IntraVENous Q6H PRN    insulin lispro (HUMALOG) injection   SubCUTAneous AC&HS    [Held by provider] aspirin delayed-release tablet 81 mg  81 mg Oral DAILY    [Held by provider] clopidogreL (PLAVIX) tablet 75 mg  75 mg Oral DAILY    DULoxetine (CYMBALTA) capsule 30 mg  30 mg Oral DAILY    ferrous sulfate tablet 325 mg  325 mg Oral BID WITH MEALS    levothyroxine (SYNTHROID) tablet 150 mcg  150 mcg Oral ACB    metoprolol succinate (TOPROL-XL) XL tablet 25 mg  25 mg Oral DAILY    atorvastatin (LIPITOR) tablet 10 mg  10 mg Oral QHS    albuterol (PROVENTIL HFA, VENTOLIN HFA, PROAIR HFA) inhaler 2 Puff  2 Puff Inhalation Q6H PRN    oxyCODONE IR (ROXICODONE) tablet 5 mg  5 mg Oral Q4H PRN         Objective:      Physical Exam:  Visit Vitals  BP (!) 145/55 (BP 1 Location: Left upper arm, BP Patient Position: At rest;Supine)   Pulse 70   Temp 98.4 °F (36.9 °C)   Resp 20   Ht 5' 1\" (1.549 m)   Wt 74.8 kg (164 lb 14.5 oz)   SpO2 99%   BMI 31.16 kg/m²     General Appearance:  Well developed, well nourished,alert and oriented x 3, and individual in no acute distress. Ears/Nose/Mouth/Throat:   Hearing grossly normal.         Neck: Supple. Chest:   Lungs clear to auscultation bilaterally. Cardiovascular:  Regular rate and rhythm, S1, S2 normal, no murmur. Abdomen:   Soft   Extremities: No edema bilaterally. Skin: Warm and dry. Data Review:   Labs:    Recent Results (from the past 24 hour(s))   GLUCOSE, POC    Collection Time: 04/02/22  9:00 PM   Result Value Ref Range    Glucose (POC) 230 (H) 65 - 117 mg/dL    Performed by [facility]    METABOLIC PANEL, COMPREHENSIVE    Collection Time: 04/03/22  6:34 AM   Result Value Ref Range    Sodium 137 136 - 145 mmol/L    Potassium 4.3 3.5 - 5.1 mmol/L    Chloride 106 97 - 108 mmol/L    CO2 26 21 - 32 mmol/L    Anion gap 5 5 - 15 mmol/L    Glucose 234 (H) 65 - 100 mg/dL    BUN 24 (H) 6 - 20 mg/dL    Creatinine 0.72 0.55 - 1.02 mg/dL    BUN/Creatinine ratio 33 (H) 12 - 20      GFR est AA >60 >60 ml/min/1.73m2    GFR est non-AA >60 >60 ml/min/1.73m2    Calcium 8.1 (L) 8.5 - 10.1 mg/dL    Bilirubin, total 0.2 0.2 - 1.0 mg/dL    AST (SGOT) 21 15 - 37 U/L    ALT (SGPT) 9 (L) 12 - 78 U/L    Alk.  phosphatase 44 (L) 45 - 117 U/L    Protein, total 4.5 (L) 6.4 - 8.2 g/dL    Albumin 2.1 (L) 3.5 - 5.0 g/dL    Globulin 2.4 2.0 - 4.0 g/dL    A-G Ratio 0.9 (L) 1.1 - 2.2     CBC WITH AUTOMATED DIFF    Collection Time: 04/03/22  6:34 AM   Result Value Ref Range    WBC 10.3 3.6 - 11.0 K/uL    RBC 2.60 (L) 3.80 - 5.20 M/uL    HGB 8.3 (L) 11.5 - 16.0 g/dL    HCT 25.4 (L) 35.0 - 47.0 %    MCV 97.7 80.0 - 99.0 FL    MCH 31.9 26.0 - 34.0 PG    MCHC 32.7 30.0 - 36.5 g/dL    RDW 13.5 11.5 - 14.5 %    PLATELET 512 331 - 629 K/uL    MPV 11.5 8.9 - 12.9 FL    NRBC 0.0 0.0  WBC    ABSOLUTE NRBC 0.00 0.00 - 0.01 K/uL    NEUTROPHILS 77 (H) 32 - 75 %    LYMPHOCYTES 9 (L) 12 - 49 %    MONOCYTES 11 5 - 13 %    EOSINOPHILS 1 0 - 7 %    BASOPHILS 1 0 - 1 %    IMMATURE GRANULOCYTES 1 (H) 0 - 0.5 %    ABS. NEUTROPHILS 8.1 (H) 1.8 - 8.0 K/UL    ABS. LYMPHOCYTES 0.9 0.8 - 3.5 K/UL    ABS. MONOCYTES 1.1 (H) 0.0 - 1.0 K/UL    ABS. EOSINOPHILS 0.1 0.0 - 0.4 K/UL    ABS. BASOPHILS 0.1 0.0 - 0.1 K/UL    ABS. IMM. GRANS. 0.1 (H) 0.00 - 0.04 K/UL    DF AUTOMATED     GLUCOSE, POC    Collection Time: 04/03/22  7:01 AM   Result Value Ref Range    Glucose (POC) 258 (H) 65 - 117 mg/dL    Performed by KELSIE BRIONES    IRON PROFILE    Collection Time: 04/03/22  8:45 AM   Result Value Ref Range    Iron 16 (L) 35 - 150 ug/dL    TIBC 150 (L) 250 - 450 ug/dL    Iron % saturation 11 (L) 20 - 50 %   GLUCOSE, POC    Collection Time: 04/03/22 11:02 AM   Result Value Ref Range    Glucose (POC) 184 (H) 65 - 117 mg/dL    Performed by Rajan Oneill          Assessment:   S/p hemicolectomy  Chronic HFrEF  CAD s/p PCI  S/p TAVR  Diabetes Mellitus  Hypertension  Hyperlipidemia  SSS s/p PPM  PVD    Plan:   -Patient is doing well post operatively.  Will closely monitor her for hypervolemia  -Recent echo showed preserved LV systolic function, normal functioning TAVR bioprosthesis  -Postoperative care as per surgery  -We will follow

## 2022-04-03 NOTE — PROGRESS NOTES
Hospitalist Progress Note    Subjective:   Daily Progress Note: 4/3/2022 7:38 AM    Patient is resting comfortably. Patient has not passed gas yet.     Current Facility-Administered Medications   Medication Dose Route Frequency    hydrALAZINE (APRESOLINE) 20 mg/mL injection 10 mg  10 mg IntraVENous Q4H PRN    ceFAZolin (ANCEF) 2 g in sterile water (preservative free) 20 mL IV syringe  2 g IntraVENous Q8H    metroNIDAZOLE (FLAGYL) IVPB premix 500 mg  500 mg IntraVENous Q8H    enoxaparin (LOVENOX) injection 40 mg  40 mg SubCUTAneous Q24H    dextrose 5% - 0.45% NaCl with KCl 20 mEq/L infusion  100 mL/hr IntraVENous CONTINUOUS    ketorolac (TORADOL) injection 15 mg  15 mg IntraVENous Q6H PRN    traMADoL (ULTRAM) tablet 50 mg  50 mg Oral Q6H PRN    HYDROmorphone (DILAUDID) injection 1 mg  1 mg IntraVENous Q4H PRN    HYDROmorphone (DILAUDID) syringe 0.5 mg  0.5 mg IntraVENous Q4H PRN    glucose chewable tablet 16 g  4 Tablet Oral PRN    dextrose 10% infusion 0-250 mL  0-250 mL IntraVENous PRN    glucagon (GLUCAGEN) injection 1 mg  1 mg IntraMUSCular PRN    acetaminophen (TYLENOL) tablet 650 mg  650 mg Oral Q6H PRN    Or    acetaminophen (TYLENOL) suppository 650 mg  650 mg Rectal Q6H PRN    ondansetron (ZOFRAN ODT) tablet 4 mg  4 mg Oral Q8H PRN    Or    ondansetron (ZOFRAN) injection 4 mg  4 mg IntraVENous Q6H PRN    insulin lispro (HUMALOG) injection   SubCUTAneous AC&HS    [Held by provider] aspirin delayed-release tablet 81 mg  81 mg Oral DAILY    [Held by provider] clopidogreL (PLAVIX) tablet 75 mg  75 mg Oral DAILY    DULoxetine (CYMBALTA) capsule 30 mg  30 mg Oral DAILY    ferrous sulfate tablet 325 mg  325 mg Oral BID WITH MEALS    levothyroxine (SYNTHROID) tablet 150 mcg  150 mcg Oral ACB    metoprolol succinate (TOPROL-XL) XL tablet 25 mg  25 mg Oral DAILY    atorvastatin (LIPITOR) tablet 10 mg  10 mg Oral QHS    albuterol (PROVENTIL HFA, VENTOLIN HFA, PROAIR HFA) inhaler 2 Puff  2 Puff Inhalation Q6H PRN    oxyCODONE IR (ROXICODONE) tablet 5 mg  5 mg Oral Q4H PRN    insulin glargine (LANTUS) injection 16 Units  16 Units SubCUTAneous QHS        Review of Systems  Review of Systems   Constitutional: Negative. Respiratory: Negative. Cardiovascular: Negative. Gastrointestinal: Negative. Neurological:        + gait instability   All other systems reviewed and are negative. Objective:     Visit Vitals  BP (!) 170/49 (BP 1 Location: Left upper arm, BP Patient Position: At rest)   Pulse 79   Temp 98.4 °F (36.9 °C)   Resp 20   Ht 5' 1\" (1.549 m)   Wt 74.8 kg (164 lb 14.5 oz)   SpO2 99%   BMI 31.16 kg/m²      O2 Device: None (Room air)    Temp (24hrs), Av.4 °F (36.9 °C), Min:98.1 °F (36.7 °C), Max:98.7 °F (37.1 °C)      No intake/output data recorded.    1901 -  0700  In: 898.3 [I.V.:898.3]  Out: 650 [Urine:650]    Recent Results (from the past 24 hour(s))   GLUCOSE, POC    Collection Time: 22 11:19 AM   Result Value Ref Range    Glucose (POC) 155 (H) 65 - 117 mg/dL    Performed by Johanny BRIONES    GLUCOSE, POC    Collection Time: 22  4:11 PM   Result Value Ref Range    Glucose (POC) 191 (H) 65 - 117 mg/dL    Performed by Johanny BRIONES    GLUCOSE, POC    Collection Time: 22  9:00 PM   Result Value Ref Range    Glucose (POC) 230 (H) 65 - 117 mg/dL    Performed by [facility]    METABOLIC PANEL, COMPREHENSIVE    Collection Time: 22  6:34 AM   Result Value Ref Range    Sodium 137 136 - 145 mmol/L    Potassium 4.3 3.5 - 5.1 mmol/L    Chloride 106 97 - 108 mmol/L    CO2 26 21 - 32 mmol/L    Anion gap 5 5 - 15 mmol/L    Glucose 234 (H) 65 - 100 mg/dL    BUN 24 (H) 6 - 20 mg/dL    Creatinine 0.72 0.55 - 1.02 mg/dL    BUN/Creatinine ratio 33 (H) 12 - 20      GFR est AA >60 >60 ml/min/1.73m2    GFR est non-AA >60 >60 ml/min/1.73m2    Calcium 8.1 (L) 8.5 - 10.1 mg/dL    Bilirubin, total 0.2 0.2 - 1.0 mg/dL    AST (SGOT) 21 15 - 37 U/L    ALT (SGPT) 9 (L) 12 - 78 U/L    Alk. phosphatase 44 (L) 45 - 117 U/L    Protein, total 4.5 (L) 6.4 - 8.2 g/dL    Albumin 2.1 (L) 3.5 - 5.0 g/dL    Globulin 2.4 2.0 - 4.0 g/dL    A-G Ratio 0.9 (L) 1.1 - 2.2     CBC WITH AUTOMATED DIFF    Collection Time: 04/03/22  6:34 AM   Result Value Ref Range    WBC 10.3 3.6 - 11.0 K/uL    RBC 2.60 (L) 3.80 - 5.20 M/uL    HGB 8.3 (L) 11.5 - 16.0 g/dL    HCT 25.4 (L) 35.0 - 47.0 %    MCV 97.7 80.0 - 99.0 FL    MCH 31.9 26.0 - 34.0 PG    MCHC 32.7 30.0 - 36.5 g/dL    RDW 13.5 11.5 - 14.5 %    PLATELET 610 931 - 827 K/uL    MPV 11.5 8.9 - 12.9 FL    NRBC 0.0 0.0  WBC    ABSOLUTE NRBC 0.00 0.00 - 0.01 K/uL    NEUTROPHILS 77 (H) 32 - 75 %    LYMPHOCYTES 9 (L) 12 - 49 %    MONOCYTES 11 5 - 13 %    EOSINOPHILS 1 0 - 7 %    BASOPHILS 1 0 - 1 %    IMMATURE GRANULOCYTES 1 (H) 0 - 0.5 %    ABS. NEUTROPHILS 8.1 (H) 1.8 - 8.0 K/UL    ABS. LYMPHOCYTES 0.9 0.8 - 3.5 K/UL    ABS. MONOCYTES 1.1 (H) 0.0 - 1.0 K/UL    ABS. EOSINOPHILS 0.1 0.0 - 0.4 K/UL    ABS. BASOPHILS 0.1 0.0 - 0.1 K/UL    ABS. IMM. GRANS. 0.1 (H) 0.00 - 0.04 K/UL    DF AUTOMATED     GLUCOSE, POC    Collection Time: 04/03/22  7:01 AM   Result Value Ref Range    Glucose (POC) 258 (H) 65 - 117 mg/dL    Performed by TABAMO CHELLEN         CT ABD PELV WO CONT   Final Result   1. Abnormal masslike thickening within the ascending colon highly concerning for   malignancy with suspected lymphovascular invasion. Tumor results in low-grade   obstruction. 2. Trace ascites within the right abdomen. 3. Subtle morphologic changes suggestive of hepatic fibrosis/early cirrhosis. 4. Cholelithiasis. Notification: Findings were discussed with Dr. Lakisha Rodriguez at 11:30 AM on 3/29/2022      XR CHEST PORT   Final Result           PHYSICAL EXAM:    Physical Exam  Vitals and nursing note reviewed. Constitutional:       Appearance: She is obese. HENT:      Head: Normocephalic and atraumatic.       Mouth/Throat:      Mouth: Mucous membranes are moist. Comments: Poor dentition  Cardiovascular:      Rate and Rhythm: Normal rate and regular rhythm. Pulmonary:      Effort: No respiratory distress. Breath sounds: No wheezing. Abdominal:      General: Bowel sounds are normal. There is no distension. Palpations: Abdomen is soft. There is no mass. Tenderness: There is no abdominal tenderness. Comments: Lateral incision clean dry and intact   Musculoskeletal:      Right lower leg: No edema. Left lower leg: No edema. Skin:     General: Skin is warm. Neurological:      Mental Status: She is alert and oriented to person, place, and time. Psychiatric:         Mood and Affect: Mood normal.          Data Review    Recent Results (from the past 24 hour(s))   GLUCOSE, POC    Collection Time: 04/02/22 11:19 AM   Result Value Ref Range    Glucose (POC) 155 (H) 65 - 117 mg/dL    Performed by Ben BRIONES    GLUCOSE, POC    Collection Time: 04/02/22  4:11 PM   Result Value Ref Range    Glucose (POC) 191 (H) 65 - 117 mg/dL    Performed by Ben BRIONES    GLUCOSE, POC    Collection Time: 04/02/22  9:00 PM   Result Value Ref Range    Glucose (POC) 230 (H) 65 - 117 mg/dL    Performed by [facility]    METABOLIC PANEL, COMPREHENSIVE    Collection Time: 04/03/22  6:34 AM   Result Value Ref Range    Sodium 137 136 - 145 mmol/L    Potassium 4.3 3.5 - 5.1 mmol/L    Chloride 106 97 - 108 mmol/L    CO2 26 21 - 32 mmol/L    Anion gap 5 5 - 15 mmol/L    Glucose 234 (H) 65 - 100 mg/dL    BUN 24 (H) 6 - 20 mg/dL    Creatinine 0.72 0.55 - 1.02 mg/dL    BUN/Creatinine ratio 33 (H) 12 - 20      GFR est AA >60 >60 ml/min/1.73m2    GFR est non-AA >60 >60 ml/min/1.73m2    Calcium 8.1 (L) 8.5 - 10.1 mg/dL    Bilirubin, total 0.2 0.2 - 1.0 mg/dL    AST (SGOT) 21 15 - 37 U/L    ALT (SGPT) 9 (L) 12 - 78 U/L    Alk.  phosphatase 44 (L) 45 - 117 U/L    Protein, total 4.5 (L) 6.4 - 8.2 g/dL    Albumin 2.1 (L) 3.5 - 5.0 g/dL    Globulin 2.4 2.0 - 4.0 g/dL    A-G Ratio 0.9 (L) 1.1 - 2.2     CBC WITH AUTOMATED DIFF    Collection Time: 04/03/22  6:34 AM   Result Value Ref Range    WBC 10.3 3.6 - 11.0 K/uL    RBC 2.60 (L) 3.80 - 5.20 M/uL    HGB 8.3 (L) 11.5 - 16.0 g/dL    HCT 25.4 (L) 35.0 - 47.0 %    MCV 97.7 80.0 - 99.0 FL    MCH 31.9 26.0 - 34.0 PG    MCHC 32.7 30.0 - 36.5 g/dL    RDW 13.5 11.5 - 14.5 %    PLATELET 365 390 - 208 K/uL    MPV 11.5 8.9 - 12.9 FL    NRBC 0.0 0.0  WBC    ABSOLUTE NRBC 0.00 0.00 - 0.01 K/uL    NEUTROPHILS 77 (H) 32 - 75 %    LYMPHOCYTES 9 (L) 12 - 49 %    MONOCYTES 11 5 - 13 %    EOSINOPHILS 1 0 - 7 %    BASOPHILS 1 0 - 1 %    IMMATURE GRANULOCYTES 1 (H) 0 - 0.5 %    ABS. NEUTROPHILS 8.1 (H) 1.8 - 8.0 K/UL    ABS. LYMPHOCYTES 0.9 0.8 - 3.5 K/UL    ABS. MONOCYTES 1.1 (H) 0.0 - 1.0 K/UL    ABS. EOSINOPHILS 0.1 0.0 - 0.4 K/UL    ABS. BASOPHILS 0.1 0.0 - 0.1 K/UL    ABS. IMM. GRANS. 0.1 (H) 0.00 - 0.04 K/UL    DF AUTOMATED     GLUCOSE, POC    Collection Time: 04/03/22  7:01 AM   Result Value Ref Range    Glucose (POC) 258 (H) 65 - 117 mg/dL    Performed by Shamar Kan         Assessment/Plan:     Active Problems:    Colonic mass (3/29/2022)      SBO (small bowel obstruction) ([address] (3/29/2022)      HTN (hypertension), malignant (3/29/2022)        Hospital Course:    Esha Lee is a [de-identified]year old female with a PMH of diabetes, hypertension, COPD,  AICD, CAD with stents, who presented with right-sided lower abdominal pain. CT abdomen showed masslike thickening within the ascending colon, trace ascites within the right abdomen, cholelithiasis, and subtle morphologic changes of the liver. Initial labs significant for WBC of 12.1. UA + nitrites with 5 -10 WBC. Urine culture negative. Chest x-ray showed no acute cardiopulmonary abnormality. Patient to be admitted for further management. General surgery, cardiology, and pulmonology consulted. Patient started on ceftriaxone and placed on clear liquid diet. Exploratory laparotomy with right colectomy on 4/1. Surgery found near obstructing tumor at proximal ascending colon with distended cecum with areas of gangrene in the wall. Ascitic fluid pending. Partial large bowel obstruction due to cecal mass  S/p exploratory laparotomy with right colectomy, POD#2  Intraoperative fluid pending  Pain control  N.P.O. - advance diet as per general surgery  Surgery following    Abnormal UA  UA + nitrites with 5 -10 WBC  Continue on ceftriaxone #5/5 days  3/29 urine: negative    Hypertension  Continue on metoprolol     Coronary artery disease with history of PCI to LAD   History of aortic valve replacement for severe aortic stenosis  AICD/pacemaker in situ  ECHO LVEF 55 - 60% with abnormal diastolic dysfunction with mild MR and MS  Hold ASA/plavix  Continue on lovastatin  Cardiology following     Diabetes mellitus, type 2  A1c 7.8  Increase lantus from 16 to 25 units for better glucose control  SSI as needed     Hypothyroidism   Continue on levothyroxine    COPD without exacerbation  Continue on albuterol as needed  Pulmonology following    Gait instability  PT/OT recommending home with home health    DVT Prophylaxis: lovenox  GI Prophylaxis: none  Discharge and disposition barriers: tolerating diet, >48 hours  Home with home health    Care Plan discussed with: Patient/Family, Nurse and     Total time spent with patient: 35 minutes.

## 2022-04-03 NOTE — PROGRESS NOTES
Cardiology Progress Note      4/2/2022 10:51 PM    Admit Date: 3/29/2022    Admit Diagnosis: Colonic mass [K63.89]  SBO (small bowel obstruction) ([facility] [address] [K56.609]  HTN (hypertension), malignant [I10]      Subjective:     Patient seen and examined. She is doing well post-operatively, pain is well controlled. No overnight events.     Visit Vitals  BP (!) 141/57   Pulse 83   Temp 98.3 °F (36.8 °C)   Resp 20   Ht 5' 1\" (1.549 m)   Wt 74.8 kg (164 lb 14.5 oz)   SpO2 98%   BMI 31.16 kg/m²     Current Facility-Administered Medications   Medication Dose Route Frequency    hydrALAZINE (APRESOLINE) 20 mg/mL injection 10 mg  10 mg IntraVENous Q4H PRN    ceFAZolin (ANCEF) 2 g in sterile water (preservative free) 20 mL IV syringe  2 g IntraVENous Q8H    metroNIDAZOLE (FLAGYL) IVPB premix 500 mg  500 mg IntraVENous Q8H    enoxaparin (LOVENOX) injection 40 mg  40 mg SubCUTAneous Q24H    dextrose 5% - 0.45% NaCl with KCl 20 mEq/L infusion  100 mL/hr IntraVENous CONTINUOUS    ketorolac (TORADOL) injection 15 mg  15 mg IntraVENous Q6H PRN    traMADoL (ULTRAM) tablet 50 mg  50 mg Oral Q6H PRN    HYDROmorphone (DILAUDID) injection 1 mg  1 mg IntraVENous Q4H PRN    HYDROmorphone (DILAUDID) syringe 0.5 mg  0.5 mg IntraVENous Q4H PRN    glucose chewable tablet 16 g  4 Tablet Oral PRN    dextrose 10% infusion 0-250 mL  0-250 mL IntraVENous PRN    glucagon (GLUCAGEN) injection 1 mg  1 mg IntraMUSCular PRN    acetaminophen (TYLENOL) tablet 650 mg  650 mg Oral Q6H PRN    Or    acetaminophen (TYLENOL) suppository 650 mg  650 mg Rectal Q6H PRN    ondansetron (ZOFRAN ODT) tablet 4 mg  4 mg Oral Q8H PRN    Or    ondansetron (ZOFRAN) injection 4 mg  4 mg IntraVENous Q6H PRN    insulin lispro (HUMALOG) injection   SubCUTAneous AC&HS    [Held by provider] aspirin delayed-release tablet 81 mg  81 mg Oral DAILY    [Held by provider] clopidogreL (PLAVIX) tablet 75 mg  75 mg Oral DAILY    DULoxetine (CYMBALTA) capsule 30 mg  30 mg Oral DAILY    ferrous sulfate tablet 325 mg  325 mg Oral BID WITH MEALS    levothyroxine (SYNTHROID) tablet 150 mcg  150 mcg Oral ACB    metoprolol succinate (TOPROL-XL) XL tablet 25 mg  25 mg Oral DAILY    atorvastatin (LIPITOR) tablet 10 mg  10 mg Oral QHS    albuterol (PROVENTIL HFA, VENTOLIN HFA, PROAIR HFA) inhaler 2 Puff  2 Puff Inhalation Q6H PRN    oxyCODONE IR (ROXICODONE) tablet 5 mg  5 mg Oral Q4H PRN    insulin glargine (LANTUS) injection 16 Units  16 Units SubCUTAneous QHS         Objective:      Physical Exam:  Visit Vitals  BP (!) 141/57   Pulse 83   Temp 98.3 °F (36.8 °C)   Resp 20   Ht 5' 1\" (1.549 m)   Wt 74.8 kg (164 lb 14.5 oz)   SpO2 98%   BMI 31.16 kg/m²     General Appearance:  Well developed, well nourished,alert and oriented x 3, and individual in no acute distress. Ears/Nose/Mouth/Throat:   Hearing grossly normal.         Neck: Supple. Chest:   Lungs clear to auscultation bilaterally. Cardiovascular:  Regular rate and rhythm, S1, S2 normal, no murmur. Abdomen:   Soft   Extremities: No edema bilaterally. Skin: Warm and dry. Data Review:   Labs:    Recent Results (from the past 24 hour(s))   METABOLIC PANEL, COMPREHENSIVE    Collection Time: 04/02/22  6:08 AM   Result Value Ref Range    Sodium 140 136 - 145 mmol/L    Potassium 4.5 3.5 - 5.1 mmol/L    Chloride 108 97 - 108 mmol/L    CO2 22 21 - 32 mmol/L    Anion gap 10 5 - 15 mmol/L    Glucose 144 (H) 65 - 100 mg/dL    BUN 18 6 - 20 mg/dL    Creatinine 0.67 0.55 - 1.02 mg/dL    BUN/Creatinine ratio 27 (H) 12 - 20      GFR est AA >60 >60 ml/min/1.73m2    GFR est non-AA >60 >60 ml/min/1.73m2    Calcium 8.2 (L) 8.5 - 10.1 mg/dL    Bilirubin, total 0.2 0.2 - 1.0 mg/dL    AST (SGOT) 19 15 - 37 U/L    ALT (SGPT) 10 (L) 12 - 78 U/L    Alk.  phosphatase 51 45 - 117 U/L    Protein, total 4.8 (L) 6.4 - 8.2 g/dL    Albumin 2.1 (L) 3.5 - 5.0 g/dL    Globulin 2.7 2.0 - 4.0 g/dL    A-G Ratio 0.8 (L) 1.1 - 2.2     CBC WITH AUTOMATED DIFF    Collection Time: 04/02/22  6:08 AM   Result Value Ref Range    WBC 11.1 (H) 3.6 - 11.0 K/uL    RBC 2.90 (L) 3.80 - 5.20 M/uL    HGB 9.2 (L) 11.5 - 16.0 g/dL    HCT 27.9 (L) 35.0 - 47.0 %    MCV 96.2 80.0 - 99.0 FL    MCH 31.7 26.0 - 34.0 PG    MCHC 33.0 30.0 - 36.5 g/dL    RDW 13.2 11.5 - 14.5 %    PLATELET 025 254 - 835 K/uL    MPV 11.6 8.9 - 12.9 FL    NRBC 0.0 0.0  WBC    ABSOLUTE NRBC 0.00 0.00 - 0.01 K/uL    NEUTROPHILS 89 (H) 32 - 75 %    LYMPHOCYTES 4 (L) 12 - 49 %    MONOCYTES 7 5 - 13 %    EOSINOPHILS 0 0 - 7 %    BASOPHILS 0 0 - 1 %    IMMATURE GRANULOCYTES 0 0 - 0.5 %    ABS. NEUTROPHILS 9.9 (H) 1.8 - 8.0 K/UL    ABS. LYMPHOCYTES 0.4 (L) 0.8 - 3.5 K/UL    ABS. MONOCYTES 0.8 0.0 - 1.0 K/UL    ABS. EOSINOPHILS 0.0 0.0 - 0.4 K/UL    ABS. BASOPHILS 0.0 0.0 - 0.1 K/UL    ABS. IMM. GRANS. 0.0 0.00 - 0.04 K/UL    DF AUTOMATED     GLUCOSE, POC    Collection Time: 04/02/22  7:21 AM   Result Value Ref Range    Glucose (POC) 145 (H) 65 - 117 mg/dL    Performed by [employer], POC    Collection Time: 04/02/22 11:19 AM   Result Value Ref Range    Glucose (POC) 155 (H) 65 - 117 mg/dL    Performed by [employer], POC    Collection Time: 04/02/22  4:11 PM   Result Value Ref Range    Glucose (POC) 191 (H) 65 - 117 mg/dL    Performed by [employer], POC    Collection Time: 04/02/22  9:00 PM   Result Value Ref Range    Glucose (POC) 230 (H) 65 - 117 mg/dL    Performed by Torrie Pelaez          Assessment:   S/p hemicolectomy  Chronic HFrEF  CAD s/p PCI  S/p TAVR  Diabetes Mellitus  Hypertension  Hyperlipidemia  SSS s/p PPM  PVD    Plan:   -Patient is doing well post operatively.  Will closely monitor her for hypervolemia  -Recent echo showed preserved LV systolic function, normal functioning TAVR bioprosthesis  -Postoperative care as per surgery  -We will follow

## 2022-04-03 NOTE — PROGRESS NOTES
Progress Note    Patient: Maria Elena Ko MRN: [id number]  SSN: [id number]    YOB: 1940  Age: [de-identified] y.o. Sex: female      Admit Date: 3/29/2022    LOS: 5 days     Subjective:   Postoperative day 2 status post exploratory laparotomy extended right colectomy for obstructing ascending colon mass  Patient seen in bed  No complaints  Denies nausea vomiting  Denies weight    Objective:     Vitals:    04/02/22 1953 04/03/22 0234 04/03/22 0703 04/03/22 1056   BP: (!) 141/57 (!) 142/55 (!) 170/49 (!) 167/52   Pulse: 83 84 79 74   Resp: 20 18 20 22   Temp: 98.3 °F (36.8 °C) 98.1 °F (36.7 °C) 98.4 °F (36.9 °C) 98.9 °F (37.2 °C)   SpO2: 98% 98% 99% 97%   Weight:       Height:            Intake and Output:  Current Shift: No intake/output data recorded.   Last three shifts: 04/01 1901 - 04/03 0700  In: 898.3 [I.V.:898.3]  Out: 650 [Urine:650]    Review of Systems:  ROS     Physical Exam:   Abdomen is soft, nondistended, appropriately tender, midline incision clean and intact 2525    Lab/Data Review:  Recent Results (from the past 24 hour(s))   GLUCOSE, POC    Collection Time: 04/02/22  4:11 PM   Result Value Ref Range    Glucose (POC) 191 (H) 65 - 117 mg/dL    Performed by Karen BRIONES    GLUCOSE, POC    Collection Time: 04/02/22  9:00 PM   Result Value Ref Range    Glucose (POC) 230 (H) 65 - 117 mg/dL    Performed by Mor Salcido    METABOLIC PANEL, COMPREHENSIVE    Collection Time: 04/03/22  6:34 AM   Result Value Ref Range    Sodium 137 136 - 145 mmol/L    Potassium 4.3 3.5 - 5.1 mmol/L    Chloride 106 97 - 108 mmol/L    CO2 26 21 - 32 mmol/L    Anion gap 5 5 - 15 mmol/L    Glucose 234 (H) 65 - 100 mg/dL    BUN 24 (H) 6 - 20 mg/dL    Creatinine 0.72 0.55 - 1.02 mg/dL    BUN/Creatinine ratio 33 (H) 12 - 20      GFR est AA >60 >60 ml/min/1.73m2    GFR est non-AA >60 >60 ml/min/1.73m2    Calcium 8.1 (L) 8.5 - 10.1 mg/dL    Bilirubin, total 0.2 0.2 - 1.0 mg/dL    AST (SGOT) 21 15 - 37 U/L    ALT (SGPT) 9 (L) 12 - 78 U/L    Alk. phosphatase 44 (L) 45 - 117 U/L    Protein, total 4.5 (L) 6.4 - 8.2 g/dL    Albumin 2.1 (L) 3.5 - 5.0 g/dL    Globulin 2.4 2.0 - 4.0 g/dL    A-G Ratio 0.9 (L) 1.1 - 2.2     CBC WITH AUTOMATED DIFF    Collection Time: 04/03/22  6:34 AM   Result Value Ref Range    WBC 10.3 3.6 - 11.0 K/uL    RBC 2.60 (L) 3.80 - 5.20 M/uL    HGB 8.3 (L) 11.5 - 16.0 g/dL    HCT 25.4 (L) 35.0 - 47.0 %    MCV 97.7 80.0 - 99.0 FL    MCH 31.9 26.0 - 34.0 PG    MCHC 32.7 30.0 - 36.5 g/dL    RDW 13.5 11.5 - 14.5 %    PLATELET 454 323 - 352 K/uL    MPV 11.5 8.9 - 12.9 FL    NRBC 0.0 0.0  WBC    ABSOLUTE NRBC 0.00 0.00 - 0.01 K/uL    NEUTROPHILS 77 (H) 32 - 75 %    LYMPHOCYTES 9 (L) 12 - 49 %    MONOCYTES 11 5 - 13 %    EOSINOPHILS 1 0 - 7 %    BASOPHILS 1 0 - 1 %    IMMATURE GRANULOCYTES 1 (H) 0 - 0.5 %    ABS. NEUTROPHILS 8.1 (H) 1.8 - 8.0 K/UL    ABS. LYMPHOCYTES 0.9 0.8 - 3.5 K/UL    ABS. MONOCYTES 1.1 (H) 0.0 - 1.0 K/UL    ABS. EOSINOPHILS 0.1 0.0 - 0.4 K/UL    ABS. BASOPHILS 0.1 0.0 - 0.1 K/UL    ABS. IMM.  GRANS. 0.1 (H) 0.00 - 0.04 K/UL    DF AUTOMATED     GLUCOSE, POC    Collection Time: 04/03/22  7:01 AM   Result Value Ref Range    Glucose (POC) 258 (H) 65 - 117 mg/dL    Performed by [employer], POC    Collection Time: 04/03/22 11:02 AM   Result Value Ref Range    Glucose (POC) 184 (H) 65 - 117 mg/dL    Performed by [employer]           Assessment:     Active Problems:    Colonic mass (3/29/2022)      SBO (small bowel obstruction) ([address] (3/29/2022)      HTN (hypertension), malignant (3/29/2022)        Plan:   Doing well postop day 2  Continue n.p.o. awaiting return of bowel function  Out of bed ambulate  Continue Marie catheter  Lovenox for DVT prophylax    Signed By: Citlali Sinclair MD     April 3, 2022

## 2022-04-04 LAB
ALBUMIN SERPL-MCNC: 2.1 G/DL (ref 3.5–5)
ALBUMIN/GLOB SERPL: 0.8 {RATIO} (ref 1.1–2.2)
ALP SERPL-CCNC: 46 U/L (ref 45–117)
ALT SERPL-CCNC: 10 U/L (ref 12–78)
ANION GAP SERPL CALC-SCNC: 3 MMOL/L (ref 5–15)
AST SERPL W P-5'-P-CCNC: 24 U/L (ref 15–37)
BASOPHILS # BLD: 0.1 K/UL (ref 0–0.1)
BASOPHILS NFR BLD: 1 % (ref 0–1)
BILIRUB SERPL-MCNC: 0.2 MG/DL (ref 0.2–1)
BUN SERPL-MCNC: 12 MG/DL (ref 6–20)
BUN/CREAT SERPL: 21 (ref 12–20)
CA-I BLD-MCNC: 8 MG/DL (ref 8.5–10.1)
CHLORIDE SERPL-SCNC: 109 MMOL/L (ref 97–108)
CO2 SERPL-SCNC: 29 MMOL/L (ref 21–32)
CREAT SERPL-MCNC: 0.57 MG/DL (ref 0.55–1.02)
DIFFERENTIAL METHOD BLD: ABNORMAL
EOSINOPHIL # BLD: 0.5 K/UL (ref 0–0.4)
EOSINOPHIL NFR BLD: 6 % (ref 0–7)
ERYTHROCYTE [DISTWIDTH] IN BLOOD BY AUTOMATED COUNT: 13.9 % (ref 11.5–14.5)
GLOBULIN SER CALC-MCNC: 2.5 G/DL (ref 2–4)
GLUCOSE BLD STRIP.AUTO-MCNC: 114 MG/DL (ref 65–117)
GLUCOSE BLD STRIP.AUTO-MCNC: 120 MG/DL (ref 65–117)
GLUCOSE BLD STRIP.AUTO-MCNC: 130 MG/DL (ref 65–117)
GLUCOSE BLD STRIP.AUTO-MCNC: 139 MG/DL (ref 65–117)
GLUCOSE SERPL-MCNC: 127 MG/DL (ref 65–100)
HCT VFR BLD AUTO: 28.6 % (ref 35–47)
HGB BLD-MCNC: 9.1 G/DL (ref 11.5–16)
IMM GRANULOCYTES # BLD AUTO: 0 K/UL (ref 0–0.04)
IMM GRANULOCYTES NFR BLD AUTO: 0 % (ref 0–0.5)
LYMPHOCYTES # BLD: 1.3 K/UL (ref 0.8–3.5)
LYMPHOCYTES NFR BLD: 15 % (ref 12–49)
MCH RBC QN AUTO: 31.5 PG (ref 26–34)
MCHC RBC AUTO-ENTMCNC: 31.8 G/DL (ref 30–36.5)
MCV RBC AUTO: 99 FL (ref 80–99)
MONOCYTES # BLD: 1 K/UL (ref 0–1)
MONOCYTES NFR BLD: 12 % (ref 5–13)
NEUTS SEG # BLD: 5.5 K/UL (ref 1.8–8)
NEUTS SEG NFR BLD: 66 % (ref 32–75)
NRBC # BLD: 0 K/UL (ref 0–0.01)
NRBC BLD-RTO: 0 PER 100 WBC
PERFORMED BY, TECHID: ABNORMAL
PERFORMED BY, TECHID: NORMAL
PLATELET # BLD AUTO: 389 K/UL (ref 150–400)
PMV BLD AUTO: 11.6 FL (ref 8.9–12.9)
POTASSIUM SERPL-SCNC: 4.3 MMOL/L (ref 3.5–5.1)
PROT SERPL-MCNC: 4.6 G/DL (ref 6.4–8.2)
RBC # BLD AUTO: 2.89 M/UL (ref 3.8–5.2)
SODIUM SERPL-SCNC: 141 MMOL/L (ref 136–145)
WBC # BLD AUTO: 8.4 K/UL (ref 3.6–11)

## 2022-04-04 PROCEDURE — 36415 COLL VENOUS BLD VENIPUNCTURE: CPT

## 2022-04-04 PROCEDURE — 74011250637 HC RX REV CODE- 250/637: Performed by: STUDENT IN AN ORGANIZED HEALTH CARE EDUCATION/TRAINING PROGRAM

## 2022-04-04 PROCEDURE — 85025 COMPLETE CBC W/AUTO DIFF WBC: CPT

## 2022-04-04 PROCEDURE — 97530 THERAPEUTIC ACTIVITIES: CPT

## 2022-04-04 PROCEDURE — 74011636637 HC RX REV CODE- 636/637: Performed by: STUDENT IN AN ORGANIZED HEALTH CARE EDUCATION/TRAINING PROGRAM

## 2022-04-04 PROCEDURE — 94762 N-INVAS EAR/PLS OXIMTRY CONT: CPT

## 2022-04-04 PROCEDURE — 99024 POSTOP FOLLOW-UP VISIT: CPT | Performed by: SURGERY

## 2022-04-04 PROCEDURE — 97116 GAIT TRAINING THERAPY: CPT

## 2022-04-04 PROCEDURE — 65270000029 HC RM PRIVATE

## 2022-04-04 PROCEDURE — 80053 COMPREHEN METABOLIC PANEL: CPT

## 2022-04-04 PROCEDURE — 74011250636 HC RX REV CODE- 250/636: Performed by: COLON & RECTAL SURGERY

## 2022-04-04 PROCEDURE — 97165 OT EVAL LOW COMPLEX 30 MIN: CPT

## 2022-04-04 PROCEDURE — 82962 GLUCOSE BLOOD TEST: CPT

## 2022-04-04 PROCEDURE — 74011250636 HC RX REV CODE- 250/636: Performed by: INTERNAL MEDICINE

## 2022-04-04 RX ADMIN — METOPROLOL SUCCINATE 25 MG: 25 TABLET, EXTENDED RELEASE ORAL at 08:15

## 2022-04-04 RX ADMIN — LEVOTHYROXINE SODIUM 150 MCG: 0.07 TABLET ORAL at 08:13

## 2022-04-04 RX ADMIN — HYDROMORPHONE HYDROCHLORIDE 1 MG: 1 INJECTION, SOLUTION INTRAMUSCULAR; INTRAVENOUS; SUBCUTANEOUS at 16:06

## 2022-04-04 RX ADMIN — FERROUS SULFATE TAB 325 MG (65 MG ELEMENTAL FE) 325 MG: 325 (65 FE) TAB at 08:13

## 2022-04-04 RX ADMIN — HYDROMORPHONE HYDROCHLORIDE 1 MG: 1 INJECTION, SOLUTION INTRAMUSCULAR; INTRAVENOUS; SUBCUTANEOUS at 11:11

## 2022-04-04 RX ADMIN — ATORVASTATIN CALCIUM 10 MG: 10 TABLET, FILM COATED ORAL at 20:30

## 2022-04-04 RX ADMIN — HYDROMORPHONE HYDROCHLORIDE 0.5 MG: 1 INJECTION, SOLUTION INTRAMUSCULAR; INTRAVENOUS; SUBCUTANEOUS at 06:17

## 2022-04-04 RX ADMIN — HYDROMORPHONE HYDROCHLORIDE 0.5 MG: 1 INJECTION, SOLUTION INTRAMUSCULAR; INTRAVENOUS; SUBCUTANEOUS at 02:27

## 2022-04-04 RX ADMIN — HYDROMORPHONE HYDROCHLORIDE 1 MG: 1 INJECTION, SOLUTION INTRAMUSCULAR; INTRAVENOUS; SUBCUTANEOUS at 20:30

## 2022-04-04 RX ADMIN — INSULIN GLARGINE 25 UNITS: 100 INJECTION, SOLUTION SUBCUTANEOUS at 21:00

## 2022-04-04 RX ADMIN — POTASSIUM CHLORIDE, DEXTROSE MONOHYDRATE AND SODIUM CHLORIDE 50 ML/HR: 150; 5; 450 INJECTION, SOLUTION INTRAVENOUS at 16:21

## 2022-04-04 RX ADMIN — DULOXETINE HYDROCHLORIDE 30 MG: 30 CAPSULE, DELAYED RELEASE ORAL at 08:13

## 2022-04-04 RX ADMIN — ENOXAPARIN SODIUM 40 MG: 40 INJECTION SUBCUTANEOUS at 11:11

## 2022-04-04 RX ADMIN — FERROUS SULFATE TAB 325 MG (65 MG ELEMENTAL FE) 325 MG: 325 (65 FE) TAB at 16:20

## 2022-04-04 NOTE — PROGRESS NOTES
Progress Note    Patient: Cindy Boyd MRN: [id number]  SSN: [id number]    YOB: 1940  Age: [de-identified] y.o. Sex: female      Admit Date: 3/29/2022    LOS: 6 days     Subjective:   POD#3 s/p exploratory laparotomy extended right colectomy for obstructing ascending colon mass  Patient seen in bed, no acute events overnight. Denies flatus or bowel movements. No nausea or vomiting. Did not ambulate yesterday. Objective:     Vitals:    04/03/22 1056 04/03/22 1441 04/03/22 2022 04/04/22 0736   BP: (!) 167/52 (!) 145/55 (!) 154/61 105/75   Pulse: 74 70 81 70   Resp: 22 20 20 20   Temp: 98.9 °F (37.2 °C) 98.4 °F (36.9 °C) 98.4 °F (36.9 °C) 97.4 °F (36.3 °C)   SpO2: 97% 99% 99% 97%   Weight:       Height:            Intake and Output:  Current Shift: No intake/output data recorded. Last three shifts: 04/02 1901 - 04/04 0700  In: -   Out: 1500 [Urine:1500]    Physical Exam:   Abdomen is soft, nondistended, appropriately tender, midline incision c/d/i    Lab/Data Review:  Recent Results (from the past 24 hour(s))   GLUCOSE, POC    Collection Time: 04/03/22  4:33 PM   Result Value Ref Range    Glucose (POC) 159 (H) 65 - 117 mg/dL    Performed by Johanny BRIONES    GLUCOSE, POC    Collection Time: 04/03/22  8:52 PM   Result Value Ref Range    Glucose (POC) 198 (H) 65 - 117 mg/dL    Performed by Guillermina Solis    METABOLIC PANEL, COMPREHENSIVE    Collection Time: 04/04/22  6:54 AM   Result Value Ref Range    Sodium 141 136 - 145 mmol/L    Potassium 4.3 3.5 - 5.1 mmol/L    Chloride 109 (H) 97 - 108 mmol/L    CO2 29 21 - 32 mmol/L    Anion gap 3 (L) 5 - 15 mmol/L    Glucose 127 (H) 65 - 100 mg/dL    BUN 12 6 - 20 mg/dL    Creatinine 0.57 0.55 - 1.02 mg/dL    BUN/Creatinine ratio 21 (H) 12 - 20      GFR est AA >60 >60 ml/min/1.73m2    GFR est non-AA >60 >60 ml/min/1.73m2    Calcium 8.0 (L) 8.5 - 10.1 mg/dL    Bilirubin, total 0.2 0.2 - 1.0 mg/dL    AST (SGOT) 24 15 - 37 U/L    ALT (SGPT) 10 (L) 12 - 78 U/L    Alk. phosphatase 46 45 - 117 U/L    Protein, total 4.6 (L) 6.4 - 8.2 g/dL    Albumin 2.1 (L) 3.5 - 5.0 g/dL    Globulin 2.5 2.0 - 4.0 g/dL    A-G Ratio 0.8 (L) 1.1 - 2.2     CBC WITH AUTOMATED DIFF    Collection Time: 04/04/22  6:54 AM   Result Value Ref Range    WBC 8.4 3.6 - 11.0 K/uL    RBC 2.89 (L) 3.80 - 5.20 M/uL    HGB 9.1 (L) 11.5 - 16.0 g/dL    HCT 28.6 (L) 35.0 - 47.0 %    MCV 99.0 80.0 - 99.0 FL    MCH 31.5 26.0 - 34.0 PG    MCHC 31.8 30.0 - 36.5 g/dL    RDW 13.9 11.5 - 14.5 %    PLATELET 813 707 - 128 K/uL    MPV 11.6 8.9 - 12.9 FL    NRBC 0.0 0.0  WBC    ABSOLUTE NRBC 0.00 0.00 - 0.01 K/uL    NEUTROPHILS 66 32 - 75 %    LYMPHOCYTES 15 12 - 49 %    MONOCYTES 12 5 - 13 %    EOSINOPHILS 6 0 - 7 %    BASOPHILS 1 0 - 1 %    IMMATURE GRANULOCYTES 0 0 - 0.5 %    ABS. NEUTROPHILS 5.5 1.8 - 8.0 K/UL    ABS. LYMPHOCYTES 1.3 0.8 - 3.5 K/UL    ABS. MONOCYTES 1.0 0.0 - 1.0 K/UL    ABS. EOSINOPHILS 0.5 (H) 0.0 - 0.4 K/UL    ABS. BASOPHILS 0.1 0.0 - 0.1 K/UL    ABS. IMM.  GRANS. 0.0 0.00 - 0.04 K/UL    DF AUTOMATED     GLUCOSE, POC    Collection Time: 04/04/22  8:01 AM   Result Value Ref Range    Glucose (POC) 130 (H) 65 - 117 mg/dL    Performed by Nguyễn Greer           Assessment:     Active Problems:    Colonic mass (3/29/2022)      SBO (small bowel obstruction) ([facility] [address] (3/29/2022)      HTN (hypertension), malignant (3/29/2022)        Plan:     Continue NPO, awaiting return of bowel function  Out of bed to chair today  PT/OT  Pain/nausea control  Lovenox    Signed By: Sol Flores, DO     April 4, 2022

## 2022-04-04 NOTE — PROGRESS NOTES
Patient and CM spoke about discharge planning. Patient wants home health. Choice given for no preference. Referrals have augustine made in [facility].     Dc plan home with [facility]      Patient accepted with [facility] with [facility] date 04/05/2022

## 2022-04-04 NOTE — PROGRESS NOTES
PHYSICAL THERAPY TREATMENT  Patient: Cindy Boyd (50 y.o. female)  Date: 4/4/2022  Diagnosis: Colonic mass [K63.89]  [facility] [K56.609]  HTN (hypertension), malignant [I10] <principal problem not specified>  Procedure(s) (LRB):  LAPAROTOMY EXPLORATORY And Right Colectomy (Right) 3 Days Post-Op  Precautions:    Chart, physical therapy assessment, plan of care and goals were reviewed. ASSESSMENT  Patient continues with skilled PT services and is progressing towards goals. Patient seated in chair upon approach with family member present and agreed to therapy today. Patient requested that nursing help with donning briefs with pad for ambulation due to patient having trouble controlling bladder. Passing OT assisted with helping patient don briefs. Patient was CGA for STS to RW for support to finish pulling briefs up independently ( from knees to waist line). Patient them ambulated with RW for 150 at [facility]. Patient demonstrated steady gait with no LOB or knee buckling but shortened step length on both sides. Patient returned to chair at [facility] for STS. Patient performed seated TE ( see details below). Patient  performed second STS to RW at [facility] to remove brief at independent level, then returned to sitting in chair. Praveen Hermosillo was replaced and Patient left seated in chair with call bell within reach and all needs meet. During session patient did report with she primarily always walks with a SPC at home and has been feeling a bit unbalanced with walking. Patient also reported that sh has been \" furniture surfing\" when walking in home. Current Level of Function Impacting Discharge (mobility/balance): poor activity tolerance. Impaired balance. Other factors to consider for discharge: PLOF, assistance at home, level of deficits. PLAN :  Patient continues to benefit from skilled intervention to address the above impairments. Continue treatment per established plan of care.   to address goals. Recommendation for discharge: (in order for the patient to meet his/her long term goals)  Home with [address] [place] [address] and continued family care    This discharge recommendation:  Has been made in collaboration with the attending provider and/or case management    IF patient discharges home will need the following DME: gait belt and rolling walker       SUBJECTIVE:   Patient stated Brionna Marie can do this all at home on my own.     OBJECTIVE DATA SUMMARY:   Critical Behavior:  Neurologic State: Alert  Orientation Level: Oriented X4  Cognition: Follows commands     Functional Mobility Training:  Transfers:  Sit to Stand: Contact guard assistance  Stand to Sit: Contact guard assistance  Balance:  Sitting: Intact; Without support  Sitting - Static: Good (unsupported)  Sitting - Dynamic: Good (unsupported)  Standing: Impaired; With support  Standing - Static: Constant support;Good  Standing - Dynamic : Constant support;Good  Ambulation/Gait Training:  Distance (ft): 150 Feet (ft)  Assistive Device: Gait belt;Walker, rolling  Ambulation - Level of Assistance: Contact guard assistance  Speed/Roya: Slow  Step Length: Left shortened;Right shortened  Therapeutic Exercises:   1x15 AP  1x15 LAQ  1x15 seated marches  1x15 hip ADD/ABD  Pain Ratin/10    Activity Tolerance:   Fair and requires rest breaks  Please refer to the flowsheet for vital signs taken during this treatment. After treatment patient left in no apparent distress:   Sitting in chair, Call bell within reach, and Caregiver / family present    COMMUNICATION/COLLABORATION:   The patients plan of care was discussed with: Registered nurse. Problem: Mobility Impaired (Adult and Pediatric)  Goal: *Acute Goals and Plan of Care (Insert Text)  Description: Physical Therapy Goals  Initiated 22  1)  I with HEP in 7 days to improve overall functional mobility. 2)  Bed mobility and transfers I in 7 days to prevent skin breakdown.   3)  Pt to amb 300ft with LRAD and sup in 7 days    Pt. Goal:  Pt to be able to walk safely without falls.      Outcome: Progressing Towards Goal       Sera Coyle PTA   Time Calculation: 35 mins

## 2022-04-04 NOTE — WOUND CARE
Wound Care Note:    Wound Care into see patient because of \"blanchable redness on right buttock\"    Skin Care & Pressure Relief Recommendations:  Minimize layers of linen  Pads under patient to optimize support surface and microclimate  Turn/reposition approximately every 2 hours. Pillow Wedges  Manage incontinence  Promote continence; Skin Protective lotion to buttocks and sacrum daily and as needed with incontinence care    Offload heels with pillows or offloading boots. Discussed above plan and the following with patient: Patient sitting in recliner. Able to stand independently using walker. Blanchable erythema noted to both buttocks, skin intact. Applied Optifoam Border Sacral foam dressing to sacrum to prevent pressure and friction related skin injury, see dressing order. Maintain the PureWick to manage  incontinence. Ensure patient turns q2h at 30 degree angle or more to offload sacrum. Float heels with 2-3 pillows while in bed for offloading of the heels. Maintain HOB at 30 degrees or less, if not contraindicated, to reduce pressure to buttocks and sacrum. Raise foot of bed to help prevent friction and shear injury from sliding down in the bed. Apply GeoMatt chair cushion to recliner seat for increased pressure reduction when sitting. If sitting, ensure patient stands hourly to reposition in chair. Waffle Overlay in place over gel mattress, comfortable per patient. Re-consult [facility] if skin condition changes.

## 2022-04-04 NOTE — PROGRESS NOTES
Bedside shift change report given to Travon Loaiza (oncoming nurse) by Rell Smith (offgoing nurse). Report included the following information SBAR.

## 2022-04-04 NOTE — PROGRESS NOTES
Pulmonary Progress Note    Subjective:   Daily Progress Note: 2022 10:10 AM    Chevy Rendon is a [de-identified]year old female PMH of COPD, diabetes, HTN, HLD, Pacemaker, AICD, HX of cardiac stents who is S/p day 3 for right hemicolectomy . Patient is alert and oriented and doing well. No respiratory distress. Review of Systems     Constitutional: Negative. HENT: Negative. Eyes: Negative. Respiratory: Negative for shortness of breath. Cardiovascular: Negative. Gastrointestinal: Positive for abdominal pain. Status post surgery bandage in place  Genitourinary: Negative. Musculoskeletal: Negative. Skin: Negative. Neurological: Negative. Psychiatric/Behavioral: Negative. Objective:     Visit Vitals  /75 (BP 1 Location: Left upper arm, BP Patient Position: At rest;Supine)   Pulse 70   Temp 97.4 °F (36.3 °C)   Resp 20   Ht 5' 1\" (1.549 m)   Wt 74.8 kg (164 lb 14.5 oz)   SpO2 97%   BMI 31.16 kg/m²      O2 Device: None (Room air)    Temp (24hrs), Av.1 °F (36.7 °C), Min:97.4 °F (36.3 °C), Max:98.4 °F (36.9 °C)      No intake/output data recorded.  1901 -  0700  In: -   Out: 1500 [Urine:1500]    Physical Exam  Constitutional:       Appearance: Normal appearance. HENT:      Head: Normocephalic and atraumatic. Nose: Nose normal.      Mouth/Throat:      Mouth: Mucous membranes are moist.   Eyes:      Pupils: Pupils are equal, round, and reactive to light. Cardiovascular:      Rate and Rhythm: Normal rate. Regular rhythm     Pulses: Normal pulses. Heart sounds: Normal heart sounds. Pulmonary:      Effort: Pulmonary effort is normal.      Breath sounds: Normal breath sounds. Abdominal:      General: Bowel sounds are present not distended     Tenderness: There is mild abdominal tenderness. Musculoskeletal:         General: Normal range of motion. Cervical back: Normal range of motion and neck supple. Skin:     General: Skin is warm.    Neurological: General: No focal deficit present. Mental Status: She is alert. Psychiatric:         Mood and Affect: Mood normal.         Data Review    Recent Results (from the past 24 hour(s))   GLUCOSE, POC    Collection Time: 04/03/22 11:02 AM   Result Value Ref Range    Glucose (POC) 184 (H) 65 - 117 mg/dL    Performed by Tanika Sharp, POC    Collection Time: 04/03/22  4:33 PM   Result Value Ref Range    Glucose (POC) 159 (H) 65 - 117 mg/dL    Performed by Ingris BRIONES    GLUCOSE, POC    Collection Time: 04/03/22  8:52 PM   Result Value Ref Range    Glucose (POC) 198 (H) 65 - 117 mg/dL    Performed by Laurita Terry    METABOLIC PANEL, COMPREHENSIVE    Collection Time: 04/04/22  6:54 AM   Result Value Ref Range    Sodium 141 136 - 145 mmol/L    Potassium 4.3 3.5 - 5.1 mmol/L    Chloride 109 (H) 97 - 108 mmol/L    CO2 29 21 - 32 mmol/L    Anion gap 3 (L) 5 - 15 mmol/L    Glucose 127 (H) 65 - 100 mg/dL    BUN 12 6 - 20 mg/dL    Creatinine 0.57 0.55 - 1.02 mg/dL    BUN/Creatinine ratio 21 (H) 12 - 20      GFR est AA >60 >60 ml/min/1.73m2    GFR est non-AA >60 >60 ml/min/1.73m2    Calcium 8.0 (L) 8.5 - 10.1 mg/dL    Bilirubin, total 0.2 0.2 - 1.0 mg/dL    AST (SGOT) 24 15 - 37 U/L    ALT (SGPT) 10 (L) 12 - 78 U/L    Alk.  phosphatase 46 45 - 117 U/L    Protein, total 4.6 (L) 6.4 - 8.2 g/dL    Albumin 2.1 (L) 3.5 - 5.0 g/dL    Globulin 2.5 2.0 - 4.0 g/dL    A-G Ratio 0.8 (L) 1.1 - 2.2     CBC WITH AUTOMATED DIFF    Collection Time: 04/04/22  6:54 AM   Result Value Ref Range    WBC 8.4 3.6 - 11.0 K/uL    RBC 2.89 (L) 3.80 - 5.20 M/uL    HGB 9.1 (L) 11.5 - 16.0 g/dL    HCT 28.6 (L) 35.0 - 47.0 %    MCV 99.0 80.0 - 99.0 FL    MCH 31.5 26.0 - 34.0 PG    MCHC 31.8 30.0 - 36.5 g/dL    RDW 13.9 11.5 - 14.5 %    PLATELET 557 461 - 115 K/uL    MPV 11.6 8.9 - 12.9 FL    NRBC 0.0 0.0  WBC    ABSOLUTE NRBC 0.00 0.00 - 0.01 K/uL    NEUTROPHILS 66 32 - 75 %    LYMPHOCYTES 15 12 - 49 %    MONOCYTES 12 5 - 13 % EOSINOPHILS 6 0 - 7 %    BASOPHILS 1 0 - 1 %    IMMATURE GRANULOCYTES 0 0 - 0.5 %    ABS. NEUTROPHILS 5.5 1.8 - 8.0 K/UL    ABS. LYMPHOCYTES 1.3 0.8 - 3.5 K/UL    ABS. MONOCYTES 1.0 0.0 - 1.0 K/UL    ABS. EOSINOPHILS 0.5 (H) 0.0 - 0.4 K/UL    ABS. BASOPHILS 0.1 0.0 - 0.1 K/UL    ABS. IMM.  GRANS. 0.0 0.00 - 0.04 K/UL    DF AUTOMATED     GLUCOSE, POC    Collection Time: 04/04/22  8:01 AM   Result Value Ref Range    Glucose (POC) 130 (H) 65 - 117 mg/dL    Performed by Osmar Uribe        Current Facility-Administered Medications   Medication Dose Route Frequency    insulin glargine (LANTUS) injection 25 Units  25 Units SubCUTAneous QHS    hydrALAZINE (APRESOLINE) 20 mg/mL injection 10 mg  10 mg IntraVENous Q4H PRN    enoxaparin (LOVENOX) injection 40 mg  40 mg SubCUTAneous Q24H    dextrose 5% - 0.45% NaCl with KCl 20 mEq/L infusion  50 mL/hr IntraVENous CONTINUOUS    ketorolac (TORADOL) injection 15 mg  15 mg IntraVENous Q6H PRN    traMADoL (ULTRAM) tablet 50 mg  50 mg Oral Q6H PRN    HYDROmorphone (DILAUDID) injection 1 mg  1 mg IntraVENous Q4H PRN    HYDROmorphone (DILAUDID) syringe 0.5 mg  0.5 mg IntraVENous Q4H PRN    glucose chewable tablet 16 g  4 Tablet Oral PRN    dextrose 10% infusion 0-250 mL  0-250 mL IntraVENous PRN    glucagon (GLUCAGEN) injection 1 mg  1 mg IntraMUSCular PRN    acetaminophen (TYLENOL) tablet 650 mg  650 mg Oral Q6H PRN    Or    acetaminophen (TYLENOL) suppository 650 mg  650 mg Rectal Q6H PRN    ondansetron (ZOFRAN ODT) tablet 4 mg  4 mg Oral Q8H PRN    Or    ondansetron (ZOFRAN) injection 4 mg  4 mg IntraVENous Q6H PRN    insulin lispro (HUMALOG) injection   SubCUTAneous AC&HS    [Held by provider] aspirin delayed-release tablet 81 mg  81 mg Oral DAILY    [Held by provider] clopidogreL (PLAVIX) tablet 75 mg  75 mg Oral DAILY    DULoxetine (CYMBALTA) capsule 30 mg  30 mg Oral DAILY    ferrous sulfate tablet 325 mg  325 mg Oral BID WITH MEALS    levothyroxine (SYNTHROID) tablet 150 mcg  150 mcg Oral ACB    metoprolol succinate (TOPROL-XL) XL tablet 25 mg  25 mg Oral DAILY    atorvastatin (LIPITOR) tablet 10 mg  10 mg Oral QHS    albuterol (PROVENTIL HFA, VENTOLIN HFA, PROAIR HFA) inhaler 2 Puff  2 Puff Inhalation Q6H PRN    oxyCODONE IR (ROXICODONE) tablet 5 mg  5 mg Oral Q4H PRN         Assessment/Plan:        1. Chronic Obstructive Pulmonary Disease without excebation  2. Partial large Bowel Obstruction  3. Status post right hemicolectomy  4. Diabetes Mellitus type 2  5. Essential Hypertension  6. Thyroid Disease   7. Urinary Tract infection       RECOMMENDATIONS/PLAN:      3 82-year-old lady no history of smoking not on any inhalers or oxygen at home, she had a history of secondhand smoke as her  used to smoke and all her children used to smoke she used to work in the form as a farmer significant history of congestive heart failure ejection fraction about 35 to 40%. She had aortic valve replaced previously also she has stent placement in the past chest x-ray no acute infiltrate or lung mass. Last showed Echocardiogram showed EF of 55 to 60% with grade 1 diastolic dysfunction. 2. Creatinine normal decrease IV fluids  3. Patient underwent right hemicolectomy doing fairly well no flatus yet  4. monitor respiratory status and O2 PRN  5.  Follow up with Pulmonary outpatient as needed for COPD management

## 2022-04-04 NOTE — PROGRESS NOTES
OCCUPATIONAL THERAPY EVALUATION  Patient: Mor Tenorio (37 y.o. female)  Date: 4/4/2022  Primary Diagnosis: Colonic mass [K63.89]  SBO (small bowel obstruction) ([facility] [K56.609]  HTN (hypertension), malignant [I10]  Procedure(s) (LRB):  LAPAROTOMY EXPLORATORY And Right Colectomy (Right) 3 Days Post-Op   Precautions: fall risk, log roll OOB       ASSESSMENT  Pt is an [de-identified] y/o F with PMH of COPD, DM, HTN, HLD, AICD, CHF with EF 35-40%, aortic stenosis s/p valve replacement presenting to [facility] with c/o abdominal pain, admitted 3/29/22 and being treated for colonic mass, SBO, HTN; s/p exploratory lap with extended R hemicolectomy 4/1 with Dr. Rajesh Maldonado. Pt received sitting up in recliner chair upon arrival, AXO x4, and agreeable to OT evaluation at this time. Per pt report, pt lives in a 2 story home but resides on the first floor with a ramp entrance. Pt reports her son has been living with her since her CABG surgery last May. Pt reports she is I with ADLs/most IADLs (I.e. cooking, cleaning) and ambulates with a SPC most of the time, though sometimes \"furniture surfs\" through the house. Pt denies any fall hx. Based on current observations, pt presents with deficits in generalized strength/AROM, dynamic sitting balance, static/dynamic standing balance, functional activity tolerance, and pain impacting overall performance of ADLs and functional transfers/mobility. Pt currently requires total A simulated socks 2' to limited reach/pain; educated pt on compensatory LB dressing technique with good understanding verbalized. Sit><stand completed to/from recliner and commode with RW and CGA, CGA for overall toileting routine including bella care in standing and management of gown and SBA for hand washing/hair combing tasks standing sink side without any LOB observed. Pt returned to recliner chair and left resting comfortably with call bell/needs in reach and visitors present w/pt permission.  Overall, pt tolerates session fair with c/o 6/10 L sided abdominal pain and mild fatigue after standing ADL. Pt would benefit from continued skilled OT services to address current impairments and improve IND and safety with self cares and functional transfers/mobility. Current OT d/c recommendation HHOT with family care once medically appropriate. Other factors to consider for discharge: family/social support, DME, time since onset, severity of deficits, decline from functional baseline     Patient will benefit from skilled therapy intervention to address the above noted impairments. PLAN :  Recommendations and Planned Interventions: self care training, functional mobility training, therapeutic exercise, balance training, therapeutic activities, endurance activities, patient education and home safety training    Frequency/Duration: Patient will be followed by occupational therapy:  2-3x/week to address goals. Recommendation for discharge: (in order for the patient to meet his/her long term goals)  HHOT with family care    This discharge recommendation:  Has been made in collaboration with the attending provider and/or case management    IF patient discharges home will need the following DME: patient owns DME required for discharge       SUBJECTIVE:   Patient stated I feel pretty good today.     OBJECTIVE DATA SUMMARY:   HISTORY:   Past Medical History:   Diagnosis Date    Depression     DM (diabetes mellitus) [facility]     Hyperlipidemia     Hypertension     Pancreatitis     Thyroid disease      Past Surgical History:   Procedure Laterality Date    HX AORTIC VALVE REPLACEMENT  08/2021    HX HYSTERECTOMY      IR FLUORO GUIDE PLC CVAD  5/10/2021    IR INSERT NON TUNL CVC OVER 5 YRS  5/10/2021    [place] INS NEW/RPLCMT PRM PM W/TRANSV ELTRD ATRIAL&VENT N/A 5/11/2021    INSERT PPM BIV MULTI performed by Leny Bass MD at [address] [place] [address]       Expanded or extensive additional review of patient history:     [address] [place] [address] Environment: Private residence  Wheelchair Ramp: Yes  One/Two Story Residence: Two story, live on 1st floor  Living Alone: No  Support Systems: Child(toshia) (Son)  Patient Expects to be Discharged to[de-identified] Home with home health  Current DME Used/Available at Home: Walker, rolling,Shower chair,Commode, bedside,Cane, straight  Tub or Shower Type: Tub/Shower combination      EXAMINATION OF PERFORMANCE DEFICITS:  Cognitive/Behavioral Status:  Neurologic State: Alert  Orientation Level: Oriented X4  Cognition: Follows commands      Hearing: Auditory  Auditory Impairment: Hard of hearing, right side,Hearing aid(s)  Hearing Aids/Status: Right    Vision/Perceptual:       Corrective Lenses: Glasses    Range of Motion:  AROM: Generally decreased, functional                         Strength:  Strength: Generally decreased, functional                Coordination:  Coordination: Within functional limits  Fine Motor Skills-Upper: Left Intact; Right Intact    Gross Motor Skills-Upper: Left Intact; Right Intact    Balance:  Sitting: Intact; Without support  Sitting - Static: Good (unsupported)  Sitting - Dynamic: Good (unsupported)  Standing: Impaired; With support  Standing - Static: Constant support;Good  Standing - Dynamic : Constant support;Good    Functional Mobility and Transfers for ADLs:  Bed Mobility:       Transfers:  Sit to Stand: Contact guard assistance  Stand to Sit: Contact guard assistance  Bed to Chair: Contact guard assistance  Bathroom Mobility: Contact guard assistance  Toilet Transfer : Contact guard assistance    ADL Intervention and task modifications:       Grooming  Grooming Assistance: Set-up; Stand-by assistance  Position Performed: Standing  Washing Hands: Set-up; Stand-by assistance  Brushing/Combing Hair: Set-up; Stand-by assistance                   Lower Body Dressing Assistance  Socks:  Total assistance (dependent) (Simulated)  Position Performed: Seated in chair    Toileting  Toileting Assistance: Contact guard assistance  Bladder Hygiene: Contact guard assistance (Standing)  Clothing Management: Contact guard assistance  Adaptive Equipment: Grab bars; Walker         Therapeutic Exercise:  Pt would benefit from UE HEP to improve overall UE AROM/strength and can be further educated in next treatment session. Functional Measure:    [address] [place] [address] AM-PACTM \"6 Clicks\"                                                       Daily Activity Inpatient Short Form  How much help from another person does the patient currently need. .. Total; A Lot A Little None   1. Putting on and taking off regular lower body clothing? []  1 []  2 [x]  3 []  4   2. Bathing (including washing, rinsing, drying)? []  1 []  2 [x]  3 []  4   3. Toileting, which includes using toilet, bedpan or urinal? [] 1 []  2 [x]  3 []  4   4. Putting on and taking off regular upper body clothing? []  1 []  2 [x]  3 []  4   5. Taking care of personal grooming such as brushing teeth? []  1 []  2 []  3 [x]  4   6. Eating meals? []  1 []  2 []  3 [x]  4   © 2007, Trustees of [address] [place] [address], under license to [employer]. All rights reserved     Score: 20/24     Interpretation of Tool:  Represents clinically-significant functional categories (i.e. Activities of daily living). Percentage of Impairment CH    0%   CI    1-19% CJ    20-39% CK    40-59% CL    60-79% CM    80-99% CN     100%   [facility]  Score 6-24 24 23 20-22 15-19 10-14 7-9 6         Occupational Therapy Evaluation Charge Determination   History Examination Decision-Making   LOW Complexity : Brief history review  LOW Complexity : 1-3 performance deficits relating to physical, cognitive , or psychosocial skils that result in activity limitations and / or participation restrictions  MEDIUM Complexity : Patient may present with comorbidities that affect occupational performnce.  Miniml to moderate modification of tasks or assistance (eg, physical or verbal ) with assesment(s) is necessary to enable patient to complete evaluation       Based on the above components, the patient evaluation is determined to be of the following complexity level: LOW   Pain Ratin/10 L abdomen    Activity Tolerance:   Fair    After treatment patient left in no apparent distress:    Sitting in chair, Heels elevated for pressure relief, Call bell within reach and Caregiver / family present    COMMUNICATION/EDUCATION:   The patients plan of care was discussed with: Certified nursing assistant/patient care technician. Patient/family have participated as able in goal setting and plan of care. and Patient/family agree to work toward stated goals and plan of care. This patients plan of care is appropriate for delegation to [facility].     Thank you for this referral.  Elier Catalan  Time Calculation: 32 mins   Problem: Self Care Deficits Care Plan (Adult)  Goal: *Acute Goals and Plan of Care (Insert Text)  Description: Pt will be Mod I sup <> sit in prep for EOB ADLs  Pt will be Mod I grooming standing sink side LRAD  Pt will be Mod I UB dressing sitting EOB/long sit   Pt will be Mod I LE dressing sitting EOB/long sit  Pt will be Mod I sit <>  prep for toileting LRAD  Pt will be Mod I toileting/toilet transfer/cloth mgmt LRAD  Pt will be IND following UE HEP in prep for self care tasks      Outcome: Not Met

## 2022-04-04 NOTE — PROGRESS NOTES
Hospitalist Progress Note    Subjective:   Daily Progress Note: 4/4/2022 8:22 AM    Hospital Course: Patient is a [de-identified]year old female with a PMH of diabetes, hypertension, COPD,  AICD, CAD with stents, who presented with right-sided lower abdominal pain. CT abdomen showed masslike thickening within the ascending colon, trace ascites within the right abdomen, cholelithiasis, and subtle morphologic changes of the liver. Initial labs significant for WBC of 12.1. UA + nitrites with 5 -10 WBC. Urine culture negative. Chest x-ray showed no acute cardiopulmonary abnormality. Patient to be admitted for further management. General surgery, cardiology, and pulmonology consulted. Patient started on ceftriaxone and placed on clear liquid diet. Exploratory laparotomy with right colectomy on 4/1. Surgery found near obstructing tumor at proximal ascending colon with distended cecum with areas of gangrene in the wall. Ascitic fluid pending. Subjective: Patient states she is feeling well. No nausea, vomiting.  No passing flatus yet    Current Facility-Administered Medications   Medication Dose Route Frequency    insulin glargine (LANTUS) injection 25 Units  25 Units SubCUTAneous QHS    hydrALAZINE (APRESOLINE) 20 mg/mL injection 10 mg  10 mg IntraVENous Q4H PRN    enoxaparin (LOVENOX) injection 40 mg  40 mg SubCUTAneous Q24H    dextrose 5% - 0.45% NaCl with KCl 20 mEq/L infusion  50 mL/hr IntraVENous CONTINUOUS    ketorolac (TORADOL) injection 15 mg  15 mg IntraVENous Q6H PRN    traMADoL (ULTRAM) tablet 50 mg  50 mg Oral Q6H PRN    HYDROmorphone (DILAUDID) injection 1 mg  1 mg IntraVENous Q4H PRN    HYDROmorphone (DILAUDID) syringe 0.5 mg  0.5 mg IntraVENous Q4H PRN    glucose chewable tablet 16 g  4 Tablet Oral PRN    dextrose 10% infusion 0-250 mL  0-250 mL IntraVENous PRN    glucagon (GLUCAGEN) injection 1 mg  1 mg IntraMUSCular PRN    acetaminophen (TYLENOL) tablet 650 mg  650 mg Oral Q6H PRN    Or    acetaminophen (TYLENOL) suppository 650 mg  650 mg Rectal Q6H PRN    ondansetron (ZOFRAN ODT) tablet 4 mg  4 mg Oral Q8H PRN    Or    ondansetron (ZOFRAN) injection 4 mg  4 mg IntraVENous Q6H PRN    insulin lispro (HUMALOG) injection   SubCUTAneous AC&HS    [Held by provider] aspirin delayed-release tablet 81 mg  81 mg Oral DAILY    [Held by provider] clopidogreL (PLAVIX) tablet 75 mg  75 mg Oral DAILY    DULoxetine (CYMBALTA) capsule 30 mg  30 mg Oral DAILY    ferrous sulfate tablet 325 mg  325 mg Oral BID WITH MEALS    levothyroxine (SYNTHROID) tablet 150 mcg  150 mcg Oral ACB    metoprolol succinate (TOPROL-XL) XL tablet 25 mg  25 mg Oral DAILY    atorvastatin (LIPITOR) tablet 10 mg  10 mg Oral QHS    albuterol (PROVENTIL HFA, VENTOLIN HFA, PROAIR HFA) inhaler 2 Puff  2 Puff Inhalation Q6H PRN    oxyCODONE IR (ROXICODONE) tablet 5 mg  5 mg Oral Q4H PRN        Review of Systems  Constitutional: No fevers, No chills, No sweats, No fatigue, No Weakness  Eyes: No redness  Ears, nose, mouth, throat, and face: No nasal congestion, No sore throat, No voice change  Respiratory: No Shortness of Breath, No cough, No wheezing  Cardiovascular: No chest pain, No palpitations, No extremity edema  Gastrointestinal: No nausea, No vomiting, No diarrhea, + abdominal pain  Genitourinary: No frequency, No dysuria, No hematuria  Integument/breast: No skin lesion(s)   Neurological: No Confusion, No headaches, No dizziness      Objective:     Visit Vitals  /75 (BP 1 Location: Left upper arm, BP Patient Position: At rest;Supine)   Pulse 70   Temp 97.4 °F (36.3 °C)   Resp 20   Ht 5' 1\" (1.549 m)   Wt 74.8 kg (164 lb 14.5 oz)   SpO2 97%   BMI 31.16 kg/m²      O2 Device: None (Room air)    Temp (24hrs), Av.3 °F (36.8 °C), Min:97.4 °F (36.3 °C), Max:98.9 °F (37.2 °C)      No intake/output data recorded.    1901 -  0700  In: -   Out: 1500 [Urine:1500]    PHYSICAL EXAM:  Constitutional: No acute distress  Skin: Extremities and face reveal no rashes. HEENT: Sclerae anicteric. Extra-occular muscles are intact. No oral ulcers. The neck is supple and no masses. Cardiovascular: Regular rate and rhythm. Respiratory:  Clear breath sounds bilaterally with no wheezes, rales, or rhonchi. GI: Abdomen nondistended, soft, and nontender. Normal active bowel sounds. Musculoskeletal: No pitting edema of the lower legs. Able to move all ext  Neurological:  Patient is alert and oriented. Cranial nerves II-XII grossly intact  Psychiatric: Mood appears appropriate       Data Review    Recent Results (from the past 24 hour(s))   IRON PROFILE    Collection Time: 04/03/22  8:45 AM   Result Value Ref Range    Iron 16 (L) 35 - 150 ug/dL    TIBC 150 (L) 250 - 450 ug/dL    Iron % saturation 11 (L) 20 - 50 %   GLUCOSE, POC    Collection Time: 04/03/22 11:02 AM   Result Value Ref Range    Glucose (POC) 184 (H) 65 - 117 mg/dL    Performed by Yue Beasley    GLUCOSE, POC    Collection Time: 04/03/22  4:33 PM   Result Value Ref Range    Glucose (POC) 159 (H) 65 - 117 mg/dL    Performed by Ciaran BRIONES    GLUCOSE, POC    Collection Time: 04/03/22  8:52 PM   Result Value Ref Range    Glucose (POC) 198 (H) 65 - 117 mg/dL    Performed by Megan Rogers    METABOLIC PANEL, COMPREHENSIVE    Collection Time: 04/04/22  6:54 AM   Result Value Ref Range    Sodium 141 136 - 145 mmol/L    Potassium 4.3 3.5 - 5.1 mmol/L    Chloride 109 (H) 97 - 108 mmol/L    CO2 29 21 - 32 mmol/L    Anion gap 3 (L) 5 - 15 mmol/L    Glucose 127 (H) 65 - 100 mg/dL    BUN 12 6 - 20 mg/dL    Creatinine 0.57 0.55 - 1.02 mg/dL    BUN/Creatinine ratio 21 (H) 12 - 20      GFR est AA >60 >60 ml/min/1.73m2    GFR est non-AA >60 >60 ml/min/1.73m2    Calcium 8.0 (L) 8.5 - 10.1 mg/dL    Bilirubin, total 0.2 0.2 - 1.0 mg/dL    AST (SGOT) 24 15 - 37 U/L    ALT (SGPT) 10 (L) 12 - 78 U/L    Alk.  phosphatase 46 45 - 117 U/L    Protein, total 4.6 (L) 6.4 - 8.2 g/dL    Albumin 2.1 (L) 3.5 - 5.0 g/dL    Globulin 2.5 2.0 - 4.0 g/dL    A-G Ratio 0.8 (L) 1.1 - 2.2     GLUCOSE, POC    Collection Time: 04/04/22  8:01 AM   Result Value Ref Range    Glucose (POC) 130 (H) 65 - 117 mg/dL    Performed by [facility]        Radiology review: CT of abdomen and pelvis    Assessment:   1. Partial large bowel obstruction due to cecal mass status post right colectomy day #3  2. Abnormal urinalysis  3. Hypertension  4. Coronary artery disease with history of PAD to LAD/history of aortic valve replacement for severe aortic stenosis/AICD/pacemaker in situ  5. Type 2 diabetes  6. Hypothyroid  7. COPD without exacerbation  8. Gait instability    Plan:    1. Patient is postop exploratory laparotomy with right colectomy day #3. Intraoperative biopsy pending. Patient currently NPO. Advance diet per general surgery. Waiting for bowel function to return. IV pain medication  2. Patient is status post 5 days of IV Rocephin. Urine culture negative. Been afebrile. 3.  Blood pressure stable. Continue to monitor per unit protocol. On metoprolol  4.  2D echo showed EF of 55 to 60% with abnormal diastolic dysfunction with mild mitral regurgitation and mitral stenosis. Holding aspirin and Plavix at this time. Cardiology consulted. On a statin. 5.  Hemoglobin A1c 7.8. Increase Lantus to 25 units. Insulin sliding scale  6. On levothyroxine  7. Pulmonology consulted. Oxygen saturation within normal limits on room air. Albuterol inhaler as  8. PT OT  9. CBC and BMP in the AM     Dispo: Greater than 48 hours. Barriers include return of bowel function and tolerating oral intake with clearance from General surgery. Suspect home with home     CODE STATUS full     DVT prophylaxis: Lovenox  Ulcer prophylaxis: N.p.o.    Care Plan discussed with: Patient/Family, Nurse and     Total time spent with patient: 34 minutes.

## 2022-04-04 NOTE — PROGRESS NOTES
Progress Note    Patient: Maria Elena Ko MRN: [id number]  SSN: [id number]    YOB: 1940  Age: [de-identified] y.o. Sex: female      Admit Date: 3/29/2022    LOS: 6 days     Subjective:     Patient is sitting up in chair and appears to be comfortable. Denied having any chest pain or shortness of breath. She has not passed gas yet. Objective:     Vitals:    04/03/22 1056 04/03/22 1441 04/03/22 2022 04/04/22 0736   BP: (!) 167/52 (!) 145/55 (!) 154/61 105/75   Pulse: 74 70 81 70   Resp: 22 20 20 20   Temp: 98.9 °F (37.2 °C) 98.4 °F (36.9 °C) 98.4 °F (36.9 °C) 97.4 °F (36.3 °C)   SpO2: 97% 99% 99% 97%   Weight:       Height:            Intake and Output:  Current Shift: No intake/output data recorded. Last three shifts: 04/02 1901 - 04/04 0700  In: -   Out: 1500 [Urine:1500]    Physical Exam:   General:  Alert, cooperative, no distress, appears stated age. Eyes:  Conjunctivae/corneas clear. PERRL, EOMs intact. Fundi benign   Ears:  Normal TMs and external ear canals both ears. Nose: Nares normal. Septum midline. Mucosa normal. No drainage or sinus tenderness. Mouth/Throat: Lips, mucosa, and tongue normal. Teeth and gums normal.   Neck: Supple, symmetrical, trachea midline, no adenopathy, thyroid: no enlargment/tenderness/nodules, no carotid bruit and no JVD. Back:   Symmetric, no curvature. ROM normal. No CVA tenderness. Lungs:   Clear to auscultation bilaterally. Heart:  Regular rate and rhythm, S1, S2 normal, no murmur, click, rub or gallop. Abdomen:   Soft, non-tender. Bowel sounds normal. No masses,  No organomegaly. Extremities: Extremities normal, atraumatic, no cyanosis or edema. Pulses: 2+ and symmetric all extremities. Skin: Skin color, texture, turgor normal. No rashes or lesions   Lymph nodes: Cervical, supraclavicular, and axillary nodes normal.   Neurologic: CNII-XII intact. Normal strength, sensation and reflexes throughout. Lab/Data Review:   All lab results for the last 24 hours reviewed. Assessment:     Active Problems:    Colonic mass (3/29/2022)      SBO (small bowel obstruction) ([address] (3/29/2022)      HTN (hypertension), malignant (3/29/2022)    Patient is an 82-year-old white female with:  1. Heart failure with reduced ejection fraction  2. Coronary disease status post PCI of LAD May 2021  3. Severe aortic stenosis status post TAVR  4. Hypertension with hypertensive heart disease  5. Sick sinus syndrome status post dual-chamber pacemaker May 2021  6. Peripheral vascular disease  7. Fracture deformity of proximal left humerus  8. Diabetes mellitus  9. Hypothyroidism  10. COPD  6. Depression  12. Status post exploratory laparotomy, right colectomy  Plan:     She appears to be euvolemic and stable from cardiac standpoint. No further cardiac testing planned at this time. Resume aspirin when okay with surgeon. Currently on metoprolol, atorvastatin. Kidney function stable as well as liver function.     Signed By: Jean Paul Lopez MD     April 4, 2022

## 2022-04-05 LAB
ALBUMIN SERPL-MCNC: 2 G/DL (ref 3.5–5)
ALBUMIN/GLOB SERPL: 0.8 {RATIO} (ref 1.1–2.2)
ALP SERPL-CCNC: 42 U/L (ref 45–117)
ALT SERPL-CCNC: 10 U/L (ref 12–78)
ANION GAP SERPL CALC-SCNC: 3 MMOL/L (ref 5–15)
AST SERPL W P-5'-P-CCNC: 23 U/L (ref 15–37)
BASOPHILS # BLD: 0.1 K/UL (ref 0–0.1)
BASOPHILS NFR BLD: 1 % (ref 0–1)
BILIRUB SERPL-MCNC: 0.3 MG/DL (ref 0.2–1)
BUN SERPL-MCNC: 10 MG/DL (ref 6–20)
BUN/CREAT SERPL: 20 (ref 12–20)
CA-I BLD-MCNC: 7.9 MG/DL (ref 8.5–10.1)
CHLORIDE SERPL-SCNC: 108 MMOL/L (ref 97–108)
CO2 SERPL-SCNC: 30 MMOL/L (ref 21–32)
CREAT SERPL-MCNC: 0.51 MG/DL (ref 0.55–1.02)
DIFFERENTIAL METHOD BLD: ABNORMAL
EOSINOPHIL # BLD: 0.7 K/UL (ref 0–0.4)
EOSINOPHIL NFR BLD: 9 % (ref 0–7)
ERYTHROCYTE [DISTWIDTH] IN BLOOD BY AUTOMATED COUNT: 13.7 % (ref 11.5–14.5)
GLOBULIN SER CALC-MCNC: 2.4 G/DL (ref 2–4)
GLUCOSE BLD STRIP.AUTO-MCNC: 179 MG/DL (ref 65–117)
GLUCOSE BLD STRIP.AUTO-MCNC: 221 MG/DL (ref 65–117)
GLUCOSE BLD STRIP.AUTO-MCNC: 226 MG/DL (ref 65–117)
GLUCOSE BLD STRIP.AUTO-MCNC: 273 MG/DL (ref 65–117)
GLUCOSE SERPL-MCNC: 146 MG/DL (ref 65–100)
HCT VFR BLD AUTO: 26.8 % (ref 35–47)
HGB BLD-MCNC: 8.6 G/DL (ref 11.5–16)
IMM GRANULOCYTES # BLD AUTO: 0 K/UL (ref 0–0.04)
IMM GRANULOCYTES NFR BLD AUTO: 1 % (ref 0–0.5)
LYMPHOCYTES # BLD: 1.1 K/UL (ref 0.8–3.5)
LYMPHOCYTES NFR BLD: 14 % (ref 12–49)
MCH RBC QN AUTO: 31.6 PG (ref 26–34)
MCHC RBC AUTO-ENTMCNC: 32.1 G/DL (ref 30–36.5)
MCV RBC AUTO: 98.5 FL (ref 80–99)
MONOCYTES # BLD: 0.9 K/UL (ref 0–1)
MONOCYTES NFR BLD: 11 % (ref 5–13)
NEUTS SEG # BLD: 5.2 K/UL (ref 1.8–8)
NEUTS SEG NFR BLD: 64 % (ref 32–75)
NRBC # BLD: 0 K/UL (ref 0–0.01)
NRBC BLD-RTO: 0 PER 100 WBC
PERFORMED BY, TECHID: ABNORMAL
PLATELET # BLD AUTO: 361 K/UL (ref 150–400)
PMV BLD AUTO: 11 FL (ref 8.9–12.9)
POTASSIUM SERPL-SCNC: 4.4 MMOL/L (ref 3.5–5.1)
PROT SERPL-MCNC: 4.4 G/DL (ref 6.4–8.2)
RBC # BLD AUTO: 2.72 M/UL (ref 3.8–5.2)
SODIUM SERPL-SCNC: 141 MMOL/L (ref 136–145)
WBC # BLD AUTO: 8 K/UL (ref 3.6–11)

## 2022-04-05 PROCEDURE — 65270000029 HC RM PRIVATE

## 2022-04-05 PROCEDURE — 74011250637 HC RX REV CODE- 250/637: Performed by: STUDENT IN AN ORGANIZED HEALTH CARE EDUCATION/TRAINING PROGRAM

## 2022-04-05 PROCEDURE — 74011250636 HC RX REV CODE- 250/636: Performed by: COLON & RECTAL SURGERY

## 2022-04-05 PROCEDURE — 99024 POSTOP FOLLOW-UP VISIT: CPT | Performed by: COLON & RECTAL SURGERY

## 2022-04-05 PROCEDURE — 97116 GAIT TRAINING THERAPY: CPT

## 2022-04-05 PROCEDURE — 36415 COLL VENOUS BLD VENIPUNCTURE: CPT

## 2022-04-05 PROCEDURE — 82962 GLUCOSE BLOOD TEST: CPT

## 2022-04-05 PROCEDURE — 74011636637 HC RX REV CODE- 636/637: Performed by: STUDENT IN AN ORGANIZED HEALTH CARE EDUCATION/TRAINING PROGRAM

## 2022-04-05 PROCEDURE — 85025 COMPLETE CBC W/AUTO DIFF WBC: CPT

## 2022-04-05 PROCEDURE — 97110 THERAPEUTIC EXERCISES: CPT

## 2022-04-05 PROCEDURE — 94762 N-INVAS EAR/PLS OXIMTRY CONT: CPT

## 2022-04-05 PROCEDURE — 80053 COMPREHEN METABOLIC PANEL: CPT

## 2022-04-05 RX ADMIN — HYDROMORPHONE HYDROCHLORIDE 1 MG: 1 INJECTION, SOLUTION INTRAMUSCULAR; INTRAVENOUS; SUBCUTANEOUS at 11:24

## 2022-04-05 RX ADMIN — CLOPIDOGREL BISULFATE 75 MG: 75 TABLET ORAL at 08:59

## 2022-04-05 RX ADMIN — INSULIN LISPRO 2 UNITS: 100 INJECTION, SOLUTION INTRAVENOUS; SUBCUTANEOUS at 21:35

## 2022-04-05 RX ADMIN — HYDROMORPHONE HYDROCHLORIDE 1 MG: 1 INJECTION, SOLUTION INTRAMUSCULAR; INTRAVENOUS; SUBCUTANEOUS at 02:39

## 2022-04-05 RX ADMIN — INSULIN GLARGINE 25 UNITS: 100 INJECTION, SOLUTION SUBCUTANEOUS at 21:35

## 2022-04-05 RX ADMIN — INSULIN LISPRO 2 UNITS: 100 INJECTION, SOLUTION INTRAVENOUS; SUBCUTANEOUS at 16:40

## 2022-04-05 RX ADMIN — METOPROLOL SUCCINATE 25 MG: 25 TABLET, EXTENDED RELEASE ORAL at 08:59

## 2022-04-05 RX ADMIN — FERROUS SULFATE TAB 325 MG (65 MG ELEMENTAL FE) 325 MG: 325 (65 FE) TAB at 16:39

## 2022-04-05 RX ADMIN — HYDROMORPHONE HYDROCHLORIDE 1 MG: 1 INJECTION, SOLUTION INTRAMUSCULAR; INTRAVENOUS; SUBCUTANEOUS at 21:33

## 2022-04-05 RX ADMIN — FERROUS SULFATE TAB 325 MG (65 MG ELEMENTAL FE) 325 MG: 325 (65 FE) TAB at 08:59

## 2022-04-05 RX ADMIN — LEVOTHYROXINE SODIUM 150 MCG: 0.07 TABLET ORAL at 08:59

## 2022-04-05 RX ADMIN — ATORVASTATIN CALCIUM 10 MG: 10 TABLET, FILM COATED ORAL at 21:32

## 2022-04-05 RX ADMIN — ENOXAPARIN SODIUM 40 MG: 40 INJECTION SUBCUTANEOUS at 11:24

## 2022-04-05 RX ADMIN — HYDROMORPHONE HYDROCHLORIDE 1 MG: 1 INJECTION, SOLUTION INTRAMUSCULAR; INTRAVENOUS; SUBCUTANEOUS at 16:39

## 2022-04-05 RX ADMIN — INSULIN LISPRO 2 UNITS: 100 INJECTION, SOLUTION INTRAVENOUS; SUBCUTANEOUS at 11:30

## 2022-04-05 RX ADMIN — DULOXETINE HYDROCHLORIDE 30 MG: 30 CAPSULE, DELAYED RELEASE ORAL at 08:59

## 2022-04-05 RX ADMIN — ASPIRIN 81 MG: 81 TABLET, COATED ORAL at 08:58

## 2022-04-05 NOTE — PROGRESS NOTES
Progress Note    Patient: Abdelrahman Mabry MRN: [id number]  SSN: [id number]    YOB: 1940  Age: [de-identified] y.o. Sex: female      Admit Date: 3/29/2022    LOS: 7 days     Subjective:   Postoperative day 4 status post extended right hemicolectomy for cecal mass  Patient seen in bed without complaints  Denies flatus or bowel movement  Denies nausea or vomiting    Objective:     Vitals:    04/04/22 0736 04/04/22 1930 04/04/22 2116 04/05/22 0039   BP: 105/75 (!) 165/67  (!) 156/65   Pulse: 70 79  80   Resp: 20 20  18   Temp: 97.4 °F (36.3 °C) 98.3 °F (36.8 °C)  98.8 °F (37.1 °C)   SpO2: 97% 98% 93% 98%   Weight:       Height:            Intake and Output:  Current Shift: No intake/output data recorded. Last three shifts: 04/03 1901 - 04/05 0700  In: 550 [P.O.:50; I.V.:500]  Out: 800 [Urine:800]    Review of Systems:  ROS     Physical Exam:   Abdomen is soft, nondistended, appropriately tender at incision.   Incision is clean and intact with staples in place    Lab/Data Review:  Recent Results (from the past 24 hour(s))   GLUCOSE, POC    Collection Time: 04/04/22  8:01 AM   Result Value Ref Range    Glucose (POC) 130 (H) 65 - 117 mg/dL    Performed by [address] [place] [address], POC    Collection Time: 04/04/22 11:12 AM   Result Value Ref Range    Glucose (POC) 114 65 - 117 mg/dL    Performed by Marie Cobb, POC    Collection Time: 04/04/22  4:00 PM   Result Value Ref Range    Glucose (POC) 120 (H) 65 - 117 mg/dL    Performed by Paula BUNCH, POC    Collection Time: 04/04/22  7:33 PM   Result Value Ref Range    Glucose (POC) 139 (H) 65 - 117 mg/dL    Performed by [address] [place] [address], COMPREHENSIVE    Collection Time: 04/05/22  3:35 AM   Result Value Ref Range    Sodium 141 136 - 145 mmol/L    Potassium 4.4 3.5 - 5.1 mmol/L    Chloride 108 97 - 108 mmol/L    CO2 30 21 - 32 mmol/L    Anion gap 3 (L) 5 - 15 mmol/L    Glucose 146 (H) 65 - 100 mg/dL    BUN 10 6 - 20 mg/dL Creatinine 0.51 (L) 0.55 - 1.02 mg/dL    BUN/Creatinine ratio 20 12 - 20      GFR est AA >60 >60 ml/min/1.73m2    GFR est non-AA >60 >60 ml/min/1.73m2    Calcium 7.9 (L) 8.5 - 10.1 mg/dL    Bilirubin, total 0.3 0.2 - 1.0 mg/dL    AST (SGOT) 23 15 - 37 U/L    ALT (SGPT) 10 (L) 12 - 78 U/L    Alk. phosphatase 42 (L) 45 - 117 U/L    Protein, total 4.4 (L) 6.4 - 8.2 g/dL    Albumin 2.0 (L) 3.5 - 5.0 g/dL    Globulin 2.4 2.0 - 4.0 g/dL    A-G Ratio 0.8 (L) 1.1 - 2.2     CBC WITH AUTOMATED DIFF    Collection Time: 04/05/22  3:35 AM   Result Value Ref Range    WBC 8.0 3.6 - 11.0 K/uL    RBC 2.72 (L) 3.80 - 5.20 M/uL    HGB 8.6 (L) 11.5 - 16.0 g/dL    HCT 26.8 (L) 35.0 - 47.0 %    MCV 98.5 80.0 - 99.0 FL    MCH 31.6 26.0 - 34.0 PG    MCHC 32.1 30.0 - 36.5 g/dL    RDW 13.7 11.5 - 14.5 %    PLATELET 451 293 - 613 K/uL    MPV 11.0 8.9 - 12.9 FL    NRBC 0.0 0.0  WBC    ABSOLUTE NRBC 0.00 0.00 - 0.01 K/uL    NEUTROPHILS 64 32 - 75 %    LYMPHOCYTES 14 12 - 49 %    MONOCYTES 11 5 - 13 %    EOSINOPHILS 9 (H) 0 - 7 %    BASOPHILS 1 0 - 1 %    IMMATURE GRANULOCYTES 1 (H) 0 - 0.5 %    ABS. NEUTROPHILS 5.2 1.8 - 8.0 K/UL    ABS. LYMPHOCYTES 1.1 0.8 - 3.5 K/UL    ABS. MONOCYTES 0.9 0.0 - 1.0 K/UL    ABS. EOSINOPHILS 0.7 (H) 0.0 - 0.4 K/UL    ABS. BASOPHILS 0.1 0.0 - 0.1 K/UL    ABS. IMM.  GRANS. 0.0 0.00 - 0.04 K/UL    DF AUTOMATED              Assessment:     Active Problems:    Colonic mass (3/29/2022)      SBO (small bowel obstruction) ([address] (3/29/2022)      HTN (hypertension), malignant (3/29/2022)        Plan:   Doing well postop day 4  Start clear liquid diet  May restart antiplatelet therapy  Out of bed encourage ambulation    Signed By: Leatha Barragan MD     April 5, 2022

## 2022-04-05 NOTE — PROGRESS NOTES
Problem: Falls - Risk of  Goal: *Absence of Falls  Description: Document Cuca Tan Fall Risk and appropriate interventions in the flowsheet.   Outcome: Progressing Towards Goal  Note: Fall Risk Interventions:  Mobility Interventions: Bed/chair exit alarm,OT consult for ADLs,Patient to call before getting OOB,PT Consult for mobility concerns         Medication Interventions: Bed/chair exit alarm,Patient to call before getting OOB,Teach patient to arise slowly    Elimination Interventions: Bed/chair exit alarm,Call light in reach    History of Falls Interventions: Bed/chair exit alarm         Problem: Pain  Goal: *Control of Pain  Outcome: Progressing Towards Goal

## 2022-04-05 NOTE — PROGRESS NOTES
Pulmonary Progress Note    Subjective:   Daily Progress Note: 2022 10:10 AM    Emani Brandt is a [de-identified]year old female PMH of COPD, diabetes, HTN, HLD, Pacemaker, AICD, HX of cardiac stents who is S/p day 3 for right hemicolectomy . Patient is alert and oriented and doing well. No respiratory distress. Review of Systems     Constitutional: Negative. HENT: Negative. Eyes: Negative. Respiratory: Negative for shortness of breath. Cardiovascular: Negative. Gastrointestinal: Positive for abdominal pain. Status post surgery bandage in place  Genitourinary: Negative. Musculoskeletal: Negative. Skin: Negative. Neurological: Negative. Psychiatric/Behavioral: Negative. Objective:     Visit Vitals  BP (!) 165/65 (BP 1 Location: Left upper arm, BP Patient Position: At rest;Semi fowlers)   Pulse 74   Temp 99.4 °F (37.4 °C)   Resp 18   Ht 5' 1\" (1.549 m)   Wt 74.8 kg (164 lb 14.5 oz)   SpO2 95%   BMI 31.16 kg/m²      O2 Device: None (Room air)    Temp (24hrs), Av.8 °F (37.1 °C), Min:98.3 °F (36.8 °C), Max:99.4 °F (37.4 °C)      No intake/output data recorded.  1901 -  0700  In: 550 [P.O.:50; I.V.:500]  Out: 800 [Urine:800]    Physical Exam  Constitutional:       Appearance: Normal appearance. HENT:      Head: Normocephalic and atraumatic. Nose: Nose normal.      Mouth/Throat:      Mouth: Mucous membranes are moist.   Eyes:      Pupils: Pupils are equal, round, and reactive to light. Cardiovascular:      Rate and Rhythm: Normal rate. Regular rhythm     Pulses: Normal pulses. Heart sounds: Normal heart sounds. Pulmonary:      Effort: Pulmonary effort is normal.      Breath sounds: Normal breath sounds. Abdominal:      General: Bowel sounds are present not distended     Tenderness: There is mild abdominal tenderness. Musculoskeletal:         General: Normal range of motion. Cervical back: Normal range of motion and neck supple.    Skin:     General: Skin is warm. Neurological:      General: No focal deficit present. Mental Status: She is alert. Psychiatric:         Mood and Affect: Mood normal.         Data Review    Recent Results (from the past 24 hour(s))   GLUCOSE, POC    Collection Time: 04/04/22 11:12 AM   Result Value Ref Range    Glucose (POC) 114 65 - 117 mg/dL    Performed by Oralia Alfaro    GLUCOSE, POC    Collection Time: 04/04/22  4:00 PM   Result Value Ref Range    Glucose (POC) 120 (H) 65 - 117 mg/dL    Performed by Rissateen Seammaren    GLUCOSE, POC    Collection Time: 04/04/22  7:33 PM   Result Value Ref Range    Glucose (POC) 139 (H) 65 - 117 mg/dL    Performed by [address] [place] [address], [facility]    Collection Time: 04/05/22  3:35 AM   Result Value Ref Range    Sodium 141 136 - 145 mmol/L    Potassium 4.4 3.5 - 5.1 mmol/L    Chloride 108 97 - 108 mmol/L    CO2 30 21 - 32 mmol/L    Anion gap 3 (L) 5 - 15 mmol/L    Glucose 146 (H) 65 - 100 mg/dL    BUN 10 6 - 20 mg/dL    Creatinine 0.51 (L) 0.55 - 1.02 mg/dL    BUN/Creatinine ratio 20 12 - 20      GFR est AA >60 >60 ml/min/1.73m2    GFR est non-AA >60 >60 ml/min/1.73m2    Calcium 7.9 (L) 8.5 - 10.1 mg/dL    Bilirubin, total 0.3 0.2 - 1.0 mg/dL    AST (SGOT) 23 15 - 37 U/L    ALT (SGPT) 10 (L) 12 - 78 U/L    Alk.  phosphatase 42 (L) 45 - 117 U/L    Protein, total 4.4 (L) 6.4 - 8.2 g/dL    Albumin 2.0 (L) 3.5 - 5.0 g/dL    Globulin 2.4 2.0 - 4.0 g/dL    A-G Ratio 0.8 (L) 1.1 - 2.2     CBC WITH AUTOMATED DIFF    Collection Time: 04/05/22  3:35 AM   Result Value Ref Range    WBC 8.0 3.6 - 11.0 K/uL    RBC 2.72 (L) 3.80 - 5.20 M/uL    HGB 8.6 (L) 11.5 - 16.0 g/dL    HCT 26.8 (L) 35.0 - 47.0 %    MCV 98.5 80.0 - 99.0 FL    MCH 31.6 26.0 - 34.0 PG    MCHC 32.1 30.0 - 36.5 g/dL    RDW 13.7 11.5 - 14.5 %    PLATELET 141 268 - 986 K/uL    MPV 11.0 8.9 - 12.9 FL    NRBC 0.0 0.0  WBC    ABSOLUTE NRBC 0.00 0.00 - 0.01 K/uL    NEUTROPHILS 64 32 - 75 %    LYMPHOCYTES 14 12 - 49 % MONOCYTES 11 5 - 13 %    EOSINOPHILS 9 (H) 0 - 7 %    BASOPHILS 1 0 - 1 %    IMMATURE GRANULOCYTES 1 (H) 0 - 0.5 %    ABS. NEUTROPHILS 5.2 1.8 - 8.0 K/UL    ABS. LYMPHOCYTES 1.1 0.8 - 3.5 K/UL    ABS. MONOCYTES 0.9 0.0 - 1.0 K/UL    ABS. EOSINOPHILS 0.7 (H) 0.0 - 0.4 K/UL    ABS. BASOPHILS 0.1 0.0 - 0.1 K/UL    ABS. IMM.  GRANS. 0.0 0.00 - 0.04 K/UL    DF AUTOMATED     GLUCOSE, POC    Collection Time: 04/05/22  8:48 AM   Result Value Ref Range    Glucose (POC) 179 (H) 65 - 117 mg/dL    Performed by Crist Canavan        Current Facility-Administered Medications   Medication Dose Route Frequency    insulin glargine (LANTUS) injection 25 Units  25 Units SubCUTAneous QHS    hydrALAZINE (APRESOLINE) 20 mg/mL injection 10 mg  10 mg IntraVENous Q4H PRN    enoxaparin (LOVENOX) injection 40 mg  40 mg SubCUTAneous Q24H    dextrose 5% - 0.45% NaCl with KCl 20 mEq/L infusion  50 mL/hr IntraVENous CONTINUOUS    ketorolac (TORADOL) injection 15 mg  15 mg IntraVENous Q6H PRN    traMADoL (ULTRAM) tablet 50 mg  50 mg Oral Q6H PRN    HYDROmorphone (DILAUDID) injection 1 mg  1 mg IntraVENous Q4H PRN    HYDROmorphone (DILAUDID) syringe 0.5 mg  0.5 mg IntraVENous Q4H PRN    glucose chewable tablet 16 g  4 Tablet Oral PRN    dextrose 10% infusion 0-250 mL  0-250 mL IntraVENous PRN    glucagon (GLUCAGEN) injection 1 mg  1 mg IntraMUSCular PRN    acetaminophen (TYLENOL) tablet 650 mg  650 mg Oral Q6H PRN    Or    acetaminophen (TYLENOL) suppository 650 mg  650 mg Rectal Q6H PRN    ondansetron (ZOFRAN ODT) tablet 4 mg  4 mg Oral Q8H PRN    Or    ondansetron (ZOFRAN) injection 4 mg  4 mg IntraVENous Q6H PRN    insulin lispro (HUMALOG) injection   SubCUTAneous AC&HS    aspirin delayed-release tablet 81 mg  81 mg Oral DAILY    clopidogreL (PLAVIX) tablet 75 mg  75 mg Oral DAILY    DULoxetine (CYMBALTA) capsule 30 mg  30 mg Oral DAILY    ferrous sulfate tablet 325 mg  325 mg Oral BID WITH MEALS    levothyroxine (SYNTHROID) tablet 150 mcg  150 mcg Oral ACB    metoprolol succinate (TOPROL-XL) XL tablet 25 mg  25 mg Oral DAILY    atorvastatin (LIPITOR) tablet 10 mg  10 mg Oral QHS    albuterol (PROVENTIL HFA, VENTOLIN HFA, PROAIR HFA) inhaler 2 Puff  2 Puff Inhalation Q6H PRN    oxyCODONE IR (ROXICODONE) tablet 5 mg  5 mg Oral Q4H PRN         Assessment/Plan:        1. Chronic Obstructive Pulmonary Disease without excebation  2. Partial large Bowel Obstruction  3. Status post right hemicolectomy  4. Diabetes Mellitus type 2  5. Essential Hypertension  6. Thyroid Disease   7. Urinary Tract infection       RECOMMENDATIONS/PLAN:      3 26-year-old lady no history of smoking not on any inhalers or oxygen at home, she had a history of secondhand smoke as her  used to smoke and all her children used to smoke she used to work in the form as a farmer significant history of congestive heart failure ejection fraction about 35 to 40%. She had aortic valve replaced previously also she has stent placement in the past chest x-ray no acute infiltrate or lung mass. Last showed Echocardiogram showed EF of 55 to 60% with grade 1 diastolic dysfunction. 2. Creatinine normal decrease IV fluids  3. Patient underwent right hemicolectomy doing fairly well no flatus yet  4. monitor respiratory status and O2 PRN  5.  Follow up with Pulmonary outpatient as needed for COPD management

## 2022-04-05 NOTE — PROGRESS NOTES
Bedside shift change report given to LATRICE Robins (oncoming nurse) by Samuel Wilson (offgoing nurse). Report included the following information SBAR.

## 2022-04-05 NOTE — PROGRESS NOTES
Hospitalist Progress Note    Subjective:   Daily Progress Note: 4/5/2022 8:22 AM    Hospital Course: Patient is a [de-identified]year old female with a PMH of diabetes, hypertension, COPD,  AICD, CAD with stents, who presented with right-sided lower abdominal pain. CT abdomen showed masslike thickening within the ascending colon, trace ascites within the right abdomen, cholelithiasis, and subtle morphologic changes of the liver. Initial labs significant for WBC of 12.1. UA + nitrites with 5 -10 WBC. Urine culture negative. Chest x-ray showed no acute cardiopulmonary abnormality. Patient to be admitted for further management. General surgery, cardiology, and pulmonology consulted. Patient started on ceftriaxone and placed on clear liquid diet. Exploratory laparotomy with right colectomy on 4/1. Surgery found near obstructing tumor at proximal ascending colon with distended cecum with areas of gangrene in the wall.  Ascitic fluid pending.n CEA 0.5      Subjective: Patient denies any abdominal pain, nausea, or vomiting    Current Facility-Administered Medications   Medication Dose Route Frequency    insulin glargine (LANTUS) injection 25 Units  25 Units SubCUTAneous QHS    hydrALAZINE (APRESOLINE) 20 mg/mL injection 10 mg  10 mg IntraVENous Q4H PRN    enoxaparin (LOVENOX) injection 40 mg  40 mg SubCUTAneous Q24H    dextrose 5% - 0.45% NaCl with KCl 20 mEq/L infusion  50 mL/hr IntraVENous CONTINUOUS    ketorolac (TORADOL) injection 15 mg  15 mg IntraVENous Q6H PRN    traMADoL (ULTRAM) tablet 50 mg  50 mg Oral Q6H PRN    HYDROmorphone (DILAUDID) injection 1 mg  1 mg IntraVENous Q4H PRN    HYDROmorphone (DILAUDID) syringe 0.5 mg  0.5 mg IntraVENous Q4H PRN    glucose chewable tablet 16 g  4 Tablet Oral PRN    dextrose 10% infusion 0-250 mL  0-250 mL IntraVENous PRN    glucagon (GLUCAGEN) injection 1 mg  1 mg IntraMUSCular PRN    acetaminophen (TYLENOL) tablet 650 mg  650 mg Oral Q6H PRN    Or    acetaminophen (TYLENOL) suppository 650 mg  650 mg Rectal Q6H PRN    ondansetron (ZOFRAN ODT) tablet 4 mg  4 mg Oral Q8H PRN    Or    ondansetron (ZOFRAN) injection 4 mg  4 mg IntraVENous Q6H PRN    insulin lispro (HUMALOG) injection   SubCUTAneous AC&HS    aspirin delayed-release tablet 81 mg  81 mg Oral DAILY    clopidogreL (PLAVIX) tablet 75 mg  75 mg Oral DAILY    DULoxetine (CYMBALTA) capsule 30 mg  30 mg Oral DAILY    ferrous sulfate tablet 325 mg  325 mg Oral BID WITH MEALS    levothyroxine (SYNTHROID) tablet 150 mcg  150 mcg Oral ACB    metoprolol succinate (TOPROL-XL) XL tablet 25 mg  25 mg Oral DAILY    atorvastatin (LIPITOR) tablet 10 mg  10 mg Oral QHS    albuterol (PROVENTIL HFA, VENTOLIN HFA, PROAIR HFA) inhaler 2 Puff  2 Puff Inhalation Q6H PRN    oxyCODONE IR (ROXICODONE) tablet 5 mg  5 mg Oral Q4H PRN        Review of Systems  Constitutional: No fevers, No chills, No sweats, No fatigue, No Weakness  Eyes: No redness  Ears, nose, mouth, throat, and face: No nasal congestion, No sore throat, No voice change  Respiratory: No Shortness of Breath, No cough, No wheezing  Cardiovascular: No chest pain, No palpitations, No extremity edema  Gastrointestinal: No nausea, No vomiting, No diarrhea, + abdominal pain  Genitourinary: No frequency, No dysuria, No hematuria  Integument/breast: No skin lesion(s)   Neurological: No Confusion, No headaches, No dizziness      Objective:     Visit Vitals  BP (!) 165/65 (BP 1 Location: Left upper arm, BP Patient Position: At rest;Semi fowlers)   Pulse 74   Temp 99.4 °F (37.4 °C)   Resp 18   Ht 5' 1\" (1.549 m)   Wt 74.8 kg (164 lb 14.5 oz)   SpO2 97%   BMI 31.16 kg/m²      O2 Device: None (Room air)    Temp (24hrs), Av.8 °F (37.1 °C), Min:98.3 °F (36.8 °C), Max:99.4 °F (37.4 °C)      No intake/output data recorded.     0700  In: 550 [P.O.:50; I.V.:500]  Out: 800 [Urine:800]    PHYSICAL EXAM:  Constitutional: No acute distress  Skin: Extremities and face reveal no rashes. HEENT: Sclerae anicteric. Extra-occular muscles are intact. No oral ulcers. The neck is supple and no masses. Cardiovascular: RRR. Respiratory:  Clear breath sounds bilaterally with no wheezes, rales, or rhonchi. GI: Abdomen nondistended, soft, and nontender. Normal active bowel sounds. Musculoskeletal: No pitting edema of the lower legs. Able to move all ext  Neurological:  Patient is alert and oriented. Cranial nerves II-XII grossly intact  Psychiatric: Mood appears appropriate       Data Review    Recent Results (from the past 24 hour(s))   GLUCOSE, POC    Collection Time: 04/04/22  8:01 AM   Result Value Ref Range    Glucose (POC) 130 (H) 65 - 117 mg/dL    Performed by Nabil Knight    GLUCOSE, POC    Collection Time: 04/04/22 11:12 AM   Result Value Ref Range    Glucose (POC) 114 65 - 117 mg/dL    Performed by Moraima Gomes    GLUCOSE, POC    Collection Time: 04/04/22  4:00 PM   Result Value Ref Range    Glucose (POC) 120 (H) 65 - 117 mg/dL    Performed by Moraima Gomes    GLUCOSE, POC    Collection Time: 04/04/22  7:33 PM   Result Value Ref Range    Glucose (POC) 139 (H) 65 - 117 mg/dL    Performed by NADER PÉREZ    METABOLIC PANEL, COMPREHENSIVE    Collection Time: 04/05/22  3:35 AM   Result Value Ref Range    Sodium 141 136 - 145 mmol/L    Potassium 4.4 3.5 - 5.1 mmol/L    Chloride 108 97 - 108 mmol/L    CO2 30 21 - 32 mmol/L    Anion gap 3 (L) 5 - 15 mmol/L    Glucose 146 (H) 65 - 100 mg/dL    BUN 10 6 - 20 mg/dL    Creatinine 0.51 (L) 0.55 - 1.02 mg/dL    BUN/Creatinine ratio 20 12 - 20      GFR est AA >60 >60 ml/min/1.73m2    GFR est non-AA >60 >60 ml/min/1.73m2    Calcium 7.9 (L) 8.5 - 10.1 mg/dL    Bilirubin, total 0.3 0.2 - 1.0 mg/dL    AST (SGOT) 23 15 - 37 U/L    ALT (SGPT) 10 (L) 12 - 78 U/L    Alk.  phosphatase 42 (L) 45 - 117 U/L    Protein, total 4.4 (L) 6.4 - 8.2 g/dL    Albumin 2.0 (L) 3.5 - 5.0 g/dL    Globulin 2.4 2.0 - 4.0 g/dL    A-G Ratio 0.8 (L) 1.1 - 2.2     CBC WITH AUTOMATED DIFF    Collection Time: 04/05/22  3:35 AM   Result Value Ref Range    WBC 8.0 3.6 - 11.0 K/uL    RBC 2.72 (L) 3.80 - 5.20 M/uL    HGB 8.6 (L) 11.5 - 16.0 g/dL    HCT 26.8 (L) 35.0 - 47.0 %    MCV 98.5 80.0 - 99.0 FL    MCH 31.6 26.0 - 34.0 PG    MCHC 32.1 30.0 - 36.5 g/dL    RDW 13.7 11.5 - 14.5 %    PLATELET 991 381 - 464 K/uL    MPV 11.0 8.9 - 12.9 FL    NRBC 0.0 0.0  WBC    ABSOLUTE NRBC 0.00 0.00 - 0.01 K/uL    NEUTROPHILS 64 32 - 75 %    LYMPHOCYTES 14 12 - 49 %    MONOCYTES 11 5 - 13 %    EOSINOPHILS 9 (H) 0 - 7 %    BASOPHILS 1 0 - 1 %    IMMATURE GRANULOCYTES 1 (H) 0 - 0.5 %    ABS. NEUTROPHILS 5.2 1.8 - 8.0 K/UL    ABS. LYMPHOCYTES 1.1 0.8 - 3.5 K/UL    ABS. MONOCYTES 0.9 0.0 - 1.0 K/UL    ABS. EOSINOPHILS 0.7 (H) 0.0 - 0.4 K/UL    ABS. BASOPHILS 0.1 0.0 - 0.1 K/UL    ABS. IMM. GRANS. 0.0 0.00 - 0.04 K/UL    DF AUTOMATED         Radiology review: CT of abdomen and pelvis    Assessment:   1. Partial large bowel obstruction due to cecal mass status post right colectomy day #4  2. Abnormal urinalysis  3. Hypertension  4. Coronary artery disease with history of PAD to LAD/history of aortic valve replacement for severe aortic stenosis/AICD/pacemaker in situ  5. Type 2 diabetes  6. Hypothyroid  7. COPD without exacerbation  8. Gait instability    Plan:    1. Patient is postop exploratory laparotomy with right colectomy day #4. Intraoperative biopsy pending. Started on full liquid diet today. Waiting for bowel function to return. IV pain medication  2. Patient is status post 5 days of IV Rocephin. Urine culture negative. Been afebrile. 3.  Blood pressure stable. Continue to monitor per unit protocol. On metoprolol  4.  2D echo showed EF of 55 to 60% with abnormal diastolic dysfunction with mild mitral regurgitation and mitral stenosis. Holding aspirin and Plavix at this time. Cardiology consulted. On a statin. 5.  Hemoglobin A1c 7.8. Increased Lantus to 25 units.   Insulin sliding scale  6. On levothyroxine  7. Pulmonology consulted. Oxygen saturation within normal limits on room air. Albuterol inhaler as  8. PT OT  9. CBC and BMP in the AM     Dispo: Greater than 48 hours. Barriers include return of bowel function and tolerating oral intake with clearance from General surgery. Suspect home with home health     CODE STATUS full     DVT prophylaxis: Lovenox  Ulcer prophylaxis: N.p.o.    Care Plan discussed with: Patient/Family, Nurse and     Total time spent with patient: 33 minutes.

## 2022-04-05 NOTE — PROGRESS NOTES
Comprehensive Nutrition Assessment    Type and Reason for Visit: Initial,RD nutrition re-screen/LOS (LOS x7)    Nutrition Recommendations/Plan:   Continue diet, advance to 3 Carb, Cardiac diet when medically appropriate. Ensure HP x2/d (320 kcal, 32 gm PRO). Please document weights, % PO and ONS intake, BMs in I/Os. Nutrition Assessment:   Admitted for RLQ pain 2/2 SBO from colonic mass, malignant HTN. POD4 R. Hemicolectomy r/t cecal mass- Full Liquid diet started today- drank 50% of Lunch per Nsg. Noted h/o Mildly Thick/[place]ird Liq on prev admission. UTO UBW, food preferences at this time. RD to add ONS. Labs: A1c 7.8%, H/H 8.6/26.8, , Cr 0.51, Ca 7.9, ALT 10, ALP 42, TP 4.4, Alb 2.0, POC -226 mg/dL. Meds: D5+1/2 NS+ KCl 20 mEq @ 50 mL/hr, FerrouSul, humalog,lantus, synthroid, lopressor, plavix, dilaudid. Malnutrition Assessment:  Malnutrition Status:  Mild malnutrition    Context:  Chronic illness     Findings of the 6 clinical characteristics of malnutrition:   Energy Intake:  7 - 75% or less est energy requirements for 1 month or longer  Weight Loss:  No significant weight loss     Body Fat Loss:  Unable to assess,     Muscle Mass Loss:  Unable to assess,    Fluid Accumulation:  No significant fluid accumulation,     Strength:  Not performed     Estimated Daily Nutrient Needs:  Energy (kcal): 1755 kcal/d (30 kcal/kg); Weight Used for Energy Requirements: Adjusted (58.5 kg)  Protein (g): 65gm/d (1.1 gm/kg); Weight Used for Protein Requirements: Usual  Fluid (ml/day): 1800 mL/d; Method Used for Fluid Requirements: 1 ml/kcal    Nutrition Related Findings:   Unavailable for NFPE during attempted visits. No N/V/D/C nor c/s issues per Nsg- noted abd tenderness. On Full Liquid diet; noted h/o Nectar/Mildly Thick on prev admission. Last BM on 4/3. No edema noted.     Wounds:    None (Blanchable erythema on Bilateral Buttock.)       Current Nutrition Therapies:  ADULT DIET Full Liquid    Anthropometric Measures:  · Height:  5' 1\" (154.9 cm)  · Current Body Wt:  74.8 kg (164 lb 14.5 oz)   · Admission Body Wt:       · Usual Body Wt:  78.2 kg (172 lb 6.4 oz) (10 mths ago per chart review.)     · Ideal Body Wt:  105 lbs:  157.1 %   · Adjusted Body Weight:   ; Weight Adjustment for:     · Adjusted BMI:       · BMI Category:  Obese class 1 (BMI 30.0-34. 9)       Nutrition Diagnosis:   · Mild malnutrition,In context of chronic illness related to inadequate protein-energy intake,altered GI function as evidenced by intake 51-75%,weight loss,GI abnormality    Nutrition Interventions:   Food and/or Nutrient Delivery: Continue current diet  Nutrition Education and Counseling: No recommendations at this time  Coordination of Nutrition Care: Continue to monitor while inpatient,Feeding assistance/environmental change    Goals:  Meet >75% EENs in 5-7 days. Wt maintenance +/- 0.5kg per week. Improve labs/Lytes WNL. Nutrition Monitoring and Evaluation:   Behavioral-Environmental Outcomes: None identified  Food/Nutrient Intake Outcomes: Diet advancement/tolerance,Food and nutrient intake,Supplement intake  Physical Signs/Symptoms Outcomes: Chewing or swallowing,Biochemical data,Meal time behavior,Weight,GI status    Discharge Planning: Too soon to determine     Electronically signed by Brien Brink RD on 4/5/2022 at 3:08 PM    Contact: ext. Omar Correa or Mya.

## 2022-04-05 NOTE — PROGRESS NOTES
PHYSICAL THERAPY TREATMENT  Patient: Byron Benoit (11 y.o. female)  Date: 4/5/2022  Diagnosis: Colonic mass [K63.89]  SBO (small bowel obstruction) (HCC) [K56.609]  HTN (hypertension), malignant [I10] <principal problem not specified>  Procedure(s) (LRB):  LAPAROTOMY EXPLORATORY And Right Colectomy (Right) 4 Days Post-Op  Precautions:    Chart, physical therapy assessment, plan of care and goals were reviewed. ASSESSMENT  Patient continues with skilled PT services and is progressing towards goals. Patient seated in chair with nursing attempting to play IV line and agreed to therapy session once nursing was finished. Once nursing finished with IV patient performed seated TE ( see details below). Patient performed STS at A to  for donning underpants for ambulation. Patient able to don under pants with pad independently while standing without AD. Patient ambulated with RW at [facility] for 170 feet. Patient had steady gait with shortened stride length, but no LOB or knee buckling. Patient did have minor  SOB after ambulation but recovered quickly. Patient returned to chair and removed underpants at A. Patient left supine in bed with call bell within reach and all needs meet. Current Level of Function Impacting Discharge (mobility/balance): poor activity tolerance    Other factors to consider for discharge: PLOF, assistance at home, level of deficits, PMH         PLAN :  Patient continues to benefit from skilled intervention to address the above impairments. Continue treatment per established plan of care. to address goals.     Recommendation for discharge: (in order for the patient to meet his/her long term goals)  Home with [address] [place] [address]    This discharge recommendation:  Has been made in collaboration with the attending provider and/or case management    IF patient discharges home will need the following DME: rolling walker       SUBJECTIVE:   Patient stated im stronger than I was yesterday.     OBJECTIVE DATA SUMMARY:   Critical Behavior:  Neurologic State: Alert  Orientation Level: Oriented X4  Cognition: Appropriate decision making,Follows commands     Functional Mobility Training:  Transfers:  Sit to Stand: Stand-by assistance  Stand to Sit: Stand-by assistance  Balance:  Sitting: Intact; Without support  Sitting - Static: Good (unsupported)  Sitting - Dynamic: Good (unsupported)  Standing: Intact; With support  Standing - Static: Constant support;Good  Standing - Dynamic : Constant support;Good  Ambulation/Gait Training:  Distance (ft): 170 Feet (ft)  Assistive Device: Gait belt;Walker, rolling  Ambulation - Level of Assistance: Stand-by assistance;Contact guard assistance  Speed/Roya: Slow  Step Length: Left shortened;Right shortened  Therapeutic Exercises:   1x20 Ap  1x20 LAQ  1x20 seated marches  1x20 hip ADD/ABD  Pain Rating:  No pain reported during session    Activity Tolerance:   Good and observed SOB with activity  Please refer to the flowsheet for vital signs taken during this treatment. After treatment patient left in no apparent distress:   Sitting in chair and Call bell within reach    COMMUNICATION/COLLABORATION:   The patients plan of care was discussed with: Registered nurse. Problem: Mobility Impaired (Adult and Pediatric)  Goal: *Acute Goals and Plan of Care (Insert Text)  Description: Physical Therapy Goals  Initiated 4/2/22  1)  I with HEP in 7 days to improve overall functional mobility. 2)  Bed mobility and transfers I in 7 days to prevent skin breakdown. 3)  Pt to amb 300ft with LRAD and sup in 7 days    Pt. Goal:  Pt to be able to walk safely without falls.      Outcome: Progressing Towards Goal       Dayana Zhang PTA   Time Calculation: 30 mins

## 2022-04-06 LAB
ALBUMIN SERPL-MCNC: 2 G/DL (ref 3.5–5)
ALBUMIN/GLOB SERPL: 0.9 {RATIO} (ref 1.1–2.2)
ALP SERPL-CCNC: 42 U/L (ref 45–117)
ALT SERPL-CCNC: 12 U/L (ref 12–78)
ANION GAP SERPL CALC-SCNC: 2 MMOL/L (ref 5–15)
AST SERPL W P-5'-P-CCNC: 28 U/L (ref 15–37)
BASOPHILS # BLD: 0.1 K/UL (ref 0–0.1)
BASOPHILS NFR BLD: 1 % (ref 0–1)
BILIRUB SERPL-MCNC: 0.3 MG/DL (ref 0.2–1)
BUN SERPL-MCNC: 7 MG/DL (ref 6–20)
BUN/CREAT SERPL: 13 (ref 12–20)
CA-I BLD-MCNC: 8.2 MG/DL (ref 8.5–10.1)
CHLORIDE SERPL-SCNC: 106 MMOL/L (ref 97–108)
CO2 SERPL-SCNC: 30 MMOL/L (ref 21–32)
CREAT SERPL-MCNC: 0.52 MG/DL (ref 0.55–1.02)
DIFFERENTIAL METHOD BLD: ABNORMAL
EOSINOPHIL # BLD: 0.6 K/UL (ref 0–0.4)
EOSINOPHIL NFR BLD: 8 % (ref 0–7)
ERYTHROCYTE [DISTWIDTH] IN BLOOD BY AUTOMATED COUNT: 13.7 % (ref 11.5–14.5)
GLOBULIN SER CALC-MCNC: 2.3 G/DL (ref 2–4)
GLUCOSE BLD STRIP.AUTO-MCNC: 157 MG/DL (ref 65–117)
GLUCOSE BLD STRIP.AUTO-MCNC: 175 MG/DL (ref 65–117)
GLUCOSE BLD STRIP.AUTO-MCNC: 237 MG/DL (ref 65–117)
GLUCOSE BLD STRIP.AUTO-MCNC: 255 MG/DL (ref 65–117)
GLUCOSE BLD STRIP.AUTO-MCNC: 283 MG/DL (ref 65–117)
GLUCOSE SERPL-MCNC: 147 MG/DL (ref 65–100)
HCT VFR BLD AUTO: 26.8 % (ref 35–47)
HGB BLD-MCNC: 8.5 G/DL (ref 11.5–16)
IMM GRANULOCYTES # BLD AUTO: 0 K/UL (ref 0–0.04)
IMM GRANULOCYTES NFR BLD AUTO: 0 % (ref 0–0.5)
LYMPHOCYTES # BLD: 1.1 K/UL (ref 0.8–3.5)
LYMPHOCYTES NFR BLD: 15 % (ref 12–49)
MCH RBC QN AUTO: 31 PG (ref 26–34)
MCHC RBC AUTO-ENTMCNC: 31.7 G/DL (ref 30–36.5)
MCV RBC AUTO: 97.8 FL (ref 80–99)
MONOCYTES # BLD: 0.8 K/UL (ref 0–1)
MONOCYTES NFR BLD: 11 % (ref 5–13)
NEUTS SEG # BLD: 4.8 K/UL (ref 1.8–8)
NEUTS SEG NFR BLD: 65 % (ref 32–75)
NRBC # BLD: 0 K/UL (ref 0–0.01)
NRBC BLD-RTO: 0 PER 100 WBC
PERFORMED BY, TECHID: ABNORMAL
PLATELET # BLD AUTO: 363 K/UL (ref 150–400)
PMV BLD AUTO: 11.1 FL (ref 8.9–12.9)
POTASSIUM SERPL-SCNC: 4.3 MMOL/L (ref 3.5–5.1)
PROT SERPL-MCNC: 4.3 G/DL (ref 6.4–8.2)
RBC # BLD AUTO: 2.74 M/UL (ref 3.8–5.2)
SODIUM SERPL-SCNC: 138 MMOL/L (ref 136–145)
WBC # BLD AUTO: 7.4 K/UL (ref 3.6–11)

## 2022-04-06 PROCEDURE — 85025 COMPLETE CBC W/AUTO DIFF WBC: CPT

## 2022-04-06 PROCEDURE — 97116 GAIT TRAINING THERAPY: CPT

## 2022-04-06 PROCEDURE — 82962 GLUCOSE BLOOD TEST: CPT

## 2022-04-06 PROCEDURE — 36415 COLL VENOUS BLD VENIPUNCTURE: CPT

## 2022-04-06 PROCEDURE — 74011250636 HC RX REV CODE- 250/636: Performed by: COLON & RECTAL SURGERY

## 2022-04-06 PROCEDURE — 74011250637 HC RX REV CODE- 250/637: Performed by: STUDENT IN AN ORGANIZED HEALTH CARE EDUCATION/TRAINING PROGRAM

## 2022-04-06 PROCEDURE — 99024 POSTOP FOLLOW-UP VISIT: CPT | Performed by: COLON & RECTAL SURGERY

## 2022-04-06 PROCEDURE — 74011250637 HC RX REV CODE- 250/637: Performed by: COLON & RECTAL SURGERY

## 2022-04-06 PROCEDURE — 80053 COMPREHEN METABOLIC PANEL: CPT

## 2022-04-06 PROCEDURE — 97530 THERAPEUTIC ACTIVITIES: CPT

## 2022-04-06 PROCEDURE — 65270000029 HC RM PRIVATE

## 2022-04-06 PROCEDURE — 74011636637 HC RX REV CODE- 636/637: Performed by: STUDENT IN AN ORGANIZED HEALTH CARE EDUCATION/TRAINING PROGRAM

## 2022-04-06 RX ADMIN — ENOXAPARIN SODIUM 40 MG: 40 INJECTION SUBCUTANEOUS at 11:09

## 2022-04-06 RX ADMIN — ASPIRIN 81 MG: 81 TABLET, COATED ORAL at 08:35

## 2022-04-06 RX ADMIN — FERROUS SULFATE TAB 325 MG (65 MG ELEMENTAL FE) 325 MG: 325 (65 FE) TAB at 08:36

## 2022-04-06 RX ADMIN — CLOPIDOGREL BISULFATE 75 MG: 75 TABLET ORAL at 08:36

## 2022-04-06 RX ADMIN — INSULIN LISPRO 2 UNITS: 100 INJECTION, SOLUTION INTRAVENOUS; SUBCUTANEOUS at 11:09

## 2022-04-06 RX ADMIN — INSULIN GLARGINE 25 UNITS: 100 INJECTION, SOLUTION SUBCUTANEOUS at 22:21

## 2022-04-06 RX ADMIN — TRAMADOL HYDROCHLORIDE 50 MG: 50 TABLET, COATED ORAL at 20:27

## 2022-04-06 RX ADMIN — LEVOTHYROXINE SODIUM 150 MCG: 0.07 TABLET ORAL at 08:36

## 2022-04-06 RX ADMIN — INSULIN LISPRO 2 UNITS: 100 INJECTION, SOLUTION INTRAVENOUS; SUBCUTANEOUS at 16:31

## 2022-04-06 RX ADMIN — ATORVASTATIN CALCIUM 10 MG: 10 TABLET, FILM COATED ORAL at 22:21

## 2022-04-06 RX ADMIN — HYDROMORPHONE HYDROCHLORIDE 1 MG: 1 INJECTION, SOLUTION INTRAMUSCULAR; INTRAVENOUS; SUBCUTANEOUS at 05:13

## 2022-04-06 RX ADMIN — TRAMADOL HYDROCHLORIDE 50 MG: 50 TABLET, COATED ORAL at 12:01

## 2022-04-06 RX ADMIN — INSULIN LISPRO 2 UNITS: 100 INJECTION, SOLUTION INTRAVENOUS; SUBCUTANEOUS at 22:21

## 2022-04-06 RX ADMIN — DULOXETINE HYDROCHLORIDE 30 MG: 30 CAPSULE, DELAYED RELEASE ORAL at 08:36

## 2022-04-06 RX ADMIN — METOPROLOL SUCCINATE 25 MG: 25 TABLET, EXTENDED RELEASE ORAL at 08:36

## 2022-04-06 RX ADMIN — FERROUS SULFATE TAB 325 MG (65 MG ELEMENTAL FE) 325 MG: 325 (65 FE) TAB at 16:31

## 2022-04-06 NOTE — PROGRESS NOTES
Pulmonary Progress Note    Subjective:   Daily Progress Note: 2022 10:10 AM    Dennise Felipe is a [de-identified]year old female PMH of COPD, diabetes, HTN, HLD, Pacemaker, AICD, HX of cardiac stents who is S/p day 3 for right hemicolectomy . Patient is alert and oriented and doing well. No respiratory distress. Review of Systems     Constitutional: Negative. HENT: Negative. Eyes: Negative. Respiratory: Negative for shortness of breath. Cardiovascular: Negative. Gastrointestinal: Positive for abdominal pain. Status post surgery bandage in place  Genitourinary: Negative. Musculoskeletal: Negative. Skin: Negative. Neurological: Negative. Psychiatric/Behavioral: Negative. Objective:     Visit Vitals  BP (!) 144/61 (BP 1 Location: Left upper arm, BP Patient Position: At rest)   Pulse 68   Temp 98 °F (36.7 °C)   Resp 20   Ht 5' 1\" (1.549 m)   Wt 74.8 kg (164 lb 14.5 oz)   SpO2 94%   BMI 31.16 kg/m²      O2 Device: None (Room air)    Temp (24hrs), Av.5 °F (36.9 °C), Min:98 °F (36.7 °C), Max:99.1 °F (37.3 °C)      No intake/output data recorded.  1901 -  0700  In: 7970 [P.O.:560; I.V.:900]  Out: 80 [Urine:930]    Physical Exam  Constitutional:       Appearance: Normal appearance. HENT:      Head: Normocephalic and atraumatic. Nose: Nose normal.      Mouth/Throat:      Mouth: Mucous membranes are moist.   Eyes:      Pupils: Pupils are equal, round, and reactive to light. Cardiovascular:      Rate and Rhythm: Normal rate. Regular rhythm     Pulses: Normal pulses. Heart sounds: Normal heart sounds. Pulmonary:      Effort: Pulmonary effort is normal.      Breath sounds: Normal breath sounds. Abdominal:      General: Bowel sounds are present not distended     Tenderness: There is mild abdominal tenderness. Musculoskeletal:         General: Normal range of motion. Cervical back: Normal range of motion and neck supple. Skin:     General: Skin is warm. Neurological:      General: No focal deficit present. Mental Status: She is alert. Psychiatric:         Mood and Affect: Mood normal.         Data Review    Recent Results (from the past 24 hour(s))   GLUCOSE, POC    Collection Time: 04/05/22 11:13 AM   Result Value Ref Range    Glucose (POC) 226 (H) 65 - 117 mg/dL    Performed by Elsa Melo RN ([facility])    GLUCOSE, POC    Collection Time: 04/05/22  4:04 PM   Result Value Ref Range    Glucose (POC) 221 (H) 65 - 117 mg/dL    Performed by Radha Garduno, POC    Collection Time: 04/05/22  8:55 PM   Result Value Ref Range    Glucose (POC) 273 (H) 65 - 117 mg/dL    Performed by KELSIE BRIONES    METABOLIC PANEL, COMPREHENSIVE    Collection Time: 04/06/22  6:20 AM   Result Value Ref Range    Sodium 138 136 - 145 mmol/L    Potassium 4.3 3.5 - 5.1 mmol/L    Chloride 106 97 - 108 mmol/L    CO2 30 21 - 32 mmol/L    Anion gap 2 (L) 5 - 15 mmol/L    Glucose 147 (H) 65 - 100 mg/dL    BUN 7 6 - 20 mg/dL    Creatinine 0.52 (L) 0.55 - 1.02 mg/dL    BUN/Creatinine ratio 13 12 - 20      GFR est AA >60 >60 ml/min/1.73m2    GFR est non-AA >60 >60 ml/min/1.73m2    Calcium 8.2 (L) 8.5 - 10.1 mg/dL    Bilirubin, total 0.3 0.2 - 1.0 mg/dL    AST (SGOT) 28 15 - 37 U/L    ALT (SGPT) 12 12 - 78 U/L    Alk.  phosphatase 42 (L) 45 - 117 U/L    Protein, total 4.3 (L) 6.4 - 8.2 g/dL    Albumin 2.0 (L) 3.5 - 5.0 g/dL    Globulin 2.3 2.0 - 4.0 g/dL    A-G Ratio 0.9 (L) 1.1 - 2.2     CBC WITH AUTOMATED DIFF    Collection Time: 04/06/22  6:20 AM   Result Value Ref Range    WBC 7.4 3.6 - 11.0 K/uL    RBC 2.74 (L) 3.80 - 5.20 M/uL    HGB 8.5 (L) 11.5 - 16.0 g/dL    HCT 26.8 (L) 35.0 - 47.0 %    MCV 97.8 80.0 - 99.0 FL    MCH 31.0 26.0 - 34.0 PG    MCHC 31.7 30.0 - 36.5 g/dL    RDW 13.7 11.5 - 14.5 %    PLATELET 578 592 - 750 K/uL    MPV 11.1 8.9 - 12.9 FL    NRBC 0.0 0.0  WBC    ABSOLUTE NRBC 0.00 0.00 - 0.01 K/uL    NEUTROPHILS 65 32 - 75 %    LYMPHOCYTES 15 12 - 49 % MONOCYTES 11 5 - 13 %    EOSINOPHILS 8 (H) 0 - 7 %    BASOPHILS 1 0 - 1 %    IMMATURE GRANULOCYTES 0 0 - 0.5 %    ABS. NEUTROPHILS 4.8 1.8 - 8.0 K/UL    ABS. LYMPHOCYTES 1.1 0.8 - 3.5 K/UL    ABS. MONOCYTES 0.8 0.0 - 1.0 K/UL    ABS. EOSINOPHILS 0.6 (H) 0.0 - 0.4 K/UL    ABS. BASOPHILS 0.1 0.0 - 0.1 K/UL    ABS. IMM.  GRANS. 0.0 0.00 - 0.04 K/UL    DF AUTOMATED     GLUCOSE, POC    Collection Time: 04/06/22  6:45 AM   Result Value Ref Range    Glucose (POC) 175 (H) 65 - 117 mg/dL    Performed by Arsalan Martin    GLUCOSE, POC    Collection Time: 04/06/22  7:46 AM   Result Value Ref Range    Glucose (POC) 157 (H) 65 - 117 mg/dL    Performed by Cedric [facility]        Current Facility-Administered Medications   Medication Dose Route Frequency    insulin glargine (LANTUS) injection 25 Units  25 Units SubCUTAneous QHS    hydrALAZINE (APRESOLINE) 20 mg/mL injection 10 mg  10 mg IntraVENous Q4H PRN    enoxaparin (LOVENOX) injection 40 mg  40 mg SubCUTAneous Q24H    dextrose 5% - 0.45% NaCl with KCl 20 mEq/L infusion  50 mL/hr IntraVENous CONTINUOUS    ketorolac (TORADOL) injection 15 mg  15 mg IntraVENous Q6H PRN    traMADoL (ULTRAM) tablet 50 mg  50 mg Oral Q6H PRN    HYDROmorphone (DILAUDID) injection 1 mg  1 mg IntraVENous Q4H PRN    HYDROmorphone (DILAUDID) syringe 0.5 mg  0.5 mg IntraVENous Q4H PRN    glucose chewable tablet 16 g  4 Tablet Oral PRN    dextrose 10% infusion 0-250 mL  0-250 mL IntraVENous PRN    glucagon (GLUCAGEN) injection 1 mg  1 mg IntraMUSCular PRN    acetaminophen (TYLENOL) tablet 650 mg  650 mg Oral Q6H PRN    Or    acetaminophen (TYLENOL) suppository 650 mg  650 mg Rectal Q6H PRN    ondansetron (ZOFRAN ODT) tablet 4 mg  4 mg Oral Q8H PRN    Or    ondansetron (ZOFRAN) injection 4 mg  4 mg IntraVENous Q6H PRN    insulin lispro (HUMALOG) injection   SubCUTAneous AC&HS    aspirin delayed-release tablet 81 mg  81 mg Oral DAILY    clopidogreL (PLAVIX) tablet 75 mg  75 mg Oral DAILY    DULoxetine (CYMBALTA) capsule 30 mg  30 mg Oral DAILY    ferrous sulfate tablet 325 mg  325 mg Oral BID WITH MEALS    levothyroxine (SYNTHROID) tablet 150 mcg  150 mcg Oral ACB    metoprolol succinate (TOPROL-XL) XL tablet 25 mg  25 mg Oral DAILY    atorvastatin (LIPITOR) tablet 10 mg  10 mg Oral QHS    albuterol (PROVENTIL HFA, VENTOLIN HFA, PROAIR HFA) inhaler 2 Puff  2 Puff Inhalation Q6H PRN    oxyCODONE IR (ROXICODONE) tablet 5 mg  5 mg Oral Q4H PRN         Assessment/Plan:        1. Chronic Obstructive Pulmonary Disease without excebation  2. Partial large Bowel Obstruction  3. Status post right hemicolectomy  4. Diabetes Mellitus type 2  5. Essential Hypertension  6. Thyroid Disease   7. 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 She had aortic valve replaced previously also she has stent placement in the past chest x-ray no acute infiltrate or lung mass. Last showed Echocardiogram showed EF of 55 to 60% with grade 1 diastolic dysfunction. 2. Creatinine normal  3. Patient underwent right hemicolectomy doing fairly well no flatus yet  4. monitor respiratory status and O2 PRN  5.  Follow up with Pulmonary outpatient as needed for COPD management

## 2022-04-06 NOTE — PROGRESS NOTES
Hospitalist Progress Note    Subjective:   Daily Progress Note: 4/6/2022 8:22 AM    Hospital Course: Patient is a [de-identified]year old female with a PMH of diabetes, hypertension, COPD,  AICD, CAD with stents, who presented with right-sided lower abdominal pain. CT abdomen showed masslike thickening within the ascending colon, trace ascites within the right abdomen, cholelithiasis, and subtle morphologic changes of the liver. Initial labs significant for WBC of 12.1. UA + nitrites with 5 -10 WBC. Urine culture negative. Chest x-ray showed no acute cardiopulmonary abnormality. Patient to be admitted for further management. General surgery, cardiology, and pulmonology consulted. Patient started on ceftriaxone and placed on clear liquid diet. Exploratory laparotomy with right colectomy on 4/1. Surgery found near obstructing tumor at proximal ascending colon with distended cecum with areas of gangrene in the wall. Ascitic fluid pending.n CEA 0.5      Subjective: Patient tolerating liquid diet. No BM or flatus. Abdominal pain min.      Current Facility-Administered Medications   Medication Dose Route Frequency    insulin glargine (LANTUS) injection 25 Units  25 Units SubCUTAneous QHS    hydrALAZINE (APRESOLINE) 20 mg/mL injection 10 mg  10 mg IntraVENous Q4H PRN    enoxaparin (LOVENOX) injection 40 mg  40 mg SubCUTAneous Q24H    dextrose 5% - 0.45% NaCl with KCl 20 mEq/L infusion  50 mL/hr IntraVENous CONTINUOUS    ketorolac (TORADOL) injection 15 mg  15 mg IntraVENous Q6H PRN    traMADoL (ULTRAM) tablet 50 mg  50 mg Oral Q6H PRN    HYDROmorphone (DILAUDID) injection 1 mg  1 mg IntraVENous Q4H PRN    HYDROmorphone (DILAUDID) syringe 0.5 mg  0.5 mg IntraVENous Q4H PRN    glucose chewable tablet 16 g  4 Tablet Oral PRN    dextrose 10% infusion 0-250 mL  0-250 mL IntraVENous PRN    glucagon (GLUCAGEN) injection 1 mg  1 mg IntraMUSCular PRN    acetaminophen (TYLENOL) tablet 650 mg  650 mg Oral Q6H PRN    Or    acetaminophen (TYLENOL) suppository 650 mg  650 mg Rectal Q6H PRN    ondansetron (ZOFRAN ODT) tablet 4 mg  4 mg Oral Q8H PRN    Or    ondansetron (ZOFRAN) injection 4 mg  4 mg IntraVENous Q6H PRN    insulin lispro (HUMALOG) injection   SubCUTAneous AC&HS    aspirin delayed-release tablet 81 mg  81 mg Oral DAILY    clopidogreL (PLAVIX) tablet 75 mg  75 mg Oral DAILY    DULoxetine (CYMBALTA) capsule 30 mg  30 mg Oral DAILY    ferrous sulfate tablet 325 mg  325 mg Oral BID WITH MEALS    levothyroxine (SYNTHROID) tablet 150 mcg  150 mcg Oral ACB    metoprolol succinate (TOPROL-XL) XL tablet 25 mg  25 mg Oral DAILY    atorvastatin (LIPITOR) tablet 10 mg  10 mg Oral QHS    albuterol (PROVENTIL HFA, VENTOLIN HFA, PROAIR HFA) inhaler 2 Puff  2 Puff Inhalation Q6H PRN    oxyCODONE IR (ROXICODONE) tablet 5 mg  5 mg Oral Q4H PRN        Review of Systems  Constitutional: No fevers, No chills, No sweats, No fatigue, No Weakness  Eyes: No redness  Ears, nose, mouth, throat, and face: No nasal congestion, No sore throat, No voice change  Respiratory: No Shortness of Breath, No cough, No wheezing  Cardiovascular: No chest pain, No palpitations, No extremity edema  Gastrointestinal: No nausea, No vomiting, No diarrhea, + abdominal pain  Genitourinary: No frequency, No dysuria, No hematuria  Integument/breast: No skin lesion(s)   Neurological: No Confusion, No headaches, No dizziness      Objective:     Visit Vitals  BP (!) 148/59 (BP 1 Location: Left upper arm, BP Patient Position: At rest)   Pulse 69   Temp 98.4 °F (36.9 °C)   Resp 16   Ht 5' 1\" (1.549 m)   Wt 74.8 kg (164 lb 14.5 oz)   SpO2 97%   BMI 31.16 kg/m²      O2 Device: None (Room air)    Temp (24hrs), Av.6 °F (37 °C), Min:98.4 °F (36.9 °C), Max:99.1 °F (37.3 °C)      No intake/output data recorded.    1901 -  0700  In: 1460 [P.O.:560; I.V.:900]  Out: 80 [Urine:930]    PHYSICAL EXAM:  Constitutional: No acute distress  Skin: Extremities and face reveal no rashes. HEENT: Sclerae anicteric. Extra-occular muscles are intact  Cardiovascular: RRR. Respiratory:  Clear breath sounds bilaterally with no wheezes, rales, or rhonchi. GI: Abdomen nondistended, soft, and nontender. Normal active bowel sounds. Musculoskeletal: No pitting edema of the lower legs. Able to move all ext  Neurological:  Patient is alert and oriented. Cranial nerves II-XII grossly intact  Psychiatric: Mood appears appropriate       Data Review    Recent Results (from the past 24 hour(s))   GLUCOSE, POC    Collection Time: 04/05/22  8:48 AM   Result Value Ref Range    Glucose (POC) 179 (H) 65 - 117 mg/dL    Performed by Carson Samayoa    GLUCOSE, POC    Collection Time: 04/05/22 11:13 AM   Result Value Ref Range    Glucose (POC) 226 (H) 65 - 117 mg/dL    Performed by Mark Greenberg RN ([employer])    GLUCOSE, POC    Collection Time: 04/05/22  4:04 PM   Result Value Ref Range    Glucose (POC) 221 (H) 65 - 117 mg/dL    Performed by Thelma Perez, POC    Collection Time: 04/05/22  8:55 PM   Result Value Ref Range    Glucose (POC) 273 (H) 65 - 117 mg/dL    Performed by KELSIE BRIONES    METABOLIC PANEL, COMPREHENSIVE    Collection Time: 04/06/22  6:20 AM   Result Value Ref Range    Sodium 138 136 - 145 mmol/L    Potassium 4.3 3.5 - 5.1 mmol/L    Chloride 106 97 - 108 mmol/L    CO2 30 21 - 32 mmol/L    Anion gap 2 (L) 5 - 15 mmol/L    Glucose 147 (H) 65 - 100 mg/dL    BUN 7 6 - 20 mg/dL    Creatinine 0.52 (L) 0.55 - 1.02 mg/dL    BUN/Creatinine ratio 13 12 - 20      GFR est AA >60 >60 ml/min/1.73m2    GFR est non-AA >60 >60 ml/min/1.73m2    Calcium 8.2 (L) 8.5 - 10.1 mg/dL    Bilirubin, total 0.3 0.2 - 1.0 mg/dL    AST (SGOT) 28 15 - 37 U/L    ALT (SGPT) 12 12 - 78 U/L    Alk.  phosphatase 42 (L) 45 - 117 U/L    Protein, total 4.3 (L) 6.4 - 8.2 g/dL    Albumin 2.0 (L) 3.5 - 5.0 g/dL    Globulin 2.3 2.0 - 4.0 g/dL    A-G Ratio 0.9 (L) 1.1 - 2.2     CBC WITH AUTOMATED DIFF    Collection Time: 04/06/22  6:20 AM   Result Value Ref Range    WBC 7.4 3.6 - 11.0 K/uL    RBC 2.74 (L) 3.80 - 5.20 M/uL    HGB 8.5 (L) 11.5 - 16.0 g/dL    HCT 26.8 (L) 35.0 - 47.0 %    MCV 97.8 80.0 - 99.0 FL    MCH 31.0 26.0 - 34.0 PG    MCHC 31.7 30.0 - 36.5 g/dL    RDW 13.7 11.5 - 14.5 %    PLATELET 277 659 - 349 K/uL    MPV 11.1 8.9 - 12.9 FL    NRBC 0.0 0.0  WBC    ABSOLUTE NRBC 0.00 0.00 - 0.01 K/uL    NEUTROPHILS 65 32 - 75 %    LYMPHOCYTES 15 12 - 49 %    MONOCYTES 11 5 - 13 %    EOSINOPHILS 8 (H) 0 - 7 %    BASOPHILS 1 0 - 1 %    IMMATURE GRANULOCYTES 0 0 - 0.5 %    ABS. NEUTROPHILS 4.8 1.8 - 8.0 K/UL    ABS. LYMPHOCYTES 1.1 0.8 - 3.5 K/UL    ABS. MONOCYTES 0.8 0.0 - 1.0 K/UL    ABS. EOSINOPHILS 0.6 (H) 0.0 - 0.4 K/UL    ABS. BASOPHILS 0.1 0.0 - 0.1 K/UL    ABS. IMM. GRANS. 0.0 0.00 - 0.04 K/UL    DF AUTOMATED     GLUCOSE, POC    Collection Time: 04/06/22  6:45 AM   Result Value Ref Range    Glucose (POC) 175 (H) 65 - 117 mg/dL    Performed by Anna Basilio    GLUCOSE, POC    Collection Time: 04/06/22  7:46 AM   Result Value Ref Range    Glucose (POC) 157 (H) 65 - 117 mg/dL    Performed by Kary Garcia        Radiology review: CT of abdomen and pelvis    Assessment:   1. 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 Abnormal urinalysis  3. Hypertension  4. Coronary artery disease with history of PAD to LAD/history of aortic valve replacement for severe aortic stenosis/AICD/pacemaker in situ  5. Type 2 diabetes  6. Hypothyroid  7. COPD without exacerbation  8. Gait instability    Plan:    1. Patient is postop exploratory laparotomy with right colectomy day #5. Intraoperative biopsy pending. Started on full liquid diet. Waiting for bowel function to return. IV pain medication  2. Patient is status post 5 days of IV Rocephin. Urine culture negative. Been afebrile. 3.  Blood pressure stable. Continue to monitor per unit protocol.   On metoprolol  4.  2D echo showed EF of 55 to 60% with abnormal diastolic dysfunction with mild mitral regurgitation and mitral stenosis. On aspirin and Plavix. Cardiology consulted. On a statin. 5.  Hemoglobin A1c 7.8. Increased Lantus to 25 units. Insulin sliding scale. Glucose levels stable  6. On levothyroxine  7. Pulmonology consulted. Oxygen saturation within normal limits on room air. Albuterol inhaler as  8. PT OT  9. CBC and BMP in the AM     Dispo: Greater than 48 hours. Barriers include return of bowel function and tolerating oral intake with clearance from General surgery. Suspect home with home health     CODE STATUS full     DVT prophylaxis: Lovenox  Ulcer prophylaxis: Tolerating liquid diet     Care Plan discussed with: Patient/Family, Nurse and     Total time spent with patient: 34 minutes.

## 2022-04-06 NOTE — PROGRESS NOTES
Problem: Falls - Risk of  Goal: *Absence of Falls  Description: Document Morene Hole Fall Risk and appropriate interventions in the flowsheet. Outcome: Progressing Towards Goal  Note: Fall Risk Interventions:  Mobility Interventions: Patient to call before getting OOB         Medication Interventions: Patient to call before getting OOB,Teach patient to arise slowly    Elimination Interventions: Call light in reach,Patient to call for help with toileting needs    History of Falls Interventions: Bed/chair exit alarm         Problem: Patient Education: Go to Patient Education Activity  Goal: Patient/Family Education  Outcome: Progressing Towards Goal     Problem: Pressure Injury - Risk of  Goal: *Prevention of pressure injury  Description: Document Delta Scale and appropriate interventions in the flowsheet. Outcome: Progressing Towards Goal  Note: Pressure Injury Interventions:  Sensory Interventions: Check visual cues for pain,Keep linens dry and wrinkle-free,Maintain/enhance activity level,Minimize linen layers,Turn and reposition approx. every two hours (pillows and wedges if needed)    Moisture Interventions: Absorbent underpads,Minimize layers    Activity Interventions: Increase time out of bed,PT/OT evaluation    Mobility Interventions: HOB 30 degrees or less,PT/OT evaluation,Turn and reposition approx.  every two hours(pillow and wedges)    Nutrition Interventions: Document food/fluid/supplement intake    Friction and Shear Interventions: HOB 30 degrees or less,Minimize layers                Problem: Patient Education: Go to Patient Education Activity  Goal: Patient/Family Education  Outcome: Progressing Towards Goal     Problem: Pain  Goal: *Control of Pain  Outcome: Progressing Towards Goal  Goal: *PALLIATIVE CARE:  Alleviation of Pain  Outcome: Progressing Towards Goal     Problem: Patient Education: Go to Patient Education Activity  Goal: Patient/Family Education  Outcome: Progressing Towards Goal Problem: Diabetes Self-Management  Goal: *Disease process and treatment process  Description: Define diabetes and identify own type of diabetes; list 3 options for treating diabetes. Outcome: Progressing Towards Goal  Goal: *Incorporating nutritional management into lifestyle  Description: Describe effect of type, amount and timing of food on blood glucose; list 3 methods for planning meals. Outcome: Progressing Towards Goal  Goal: *Incorporating physical activity into lifestyle  Description: State effect of exercise on blood glucose levels. Outcome: Progressing Towards Goal  Goal: *Developing strategies to promote health/change behavior  Description: Define the ABC's of diabetes; identify appropriate screenings, schedule and personal plan for screenings. Outcome: Progressing Towards Goal  Goal: *Using medications safely  Description: State effect of diabetes medications on diabetes; name diabetes medication taking, action and side effects. Outcome: Progressing Towards Goal  Goal: *Monitoring blood glucose, interpreting and using results  Description: Identify recommended blood glucose targets  and personal targets. Outcome: Progressing Towards Goal  Goal: *Prevention, detection, treatment of acute complications  Description: List symptoms of hyper- and hypoglycemia; describe how to treat low blood sugar and actions for lowering  high blood glucose level. Outcome: Progressing Towards Goal  Goal: *Prevention, detection and treatment of chronic complications  Description: Define the natural course of diabetes and describe the relationship of blood glucose levels to long term complications of diabetes.   Outcome: Progressing Towards Goal  Goal: *Developing strategies to address psychosocial issues  Description: Describe feelings about living with diabetes; identify support needed and support network  Outcome: Progressing Towards Goal  Goal: *Insulin pump training  Outcome: Progressing Towards Goal  Goal: *Sick day guidelines  Outcome: Progressing Towards Goal  Goal: *Patient Specific Goal (EDIT GOAL, INSERT TEXT)  Outcome: Progressing Towards Goal     Problem: Patient Education: Go to Patient Education Activity  Goal: Patient/Family Education  Outcome: Progressing Towards Goal     Problem: Patient Education: Go to Patient Education Activity  Goal: Patient/Family Education  Outcome: Progressing Towards Goal     Problem: Patient Education: Go to Patient Education Activity  Goal: Patient/Family Education  Outcome: Progressing Towards Goal

## 2022-04-06 NOTE — PROGRESS NOTES
PHYSICAL THERAPY TREATMENT  Patient: Mirna Caro (10 y.o. 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 ASSESSMENT  Patient continues with skilled PT services and is progressing towards goals. Patient supine in bed upon approach and agreed to therapy today. Patient was SBA for rolling, supine> sit and scooting to EOB. Patient demonstrated good unsupported static and dynamic sitting balance while sitting EOB and putting on socks independently. Patient performed STS to RW where patient was able to don under pants for ambulation. Patient ambulated with RW and SBA for 230 feet in hallway. Patient had slow but steady gait with step through gait pattern and no LOB or Knee buckling. Patient returned to seated in bedside chair at [place] [id number]. Patient performed review of TE ( see details below). Patient left seated in chair with call bell within reach and all needs meet. Current Level of Function Impacting Discharge (mobility/balance): general weakness, reduced activity tolerance    Other factors to consider for discharge: PLOF, assistance at home, PMH, level of deficits. PLAN :  Patient continues to benefit from skilled intervention to address the above impairments. Continue treatment per established plan of care. to address goals.     Recommendation for discharge: (in order for the patient to meet his/her long term goals)  Home with [address] [place] [address]    This discharge recommendation:  Has been made in collaboration with the attending provider and/or case management    IF patient discharges home will need the following DME: rolling walker       SUBJECTIVE:   Patient stated Beau Larios can try whatever you want me to do .     OBJECTIVE DATA SUMMARY:   Critical Behavior:  Neurologic State: Alert  Orientation Level: Oriented X4  Cognition: Follows commands,Impulsive     Functional Mobility Training:  Bed Mobility:  Rolling: Stand-by assistance  Supine to Sit: Stand-by assistance  Scooting: Stand-by assistance  Transfers:  Sit to Stand: Stand-by assistance  Stand to Sit: Stand-by assistance  Balance:  Sitting: Intact; Without support  Sitting - Static: Good (unsupported)  Sitting - Dynamic: Good (unsupported)  Standing: Intact; With support  Standing - Static: Constant support;Good  Standing - Dynamic : Constant support;Good  Ambulation/Gait Training:  Distance (ft): 230 Feet (ft)  Assistive Device: Gait belt;Walker, rolling  Ambulation - Level of Assistance: Stand-by assistance  Speed/Roya: Slow  Step Length: Left shortened;Right shortened      Therapeutic Exercises:   1x15 AP  1x15 LAQ  1x15 Seated marches  1x15  hip ADD/ABD  Pain Ratin/10 in stomach     Activity Tolerance:   Fair and observed SOB with activity  Please refer to the flowsheet for vital signs taken during this treatment. After treatment patient left in no apparent distress:   Sitting in chair and Call bell within reach    COMMUNICATION/COLLABORATION:   The patients plan of care was discussed with: Registered nurse. Problem: Mobility Impaired (Adult and Pediatric)  Goal: *Acute Goals and Plan of Care (Insert Text)  Description: Physical Therapy Goals  Initiated 22  1)  I with HEP in 7 days to improve overall functional mobility. 2)  Bed mobility and transfers I in 7 days to prevent skin breakdown. 3)  Pt to amb 300ft with LRAD and sup in 7 days    Pt. Goal:  Pt to be able to walk safely without falls.      Outcome: Progressing Towards Goal       Charisma Martínez PTA   Time Calculation: 34 mins

## 2022-04-06 NOTE — PROGRESS NOTES
CM reviewed chart. Updated clinicals sent in [id number]. Waiting on return of bowel function and tolerating her diet. Home health already setup with [facility].     DC plan home with home health

## 2022-04-07 LAB
ANION GAP SERPL CALC-SCNC: 5 MMOL/L (ref 5–15)
BASOPHILS # BLD: 0.1 K/UL (ref 0–0.1)
BASOPHILS NFR BLD: 1 % (ref 0–1)
BUN SERPL-MCNC: 5 MG/DL (ref 6–20)
BUN/CREAT SERPL: 10 (ref 12–20)
CA-I BLD-MCNC: 8.4 MG/DL (ref 8.5–10.1)
CEA SERPL-MCNC: 0.6 NG/ML
CHLORIDE SERPL-SCNC: 104 MMOL/L (ref 97–108)
CO2 SERPL-SCNC: 31 MMOL/L (ref 21–32)
CREAT SERPL-MCNC: 0.5 MG/DL (ref 0.55–1.02)
DIFFERENTIAL METHOD BLD: ABNORMAL
EOSINOPHIL # BLD: 0.5 K/UL (ref 0–0.4)
EOSINOPHIL NFR BLD: 5 % (ref 0–7)
ERYTHROCYTE [DISTWIDTH] IN BLOOD BY AUTOMATED COUNT: 13.8 % (ref 11.5–14.5)
FERRITIN SERPL-MCNC: 96 NG/ML (ref 8–252)
FOLATE SERPL-MCNC: 19.4 NG/ML (ref 5–21)
GLUCOSE BLD STRIP.AUTO-MCNC: 141 MG/DL (ref 65–117)
GLUCOSE BLD STRIP.AUTO-MCNC: 179 MG/DL (ref 65–117)
GLUCOSE BLD STRIP.AUTO-MCNC: 182 MG/DL (ref 65–117)
GLUCOSE BLD STRIP.AUTO-MCNC: 214 MG/DL (ref 65–117)
GLUCOSE SERPL-MCNC: 143 MG/DL (ref 65–100)
HCT VFR BLD AUTO: 28.9 % (ref 35–47)
HGB BLD-MCNC: 9.2 G/DL (ref 11.5–16)
IMM GRANULOCYTES # BLD AUTO: 0.1 K/UL (ref 0–0.04)
IMM GRANULOCYTES NFR BLD AUTO: 1 % (ref 0–0.5)
LYMPHOCYTES # BLD: 0.9 K/UL (ref 0.8–3.5)
LYMPHOCYTES NFR BLD: 11 % (ref 12–49)
MCH RBC QN AUTO: 30.7 PG (ref 26–34)
MCHC RBC AUTO-ENTMCNC: 31.8 G/DL (ref 30–36.5)
MCV RBC AUTO: 96.3 FL (ref 80–99)
MONOCYTES # BLD: 0.8 K/UL (ref 0–1)
MONOCYTES NFR BLD: 10 % (ref 5–13)
NEUTS SEG # BLD: 6.3 K/UL (ref 1.8–8)
NEUTS SEG NFR BLD: 72 % (ref 32–75)
NRBC # BLD: 0 K/UL (ref 0–0.01)
NRBC BLD-RTO: 0 PER 100 WBC
PERFORMED BY, TECHID: ABNORMAL
PLATELET # BLD AUTO: 414 K/UL (ref 150–400)
PMV BLD AUTO: 10.9 FL (ref 8.9–12.9)
POTASSIUM SERPL-SCNC: 4.1 MMOL/L (ref 3.5–5.1)
RBC # BLD AUTO: 3 M/UL (ref 3.8–5.2)
SODIUM SERPL-SCNC: 140 MMOL/L (ref 136–145)
VIT B12 SERPL-MCNC: 1985 PG/ML (ref 193–986)
WBC # BLD AUTO: 8.6 K/UL (ref 3.6–11)

## 2022-04-07 PROCEDURE — 82607 VITAMIN B-12: CPT

## 2022-04-07 PROCEDURE — 74011636637 HC RX REV CODE- 636/637: Performed by: STUDENT IN AN ORGANIZED HEALTH CARE EDUCATION/TRAINING PROGRAM

## 2022-04-07 PROCEDURE — 74011250637 HC RX REV CODE- 250/637: Performed by: STUDENT IN AN ORGANIZED HEALTH CARE EDUCATION/TRAINING PROGRAM

## 2022-04-07 PROCEDURE — 74011250637 HC RX REV CODE- 250/637: Performed by: COLON & RECTAL SURGERY

## 2022-04-07 PROCEDURE — 82962 GLUCOSE BLOOD TEST: CPT

## 2022-04-07 PROCEDURE — 85025 COMPLETE CBC W/AUTO DIFF WBC: CPT

## 2022-04-07 PROCEDURE — 82378 CARCINOEMBRYONIC ANTIGEN: CPT

## 2022-04-07 PROCEDURE — 74011250636 HC RX REV CODE- 250/636: Performed by: HOSPITALIST

## 2022-04-07 PROCEDURE — 74011250636 HC RX REV CODE- 250/636: Performed by: INTERNAL MEDICINE

## 2022-04-07 PROCEDURE — 36415 COLL VENOUS BLD VENIPUNCTURE: CPT

## 2022-04-07 PROCEDURE — 65270000029 HC RM PRIVATE

## 2022-04-07 PROCEDURE — 82728 ASSAY OF FERRITIN: CPT

## 2022-04-07 PROCEDURE — 74011250636 HC RX REV CODE- 250/636: Performed by: COLON & RECTAL SURGERY

## 2022-04-07 PROCEDURE — 80048 BASIC METABOLIC PNL TOTAL CA: CPT

## 2022-04-07 PROCEDURE — 97116 GAIT TRAINING THERAPY: CPT

## 2022-04-07 RX ADMIN — LEVOTHYROXINE SODIUM 150 MCG: 0.07 TABLET ORAL at 08:48

## 2022-04-07 RX ADMIN — FERROUS SULFATE TAB 325 MG (65 MG ELEMENTAL FE) 325 MG: 325 (65 FE) TAB at 16:24

## 2022-04-07 RX ADMIN — METOPROLOL SUCCINATE 25 MG: 25 TABLET, EXTENDED RELEASE ORAL at 08:48

## 2022-04-07 RX ADMIN — POTASSIUM CHLORIDE, DEXTROSE MONOHYDRATE AND SODIUM CHLORIDE 50 ML/HR: 150; 5; 450 INJECTION, SOLUTION INTRAVENOUS at 03:24

## 2022-04-07 RX ADMIN — FERROUS SULFATE TAB 325 MG (65 MG ELEMENTAL FE) 325 MG: 325 (65 FE) TAB at 08:48

## 2022-04-07 RX ADMIN — DULOXETINE HYDROCHLORIDE 30 MG: 30 CAPSULE, DELAYED RELEASE ORAL at 08:48

## 2022-04-07 RX ADMIN — CLOPIDOGREL BISULFATE 75 MG: 75 TABLET ORAL at 08:48

## 2022-04-07 RX ADMIN — INSULIN GLARGINE 25 UNITS: 100 INJECTION, SOLUTION SUBCUTANEOUS at 22:00

## 2022-04-07 RX ADMIN — TRAMADOL HYDROCHLORIDE 50 MG: 50 TABLET, COATED ORAL at 05:46

## 2022-04-07 RX ADMIN — TRAMADOL HYDROCHLORIDE 50 MG: 50 TABLET, COATED ORAL at 21:32

## 2022-04-07 RX ADMIN — ASPIRIN 81 MG: 81 TABLET, COATED ORAL at 08:48

## 2022-04-07 RX ADMIN — INSULIN LISPRO 2 UNITS: 100 INJECTION, SOLUTION INTRAVENOUS; SUBCUTANEOUS at 11:24

## 2022-04-07 RX ADMIN — ENOXAPARIN SODIUM 40 MG: 40 INJECTION SUBCUTANEOUS at 11:25

## 2022-04-07 RX ADMIN — TRAMADOL HYDROCHLORIDE 50 MG: 50 TABLET, COATED ORAL at 10:33

## 2022-04-07 RX ADMIN — ATORVASTATIN CALCIUM 10 MG: 10 TABLET, FILM COATED ORAL at 21:26

## 2022-04-07 RX ADMIN — INSULIN LISPRO 2 UNITS: 100 INJECTION, SOLUTION INTRAVENOUS; SUBCUTANEOUS at 16:23

## 2022-04-07 RX ADMIN — HYDRALAZINE HYDROCHLORIDE 10 MG: 20 INJECTION, SOLUTION INTRAMUSCULAR; INTRAVENOUS at 03:52

## 2022-04-07 NOTE — PROGRESS NOTES
Hospitalist Progress Note    Subjective:   Daily Progress Note: 4/7/2022 8:22 AM    Hospital Course: Patient is a [de-identified]year old female with a PMH of diabetes, hypertension, COPD,  AICD, CAD with stents, who presented with right-sided lower abdominal pain. CT abdomen showed masslike thickening within the ascending colon, trace ascites within the right abdomen, cholelithiasis, and subtle morphologic changes of the liver. Initial labs significant for WBC of 12.1. UA + nitrites with 5 -10 WBC. Urine culture negative. Chest x-ray showed no acute cardiopulmonary abnormality. Patient to be admitted for further management. General surgery, cardiology, and pulmonology consulted. Patient started on ceftriaxone and placed on clear liquid diet. Exploratory laparotomy with right colectomy on 4/1. Surgery found near obstructing tumor at proximal ascending colon with distended cecum with areas of gangrene in the wall. Ascitic fluid pending.n CEA 0.5. Biopsy shows moderately differentiated adenocarcinoma with invasion to serosa, and a static carcinoma and 10 of 14 lymph nodes. Oncology consult      Subjective: Patient tolerating liquid diet. Patient has been passing flatulence and has had 4 bowel movements.     Current Facility-Administered Medications   Medication Dose Route Frequency    insulin glargine (LANTUS) injection 25 Units  25 Units SubCUTAneous QHS    hydrALAZINE (APRESOLINE) 20 mg/mL injection 10 mg  10 mg IntraVENous Q4H PRN    enoxaparin (LOVENOX) injection 40 mg  40 mg SubCUTAneous Q24H    dextrose 5% - 0.45% NaCl with KCl 20 mEq/L infusion  50 mL/hr IntraVENous CONTINUOUS    ketorolac (TORADOL) injection 15 mg  15 mg IntraVENous Q6H PRN    traMADoL (ULTRAM) tablet 50 mg  50 mg Oral Q6H PRN    HYDROmorphone (DILAUDID) injection 1 mg  1 mg IntraVENous Q4H PRN    HYDROmorphone (DILAUDID) syringe 0.5 mg  0.5 mg IntraVENous Q4H PRN    glucose chewable tablet 16 g  4 Tablet Oral PRN    dextrose 10% infusion 0-250 mL  0-250 mL IntraVENous PRN    glucagon (GLUCAGEN) injection 1 mg  1 mg IntraMUSCular PRN    acetaminophen (TYLENOL) tablet 650 mg  650 mg Oral Q6H PRN    Or    acetaminophen (TYLENOL) suppository 650 mg  650 mg Rectal Q6H PRN    ondansetron (ZOFRAN ODT) tablet 4 mg  4 mg Oral Q8H PRN    Or    ondansetron (ZOFRAN) injection 4 mg  4 mg IntraVENous Q6H PRN    insulin lispro (HUMALOG) injection   SubCUTAneous AC&HS    aspirin delayed-release tablet 81 mg  81 mg Oral DAILY    clopidogreL (PLAVIX) tablet 75 mg  75 mg Oral DAILY    DULoxetine (CYMBALTA) capsule 30 mg  30 mg Oral DAILY    ferrous sulfate tablet 325 mg  325 mg Oral BID WITH MEALS    levothyroxine (SYNTHROID) tablet 150 mcg  150 mcg Oral ACB    metoprolol succinate (TOPROL-XL) XL tablet 25 mg  25 mg Oral DAILY    atorvastatin (LIPITOR) tablet 10 mg  10 mg Oral QHS    albuterol (PROVENTIL HFA, VENTOLIN HFA, PROAIR HFA) inhaler 2 Puff  2 Puff Inhalation Q6H PRN    oxyCODONE IR (ROXICODONE) tablet 5 mg  5 mg Oral Q4H PRN        Review of Systems  Constitutional: No fevers, No chills, No sweats, No fatigue, No Weakness  Eyes: No redness  Ears, nose, mouth, throat, and face: No nasal congestion, No sore throat, No voice change  Respiratory: No Shortness of Breath, No cough, No wheezing  Cardiovascular: No chest pain, No palpitations, No extremity edema  Gastrointestinal: No nausea, No vomiting, No diarrhea, + abdominal pain  Genitourinary: No frequency, No dysuria, No hematuria  Integument/breast: No skin lesion(s)   Neurological: No Confusion, No headaches, No dizziness      Objective:     Visit Vitals  BP (!) 148/56 (BP 1 Location: Right upper arm, BP Patient Position: At rest) Comment: primary RN notified   Pulse 81   Temp 98.9 °F (37.2 °C)   Resp 18   Ht 5' 1\" (1.549 m)   Wt 74.8 kg (164 lb 14.5 oz)   SpO2 96%   BMI 31.16 kg/m²      O2 Device: None (Room air)    Temp (24hrs), Av.5 °F (36.9 °C), Min:98 °F (36.7 °C), Max:98.9 °F (37.2 °C)      No intake/output data recorded. 04/05 1901 - 04/07 0700  In: 3783.3 [P.O.:1150; I.V.:2633.3]  Out: 4255 [Urine:4255]    PHYSICAL EXAM:  Constitutional: No acute distress  Skin: Extremities and face reveal no rashes. HEENT: Sclerae anicteric. Extra-occular muscles are intact  Cardiovascular: RRR. Respiratory:  Clear breath sounds bilaterally with no wheezes, rales, or rhonchi. GI: Abdomen nondistended, soft, and nontender. Normal active bowel sounds. Incision without erythema, drainage, or edema   Musculoskeletal: No pitting edema of the lower legs. Able to move all ext  Neurological:  Patient is alert and oriented.  Cranial nerves II-XII grossly intact  Psychiatric: Mood appears appropriate       Data Review    Recent Results (from the past 24 hour(s))   GLUCOSE, POC    Collection Time: 04/06/22 11:01 AM   Result Value Ref Range    Glucose (POC) 237 (H) 65 - 117 mg/dL    Performed by DANISH [id number] Shea Prasad, POC    Collection Time: 04/06/22  4:12 PM   Result Value Ref Range    Glucose (POC) 255 (H) 65 - 117 mg/dL    Performed by Tiffany Jack RN (Traveler)    GLUCOSE, POC    Collection Time: 04/06/22  8:37 PM   Result Value Ref Range    Glucose (POC) 283 (H) 65 - 117 mg/dL    Performed by HEATH FELIZ    METABOLIC PANEL, BASIC    Collection Time: 04/07/22  6:44 AM   Result Value Ref Range    Sodium 140 136 - 145 mmol/L    Potassium 4.1 3.5 - 5.1 mmol/L    Chloride 104 97 - 108 mmol/L    CO2 31 21 - 32 mmol/L    Anion gap 5 5 - 15 mmol/L    Glucose 143 (H) 65 - 100 mg/dL    BUN 5 (L) 6 - 20 mg/dL    Creatinine 0.50 (L) 0.55 - 1.02 mg/dL    BUN/Creatinine ratio 10 (L) 12 - 20      GFR est AA >60 >60 ml/min/1.73m2    GFR est non-AA >60 >60 ml/min/1.73m2    Calcium 8.4 (L) 8.5 - 10.1 mg/dL   CBC WITH AUTOMATED DIFF    Collection Time: 04/07/22  6:44 AM   Result Value Ref Range    WBC 8.6 3.6 - 11.0 K/uL    RBC 3.00 (L) 3.80 - 5.20 M/uL    HGB 9.2 (L) 11.5 - 16.0 g/dL    HCT 28.9 (L) 35.0 - 47.0 %    MCV 96.3 80.0 - 99.0 FL    MCH 30.7 26.0 - 34.0 PG    MCHC 31.8 30.0 - 36.5 g/dL    RDW 13.8 11.5 - 14.5 %    PLATELET 848 (H) 702 - 400 K/uL    MPV 10.9 8.9 - 12.9 FL    NRBC 0.0 0.0  WBC    ABSOLUTE NRBC 0.00 0.00 - 0.01 K/uL    NEUTROPHILS 72 32 - 75 %    LYMPHOCYTES 11 (L) 12 - 49 %    MONOCYTES 10 5 - 13 %    EOSINOPHILS 5 0 - 7 %    BASOPHILS 1 0 - 1 %    IMMATURE GRANULOCYTES 1 (H) 0 - 0.5 %    ABS. NEUTROPHILS 6.3 1.8 - 8.0 K/UL    ABS. LYMPHOCYTES 0.9 0.8 - 3.5 K/UL    ABS. MONOCYTES 0.8 0.0 - 1.0 K/UL    ABS. EOSINOPHILS 0.5 (H) 0.0 - 0.4 K/UL    ABS. BASOPHILS 0.1 0.0 - 0.1 K/UL    ABS. IMM. GRANS. 0.1 (H) 0.00 - 0.04 K/UL    DF AUTOMATED         Radiology review: CT of abdomen and pelvis    Assessment:   1. Partial large bowel obstruction due to cecal mass status post right colectomy day #6  2. Abnormal urinalysis  3. Hypertension  4. Coronary artery disease with history of PAD to LAD/history of aortic valve replacement for severe aortic stenosis/AICD/pacemaker in situ  5. Type 2 diabetes  6. Hypothyroid  7. COPD without exacerbation  8. Gait instability    Plan:    1. Patient is postop exploratory laparotomy with right colectomy day #6. Intraoperative biopsy  shows moderately differentiated adenocarcinoma with invasion to serosa, and a static carcinoma and 10 of 14 lymph nodes. Increase diet per surgery. IV pain medication. Oncology consulted  2. Patient is status post 5 days of IV Rocephin. Urine culture negative. Been afebrile. 3.  Blood pressure stable. Continue to monitor per unit protocol. On metoprolol  4.  2D echo showed EF of 55 to 60% with abnormal diastolic dysfunction with mild mitral regurgitation and mitral stenosis. On aspirin and Plavix. Cardiology consulted. On a statin. 5.  Hemoglobin A1c 7.8. Increased Lantus to 25 units. Insulin sliding scale. Glucose levels stable  6. On levothyroxine  7. Pulmonology consulted.   Oxygen saturation within normal limits on room air. Albuterol inhaler as  8. PT OT  9. CBC and BMP in the AM     Dispo: 24-48 hours. Barriers include return of bowel function and tolerating oral intake with clearance from General surgery and oncology consult. Suspect home with home health     CODE STATUS full     DVT prophylaxis: Lovenox  Ulcer prophylaxis: Tolerating liquid diet     Care Plan discussed with: Patient/Family, Nurse and     Total time spent with patient: 33 minutes.

## 2022-04-07 NOTE — PROGRESS NOTES
Pulmonary Progress Note    Subjective:   Daily Progress Note: 2022 10:10 AM    Moni Mckeon is a [de-identified]year old female PMH of COPD, diabetes, HTN, HLD, Pacemaker, AICD, HX of cardiac stents who is S/p day 6 for right hemicolectomy . Patient is alert and oriented and doing well. No respiratory distress. Patient was found to have stage IIIc colon cancer. Patient awaiting discharge. Review of Systems     Constitutional: Negative. HENT: Negative. Eyes: Negative. Respiratory: Negative for shortness of breath. Cardiovascular: Negative. Gastrointestinal: Positive for abdominal pain. Status post surgery bandage in place  Genitourinary: Negative. Musculoskeletal: Negative. Skin: Negative. Neurological: Negative. Psychiatric/Behavioral: Negative. Objective:     Visit Vitals  BP (!) 118/59   Pulse 87   Temp 99 °F (37.2 °C)   Resp 18   Ht 5' 1\" (1.549 m)   Wt 74.8 kg (164 lb 14.5 oz)   SpO2 99%   BMI 31.16 kg/m²      O2 Device: None (Room air)    Temp (24hrs), Av.6 °F (37 °C), Min:97.9 °F (36.6 °C), Max:99 °F (37.2 °C)       0701 -  1900  In: 1650.8 [P.O.:500; I.V.:1150.8]  Out: 1400 [Urine:1400]   1901 -  0700  In: 3783.3 [P.O.:1150; I.V.:2633.3]  Out: 4255 [Urine:4255]    Physical Exam  Constitutional:       Appearance: Normal appearance. HENT:      Head: Normocephalic and atraumatic. Nose: Nose normal.      Mouth/Throat:      Mouth: Mucous membranes are moist.   Eyes:      Pupils: Pupils are equal, round, and reactive to light. Cardiovascular:      Rate and Rhythm: Normal rate. Regular rhythm     Pulses: Normal pulses. Heart sounds: Normal heart sounds. Pulmonary:      Effort: Pulmonary effort is normal.      Breath sounds: Normal breath sounds. Abdominal:      General: Bowel sounds are present not distended     Tenderness: There is mild abdominal tenderness. Musculoskeletal:         General: Normal range of motion.       Cervical back: Normal range of motion and neck supple. Skin:     General: Skin is warm. Neurological:      General: No focal deficit present. Mental Status: She is alert. Psychiatric:         Mood and Affect: Mood normal.         Data Review    Recent Results (from the past 24 hour(s))   GLUCOSE, POC    Collection Time: 04/06/22  4:12 PM   Result Value Ref Range    Glucose (POC) 255 (H) 65 - 117 mg/dL    Performed by Nichole Vazquez RN (Traveler)    GLUCOSE, POC    Collection Time: 04/06/22  8:37 PM   Result Value Ref Range    Glucose (POC) 283 (H) 65 - 117 mg/dL    Performed by Dina Bad    METABOLIC PANEL, BASIC    Collection Time: 04/07/22  6:44 AM   Result Value Ref Range    Sodium 140 136 - 145 mmol/L    Potassium 4.1 3.5 - 5.1 mmol/L    Chloride 104 97 - 108 mmol/L    CO2 31 21 - 32 mmol/L    Anion gap 5 5 - 15 mmol/L    Glucose 143 (H) 65 - 100 mg/dL    BUN 5 (L) 6 - 20 mg/dL    Creatinine 0.50 (L) 0.55 - 1.02 mg/dL    BUN/Creatinine ratio 10 (L) 12 - 20      GFR est AA >60 >60 ml/min/1.73m2    GFR est non-AA >60 >60 ml/min/1.73m2    Calcium 8.4 (L) 8.5 - 10.1 mg/dL   CBC WITH AUTOMATED DIFF    Collection Time: 04/07/22  6:44 AM   Result Value Ref Range    WBC 8.6 3.6 - 11.0 K/uL    RBC 3.00 (L) 3.80 - 5.20 M/uL    HGB 9.2 (L) 11.5 - 16.0 g/dL    HCT 28.9 (L) 35.0 - 47.0 %    MCV 96.3 80.0 - 99.0 FL    MCH 30.7 26.0 - 34.0 PG    MCHC 31.8 30.0 - 36.5 g/dL    RDW 13.8 11.5 - 14.5 %    PLATELET 274 (H) 064 - 400 K/uL    MPV 10.9 8.9 - 12.9 FL    NRBC 0.0 0.0  WBC    ABSOLUTE NRBC 0.00 0.00 - 0.01 K/uL    NEUTROPHILS 72 32 - 75 %    LYMPHOCYTES 11 (L) 12 - 49 %    MONOCYTES 10 5 - 13 %    EOSINOPHILS 5 0 - 7 %    BASOPHILS 1 0 - 1 %    IMMATURE GRANULOCYTES 1 (H) 0 - 0.5 %    ABS. NEUTROPHILS 6.3 1.8 - 8.0 K/UL    ABS. LYMPHOCYTES 0.9 0.8 - 3.5 K/UL    ABS. MONOCYTES 0.8 0.0 - 1.0 K/UL    ABS. EOSINOPHILS 0.5 (H) 0.0 - 0.4 K/UL    ABS. BASOPHILS 0.1 0.0 - 0.1 K/UL    ABS. IMM.  GRANS. 0.1 (H) 0.00 - 0.04 K/UL DF AUTOMATED     GLUCOSE, POC    Collection Time: 04/07/22  7:42 AM   Result Value Ref Range    Glucose (POC) 141 (H) 65 - 117 mg/dL    Performed by Hitesh Clark    Collection Time: 04/07/22  7:51 AM   Result Value Ref Range    Ferritin 96 8 - 252 ng/mL   VITAMIN B12 & FOLATE    Collection Time: 04/07/22  7:51 AM   Result Value Ref Range    Vitamin B12 1,985 (H) 193 - 986 pg/mL    Folate 19.4 5.0 - 21.0 ng/mL   CEA    Collection Time: 04/07/22  7:51 AM   Result Value Ref Range    CEA 0.6 ng/mL   GLUCOSE, POC    Collection Time: 04/07/22 11:12 AM   Result Value Ref Range    Glucose (POC) 214 (H) 65 - 117 mg/dL    Performed by DANISH MENDOZA        Current Facility-Administered Medications   Medication Dose Route Frequency    insulin glargine (LANTUS) injection 25 Units  25 Units SubCUTAneous QHS    hydrALAZINE (APRESOLINE) 20 mg/mL injection 10 mg  10 mg IntraVENous Q4H PRN    enoxaparin (LOVENOX) injection 40 mg  40 mg SubCUTAneous Q24H    dextrose 5% - 0.45% NaCl with KCl 20 mEq/L infusion  50 mL/hr IntraVENous CONTINUOUS    traMADoL (ULTRAM) tablet 50 mg  50 mg Oral Q6H PRN    HYDROmorphone (DILAUDID) injection 1 mg  1 mg IntraVENous Q4H PRN    HYDROmorphone (DILAUDID) syringe 0.5 mg  0.5 mg IntraVENous Q4H PRN    glucose chewable tablet 16 g  4 Tablet Oral PRN    dextrose 10% infusion 0-250 mL  0-250 mL IntraVENous PRN    glucagon (GLUCAGEN) injection 1 mg  1 mg IntraMUSCular PRN    acetaminophen (TYLENOL) tablet 650 mg  650 mg Oral Q6H PRN    Or    acetaminophen (TYLENOL) suppository 650 mg  650 mg Rectal Q6H PRN    ondansetron (ZOFRAN ODT) tablet 4 mg  4 mg Oral Q8H PRN    Or    ondansetron (ZOFRAN) injection 4 mg  4 mg IntraVENous Q6H PRN    insulin lispro (HUMALOG) injection   SubCUTAneous AC&HS    aspirin delayed-release tablet 81 mg  81 mg Oral DAILY    clopidogreL (PLAVIX) tablet 75 mg  75 mg Oral DAILY    DULoxetine (CYMBALTA) capsule 30 mg  30 mg Oral DAILY    ferrous sulfate tablet 325 mg  325 mg Oral BID WITH MEALS    levothyroxine (SYNTHROID) tablet 150 mcg  150 mcg Oral ACB    metoprolol succinate (TOPROL-XL) XL tablet 25 mg  25 mg Oral DAILY    atorvastatin (LIPITOR) tablet 10 mg  10 mg Oral QHS    albuterol (PROVENTIL HFA, VENTOLIN HFA, PROAIR HFA) inhaler 2 Puff  2 Puff Inhalation Q6H PRN    oxyCODONE IR (ROXICODONE) tablet 5 mg  5 mg Oral Q4H PRN         Assessment/Plan:        1. Chronic Obstructive Pulmonary Disease without excebation  2. Partial large Bowel Obstruction  3. Status post right hemicolectomy  4. Diabetes Mellitus type 2  5. Essential Hypertension  6. Thyroid Disease   7. Urinary Tract infection       RECOMMENDATIONS/PLAN:      3 27-year-old lady no history of smoking not on any inhalers or oxygen at home, she had a history of secondhand smoke as her  used to smoke and all her children used to smoke she used to work in the form as a farmer significant history of congestive heart failure ejection fraction about 35 to 40%. She had aortic valve replaced previously also she has stent placement in the past chest x-ray no acute infiltrate or lung mass. Last showed Echocardiogram showed EF of 55 to 60% with grade 1 diastolic dysfunction. 2. Creatinine normal  3. Patient underwent right hemicolectomy doing fairly well no flatus yet  4. monitor respiratory status and O2 PRN  5.  Follow up with Pulmonary outpatient as needed for COPD management

## 2022-04-07 NOTE — PROGRESS NOTES
Progress Note    Patient: Perez Miranda MRN: [id number]  SSN: [id number]    YOB: 1940  Age: [de-identified] y.o. Sex: female      Admit Date: 3/29/2022    LOS: 9 days     Subjective:   Postoperative day 6 status post extended right hemicolectomy  Patient seen in bed  Had bowel movements overnight    Objective:     Vitals:    04/06/22 2000 04/06/22 2030 04/07/22 0334 04/07/22 0735   BP: (!) 168/63  (!) 165/64 (!) 148/56   Pulse: 80  81 81   Resp: 16  17 18   Temp: 98.9 °F (37.2 °C)  98.1 °F (36.7 °C) 98.9 °F (37.2 °C)   SpO2: 98% 98% 98% 96%   Weight:       Height:            Intake and Output:  Current Shift: No intake/output data recorded. Last three shifts: 04/05 1901 - 04/07 0700  In: 3783.3 [P.O.:1150;  I.V.:2633.3]  Out: 4255 [Urine:4255]    Review of Systems:  ROS     Physical Exam:   Abdomen is soft, appropriately tender at incision, nondistended, incision clean    Lab/Data Review:  Recent Results (from the past 24 hour(s))   GLUCOSE, POC    Collection Time: 04/06/22 11:01 AM   Result Value Ref Range    Glucose (POC) 237 (H) 65 - 117 mg/dL    Performed by DANISH MENDOZA    GLUCOSE, POC    Collection Time: 04/06/22  4:12 PM   Result Value Ref Range    Glucose (POC) 255 (H) 65 - 117 mg/dL    Performed by Lou Jason RN (Traveler)    GLUCOSE, POC    Collection Time: 04/06/22  8:37 PM   Result Value Ref Range    Glucose (POC) 283 (H) 65 - 117 mg/dL    Performed by HEATH FELIZ    METABOLIC PANEL, BASIC    Collection Time: 04/07/22  6:44 AM   Result Value Ref Range    Sodium 140 136 - 145 mmol/L    Potassium 4.1 3.5 - 5.1 mmol/L    Chloride 104 97 - 108 mmol/L    CO2 31 21 - 32 mmol/L    Anion gap 5 5 - 15 mmol/L    Glucose 143 (H) 65 - 100 mg/dL    BUN 5 (L) 6 - 20 mg/dL    Creatinine 0.50 (L) 0.55 - 1.02 mg/dL    BUN/Creatinine ratio 10 (L) 12 - 20      GFR est AA >60 >60 ml/min/1.73m2    GFR est non-AA >60 >60 ml/min/1.73m2    Calcium 8.4 (L) 8.5 - 10.1 mg/dL   CBC WITH AUTOMATED DIFF    Collection Time: 04/07/22  6:44 AM   Result Value Ref Range    WBC 8.6 3.6 - 11.0 K/uL    RBC 3.00 (L) 3.80 - 5.20 M/uL    HGB 9.2 (L) 11.5 - 16.0 g/dL    HCT 28.9 (L) 35.0 - 47.0 %    MCV 96.3 80.0 - 99.0 FL    MCH 30.7 26.0 - 34.0 PG    MCHC 31.8 30.0 - 36.5 g/dL    RDW 13.8 11.5 - 14.5 %    PLATELET 366 (H) 025 - 400 K/uL    MPV 10.9 8.9 - 12.9 FL    NRBC 0.0 0.0  WBC    ABSOLUTE NRBC 0.00 0.00 - 0.01 K/uL    NEUTROPHILS 72 32 - 75 %    LYMPHOCYTES 11 (L) 12 - 49 %    MONOCYTES 10 5 - 13 %    EOSINOPHILS 5 0 - 7 %    BASOPHILS 1 0 - 1 %    IMMATURE GRANULOCYTES 1 (H) 0 - 0.5 %    ABS. NEUTROPHILS 6.3 1.8 - 8.0 K/UL    ABS. LYMPHOCYTES 0.9 0.8 - 3.5 K/UL    ABS. MONOCYTES 0.8 0.0 - 1.0 K/UL    ABS. EOSINOPHILS 0.5 (H) 0.0 - 0.4 K/UL    ABS. BASOPHILS 0.1 0.0 - 0.1 K/UL    ABS. IMM.  GRANS. 0.1 (H) 0.00 - 0.04 K/UL    DF AUTOMATED     GLUCOSE, POC    Collection Time: 04/07/22  7:42 AM   Result Value Ref Range    Glucose (POC) 141 (H) 65 - 117 mg/dL    Performed by Nedra Hammans         Pathology:  Synoptic Data   Procedure:                         Right hemicolectomy   Tumor site:                         Ascending colon   Tumor size:                         3 cm, circumferential   Macroscopic tumor perforation:               Not present   Histologic type:                         Adenocarcinoma   Histologic grade:                     Moderately differentiated (grade 2)   Tumor extension:                    Tumor extends through the muscularis   propria to involve the serosal                               surface   Margins:         Radial margin:                    Involved by neoplasm (minimal   microscopic involvement)           Distance to proximal margin:          35 cm       Distance to distal margin:               22 cm   Treatment effect:                    No known presurgical therapy   Lymphovascular invasion:               Present, extensive   Tumor budding:                         Present, extensive Perineural invasion:                    Not identified   Tumor deposits:                         Present   Pathologic Stage (pTNM, AJCC 8th Edition):   pT4a, pN2b   Lymph nodes:       Number of lymph nodes examined:          14       Number of lymph nodes involved:          10   Ancillary studies:                    DNA MMR testing will be performed   Additional pathologic findings:               Extensive invasion of   mesentery by tumor     Assessment:     Active Problems:    Colonic mass (3/29/2022)      SBO (small bowel obstruction) ([facility] [address] (3/29/2022)      HTN (hypertension), malignant (3/29/2022)        Plan:   Overall Ms. Joyce Buchanan continues to do well  Advance to GI soft diet  Continue to encourage ambulation  Oncology consulted for stage IIIc colon cancer  Anticipate discharge in 24 hours    Signed By: Natasha Zuniga MD     April 7, 2022

## 2022-04-07 NOTE — NURSE NAVIGATOR
Met with patient, who was sitting in chair. Patient in very good spirits. Dr. Dayana Ann came in as well. Provided education on colorectal cancer as well as support resources. Will continue to follow.

## 2022-04-07 NOTE — PROGRESS NOTES
Pulmonary Progress Note    Subjective:   Daily Progress Note: 2022 10:10 AM    Omari Darden is a [de-identified]year old female PMH of COPD, diabetes, HTN, HLD, Pacemaker, AICD, HX of cardiac stents who is S/p day 6 for right hemicolectomy . Patient is alert and oriented and doing well. No respiratory distress. Patient was found to have stage IIIc colon cancer. Patient awaiting discharge. Review of Systems     Constitutional: Negative. HENT: Negative. Eyes: Negative. Respiratory: Negative for shortness of breath. Cardiovascular: Negative. Gastrointestinal: Positive for abdominal pain. Status post surgery bandage in place  Genitourinary: Negative. Musculoskeletal: Negative. Skin: Negative. Neurological: Negative. Psychiatric/Behavioral: Negative. Objective:     Visit Vitals  BP (!) 148/56 (BP 1 Location: Right upper arm, BP Patient Position: At rest) Comment: primary RN notified   Pulse 81   Temp 98.9 °F (37.2 °C)   Resp 18   Ht 5' 1\" (1.549 m)   Wt 164 lb 14.5 oz (74.8 kg)   SpO2 96%   BMI 31.16 kg/m²      O2 Device: None (Room air)    Temp (24hrs), Av.6 °F (37 °C), Min:98.1 °F (36.7 °C), Max:98.9 °F (37.2 °C)      701 -  190  In: -   Out: 650 [Urine:650]  1901 -  0700  In: 3783.3 [P.O.:1150; I.V.:2633.3]  Out: 4255 [Urine:4255]    Physical Exam  Constitutional:       Appearance: Normal appearance. HENT:      Head: Normocephalic and atraumatic. Nose: Nose normal.      Mouth/Throat:      Mouth: Mucous membranes are moist.   Eyes:      Pupils: Pupils are equal, round, and reactive to light. Cardiovascular:      Rate and Rhythm: Normal rate. Regular rhythm     Pulses: Normal pulses. Heart sounds: Normal heart sounds. Pulmonary:      Effort: Pulmonary effort is normal.      Breath sounds: Normal breath sounds. Abdominal:      General: Bowel sounds are present not distended     Tenderness: There is mild abdominal tenderness. Musculoskeletal:         General: Normal range of motion. Cervical back: Normal range of motion and neck supple. Skin:     General: Skin is warm. Neurological:      General: No focal deficit present. Mental Status: She is alert. Psychiatric:         Mood and Affect: Mood normal.         Data Review    Recent Results (from the past 24 hour(s))   GLUCOSE, POC    Collection Time: 04/06/22 11:01 AM   Result Value Ref Range    Glucose (POC) 237 (H) 65 - 117 mg/dL    Performed by DANISH [id number] Shea Prasad, POC    Collection Time: 04/06/22  4:12 PM   Result Value Ref Range    Glucose (POC) 255 (H) 65 - 117 mg/dL    Performed by John Paul Avila RN (Traveler)    GLUCOSE, POC    Collection Time: 04/06/22  8:37 PM   Result Value Ref Range    Glucose (POC) 283 (H) 65 - 117 mg/dL    Performed by Migdalia Castellanochment    METABOLIC PANEL, BASIC    Collection Time: 04/07/22  6:44 AM   Result Value Ref Range    Sodium 140 136 - 145 mmol/L    Potassium 4.1 3.5 - 5.1 mmol/L    Chloride 104 97 - 108 mmol/L    CO2 31 21 - 32 mmol/L    Anion gap 5 5 - 15 mmol/L    Glucose 143 (H) 65 - 100 mg/dL    BUN 5 (L) 6 - 20 mg/dL    Creatinine 0.50 (L) 0.55 - 1.02 mg/dL    BUN/Creatinine ratio 10 (L) 12 - 20      GFR est AA >60 >60 ml/min/1.73m2    GFR est non-AA >60 >60 ml/min/1.73m2    Calcium 8.4 (L) 8.5 - 10.1 mg/dL   CBC WITH AUTOMATED DIFF    Collection Time: 04/07/22  6:44 AM   Result Value Ref Range    WBC 8.6 3.6 - 11.0 K/uL    RBC 3.00 (L) 3.80 - 5.20 M/uL    HGB 9.2 (L) 11.5 - 16.0 g/dL    HCT 28.9 (L) 35.0 - 47.0 %    MCV 96.3 80.0 - 99.0 FL    MCH 30.7 26.0 - 34.0 PG    MCHC 31.8 30.0 - 36.5 g/dL    RDW 13.8 11.5 - 14.5 %    PLATELET 134 (H) 436 - 400 K/uL    MPV 10.9 8.9 - 12.9 FL    NRBC 0.0 0.0  WBC    ABSOLUTE NRBC 0.00 0.00 - 0.01 K/uL    NEUTROPHILS 72 32 - 75 %    LYMPHOCYTES 11 (L) 12 - 49 %    MONOCYTES 10 5 - 13 %    EOSINOPHILS 5 0 - 7 %    BASOPHILS 1 0 - 1 %    IMMATURE GRANULOCYTES 1 (H) 0 - 0.5 %    ABS. NEUTROPHILS 6.3 1.8 - 8.0 K/UL    ABS. LYMPHOCYTES 0.9 0.8 - 3.5 K/UL    ABS. MONOCYTES 0.8 0.0 - 1.0 K/UL    ABS. EOSINOPHILS 0.5 (H) 0.0 - 0.4 K/UL    ABS. BASOPHILS 0.1 0.0 - 0.1 K/UL    ABS. IMM.  GRANS. 0.1 (H) 0.00 - 0.04 K/UL    DF AUTOMATED     GLUCOSE, POC    Collection Time: 04/07/22  7:42 AM   Result Value Ref Range    Glucose (POC) 141 (H) 65 - 117 mg/dL    Performed by Karissa Joseph        Current Facility-Administered Medications   Medication Dose Route Frequency    insulin glargine (LANTUS) injection 25 Units  25 Units SubCUTAneous QHS    hydrALAZINE (APRESOLINE) 20 mg/mL injection 10 mg  10 mg IntraVENous Q4H PRN    enoxaparin (LOVENOX) injection 40 mg  40 mg SubCUTAneous Q24H    dextrose 5% - 0.45% NaCl with KCl 20 mEq/L infusion  50 mL/hr IntraVENous CONTINUOUS    ketorolac (TORADOL) injection 15 mg  15 mg IntraVENous Q6H PRN    traMADoL (ULTRAM) tablet 50 mg  50 mg Oral Q6H PRN    HYDROmorphone (DILAUDID) injection 1 mg  1 mg IntraVENous Q4H PRN    HYDROmorphone (DILAUDID) syringe 0.5 mg  0.5 mg IntraVENous Q4H PRN    glucose chewable tablet 16 g  4 Tablet Oral PRN    dextrose 10% infusion 0-250 mL  0-250 mL IntraVENous PRN    glucagon (GLUCAGEN) injection 1 mg  1 mg IntraMUSCular PRN    acetaminophen (TYLENOL) tablet 650 mg  650 mg Oral Q6H PRN    Or    acetaminophen (TYLENOL) suppository 650 mg  650 mg Rectal Q6H PRN    ondansetron (ZOFRAN ODT) tablet 4 mg  4 mg Oral Q8H PRN    Or    ondansetron (ZOFRAN) injection 4 mg  4 mg IntraVENous Q6H PRN    insulin lispro (HUMALOG) injection   SubCUTAneous AC&HS    aspirin delayed-release tablet 81 mg  81 mg Oral DAILY    clopidogreL (PLAVIX) tablet 75 mg  75 mg Oral DAILY    DULoxetine (CYMBALTA) capsule 30 mg  30 mg Oral DAILY    ferrous sulfate tablet 325 mg  325 mg Oral BID WITH MEALS    levothyroxine (SYNTHROID) tablet 150 mcg  150 mcg Oral ACB    metoprolol succinate (TOPROL-XL) XL tablet 25 mg  25 mg Oral DAILY    atorvastatin (LIPITOR) tablet 10 mg  10 mg Oral QHS    albuterol (PROVENTIL HFA, VENTOLIN HFA, PROAIR HFA) inhaler 2 Puff  2 Puff Inhalation Q6H PRN    oxyCODONE IR (ROXICODONE) tablet 5 mg  5 mg Oral Q4H PRN         Assessment/Plan:        1. Chronic Obstructive Pulmonary Disease without excebation  2. Partial large Bowel Obstruction  3. Status post right hemicolectomy  4. Diabetes Mellitus type 2  5. Essential Hypertension  6. Thyroid Disease   7. Urinary Tract infection       RECOMMENDATIONS/PLAN:      3 61-year-old lady no history of smoking not on any inhalers or oxygen at home, she had a history of secondhand smoke as her  used to smoke and all her children used to smoke she used to work in the form as a farmer significant history of congestive heart failure ejection fraction about 35 to 40%. She had aortic valve replaced previously also she has stent placement in the past chest x-ray no acute infiltrate or lung mass. Last showed Echocardiogram showed EF of 55 to 60% with grade 1 diastolic dysfunction. 2. Creatinine normal  3. Patient underwent right hemicolectomy doing fairly well no flatus yet  4. monitor respiratory status and O2 PRN  5.  Follow up with Pulmonary outpatient as needed for COPD management

## 2022-04-07 NOTE — PROGRESS NOTES
CM reviewed chart. Patient diet has been advanced to regular, needs to tolerate. Likely discharge home tomorrow after surgical clearance. Current plan is discharge home with home health through [facility]. Updated clinicals have been faxed over through [employer].

## 2022-04-07 NOTE — PROGRESS NOTES
PHYSICAL THERAPY TREATMENT  Patient: Mor Tenorio (42 y.o. female)  Date: 4/7/2022  Diagnosis: Colonic mass [K63.89]  SBO (small bowel obstruction) ([facility]) [K56.609]  HTN (hypertension), malignant [I10] <principal problem not specified>  Procedure(s) (LRB):  LAPAROTOMY EXPLORATORY And Right Colectomy (Right) 6 Days Post-Op  Precautions:    Chart, physical therapy assessment, plan of care and goals were reviewed. ASSESSMENT  Patient continues with skilled PT services and is progressing towards goals. Patient supine in bed upon appraoch and agreed to therapy today. Patient was Mod I for rolling, supine>sit and scooting to EOB. Patient demonstrated good unsupported static and dynamic sitting balance while sitting EOB independently putting on socks. Patient was able to don under pants at independent level , bring pants up to knees in sitting position then performing STS to RW at supervision level and pulling pants all the way up to waist line. Patient then ambulated in hallway for 350 feet at [facility] 54 with RW. Patient had no LOB or knee buckling with steady step through gait pattern but did require 3 short rest breaks each lasting 1-2 minutes long to recover from \" tried feeling\". Patient denied SOB  And reported that she did not think she would need rest breaks if she was not wearing a mask. Patient returned to room wear she performed stand>sit at supervision into bedside chair. Patient able to verbalize and demonstrate HEP exercises. Patient left seated in room with call bell within reach and all needs meet and family member present. Current Level of Function Impacting Discharge (mobility/balance): poor activity tolerance     Other factors to consider for discharge: PLOF, assistance at home, PMH, acute medical state. PLAN :  Patient continues to benefit from skilled intervention to address the above impairments. Continue treatment per established plan of care. to address goals.     Recommendation for discharge: (in order for the patient to meet his/her long term goals)  Home with [address] [place] [address] and continued family care    This discharge recommendation:  Has been made in collaboration with the attending provider and/or case management    IF patient discharges home will need the following DME: rolling walker       SUBJECTIVE:   Patient stated im getting better each day .     OBJECTIVE DATA SUMMARY:   Critical Behavior:  Neurologic State: Alert  Orientation Level: Oriented X4  Cognition: Follows commands     Functional Mobility Training:  Bed Mobility:  Rolling: Modified independent  Supine to Sit: Modified independent  Scooting: Modified independent  Transfers:  Sit to Stand: Supervision  Stand to Sit: Supervision  Balance:  Sitting: Intact; Without support  Sitting - Static: Good (unsupported)  Sitting - Dynamic: Good (unsupported)  Standing: Intact; With support  Standing - Static: Constant support;Good  Standing - Dynamic : Constant support;Good  Ambulation/Gait Training:  Distance (ft): 350 Feet (ft)  Assistive Device: Gait belt;Walker, rolling  Ambulation - Level of Assistance: Stand-by assistance  Speed/Roya: Slow  Step Length: Left shortened;Right shortened  Therapeutic Exercises:   Review of previous seated TE  Pain Ratin/10 \" soreness but not pain\" in stomach     Activity Tolerance:   Fair and requires rest breaks  Please refer to the flowsheet for vital signs taken during this treatment. After treatment patient left in no apparent distress:   Sitting in chair, Call bell within reach, and Caregiver / family present    COMMUNICATION/COLLABORATION:   The patients plan of care was discussed with: Registered nurse. Problem: Mobility Impaired (Adult and Pediatric)  Goal: *Acute Goals and Plan of Care (Insert Text)  Description: Physical Therapy Goals  Initiated 22  1)  I with HEP in 7 days to improve overall functional mobility.   2)  Bed mobility and transfers I in 7 days to prevent skin breakdown. 3)  Pt to amb 300ft with LRAD and sup in 7 days    Pt. Goal:  Pt to be able to walk safely without falls.      Outcome: Progressing Towards Goal       Flavio Beaulieu PTA   Time Calculation: 32 mins

## 2022-04-07 NOTE — CONSULTS
Hematology and Oncology Inpatient Consult Note     Patient: Luis Cadena MRN: [id number]  SSN: [id number]    YOB: 1940  Age: [de-identified] y.o. Sex: female    Chief Complaint: Patient is feeling tired    Reason for consult: Evaluation and management of patient with colon cancer    Subjective:      Luis Cadena is a [de-identified] y.o. White female who was admitted with abdominal pain and constipation. Patient had a further work-up and was found to have colon cancer. She had resection and now recovering from the surgery. She had a bowel movement. She is now tolerating her p.o. diet well. Patient never had colonoscopy before. Past Medical History:   Diagnosis Date    Depression     DM (diabetes mellitus) ([address]     Hyperlipidemia     Hypertension     Pancreatitis     Thyroid disease      Ovarian cancer: Patient had ovarian cancer about 30 years ago. She could not give me details but it looks like patient had surgery followed by 1 year of chemotherapy. She did not have any recurrence after the chemotherapy. Colon cancer diagnosed in March 2022    History of syncopal episode and s/p pacemaker placement    History of peripheral neuropathy from diabetes. Past Surgical History:   Procedure Laterality Date    HX AORTIC VALVE REPLACEMENT  08/2021    HX HYSTERECTOMY      IR FLUORO GUIDE PLC CVAD  5/10/2021    IR INSERT NON TUNL CVC OVER 5 YRS  5/10/2021    TX INS NEW/RPLCMT PRM PM W/TRANSV ELTRD ATRIAL&VENT N/A 5/11/2021    INSERT PPM BIV MULTI performed by Ching Wilkins MD at [address] [place] [address]      Family history: Brother had skin cancer. Social history: Currently not smoking cigarettes.   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       Current Facility-Administered Medications   Medication Dose Route Frequency Provider Last Rate Last Admin    insulin glargine (LANTUS) injection 25 Units  25 Units SubCUTAneous QHS Ekta Euceda [place]   25 Units at 04/06/22 2221    hydrALAZINE (APRESOLINE) 20 mg/mL injection 10 mg  10 mg IntraVENous Q4H PRN Ana Rosa Bass MD   10 mg at 04/07/22 0352    enoxaparin (LOVENOX) injection 40 mg  40 mg SubCUTAneous Q24H Milton See MD   40 mg at 04/06/22 1109    dextrose 5% - 0.45% NaCl with KCl 20 mEq/L infusion  50 mL/hr IntraVENous CONTINUOUS Robert Reynolds MD 50 mL/hr at 04/07/22 0324 50 mL/hr at 04/07/22 0324    ketorolac (TORADOL) injection 15 mg  15 mg IntraVENous Q6H PRN Milton See MD   15 mg at 04/02/22 1253    traMADoL (ULTRAM) tablet 50 mg  50 mg Oral Q6H PRN Milton See MD   50 mg at 04/07/22 0546    HYDROmorphone (DILAUDID) injection 1 mg  1 mg IntraVENous Q4H PRN Milton See MD   1 mg at 04/06/22 0513    HYDROmorphone (DILAUDID) syringe 0.5 mg  0.5 mg IntraVENous Q4H PRN Milton See MD   0.5 mg at 04/04/22 0617    glucose chewable tablet 16 g  4 Tablet Oral PRN Yasir Euceda PA        dextrose 10% infusion 0-250 mL  0-250 mL IntraVENous PRN Ekta Euceda PA        glucagon (GLUCAGEN) injection 1 mg  1 mg IntraMUSCular PRN Ekta Euceda PA        acetaminophen (TYLENOL) tablet 650 mg  650 mg Oral Q6H PRN Ekta Euceda PA        Or    acetaminophen (TYLENOL) suppository 650 mg  650 mg Rectal Q6H PRN Yasir Euceda PA        ondansetron (ZOFRAN ODT) tablet 4 mg  4 mg Oral Q8H PRN Ekta Euceda PA        Or    ondansetron (ZOFRAN) injection 4 mg  4 mg IntraVENous Q6H PRN Ekta Euceda PA   4 mg at 03/31/22 1638    insulin lispro (HUMALOG) injection   SubCUTAneous AC&HS RICHARD Goldberg   2 Units at 04/06/22 2221    aspirin delayed-release tablet 81 mg  81 mg Oral DAILY RICHARD Goldberg   81 mg at 04/06/22 0835    clopidogreL (PLAVIX) tablet 75 mg  75 mg Oral DAILY RICHARD Goldberg   75 mg at 04/06/22 0836    DULoxetine (CYMBALTA) capsule 30 mg  30 mg Oral DAILY Ekta Euceda PA   30 mg at 04/06/22 0836    ferrous sulfate tablet 325 mg  325 mg Oral BID WITH MEALS Roel Diamond   325 mg at 04/06/22 1631    levothyroxine (SYNTHROID) tablet 150 mcg  150 mcg Oral ACB RICHARD Diamond   150 mcg at 04/06/22 3487    metoprolol succinate (TOPROL-XL) XL tablet 25 mg  25 mg Oral DAILY RICHARD Diamond   25 mg at 04/06/22 4641    atorvastatin (LIPITOR) tablet 10 mg  10 mg Oral QHS Ekta Euceda PA   10 mg at 04/06/22 2221    albuterol (PROVENTIL HFA, VENTOLIN HFA, PROAIR HFA) inhaler 2 Puff  2 Puff Inhalation Q6H PRN Ekta Euceda PA        oxyCODONE IR (ROXICODONE) tablet 5 mg  5 mg Oral Q4H PRN RICHARD Diamond   5 mg at 04/03/22 1134        Allergies   Allergen Reactions    Nortriptyline Itching    Contrast Agent [Iodine] Unknown (comments)    Cymbalta [Duloxetine] Itching    Ivp [Fd And C Blue No.1] Hives    Morphine Itching    Tetracycline Itching       Review of Systems:  CONSTITUTIONAL: No fever, no chills. No repeated infections. No night sweats. Fatigue  HEENT: No mouth sores. No Epistaxis. Has mild hearing impairment. No change in taste or smell sensations. CARDIOVASCULAR: No  palpitations or chest pain. No edema. Had one episode of syncope. RESPIRATORY: No cough. Has dyspnea and dyspnea on exertion. No Hemoptysis. No wheezing. No hoarseness of voice. GI: No nausea or vomiting. No diarrhea, constipation, no bright red blood per rectum. No hematemesis or melena. She has voluntary weight loss. No dysphagia. : No dysuria, no hematuria. No frequency of urination. INTEGUMENTARY: No skin rash or palpable lumps or bumps. HEMATOLOGIC: No history of easy bruisability. No gingival bleeding  NEURO: No focal weakness. No headache or seizures. Has numbness of feet from diabetic neuropathy  MUSCULOSKELETAL: No back pain.   ENDOCRINE: Has diabetes    Objective:     Vitals:    04/06/22 2000 04/06/22 2030 04/07/22 0334 04/07/22 0735   BP: (!) 168/63 (!) 165/64 (!) 148/56   Pulse: 80  81 81   Resp: 16  17 18   Temp: 98.9 °F (37.2 °C)  98.1 °F (36.7 °C) 98.9 °F (37.2 °C)   SpO2: 98% 98% 98% 96%   Weight:       Height:            Physical Exam:  Constitutional: Elderly white female not in acute distress or pain. Eyes: Sclerae anicteric. Conjunctivae shows pallor. ENMT: Oral mucosa is moist, no thrush, mucositis, or petechiae. Neck: No adenopathy. Hematologic/Lymphatic: Bilateral axillary/inguinal regions showed no adenopathy. Respiratory: Lungs are clear bilaterally. Cardiovascular: Normal sinus rhythm. Abdomen: Soft, nontender, no hepatosplenomegaly. No guarding or rigidity. Bowel sounds present. Patient has staples from recent abdominal surgery  Back/Spine: No spinal tenderness; no costovertebral angle tenderness. Extremities: No edema, cyanosis or clubbing. Skin: No petechiae; no skin rash. Neurologic: Alert/oriented x 3; no focal neurological deficits.     Recent Results (from the past 24 hour(s))   GLUCOSE, POC    Collection Time: 04/06/22  7:46 AM   Result Value Ref Range    Glucose (POC) 157 (H) 65 - 117 mg/dL    Performed by [address] [place] [address], POC    Collection Time: 04/06/22 11:01 AM   Result Value Ref Range    Glucose (POC) 237 (H) 65 - 117 mg/dL    Performed by [facility] [id number] Shea Prasad, POC    Collection Time: 04/06/22  4:12 PM   Result Value Ref Range    Glucose (POC) 255 (H) 65 - 117 mg/dL    Performed by Darrell Nam RN (Traveler)    GLUCOSE, POC    Collection Time: 04/06/22  8:37 PM   Result Value Ref Range    Glucose (POC) 283 (H) 65 - 117 mg/dL    Performed by Brooke Persaud    CBC WITH AUTOMATED DIFF    Collection Time: 04/07/22  6:44 AM   Result Value Ref Range    WBC 8.6 3.6 - 11.0 K/uL    RBC 3.00 (L) 3.80 - 5.20 M/uL    HGB 9.2 (L) 11.5 - 16.0 g/dL    HCT 28.9 (L) 35.0 - 47.0 %    MCV 96.3 80.0 - 99.0 FL    MCH 30.7 26.0 - 34.0 PG    MCHC 31.8 30.0 - 36.5 g/dL    RDW 13.8 11.5 - 14.5 %    PLATELET 570 (H) 628 - 400 K/uL MPV 10.9 8.9 - 12.9 FL    NRBC 0.0 0.0  WBC    ABSOLUTE NRBC 0.00 0.00 - 0.01 K/uL    NEUTROPHILS 72 32 - 75 %    LYMPHOCYTES 11 (L) 12 - 49 %    MONOCYTES 10 5 - 13 %    EOSINOPHILS 5 0 - 7 %    BASOPHILS 1 0 - 1 %    IMMATURE GRANULOCYTES 1 (H) 0 - 0.5 %    ABS. NEUTROPHILS 6.3 1.8 - 8.0 K/UL    ABS. LYMPHOCYTES 0.9 0.8 - 3.5 K/UL    ABS. MONOCYTES 0.8 0.0 - 1.0 K/UL    ABS. EOSINOPHILS 0.5 (H) 0.0 - 0.4 K/UL    ABS. BASOPHILS 0.1 0.0 - 0.1 K/UL    ABS. IMM. GRANS. 0.1 (H) 0.00 - 0.04 K/UL    DF AUTOMATED          CT ABD PELV WO CONT   Final Result   1. Abnormal masslike thickening within the ascending colon highly concerning for   malignancy with suspected lymphovascular invasion. Tumor results in low-grade   obstruction. 2. Trace ascites within the right abdomen. 3. Subtle morphologic changes suggestive of hepatic fibrosis/early cirrhosis. 4. Cholelithiasis. Notification: Findings were discussed with Dr. Neno Michel at 11:30 AM on 3/29/2022      XR CHEST PORT   Final Result         See synoptic report.      Synoptic Data   Procedure:                         Right hemicolectomy   Tumor site:                         Ascending colon   Tumor size:                         3 cm, circumferential   Macroscopic tumor perforation:               Not present   Histologic type:                         Adenocarcinoma   Histologic grade:                     Moderately differentiated (grade 2)   Tumor extension:                    Tumor extends through the muscularis   propria to involve the serosal                               surface   Margins:         Radial margin:                    Involved by neoplasm (minimal   microscopic involvement)           Distance to proximal margin:          35 cm       Distance to distal margin:               22 cm   Treatment effect:                    No known presurgical therapy   Lymphovascular invasion:               Present, extensive   Tumor budding:                         Present, extensive   Perineural invasion:                    Not identified   Tumor deposits:                         Present   Pathologic Stage (pTNM, AJCC 8th Edition):   pT4a, pN2b   Lymph nodes:       Number of lymph nodes examined:          14       Number of lymph nodes involved:          10   Ancillary studies:                    DNA MMR testing will be performed   Additional pathologic findings:               Extensive invasion of   mesentery by tumor         Electronically Signed Out By Suzanne Gabriel MD    js4/4/5/2022   Results for Jose Nguyen (MRN [id number]) as of 4/7/2022 07:38   Ref. Range 4/3/2022 08:45   Iron Latest Ref Range: 35 - 150 ug/dL 16 (L)   TIBC Latest Ref Range: 250 - 450 ug/dL 150 (L)   Iron % saturation Latest Ref Range: 20 - 50 % 11 (L)       Assessment:     Hospital Problems  Never Reviewed          Codes Class Noted POA    Colonic mass ICD-10-CM: C66.85  ICD-9-CM: 569.89  3/29/2022 Unknown        SBO (small bowel obstruction) ([facility] ICD-10-CM: C92.128  ICD-9-CM: 560.9  3/29/2022 Unknown        HTN (hypertension), malignant ICD-10-CM: I10  ICD-9-CM: 401.0  3/29/2022 Unknown              Assessment & Plan:     82-year-old white female with history of ovarian cancer about 30 years ago who also has multiple other medical problems including diabetes, syncopal episode, status post pacemaker placement, diabetic neuropathy. Patient is now diagnosed with colon cancer and had complete resection of her colon cancer. Her surgical staging was pT4a pN2b cM0. I have discussed with patient about her diagnosis and prognosis. I have also discussed with her about natural history of colon cancer and her chances of cancer coming back after the surgery. I have discussed with her about adjuvant chemotherapy according to NCCN guidelines. At 82 she is fairly active and has relatively good performance status. Of course I will wait for rest of the molecular profile.   Patient is considering to go through adjuvant chemotherapy.    -Nurse navigator Ana Newman was present during our discussion and she is also providing resources to the patient. Patient does have anemia which may be from her GI blood loss. I will check ferritin to see if patient has iron deficiency component. This dictation was done by dragon, computer voice recognition software. Often unanticipated grammatical, syntax, phones and other interpretive errors are inadvertently transcribed. Please excuse errors that have escaped final proofreading.      Signed By: Fermin Conrad MD     April 7, 2022

## 2022-04-07 NOTE — PROGRESS NOTES
Problem: Falls - Risk of  Goal: *Absence of Falls  Description: Document August Melendrezcorinna Fall Risk and appropriate interventions in the flowsheet. Outcome: Progressing Towards Goal  Note: Fall Risk Interventions:  Mobility Interventions: Utilize walker, cane, or other assistive device         Medication Interventions: Bed/chair exit alarm,Patient to call before getting OOB    Elimination Interventions: Call light in reach    History of Falls Interventions: Bed/chair exit alarm         Problem: Pressure Injury - Risk of  Goal: *Prevention of pressure injury  Description: Document Delta Scale and appropriate interventions in the flowsheet.   Outcome: Progressing Towards Goal  Note: Pressure Injury Interventions:  Sensory Interventions: Minimize linen layers    Moisture Interventions: Absorbent underpads,Minimize layers    Activity Interventions: Increase time out of bed    Mobility Interventions: HOB 30 degrees or less    Nutrition Interventions: Document food/fluid/supplement intake,Offer support with meals,snacks and hydration    Friction and Shear Interventions: HOB 30 degrees or less                Problem: Pain  Goal: *Control of Pain  Outcome: Progressing Towards Goal  Goal: *PALLIATIVE CARE:  Alleviation of Pain  Outcome: Progressing Towards Goal

## 2022-04-08 VITALS
OXYGEN SATURATION: 97 % | WEIGHT: 164.9 LBS | RESPIRATION RATE: 16 BRPM | BODY MASS INDEX: 31.13 KG/M2 | DIASTOLIC BLOOD PRESSURE: 54 MMHG | HEIGHT: 61 IN | SYSTOLIC BLOOD PRESSURE: 131 MMHG | HEART RATE: 67 BPM | TEMPERATURE: 98.2 F

## 2022-04-08 LAB
ANION GAP SERPL CALC-SCNC: 3 MMOL/L (ref 5–15)
BASOPHILS # BLD: 0.1 K/UL (ref 0–0.1)
BASOPHILS NFR BLD: 1 % (ref 0–1)
BUN SERPL-MCNC: 11 MG/DL (ref 6–20)
BUN/CREAT SERPL: 14 (ref 12–20)
CA-I BLD-MCNC: 8.2 MG/DL (ref 8.5–10.1)
CHLORIDE SERPL-SCNC: 103 MMOL/L (ref 97–108)
CO2 SERPL-SCNC: 29 MMOL/L (ref 21–32)
CREAT SERPL-MCNC: 0.77 MG/DL (ref 0.55–1.02)
DIFFERENTIAL METHOD BLD: ABNORMAL
EOSINOPHIL # BLD: 0.5 K/UL (ref 0–0.4)
EOSINOPHIL NFR BLD: 6 % (ref 0–7)
ERYTHROCYTE [DISTWIDTH] IN BLOOD BY AUTOMATED COUNT: 14.1 % (ref 11.5–14.5)
GLUCOSE BLD STRIP.AUTO-MCNC: 190 MG/DL (ref 65–117)
GLUCOSE BLD STRIP.AUTO-MCNC: 272 MG/DL (ref 65–117)
GLUCOSE SERPL-MCNC: 222 MG/DL (ref 65–100)
HCT VFR BLD AUTO: 26.2 % (ref 35–47)
HGB BLD-MCNC: 8.3 G/DL (ref 11.5–16)
IMM GRANULOCYTES # BLD AUTO: 0.1 K/UL (ref 0–0.04)
IMM GRANULOCYTES NFR BLD AUTO: 1 % (ref 0–0.5)
LYMPHOCYTES # BLD: 1.6 K/UL (ref 0.8–3.5)
LYMPHOCYTES NFR BLD: 17 % (ref 12–49)
MCH RBC QN AUTO: 31.1 PG (ref 26–34)
MCHC RBC AUTO-ENTMCNC: 31.7 G/DL (ref 30–36.5)
MCV RBC AUTO: 98.1 FL (ref 80–99)
MONOCYTES # BLD: 0.9 K/UL (ref 0–1)
MONOCYTES NFR BLD: 10 % (ref 5–13)
NEUTS SEG # BLD: 6.1 K/UL (ref 1.8–8)
NEUTS SEG NFR BLD: 65 % (ref 32–75)
NRBC # BLD: 0 K/UL (ref 0–0.01)
NRBC BLD-RTO: 0 PER 100 WBC
PERFORMED BY, TECHID: ABNORMAL
PERFORMED BY, TECHID: ABNORMAL
PLATELET # BLD AUTO: 410 K/UL (ref 150–400)
PMV BLD AUTO: 11.1 FL (ref 8.9–12.9)
POTASSIUM SERPL-SCNC: 4.7 MMOL/L (ref 3.5–5.1)
RBC # BLD AUTO: 2.67 M/UL (ref 3.8–5.2)
SODIUM SERPL-SCNC: 135 MMOL/L (ref 136–145)
WBC # BLD AUTO: 9.3 K/UL (ref 3.6–11)

## 2022-04-08 PROCEDURE — 74011250636 HC RX REV CODE- 250/636: Performed by: COLON & RECTAL SURGERY

## 2022-04-08 PROCEDURE — 80048 BASIC METABOLIC PNL TOTAL CA: CPT

## 2022-04-08 PROCEDURE — 99024 POSTOP FOLLOW-UP VISIT: CPT | Performed by: COLON & RECTAL SURGERY

## 2022-04-08 PROCEDURE — 74011636637 HC RX REV CODE- 636/637: Performed by: STUDENT IN AN ORGANIZED HEALTH CARE EDUCATION/TRAINING PROGRAM

## 2022-04-08 PROCEDURE — 36415 COLL VENOUS BLD VENIPUNCTURE: CPT

## 2022-04-08 PROCEDURE — 82962 GLUCOSE BLOOD TEST: CPT

## 2022-04-08 PROCEDURE — 85025 COMPLETE CBC W/AUTO DIFF WBC: CPT

## 2022-04-08 PROCEDURE — 74011250637 HC RX REV CODE- 250/637: Performed by: COLON & RECTAL SURGERY

## 2022-04-08 PROCEDURE — 74011250637 HC RX REV CODE- 250/637: Performed by: STUDENT IN AN ORGANIZED HEALTH CARE EDUCATION/TRAINING PROGRAM

## 2022-04-08 RX ORDER — ALBUTEROL SULFATE 90 UG/1
2 AEROSOL, METERED RESPIRATORY (INHALATION)
Qty: 18 G | Refills: 0 | Status: SHIPPED | OUTPATIENT
Start: 2022-04-08

## 2022-04-08 RX ORDER — OXYCODONE HYDROCHLORIDE 5 MG/1
5 TABLET ORAL
Qty: 12 TABLET | Refills: 0 | Status: SHIPPED | OUTPATIENT
Start: 2022-04-08 | End: 2022-04-11

## 2022-04-08 RX ADMIN — CLOPIDOGREL BISULFATE 75 MG: 75 TABLET ORAL at 09:18

## 2022-04-08 RX ADMIN — ENOXAPARIN SODIUM 40 MG: 40 INJECTION SUBCUTANEOUS at 12:55

## 2022-04-08 RX ADMIN — FERROUS SULFATE TAB 325 MG (65 MG ELEMENTAL FE) 325 MG: 325 (65 FE) TAB at 09:18

## 2022-04-08 RX ADMIN — METOPROLOL SUCCINATE 25 MG: 25 TABLET, EXTENDED RELEASE ORAL at 09:18

## 2022-04-08 RX ADMIN — TRAMADOL HYDROCHLORIDE 50 MG: 50 TABLET, COATED ORAL at 06:43

## 2022-04-08 RX ADMIN — ASPIRIN 81 MG: 81 TABLET, COATED ORAL at 09:18

## 2022-04-08 RX ADMIN — DULOXETINE HYDROCHLORIDE 30 MG: 30 CAPSULE, DELAYED RELEASE ORAL at 09:18

## 2022-04-08 RX ADMIN — INSULIN LISPRO 3 UNITS: 100 INJECTION, SOLUTION INTRAVENOUS; SUBCUTANEOUS at 12:55

## 2022-04-08 RX ADMIN — LEVOTHYROXINE SODIUM 150 MCG: 0.07 TABLET ORAL at 06:30

## 2022-04-08 NOTE — PROGRESS NOTES
Problem: Falls - Risk of  Goal: *Absence of Falls  Description: Document Yecenia Bridgette Fall Risk and appropriate interventions in the flowsheet.   Outcome: Progressing Towards Goal  Note: Fall Risk Interventions:  Mobility Interventions: Bed/chair exit alarm         Medication Interventions: Bed/chair exit alarm    Elimination Interventions: Call light in reach    History of Falls Interventions: Bed/chair exit alarm

## 2022-04-08 NOTE — PROGRESS NOTES
Pulmonary Progress Note    Subjective:   Daily Progress Note: 2022 10:10 AM    Ghislaine Baugh is a [de-identified]year old female PMH of COPD, diabetes, HTN, HLD, Pacemaker, AICD, HX of cardiac stents who is S/p day 7 for right hemicolectomy . Patient is alert and oriented and doing well. No respiratory distress. Patient was found to have stage IIIc colon cancer. Patient discharge planned today. Review of Systems     Constitutional: Negative. HENT: Negative. Eyes: Negative. Respiratory: Negative for shortness of breath. Cardiovascular: Negative. Gastrointestinal: Positive for abdominal pain. Status post surgery bandage in place  Genitourinary: Negative. Musculoskeletal: Negative. Skin: Negative. Neurological: Negative. Psychiatric/Behavioral: Negative. Objective:     Visit Vitals  BP (!) 131/54 (BP 1 Location: Left upper arm, BP Patient Position: At rest)   Pulse 67   Temp 98.2 °F (36.8 °C)   Resp 16   Ht 5' 1\" (1.549 m)   Wt 74.8 kg (164 lb 14.5 oz)   SpO2 97%   BMI 31.16 kg/m²      O2 Device: None (Room air)    Temp (24hrs), Av.6 °F (37 °C), Min:97.9 °F (36.6 °C), Max:99 °F (37.2 °C)      No intake/output data recorded.  1901 -  0700  In: 3654.2 [P.O.:1230; I.V.:2424.2]  Out: 3850 [Urine:3850]    Physical Exam  Constitutional:       Appearance: Normal appearance. HENT:      Head: Normocephalic and atraumatic. Nose: Nose normal.      Mouth/Throat:      Mouth: Mucous membranes are moist.   Eyes:      Pupils: Pupils are equal, round, and reactive to light. Cardiovascular:      Rate and Rhythm: Normal rate. Regular rhythm     Pulses: Normal pulses. Heart sounds: Normal heart sounds. Pulmonary:      Effort: Pulmonary effort is normal.      Breath sounds: Normal breath sounds. Abdominal:      General: Bowel sounds are present not distended     Tenderness: There is mild abdominal tenderness. Musculoskeletal:         General: Normal range of motion. Cervical back: Normal range of motion and neck supple. Skin:     General: Skin is warm. Neurological:      General: No focal deficit present. Mental Status: She is alert. Psychiatric:         Mood and Affect: Mood normal.         Data Review    Recent Results (from the past 24 hour(s))   GLUCOSE, POC    Collection Time: 04/07/22 11:12 AM   Result Value Ref Range    Glucose (POC) 214 (H) 65 - 117 mg/dL    Performed by [id number], POC    Collection Time: 04/07/22  4:20 PM   Result Value Ref Range    Glucose (POC) 182 (H) 65 - 117 mg/dL    Performed by [employer], POC    Collection Time: 04/07/22  7:54 PM   Result Value Ref Range    Glucose (POC) 179 (H) 65 - 117 mg/dL    Performed by [address] [place] [address], BASIC    Collection Time: 04/08/22  4:30 AM   Result Value Ref Range    Sodium 135 (L) 136 - 145 mmol/L    Potassium 4.7 3.5 - 5.1 mmol/L    Chloride 103 97 - 108 mmol/L    CO2 29 21 - 32 mmol/L    Anion gap 3 (L) 5 - 15 mmol/L    Glucose 222 (H) 65 - 100 mg/dL    BUN 11 6 - 20 mg/dL    Creatinine 0.77 0.55 - 1.02 mg/dL    BUN/Creatinine ratio 14 12 - 20      GFR est AA >60 >60 ml/min/1.73m2    GFR est non-AA >60 >60 ml/min/1.73m2    Calcium 8.2 (L) 8.5 - 10.1 mg/dL   CBC WITH AUTOMATED DIFF    Collection Time: 04/08/22  4:30 AM   Result Value Ref Range    WBC 9.3 3.6 - 11.0 K/uL    RBC 2.67 (L) 3.80 - 5.20 M/uL    HGB 8.3 (L) 11.5 - 16.0 g/dL    HCT 26.2 (L) 35.0 - 47.0 %    MCV 98.1 80.0 - 99.0 FL    MCH 31.1 26.0 - 34.0 PG    MCHC 31.7 30.0 - 36.5 g/dL    RDW 14.1 11.5 - 14.5 %    PLATELET 841 (H) 445 - 400 K/uL    MPV 11.1 8.9 - 12.9 FL    NRBC 0.0 0.0  WBC    ABSOLUTE NRBC 0.00 0.00 - 0.01 K/uL    NEUTROPHILS 65 32 - 75 %    LYMPHOCYTES 17 12 - 49 %    MONOCYTES 10 5 - 13 %    EOSINOPHILS 6 0 - 7 %    BASOPHILS 1 0 - 1 %    IMMATURE GRANULOCYTES 1 (H) 0 - 0.5 %    ABS. NEUTROPHILS 6.1 1.8 - 8.0 K/UL    ABS. LYMPHOCYTES 1.6 0.8 - 3.5 K/UL    ABS. MONOCYTES 0.9 0.0 - 1.0 K/UL    ABS. EOSINOPHILS 0.5 (H) 0.0 - 0.4 K/UL    ABS. BASOPHILS 0.1 0.0 - 0.1 K/UL    ABS. IMM.  GRANS. 0.1 (H) 0.00 - 0.04 K/UL    DF AUTOMATED     GLUCOSE, POC    Collection Time: 04/08/22  9:07 AM   Result Value Ref Range    Glucose (POC) 190 (H) 65 - 117 mg/dL    Performed by FARA BEECRRA        Current Facility-Administered Medications   Medication Dose Route Frequency    insulin glargine (LANTUS) injection 25 Units  25 Units SubCUTAneous QHS    hydrALAZINE (APRESOLINE) 20 mg/mL injection 10 mg  10 mg IntraVENous Q4H PRN    enoxaparin (LOVENOX) injection 40 mg  40 mg SubCUTAneous Q24H    dextrose 5% - 0.45% NaCl with KCl 20 mEq/L infusion  50 mL/hr IntraVENous CONTINUOUS    traMADoL (ULTRAM) tablet 50 mg  50 mg Oral Q6H PRN    HYDROmorphone (DILAUDID) injection 1 mg  1 mg IntraVENous Q4H PRN    HYDROmorphone (DILAUDID) syringe 0.5 mg  0.5 mg IntraVENous Q4H PRN    glucose chewable tablet 16 g  4 Tablet Oral PRN    dextrose 10% infusion 0-250 mL  0-250 mL IntraVENous PRN    glucagon (GLUCAGEN) injection 1 mg  1 mg IntraMUSCular PRN    acetaminophen (TYLENOL) tablet 650 mg  650 mg Oral Q6H PRN    Or    acetaminophen (TYLENOL) suppository 650 mg  650 mg Rectal Q6H PRN    ondansetron (ZOFRAN ODT) tablet 4 mg  4 mg Oral Q8H PRN    Or    ondansetron (ZOFRAN) injection 4 mg  4 mg IntraVENous Q6H PRN    insulin lispro (HUMALOG) injection   SubCUTAneous AC&HS    aspirin delayed-release tablet 81 mg  81 mg Oral DAILY    clopidogreL (PLAVIX) tablet 75 mg  75 mg Oral DAILY    DULoxetine (CYMBALTA) capsule 30 mg  30 mg Oral DAILY    ferrous sulfate tablet 325 mg  325 mg Oral BID WITH MEALS    levothyroxine (SYNTHROID) tablet 150 mcg  150 mcg Oral ACB    metoprolol succinate (TOPROL-XL) XL tablet 25 mg  25 mg Oral DAILY    atorvastatin (LIPITOR) tablet 10 mg  10 mg Oral QHS    albuterol (PROVENTIL HFA, VENTOLIN HFA, PROAIR HFA) inhaler 2 Puff  2 Puff Inhalation Q6H PRN    oxyCODONE IR (ROXICODONE) tablet 5 mg  5 mg Oral Q4H PRN         Assessment/Plan:        1. Chronic Obstructive Pulmonary Disease without excebation  2. Partial large Bowel Obstruction  3. Status post right hemicolectomy  4. Diabetes Mellitus type 2  5. Essential Hypertension  6. Thyroid Disease   7. 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 She had aortic valve replaced previously also she has stent placement in the past chest x-ray no acute infiltrate or lung mass. Last showed Echocardiogram showed EF of 55 to 60% with grade 1 diastolic dysfunction. 2. Creatinine normal  3. Patient underwent right hemicolectomy doing fairly well no flatus yet  4. monitor respiratory status and O2 PRN  5.  Okay to discharge

## 2022-04-08 NOTE — PROGRESS NOTES
Comprehensive Nutrition Assessment    Type and Reason for Visit: Reassess (Interim)    Nutrition Recommendations/Plan:   Continue diet. Continue ONS- Ensure HP x2/d. Recommend Diabetes Management consult. Monitor while IP. Nutrition Assessment:   Admitted for RLQ pain 2/2 SBO from colonic mass, malignant HTN. POD4 R. Hemicolectomy r/t cecal mass- Full Liquid diet started today- drank 50% of Lunch per Nsg. Noted h/o Mildly Thick/[place] Liq on prev admission. UTO UBW, food preferences at this time. RD to add ONS. (4/8) Pt pleasant in bed s/p Breakfast. Ate 90% and drank 1005 of ONS. Appetite good and at baseline. UBW reported as 148-149 lbs at baseline(Pt monitors for CHF dx); UTO CBW- bedscale error. Scheduled D/C home today; reported eager to return and prepare meals w/ air fryer. Rec'd Diabetes Management consult for DM management. Labs: H/H 8.3/26.2, , Ca 8.2, POC -214 mg/dL. Meds: D5+ 1/2 NS + KCl 40 mEq @ 50 mL/hr, FerrouSul, synthroid, lopressor, plavix, lipitor, aspirin, tramadol. Malnutrition Assessment:  Malnutrition Status:  Mild malnutrition    Context:  Chronic illness       Estimated Daily Nutrient Needs:  Energy (kcal): 1755 kcal/d (30 kcal/kg); Weight Used for Energy Requirements: Adjusted (58.5 kg)  Protein (g): 65gm/d (1.1 gm/kg); Weight Used for Protein Requirements: Usual  Fluid (ml/day): 1800 mL/d; Method Used for Fluid Requirements: 1 ml/kcal    Nutrition Related Findings:   Appears well nourished. No edema noted. No N/V/D/C nor c/s issues per Pt. Diet advanced to Regular, therapeutic and tolerated. Last BM on 4/8. Wounds:    None (Blanchable erythema on Bilateral Buttock.)       Current Nutrition Therapies:  ADULT ORAL NUTRITION SUPPLEMENT Breakfast, Lunch;  Low Calorie/High Protein  ADULT DIET Regular; GI [place] (GERD/Peptic Ulcer)    Anthropometric Measures:  · Height:  5' 1\" (154.9 cm)  · Current Body Wt:  74.8 kg (164 lb 14.5 oz)   · Admission Body Wt:       · Usual Body Wt:  67.6 kg (149 lb) (PTA)     · Ideal Body Wt:  105 lbs:  157.1 %   · Adjusted Body Weight:   ; Weight Adjustment for:     · Adjusted BMI:       · BMI Category:  Obese class 1 (BMI 30.0-34. 9)       Nutrition Diagnosis:   · Mild malnutrition,In context of chronic illness related to inadequate protein-energy intake,altered GI function as evidenced by intake 51-75%,weight loss,GI abnormality    Nutrition Interventions:   Food and/or Nutrient Delivery: Continue current diet,Continue oral nutrition supplement  Nutrition Education and Counseling: Survival skills/brief education completed  Coordination of Nutrition Care: Continue to monitor while inpatient    Goals:  Meet >75% EENs in 5-7 days. Wt maintenance +/- 0.5kg per week. Improve labs/Lytes WNL. Nutrition Monitoring and Evaluation:   Behavioral-Environmental Outcomes: None identified  Food/Nutrient Intake Outcomes: Supplement intake,Food and nutrient intake,Diet advancement/tolerance  Physical Signs/Symptoms Outcomes: Biochemical data,Meal time behavior,Weight,GI status    Discharge Planning:    Continue current diet     Electronically signed by Ramírez Garland RD on 4/8/2022 at 11:10 AM    Contact: ext. [phone] or [employer].

## 2022-04-08 NOTE — PROGRESS NOTES
Patient discharging home today with home health through [facility] health. SOC date 04/09/2022. Patient and son aware of discharge and in agreement. All dc information sent through [employer]. Discharge plan of care/case management plan validated with provider discharge order. Medicare pt has received, reviewed, and signed 2nd IM letter informing them of their right to appeal the discharge. Signed copied has been placed on pt bedside chart.

## 2022-04-08 NOTE — PROGRESS NOTES
Patient being discharged home with home health per attending and case management. Discharge paperwork reviewed with patient and son.

## 2022-04-08 NOTE — DISCHARGE INSTRUCTIONS
Hospitalist Discharge Instructions     Patient ID:    Cindy Boyd  [id number]  37 y.o.  1940    Admit date: 3/29/2022    Discharge date : 4/8/2022    Final Diagnoses:   1. Partial large bowel obstruction due to cecal mass status post right colectomy day #7/ colon cancer/adenocarcinoma  2. Abnormal urinalysis  3. Hypertension  4. Coronary artery disease with history of PAD to LAD/history of aortic valve replacement for severe aortic stenosis/AICD/pacemaker in situ  5. Type 2 diabetes  6. Hypothyroid  7. COPD without exacerbation  8. Gait instability    Reason for Hospitalization: Abdominal pain      Discharge Medications:   Current Discharge Medication List      START taking these medications    Details   albuterol (PROVENTIL HFA, VENTOLIN HFA, PROAIR HFA) 90 mcg/actuation inhaler Take 2 Puffs by inhalation every six (6) hours as needed for Wheezing or Shortness of Breath. Qty: 18 g, Refills: 0  Start date: 4/8/2022      oxyCODONE IR (ROXICODONE) 5 mg immediate release tablet Take 1 Tablet by mouth every four (4) hours as needed for Pain for up to 3 days. Max Daily Amount: 30 mg.  Qty: 12 Tablet, Refills: 0  Start date: 4/8/2022, End date: 4/11/2022    Associated Diagnoses: Colonic mass         CONTINUE these medications which have NOT CHANGED    Details   DULoxetine (CYMBALTA) 30 mg capsule Take 30 mg by mouth daily. clopidogreL (PLAVIX) 75 mg tab Take 75 mg by mouth daily. aspirin delayed-release 81 mg tablet Take 81 mg by mouth daily. acetaminophen (TylenoL) 325 mg tablet Take 325 mg by mouth as needed for Pain. cyanocobalamin (Vitamin B-12) 1,000 mcg tablet Take 1,000 mcg by mouth daily. diphenhydrAMINE (BenadryL) 25 mg capsule Take 25 mg by mouth as needed.       insulin glargine (Lantus U-100 Insulin) 100 unit/mL injection 40 unit daily  Qty: 1 Vial, Refills: 1      ferrous sulfate 325 mg (65 mg iron) tablet Take 1 Tablet by mouth two (2) times daily (with meals). Qty: 60 Tablet, Refills: 0      metoprolol succinate (TOPROL-XL) 25 mg XL tablet Take 1 Tablet by mouth daily. Qty: 30 Tablet, Refills: 0      levothyroxine (SYNTHROID) 150 mcg tablet Take 150 mcg by mouth daily (before breakfast). Associated Diagnoses: DM (diabetes mellitus) ([facility] Type II or unspecified type diabetes mellitus without mention of complication, uncontrolled; HTN (hypertension); Hyperlipidemia      lovastatin (MEVACOR) 40 mg tablet Take 40 mg by mouth nightly. Associated Diagnoses: DM (diabetes mellitus) ([facility] Type II or unspecified type diabetes mellitus without mention of complication, uncontrolled; HTN (hypertension); Hyperlipidemia         STOP taking these medications       predniSONE (DELTASONE) 20 mg tablet Comments:   Reason for Stopping:         furosemide (LASIX) 40 mg tablet Comments:   Reason for Stopping:         ticagrelor (BRILINTA) 90 mg tablet Comments:   Reason for Stopping:         LEXAPRO 10 mg tablet Comments:   Reason for Stopping:         famotidine (PEPCID) 20 mg tablet Comments:   Reason for Stopping: Follow up Care:    1. Beni Vasquez MD in 1-2 weeks. Follow-up Information     Follow up With Specialties Details Why Gudelia Sims MD Family Medicine In 3 weeks  [address]  [facility] [address]      Terence Cobos MD Hematology and Oncology In 1 week  [address] [facility]      Alexis Mohan MD Colon and Rectal Surgery, General Surgery In 2 weeks  [address] [facility]      Isabel Rojas MD Pulmonary Disease In 2 weeks  [address] [facility]  [phone]              Patient Follow Up Instructions:    Activity: Activity as tolerated  Diet:  Diabetic Diet  Wound care: None Needed    Condition at Discharge: Stable  __________________________________________________________________    Disposition  Home with family and home health services  ____________________________________________________________________    Code Status: Full Code  ___________________________________________________________________    CONSULTATIONS: Hematology/Oncology and General Surgery    Signed:  Rebecca Jacobs PA-C  4/8/2022  8:00 AM

## 2022-04-08 NOTE — DISCHARGE SUMMARY
Hospitalist Discharge Summary     Patient ID:    Charley Yeager  [id number]  14 y.o.  1940    Admit date: 3/29/2022    Discharge date : 4/8/2022    Chronic Diagnoses:    Problem List as of 4/8/2022 Never Reviewed          Codes Class Noted - Resolved    Colonic mass ICD-10-CM: K63.89  ICD-9-CM: 569.89  3/29/2022 - Present        SBO (small bowel obstruction) ([facility] ICD-10-CM: K56.609  ICD-9-CM: 560.9  3/29/2022 - Present        HTN (hypertension), malignant ICD-10-CM: I10  ICD-9-CM: 401.0  3/29/2022 - Present        CHF (congestive heart failure) (HCC) ICD-10-CM: I50.9  ICD-9-CM: 428.0  5/4/2021 - Present        COPD (chronic obstructive pulmonary disease) ([facility] ICD-10-CM: J44.9  ICD-9-CM: 496  5/4/2021 - Present        Pancreatitis ICD-10-CM: K85.90  ICD-9-CM: 577.0  Unknown - Present        Hypertension ICD-10-CM: I10  ICD-9-CM: 401.9  Unknown - Present        Thyroid disease ICD-10-CM: E07.9  ICD-9-CM: 246. 9  Unknown - Present        DM (diabetes mellitus) ([facility] ICD-10-CM: E11.9  ICD-9-CM: 250.00  Unknown - Present              Final Diagnoses:   1. Partial large bowel obstruction due to cecal mass status post right colectomy day #7/ colon cancer/adenocarcinoma  2. Abnormal urinalysis  3. Hypertension  4. Coronary artery disease with history of PAD to LAD/history of aortic valve replacement for severe aortic stenosis/AICD/pacemaker in situ  5. Type 2 diabetes  6. Hypothyroid  7. COPD without exacerbation  8. Gait instability    Reason for Hospitalization: Abdominal pain      Hospital Course: Patient is a [de-identified]year old female with a PMH of diabetes, hypertension, COPD,  AICD, CAD with stents, who presented with right-sided lower abdominal pain. CT abdomen showed masslike thickening within the ascending colon, trace ascites within the right abdomen, cholelithiasis, and subtle morphologic changes of the liver. Initial labs significant for WBC of 12.1. UA + nitrites with 5 -10 WBC. Urine culture negative. Chest x-ray showed no acute cardiopulmonary abnormality. Patient to be admitted for further management. General surgery, cardiology, and pulmonology consulted. Patient started on ceftriaxone and placed on clear liquid diet. Exploratory laparotomy with right colectomy on 4/1. Surgery found near obstructing tumor at proximal ascending colon with distended cecum with areas of gangrene in the wall. Ascitic fluid pending.n CEA 0.5. Biopsy shows moderately differentiated adenocarcinoma with invasion to serosa, and a static carcinoma and 10 of 14 lymph nodes. Oncology consult and oncology nurse navigator. Patient is tolerating her diet. Coden stable for discharge home on 4/8/2022 with close follow-up with oncology and general surgery in 1 or 2 weeks      Discharge Medications:   Current Discharge Medication List      START taking these medications    Details   albuterol (PROVENTIL HFA, VENTOLIN HFA, PROAIR HFA) 90 mcg/actuation inhaler Take 2 Puffs by inhalation every six (6) hours as needed for Wheezing or Shortness of Breath. Qty: 18 g, Refills: 0  Start date: 4/8/2022      oxyCODONE IR (ROXICODONE) 5 mg immediate release tablet Take 1 Tablet by mouth every four (4) hours as needed for Pain for up to 3 days. Max Daily Amount: 30 mg.  Qty: 12 Tablet, Refills: 0  Start date: 4/8/2022, End date: 4/11/2022    Associated Diagnoses: Colonic mass         CONTINUE these medications which have NOT CHANGED    Details   DULoxetine (CYMBALTA) 30 mg capsule Take 30 mg by mouth daily. clopidogreL (PLAVIX) 75 mg tab Take 75 mg by mouth daily. aspirin delayed-release 81 mg tablet Take 81 mg by mouth daily. acetaminophen (TylenoL) 325 mg tablet Take 325 mg by mouth as needed for Pain. cyanocobalamin (Vitamin B-12) 1,000 mcg tablet Take 1,000 mcg by mouth daily. diphenhydrAMINE (BenadryL) 25 mg capsule Take 25 mg by mouth as needed.       insulin glargine (Lantus U-100 Insulin) 100 unit/mL injection 40 unit daily  Qty: 1 Vial, Refills: 1      ferrous sulfate 325 mg (65 mg iron) tablet Take 1 Tablet by mouth two (2) times daily (with meals). Qty: 60 Tablet, Refills: 0      metoprolol succinate (TOPROL-XL) 25 mg XL tablet Take 1 Tablet by mouth daily. Qty: 30 Tablet, Refills: 0      levothyroxine (SYNTHROID) 150 mcg tablet Take 150 mcg by mouth daily (before breakfast). Associated Diagnoses: DM (diabetes mellitus) ([facility] Type II or unspecified type diabetes mellitus without mention of complication, uncontrolled; HTN (hypertension); Hyperlipidemia      lovastatin (MEVACOR) 40 mg tablet Take 40 mg by mouth nightly. Associated Diagnoses: DM (diabetes mellitus) ([facility] Type II or unspecified type diabetes mellitus without mention of complication, uncontrolled; HTN (hypertension); Hyperlipidemia         STOP taking these medications       predniSONE (DELTASONE) 20 mg tablet Comments:   Reason for Stopping:         furosemide (LASIX) 40 mg tablet Comments:   Reason for Stopping:         ticagrelor (BRILINTA) 90 mg tablet Comments:   Reason for Stopping:         LEXAPRO 10 mg tablet Comments:   Reason for Stopping:         famotidine (PEPCID) 20 mg tablet Comments:   Reason for Stopping: Follow up Care:    1. Kim Cardenas MD in 1-2 weeks. Follow-up Information     Follow up With Specialties Details Why Kimberly Frost MD Family Medicine In 3 weeks  [address]  [place] [address]      Yoana Montenegro MD Hematology and Oncology In 1 week  [address] [facility]      Donn Flores MD Colon and Rectal Surgery, General Surgery In 2 weeks  [address] [facility]      Christina Staton MD Pulmonary Disease In 2 weeks  [address]  [place] [address]  [phone]              Patient Follow Up Instructions: Activity: Activity as tolerated  Diet:  Diabetic Diet  Wound care: None Needed    Condition at Discharge:  Stable  __________________________________________________________________    Disposition  Home with family and home health services  ____________________________________________________________________    Code Status: Full Code  ___________________________________________________________________    Discharge Exam:  Patient seen and examined by me on discharge day. Pertinent Findings:  Gen:    Not in distress  Chest: Clear lungs  CVS:   Regular rhythm. No edema  Abd:  Soft, not distended, not tender  Neuro:  Alert    CONSULTATIONS: Hematology/Oncology and General Surgery    Significant Diagnostic Studies:   3/29/2022: BUN 21 mg/dL (H; Ref range: 6 - 20 mg/dL); Calcium 9.5 mg/dL (Ref range: 8.5 - 10.1 mg/dL); CO2 28 mmol/L (Ref range: 21 - 32 mmol/L); Creatinine 0.97 mg/dL (Ref range: 0.55 - 1.02 mg/dL); Glucose 161 mg/dL (H; Ref range: 65 - 100 mg/dL); HCT 35.3 % (Ref range: 35.0 - 47.0 %); HGB 11.4 g/dL (L; Ref range: 11.5 - 16.0 g/dL); Potassium 4.2 mmol/L (Ref range: 3.5 - 5.1 mmol/L); Sodium 139 mmol/L (Ref range: 136 - 145 mmol/L)  3/30/2022: BUN 23 mg/dL (H; Ref range: 6 - 20 mg/dL); Calcium 8.3 mg/dL (L; Ref range: 8.5 - 10.1 mg/dL); CO2 30 mmol/L (Ref range: 21 - 32 mmol/L); Creatinine 1.02 mg/dL (Ref range: 0.55 - 1.02 mg/dL); Glucose 138 mg/dL (H; Ref range: 65 - 100 mg/dL); HCT 31.2 % (L; Ref range: 35.0 - 47.0 %); HGB 9.9 g/dL (L; Ref range: 11.5 - 16.0 g/dL); Potassium 4.2 mmol/L (Ref range: 3.5 - 5.1 mmol/L);  Sodium 133 mmol/L (L; Ref range: 136 - 145 mmol/L)  Recent Labs     04/08/22 0430 04/07/22  0644   WBC 9.3 8.6   HGB 8.3* 9.2*   HCT 26.2* 28.9*   * 414*     Recent Labs     04/08/22  0430 04/07/22  0644 04/06/22  0620   * 140 138   K 4.7 4.1 4.3    104 106   CO2 29 31 30   BUN 11 5* 7   CREA 0.77 0.50* 0.52*   * 143* 147*   CA 8.2* 8.4* 8.2* Recent Labs     04/06/22  0620   ALT 12   AP 42*   TBILI 0.3   TP 4.3*   ALB 2.0*   GLOB 2.3     No results for input(s): INR, PTP, APTT, INREXT in the last 72 hours. Recent Labs     04/07/22  0751   FERR 96      No results for input(s): PH, PCO2, PO2 in the last 72 hours. No results for input(s): CPK, CKMB in the last 72 hours.     No lab exists for component: TROPONINI  Lab Results   Component Value Date/Time    Glucose (POC) 179 (H) 04/07/2022 07:54 PM    Glucose (POC) 182 (H) 04/07/2022 04:20 PM    Glucose (POC) 214 (H) 04/07/2022 11:12 AM    Glucose (POC) 141 (H) 04/07/2022 07:42 AM    Glucose (POC) 283 (H) 04/06/2022 08:37 PM         Total Time: >30 min     Signed:  Maggie Juarez PA-C  4/8/2022  8:00 AM

## 2022-04-08 NOTE — NURSE NAVIGATOR
Visited patient; advised her that I spoke with her son this morning and answered his questions to the best of my ability. Patient in good spirits. Will continue to follow as outpatient.

## 2022-04-08 NOTE — PROGRESS NOTES
Hematology and Oncology Progress Note    Patient: Wenceslao Leyden MRN: [id number]  SSN: [id number]    YOB: 1940  Age: [de-identified] y.o. Sex: female      Admit Date: 3/29/2022    LOS: 10 days     Chief Complaint: Patient is feeling much better now    Subjective:     Patient had bowel movement. She has slight abdominal discomfort. She is accompanied by her son Endy Aaron at bedside. Objective:     Vitals:    04/07/22 1500 04/07/22 1948 04/08/22 0102 04/08/22 0835   BP: (!) 118/59 128/62 (!) 140/61 (!) 131/54   Pulse: 87 82 83 67   Resp:  16 16 16   Temp: 99 °F (37.2 °C) 98.9 °F (37.2 °C) 98.5 °F (36.9 °C) 98.2 °F (36.8 °C)   SpO2: 99% 99% 97% 97%   Weight:       Height:          Physical Exam:  Constitutional: Elderly white female not in acute distress or pain. Eyes: Sclerae anicteric. Conjunctivae shows pallor. ENMT: Oral mucosa is moist, no thrush, mucositis, or petechiae. Neck: No adenopathy. Respiratory: Lungs are clear bilaterally. Cardiovascular: Normal sinus rhythm. Abdomen: Soft, nontender, no hepatosplenomegaly. No guarding or rigidity. Bowel sounds present. Patient has staples from recent abdominal surgery  Extremities: No edema, cyanosis or clubbing. Skin: No petechiae; no skin rash.   Neurologic: Alert/oriented x 3  Lab/Data Review:    Recent Results (from the past 24 hour(s))   GLUCOSE, POC    Collection Time: 04/07/22 11:12 AM   Result Value Ref Range    Glucose (POC) 214 (H) 65 - 117 mg/dL    Performed by [facility], POC    Collection Time: 04/07/22  4:20 PM   Result Value Ref Range    Glucose (POC) 182 (H) 65 - 117 mg/dL    Performed by [facility], POC    Collection Time: 04/07/22  7:54 PM   Result Value Ref Range    Glucose (POC) 179 (H) 65 - 117 mg/dL    Performed by [address] [place] [address], BASIC    Collection Time: 04/08/22  4:30 AM   Result Value Ref Range    Sodium 135 (L) 136 - 145 mmol/L    Potassium 4.7 3.5 - 5.1 mmol/L    Chloride 103 97 - 108 mmol/L    CO2 29 21 - 32 mmol/L    Anion gap 3 (L) 5 - 15 mmol/L    Glucose 222 (H) 65 - 100 mg/dL    BUN 11 6 - 20 mg/dL    Creatinine 0.77 0.55 - 1.02 mg/dL    BUN/Creatinine ratio 14 12 - 20      GFR est AA >60 >60 ml/min/1.73m2    GFR est non-AA >60 >60 ml/min/1.73m2    Calcium 8.2 (L) 8.5 - 10.1 mg/dL   CBC WITH AUTOMATED DIFF    Collection Time: 04/08/22  4:30 AM   Result Value Ref Range    WBC 9.3 3.6 - 11.0 K/uL    RBC 2.67 (L) 3.80 - 5.20 M/uL    HGB 8.3 (L) 11.5 - 16.0 g/dL    HCT 26.2 (L) 35.0 - 47.0 %    MCV 98.1 80.0 - 99.0 FL    MCH 31.1 26.0 - 34.0 PG    MCHC 31.7 30.0 - 36.5 g/dL    RDW 14.1 11.5 - 14.5 %    PLATELET 687 (H) 159 - 400 K/uL    MPV 11.1 8.9 - 12.9 FL    NRBC 0.0 0.0  WBC    ABSOLUTE NRBC 0.00 0.00 - 0.01 K/uL    NEUTROPHILS 65 32 - 75 %    LYMPHOCYTES 17 12 - 49 %    MONOCYTES 10 5 - 13 %    EOSINOPHILS 6 0 - 7 %    BASOPHILS 1 0 - 1 %    IMMATURE GRANULOCYTES 1 (H) 0 - 0.5 %    ABS. NEUTROPHILS 6.1 1.8 - 8.0 K/UL    ABS. LYMPHOCYTES 1.6 0.8 - 3.5 K/UL    ABS. MONOCYTES 0.9 0.0 - 1.0 K/UL    ABS. EOSINOPHILS 0.5 (H) 0.0 - 0.4 K/UL    ABS. BASOPHILS 0.1 0.0 - 0.1 K/UL    ABS. IMM. GRANS. 0.1 (H) 0.00 - 0.04 K/UL    DF AUTOMATED     GLUCOSE, POC    Collection Time: 04/08/22  9:07 AM   Result Value Ref Range    Glucose (POC) 190 (H) 65 - 117 mg/dL    Performed by FARA BECERRA            Assessment and plan:   82-year-old white female with history of ovarian cancer about 30 years ago who also has multiple other medical problems including diabetes, syncopal episode, status post pacemaker placement, diabetic neuropathy. Patient is now diagnosed with colon cancer and had complete resection of her colon cancer. Her surgical staging was pT4a pN2b cM0. I have discussed with patient about her diagnosis and prognosis. I have also discussed with her about natural history of colon cancer and her chances of cancer coming back after the surgery.   I have discussed with her about adjuvant chemotherapy according to NCCN guidelines. At 82 she is fairly active and has relatively good performance status. Of course I will wait for rest of the molecular profile. Patient is considering to go through adjuvant chemotherapy.  -I had a very long discussion with patient's son Teresa Wyman regarding patient's cancer and her prognosis. We also briefly discussed about chemotherapy. Patient is going home today. I have set up for follow-up appointment.    -Patient's CEA was normal at 0.6.     -Nurse navigator Raymundo Delaney was present during our initial discussion (on 7 April) and she is also providing resources to the patient.     Patient does have anemia which may be from her GI blood loss.   -Her anemia work-up shows vitamin B12 1985 and folate is 19.4. So patient does not have any vitamin B12 or folate deficiency. Patient's ferritin was 96. This dictation was done by dragon, computer voice recognition software. Often unanticipated grammatical, syntax, phones and other interpretive errors are inadvertently transcribed. Please excuse errors that have escaped final proofreading.        Signed By: Nikky Hahn MD     April 8, 2022

## 2022-04-08 NOTE — PROGRESS NOTES
Pulmonary Progress Note    Subjective:   Daily Progress Note: 2022 10:10 AM    Jericho Robison is a [de-identified]year old female PMH of COPD, diabetes, HTN, HLD, Pacemaker, AICD, HX of cardiac stents who is S/p day 7 for right hemicolectomy . Patient is alert and oriented and doing well. No respiratory distress. Patient was found to have stage IIIc colon cancer. Patient discharge planned today. Review of Systems     Constitutional: Negative. HENT: Negative. Eyes: Negative. Respiratory: Negative for shortness of breath. Cardiovascular: Negative. Gastrointestinal: Positive for abdominal pain. Status post surgery bandage in place  Genitourinary: Negative. Musculoskeletal: Negative. Skin: Negative. Neurological: Negative. Psychiatric/Behavioral: Negative. Objective:     Visit Vitals  BP (!) 131/54 (BP 1 Location: Left upper arm, BP Patient Position: At rest)   Pulse 67   Temp 98.2 °F (36.8 °C)   Resp 16   Ht 5' 1\" (1.549 m)   Wt 164 lb 14.5 oz (74.8 kg)   SpO2 97%   BMI 31.16 kg/m²      O2 Device: None (Room air)    Temp (24hrs), Av.6 °F (37 °C), Min:97.9 °F (36.6 °C), Max:99 °F (37.2 °C)      No intake/output data recorded.  1901 -  0700  In: 3654.2 [P.O.:1230; I.V.:2424.2]  Out: 3850 [Urine:3850]    Physical Exam  Constitutional:       Appearance: Normal appearance. HENT:      Head: Normocephalic and atraumatic. Nose: Nose normal.      Mouth/Throat:      Mouth: Mucous membranes are moist.   Eyes:      Pupils: Pupils are equal, round, and reactive to light. Cardiovascular:      Rate and Rhythm: Normal rate. Regular rhythm     Pulses: Normal pulses. Heart sounds: Normal heart sounds. Pulmonary:      Effort: Pulmonary effort is normal.      Breath sounds: Normal breath sounds. Abdominal:      General: Bowel sounds are present not distended     Tenderness: There is mild abdominal tenderness. Musculoskeletal:         General: Normal range of motion. Cervical back: Normal range of motion and neck supple. Skin:     General: Skin is warm. Neurological:      General: No focal deficit present. Mental Status: She is alert. Psychiatric:         Mood and Affect: Mood normal.         Data Review    Recent Results (from the past 24 hour(s))   GLUCOSE, POC    Collection Time: 04/07/22 11:12 AM   Result Value Ref Range    Glucose (POC) 214 (H) 65 - 117 mg/dL    Performed by [employer] [id number], POC    Collection Time: 04/07/22  4:20 PM   Result Value Ref Range    Glucose (POC) 182 (H) 65 - 117 mg/dL    Performed by [facility], POC    Collection Time: 04/07/22  7:54 PM   Result Value Ref Range    Glucose (POC) 179 (H) 65 - 117 mg/dL    Performed by [address] [place] [address], BASIC    Collection Time: 04/08/22  4:30 AM   Result Value Ref Range    Sodium 135 (L) 136 - 145 mmol/L    Potassium 4.7 3.5 - 5.1 mmol/L    Chloride 103 97 - 108 mmol/L    CO2 29 21 - 32 mmol/L    Anion gap 3 (L) 5 - 15 mmol/L    Glucose 222 (H) 65 - 100 mg/dL    BUN 11 6 - 20 mg/dL    Creatinine 0.77 0.55 - 1.02 mg/dL    BUN/Creatinine ratio 14 12 - 20      GFR est AA >60 >60 ml/min/1.73m2    GFR est non-AA >60 >60 ml/min/1.73m2    Calcium 8.2 (L) 8.5 - 10.1 mg/dL   CBC WITH AUTOMATED DIFF    Collection Time: 04/08/22  4:30 AM   Result Value Ref Range    WBC 9.3 3.6 - 11.0 K/uL    RBC 2.67 (L) 3.80 - 5.20 M/uL    HGB 8.3 (L) 11.5 - 16.0 g/dL    HCT 26.2 (L) 35.0 - 47.0 %    MCV 98.1 80.0 - 99.0 FL    MCH 31.1 26.0 - 34.0 PG    MCHC 31.7 30.0 - 36.5 g/dL    RDW 14.1 11.5 - 14.5 %    PLATELET 104 (H) 215 - 400 K/uL    MPV 11.1 8.9 - 12.9 FL    NRBC 0.0 0.0  WBC    ABSOLUTE NRBC 0.00 0.00 - 0.01 K/uL    NEUTROPHILS 65 32 - 75 %    LYMPHOCYTES 17 12 - 49 %    MONOCYTES 10 5 - 13 %    EOSINOPHILS 6 0 - 7 %    BASOPHILS 1 0 - 1 %    IMMATURE GRANULOCYTES 1 (H) 0 - 0.5 %    ABS. NEUTROPHILS 6.1 1.8 - 8.0 K/UL    ABS. LYMPHOCYTES 1.6 0.8 - 3.5 K/UL    ABS. MONOCYTES 0.9 0.0 - 1.0 K/UL    ABS. EOSINOPHILS 0.5 (H) 0.0 - 0.4 K/UL    ABS. BASOPHILS 0.1 0.0 - 0.1 K/UL    ABS. IMM.  GRANS. 0.1 (H) 0.00 - 0.04 K/UL    DF AUTOMATED     GLUCOSE, POC    Collection Time: 04/08/22  9:07 AM   Result Value Ref Range    Glucose (POC) 190 (H) 65 - 117 mg/dL    Performed by [facility]ARA [facility]ECERRA        Current Facility-Administered Medications   Medication Dose Route Frequency    insulin glargine (LANTUS) injection 25 Units  25 Units SubCUTAneous QHS    hydrALAZINE (APRESOLINE) 20 mg/mL injection 10 mg  10 mg IntraVENous Q4H PRN    enoxaparin (LOVENOX) injection 40 mg  40 mg SubCUTAneous Q24H    dextrose 5% - 0.45% NaCl with KCl 20 mEq/L infusion  50 mL/hr IntraVENous CONTINUOUS    traMADoL (ULTRAM) tablet 50 mg  50 mg Oral Q6H PRN    HYDROmorphone (DILAUDID) injection 1 mg  1 mg IntraVENous Q4H PRN    HYDROmorphone (DILAUDID) syringe 0.5 mg  0.5 mg IntraVENous Q4H PRN    glucose chewable tablet 16 g  4 Tablet Oral PRN    dextrose 10% infusion 0-250 mL  0-250 mL IntraVENous PRN    glucagon (GLUCAGEN) injection 1 mg  1 mg IntraMUSCular PRN    acetaminophen (TYLENOL) tablet 650 mg  650 mg Oral Q6H PRN    Or    acetaminophen (TYLENOL) suppository 650 mg  650 mg Rectal Q6H PRN    ondansetron (ZOFRAN ODT) tablet 4 mg  4 mg Oral Q8H PRN    Or    ondansetron (ZOFRAN) injection 4 mg  4 mg IntraVENous Q6H PRN    insulin lispro (HUMALOG) injection   SubCUTAneous AC&HS    aspirin delayed-release tablet 81 mg  81 mg Oral DAILY    clopidogreL (PLAVIX) tablet 75 mg  75 mg Oral DAILY    DULoxetine (CYMBALTA) capsule 30 mg  30 mg Oral DAILY    ferrous sulfate tablet 325 mg  325 mg Oral BID WITH MEALS    levothyroxine (SYNTHROID) tablet 150 mcg  150 mcg Oral ACB    metoprolol succinate (TOPROL-XL) XL tablet 25 mg  25 mg Oral DAILY    atorvastatin (LIPITOR) tablet 10 mg  10 mg Oral QHS    albuterol (PROVENTIL HFA, VENTOLIN HFA, PROAIR HFA) inhaler 2 Puff  2 Puff Inhalation Q6H PRN    oxyCODONE IR (ROXICODONE) tablet 5 mg  5 mg Oral Q4H PRN         Assessment/Plan:        1. Chronic Obstructive Pulmonary Disease without excebation  2. Partial large Bowel Obstruction  3. Status post right hemicolectomy  4. Diabetes Mellitus type 2  5. Essential Hypertension  6. Thyroid Disease   7. 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 She had aortic valve replaced previously also she has stent placement in the past chest x-ray no acute infiltrate or lung mass. Last showed Echocardiogram showed EF of 55 to 60% with grade 1 diastolic dysfunction. 2. Creatinine normal  3. Patient underwent right hemicolectomy doing fairly well no flatus yet  4. monitor respiratory status and O2 PRN  5.  Discharge planned today, follow up with Pulmonary outpatient as needed for COPD management

## 2022-04-08 NOTE — PROGRESS NOTES
Postoperative day 7 status post extended right hemicolectomy  Patient tolerating diet  Continues to have bowel movements and passing flatus    Abdomen is soft, probably tender incision, nondistended, incision clean and intact    Assessment and plan:  82-year-old female with newly diagnosed stage IIIc colon cancer; seen by oncology and will have outpatient follow-up. Will be discharged home today to follow-up in my office in 2 weeks.

## 2022-04-10 DIAGNOSIS — C18.2 MALIGNANT NEOPLASM OF ASCENDING COLON (HCC): Primary | ICD-10-CM

## 2022-04-10 RX ORDER — TRAMADOL HYDROCHLORIDE 50 MG/1
50 TABLET ORAL
Qty: 30 TABLET | Refills: 0 | Status: SHIPPED | OUTPATIENT
Start: 2022-04-10 | End: 2022-04-24

## 2022-04-20 ENCOUNTER — OFFICE VISIT (OUTPATIENT)
Dept: SURGERY | Age: 82
End: 2022-04-20
Payer: MEDICARE

## 2022-04-20 VITALS
OXYGEN SATURATION: 95 % | RESPIRATION RATE: 20 BRPM | WEIGHT: 151.2 LBS | TEMPERATURE: 98.7 F | HEIGHT: 61 IN | BODY MASS INDEX: 28.55 KG/M2 | HEART RATE: 101 BPM | DIASTOLIC BLOOD PRESSURE: 66 MMHG | SYSTOLIC BLOOD PRESSURE: 166 MMHG

## 2022-04-20 DIAGNOSIS — C18.2 MALIGNANT NEOPLASM OF ASCENDING COLON (HCC): Primary | ICD-10-CM

## 2022-04-20 PROCEDURE — 99024 POSTOP FOLLOW-UP VISIT: CPT | Performed by: COLON & RECTAL SURGERY

## 2022-05-09 RX ORDER — HYDROXYZINE PAMOATE 25 MG/1
25 CAPSULE ORAL
COMMUNITY

## 2022-05-10 ENCOUNTER — HOSPITAL ENCOUNTER (OUTPATIENT)
Dept: INTERVENTIONAL RADIOLOGY/VASCULAR | Age: 82
Discharge: HOME | End: 2022-05-10
Attending: INTERNAL MEDICINE
Payer: MEDICARE

## 2022-05-10 VITALS
DIASTOLIC BLOOD PRESSURE: 68 MMHG | RESPIRATION RATE: 18 BRPM | TEMPERATURE: 97.6 F | HEART RATE: 87 BPM | OXYGEN SATURATION: 96 % | WEIGHT: 155 LBS | SYSTOLIC BLOOD PRESSURE: 152 MMHG | BODY MASS INDEX: 29.27 KG/M2 | HEIGHT: 61 IN

## 2022-05-10 DIAGNOSIS — C18.9 COLON CANCER (HCC): ICD-10-CM

## 2022-05-10 LAB
ANION GAP SERPL CALC-SCNC: 3 MMOL/L (ref 5–15)
BASOPHILS # BLD: 0.1 K/UL (ref 0–0.1)
BASOPHILS NFR BLD: 2 % (ref 0–1)
BUN SERPL-MCNC: 28 MG/DL (ref 6–20)
BUN/CREAT SERPL: 39 (ref 12–20)
CA-I BLD-MCNC: 9 MG/DL (ref 8.5–10.1)
CHLORIDE SERPL-SCNC: 103 MMOL/L (ref 97–108)
CO2 SERPL-SCNC: 28 MMOL/L (ref 21–32)
CREAT SERPL-MCNC: 0.71 MG/DL (ref 0.55–1.02)
DIFFERENTIAL METHOD BLD: ABNORMAL
EOSINOPHIL # BLD: 0.5 K/UL (ref 0–0.4)
EOSINOPHIL NFR BLD: 5 % (ref 0–7)
ERYTHROCYTE [DISTWIDTH] IN BLOOD BY AUTOMATED COUNT: 15 % (ref 11.5–14.5)
GLUCOSE BLD STRIP.AUTO-MCNC: 264 MG/DL (ref 65–117)
GLUCOSE SERPL-MCNC: 244 MG/DL (ref 65–100)
HCT VFR BLD AUTO: 30.2 % (ref 35–47)
HGB BLD-MCNC: 9.4 G/DL (ref 11.5–16)
IMM GRANULOCYTES # BLD AUTO: 0 K/UL (ref 0–0.04)
IMM GRANULOCYTES NFR BLD AUTO: 0 % (ref 0–0.5)
INR PPP: 1 (ref 0.9–1.1)
LYMPHOCYTES # BLD: 1.6 K/UL (ref 0.8–3.5)
LYMPHOCYTES NFR BLD: 17 % (ref 12–49)
MCH RBC QN AUTO: 31.3 PG (ref 26–34)
MCHC RBC AUTO-ENTMCNC: 31.1 G/DL (ref 30–36.5)
MCV RBC AUTO: 100.7 FL (ref 80–99)
MONOCYTES # BLD: 0.9 K/UL (ref 0–1)
MONOCYTES NFR BLD: 9 % (ref 5–13)
NEUTS SEG # BLD: 6 K/UL (ref 1.8–8)
NEUTS SEG NFR BLD: 67 % (ref 32–75)
NRBC # BLD: 0 K/UL (ref 0–0.01)
NRBC BLD-RTO: 0 PER 100 WBC
PERFORMED BY, TECHID: ABNORMAL
PLATELET # BLD AUTO: 361 K/UL (ref 150–400)
PMV BLD AUTO: 10.7 FL (ref 8.9–12.9)
POTASSIUM SERPL-SCNC: 5.4 MMOL/L (ref 3.5–5.1)
PROTHROMBIN TIME: 13.4 SEC (ref 11.9–14.6)
RBC # BLD AUTO: 3 M/UL (ref 3.8–5.2)
SODIUM SERPL-SCNC: 134 MMOL/L (ref 136–145)
WBC # BLD AUTO: 9 K/UL (ref 3.6–11)

## 2022-05-10 PROCEDURE — 36415 COLL VENOUS BLD VENIPUNCTURE: CPT

## 2022-05-10 PROCEDURE — 82962 GLUCOSE BLOOD TEST: CPT

## 2022-05-10 PROCEDURE — 74011250636 HC RX REV CODE- 250/636: Performed by: INTERNAL MEDICINE

## 2022-05-10 PROCEDURE — 85610 PROTHROMBIN TIME: CPT

## 2022-05-10 PROCEDURE — 85025 COMPLETE CBC W/AUTO DIFF WBC: CPT

## 2022-05-10 PROCEDURE — 74011000250 HC RX REV CODE- 250: Performed by: INTERNAL MEDICINE

## 2022-05-10 PROCEDURE — 99153 MOD SED SAME PHYS/QHP EA: CPT

## 2022-05-10 PROCEDURE — 36561 INSERT TUNNELED CV CATH: CPT

## 2022-05-10 PROCEDURE — C1788 PORT, INDWELLING, IMP: HCPCS

## 2022-05-10 PROCEDURE — 99152 MOD SED SAME PHYS/QHP 5/>YRS: CPT

## 2022-05-10 PROCEDURE — 74011250636 HC RX REV CODE- 250/636: Performed by: RADIOLOGY

## 2022-05-10 PROCEDURE — 77001 FLUOROGUIDE FOR VEIN DEVICE: CPT

## 2022-05-10 PROCEDURE — 80048 BASIC METABOLIC PNL TOTAL CA: CPT

## 2022-05-10 PROCEDURE — 76937 US GUIDE VASCULAR ACCESS: CPT

## 2022-05-10 RX ORDER — MIDAZOLAM HYDROCHLORIDE 1 MG/ML
.5-1 INJECTION, SOLUTION INTRAMUSCULAR; INTRAVENOUS
Status: DISCONTINUED | OUTPATIENT
Start: 2022-05-10 | End: 2022-05-19 | Stop reason: HOSPADM

## 2022-05-10 RX ORDER — FENTANYL CITRATE 50 UG/ML
12.5-5 INJECTION, SOLUTION INTRAMUSCULAR; INTRAVENOUS
Status: DISCONTINUED | OUTPATIENT
Start: 2022-05-10 | End: 2022-05-19 | Stop reason: HOSPADM

## 2022-05-10 RX ORDER — CEFAZOLIN SODIUM 1 G/3ML
2 INJECTION, POWDER, FOR SOLUTION INTRAMUSCULAR; INTRAVENOUS ONCE
Status: DISCONTINUED | OUTPATIENT
Start: 2022-05-10 | End: 2022-05-10

## 2022-05-10 RX ADMIN — CEFAZOLIN SODIUM 2 G: 1 INJECTION, POWDER, FOR SOLUTION INTRAMUSCULAR; INTRAVENOUS at 09:31

## 2022-05-10 RX ADMIN — MIDAZOLAM HYDROCHLORIDE 0.5 MG: 1 INJECTION, SOLUTION INTRAMUSCULAR; INTRAVENOUS at 09:33

## 2022-05-10 RX ADMIN — FENTANYL CITRATE 25 MCG: 50 INJECTION, SOLUTION INTRAMUSCULAR; INTRAVENOUS at 09:33

## 2022-05-10 NOTE — PROGRESS NOTES
Patient given discharge instructions and verbalizes understanding. IV removed with tip intact and no issues. Incision remains intact with no drainage and no issues. Patient drank ginger ale and ate peanut butter crackers. She ambulated to the bathroom and dressed self with no complaints. Patient discharged via wheelchair with son to drive her home.

## 2022-05-10 NOTE — PROGRESS NOTES
#20 IV lock to left wrist is clean dry and intact with transparent dressing. Incision to right neck is closed with dermabond and is clean dry and intact with no drainage noted. Incision to right chest for port-a-cath placement is well approximated, clean, dry and intact with dermabond closure and no drainage noted. Patient remains stable at this time.

## 2022-10-06 NOTE — PROGRESS NOTES
Notified Dr. Wallace Buck, verbally acknowledged. STAT EKG ordered. This RN then called Dr. Deloris Da Silva. Order received for Amiodarone drip 1mg/min for 6 hours, then decrease to 0.5mg. See MAR    Also ordered STAT BMP and magnesium levels. Pt with COPD on home 3L.     - continue to monitor oxygen saturation; patient currently on ventilator & daily CPAP trials

## 2022-10-19 ENCOUNTER — TRANSCRIBE ORDER (OUTPATIENT)
Dept: SCHEDULING | Age: 82
End: 2022-10-19

## 2022-10-19 DIAGNOSIS — C18.2 MALIGNANT NEOPLASM OF ASCENDING COLON (HCC): Primary | ICD-10-CM

## 2022-11-01 ENCOUNTER — HOSPITAL ENCOUNTER (OUTPATIENT)
Dept: CT IMAGING | Age: 82
Discharge: HOME | End: 2022-11-01
Attending: INTERNAL MEDICINE
Payer: MEDICARE

## 2022-11-01 DIAGNOSIS — C18.2 MALIGNANT NEOPLASM OF ASCENDING COLON (HCC): ICD-10-CM

## 2022-11-01 PROCEDURE — 74176 CT ABD & PELVIS W/O CONTRAST: CPT

## 2023-01-22 ENCOUNTER — HOSPITAL ENCOUNTER (EMERGENCY)
Age: 83
Discharge: HOME | End: 2023-01-22
Attending: STUDENT IN AN ORGANIZED HEALTH CARE EDUCATION/TRAINING PROGRAM
Payer: MEDICARE

## 2023-01-22 ENCOUNTER — APPOINTMENT (OUTPATIENT)
Dept: GENERAL RADIOLOGY | Age: 83
End: 2023-01-22
Attending: STUDENT IN AN ORGANIZED HEALTH CARE EDUCATION/TRAINING PROGRAM
Payer: MEDICARE

## 2023-01-22 VITALS
HEART RATE: 68 BPM | BODY MASS INDEX: 30.78 KG/M2 | WEIGHT: 163 LBS | TEMPERATURE: 98.5 F | OXYGEN SATURATION: 98 % | DIASTOLIC BLOOD PRESSURE: 83 MMHG | RESPIRATION RATE: 18 BRPM | SYSTOLIC BLOOD PRESSURE: 180 MMHG | HEIGHT: 61 IN

## 2023-01-22 DIAGNOSIS — M70.61 TROCHANTERIC BURSITIS OF RIGHT HIP: Primary | ICD-10-CM

## 2023-01-22 PROCEDURE — 74011000250 HC RX REV CODE- 250: Performed by: STUDENT IN AN ORGANIZED HEALTH CARE EDUCATION/TRAINING PROGRAM

## 2023-01-22 PROCEDURE — 99283 EMERGENCY DEPT VISIT LOW MDM: CPT

## 2023-01-22 PROCEDURE — 74011250637 HC RX REV CODE- 250/637: Performed by: STUDENT IN AN ORGANIZED HEALTH CARE EDUCATION/TRAINING PROGRAM

## 2023-01-22 PROCEDURE — 73502 X-RAY EXAM HIP UNI 2-3 VIEWS: CPT

## 2023-01-22 RX ORDER — DICLOFENAC SODIUM 10 MG/G
2 GEL TOPICAL 4 TIMES DAILY
Qty: 1 EACH | Refills: 0 | Status: SHIPPED | OUTPATIENT
Start: 2023-01-22 | End: 2023-01-29

## 2023-01-22 RX ORDER — METOPROLOL SUCCINATE 25 MG/1
25 TABLET, EXTENDED RELEASE ORAL
Status: COMPLETED | OUTPATIENT
Start: 2023-01-22 | End: 2023-01-22

## 2023-01-22 RX ORDER — DICLOFENAC SODIUM 10 MG/G
2 GEL TOPICAL
Status: DISCONTINUED | OUTPATIENT
Start: 2023-01-22 | End: 2023-01-22 | Stop reason: HOSPADM

## 2023-01-22 RX ORDER — LIDOCAINE 4 G/100G
1 PATCH TOPICAL EVERY 24 HOURS
Status: DISCONTINUED | OUTPATIENT
Start: 2023-01-22 | End: 2023-01-22 | Stop reason: HOSPADM

## 2023-01-22 RX ORDER — LIDOCAINE 50 MG/G
PATCH TOPICAL
Qty: 3 EACH | Refills: 0 | Status: SHIPPED | OUTPATIENT
Start: 2023-01-22

## 2023-01-22 RX ORDER — ACETAMINOPHEN 325 MG/1
975 TABLET ORAL
Status: COMPLETED | OUTPATIENT
Start: 2023-01-22 | End: 2023-01-22

## 2023-01-22 RX ADMIN — METOPROLOL SUCCINATE 25 MG: 25 TABLET, EXTENDED RELEASE ORAL at 15:04

## 2023-01-22 RX ADMIN — ACETAMINOPHEN 975 MG: 325 TABLET ORAL at 14:47

## 2023-01-22 NOTE — DISCHARGE INSTRUCTIONS
You can  Salonpas at the pharmacy over-the-counter if the lidocaine patches are too expensive. Please be sure to follow-up with orthopedist and return to ED if you have any fever or worsening pain or unable to walk. Thank you! Thank you for allowing me to care for you in the emergency department. I sincerely hope that you are satisfied with your visit today. It is my goal to provide you with excellent care. Below you will find a list of your labs and imaging from your visit today if applicable. Should you have any questions regarding these results please do not hesitate to call the emergency department. Please review [facility] for a more detailed result list since the below list may not be comprehensive. Instructions on how to sign up to [facility] should be provided in this packet. Labs -   No results found for this or any previous visit (from the past 12 hour(s)). Radiologic Studies -   XR HIP RT W OR WO PELV 2-3 VWS   Final Result   No evidence of acute fracture or AVN at the hips. There are bilateral hip   degenerative joint changes. CT Results  (Last 48 hours)      None          CXR Results  (Last 48 hours)      None               If you feel that you have not received excellent quality care or timely care, please ask to speak to the nurse manager. Please choose us in the future for your continued health care needs. ------------------------------------------------------------------------------------------------------------  The exam and treatment you received in the Emergency Department were for an urgent problem and are not intended as complete care. It is very important that you follow-up with a doctor, nurse practitioner, or physician assistant in a timely manner to:  (1) confirm your diagnosis and review all imaging and lab results,  (2) re-evaluation of changes in your illness and treatment, and  (3) for ongoing care.   If your symptoms become worse or you do not improve as expected and you are unable to reach your usual health care provider, you should return to the Emergency Department. We are available 24 hours a day. Please take your discharge instructions with you when you go to your follow-up appointment. If a prescription has been provided, please have it filled as soon as possible to prevent a delay in treatment. Read the entire medication instruction sheet provided to you by the pharmacy. If you have any questions or reservations about taking the medication due to side effects or interactions with other medications, please call your primary care physician or contact the ER to speak with the charge nurse. Make an appointment with your family doctor or the physician you were referred to for follow-up of this visit as instructed on your discharge paperwork, as this is a mandatory follow-up. Return to the ER if you are unable to be seen or if you are unable to be seen in a timely manner. If you have any problem arranging the follow-up visit, contact the Emergency Department immediately.

## 2023-01-22 NOTE — ED PROVIDER NOTES
[facility] EMERGENCY DEPT  EMERGENCY DEPARTMENT HISTORY AND PHYSICAL EXAM      Date: 1/22/2023  Patient Name: Carlos Harper  MRN: [id number]  [place]: 1940  Date of evaluation: 1/22/2023  Provider: Latonya Pisano MD   Note Started: 3:27 PM 1/22/23    HISTORY OF PRESENT ILLNESS     Chief Complaint   Patient presents with    Hip Pain       History Provided By: Patient    HPI: Carlos Harper, [de-identified] y.o. female reports right-sided lateral hip pain for the last week. Patient states she felt the pain while she was walking around the house. Denies any fall or trauma. Denies any numbness or tingling. Denies any color changes of her leg. She denies any abdominal pain or nausea or vomiting. She has had normal stools and has had normal urination. She denies any radiation to the back. PAST MEDICAL HISTORY   Past Medical History:  Past Medical History:   Diagnosis Date    Cancer ([address]     lymph nodes    Depression     DM (diabetes mellitus) ([facility] [address]     Hyperlipidemia     Hypertension     Pancreatitis     Thyroid disease        Past Surgical History:  Past Surgical History:   Procedure Laterality Date    HX AORTIC VALVE REPLACEMENT  08/2021    HX HYSTERECTOMY      HX LAPAROTOMY  04/01/2022        HX PARTIAL COLECTOMY Right 04/01/2022        IR FLUORO GUIDE PLC CVAD  5/10/2021    IR INSERT NON TUNL CVC OVER 5 YRS  5/10/2021    IR INSERT TUNL CVC W PORT OVER 5 YEARS  5/10/2022    IR PLACE CVAD FLUORO GUIDE  5/10/2022    IN INS NEW/RPLCMT PRM PM W/TRANSV ELTRD ATRIAL&VENT N/A 5/11/2021    INSERT PPM BIV MULTI performed by lAessandra Barton MD at [address] [place] [address]       Family History:  Family History   Problem Relation Age of Onset    Diabetes Mother        Social History:  Social History     Tobacco Use    Smoking status: Never    Smokeless tobacco: Never   Substance Use Topics    Alcohol use: No    Drug use: No       Allergies:   Allergies   Allergen Reactions    Nortriptyline Itching    Contrast Agent [Iodine] Unknown (comments)    Cymbalta [Duloxetine] Itching    Ivp [Fd And C Blue No.1] Hives    Morphine Itching    Tetracycline Itching       PCP: Chantal Handley MD    Current Meds:   Previous Medications    ACETAMINOPHEN (TYLENOL) 325 MG TABLET    Take 325 mg by mouth as needed for Pain. ALBUTEROL (PROVENTIL HFA, VENTOLIN HFA, PROAIR HFA) 90 MCG/ACTUATION INHALER    Take 2 Puffs by inhalation every six (6) hours as needed for Wheezing or Shortness of Breath. ASPIRIN DELAYED-RELEASE 81 MG TABLET    Take 81 mg by mouth daily. CLOPIDOGREL (PLAVIX) 75 MG TAB    Take 75 mg by mouth daily. CYANOCOBALAMIN (VITAMIN B-12) 1,000 MCG TABLET    Take 1,000 mcg by mouth daily. DIPHENHYDRAMINE (BENADRYL) 25 MG CAPSULE    Take 25 mg by mouth as needed. DULOXETINE (CYMBALTA) 30 MG CAPSULE    Take 30 mg by mouth daily. FERROUS SULFATE 325 MG (65 MG IRON) TABLET    Take 1 Tablet by mouth two (2) times daily (with meals). HYDROXYZINE PAMOATE (VISTARIL) 25 MG CAPSULE    Take 25 mg by mouth nightly. INSULIN GLARGINE (LANTUS U-100 INSULIN) 100 UNIT/ML INJECTION    40 unit daily    LEVOTHYROXINE (SYNTHROID) 150 MCG TABLET    Take 150 mcg by mouth daily (before breakfast). LOVASTATIN (MEVACOR) 40 MG TABLET    Take 40 mg by mouth nightly. METOPROLOL SUCCINATE (TOPROL-XL) 25 MG XL TABLET    Take 1 Tablet by mouth daily. REVIEW OF SYSTEMS   Review of Systems  Positives and Pertinent negatives as per HPI. PHYSICAL EXAM     ED Triage Vitals [01/22/23 1244]   ED Encounter Vitals Group      BP (!) 233/102      Pulse (Heart Rate) 98      Resp Rate 18      Temp 98.5 °F (36.9 °C)      Temp src       O2 Sat (%) 99 %      Weight 163 lb      Height 5' 1\"      Physical Exam  Vitals and nursing note reviewed. Constitutional:       General: She is not in acute distress. Appearance: Normal appearance. HENT:      Head: Normocephalic.    Eyes:      Extraocular Movements: Extraocular movements intact. Pupils: Pupils are equal, round, and reactive to light. Cardiovascular:      Rate and Rhythm: Normal rate. Pulses: Normal pulses. Comments: Distal pulses throughout are normal.  Pulmonary:      Effort: Pulmonary effort is normal.      Breath sounds: Normal breath sounds. Abdominal:      General: Abdomen is flat. There is no distension. Palpations: There is no mass. Tenderness: There is no abdominal tenderness. There is no right CVA tenderness, left CVA tenderness, guarding or rebound. Hernia: No hernia is present. Musculoskeletal:         General: No swelling, tenderness, deformity or signs of injury. Cervical back: Neck supple. Right lower leg: No edema. Left lower leg: No edema. Comments: There is point tenderness to the right trochanter. There is no groin tenderness, there is full active range motion there is no exacerbation of symptoms with passive range of motion of the hip. Skin:     General: Skin is warm. Neurological:      Mental Status: She is alert and oriented to person, place, and time. Comments: Normal sensation in the bilateral lower extremities. ED COURSE and DIFFERENTIAL DIAGNOSIS/MDM   Records Reviewed (source and summary of external notes): Nursing Notes and Old Medical Records    Vitals:    Vitals:    01/22/23 1244 01/22/23 1530   BP: (!) 233/102 (!) 180/83   Pulse: 98 68   Resp: 18 18   Temp: 98.5 °F (36.9 °C)    SpO2: 99% 98%   Weight: 73.9 kg (163 lb)    Height: 5' 1\" (1.549 m)        CC/HPI Summary, DDx, ED Course, and Reassessment: 82-year-old female presents to the ED for right hip pain.   Differential diagnosis includes hip fracture, stress fracture, trochanteric bursitis, less likely intra-abdominal process given exam.  The patient is well-appearing has no abdominal symptoms and has a completely benign abdominal exam, in combination with the point tenderness to the trochanteric bursa believe trochanteric bursitis is the most likely diagnosis at this time. X-ray is clear of any fracture or necrosis. The patient is ambulatory with a cane. We will provide medications here and sent prescription. She will follow-up with orthopedics. She return to the ED if there are any new or worsening symptoms including nausea or vomiting or inability to urinate or pass bowels. If she develops a fever she will return to the ED. If she develops an inability to walk she will return to the ED. Patient demonstrated ability to walk after medications. Patient's blood pressure was elevated to 233/102, she has not taken her blood pressure medication so far today, we will provide them to her in the ED. There is no concern for hypertensive emergency at this time. Patient was given the following medications:  Medications   diclofenac (VOLTAREN) 1 % topical gel 2 g (0 g Topical Held 1/22/23 1420)   lidocaine 4 % patch 1 Patch (1 Patch TransDERmal Apply Patch 1/22/23 1447)   acetaminophen (TYLENOL) tablet 975 mg (975 mg Oral Given 1/22/23 1447)   metoprolol succinate (TOPROL-XL) XL tablet 25 mg (25 mg Oral Given 1/22/23 1504)       CONSULTS: (Who and What was discussed)  None     Chronic Conditions: CAD  Social Determinants affecting Dx or Tx:   Counseling:      Disposition Considerations (Tests not done, Shared Decision Making, Pt Expectation of Test or Treatment.):      SCREENINGS               No data recorded        LAB, EKG AND DIAGNOSTIC RESULTS   Labs:  No results found for this or any previous visit (from the past 12 hour(s)).     Radiologic Studies:  Non-plain film images such as CT, Ultrasound and MRI are read by the radiologist. Plain radiographic images are visualized and preliminarily interpreted by the ED Provider with the below findings:    Interpretation per the Radiologist below, if available at the time of this note:  XR HIP RT W OR WO PELV 2-3 VWS    Result Date: 1/22/2023  EXAM: XR HIP RT W OR WO PELV 2-3 VWS INDICATION: pain. COMPARISON: CT of the abdomen/pelvis November 1, 2022. FINDINGS: AP view of the pelvis and a frogleg lateral view of the right hip demonstrate no fracture, dislocation or other acute abnormality. The LEFT hip is aligned. The SI joints and pubic symphysis are aligned. There are diffuse vascular calcifications. 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 The femoral heads are intact and there is no evidence of AVN. No evidence of acute fracture or AVN at the hips. There are bilateral hip degenerative joint changes. PROCEDURES   Unless otherwise noted below, none. Performed by: Mike Chowdary MD   Procedures      CRITICAL CARE TIME       FINAL IMPRESSION     1. Trochanteric bursitis of right hip          DISPOSITION/PLAN   Discharged    Discharge Note: The patient is stable for discharge home. The signs, symptoms, diagnosis, and discharge instructions have been discussed, understanding conveyed, and agreed upon. The patient is to follow up as recommended or return to ER should their symptoms worsen. PATIENT REFERRED TO:  Follow-up Information       Follow up With Specialties Details Why GINNA [address]  Call in 1 day  [facility]  [phone]              DISCHARGE MEDICATIONS:  Current Discharge Medication List        START taking these medications    Details   diclofenac (VOLTAREN) 1 % gel Apply 2 g to affected area four (4) times daily for 7 days. Qty: 1 Each, Refills: 0  Start date: 1/22/2023, End date: 1/29/2023      lidocaine (Lidoderm) 5 % Apply patch to the affected area for 12 hours a day and remove for 12 hours a day.   Qty: 3 Each, Refills: 0  Start date: 1/22/2023               DISCONTINUED MEDICATIONS:  Current Discharge Medication List          I am the Primary Clinician of Record: Mike Chowdary MD (electronically signed)    (Please note that parts of this dictation were completed with voice recognition software. Quite often unanticipated grammatical, syntax, homophones, and other interpretive errors are inadvertently transcribed by the computer software. Please disregards these errors.  Please excuse any errors that have escaped final proofreading.)

## 2023-02-01 ENCOUNTER — HOSPITAL ENCOUNTER (INPATIENT)
Age: 83
LOS: 6 days | Discharge: NURSING, REHAB OR LONG-TERM CARE | DRG: 379 | End: 2023-02-07
Attending: COLON & RECTAL SURGERY | Admitting: COLON & RECTAL SURGERY
Payer: MEDICARE

## 2023-02-01 ENCOUNTER — OFFICE VISIT (OUTPATIENT)
Dept: SURGERY | Age: 83
End: 2023-02-01

## 2023-02-01 VITALS
OXYGEN SATURATION: 94 % | HEIGHT: 61 IN | BODY MASS INDEX: 30.8 KG/M2 | SYSTOLIC BLOOD PRESSURE: 106 MMHG | DIASTOLIC BLOOD PRESSURE: 50 MMHG | RESPIRATION RATE: 16 BRPM | HEART RATE: 96 BPM | TEMPERATURE: 97.8 F

## 2023-02-01 DIAGNOSIS — K92.2 GASTROINTESTINAL HEMORRHAGE, UNSPECIFIED GASTROINTESTINAL HEMORRHAGE TYPE: Primary | ICD-10-CM

## 2023-02-01 DIAGNOSIS — R10.31 RIGHT LOWER QUADRANT ABDOMINAL PAIN: Primary | ICD-10-CM

## 2023-02-01 PROBLEM — R10.9 ABDOMINAL PAIN: Status: ACTIVE | Noted: 2023-02-01

## 2023-02-01 PROBLEM — E78.5 HYPERLIPIDEMIA: Status: ACTIVE | Noted: 2023-02-01

## 2023-02-01 PROBLEM — E03.9 HYPOTHYROID: Status: ACTIVE | Noted: 2023-02-01

## 2023-02-01 PROBLEM — C18.2 MALIGNANT NEOPLASM OF ASCENDING COLON (HCC): Status: RESOLVED | Noted: 2023-02-01 | Resolved: 2023-02-01

## 2023-02-01 PROBLEM — C18.2 MALIGNANT NEOPLASM OF ASCENDING COLON (HCC): Status: ACTIVE | Noted: 2023-02-01

## 2023-02-01 LAB
ALBUMIN SERPL-MCNC: 2.7 G/DL (ref 3.5–5)
ALBUMIN/GLOB SERPL: 0.9 (ref 1.1–2.2)
ALP SERPL-CCNC: 48 U/L (ref 45–117)
ALT SERPL-CCNC: 26 U/L (ref 12–78)
ANION GAP SERPL CALC-SCNC: 6 MMOL/L (ref 5–15)
AST SERPL W P-5'-P-CCNC: 21 U/L (ref 15–37)
BASOPHILS # BLD: 0 K/UL (ref 0–0.1)
BASOPHILS NFR BLD: 0 % (ref 0–1)
BILIRUB SERPL-MCNC: 0.4 MG/DL (ref 0.2–1)
BUN SERPL-MCNC: 60 MG/DL (ref 6–20)
BUN/CREAT SERPL: 58 (ref 12–20)
CA-I BLD-MCNC: 8.2 MG/DL (ref 8.5–10.1)
CHLORIDE SERPL-SCNC: 104 MMOL/L (ref 97–108)
CO2 SERPL-SCNC: 27 MMOL/L (ref 21–32)
CREAT SERPL-MCNC: 1.04 MG/DL (ref 0.55–1.02)
DIFFERENTIAL METHOD BLD: ABNORMAL
EOSINOPHIL # BLD: 0.1 K/UL (ref 0–0.4)
EOSINOPHIL NFR BLD: 1 % (ref 0–7)
ERYTHROCYTE [DISTWIDTH] IN BLOOD BY AUTOMATED COUNT: 14.2 % (ref 11.5–14.5)
GLOBULIN SER CALC-MCNC: 3 G/DL (ref 2–4)
GLUCOSE BLD STRIP.AUTO-MCNC: 187 MG/DL (ref 65–100)
GLUCOSE BLD STRIP.AUTO-MCNC: 257 MG/DL (ref 65–100)
GLUCOSE SERPL-MCNC: 269 MG/DL (ref 65–100)
HCT VFR BLD AUTO: 20.8 % (ref 35–47)
HGB BLD-MCNC: 7 G/DL (ref 11.5–16)
IMM GRANULOCYTES # BLD AUTO: 0.1 K/UL (ref 0–0.04)
IMM GRANULOCYTES NFR BLD AUTO: 1 % (ref 0–0.5)
LYMPHOCYTES # BLD: 1.8 K/UL (ref 0.8–3.5)
LYMPHOCYTES NFR BLD: 13 % (ref 12–49)
MCH RBC QN AUTO: 32.7 PG (ref 26–34)
MCHC RBC AUTO-ENTMCNC: 33.7 G/DL (ref 30–36.5)
MCV RBC AUTO: 97.2 FL (ref 80–99)
MONOCYTES # BLD: 0.7 K/UL (ref 0–1)
MONOCYTES NFR BLD: 5 % (ref 5–13)
NEUTS SEG # BLD: 10.6 K/UL (ref 1.8–8)
NEUTS SEG NFR BLD: 80 % (ref 32–75)
NRBC # BLD: 0 K/UL (ref 0–0.01)
NRBC BLD-RTO: 0 PER 100 WBC
PERFORMED BY, TECHID: ABNORMAL
PERFORMED BY, TECHID: ABNORMAL
PLATELET # BLD AUTO: 200 K/UL (ref 150–400)
PMV BLD AUTO: 12.1 FL (ref 8.9–12.9)
POTASSIUM SERPL-SCNC: 3.8 MMOL/L (ref 3.5–5.1)
PROT SERPL-MCNC: 5.7 G/DL (ref 6.4–8.2)
RBC # BLD AUTO: 2.14 M/UL (ref 3.8–5.2)
SODIUM SERPL-SCNC: 137 MMOL/L (ref 136–145)
WBC # BLD AUTO: 13.2 K/UL (ref 3.6–11)

## 2023-02-01 PROCEDURE — 74011250636 HC RX REV CODE- 250/636: Performed by: COLON & RECTAL SURGERY

## 2023-02-01 PROCEDURE — 86923 COMPATIBILITY TEST ELECTRIC: CPT

## 2023-02-01 PROCEDURE — 86900 BLOOD TYPING SEROLOGIC ABO: CPT

## 2023-02-01 PROCEDURE — 74011250637 HC RX REV CODE- 250/637: Performed by: COLON & RECTAL SURGERY

## 2023-02-01 PROCEDURE — 82962 GLUCOSE BLOOD TEST: CPT

## 2023-02-01 PROCEDURE — 85025 COMPLETE CBC W/AUTO DIFF WBC: CPT

## 2023-02-01 PROCEDURE — 65270000029 HC RM PRIVATE

## 2023-02-01 PROCEDURE — 99222 1ST HOSP IP/OBS MODERATE 55: CPT | Performed by: COLON & RECTAL SURGERY

## 2023-02-01 PROCEDURE — 74011636637 HC RX REV CODE- 636/637: Performed by: COLON & RECTAL SURGERY

## 2023-02-01 PROCEDURE — 36415 COLL VENOUS BLD VENIPUNCTURE: CPT

## 2023-02-01 PROCEDURE — 80053 COMPREHEN METABOLIC PANEL: CPT

## 2023-02-01 RX ORDER — ONDANSETRON 2 MG/ML
4 INJECTION INTRAMUSCULAR; INTRAVENOUS
Status: DISCONTINUED | OUTPATIENT
Start: 2023-02-01 | End: 2023-02-07 | Stop reason: HOSPADM

## 2023-02-01 RX ORDER — FUROSEMIDE 40 MG/1
40 TABLET ORAL DAILY
Status: DISCONTINUED | OUTPATIENT
Start: 2023-02-02 | End: 2023-02-07 | Stop reason: HOSPADM

## 2023-02-01 RX ORDER — ASPIRIN 81 MG/1
81 TABLET ORAL DAILY
Status: DISCONTINUED | OUTPATIENT
Start: 2023-02-02 | End: 2023-02-07 | Stop reason: HOSPADM

## 2023-02-01 RX ORDER — HYDROXYZINE PAMOATE 25 MG/1
25 CAPSULE ORAL
Status: DISCONTINUED | OUTPATIENT
Start: 2023-02-01 | End: 2023-02-07 | Stop reason: HOSPADM

## 2023-02-01 RX ORDER — HYDROMORPHONE HYDROCHLORIDE 1 MG/ML
1 INJECTION, SOLUTION INTRAMUSCULAR; INTRAVENOUS; SUBCUTANEOUS
Status: DISCONTINUED | OUTPATIENT
Start: 2023-02-01 | End: 2023-02-07 | Stop reason: HOSPADM

## 2023-02-01 RX ORDER — ALBUTEROL SULFATE 90 UG/1
2 AEROSOL, METERED RESPIRATORY (INHALATION)
Status: DISCONTINUED | OUTPATIENT
Start: 2023-02-01 | End: 2023-02-07 | Stop reason: HOSPADM

## 2023-02-01 RX ORDER — DEXTROSE MONOHYDRATE 100 MG/ML
0-250 INJECTION, SOLUTION INTRAVENOUS AS NEEDED
Status: DISCONTINUED | OUTPATIENT
Start: 2023-02-01 | End: 2023-02-07 | Stop reason: HOSPADM

## 2023-02-01 RX ORDER — ATORVASTATIN CALCIUM 10 MG/1
10 TABLET, FILM COATED ORAL
Status: DISCONTINUED | OUTPATIENT
Start: 2023-02-01 | End: 2023-02-07 | Stop reason: HOSPADM

## 2023-02-01 RX ORDER — DULOXETIN HYDROCHLORIDE 30 MG/1
30 CAPSULE, DELAYED RELEASE ORAL DAILY
Status: DISCONTINUED | OUTPATIENT
Start: 2023-02-02 | End: 2023-02-07 | Stop reason: HOSPADM

## 2023-02-01 RX ORDER — METOPROLOL SUCCINATE 50 MG/1
25 TABLET, EXTENDED RELEASE ORAL DAILY
Status: DISCONTINUED | OUTPATIENT
Start: 2023-02-02 | End: 2023-02-07 | Stop reason: HOSPADM

## 2023-02-01 RX ORDER — LEVOTHYROXINE SODIUM 75 UG/1
150 TABLET ORAL
Status: DISCONTINUED | OUTPATIENT
Start: 2023-02-02 | End: 2023-02-07 | Stop reason: HOSPADM

## 2023-02-01 RX ORDER — HYDROMORPHONE HYDROCHLORIDE 1 MG/ML
0.5 INJECTION, SOLUTION INTRAMUSCULAR; INTRAVENOUS; SUBCUTANEOUS
Status: DISCONTINUED | OUTPATIENT
Start: 2023-02-01 | End: 2023-02-07 | Stop reason: HOSPADM

## 2023-02-01 RX ORDER — SODIUM CHLORIDE 9 MG/ML
75 INJECTION, SOLUTION INTRAVENOUS CONTINUOUS
Status: DISCONTINUED | OUTPATIENT
Start: 2023-02-01 | End: 2023-02-07 | Stop reason: HOSPADM

## 2023-02-01 RX ORDER — INSULIN LISPRO 100 [IU]/ML
INJECTION, SOLUTION INTRAVENOUS; SUBCUTANEOUS
Status: DISCONTINUED | OUTPATIENT
Start: 2023-02-01 | End: 2023-02-07 | Stop reason: HOSPADM

## 2023-02-01 RX ORDER — FUROSEMIDE 40 MG/1
40 TABLET ORAL DAILY
COMMUNITY
Start: 2022-12-12

## 2023-02-01 RX ORDER — IBUPROFEN 200 MG
4 TABLET ORAL AS NEEDED
Status: DISCONTINUED | OUTPATIENT
Start: 2023-02-01 | End: 2023-02-07 | Stop reason: HOSPADM

## 2023-02-01 RX ADMIN — ATORVASTATIN CALCIUM 10 MG: 10 TABLET, FILM COATED ORAL at 21:26

## 2023-02-01 RX ADMIN — INSULIN LISPRO 3 UNITS: 100 INJECTION, SOLUTION INTRAVENOUS; SUBCUTANEOUS at 22:00

## 2023-02-01 RX ADMIN — HYDROXYZINE PAMOATE 25 MG: 25 CAPSULE ORAL at 21:26

## 2023-02-01 RX ADMIN — HYDROMORPHONE HYDROCHLORIDE 1 MG: 1 INJECTION, SOLUTION INTRAMUSCULAR; INTRAVENOUS; SUBCUTANEOUS at 18:11

## 2023-02-01 RX ADMIN — SODIUM CHLORIDE 75 ML/HR: 9 INJECTION, SOLUTION INTRAVENOUS at 18:23

## 2023-02-01 NOTE — H&P
History and Physical    Subjective:     Osmany Olmstead is a [de-identified] y.o.  female who was seen in the office today complaining of significant abdominal pain. She also appeared anemic and complains of fatigue. The patient had an extended right hemicolectomy in April 2022 for an adenocarcinoma of the ascending colon. Her final pathology is a T4N2B moderately differentiated tumor. She has completed chemotherapy. She states that over the past several weeks has been having increasing right lower abdominal and flank pain. In addition her daughter states that she had coffee-ground emesis. Her past medical history significant history of hypothyroidism diabetes hypertension depression. Aortic valve replacement. Past Medical History:   Diagnosis Date    Cancer ([facility] [address]     lymph nodes    Depression     DM (diabetes mellitus) ([facility] [address]     Hyperlipidemia     Hypertension     Pancreatitis     Thyroid disease       Past Surgical History:   Procedure Laterality Date    HX AORTIC VALVE REPLACEMENT  08/2021    HX HYSTERECTOMY      HX LAPAROTOMY  04/01/2022        HX PARTIAL COLECTOMY Right 04/01/2022        IR FLUORO GUIDE PLC CVAD  5/10/2021    IR INSERT NON TUNL CVC OVER 5 YRS  5/10/2021    IR INSERT TUNL CVC W PORT OVER 5 YEARS  5/10/2022    IR PLACE CVAD FLUORO GUIDE  5/10/2022    AK INS NEW/RPLCMT PRM PM W/TRANSV ELTRD ATRIAL&VENT N/A 5/11/2021    INSERT PPM BIV MULTI performed by Carol Ann Louise MD at [address] [place] [address]     Family History   Problem Relation Age of Onset    Diabetes Mother       Social History     Tobacco Use    Smoking status: Never    Smokeless tobacco: Never   Substance Use Topics    Alcohol use: No       Prior to Admission medications    Medication Sig Start Date End Date Taking? Authorizing Provider   furosemide (LASIX) 40 mg tablet Take 40 mg by mouth daily.  12/12/22  Yes Provider, Historical   lidocaine (Lidoderm) 5 % Apply patch to the affected area for 12 hours a day and remove for 12 hours a day. 1/22/23  Yes Carson Ortega MD   hydrOXYzine pamoate (VISTARIL) 25 mg capsule Take 25 mg by mouth nightly. Yes Provider, Historical   DULoxetine (CYMBALTA) 30 mg capsule Take 30 mg by mouth daily. Yes Provider, Historical   clopidogreL (PLAVIX) 75 mg tab Take 75 mg by mouth daily. Yes Provider, Historical   aspirin delayed-release 81 mg tablet Take 81 mg by mouth daily. Yes Provider, Historical   acetaminophen (TYLENOL) 325 mg tablet Take 325 mg by mouth as needed for Pain. Yes Provider, Historical   cyanocobalamin 1,000 mcg tablet Take 1,000 mcg by mouth daily. Yes Provider, Historical   insulin glargine (Lantus U-100 Insulin) 100 unit/mL injection 40 unit daily  Patient taking differently: 20 Units by SubCUTAneous route daily. 40 unit daily 5/19/21  Yes Deepali Cheung MD   ferrous sulfate 325 mg (65 mg iron) tablet Take 1 Tablet by mouth two (2) times daily (with meals). 5/19/21  Yes Deepali Cheung MD   metoprolol succinate (TOPROL-XL) 25 mg XL tablet Take 1 Tablet by mouth daily. 5/20/21  Yes Deepali Cheung MD   levothyroxine (SYNTHROID) 150 mcg tablet Take 150 mcg by mouth daily (before breakfast). 10/11/10  Yes Provider, Historical   lovastatin (MEVACOR) 40 mg tablet Take 40 mg by mouth nightly. 10/11/10  Yes Provider, Historical   albuterol (PROVENTIL HFA, VENTOLIN HFA, PROAIR HFA) 90 mcg/actuation inhaler Take 2 Puffs by inhalation every six (6) hours as needed for Wheezing or Shortness of Breath. 4/8/22   Brennen Galloway PA-C   diphenhydrAMINE (BENADRYL) 25 mg capsule Take 25 mg by mouth as needed.     Provider, Historical     Allergies   Allergen Reactions    Nortriptyline Itching    Contrast Agent [Iodine] Unknown (comments)    Cymbalta [Duloxetine] Itching    Ivp [Fd And C Blue No.1] Hives    Morphine Itching    Tetracycline Itching        Review of Systems:  A comprehensive review of systems was negative except for that written in the History of Present Illness. Objective:   Visit Vitals  BP (!) 147/60 (BP 1 Location: Left upper arm, BP Patient Position: Semi fowlers)   Pulse 88   Temp 97.4 °F (36.3 °C)   Resp 19   SpO2 100%   Breastfeeding No        Physical Exam:   Physical Exam  Constitutional:       General: She is not in acute distress. Appearance: She is obese. She is ill-appearing. HENT:      Head: Normocephalic and atraumatic. Cardiovascular:      Rate and Rhythm: Normal rate and regular rhythm. Pulmonary:      Effort: Pulmonary effort is normal.   Abdominal:      General: There is no distension. Palpations: Abdomen is soft. There is no mass. Tenderness: There is abdominal tenderness (Tender right lower abdomen). Hernia: No hernia is present. Comments: Well-healed surgical incision   Musculoskeletal:         General: Normal range of motion. Cervical back: Normal range of motion and neck supple. Skin:     General: Skin is warm and dry. Neurological:      General: No focal deficit present. Mental Status: She is alert and oriented to person, place, and time. Psychiatric:         Mood and Affect: Mood normal.         Thought Content: Thought content normal.         Judgment: Judgment normal.        Data Review:   Recent Results (from the past 24 hour(s))   CBC WITH AUTOMATED DIFF    Collection Time: 02/01/23  4:06 PM   Result Value Ref Range    WBC 13.2 (H) 3.6 - 11.0 K/uL    RBC 2.14 (L) 3.80 - 5.20 M/uL    HGB 7.0 (L) 11.5 - 16.0 g/dL    HCT 20.8 (L) 35.0 - 47.0 %    MCV 97.2 80.0 - 99.0 FL    MCH 32.7 26.0 - 34.0 PG    MCHC 33.7 30.0 - 36.5 g/dL    RDW 14.2 11.5 - 14.5 %    PLATELET 810 801 - 233 K/uL    MPV 12.1 8.9 - 12.9 FL    NRBC 0.0 0.0  WBC    ABSOLUTE NRBC 0.00 0.00 - 0.01 K/uL    NEUTROPHILS 80 (H) 32 - 75 %    LYMPHOCYTES 13 12 - 49 %    MONOCYTES 5 5 - 13 %    EOSINOPHILS 1 0 - 7 %    BASOPHILS 0 0 - 1 %    IMMATURE GRANULOCYTES 1 (H) 0 - 0.5 %    ABS.  NEUTROPHILS 10.6 (H) 1.8 - 8.0 K/UL    ABS. LYMPHOCYTES 1.8 0.8 - 3.5 K/UL    ABS. MONOCYTES 0.7 0.0 - 1.0 K/UL    ABS. EOSINOPHILS 0.1 0.0 - 0.4 K/UL    ABS. BASOPHILS 0.0 0.0 - 0.1 K/UL    ABS. IMM. GRANS. 0.1 (H) 0.00 - 0.04 K/UL    DF AUTOMATED     METABOLIC PANEL, COMPREHENSIVE    Collection Time: 02/01/23  4:06 PM   Result Value Ref Range    Sodium 137 136 - 145 mmol/L    Potassium 3.8 3.5 - 5.1 mmol/L    Chloride 104 97 - 108 mmol/L    CO2 27 21 - 32 mmol/L    Anion gap 6 5 - 15 mmol/L    Glucose 269 (H) 65 - 100 mg/dL    BUN 60 (H) 6 - 20 mg/dL    Creatinine 1.04 (H) 0.55 - 1.02 mg/dL    BUN/Creatinine ratio 58 (H) 12 - 20      eGFR 54 (L) >60 ml/min/1.73m2    Calcium 8.2 (L) 8.5 - 10.1 mg/dL    Bilirubin, total 0.4 0.2 - 1.0 mg/dL    AST (SGOT) 21 15 - 37 U/L    ALT (SGPT) 26 12 - 78 U/L    Alk.  phosphatase 48 45 - 117 U/L    Protein, total 5.7 (L) 6.4 - 8.2 g/dL    Albumin 2.7 (L) 3.5 - 5.0 g/dL    Globulin 3.0 2.0 - 4.0 g/dL    A-G Ratio 0.9 (L) 1.1 - 2.2     GLUCOSE, POC    Collection Time: 02/01/23  4:30 PM   Result Value Ref Range    Glucose (POC) 257 (H) 65 - 100 mg/dL    Performed by [facility]        Assessment:     Active Problems:    Hypertension ()      Hyperlipidemia (2/1/2023)      Abdominal pain (2/1/2023)      GI bleed (2/1/2023)      Hypothyroid (2/1/2023)        Plan:   Admit  IV fluids  Clear liquid diet  CT and abdomen pelvis  CBC and CMP  Continue home medications, hold Plavix  Pain Management  Insulin sliding scale for blood glucose control

## 2023-02-01 NOTE — PROGRESS NOTES
Lipitor 10 mg  was therapeutically interchanged for Mevacor 40 mg per the P&T Committee approved Therapeutic Interchanges Policy.     Med Kumar MS, Pharmacist  2/1/2023 4:26 PM

## 2023-02-02 ENCOUNTER — APPOINTMENT (OUTPATIENT)
Dept: CT IMAGING | Age: 83
DRG: 379 | End: 2023-02-02
Attending: COLON & RECTAL SURGERY
Payer: MEDICARE

## 2023-02-02 LAB
ALBUMIN SERPL-MCNC: 2.3 G/DL (ref 3.5–5)
ALBUMIN/GLOB SERPL: 0.9 (ref 1.1–2.2)
ALP SERPL-CCNC: 42 U/L (ref 45–117)
ALT SERPL-CCNC: 19 U/L (ref 12–78)
ANION GAP SERPL CALC-SCNC: 5 MMOL/L (ref 5–15)
AST SERPL W P-5'-P-CCNC: 22 U/L (ref 15–37)
BASOPHILS # BLD: 0 K/UL (ref 0–0.1)
BASOPHILS NFR BLD: 0 % (ref 0–1)
BILIRUB SERPL-MCNC: 0.3 MG/DL (ref 0.2–1)
BUN SERPL-MCNC: 52 MG/DL (ref 6–20)
BUN/CREAT SERPL: 56 (ref 12–20)
CA-I BLD-MCNC: 7.9 MG/DL (ref 8.5–10.1)
CHLORIDE SERPL-SCNC: 109 MMOL/L (ref 97–108)
CO2 SERPL-SCNC: 24 MMOL/L (ref 21–32)
CREAT SERPL-MCNC: 0.93 MG/DL (ref 0.55–1.02)
DIFFERENTIAL METHOD BLD: ABNORMAL
EOSINOPHIL # BLD: 0.3 K/UL (ref 0–0.4)
EOSINOPHIL NFR BLD: 3 % (ref 0–7)
ERYTHROCYTE [DISTWIDTH] IN BLOOD BY AUTOMATED COUNT: 14.8 % (ref 11.5–14.5)
GLOBULIN SER CALC-MCNC: 2.5 G/DL (ref 2–4)
GLUCOSE BLD STRIP.AUTO-MCNC: 152 MG/DL (ref 65–100)
GLUCOSE BLD STRIP.AUTO-MCNC: 260 MG/DL (ref 65–100)
GLUCOSE BLD STRIP.AUTO-MCNC: 83 MG/DL (ref 65–100)
GLUCOSE SERPL-MCNC: 123 MG/DL (ref 65–100)
HCT VFR BLD AUTO: 18.7 % (ref 35–47)
HGB BLD-MCNC: 6 G/DL (ref 11.5–16)
IMM GRANULOCYTES # BLD AUTO: 0.1 K/UL (ref 0–0.04)
IMM GRANULOCYTES NFR BLD AUTO: 1 % (ref 0–0.5)
LYMPHOCYTES # BLD: 2.5 K/UL (ref 0.8–3.5)
LYMPHOCYTES NFR BLD: 22 % (ref 12–49)
MCH RBC QN AUTO: 32.6 PG (ref 26–34)
MCHC RBC AUTO-ENTMCNC: 32.1 G/DL (ref 30–36.5)
MCV RBC AUTO: 101.6 FL (ref 80–99)
MONOCYTES # BLD: 0.9 K/UL (ref 0–1)
MONOCYTES NFR BLD: 8 % (ref 5–13)
NEUTS SEG # BLD: 7.5 K/UL (ref 1.8–8)
NEUTS SEG NFR BLD: 66 % (ref 32–75)
NRBC # BLD: 0.02 K/UL (ref 0–0.01)
NRBC BLD-RTO: 0.2 PER 100 WBC
PERFORMED BY, TECHID: ABNORMAL
PERFORMED BY, TECHID: ABNORMAL
PERFORMED BY, TECHID: NORMAL
PLATELET # BLD AUTO: 192 K/UL (ref 150–400)
PMV BLD AUTO: 11.8 FL (ref 8.9–12.9)
POTASSIUM SERPL-SCNC: 3.9 MMOL/L (ref 3.5–5.1)
PROT SERPL-MCNC: 4.8 G/DL (ref 6.4–8.2)
RBC # BLD AUTO: 1.84 M/UL (ref 3.8–5.2)
SODIUM SERPL-SCNC: 138 MMOL/L (ref 136–145)
WBC # BLD AUTO: 11.4 K/UL (ref 3.6–11)

## 2023-02-02 PROCEDURE — 74011250636 HC RX REV CODE- 250/636: Performed by: COLON & RECTAL SURGERY

## 2023-02-02 PROCEDURE — 74011250637 HC RX REV CODE- 250/637: Performed by: COLON & RECTAL SURGERY

## 2023-02-02 PROCEDURE — 74176 CT ABD & PELVIS W/O CONTRAST: CPT

## 2023-02-02 PROCEDURE — P9016 RBC LEUKOCYTES REDUCED: HCPCS

## 2023-02-02 PROCEDURE — 99232 SBSQ HOSP IP/OBS MODERATE 35: CPT | Performed by: COLON & RECTAL SURGERY

## 2023-02-02 PROCEDURE — 80053 COMPREHEN METABOLIC PANEL: CPT

## 2023-02-02 PROCEDURE — 74011636637 HC RX REV CODE- 636/637: Performed by: COLON & RECTAL SURGERY

## 2023-02-02 PROCEDURE — 36430 TRANSFUSION BLD/BLD COMPNT: CPT

## 2023-02-02 PROCEDURE — 65270000029 HC RM PRIVATE

## 2023-02-02 PROCEDURE — 82962 GLUCOSE BLOOD TEST: CPT

## 2023-02-02 PROCEDURE — 85025 COMPLETE CBC W/AUTO DIFF WBC: CPT

## 2023-02-02 PROCEDURE — 74011000250 HC RX REV CODE- 250: Performed by: COLON & RECTAL SURGERY

## 2023-02-02 PROCEDURE — 36415 COLL VENOUS BLD VENIPUNCTURE: CPT

## 2023-02-02 RX ORDER — BARIUM SULFATE 20 MG/ML
450 SUSPENSION ORAL ONCE
Status: COMPLETED | OUTPATIENT
Start: 2023-02-02 | End: 2023-02-02

## 2023-02-02 RX ORDER — SODIUM CHLORIDE 9 MG/ML
250 INJECTION, SOLUTION INTRAVENOUS AS NEEDED
Status: DISCONTINUED | OUTPATIENT
Start: 2023-02-02 | End: 2023-02-07 | Stop reason: HOSPADM

## 2023-02-02 RX ADMIN — LEVOTHYROXINE SODIUM 150 MCG: 0.07 TABLET ORAL at 05:33

## 2023-02-02 RX ADMIN — BARIUM SULFATE 450 ML: 20 SUSPENSION ORAL at 13:29

## 2023-02-02 RX ADMIN — HYDROMORPHONE HYDROCHLORIDE 1 MG: 1 INJECTION, SOLUTION INTRAMUSCULAR; INTRAVENOUS; SUBCUTANEOUS at 03:32

## 2023-02-02 RX ADMIN — ATORVASTATIN CALCIUM 10 MG: 10 TABLET, FILM COATED ORAL at 20:16

## 2023-02-02 RX ADMIN — HYDROXYZINE PAMOATE 25 MG: 25 CAPSULE ORAL at 20:16

## 2023-02-02 RX ADMIN — BARIUM SULFATE 450 ML: 20 SUSPENSION ORAL at 14:18

## 2023-02-02 RX ADMIN — HYDROMORPHONE HYDROCHLORIDE 1 MG: 1 INJECTION, SOLUTION INTRAMUSCULAR; INTRAVENOUS; SUBCUTANEOUS at 14:03

## 2023-02-02 RX ADMIN — ASPIRIN 81 MG: 81 TABLET, COATED ORAL at 11:23

## 2023-02-02 RX ADMIN — DULOXETINE HYDROCHLORIDE 30 MG: 30 CAPSULE, DELAYED RELEASE ORAL at 11:23

## 2023-02-02 RX ADMIN — SODIUM CHLORIDE 75 ML/HR: 9 INJECTION, SOLUTION INTRAVENOUS at 23:36

## 2023-02-02 RX ADMIN — INSULIN LISPRO 3 UNITS: 100 INJECTION, SOLUTION INTRAVENOUS; SUBCUTANEOUS at 17:47

## 2023-02-02 RX ADMIN — FUROSEMIDE 40 MG: 40 TABLET ORAL at 11:23

## 2023-02-02 RX ADMIN — INSULIN LISPRO 9 UNITS: 100 INJECTION, SOLUTION INTRAVENOUS; SUBCUTANEOUS at 12:42

## 2023-02-02 RX ADMIN — METOPROLOL SUCCINATE 25 MG: 50 TABLET, EXTENDED RELEASE ORAL at 11:23

## 2023-02-02 RX ADMIN — HYDROMORPHONE HYDROCHLORIDE 1 MG: 1 INJECTION, SOLUTION INTRAMUSCULAR; INTRAVENOUS; SUBCUTANEOUS at 23:36

## 2023-02-02 RX ADMIN — HYDROMORPHONE HYDROCHLORIDE 1 MG: 1 INJECTION, SOLUTION INTRAMUSCULAR; INTRAVENOUS; SUBCUTANEOUS at 10:10

## 2023-02-02 NOTE — PROGRESS NOTES
MD discussed blood transfusion with patient. Patient signed consent, nurse witnessed consent. Patients daughter Emani Saha contacted and informed of blood transfusion.

## 2023-02-02 NOTE — PROGRESS NOTES
Bedside and Verbal shift change report given to Kate Aragon (oncoming nurse) by Claudia  (offgoing nurse). Report included the following information SBAR, Intake/Output, MAR, Recent Results, and Cardiac Rhythm Vpaced .

## 2023-02-02 NOTE — PROGRESS NOTES
Progress Note    Patient: Efra Greenberg MRN: [id number]  SSN: [id number]    YOB: 1940  Age: [de-identified] y.o. Sex: female      Admit Date: 2/1/2023    LOS: 1 day     Subjective:   Hospital day 2 abdominal pain anemia  Hemoglobin down to 6 at 4 AM labs 2 unit PRBC ordered slight delay secondary to confusion regarding consent form      Objective:     Vitals:    02/02/23 0418 02/02/23 0824 02/02/23 0847 02/02/23 0907   BP: (!) 95/45 (!) 114/51 (!) 132/54    Pulse:  91 94    Resp:  18 18    Temp:  98.5 °F (36.9 °C) 98.5 °F (36.9 °C) 98.2 °F (36.8 °C)   SpO2:  100% 98% 98%        Intake and Output:  Current Shift: No intake/output data recorded. Last three shifts: 01/31 1901 - 02/02 0700  In: 875 [I.V.:875]  Out: -     Review of Systems:  ROS     Physical Exam:   Abdomen is soft, nondistended, mildly tender palpation right lower quadrant, well-healed midline incision with periumbilical incisional hernia    Lab/Data Review:  Recent Results (from the past 24 hour(s))   CBC WITH AUTOMATED DIFF    Collection Time: 02/01/23  4:06 PM   Result Value Ref Range    WBC 13.2 (H) 3.6 - 11.0 K/uL    RBC 2.14 (L) 3.80 - 5.20 M/uL    HGB 7.0 (L) 11.5 - 16.0 g/dL    HCT 20.8 (L) 35.0 - 47.0 %    MCV 97.2 80.0 - 99.0 FL    MCH 32.7 26.0 - 34.0 PG    MCHC 33.7 30.0 - 36.5 g/dL    RDW 14.2 11.5 - 14.5 %    PLATELET 004 210 - 239 K/uL    MPV 12.1 8.9 - 12.9 FL    NRBC 0.0 0.0  WBC    ABSOLUTE NRBC 0.00 0.00 - 0.01 K/uL    NEUTROPHILS 80 (H) 32 - 75 %    LYMPHOCYTES 13 12 - 49 %    MONOCYTES 5 5 - 13 %    EOSINOPHILS 1 0 - 7 %    BASOPHILS 0 0 - 1 %    IMMATURE GRANULOCYTES 1 (H) 0 - 0.5 %    ABS. NEUTROPHILS 10.6 (H) 1.8 - 8.0 K/UL    ABS. LYMPHOCYTES 1.8 0.8 - 3.5 K/UL    ABS. MONOCYTES 0.7 0.0 - 1.0 K/UL    ABS. EOSINOPHILS 0.1 0.0 - 0.4 K/UL    ABS. BASOPHILS 0.0 0.0 - 0.1 K/UL    ABS. IMM.  GRANS. 0.1 (H) 0.00 - 0.04 K/UL    DF AUTOMATED     METABOLIC PANEL, COMPREHENSIVE    Collection Time: 02/01/23  4:06 PM   Result Value Ref Range    Sodium 137 136 - 145 mmol/L    Potassium 3.8 3.5 - 5.1 mmol/L    Chloride 104 97 - 108 mmol/L    CO2 27 21 - 32 mmol/L    Anion gap 6 5 - 15 mmol/L    Glucose 269 (H) 65 - 100 mg/dL    BUN 60 (H) 6 - 20 mg/dL    Creatinine 1.04 (H) 0.55 - 1.02 mg/dL    BUN/Creatinine ratio 58 (H) 12 - 20      eGFR 54 (L) >60 ml/min/1.73m2    Calcium 8.2 (L) 8.5 - 10.1 mg/dL    Bilirubin, total 0.4 0.2 - 1.0 mg/dL    AST (SGOT) 21 15 - 37 U/L    ALT (SGPT) 26 12 - 78 U/L    Alk.  phosphatase 48 45 - 117 U/L    Protein, total 5.7 (L) 6.4 - 8.2 g/dL    Albumin 2.7 (L) 3.5 - 5.0 g/dL    Globulin 3.0 2.0 - 4.0 g/dL    A-G Ratio 0.9 (L) 1.1 - 2.2     GLUCOSE, POC    Collection Time: 02/01/23  4:30 PM   Result Value Ref Range    Glucose (POC) 257 (H) 65 - 100 mg/dL    Performed by Serg Restrepo    TYPE & SCREEN    Collection Time: 02/01/23  6:50 PM   Result Value Ref Range    Crossmatch Expiration 02/04/2023,2359     ABO/Rh(D) A Positive     Antibody screen Negative     Unit number [id number]     Blood component type [facility]     Unit division 00     Status of unit [address] to transfuse     Crossmatch result Compatible     Unit number [id number]     Blood component type [facility]     Unit division 00     Status of unit [address] to transfuse     Crossmatch result Compatible    GLUCOSE, POC    Collection Time: 02/01/23  9:16 PM   Result Value Ref Range    Glucose (POC) 187 (H) 65 - 100 mg/dL    Performed by Shania Galvin    CBC WITH AUTOMATED DIFF    Collection Time: 02/02/23  3:49 AM   Result Value Ref Range    WBC 11.4 (H) 3.6 - 11.0 K/uL    RBC 1.84 (L) 3.80 - 5.20 M/uL    HGB 6.0 (L) 11.5 - 16.0 g/dL    HCT 18.7 (L) 35.0 - 47.0 %    .6 (H) 80.0 - 99.0 FL    MCH 32.6 26.0 - 34.0 PG    MCHC 32.1 30.0 - 36.5 g/dL    RDW 14.8 (H) 11.5 - 14.5 %    PLATELET 883 911 - 842 K/uL    MPV 11.8 8.9 - 12.9 FL    NRBC 0.2 (H) 0.0  WBC    ABSOLUTE NRBC 0.02 (H) 0.00 - 0.01 K/uL    NEUTROPHILS 66 32 - 75 %    LYMPHOCYTES 22 12 - 49 %    MONOCYTES 8 5 - 13 %    EOSINOPHILS 3 0 - 7 %    BASOPHILS 0 0 - 1 %    IMMATURE GRANULOCYTES 1 (H) 0 - 0.5 %    ABS. NEUTROPHILS 7.5 1.8 - 8.0 K/UL    ABS. LYMPHOCYTES 2.5 0.8 - 3.5 K/UL    ABS. MONOCYTES 0.9 0.0 - 1.0 K/UL    ABS. EOSINOPHILS 0.3 0.0 - 0.4 K/UL    ABS. BASOPHILS 0.0 0.0 - 0.1 K/UL    ABS. IMM. GRANS. 0.1 (H) 0.00 - 0.04 K/UL    DF AUTOMATED     METABOLIC PANEL, COMPREHENSIVE    Collection Time: 02/02/23  3:49 AM   Result Value Ref Range    Sodium 138 136 - 145 mmol/L    Potassium 3.9 3.5 - 5.1 mmol/L    Chloride 109 (H) 97 - 108 mmol/L    CO2 24 21 - 32 mmol/L    Anion gap 5 5 - 15 mmol/L    Glucose 123 (H) 65 - 100 mg/dL    BUN 52 (H) 6 - 20 mg/dL    Creatinine 0.93 0.55 - 1.02 mg/dL    BUN/Creatinine ratio 56 (H) 12 - 20      eGFR >60 >60 ml/min/1.73m2    Calcium 7.9 (L) 8.5 - 10.1 mg/dL    Bilirubin, total 0.3 0.2 - 1.0 mg/dL    AST (SGOT) 22 15 - 37 U/L    ALT (SGPT) 19 12 - 78 U/L    Alk.  phosphatase 42 (L) 45 - 117 U/L    Protein, total 4.8 (L) 6.4 - 8.2 g/dL    Albumin 2.3 (L) 3.5 - 5.0 g/dL    Globulin 2.5 2.0 - 4.0 g/dL    A-G Ratio 0.9 (L) 1.1 - 2.2              Assessment:     Active Problems:    Hypertension ()      Hyperlipidemia (2/1/2023)      Abdominal pain (2/1/2023)      GI bleed (2/1/2023)      Hypothyroid (2/1/2023)        Plan:   CT results noted no acute process, periumbilical hernia noted on CT  Renal function improved with IV hydration  Receiving 2 units PRBC for hemoglobin 6  Consult gastroenterology for endoscopic evaluation; patient reports she has been having black stools  Continue clear liquids only  Protonix IV twice daily  Sequential compression devices as anticoagulation therapy is contra-indicated  Signed By: Selvin Machado MD     February 2, 2023

## 2023-02-02 NOTE — PROGRESS NOTES
Patients blood placed in fridge due to IV access malfunction. Spoke with blood bank supervisor Manny. Blood placed in med room refrigerator. PICC team consult for IV access placed stat.

## 2023-02-02 NOTE — PROGRESS NOTES
Problem: Falls - Risk of  Goal: *Absence of Falls  Description: Document Triston Montes Fall Risk and appropriate interventions in the flowsheet. Outcome: Progressing Towards Goal  Note: Fall Risk Interventions:                                Problem: Patient Education: Go to Patient Education Activity  Goal: Patient/Family Education  Outcome: Progressing Towards Goal     Problem: Pressure Injury - Risk of  Goal: *Prevention of pressure injury  Description: Document Delta Scale and appropriate interventions in the flowsheet.   Outcome: Progressing Towards Goal  Note: Pressure Injury Interventions:       Moisture Interventions: Absorbent underpads    Activity Interventions: PT/OT evaluation    Mobility Interventions: PT/OT evaluation, HOB 30 degrees or less    Nutrition Interventions: Document food/fluid/supplement intake

## 2023-02-02 NOTE — PROGRESS NOTES
Dual skin assessment performed with Renaldo Amaral pt noted to have blanchable redness to sacrum, abrasion on right leg, moles right upper quadrant of abdomen.

## 2023-02-02 NOTE — PROGRESS NOTES
MD Lis Munson notified about pt's hemoglobin of 6.0, blood pressure 96/45 orders placed for 2 units ofPRBC's     05:20 MD Lis Munson notified about pt needing consent before blood can be transfused, advised I page hospitalist to get consent since Michael Davis is not allowed to get consent per supervisor and protocol. Paged MD Zulema Bear if able to get consent, replied not able to get consent at this time. Called back MD Lis Munson and nursing supervisor to notify. MD Lis Munson to call nursing supervisor.

## 2023-02-02 NOTE — CONSULTS
Gastroenterology Consult     Referring Physician: Saw Elliott MD     Consult Date: 2/2/2023     Subjective:     Chief Complaint: Low hemoglobin    History of Present Illness: Zahra Cobb is a [de-identified] y.o. female who is seen in consultation for suspected upper GI bleed. Patient was admitted to the hospital with low hemoglobin. She has previous history of colon cancer and resection she complained of pain in the right side in the flank area. She recently went for a CAT scan that report is not available as yet. Patient is very hard of hearing and a poor historian most of the historical data was gathered from patient's chart that was reviewed and interviewing the nursing staff. A consult was placed for gastroenterology for possible upper GI bleed there is some history of coffee-ground emesis.           A narrative of his history is as follows     Past Medical History:   Diagnosis Date    Cancer ([address]     lymph nodes    Depression     DM (diabetes mellitus) ([facility] [address]     Hyperlipidemia     Hypertension     Pancreatitis     Thyroid disease      Past Surgical History:   Procedure Laterality Date    HX AORTIC VALVE REPLACEMENT  08/2021    HX HYSTERECTOMY      HX LAPAROTOMY  04/01/2022        HX PARTIAL COLECTOMY Right 04/01/2022        IR FLUORO GUIDE PLC CVAD  5/10/2021    IR INSERT NON TUNL CVC OVER 5 YRS  5/10/2021    IR INSERT TUNL CVC W PORT OVER 5 YEARS  5/10/2022    IR PLACE CVAD FLUORO GUIDE  5/10/2022    AL INS NEW/RPLCMT PRM PM W/TRANSV ELTRD ATRIAL&VENT N/A 5/11/2021    INSERT PPM BIV MULTI performed by Chalino Townsend MD at [address] [place] [address]      Family History   Problem Relation Age of Onset    Diabetes Mother      Social History     Tobacco Use    Smoking status: Never    Smokeless tobacco: Never   Substance Use Topics    Alcohol use: No      Allergies   Allergen Reactions    Nortriptyline Itching    Contrast Agent [Iodine] Unknown (comments)    Cymbalta [Duloxetine] Itching Ivp [Fd And C Blue No.1] Hives    Morphine Itching    Tetracycline Itching     Current Facility-Administered Medications   Medication Dose Route Frequency    0.9% sodium chloride infusion 250 mL  250 mL IntraVENous PRN    diatrizoate martinez-diatrizoat sod (MD-GASTROVIEW,GASTROGRAFIN) 66-10 % contrast solution 30 mL  30 mL Oral ONCE    HYDROmorphone (DILAUDID) syringe 0.5 mg  0.5 mg IntraVENous Q4H PRN    HYDROmorphone (DILAUDID) injection 1 mg  1 mg IntraVENous Q4H PRN    ondansetron (ZOFRAN) injection 4 mg  4 mg IntraVENous Q6H PRN    albuterol (PROVENTIL HFA, VENTOLIN HFA, PROAIR HFA) inhaler 2 Puff  2 Puff Inhalation Q6H PRN    aspirin delayed-release tablet 81 mg  81 mg Oral DAILY    DULoxetine (CYMBALTA) capsule 30 mg  30 mg Oral DAILY    furosemide (LASIX) tablet 40 mg  40 mg Oral DAILY    hydrOXYzine pamoate (VISTARIL) capsule 25 mg  25 mg Oral QHS    levothyroxine (SYNTHROID) tablet 150 mcg  150 mcg Oral ACB    atorvastatin (LIPITOR) tablet 10 mg  10 mg Oral QHS    metoprolol succinate (TOPROL-XL) XL tablet 25 mg  25 mg Oral DAILY    0.9% sodium chloride infusion  75 mL/hr IntraVENous CONTINUOUS    insulin lispro (HUMALOG) injection   SubCUTAneous AC&HS    glucose chewable tablet 16 g  4 Tablet Oral PRN    glucagon (GLUCAGEN) injection 1 mg  1 mg IntraMUSCular PRN    dextrose 10% infusion 0-250 mL  0-250 mL IntraVENous PRN        Review of Systems:  A detailed 10 organ review of systems is obtained with pertinent positives as listed in the History of Present Illness and Past Medical History. All others are negative. Objective:     Physical Exam:  Visit Vitals  BP (!) 101/57 (BP 1 Location: Right upper arm, BP Patient Position: At rest)   Pulse 61   Temp 97.6 °F (36.4 °C)   Resp 18   SpO2 98%   Breastfeeding No        Skin:  Extremities and face reveal no rashes. No whyte erythema. No telangiectasias on the chest wall. HEENT: Sclerae anicteric. Extra-occular muscles are intact. No oral ulcers.   No abnormal pigmentation of the lips. The neck is supple. Cardiovascular: Regular rate and rhythm. No murmurs, gallops, or rubs. PMI nondisplaced. Carotids without bruits. Respiratory:  Comfortable breathing with no accessory muscle use. Clear breath sounds with no wheezes, rales, or rhonchi. GI:  Abdomen nondistended, tenderness in the right lower quadrant rectal:  Deferred  Musculoskeletal:  No pitting edema of the lower legs. Extremities have good range of motion. No costovertebral tenderness. Neurological: Patient is hard of hearing   psychiatric:  Mood appears appropriate with judgement intact. Lymphatic:  No cervical or supraclavicular adenopathy.     Lab/Data Review:  Recent Results (from the past 24 hour(s))   TYPE & SCREEN    Collection Time: 02/01/23  6:50 PM   Result Value Ref Range    Crossmatch Expiration 02/04/2023,2359     ABO/Rh(D) A Positive     Antibody screen Negative     Unit number [id number]     Blood component type [facility]     Unit division 00     Status of unit [address] to transfuse     Crossmatch result Compatible     Unit number [id number]     Blood component type [facility]     Unit division 00     Status of unit [address] to transfuse     Crossmatch result Compatible    GLUCOSE, POC    Collection Time: 02/01/23  9:16 PM   Result Value Ref Range    Glucose (POC) 187 (H) 65 - 100 mg/dL    Performed by Shania Galvin    CBC WITH AUTOMATED DIFF    Collection Time: 02/02/23  3:49 AM   Result Value Ref Range    WBC 11.4 (H) 3.6 - 11.0 K/uL    RBC 1.84 (L) 3.80 - 5.20 M/uL    HGB 6.0 (L) 11.5 - 16.0 g/dL    HCT 18.7 (L) 35.0 - 47.0 %    .6 (H) 80.0 - 99.0 FL    MCH 32.6 26.0 - 34.0 PG    MCHC 32.1 30.0 - 36.5 g/dL    RDW 14.8 (H) 11.5 - 14.5 %    PLATELET 618 750 - 093 K/uL    MPV 11.8 8.9 - 12.9 FL    NRBC 0.2 (H) 0.0  WBC    ABSOLUTE NRBC 0.02 (H) 0.00 - 0.01 K/uL    NEUTROPHILS 66 32 - 75 %    LYMPHOCYTES 22 12 - 49 %    MONOCYTES 8 5 - 13 %    EOSINOPHILS 3 0 - 7 %    BASOPHILS 0 0 - 1 %    IMMATURE GRANULOCYTES 1 (H) 0 - 0.5 %    ABS. NEUTROPHILS 7.5 1.8 - 8.0 K/UL    ABS. LYMPHOCYTES 2.5 0.8 - 3.5 K/UL    ABS. MONOCYTES 0.9 0.0 - 1.0 K/UL    ABS. EOSINOPHILS 0.3 0.0 - 0.4 K/UL    ABS. BASOPHILS 0.0 0.0 - 0.1 K/UL    ABS. IMM. GRANS. 0.1 (H) 0.00 - 0.04 K/UL    DF AUTOMATED     METABOLIC PANEL, COMPREHENSIVE    Collection Time: 02/02/23  3:49 AM   Result Value Ref Range    Sodium 138 136 - 145 mmol/L    Potassium 3.9 3.5 - 5.1 mmol/L    Chloride 109 (H) 97 - 108 mmol/L    CO2 24 21 - 32 mmol/L    Anion gap 5 5 - 15 mmol/L    Glucose 123 (H) 65 - 100 mg/dL    BUN 52 (H) 6 - 20 mg/dL    Creatinine 0.93 0.55 - 1.02 mg/dL    BUN/Creatinine ratio 56 (H) 12 - 20      eGFR >60 >60 ml/min/1.73m2    Calcium 7.9 (L) 8.5 - 10.1 mg/dL    Bilirubin, total 0.3 0.2 - 1.0 mg/dL    AST (SGOT) 22 15 - 37 U/L    ALT (SGPT) 19 12 - 78 U/L    Alk. phosphatase 42 (L) 45 - 117 U/L    Protein, total 4.8 (L) 6.4 - 8.2 g/dL    Albumin 2.3 (L) 3.5 - 5.0 g/dL    Globulin 2.5 2.0 - 4.0 g/dL    A-G Ratio 0.9 (L) 1.1 - 2.2     GLUCOSE, POC    Collection Time: 02/02/23 10:47 AM   Result Value Ref Range    Glucose (POC) 260 (H) 65 - 100 mg/dL    Performed by [address] [place] [address], POC    Collection Time: 02/02/23  5:06 PM   Result Value Ref Range    Glucose (POC) 152 (H) 65 - 100 mg/dL    Performed by Jackeline Hernandez         CT ABD PELV WO CONT    (Results Pending)          Assessment/Plan:     Active Problems:    Hypertension ()      Hyperlipidemia (2/1/2023)      Abdominal pain (2/1/2023)      GI bleed (2/1/2023)      Hypothyroid (2/1/2023)    Macrocytic anemia,   Coffee-ground emesis  We will plan to do upper endoscopy tomorrow to look for source of bleeding in the upper GI tract. I concur with monitoring of hemoglobin and transfusion as necessary. I will also check Q87 and folic acid levels. Along with a TSH.     IP CONSULT TO GASTROENTEROLOGY    Thank you for allowing me to participate in this patients care  Cc Referring Physician   Migue Pantoja MD

## 2023-02-02 NOTE — PROGRESS NOTES
Physician Progress Note      Gloria Caceres  CSN #:                  [id number]  :                       1940  ADMIT DATE:       2023 3:28 PM  [address] [place] DATE:  RESPONDING  PROVIDER #:        Janene Stevens MD        QUERY TEXT:    Type of Anemia: Please provide further specificity, if known. Clinical indicators include: nemia, emoglobin, 2 unit prbc, rbc, hgb, hct, platelet, transfusion, transfuse, gi bleed, 2 units prbc, hemoglobin, black stools  Options provided:  -- Anemia due to acute blood loss  -- Anemia due to chronic blood loss  -- Anemia due to iron deficiency  -- Anemia due to postoperative blood loss  -- Anemia due to chronic disease  -- Other - I will add my own diagnosis  -- Disagree - Not applicable / Not valid  -- Disagree - Clinically Unable to determine / Unknown        PROVIDER RESPONSE TEXT:    The patient has anemia due to chronic blood loss.       Electronically signed by:  Janene Stevens MD 2023 1:55 PM

## 2023-02-03 ENCOUNTER — ANESTHESIA (OUTPATIENT)
Dept: ENDOSCOPY | Age: 83
DRG: 379 | End: 2023-02-03
Payer: MEDICARE

## 2023-02-03 ENCOUNTER — APPOINTMENT (OUTPATIENT)
Dept: ENDOSCOPY | Age: 83
DRG: 379 | End: 2023-02-03
Attending: INTERNAL MEDICINE
Payer: MEDICARE

## 2023-02-03 ENCOUNTER — ANESTHESIA EVENT (OUTPATIENT)
Dept: ENDOSCOPY | Age: 83
DRG: 379 | End: 2023-02-03
Payer: MEDICARE

## 2023-02-03 LAB
ABO + RH BLD: NORMAL
BLD PROD TYP BPU: NORMAL
BLD PROD TYP BPU: NORMAL
BLOOD GROUP ANTIBODIES SERPL: NEGATIVE
BPU ID: NORMAL
BPU ID: NORMAL
CROSSMATCH RESULT,%XM: NORMAL
CROSSMATCH RESULT,%XM: NORMAL
FOLATE SERPL-MCNC: 42.9 NG/ML (ref 5–21)
GLUCOSE BLD STRIP.AUTO-MCNC: 107 MG/DL (ref 65–100)
GLUCOSE BLD STRIP.AUTO-MCNC: 116 MG/DL (ref 65–100)
GLUCOSE BLD STRIP.AUTO-MCNC: 182 MG/DL (ref 65–100)
GLUCOSE BLD STRIP.AUTO-MCNC: 212 MG/DL (ref 65–100)
GLUCOSE BLD STRIP.AUTO-MCNC: 223 MG/DL (ref 65–100)
GLUCOSE BLD STRIP.AUTO-MCNC: 232 MG/DL (ref 65–100)
HCT VFR BLD AUTO: 25.5 % (ref 35–47)
HGB BLD-MCNC: 8.5 G/DL (ref 11.5–16)
IRON SATN MFR SERPL: 13 % (ref 20–50)
IRON SERPL-MCNC: 27 UG/DL (ref 35–150)
PERFORMED BY, TECHID: ABNORMAL
SPECIMEN EXP DATE BLD: NORMAL
STATUS OF UNIT,%ST: NORMAL
STATUS OF UNIT,%ST: NORMAL
TIBC SERPL-MCNC: 209 UG/DL (ref 250–450)
TRANSFUSION STATUS PATIENT QL: NORMAL
TRANSFUSION STATUS PATIENT QL: NORMAL
UNIT DIVISION, %UDIV: 0
UNIT DIVISION, %UDIV: 0
VIT B12 SERPL-MCNC: 1034 PG/ML (ref 193–986)

## 2023-02-03 PROCEDURE — 88305 TISSUE EXAM BY PATHOLOGIST: CPT

## 2023-02-03 PROCEDURE — 99232 SBSQ HOSP IP/OBS MODERATE 35: CPT | Performed by: COLON & RECTAL SURGERY

## 2023-02-03 PROCEDURE — 74011000250 HC RX REV CODE- 250: Performed by: COLON & RECTAL SURGERY

## 2023-02-03 PROCEDURE — 30233N1 TRANSFUSION OF NONAUTOLOGOUS RED BLOOD CELLS INTO PERIPHERAL VEIN, PERCUTANEOUS APPROACH: ICD-10-PCS | Performed by: COLON & RECTAL SURGERY

## 2023-02-03 PROCEDURE — 2709999900 HC NON-CHARGEABLE SUPPLY: Performed by: INTERNAL MEDICINE

## 2023-02-03 PROCEDURE — 74011636637 HC RX REV CODE- 636/637: Performed by: COLON & RECTAL SURGERY

## 2023-02-03 PROCEDURE — 82962 GLUCOSE BLOOD TEST: CPT

## 2023-02-03 PROCEDURE — 74011000250 HC RX REV CODE- 250: Performed by: ANESTHESIOLOGY

## 2023-02-03 PROCEDURE — 83540 ASSAY OF IRON: CPT

## 2023-02-03 PROCEDURE — 74011250636 HC RX REV CODE- 250/636: Performed by: COLON & RECTAL SURGERY

## 2023-02-03 PROCEDURE — 85018 HEMOGLOBIN: CPT

## 2023-02-03 PROCEDURE — 0DB78ZX EXCISION OF STOMACH, PYLORUS, VIA NATURAL OR ARTIFICIAL OPENING ENDOSCOPIC, DIAGNOSTIC: ICD-10-PCS | Performed by: COLON & RECTAL SURGERY

## 2023-02-03 PROCEDURE — 36415 COLL VENOUS BLD VENIPUNCTURE: CPT

## 2023-02-03 PROCEDURE — 74011250636 HC RX REV CODE- 250/636: Performed by: ANESTHESIOLOGY

## 2023-02-03 PROCEDURE — 74011250637 HC RX REV CODE- 250/637: Performed by: COLON & RECTAL SURGERY

## 2023-02-03 PROCEDURE — 77030009426 HC FCPS BIOP ENDOSC BSC -B: Performed by: INTERNAL MEDICINE

## 2023-02-03 PROCEDURE — 76040000007: Performed by: INTERNAL MEDICINE

## 2023-02-03 PROCEDURE — 82607 VITAMIN B-12: CPT

## 2023-02-03 PROCEDURE — 76060000032 HC ANESTHESIA 0.5 TO 1 HR: Performed by: INTERNAL MEDICINE

## 2023-02-03 PROCEDURE — 77030021593 HC FCPS BIOP ENDOSC BSC -A: Performed by: INTERNAL MEDICINE

## 2023-02-03 PROCEDURE — 65270000029 HC RM PRIVATE

## 2023-02-03 RX ORDER — SODIUM CHLORIDE, SODIUM LACTATE, POTASSIUM CHLORIDE, CALCIUM CHLORIDE 600; 310; 30; 20 MG/100ML; MG/100ML; MG/100ML; MG/100ML
INJECTION, SOLUTION INTRAVENOUS
Status: DISCONTINUED | OUTPATIENT
Start: 2023-02-03 | End: 2023-02-03 | Stop reason: HOSPADM

## 2023-02-03 RX ORDER — PROPOFOL 10 MG/ML
INJECTION, EMULSION INTRAVENOUS AS NEEDED
Status: DISCONTINUED | OUTPATIENT
Start: 2023-02-03 | End: 2023-02-03 | Stop reason: HOSPADM

## 2023-02-03 RX ORDER — LIDOCAINE HYDROCHLORIDE 20 MG/ML
INJECTION, SOLUTION EPIDURAL; INFILTRATION; INTRACAUDAL; PERINEURAL AS NEEDED
Status: DISCONTINUED | OUTPATIENT
Start: 2023-02-03 | End: 2023-02-03 | Stop reason: HOSPADM

## 2023-02-03 RX ORDER — LIDOCAINE 4 G/100G
1 PATCH TOPICAL EVERY 24 HOURS
Status: DISCONTINUED | OUTPATIENT
Start: 2023-02-03 | End: 2023-02-07 | Stop reason: HOSPADM

## 2023-02-03 RX ORDER — OXYCODONE AND ACETAMINOPHEN 5; 325 MG/1; MG/1
2 TABLET ORAL
Status: DISCONTINUED | OUTPATIENT
Start: 2023-02-03 | End: 2023-02-07 | Stop reason: HOSPADM

## 2023-02-03 RX ADMIN — FUROSEMIDE 40 MG: 40 TABLET ORAL at 10:25

## 2023-02-03 RX ADMIN — HYDROMORPHONE HYDROCHLORIDE 1 MG: 1 INJECTION, SOLUTION INTRAMUSCULAR; INTRAVENOUS; SUBCUTANEOUS at 06:43

## 2023-02-03 RX ADMIN — ATORVASTATIN CALCIUM 10 MG: 10 TABLET, FILM COATED ORAL at 20:58

## 2023-02-03 RX ADMIN — DULOXETINE HYDROCHLORIDE 30 MG: 30 CAPSULE, DELAYED RELEASE ORAL at 10:25

## 2023-02-03 RX ADMIN — PROPOFOL 50 MG: 10 INJECTION, EMULSION INTRAVENOUS at 12:35

## 2023-02-03 RX ADMIN — LIDOCAINE HYDROCHLORIDE 100 MG: 20 INJECTION, SOLUTION EPIDURAL; INFILTRATION; INTRACAUDAL; PERINEURAL at 12:32

## 2023-02-03 RX ADMIN — SODIUM CHLORIDE 75 ML/HR: 9 INJECTION, SOLUTION INTRAVENOUS at 14:31

## 2023-02-03 RX ADMIN — ASPIRIN 81 MG: 81 TABLET, COATED ORAL at 10:25

## 2023-02-03 RX ADMIN — PROPOFOL 50 MG: 10 INJECTION, EMULSION INTRAVENOUS at 12:32

## 2023-02-03 RX ADMIN — INSULIN LISPRO 6 UNITS: 100 INJECTION, SOLUTION INTRAVENOUS; SUBCUTANEOUS at 20:58

## 2023-02-03 RX ADMIN — SODIUM CHLORIDE, POTASSIUM CHLORIDE, SODIUM LACTATE AND CALCIUM CHLORIDE: 600; 310; 30; 20 INJECTION, SOLUTION INTRAVENOUS at 12:32

## 2023-02-03 RX ADMIN — METOPROLOL SUCCINATE 25 MG: 50 TABLET, EXTENDED RELEASE ORAL at 10:25

## 2023-02-03 RX ADMIN — HYDROXYZINE PAMOATE 25 MG: 25 CAPSULE ORAL at 20:58

## 2023-02-03 RX ADMIN — INSULIN LISPRO 3 UNITS: 100 INJECTION, SOLUTION INTRAVENOUS; SUBCUTANEOUS at 17:01

## 2023-02-03 RX ADMIN — HYDROMORPHONE HYDROCHLORIDE 1 MG: 1 INJECTION, SOLUTION INTRAMUSCULAR; INTRAVENOUS; SUBCUTANEOUS at 15:32

## 2023-02-03 NOTE — PROGRESS NOTES
Bedside and Verbal shift change report given to Trina Lawson RN (oncoming nurse) by Megan George (offgoing nurse). Report included the following information SBAR and Kardex.

## 2023-02-03 NOTE — ANESTHESIA PREPROCEDURE EVALUATION
Relevant Problems   RESPIRATORY SYSTEM   (+) COPD (chronic obstructive pulmonary disease) (HCC)      CARDIOVASCULAR   (+) CHF (congestive heart failure) (HCC)   (+) HTN (hypertension), malignant   (+) Hypertension      ENDOCRINE   (+) DM (diabetes mellitus) (HCC)       Anesthetic History   No history of anesthetic complications            Review of Systems / Medical History  Patient summary reviewed, nursing notes reviewed and pertinent labs reviewed    Pulmonary    COPD               Neuro/Psych         Psychiatric history     Cardiovascular    Hypertension          Hyperlipidemia      Comments: Echo:    Left Ventricle: Left ventricle size is normal. Increased wall thickness. Normal wall motion. Normal left ventricular systolic function with a visually estimated EF of 55 - 60%. Abnormal diastolic function.   Aortic Valve: Appropriately positioned transcatheter bioprosthetic valve that is well-seated.   Mitral Valve: Mild to moderate transvalvular regurgitation. Mild stenosis noted.   Left Atrium: Left atrium is dilated.    GI/Hepatic/Renal  Within defined limits              Endo/Other    Diabetes  Hypothyroidism       Other Findings            Past Medical History:   Diagnosis Date    Cancer ([facility]     lymph nodes    Depression     DM (diabetes mellitus) ([facility]     Hyperlipidemia     Hypertension     Pancreatitis     Thyroid disease        Past Surgical History:   Procedure Laterality Date    HX AORTIC VALVE REPLACEMENT  08/2021    HX HYSTERECTOMY      HX LAPAROTOMY  04/01/2022        HX PARTIAL COLECTOMY Right 04/01/2022        IR FLUORO GUIDE PLC CVAD  5/10/2021    IR INSERT NON TUNL CVC OVER 5 YRS  5/10/2021    IR INSERT TUNL CVC W PORT OVER 5 YEARS  5/10/2022    IR PLACE CVAD FLUORO GUIDE  5/10/2022    KS INS NEW/RPLCMT PRM PM W/TRANSV ELTRD ATRIAL&VENT N/A 5/11/2021    INSERT PPM BIV MULTI performed by Ariana Mackey MD at [address] [place] [address]       Current Outpatient Medications   Medication Instructions    acetaminophen (TYLENOL) 325 mg, Oral, AS NEEDED    albuterol (PROVENTIL HFA, VENTOLIN HFA, PROAIR HFA) 90 mcg/actuation inhaler 2 Puffs, Inhalation, EVERY 6 HOURS AS NEEDED    aspirin delayed-release 81 mg, Oral, DAILY    clopidogreL (PLAVIX) 75 mg, Oral, DAILY    cyanocobalamin 1,000 mcg, Oral, DAILY    diphenhydrAMINE (BENADRYL) 25 mg, Oral, AS NEEDED    DULoxetine (CYMBALTA) 30 mg, Oral, DAILY    ferrous sulfate 325 mg, Oral, 2 TIMES DAILY WITH MEALS    furosemide (LASIX) 40 mg, Oral, DAILY    hydrOXYzine pamoate (VISTARIL) 25 mg, Oral, EVERY BEDTIME    insulin glargine (Lantus U-100 Insulin) 100 unit/mL injection 40 unit daily    levothyroxine (SYNTHROID) 150 mcg, Oral, DAILY BEFORE BREAKFAST    lidocaine (Lidoderm) 5 % Apply patch to the affected area for 12 hours a day and remove for 12 hours a day.  lovastatin (MEVACOR) 40 mg, Oral, EVERY BEDTIME    metoprolol succinate (TOPROL-XL) 25 mg, Oral, DAILY       No current facility-administered medications for this visit. No current outpatient medications on file.      Facility-Administered Medications Ordered in Other Visits   Medication Dose Route Frequency    0.9% sodium chloride infusion 250 mL  250 mL IntraVENous PRN    HYDROmorphone (DILAUDID) syringe 0.5 mg  0.5 mg IntraVENous Q4H PRN    HYDROmorphone (DILAUDID) injection 1 mg  1 mg IntraVENous Q4H PRN    ondansetron (ZOFRAN) injection 4 mg  4 mg IntraVENous Q6H PRN    albuterol (PROVENTIL HFA, VENTOLIN HFA, PROAIR HFA) inhaler 2 Puff  2 Puff Inhalation Q6H PRN    aspirin delayed-release tablet 81 mg  81 mg Oral DAILY    DULoxetine (CYMBALTA) capsule 30 mg  30 mg Oral DAILY    furosemide (LASIX) tablet 40 mg  40 mg Oral DAILY    hydrOXYzine pamoate (VISTARIL) capsule 25 mg  25 mg Oral QHS    levothyroxine (SYNTHROID) tablet 150 mcg  150 mcg Oral ACB    atorvastatin (LIPITOR) tablet 10 mg  10 mg Oral QHS    metoprolol succinate (TOPROL-XL) XL tablet 25 mg  25 mg Oral DAILY    0.9% sodium chloride infusion  75 mL/hr IntraVENous CONTINUOUS    insulin lispro (HUMALOG) injection   SubCUTAneous AC&HS    glucose chewable tablet 16 g  4 Tablet Oral PRN    glucagon (GLUCAGEN) injection 1 mg  1 mg IntraMUSCular PRN    dextrose 10% infusion 0-250 mL  0-250 mL IntraVENous PRN       No data found.     Lab Results   Component Value Date/Time    WBC 11.4 (H) 02/02/2023 03:49 AM    Hemoglobin (POC) 15.6 06/19/2009 10:55 AM    HGB 8.5 (L) 02/03/2023 02:40 AM    Hematocrit (POC) 46 06/19/2009 10:55 AM    HCT 25.5 (L) 02/03/2023 02:40 AM    PLATELET 328 06/53/4934 03:49 AM    .6 (H) 02/02/2023 03:49 AM     Lab Results   Component Value Date/Time    Sodium 138 02/02/2023 03:49 AM    Potassium 3.9 02/02/2023 03:49 AM    Chloride 109 (H) 02/02/2023 03:49 AM    CO2 24 02/02/2023 03:49 AM    Anion gap 5 02/02/2023 03:49 AM    Glucose 123 (H) 02/02/2023 03:49 AM    BUN 52 (H) 02/02/2023 03:49 AM    Creatinine 0.93 02/02/2023 03:49 AM    BUN/Creatinine ratio 56 (H) 02/02/2023 03:49 AM    GFR est AA >60 05/10/2022 07:48 AM    GFR est non-AA >60 05/10/2022 07:48 AM    Calcium 7.9 (L) 02/02/2023 03:49 AM     No results found for: APTT, PTP, INR, INREXT, INREXT  Lab Results   Component Value Date/Time    Glucose 123 (H) 02/02/2023 03:49 AM    Glucose (POC) 116 (H) 02/03/2023 11:07 AM     Physical Exam    Airway  Mallampati: II  TM Distance: 4 - 6 cm  Neck ROM: normal range of motion   Mouth opening: Normal     Cardiovascular    Rhythm: regular  Rate: normal         Dental         Pulmonary  Breath sounds clear to auscultation               Abdominal  GI exam deferred       Other Findings            Anesthetic Plan    ASA: 3  Anesthesia type: total IV anesthesia and MAC    Monitoring Plan: Continuous noninvasive hemodynamic monitoring      Induction: Intravenous  Anesthetic plan and risks discussed with: Patient and Family

## 2023-02-03 NOTE — PROGRESS NOTES
Problem: Pressure Injury - Risk of  Goal: *Prevention of pressure injury  Description: Document Delta Scale and appropriate interventions in the flowsheet.   Outcome: Progressing Towards Goal  Note: Pressure Injury Interventions:       Moisture Interventions: Absorbent underpads, Internal/External urinary devices    Activity Interventions: PT/OT evaluation, Increase time out of bed    Mobility Interventions: HOB 30 degrees or less, PT/OT evaluation    Nutrition Interventions: Document food/fluid/supplement intake, Offer support with meals,snacks and hydration    Friction and Shear Interventions: HOB 30 degrees or less, Minimize layers

## 2023-02-03 NOTE — PROGRESS NOTES
Progress Note    Patient: Efra Greenberg MRN: [id number]  SSN: [id number]    YOB: 1940  Age: [de-identified] y.o. Sex: female      Admit Date: 2/1/2023    LOS: 2 days     Subjective:   Patient seen in bed  Transfused yesterday for hemoglobin 6, repeat hemoglobin 8.5  Upper endoscopy today demonstrated gastric ulcer no active bleeding    Objective:     Vitals:    02/03/23 1300 02/03/23 1305 02/03/23 1310 02/03/23 1524   BP: (!) 165/57 (!) 148/63 (!) 163/57 (!) 139/52   Pulse: 66 70 67 72   Resp: 18 16 18 16   Temp:    99.3 °F (37.4 °C)   SpO2: 95% 99% 98% 100%        Intake and Output:  Current Shift: 02/03 0701 - 02/03 1900  In: 250 [I.V.:250]  Out: -   Last three shifts: 02/01 1901 - 02/03 0700  In: 2047 [P.O.:480; I.V.:875]  Out: -     Review of Systems:  ROS     Physical Exam:   Abdomen is soft, nondistended, nontender although there is tenderness to palpation over the right hip. Lab/Data Review:  Recent Results (from the past 24 hour(s))   GLUCOSE, POC    Collection Time: 02/02/23  5:06 PM   Result Value Ref Range    Glucose (POC) 152 (H) 65 - 100 mg/dL    Performed by Anel Burt, POC    Collection Time: 02/02/23  9:29 PM   Result Value Ref Range    Glucose (POC) 83 65 - 100 mg/dL    Performed by Atiya Jorgensen    HGB & HCT    Collection Time: 02/03/23  2:40 AM   Result Value Ref Range    HGB 8.5 (L) 11.5 - 16.0 g/dL    HCT 25.5 (L) 35.0 - 47.0 %   GLUCOSE, POC    Collection Time: 02/03/23  7:56 AM   Result Value Ref Range    Glucose (POC) 107 (H) 65 - 100 mg/dL    Performed by Jason Helms    GLUCOSE, POC    Collection Time: 02/03/23 11:07 AM   Result Value Ref Range    Glucose (POC) 116 (H) 65 - 100 mg/dL    Performed by Jason Helms             Assessment:     Active Problems:    Hypertension ()      Hyperlipidemia (2/1/2023)      Abdominal pain (2/1/2023)      GI bleed (2/1/2023)      Hypothyroid (2/1/2023)        Plan:   Repeat CBC in a.m.   Continue proton pump inhibitors we will add Carafate for gastric ulcer  Physical therapy to see patient  Of the chair this  p.m.   Lidocaine patch for right hip pain    Signed By: Kim King MD     February 3, 2023

## 2023-02-03 NOTE — PROGRESS NOTES
Problem: Falls - Risk of  Goal: *Absence of Falls  Description: Document Franko Tejada Fall Risk and appropriate interventions in the flowsheet.   Outcome: Progressing Towards Goal  Note: Fall Risk Interventions:

## 2023-02-03 NOTE — PROGRESS NOTES
Periods of confusion during the night. Explained planned procedure (EGD) for today and the purpose of blood transfusion. No acute distress noted. Call bell within reach. Bed alarm on. Will continue to monitor.

## 2023-02-04 LAB
ALBUMIN SERPL-MCNC: 2.1 G/DL (ref 3.5–5)
ALBUMIN/GLOB SERPL: 0.8 (ref 1.1–2.2)
ALP SERPL-CCNC: 50 U/L (ref 45–117)
ALT SERPL-CCNC: 16 U/L (ref 12–78)
ANION GAP SERPL CALC-SCNC: 3 MMOL/L (ref 5–15)
AST SERPL W P-5'-P-CCNC: 19 U/L (ref 15–37)
BASOPHILS # BLD: 0 K/UL (ref 0–0.1)
BASOPHILS NFR BLD: 0 % (ref 0–1)
BILIRUB SERPL-MCNC: 0.3 MG/DL (ref 0.2–1)
BUN SERPL-MCNC: 16 MG/DL (ref 6–20)
BUN/CREAT SERPL: 16 (ref 12–20)
CA-I BLD-MCNC: 7.6 MG/DL (ref 8.5–10.1)
CHLORIDE SERPL-SCNC: 106 MMOL/L (ref 97–108)
CO2 SERPL-SCNC: 28 MMOL/L (ref 21–32)
CREAT SERPL-MCNC: 1.02 MG/DL (ref 0.55–1.02)
DIFFERENTIAL METHOD BLD: ABNORMAL
EOSINOPHIL # BLD: 0.2 K/UL (ref 0–0.4)
EOSINOPHIL NFR BLD: 2 % (ref 0–7)
ERYTHROCYTE [DISTWIDTH] IN BLOOD BY AUTOMATED COUNT: 15.8 % (ref 11.5–14.5)
GLOBULIN SER CALC-MCNC: 2.6 G/DL (ref 2–4)
GLUCOSE BLD STRIP.AUTO-MCNC: 138 MG/DL (ref 65–100)
GLUCOSE BLD STRIP.AUTO-MCNC: 172 MG/DL (ref 65–100)
GLUCOSE BLD STRIP.AUTO-MCNC: 202 MG/DL (ref 65–100)
GLUCOSE BLD STRIP.AUTO-MCNC: 282 MG/DL (ref 65–100)
GLUCOSE SERPL-MCNC: 122 MG/DL (ref 65–100)
HCT VFR BLD AUTO: 25.8 % (ref 35–47)
HGB BLD-MCNC: 8.4 G/DL (ref 11.5–16)
IMM GRANULOCYTES # BLD AUTO: 0 K/UL (ref 0–0.04)
IMM GRANULOCYTES NFR BLD AUTO: 1 % (ref 0–0.5)
LYMPHOCYTES # BLD: 1.2 K/UL (ref 0.8–3.5)
LYMPHOCYTES NFR BLD: 13 % (ref 12–49)
MCH RBC QN AUTO: 31.6 PG (ref 26–34)
MCHC RBC AUTO-ENTMCNC: 32.6 G/DL (ref 30–36.5)
MCV RBC AUTO: 97 FL (ref 80–99)
MONOCYTES # BLD: 0.9 K/UL (ref 0–1)
MONOCYTES NFR BLD: 10 % (ref 5–13)
NEUTS SEG # BLD: 6.5 K/UL (ref 1.8–8)
NEUTS SEG NFR BLD: 74 % (ref 32–75)
NRBC # BLD: 0 K/UL (ref 0–0.01)
NRBC BLD-RTO: 0 PER 100 WBC
PERFORMED BY, TECHID: ABNORMAL
PLATELET # BLD AUTO: 174 K/UL (ref 150–400)
PMV BLD AUTO: 11.2 FL (ref 8.9–12.9)
POTASSIUM SERPL-SCNC: 3.6 MMOL/L (ref 3.5–5.1)
PROT SERPL-MCNC: 4.7 G/DL (ref 6.4–8.2)
RBC # BLD AUTO: 2.66 M/UL (ref 3.8–5.2)
SODIUM SERPL-SCNC: 137 MMOL/L (ref 136–145)
WBC # BLD AUTO: 8.8 K/UL (ref 3.6–11)

## 2023-02-04 PROCEDURE — 74011250637 HC RX REV CODE- 250/637: Performed by: COLON & RECTAL SURGERY

## 2023-02-04 PROCEDURE — 99232 SBSQ HOSP IP/OBS MODERATE 35: CPT | Performed by: COLON & RECTAL SURGERY

## 2023-02-04 PROCEDURE — 94762 N-INVAS EAR/PLS OXIMTRY CONT: CPT

## 2023-02-04 PROCEDURE — 36415 COLL VENOUS BLD VENIPUNCTURE: CPT

## 2023-02-04 PROCEDURE — 80053 COMPREHEN METABOLIC PANEL: CPT

## 2023-02-04 PROCEDURE — 74011636637 HC RX REV CODE- 636/637: Performed by: COLON & RECTAL SURGERY

## 2023-02-04 PROCEDURE — 65270000029 HC RM PRIVATE

## 2023-02-04 PROCEDURE — 74011000250 HC RX REV CODE- 250: Performed by: COLON & RECTAL SURGERY

## 2023-02-04 PROCEDURE — 85025 COMPLETE CBC W/AUTO DIFF WBC: CPT

## 2023-02-04 PROCEDURE — 74011250636 HC RX REV CODE- 250/636: Performed by: COLON & RECTAL SURGERY

## 2023-02-04 PROCEDURE — 82962 GLUCOSE BLOOD TEST: CPT

## 2023-02-04 RX ORDER — PANTOPRAZOLE SODIUM 40 MG/1
40 TABLET, DELAYED RELEASE ORAL
Status: DISCONTINUED | OUTPATIENT
Start: 2023-02-05 | End: 2023-02-07 | Stop reason: HOSPADM

## 2023-02-04 RX ORDER — LIDOCAINE 4 G/100G
1 PATCH TOPICAL EVERY 24 HOURS
Status: DISCONTINUED | OUTPATIENT
Start: 2023-02-04 | End: 2023-02-04

## 2023-02-04 RX ADMIN — OXYCODONE AND ACETAMINOPHEN 2 TABLET: 5; 325 TABLET ORAL at 08:29

## 2023-02-04 RX ADMIN — SODIUM CHLORIDE 75 ML/HR: 9 INJECTION, SOLUTION INTRAVENOUS at 18:58

## 2023-02-04 RX ADMIN — ASPIRIN 81 MG: 81 TABLET, COATED ORAL at 08:19

## 2023-02-04 RX ADMIN — OXYCODONE AND ACETAMINOPHEN 2 TABLET: 5; 325 TABLET ORAL at 14:10

## 2023-02-04 RX ADMIN — INSULIN LISPRO 9 UNITS: 100 INJECTION, SOLUTION INTRAVENOUS; SUBCUTANEOUS at 21:27

## 2023-02-04 RX ADMIN — LEVOTHYROXINE SODIUM 150 MCG: 0.07 TABLET ORAL at 05:05

## 2023-02-04 RX ADMIN — METOPROLOL SUCCINATE 25 MG: 50 TABLET, EXTENDED RELEASE ORAL at 08:19

## 2023-02-04 RX ADMIN — ATORVASTATIN CALCIUM 10 MG: 10 TABLET, FILM COATED ORAL at 21:24

## 2023-02-04 RX ADMIN — SODIUM CHLORIDE 75 ML/HR: 9 INJECTION, SOLUTION INTRAVENOUS at 05:05

## 2023-02-04 RX ADMIN — DULOXETINE HYDROCHLORIDE 30 MG: 30 CAPSULE, DELAYED RELEASE ORAL at 08:19

## 2023-02-04 RX ADMIN — FUROSEMIDE 40 MG: 40 TABLET ORAL at 08:19

## 2023-02-04 RX ADMIN — INSULIN LISPRO 6 UNITS: 100 INJECTION, SOLUTION INTRAVENOUS; SUBCUTANEOUS at 12:07

## 2023-02-04 RX ADMIN — OXYCODONE AND ACETAMINOPHEN 2 TABLET: 5; 325 TABLET ORAL at 21:24

## 2023-02-04 RX ADMIN — INSULIN LISPRO 3 UNITS: 100 INJECTION, SOLUTION INTRAVENOUS; SUBCUTANEOUS at 08:18

## 2023-02-04 RX ADMIN — HYDROXYZINE PAMOATE 25 MG: 25 CAPSULE ORAL at 21:23

## 2023-02-04 NOTE — PROGRESS NOTES
Progress Note    Patient: Grace Hendrix MRN: [id number]  SSN: [id number]    YOB: 1940  Age: [de-identified] y.o. Sex: female      Admit Date: 2/1/2023    LOS: 3 days     Subjective:     Patient is comfortable denies any abdominal pain or any overt bleeding yesterday's hemoglobin was 8.5    Objective:     Vitals:    02/04/23 0000 02/04/23 0400 02/04/23 0747 02/04/23 0800   BP:  (!) 130/59 (!) 158/65    Pulse: 81 78 97 97   Resp:  20 18    Temp:  97.9 °F (36.6 °C) 98.3 °F (36.8 °C)    SpO2:  99% 97%    Weight:  76 kg (167 lb 8.8 oz)          Intake and Output:  Current Shift: No intake/output data recorded. Last three shifts: 02/02 1901 - 02/04 0700  In: 586 [I.V.:250]  Out: 1500 [Urine:1500]    Physical Exam:   Skin:  Extremities and face reveal no rashes. No whyte erythema. HEENT: Sclerae anicteric. Extra-occular muscles are intact. No abnormal pigmentation of the lips. The neck is supple. Cardiovascular: Regular rate and rhythm. Respiratory:  Comfortable breathing with no accessory muscle use. GI:  Abdomen nondistended, soft, and nontender. No enlargement of the liver or spleen. No masses palpable. Rectal:  Deferred  Musculoskeletal: Extremities have good range of motion. Neurological:  Gross memory appears intact. Patient is alert and oriented. Psychiatric:  Mood appears appropriate with judgement intact.   Lymphatic:  No visible adenopathy      Lab/Data Review:  Recent Results (from the past 24 hour(s))   GLUCOSE, POC    Collection Time: 02/03/23  4:43 PM   Result Value Ref Range    Glucose (POC) 212 (H) 65 - 100 mg/dL    Performed by Vane Jacobson, POC    Collection Time: 02/03/23  4:52 PM   Result Value Ref Range    Glucose (POC) 182 (H) 65 - 100 mg/dL    Performed by Piyush Weaver    GLUCOSE, POC    Collection Time: 02/03/23  8:16 PM   Result Value Ref Range    Glucose (POC) 223 (H) 65 - 100 mg/dL    Performed by Libia Valle RN    GLUCOSE, POC    Collection Time: 02/03/23 8:48 PM   Result Value Ref Range    Glucose (POC) 232 (H) 65 - 100 mg/dL    Performed by Rina Prader, POC    Collection Time: 02/04/23  7:50 AM   Result Value Ref Range    Glucose (POC) 172 (H) 65 - 100 mg/dL    Performed by Rosette Morales    GLUCOSE, POC    Collection Time: 02/04/23 11:44 AM   Result Value Ref Range    Glucose (POC) 202 (H) 65 - 100 mg/dL    Performed by Patricia Garcia               CT ABD PELV WO CONT   Final Result      1. Abnormal wall thickening/inflammation of the pyloric channel. This may   represent peptic ulcer disease. No evidence of perforation. 2. Evidence of right hemicolectomy. 3. Umbilical hernia containing a loop of small bowel. No evidence of bowel   obstruction or perforation. 4. Cholelithiasis. No biliary ductal dilatation. 5. Chronic moderate left pleural effusion. 6. Nonobstructing right renal stone. Assessment:     Active Problems:    Hypertension ()      Hyperlipidemia (2/1/2023)      Abdominal pain (2/1/2023)      GI bleed (2/1/2023)      Hypothyroid (2/1/2023)      Gastrointestinal bleeding due to a gastric ulcer  Abdominal pain is improving  Plan:   Continue proton pump inhibitor therapy. Monitor hemoglobin repeat upper endoscopy in 3 months time to ensure healing of the ulcer.     Thank you for allowing me to participate in this patients care    Signed By: Didi King MD     February 4, 2023

## 2023-02-04 NOTE — PROGRESS NOTES
Problem: Falls - Risk of  Goal: *Absence of Falls  Description: Document Abdulaziz Sullivan Fall Risk and appropriate interventions in the flowsheet. Outcome: Progressing Towards Goal  Note: Fall Risk Interventions:                                Problem: Patient Education: Go to Patient Education Activity  Goal: Patient/Family Education  Outcome: Progressing Towards Goal     Problem: Pressure Injury - Risk of  Goal: *Prevention of pressure injury  Description: Document Delta Scale and appropriate interventions in the flowsheet.   Outcome: Progressing Towards Goal  Note: Pressure Injury Interventions:       Moisture Interventions: Absorbent underpads, Internal/External urinary devices    Activity Interventions: PT/OT evaluation, Increase time out of bed    Mobility Interventions: HOB 30 degrees or less, PT/OT evaluation    Nutrition Interventions: Document food/fluid/supplement intake, Offer support with meals,snacks and hydration    Friction and Shear Interventions: HOB 30 degrees or less, Minimize layers                Problem: Patient Education: Go to Patient Education Activity  Goal: Patient/Family Education  Outcome: Progressing Towards Goal

## 2023-02-04 NOTE — PROGRESS NOTES
Problem: Falls - Risk of  Goal: *Absence of Falls  Description: Document Lenord Corners Fall Risk and appropriate interventions in the flowsheet. Outcome: Progressing Towards Goal  Note: Fall Risk Interventions:                                Problem: Patient Education: Go to Patient Education Activity  Goal: Patient/Family Education  Outcome: Progressing Towards Goal     Problem: Pressure Injury - Risk of  Goal: *Prevention of pressure injury  Description: Document Delta Scale and appropriate interventions in the flowsheet.   Outcome: Progressing Towards Goal  Note: Pressure Injury Interventions:       Moisture Interventions: Absorbent underpads, Internal/External urinary devices    Activity Interventions: Increase time out of bed, PT/OT evaluation    Mobility Interventions: HOB 30 degrees or less, PT/OT evaluation    Nutrition Interventions: Document food/fluid/supplement intake    Friction and Shear Interventions: HOB 30 degrees or less, Minimize layers                Problem: Patient Education: Go to Patient Education Activity  Goal: Patient/Family Education  Outcome: Progressing Towards Goal

## 2023-02-04 NOTE — PROGRESS NOTES
Progress Note    Patient: David Mcadams MRN: [id number]  SSN: [id number]    YOB: 1940  Age: [de-identified] y.o. Sex: female      Admit Date: 2/1/2023    LOS: 3 days     Subjective:   Patient seen in follow-up upper GI bleed abdominal pain  Endoscopy demonstrated large gastric ulcer has had no further episodes of melanotic stools  In terms of her pain is more localized to the hip denies intra-abdominal    Objective:     Vitals:    02/04/23 0800 02/04/23 1200 02/04/23 1557 02/04/23 1606   BP:    111/60   Pulse: 97 92 90 71   Resp:    18   Temp:    98 °F (36.7 °C)   SpO2:    98%   Weight:            Intake and Output:  Current Shift: No intake/output data recorded.   Last three shifts: 02/02 1901 - 02/04 0700  In: 586 [I.V.:250]  Out: 1500 [Urine:1500]    Review of Systems:  ROS     Physical Exam:   Abdomen is soft, nontender, nondistended    Lab/Data Review:  Recent Results (from the past 24 hour(s))   GLUCOSE, POC    Collection Time: 02/03/23  4:43 PM   Result Value Ref Range    Glucose (POC) 212 (H) 65 - 100 mg/dL    Performed by Yany Suarez, POC    Collection Time: 02/03/23  4:52 PM   Result Value Ref Range    Glucose (POC) 182 (H) 65 - 100 mg/dL    Performed by Kriss Carrero    GLUCOSE, POC    Collection Time: 02/03/23  8:16 PM   Result Value Ref Range    Glucose (POC) 223 (H) 65 - 100 mg/dL    Performed by Baldev Ta RN    GLUCOSE, POC    Collection Time: 02/03/23  8:48 PM   Result Value Ref Range    Glucose (POC) 232 (H) 65 - 100 mg/dL    Performed by Carlos Waite    GLUCOSE, POC    Collection Time: 02/04/23  7:50 AM   Result Value Ref Range    Glucose (POC) 172 (H) 65 - 100 mg/dL    Performed by Gautam Jung    GLUCOSE, POC    Collection Time: 02/04/23 11:44 AM   Result Value Ref Range    Glucose (POC) 202 (H) 65 - 100 mg/dL    Performed by Luther Burris           Assessment:     Active Problems:    Hypertension ()      Hyperlipidemia (2/1/2023)      Abdominal pain (2/1/2023)      GI bleed (2/1/2023)      Hypothyroid (2/1/2023)        Plan:   Pump inhibitors for large gastric ulcer, continue diet  Lidocaine patch to hip  Physical therapy to see    Signed By: Rona Duarte MD     February 4, 2023

## 2023-02-05 LAB
BASOPHILS # BLD: 0 K/UL (ref 0–0.1)
BASOPHILS NFR BLD: 1 % (ref 0–1)
DIFFERENTIAL METHOD BLD: ABNORMAL
EOSINOPHIL # BLD: 0.3 K/UL (ref 0–0.4)
EOSINOPHIL NFR BLD: 3 % (ref 0–7)
ERYTHROCYTE [DISTWIDTH] IN BLOOD BY AUTOMATED COUNT: 15.9 % (ref 11.5–14.5)
GLUCOSE BLD STRIP.AUTO-MCNC: 176 MG/DL (ref 65–100)
GLUCOSE BLD STRIP.AUTO-MCNC: 188 MG/DL (ref 65–100)
GLUCOSE BLD STRIP.AUTO-MCNC: 212 MG/DL (ref 65–100)
GLUCOSE BLD STRIP.AUTO-MCNC: 267 MG/DL (ref 65–100)
HCT VFR BLD AUTO: 26.4 % (ref 35–47)
HGB BLD-MCNC: 8.4 G/DL (ref 11.5–16)
IMM GRANULOCYTES # BLD AUTO: 0.1 K/UL (ref 0–0.04)
IMM GRANULOCYTES NFR BLD AUTO: 1 % (ref 0–0.5)
LYMPHOCYTES # BLD: 1.3 K/UL (ref 0.8–3.5)
LYMPHOCYTES NFR BLD: 15 % (ref 12–49)
MCH RBC QN AUTO: 31.5 PG (ref 26–34)
MCHC RBC AUTO-ENTMCNC: 31.8 G/DL (ref 30–36.5)
MCV RBC AUTO: 98.9 FL (ref 80–99)
MONOCYTES # BLD: 0.9 K/UL (ref 0–1)
MONOCYTES NFR BLD: 11 % (ref 5–13)
NEUTS SEG # BLD: 6.2 K/UL (ref 1.8–8)
NEUTS SEG NFR BLD: 69 % (ref 32–75)
NRBC # BLD: 0 K/UL (ref 0–0.01)
NRBC BLD-RTO: 0 PER 100 WBC
PERFORMED BY, TECHID: ABNORMAL
PLATELET # BLD AUTO: 176 K/UL (ref 150–400)
PMV BLD AUTO: 11.6 FL (ref 8.9–12.9)
RBC # BLD AUTO: 2.67 M/UL (ref 3.8–5.2)
WBC # BLD AUTO: 8.8 K/UL (ref 3.6–11)

## 2023-02-05 PROCEDURE — 65270000029 HC RM PRIVATE

## 2023-02-05 PROCEDURE — 85025 COMPLETE CBC W/AUTO DIFF WBC: CPT

## 2023-02-05 PROCEDURE — 82962 GLUCOSE BLOOD TEST: CPT

## 2023-02-05 PROCEDURE — 74011250637 HC RX REV CODE- 250/637: Performed by: COLON & RECTAL SURGERY

## 2023-02-05 PROCEDURE — 74011636637 HC RX REV CODE- 636/637: Performed by: COLON & RECTAL SURGERY

## 2023-02-05 PROCEDURE — 74011000250 HC RX REV CODE- 250: Performed by: COLON & RECTAL SURGERY

## 2023-02-05 PROCEDURE — 36415 COLL VENOUS BLD VENIPUNCTURE: CPT

## 2023-02-05 PROCEDURE — 74011250636 HC RX REV CODE- 250/636: Performed by: COLON & RECTAL SURGERY

## 2023-02-05 PROCEDURE — 99232 SBSQ HOSP IP/OBS MODERATE 35: CPT | Performed by: COLON & RECTAL SURGERY

## 2023-02-05 RX ADMIN — INSULIN LISPRO 9 UNITS: 100 INJECTION, SOLUTION INTRAVENOUS; SUBCUTANEOUS at 12:41

## 2023-02-05 RX ADMIN — LEVOTHYROXINE SODIUM 150 MCG: 0.07 TABLET ORAL at 05:15

## 2023-02-05 RX ADMIN — OXYCODONE AND ACETAMINOPHEN 2 TABLET: 5; 325 TABLET ORAL at 05:26

## 2023-02-05 RX ADMIN — INSULIN LISPRO 3 UNITS: 100 INJECTION, SOLUTION INTRAVENOUS; SUBCUTANEOUS at 08:10

## 2023-02-05 RX ADMIN — METOPROLOL SUCCINATE 25 MG: 50 TABLET, EXTENDED RELEASE ORAL at 08:11

## 2023-02-05 RX ADMIN — INSULIN LISPRO 3 UNITS: 100 INJECTION, SOLUTION INTRAVENOUS; SUBCUTANEOUS at 16:35

## 2023-02-05 RX ADMIN — OXYCODONE AND ACETAMINOPHEN 2 TABLET: 5; 325 TABLET ORAL at 11:07

## 2023-02-05 RX ADMIN — DULOXETINE HYDROCHLORIDE 30 MG: 30 CAPSULE, DELAYED RELEASE ORAL at 08:11

## 2023-02-05 RX ADMIN — INSULIN LISPRO 6 UNITS: 100 INJECTION, SOLUTION INTRAVENOUS; SUBCUTANEOUS at 21:29

## 2023-02-05 RX ADMIN — PANTOPRAZOLE SODIUM 40 MG: 40 TABLET, DELAYED RELEASE ORAL at 08:11

## 2023-02-05 RX ADMIN — ATORVASTATIN CALCIUM 10 MG: 10 TABLET, FILM COATED ORAL at 21:28

## 2023-02-05 RX ADMIN — FUROSEMIDE 40 MG: 40 TABLET ORAL at 08:11

## 2023-02-05 RX ADMIN — OXYCODONE AND ACETAMINOPHEN 2 TABLET: 5; 325 TABLET ORAL at 21:28

## 2023-02-05 RX ADMIN — HYDROXYZINE PAMOATE 25 MG: 25 CAPSULE ORAL at 21:28

## 2023-02-05 RX ADMIN — SODIUM CHLORIDE 75 ML/HR: 9 INJECTION, SOLUTION INTRAVENOUS at 06:35

## 2023-02-05 RX ADMIN — SODIUM CHLORIDE 75 ML/HR: 9 INJECTION, SOLUTION INTRAVENOUS at 19:32

## 2023-02-05 NOTE — PROGRESS NOTES
Progress Note    Patient: Yunior Elkins MRN: [id number]  SSN: [id number]    YOB: 1940  Age: [de-identified] y.o. Sex: female      Admit Date: 2/1/2023    LOS: 4 days     Subjective:   Hospital day 5  Patient seen in bed  Reports right hip pain however denies abdominal pain    Objective:     Vitals:    02/05/23 0400 02/05/23 0740 02/05/23 0800 02/05/23 1200   BP:  (!) 127/52     Pulse: 65 74 74 78   Resp:  18     Temp:  97.6 °F (36.4 °C)     SpO2:  97%     Weight:            Intake and Output:  Current Shift: No intake/output data recorded. Last three shifts: 02/03 1901 - 02/05 0700  In: -   Out: 2400 [Urine:2400]    Review of Systems:  ROS     Physical Exam:   Abdomen is soft, nontender, nondistended; tender palpation over the right hip    Lab/Data Review:  Recent Results (from the past 24 hour(s))   GLUCOSE, POC    Collection Time: 02/04/23  4:24 PM   Result Value Ref Range    Glucose (POC) 138 (H) 65 - 100 mg/dL    Performed by Dixon Wilder    CBC WITH AUTOMATED DIFF    Collection Time: 02/04/23  4:36 PM   Result Value Ref Range    WBC 8.8 3.6 - 11.0 K/uL    RBC 2.66 (L) 3.80 - 5.20 M/uL    HGB 8.4 (L) 11.5 - 16.0 g/dL    HCT 25.8 (L) 35.0 - 47.0 %    MCV 97.0 80.0 - 99.0 FL    MCH 31.6 26.0 - 34.0 PG    MCHC 32.6 30.0 - 36.5 g/dL    RDW 15.8 (H) 11.5 - 14.5 %    PLATELET 851 111 - 736 K/uL    MPV 11.2 8.9 - 12.9 FL    NRBC 0.0 0.0  WBC    ABSOLUTE NRBC 0.00 0.00 - 0.01 K/uL    NEUTROPHILS 74 32 - 75 %    LYMPHOCYTES 13 12 - 49 %    MONOCYTES 10 5 - 13 %    EOSINOPHILS 2 0 - 7 %    BASOPHILS 0 0 - 1 %    IMMATURE GRANULOCYTES 1 (H) 0 - 0.5 %    ABS. NEUTROPHILS 6.5 1.8 - 8.0 K/UL    ABS. LYMPHOCYTES 1.2 0.8 - 3.5 K/UL    ABS. MONOCYTES 0.9 0.0 - 1.0 K/UL    ABS. EOSINOPHILS 0.2 0.0 - 0.4 K/UL    ABS. BASOPHILS 0.0 0.0 - 0.1 K/UL    ABS. IMM.  GRANS. 0.0 0.00 - 0.04 K/UL    DF AUTOMATED     METABOLIC PANEL, COMPREHENSIVE    Collection Time: 02/04/23  4:36 PM   Result Value Ref Range    Sodium 137 136 - 145 mmol/L    Potassium 3.6 3.5 - 5.1 mmol/L    Chloride 106 97 - 108 mmol/L    CO2 28 21 - 32 mmol/L    Anion gap 3 (L) 5 - 15 mmol/L    Glucose 122 (H) 65 - 100 mg/dL    BUN 16 6 - 20 mg/dL    Creatinine 1.02 0.55 - 1.02 mg/dL    BUN/Creatinine ratio 16 12 - 20      eGFR 56 (L) >60 ml/min/1.73m2    Calcium 7.6 (L) 8.5 - 10.1 mg/dL    Bilirubin, total 0.3 0.2 - 1.0 mg/dL    AST (SGOT) 19 15 - 37 U/L    ALT (SGPT) 16 12 - 78 U/L    Alk. phosphatase 50 45 - 117 U/L    Protein, total 4.7 (L) 6.4 - 8.2 g/dL    Albumin 2.1 (L) 3.5 - 5.0 g/dL    Globulin 2.6 2.0 - 4.0 g/dL    A-G Ratio 0.8 (L) 1.1 - 2.2     GLUCOSE, POC    Collection Time: 02/04/23  8:04 PM   Result Value Ref Range    Glucose (POC) 282 (H) 65 - 100 mg/dL    Performed by Tresa Urrutia    CBC WITH AUTOMATED DIFF    Collection Time: 02/05/23  5:09 AM   Result Value Ref Range    WBC 8.8 3.6 - 11.0 K/uL    RBC 2.67 (L) 3.80 - 5.20 M/uL    HGB 8.4 (L) 11.5 - 16.0 g/dL    HCT 26.4 (L) 35.0 - 47.0 %    MCV 98.9 80.0 - 99.0 FL    MCH 31.5 26.0 - 34.0 PG    MCHC 31.8 30.0 - 36.5 g/dL    RDW 15.9 (H) 11.5 - 14.5 %    PLATELET 402 125 - 510 K/uL    MPV 11.6 8.9 - 12.9 FL    NRBC 0.0 0.0  WBC    ABSOLUTE NRBC 0.00 0.00 - 0.01 K/uL    NEUTROPHILS 69 32 - 75 %    LYMPHOCYTES 15 12 - 49 %    MONOCYTES 11 5 - 13 %    EOSINOPHILS 3 0 - 7 %    BASOPHILS 1 0 - 1 %    IMMATURE GRANULOCYTES 1 (H) 0 - 0.5 %    ABS. NEUTROPHILS 6.2 1.8 - 8.0 K/UL    ABS. LYMPHOCYTES 1.3 0.8 - 3.5 K/UL    ABS. MONOCYTES 0.9 0.0 - 1.0 K/UL    ABS. EOSINOPHILS 0.3 0.0 - 0.4 K/UL    ABS. BASOPHILS 0.0 0.0 - 0.1 K/UL    ABS. IMM.  GRANS. 0.1 (H) 0.00 - 0.04 K/UL    DF AUTOMATED     GLUCOSE, POC    Collection Time: 02/05/23  7:42 AM   Result Value Ref Range    Glucose (POC) 176 (H) 65 - 100 mg/dL    Performed by [employer] Helder    GLUCOSE, POC    Collection Time: 02/05/23 11:23 AM   Result Value Ref Range    Glucose (POC) 267 (H) 65 - 100 mg/dL    Performed by Tamica Pendleton Assessment:     Active Problems:    Hypertension ()      Hyperlipidemia (2/1/2023)      Abdominal pain (2/1/2023)      GI bleed (2/1/2023)      Hypothyroid (2/1/2023)        Plan:   Hemoglobin stable after transfusion continue Protonix  Continue lidocaine patches for right hip pain patient has been seen by orthopedics as outpatient  Physical therapy to see and evaluate    Signed By: Kristofer Casey MD     February 5, 2023

## 2023-02-05 NOTE — PROGRESS NOTES
Problem: Falls - Risk of  Goal: *Absence of Falls  Description: Document Abdulaziz Sullivan Fall Risk and appropriate interventions in the flowsheet. Outcome: Progressing Towards Goal  Note: Fall Risk Interventions:  Mobility Interventions: Bed/chair exit alarm         Medication Interventions: Bed/chair exit alarm, Patient to call before getting OOB    Elimination Interventions: Bed/chair exit alarm, Call light in reach              Problem: Patient Education: Go to Patient Education Activity  Goal: Patient/Family Education  Outcome: Progressing Towards Goal     Problem: Pressure Injury - Risk of  Goal: *Prevention of pressure injury  Description: Document Delta Scale and appropriate interventions in the flowsheet.   Note: Pressure Injury Interventions:  Sensory Interventions: Assess changes in LOC    Moisture Interventions: Absorbent underpads, Apply protective barrier, creams and emollients    Activity Interventions: Increase time out of bed    Mobility Interventions: PT/OT evaluation    Nutrition Interventions: Document food/fluid/supplement intake    Friction and Shear Interventions: HOB 30 degrees or less                Problem: Patient Education: Go to Patient Education Activity  Goal: Patient/Family Education  Outcome: Progressing Towards Goal

## 2023-02-05 NOTE — PROGRESS NOTES
Bedside and Verbal shift change report given to Nathaly Kessler RN (oncoming nurse) by Ana Laura Tafoya RN (offgoing nurse). Report included the following information SBAR, Kardex, and MAR.

## 2023-02-05 NOTE — PROGRESS NOTES
Problem: Falls - Risk of  Goal: *Absence of Falls  Description: Document [place] [facility] Fall Risk and appropriate interventions in the flowsheet. Outcome: Progressing Towards Goal  Note: Fall Risk Interventions:  Mobility Interventions: Bed/chair exit alarm         Medication Interventions: Bed/chair exit alarm, Patient to call before getting OOB    Elimination Interventions: Bed/chair exit alarm, Call light in reach              Problem: Patient Education: Go to Patient Education Activity  Goal: Patient/Family Education  Outcome: Progressing Towards Goal     Problem: Pressure Injury - Risk of  Goal: *Prevention of pressure injury  Description: Document Delta Scale and appropriate interventions in the flowsheet.   Outcome: Progressing Towards Goal  Note: Pressure Injury Interventions:  Sensory Interventions: Assess changes in LOC    Moisture Interventions: Absorbent underpads, Internal/External urinary devices    Activity Interventions: Increase time out of bed, PT/OT evaluation    Mobility Interventions: HOB 30 degrees or less, PT/OT evaluation    Nutrition Interventions: Document food/fluid/supplement intake    Friction and Shear Interventions: HOB 30 degrees or less                Problem: Patient Education: Go to Patient Education Activity  Goal: Patient/Family Education  Outcome: Progressing Towards Goal

## 2023-02-06 LAB
BASOPHILS # BLD: 0.1 K/UL (ref 0–0.1)
BASOPHILS NFR BLD: 1 % (ref 0–1)
DIFFERENTIAL METHOD BLD: ABNORMAL
EOSINOPHIL # BLD: 0.3 K/UL (ref 0–0.4)
EOSINOPHIL NFR BLD: 2 % (ref 0–7)
ERYTHROCYTE [DISTWIDTH] IN BLOOD BY AUTOMATED COUNT: 15 % (ref 11.5–14.5)
GLUCOSE BLD STRIP.AUTO-MCNC: 147 MG/DL (ref 65–100)
GLUCOSE BLD STRIP.AUTO-MCNC: 223 MG/DL (ref 65–100)
GLUCOSE BLD STRIP.AUTO-MCNC: 240 MG/DL (ref 65–100)
GLUCOSE BLD STRIP.AUTO-MCNC: 297 MG/DL (ref 65–100)
GLUCOSE BLD STRIP.AUTO-MCNC: 300 MG/DL (ref 65–100)
HCT VFR BLD AUTO: 26.5 % (ref 35–47)
HGB BLD-MCNC: 8.5 G/DL (ref 11.5–16)
IMM GRANULOCYTES # BLD AUTO: 0 K/UL (ref 0–0.04)
IMM GRANULOCYTES NFR BLD AUTO: 0 % (ref 0–0.5)
LYMPHOCYTES # BLD: 0.8 K/UL (ref 0.8–3.5)
LYMPHOCYTES NFR BLD: 7 % (ref 12–49)
MCH RBC QN AUTO: 31.3 PG (ref 26–34)
MCHC RBC AUTO-ENTMCNC: 32.1 G/DL (ref 30–36.5)
MCV RBC AUTO: 97.4 FL (ref 80–99)
MONOCYTES # BLD: 0.7 K/UL (ref 0–1)
MONOCYTES NFR BLD: 6 % (ref 5–13)
NEUTS SEG # BLD: 9 K/UL (ref 1.8–8)
NEUTS SEG NFR BLD: 84 % (ref 32–75)
NRBC # BLD: 0 K/UL (ref 0–0.01)
NRBC BLD-RTO: 0 PER 100 WBC
PERFORMED BY, TECHID: ABNORMAL
PLATELET # BLD AUTO: 198 K/UL (ref 150–400)
PMV BLD AUTO: 11.3 FL (ref 8.9–12.9)
RBC # BLD AUTO: 2.72 M/UL (ref 3.8–5.2)
WBC # BLD AUTO: 10.8 K/UL (ref 3.6–11)

## 2023-02-06 PROCEDURE — 65270000029 HC RM PRIVATE

## 2023-02-06 PROCEDURE — 97530 THERAPEUTIC ACTIVITIES: CPT

## 2023-02-06 PROCEDURE — 97165 OT EVAL LOW COMPLEX 30 MIN: CPT

## 2023-02-06 PROCEDURE — 85025 COMPLETE CBC W/AUTO DIFF WBC: CPT

## 2023-02-06 PROCEDURE — 74011250636 HC RX REV CODE- 250/636: Performed by: COLON & RECTAL SURGERY

## 2023-02-06 PROCEDURE — 74011250637 HC RX REV CODE- 250/637: Performed by: COLON & RECTAL SURGERY

## 2023-02-06 PROCEDURE — 74011636637 HC RX REV CODE- 636/637: Performed by: COLON & RECTAL SURGERY

## 2023-02-06 PROCEDURE — 97162 PT EVAL MOD COMPLEX 30 MIN: CPT

## 2023-02-06 PROCEDURE — 36415 COLL VENOUS BLD VENIPUNCTURE: CPT

## 2023-02-06 PROCEDURE — 99232 SBSQ HOSP IP/OBS MODERATE 35: CPT | Performed by: COLON & RECTAL SURGERY

## 2023-02-06 PROCEDURE — 82962 GLUCOSE BLOOD TEST: CPT

## 2023-02-06 PROCEDURE — 74011000250 HC RX REV CODE- 250: Performed by: COLON & RECTAL SURGERY

## 2023-02-06 RX ADMIN — SODIUM CHLORIDE 75 ML/HR: 9 INJECTION, SOLUTION INTRAVENOUS at 11:25

## 2023-02-06 RX ADMIN — PANTOPRAZOLE SODIUM 40 MG: 40 TABLET, DELAYED RELEASE ORAL at 08:21

## 2023-02-06 RX ADMIN — LEVOTHYROXINE SODIUM 150 MCG: 0.07 TABLET ORAL at 05:10

## 2023-02-06 RX ADMIN — INSULIN LISPRO 12 UNITS: 100 INJECTION, SOLUTION INTRAVENOUS; SUBCUTANEOUS at 12:11

## 2023-02-06 RX ADMIN — DULOXETINE HYDROCHLORIDE 30 MG: 30 CAPSULE, DELAYED RELEASE ORAL at 09:41

## 2023-02-06 RX ADMIN — HYDROXYZINE PAMOATE 25 MG: 25 CAPSULE ORAL at 22:23

## 2023-02-06 RX ADMIN — FUROSEMIDE 40 MG: 40 TABLET ORAL at 09:41

## 2023-02-06 RX ADMIN — OXYCODONE AND ACETAMINOPHEN 2 TABLET: 5; 325 TABLET ORAL at 14:05

## 2023-02-06 RX ADMIN — OXYCODONE AND ACETAMINOPHEN 2 TABLET: 5; 325 TABLET ORAL at 22:23

## 2023-02-06 RX ADMIN — ATORVASTATIN CALCIUM 10 MG: 10 TABLET, FILM COATED ORAL at 22:23

## 2023-02-06 RX ADMIN — INSULIN LISPRO 6 UNITS: 100 INJECTION, SOLUTION INTRAVENOUS; SUBCUTANEOUS at 08:20

## 2023-02-06 RX ADMIN — METOPROLOL SUCCINATE 25 MG: 50 TABLET, EXTENDED RELEASE ORAL at 09:41

## 2023-02-06 RX ADMIN — INSULIN LISPRO 6 UNITS: 100 INJECTION, SOLUTION INTRAVENOUS; SUBCUTANEOUS at 17:17

## 2023-02-06 NOTE — PROGRESS NOTES
Bedside and Verbal shift change report given to [facility] RN(oncoming nurse) by Carlos Manuel Mary RN (offgoing nurse). Report included the following information SBAR, Kardex, and MAR.

## 2023-02-06 NOTE — PROGRESS NOTES
CM discussed discharge planning with patient and family. Family feels that patient is in need of rehab before returning home. Choice letter reviewed and signed. Choices [facility] [place]. The closest facilities to [facility]. Referrals sent.

## 2023-02-06 NOTE — PROGRESS NOTES
Progress Note    Patient: Tangela Burton MRN: [id number]  SSN: [id number]    YOB: 1940  Age: [de-identified] y.o. Sex: female      Admit Date: 2/1/2023    LOS: 5 days     Subjective:     Chief Complaint: Low hemoglobin     History of Present Illness: Tangela Burton is a [de-identified] y.o. female who is seen in consultation for suspected upper GI bleed. Patient was admitted to the hospital with low hemoglobin. She has previous history of colon cancer and resection she complained of pain in the right side in the flank area. She recently went for a CAT scan that report is not available as yet. Patient is very hard of hearing and a poor historian most of the historical data was gathered from patient's chart that was reviewed and interviewing the nursing staff. A consult was placed for gastroenterology for possible upper GI bleed there is some history of coffee-ground emesis. 2/4/23:  Patient is comfortable denies any abdominal pain or any overt bleeding yesterday's hemoglobin was 8.5    ------------------    Patient is seen and examined in [facility]. Patient complains of severe right-sided abdominal pain despite pain medication. Today's labs: stable Hgb (8.5)      Objective:     Vitals:    02/06/23 0747 02/06/23 0800 02/06/23 0813 02/06/23 0941   BP:   (!) 140/58 (!) 141/57   Pulse:  88 72 89   Resp:   18    Temp:   98.3 °F (36.8 °C)    SpO2:   96%    Weight: 77.8 kg (171 lb 8.3 oz)           Intake and Output:  Current Shift: 02/06 0701 - 02/06 1900  In: -   Out: 600 [Urine:600]  Last three shifts: 02/04 1901 - 02/06 0700  In: -   Out: 2100 [Urine:2100]    Physical Exam:   Skin:  Extremities and face reveal no rashes. No whyte erythema. HEENT: Sclerae anicteric. Extra-occular muscles are intact. No abnormal pigmentation of the lips. The neck is supple. Cardiovascular: Regular rate and rhythm. Respiratory:  Comfortable breathing with no accessory muscle use. GI:  Abdomen nondistended, soft, and nontender.  No enlargement of the liver or spleen. No masses palpable. Rectal:  Deferred  Musculoskeletal: Extremities have good range of motion. Neurological:  Gross memory appears intact. Patient is alert and oriented. Psychiatric:  Mood appears appropriate with judgement intact. Lymphatic:  No visible adenopathy      Lab/Data Review:  Recent Results (from the past 24 hour(s))   GLUCOSE, POC    Collection Time: 02/05/23  4:27 PM   Result Value Ref Range    Glucose (POC) 188 (H) 65 - 100 mg/dL    Performed by Erica Troy    GLUCOSE, POC    Collection Time: 02/05/23  7:48 PM   Result Value Ref Range    Glucose (POC) 212 (H) 65 - 100 mg/dL    Performed by [address] [place] [address], POC    Collection Time: 02/06/23  7:33 AM   Result Value Ref Range    Glucose (POC) 240 (H) 65 - 100 mg/dL    Performed by Zara Rosario, POC    Collection Time: 02/06/23 11:32 AM   Result Value Ref Range    Glucose (POC) 300 (H) 65 - 100 mg/dL    Performed by Roger Pennington, POC    Collection Time: 02/06/23 12:05 PM   Result Value Ref Range    Glucose (POC) 297 (H) 65 - 100 mg/dL    Performed by Migdalia Bond    CBC WITH AUTOMATED DIFF    Collection Time: 02/06/23 12:21 PM   Result Value Ref Range    WBC 10.8 3.6 - 11.0 K/uL    RBC 2.72 (L) 3.80 - 5.20 M/uL    HGB 8.5 (L) 11.5 - 16.0 g/dL    HCT 26.5 (L) 35.0 - 47.0 %    MCV 97.4 80.0 - 99.0 FL    MCH 31.3 26.0 - 34.0 PG    MCHC 32.1 30.0 - 36.5 g/dL    RDW 15.0 (H) 11.5 - 14.5 %    PLATELET 836 247 - 726 K/uL    MPV 11.3 8.9 - 12.9 FL    NRBC 0.0 0.0  WBC    ABSOLUTE NRBC 0.00 0.00 - 0.01 K/uL    NEUTROPHILS 84 (H) 32 - 75 %    LYMPHOCYTES 7 (L) 12 - 49 %    MONOCYTES 6 5 - 13 %    EOSINOPHILS 2 0 - 7 %    BASOPHILS 1 0 - 1 %    IMMATURE GRANULOCYTES 0 0 - 0.5 %    ABS. NEUTROPHILS 9.0 (H) 1.8 - 8.0 K/UL    ABS. LYMPHOCYTES 0.8 0.8 - 3.5 K/UL    ABS. MONOCYTES 0.7 0.0 - 1.0 K/UL    ABS. EOSINOPHILS 0.3 0.0 - 0.4 K/UL    ABS. BASOPHILS 0.1 0.0 - 0.1 K/UL    ABS. IMM. GRANS. 0.0 0.00 - 0.04 K/UL    DF AUTOMATED                CT ABD PELV WO CONT   Final Result      1. Abnormal wall thickening/inflammation of the pyloric channel. This may   represent peptic ulcer disease. No evidence of perforation. 2. Evidence of right hemicolectomy. 3. Umbilical hernia containing a loop of small bowel. No evidence of bowel   obstruction or perforation. 4. Cholelithiasis. No biliary ductal dilatation. 5. Chronic moderate left pleural effusion. 6. Nonobstructing right renal stone. Assessment:      Active Problems:    Hypertension ()       Hyperlipidemia (2/1/2023)       Abdominal pain (2/1/2023)       GI bleed (2/1/2023)       Hypothyroid (2/1/2023)        Gastrointestinal bleeding due to a gastric ulcer  Abdominal pain is improving  Plan:   Continue proton pump inhibitor therapy. Monitor hemoglobin repeat upper endoscopy in 3 months time to ensure healing of the ulcer. Continue pain medication management. Thank you for allowing me to participate in this patients care       Signed By: Dannielle Lara     February 6, 2023          *ATTENTION:  This note has been created by a medical student for educational purposes only. Please do not refer to the content of this note for clinical decision-making, billing, or other purposes. Please see attending physicians note to obtain clinical information on this patient. *

## 2023-02-06 NOTE — PROGRESS NOTES
PHYSICAL THERAPY EVALUATION  Patient: Grace Hendrix (66 y.o. female)  Date: 2/6/2023  Primary Diagnosis: Abdominal pain [R10.9]  GI bleed [K92.2]  Hypertension [I10]  Hyperlipidemia [E78.5]  Hypothyroid [E03.9]  Procedure(s) (LRB):  ESOPHAGOGASTRODUODENOSCOPY (EGD) (N/A)  ESOPHAGOGASTRODUODENAL (EGD) BIOPSY (N/A) 3 Days Post-Op   Precautions: falls    In place during session: Peripheral IV and External Catheter  ASSESSMENT  Pt is a [de-identified] y.o. female admitted on 2/1/2023 for sig abdominal pain; pt currently being treated for HTN, HLD, abdominal pain, GI bleed, hypothyroidism. Pt semi-supine in bed drowsy but easily aroused upon PT arrival, agreeable to evaluation. Pt A&O x person, and time but required increased time for place and situation. Pt brother and daughter present in room throughout session with permission from pt received. Based on the objective data described, the patient presents with generalized weakness, impaired functional mobility, impaired amb, impaired balance, and decreased activity tolerance. (See below for objective details and assist levels). Overall pt tolerated session fair today with c/o 8-9/10 right hip pain with mobility, states only relieving factor is rest with ice application. Pt amb bed>bathroom>bedside recliner with RW, gt belt, and CGA to min A with max verbal cues for sequencing and safety using RW. Pt declined to sit on elevated toilet frame due to pain in right hip, instead hovering over seat for toileting. Pt education regarding importance of mobility given, pt reluctant reporting laying down with ice and not hurting is better then hurting all day. Pt will benefit from continued skilled PT to address above deficits and return to PLOF. Current PT DC recommendation Dakota Reece once medically appropriate.     Current Level of Function Impacting Discharge (mobility/balance): CGA to min A    Other factors to consider for discharge: severity of deficits, acute medical state, pain with mobility, limited desire and motivation      PLAN :  Recommendations and Planned Interventions: bed mobility training, transfer training, gait training, therapeutic exercises, patient and family training/education, and therapeutic activities      Recommend for staff: Out of bed to chair for meals and Amb to bathroom for toileting with gt belt and AD    Frequency/Duration: Patient will be followed by physical therapy:  3-5x/week to address goals. Recommendation for discharge: (in order for the patient to meet his/her long term goals)  Dakota Reece    This discharge recommendation:  Has been made in collaboration with the attending provider and/or case management    IF patient discharges home will need the following DME: to be determined (TBD)         SUBJECTIVE:   Patient stated it hurts all the time when I move.     OBJECTIVE DATA SUMMARY:   HISTORY:    Past Medical History:   Diagnosis Date    Cancer ([facility] [address]     lymph nodes    Depression     DM (diabetes mellitus) ([facility] [address]     Hyperlipidemia     Hypertension     Pancreatitis     Thyroid disease      Past Surgical History:   Procedure Laterality Date    HX AORTIC VALVE REPLACEMENT  08/2021    HX HYSTERECTOMY      HX LAPAROTOMY  04/01/2022        HX PARTIAL COLECTOMY Right 04/01/2022        IR FLUORO GUIDE PLC CVAD  5/10/2021    IR INSERT NON TUNL CVC OVER 5 YRS  5/10/2021    IR INSERT TUNL CVC W PORT OVER 5 YEARS  5/10/2022    IR PLACE CVAD FLUORO GUIDE  5/10/2022    DE INS NEW/RPLCMT PRM PM W/TRANSV ELTRD ATRIAL&VENT N/A 5/11/2021    INSERT PPM BIV MULTI performed by Kajal Smith MD at [facility]: Private residence  # Steps to Enter: 0 (Ramp)  Wheelchair Ramp: Yes  One/Two Story Residence: Two story, live on 1st floor  Living Alone: Yes  Support Systems: Other Family Member(s)  Patient Expects to be Discharged to[de-identified] Home  Current DME Used/Available at Home: Angel Abraham rollator, Shower chair, Grab bars    EXAMINATION/PRESENTATION/DECISION MAKING:   Critical Behavior:  Neurologic State: Alert, Drowsy  Orientation Level: Oriented X4  Cognition: Follows commands     Hearing: Auditory  Auditory Impairment: Hard of hearing, right side, Hearing aid(s)  Skin:  intact where visible  Edema: none noted   Range Of Motion:  AROM: Generally decreased, functional                       Strength:    Strength: Generally decreased, functional                    Tone & Sensation:                  Sensation: Impaired (B feet)    Functional Mobility:  Bed Mobility:     Supine to Sit: Minimum assistance        Transfers:  Sit to Stand: Contact guard assistance  Stand to Sit: Contact guard assistance        Bed to Chair: Minimum assistance (for sequencing)              Balance:   Sitting: Intact; Without support  Standing: Impaired; With support  Standing - Static: Fair;Constant support  Standing - Dynamic : Fair;Constant support  Ambulation/Gait Training:  Distance (ft): 40 Feet (ft) (bed>bathroom>bedside recliner)  Assistive Device: Gait belt;Walker, rolling  Ambulation - Level of Assistance: Contact guard assistance;Minimal assistance; Additional time (max verbal cues for sequencing with RW)     Gait Description (WDL): Exceptions to [facility]           Base of Support: Widened;Shift to left     Speed/Roya: Shuffled; Slow    Therapeutic Exercises:   Pt would benefit from LE HEP to improve overall LE AROM/strength and can be further educated in next treatment session. Functional Measure:  [employer] AM-[employer]AC[employer] 6 Clicks         Basic Mobility Inpatient Short Form  How much difficulty does the patient currently have. .. Unable A Lot A Little None   1. Turning over in bed (including adjusting bedclothes, sheets and blankets)? [] 1   [x] 2   [] 3   [] 4   2. Sitting down on and standing up from a chair with arms ( e.g., wheelchair, bedside commode, etc.)   [] 1   [x] 2   [] 3   [] 4   3.   Moving from lying on back to sitting on the side of the bed? [] 1   [x] 2   [] 3   [] 4          How much help from another person does the patient currently need. .. Total A Lot A Little None   4. Moving to and from a bed to a chair (including a wheelchair)? [] 1   [] 2   [x] 3   [] 4   5. Need to walk in hospital room? [] 1   [] 2   [x] 3   [] 4   6. Climbing 3-5 steps with a railing? [] 1   [x] 2   [] 3   [] 4   © , Trustees of [employer], under license to [employer]. All rights reserved     Score:  Initial:  Most Recent: X (Date: 2023 )   Interpretation of Tool:  Represents activities that are increasingly more difficult (i.e. Bed mobility, Transfers, Gait). Score 24 23 22-20 19-15 14-10 9-7 6   Modifier CH CI CJ CK CL CM CN         Physical Therapy Evaluation Charge Determination   History Examination Presentation Decision-Making   HIGH Complexity :3+ comorbidities / personal factors will impact the outcome/ POC  HIGH Complexity : 4+ Standardized tests and measures addressing body structure, function, activity limitation and / or participation in recreation  MEDIUM Complexity : Evolving with changing characteristics  Other outcome measures ampac 6  mod      Based on the above components, the patient evaluation is determined to be of the following complexity level: MEDIUM    Pain Ratin-9/10 right hip/groin    Activity Tolerance:   Fair and requires frequent rest breaks    After treatment patient left in no apparent distress:   Bed locked and in lowest position Sitting in chair, Call bell within reach, and Caregiver / family present and nsg updated. GOALS:    Problem: Mobility Impaired (Adult and Pediatric)  Goal: *Acute Goals and Plan of Care (Insert Text)  Description: FUNCTIONAL STATUS PRIOR TO ADMISSION: Pt daughter reports pt was mod with rollator, I with ADLS, however, over the past 3 weeks has not been amb or getting out of bed much due to sig right hip pain.      HOME SUPPORT PRIOR TO ADMISSION: The patient lived alone with several family members to provide assistance. Physical Therapy Goals  Initiated 2/6/2023  Pt stated goal: for right hip to stop hurting  Pt will be I with LE HEP in 7 days. Pt will perform bed mobility with mod I in 7 days. Pt will perform transfers with mod I in 7 days. Pt will amb  feet with LRAD safely with mod I in 7 days. Outcome: Not Met       COMMUNICATION/EDUCATION:   The patients plan of care was discussed with: Occupational therapist, Registered nurse, and Case management. Fall prevention education was provided and the patient/caregiver indicated understanding., Patient/family have participated as able in goal setting and plan of care. , and Patient/family agree to work toward stated goals and plan of care. PT/OT sessions occurred together for increased safety of pt and clinician.        Thank you for this referral.  Gilford Bunting, PT, DPT   Time Calculation: 33 mins

## 2023-02-06 NOTE — PROGRESS NOTES
Problem: Falls - Risk of  Goal: *Absence of Falls  Description: Document [place] Fall Risk and appropriate interventions in the flowsheet. Outcome: Progressing Towards Goal  Note: Fall Risk Interventions:  Mobility Interventions: Bed/chair exit alarm         Medication Interventions: Bed/chair exit alarm    Elimination Interventions: Bed/chair exit alarm, Call light in reach              Problem: Patient Education: Go to Patient Education Activity  Goal: Patient/Family Education  Outcome: Progressing Towards Goal     Problem: Pressure Injury - Risk of  Goal: *Prevention of pressure injury  Description: Document Delta Scale and appropriate interventions in the flowsheet.   Outcome: Progressing Towards Goal  Note: Pressure Injury Interventions:  Sensory Interventions: Assess changes in LOC    Moisture Interventions: Absorbent underpads    Activity Interventions: PT/OT evaluation    Mobility Interventions: Float heels, HOB 30 degrees or less    Nutrition Interventions: Document food/fluid/supplement intake    Friction and Shear Interventions: HOB 30 degrees or less                Problem: Patient Education: Go to Patient Education Activity  Goal: Patient/Family Education  Outcome: Progressing Towards Goal

## 2023-02-06 NOTE — PROGRESS NOTES
OCCUPATIONAL THERAPY EVALUATION  Patient: Radha Rg (72 y.o. female)  Date: 2/6/2023  Primary Diagnosis: Abdominal pain [R10.9]  GI bleed [K92.2]  Hypertension [I10]  Hyperlipidemia [E78.5]  Hypothyroid [E03.9]  Procedure(s) (LRB):  ESOPHAGOGASTRODUODENOSCOPY (EGD) (N/A)  ESOPHAGOGASTRODUODENAL (EGD) BIOPSY (N/A) 3 Days Post-Op   Precautions: fall risk     In place during session: Peripheral IV, External Catheter, and EKG/telemetry     ASSESSMENT  Pt is a [de-identified] y.o. female presenting to [facility] with c/o significant abdominal pain, admitted 2/1 and currently being treated for abdominal pain, GI bleed, and HTN. Pt is s/p EGD; POD #3. Pt received semi-supine in bed upon arrival w/ family in room (remained w/ permission), AXO x2 (increased time/cues req'd for place and situation), and agreeable to OT evaluation. Pt initially drowsy in supine but increased alertness noted at EOB. Based on current observations, pt presents with deficits in generalized strength/AROM, bed mobility, static/dynamic sitting balance, static/dynamic standing balance (see PT note for gait details), functional activity tolerance, and pain currently impacting overall performance of ADLs and functional transfers/mobility (see below for objective details and assist levels). Overall, pt tolerates session fair with 7-8/10 pain in R hip w/ movement and 0/10 pain in supine w/ ice donned. She req'd min A for sup>sit transfer; A to bring UB into upright position. She req'd CGA/min A for OOB mobility using RW; cues for sequencing while using RW (to use BUE to off set pain in RLE while moving). Pt req'd CGA for standing balance while completing anterior pericare w/ L hand (difficultly w/ R 2' pain in hip and need to rotate toward that side). Pt declined ADLs at this time. Pt would benefit from continued skilled OT services to address current impairments and improve IND and safety with self cares and functional transfers/mobility.  Current OT d/c recommendation Dakota Reece once medically appropriate to maximize safety/IND prior to discharging home alone. Other factors to consider for discharge: family/social support, DME, time since onset, severity of deficits, functional baseline     Patient will benefit from skilled therapy intervention to address the above noted impairments. PLAN :  Recommendations and Planned Interventions: self care training, functional mobility training, therapeutic exercise, balance training, therapeutic activities, endurance activities, and patient education    Recommend with staff: Amb to bathroom for toileting with gt belt and AD    Recommend next session: LB dressing , LB bathing, and Standing grooming    Frequency/Duration: Patient will be followed by occupational therapy:  3-5x/week to address goals. Recommendation for discharge: (in order for the patient to meet his/her long term goals)  Dakota Reece    This discharge recommendation:  Has been made in collaboration with the attending provider and/or case management    IF patient discharges home will need the following DME: TBD at next placement       SUBJECTIVE:   Patient stated When the pain hits me it hurts.     OBJECTIVE DATA SUMMARY:   HISTORY:   Past Medical History:   Diagnosis Date    Cancer ([facility]     lymph nodes    Depression     DM (diabetes mellitus) ([facility]     Hyperlipidemia     Hypertension     Pancreatitis     Thyroid disease      Past Surgical History:   Procedure Laterality Date    HX AORTIC VALVE REPLACEMENT  08/2021    HX HYSTERECTOMY      HX LAPAROTOMY  04/01/2022        HX PARTIAL COLECTOMY Right 04/01/2022        IR FLUORO GUIDE PLC CVAD  5/10/2021    IR INSERT NON TUNL CVC OVER 5 YRS  5/10/2021    IR INSERT TUNL CVC W PORT OVER 5 YEARS  5/10/2022    IR PLACE CVAD FLUORO GUIDE  5/10/2022    [place] NEW/RPLCMT PRM PM W/TRANSV ELTRD ATRIAL&VENT N/A 5/11/2021    INSERT PPM BIV MULTI performed by Jose Ac Dior MD at [address] [place] [address]       Per pt:   Home Situation  Home Environment: Private residence  # Steps to Enter: 0 (Ramp)  Wheelchair Ramp: Yes  One/Two Story Residence: Two story, live on 1st floor  Living Alone: Yes  Support Systems: Other Family Member(s)  Patient Expects to be Discharged to[de-identified] Home  Current DME Used/Available at Home: Deangelo Alstrom, rollator, Shower chair, Grab bars    Hand dominance: Right    EXAMINATION OF PERFORMANCE DEFICITS:  Cognitive/Behavioral Status:  Neurologic State: Alert;Drowsy  Orientation Level: Oriented to person;Oriented to time  Cognition: Follows commands               Hearing: Auditory  Auditory Impairment: Hard of hearing, right side, Hearing aid(s)      Range of Motion:  AROM: Generally decreased, functional                         Strength:  Strength: Generally decreased, functional                Coordination:     Fine Motor Skills-Upper: Left Intact; Right Intact    Gross Motor Skills-Upper: Left Intact; Right Intact    Tone & Sensation:     Sensation: Impaired (B feet)                      Balance:  Sitting: Intact; Without support  Standing: Impaired; With support  Standing - Static: Fair;Constant support  Standing - Dynamic : Fair;Constant support    Functional Mobility and Transfers for ADLs:  Bed Mobility:  Supine to Sit: Minimum assistance    Transfers:  Sit to Stand: Contact guard assistance  Stand to Sit: Contact guard assistance  Bed to Chair: Minimum assistance (for sequencing)  Bathroom Mobility: Contact guard assistance  Toilet Transfer : Contact guard assistance (BSC overtop)  Assistive Device : Walker, rolling      ADL Intervention and task modifications: Toileting  Bladder Hygiene: Contact guard assistance (for standing balance; utiized L hand)         Therapeutic Exercise:  Pt will benefit from BUE HEP to improve participation in ADLs and mobility. Plan will be initiated at next session.        Functional Measure:    [facility] \"6 Clicks\"                                                       Daily Activity Inpatient Short Form  How much help from another person does the patient currently need. .. Total; A Lot A Little None   1. Putting on and taking off regular lower body clothing? []  1 [x]  2 []  3 []  4   2. Bathing (including washing, rinsing, drying)? []  1 [x]  2 []  3 []  4   3. Toileting, which includes using toilet, bedpan or urinal? [] 1 []  2 [x]  3 []  4   4. Putting on and taking off regular upper body clothing? []  1 []  2 [x]  3 []  4   5. Taking care of personal grooming such as brushing teeth? []  1 []  2 [x]  3 []  4   6. Eating meals? []  1 []  2 []  3 [x]  4   © 2007, Trustees of [employer], under license to [employer]. All rights reserved     Score: 17/24     Interpretation of Tool:  Represents clinically-significant functional categories (i.e. Activities of daily living). Percentage of Impairment CH    0%   CI    1-19% CJ    20-39% CK    40-59% CL    60-79% CM    80-99% CN     100%   [facility]  Score 6-24 24 23 20-22 15-19 10-14 7-9 6     Occupational Therapy Evaluation Charge Determination   History Examination Decision-Making   LOW Complexity : Brief history review  LOW Complexity : 1-3 performance deficits relating to physical, cognitive , or psychosocial skils that result in activity limitations and / or participation restrictions  MEDIUM Complexity : Patient may present with comorbidities that affect occupational performnce.  Miniml to moderate modification of tasks or assistance (eg, physical or verbal ) with assesment(s) is necessary to enable patient to complete evaluation       Based on the above components, the patient evaluation is determined to be of the following complexity level: LOW   Pain Rating:  See note above    Activity Tolerance:   Fair and requires rest breaks    After treatment patient left in no apparent distress:    Sitting in chair, Call bell within reach, and Caregiver / family present    COMMUNICATION/EDUCATION:   The patients plan of care was discussed with: Physical therapist and Registered nurse. Patient/family have participated as able in goal setting and plan of care. and Patient/family agree to work toward stated goals and plan of care. This patients plan of care is appropriate for delegation to [facility] [place]. PT/OT sessions occurred together for increased safety of pt and clinician. Thank you for this referral.  Silas Kc OT  Time Calculation: 29 mins   Problem: Self Care Deficits Care Plan (Adult)  Goal: *Acute Goals and Plan of Care (Insert Text)  Description: FUNCTIONAL STATUS PRIOR TO ADMISSION: Pt utilized rollator for ambulation. She reports she is mostly IND w/ ADLs and bathing; per family, pt is not taking showers. HOME SUPPORT: Pt lives alone and has some help during the day.     Occupational Therapy Goals  Initiated 2/6/2023    Pt stated goal I want to get better  Pt will be IND sup <> sit in prep for EOB ADLs  Pt will be Mod I grooming standing sink side LRAD  Pt will be IND UB dressing sitting EOB/long sit   Pt will be Mod I LE dressing sitting EOB/long sit  Pt will be Mod I sit <>  prep for toileting LRAD  Pt will be Mod I toileting/toilet transfer/cloth mgmt LRAD  Pt will be IND following UE HEP in prep for self care tasks   Outcome: Not Met

## 2023-02-07 VITALS
BODY MASS INDEX: 32.41 KG/M2 | DIASTOLIC BLOOD PRESSURE: 65 MMHG | RESPIRATION RATE: 18 BRPM | HEART RATE: 81 BPM | OXYGEN SATURATION: 97 % | WEIGHT: 171.52 LBS | TEMPERATURE: 99 F | SYSTOLIC BLOOD PRESSURE: 142 MMHG

## 2023-02-07 LAB
GLUCOSE BLD STRIP.AUTO-MCNC: 219 MG/DL (ref 65–100)
GLUCOSE BLD STRIP.AUTO-MCNC: 246 MG/DL (ref 65–100)
PERFORMED BY, TECHID: ABNORMAL
PERFORMED BY, TECHID: ABNORMAL
SARS-COV-2 RDRP RESP QL NAA+PROBE: NOT DETECTED

## 2023-02-07 PROCEDURE — 74011000250 HC RX REV CODE- 250: Performed by: COLON & RECTAL SURGERY

## 2023-02-07 PROCEDURE — 74011250637 HC RX REV CODE- 250/637: Performed by: COLON & RECTAL SURGERY

## 2023-02-07 PROCEDURE — 99239 HOSP IP/OBS DSCHRG MGMT >30: CPT | Performed by: COLON & RECTAL SURGERY

## 2023-02-07 PROCEDURE — 87635 SARS-COV-2 COVID-19 AMP PRB: CPT

## 2023-02-07 PROCEDURE — 74011636637 HC RX REV CODE- 636/637: Performed by: COLON & RECTAL SURGERY

## 2023-02-07 PROCEDURE — 82962 GLUCOSE BLOOD TEST: CPT

## 2023-02-07 PROCEDURE — 74011250636 HC RX REV CODE- 250/636: Performed by: COLON & RECTAL SURGERY

## 2023-02-07 RX ORDER — HEPARIN 100 UNIT/ML
300 SYRINGE INTRAVENOUS AS NEEDED
Status: DISCONTINUED | OUTPATIENT
Start: 2023-02-07 | End: 2023-02-07 | Stop reason: HOSPADM

## 2023-02-07 RX ORDER — PANTOPRAZOLE SODIUM 40 MG/1
40 TABLET, DELAYED RELEASE ORAL DAILY
Qty: 30 TABLET | Refills: 2 | Status: SHIPPED | OUTPATIENT
Start: 2023-02-07 | End: 2023-05-08

## 2023-02-07 RX ORDER — OXYCODONE HYDROCHLORIDE 5 MG/1
5 TABLET ORAL
Qty: 20 TABLET | Refills: 0 | Status: SHIPPED | OUTPATIENT
Start: 2023-02-07 | End: 2023-02-14

## 2023-02-07 RX ADMIN — METOPROLOL SUCCINATE 25 MG: 50 TABLET, EXTENDED RELEASE ORAL at 10:27

## 2023-02-07 RX ADMIN — METHYLPREDNISOLONE SODIUM SUCCINATE 30 MG: 40 INJECTION, POWDER, FOR SOLUTION INTRAMUSCULAR; INTRAVENOUS at 16:59

## 2023-02-07 RX ADMIN — DULOXETINE HYDROCHLORIDE 30 MG: 30 CAPSULE, DELAYED RELEASE ORAL at 10:27

## 2023-02-07 RX ADMIN — PANTOPRAZOLE SODIUM 40 MG: 40 TABLET, DELAYED RELEASE ORAL at 10:27

## 2023-02-07 RX ADMIN — HYDROMORPHONE HYDROCHLORIDE 1 MG: 1 INJECTION, SOLUTION INTRAMUSCULAR; INTRAVENOUS; SUBCUTANEOUS at 10:27

## 2023-02-07 RX ADMIN — INSULIN LISPRO 6 UNITS: 100 INJECTION, SOLUTION INTRAVENOUS; SUBCUTANEOUS at 08:52

## 2023-02-07 RX ADMIN — FUROSEMIDE 40 MG: 40 TABLET ORAL at 10:27

## 2023-02-07 RX ADMIN — SODIUM CHLORIDE 75 ML/HR: 9 INJECTION, SOLUTION INTRAVENOUS at 01:58

## 2023-02-07 RX ADMIN — Medication 300 UNITS: at 18:46

## 2023-02-07 RX ADMIN — INSULIN LISPRO 6 UNITS: 100 INJECTION, SOLUTION INTRAVENOUS; SUBCUTANEOUS at 12:48

## 2023-02-07 RX ADMIN — LEVOTHYROXINE SODIUM 150 MCG: 0.07 TABLET ORAL at 05:45

## 2023-02-07 NOTE — PROGRESS NOTES
[facility]. Insurance authorization submitted this am to [facility] fax [phone].  informed daughter Lawyer Hess and patient. Insurance authorization received from [facility]. Ref# [id number]. Plan Authorization # [id number]. Authorized 2/7-2/9/2023. Review on 2/9/2023. Facility to fax documents to [phone] [place] informed via [facility]. Patient will discharge to [address] [place] [address] and Rehab today. She will go to [facility]. Nurse to call report to [phone]. Family will arrive between 5:30-6 pm to transport to facility. Primary nurse is aware. Medicare pt has received, reviewed, and signed 2nd IM letter informing them of their right to appeal the discharge. Signed copied has been placed on pt bedside chart. . Discharge plan of care/case management plan validated with provider discharge order.

## 2023-02-07 NOTE — PROGRESS NOTES
Progress Note    Patient: Radha Rg MRN: [id number]  SSN: [id number]    YOB: 1940  Age: [de-identified] y.o. Sex: female      Admit Date: 2/1/2023    LOS: 5 days     Subjective:   Hospital day 6  Patient seen in bed continues to complain of significant right hip pain which impairs her getting up and ambulating and moving around  Denies any intra-abdominal pain is tolerating oral intake with no nausea vomiting  Hemoglobin has been stable since transfusion    Objective:     Vitals:    02/06/23 0800 02/06/23 0813 02/06/23 0941 02/06/23 1708   BP:  (!) 140/58 (!) 141/57 (!) 173/69   Pulse: 88 72 89 81   Resp:  18  18   Temp:  98.3 °F (36.8 °C)  98.2 °F (36.8 °C)   SpO2:  96%  98%   Weight:            Intake and Output:  Current Shift: No intake/output data recorded. Last three shifts: 02/05 0701 - 02/06 1900  In: -   Out: 2300 [Urine:2300]    Review of Systems:  ROS     Physical Exam:   Abdomen is soft, nontender, nondistended.   Tender to palpation over right hip    Lab/Data Review:  Recent Results (from the past 24 hour(s))   GLUCOSE, POC    Collection Time: 02/05/23  7:48 PM   Result Value Ref Range    Glucose (POC) 212 (H) 65 - 100 mg/dL    Performed by [address] [place] [address], POC    Collection Time: 02/06/23  7:33 AM   Result Value Ref Range    Glucose (POC) 240 (H) 65 - 100 mg/dL    Performed by Azam Kaur, POC    Collection Time: 02/06/23 11:32 AM   Result Value Ref Range    Glucose (POC) 300 (H) 65 - 100 mg/dL    Performed by Rj Cuellar, POC    Collection Time: 02/06/23 12:05 PM   Result Value Ref Range    Glucose (POC) 297 (H) 65 - 100 mg/dL    Performed by Darylene Keeling    CBC WITH AUTOMATED DIFF    Collection Time: 02/06/23 12:21 PM   Result Value Ref Range    WBC 10.8 3.6 - 11.0 K/uL    RBC 2.72 (L) 3.80 - 5.20 M/uL    HGB 8.5 (L) 11.5 - 16.0 g/dL    HCT 26.5 (L) 35.0 - 47.0 %    MCV 97.4 80.0 - 99.0 FL    MCH 31.3 26.0 - 34.0 PG    MCHC 32.1 30.0 - 36.5 g/dL RDW 15.0 (H) 11.5 - 14.5 %    PLATELET 748 275 - 677 K/uL    MPV 11.3 8.9 - 12.9 FL    NRBC 0.0 0.0  WBC    ABSOLUTE NRBC 0.00 0.00 - 0.01 K/uL    NEUTROPHILS 84 (H) 32 - 75 %    LYMPHOCYTES 7 (L) 12 - 49 %    MONOCYTES 6 5 - 13 %    EOSINOPHILS 2 0 - 7 %    BASOPHILS 1 0 - 1 %    IMMATURE GRANULOCYTES 0 0 - 0.5 %    ABS. NEUTROPHILS 9.0 (H) 1.8 - 8.0 K/UL    ABS. LYMPHOCYTES 0.8 0.8 - 3.5 K/UL    ABS. MONOCYTES 0.7 0.0 - 1.0 K/UL    ABS. EOSINOPHILS 0.3 0.0 - 0.4 K/UL    ABS. BASOPHILS 0.1 0.0 - 0.1 K/UL    ABS. IMM. GRANS. 0.0 0.00 - 0.04 K/UL    DF AUTOMATED     GLUCOSE, POC    Collection Time: 02/06/23  4:31 PM   Result Value Ref Range    Glucose (POC) 223 (H) 65 - 100 mg/dL    Performed by Bella Dickens           Assessment:     Active Problems:    Hypertension ()      Hyperlipidemia (2/1/2023)      Abdominal pain (2/1/2023)      GI bleed (2/1/2023)      Hypothyroid (2/1/2023)        Plan: We will consult orthopedic surgery to see the patient, patient apparently was seen as an outpatient: Orthopedics she has done well by which physician. She states the question was raised of bursitis.   We will reconsult for further recommendations  Continue physical therapy  Case management for SNF placement  Continue diet as ordered    Signed By: Gill Cardona MD     February 6, 2023

## 2023-02-07 NOTE — DISCHARGE SUMMARY
Admit date: 2/1/2023   Admitting Provider: Roman Wang MD    Discharge date: 2/7/2023  Discharging Provider: Roman Wang MD      * Admission Diagnoses: Abdominal pain [R10.9];GI bleed [K92.2]; Hypertension [I10]; Hyperlipidemia [E78.5]; Hypothyroid [E03.9]    * Discharge Diagnoses: Abdominal pain, upper GI bleed, gastric ulcer, right hip pain, hypertension, hyperlipidemia, hypothyroidism  Hospital Problems as of 2/7/2023 Date Reviewed: 2/1/2023            Codes Class Noted - Resolved POA    Hyperlipidemia ICD-10-CM: E78.5  ICD-9-CM: 272.4  2/1/2023 - Present Unknown        Abdominal pain ICD-10-CM: R10.9  ICD-9-CM: 789.00  2/1/2023 - Present Unknown        GI bleed ICD-10-CM: K92.2  ICD-9-CM: 578.9  2/1/2023 - Present Unknown        Hypothyroid ICD-10-CM: E03.9  ICD-9-CM: 244.9  2/1/2023 - Present Unknown        Hypertension ICD-10-CM: I10  ICD-9-CM: 401.9  Unknown - Present Unknown           * Hospital Course: Patient is a 71-year-old female who had previously undergone a extended right hemicolectomy for adenocarcinoma of the ascending colon in April 2022. She presents the office complaining of weakness and right hip pain as well as right lower abdominal pain. She was direct admitted to the hospital and found on blood work to be anemic and was transfused 2 units of blood. Gastroenterology performed an EGD which showed a gastric ulcer. This was nonbleeding. Hemoglobin has been stable. She was started on a proton pump inhibitor. In terms of her abdominal pain she had a CT scan of the abdomen pelvis which failed to show any intra-abdominal pathology. She continues to have right hip pain. She was started on a lidocaine patch. Orthopedic surgery was consulted and felt this is a chronic condition and did not require any acute intervention in the hospital.  She was to be discharged to follow-up with orthopedics in 1 to 2 weeks.     * Procedures:  Procedure(s):  ESOPHAGOGASTRODUODENOSCOPY (EGD)  ESOPHAGOGASTRODUODENAL (EGD) BIOPSY      Consults: Orthopedic Surgery    Significant Diagnostic Studies: CT scan abdomen pelvis    Discharge Exam:  Physical Exam  Constitutional:       General: She is not in acute distress. Appearance: Normal appearance. She is obese. She is not ill-appearing. HENT:      Head: Normocephalic and atraumatic. Cardiovascular:      Rate and Rhythm: Normal rate and regular rhythm. Pulmonary:      Effort: Pulmonary effort is normal.   Abdominal:      General: There is no distension. Palpations: Abdomen is soft. Tenderness: There is no abdominal tenderness. Comments: Well-healed surgical incision   Musculoskeletal:         General: Normal range of motion. Cervical back: Normal range of motion and neck supple. Legs:       Comments: Tender palpation right hip   Skin:     General: Skin is warm and dry. Neurological:      General: No focal deficit present. Mental Status: She is alert and oriented to person, place, and time. Psychiatric:         Mood and Affect: Mood normal.         Thought Content: Thought content normal.         Judgment: Judgment normal.        * Discharge Condition: good  * Disposition: [facility])    Discharge Medications:  Current Discharge Medication List        START taking these medications    Details   pantoprazole (Protonix) 40 mg tablet Take 1 Tablet by mouth daily for 90 days. Qty: 30 Tablet, Refills: 2  Start date: 2/7/2023, End date: 5/8/2023      oxyCODONE IR (ROXICODONE) 5 mg immediate release tablet Take 1 Tablet by mouth every six (6) hours as needed for Pain for up to 7 days. Max Daily Amount: 20 mg.  Qty: 20 Tablet, Refills: 0  Start date: 2/7/2023, End date: 2/14/2023    Associated Diagnoses: Right lower quadrant abdominal pain           CONTINUE these medications which have NOT CHANGED    Details   furosemide (LASIX) 40 mg tablet Take 40 mg by mouth daily.       lidocaine (Lidoderm) 5 % Apply patch to the affected area for 12 hours a day and remove for 12 hours a day. Qty: 3 Each, Refills: 0      hydrOXYzine pamoate (VISTARIL) 25 mg capsule Take 25 mg by mouth nightly. DULoxetine (CYMBALTA) 30 mg capsule Take 30 mg by mouth daily. aspirin delayed-release 81 mg tablet Take 81 mg by mouth daily. acetaminophen (TYLENOL) 325 mg tablet Take 325 mg by mouth as needed for Pain. cyanocobalamin 1,000 mcg tablet Take 1,000 mcg by mouth daily. insulin glargine (Lantus U-100 Insulin) 100 unit/mL injection 40 unit daily  Qty: 1 Vial, Refills: 1      ferrous sulfate 325 mg (65 mg iron) tablet Take 1 Tablet by mouth two (2) times daily (with meals). Qty: 60 Tablet, Refills: 0      metoprolol succinate (TOPROL-XL) 25 mg XL tablet Take 1 Tablet by mouth daily. Qty: 30 Tablet, Refills: 0      levothyroxine (SYNTHROID) 150 mcg tablet Take 150 mcg by mouth daily (before breakfast). Associated Diagnoses: DM [facility]diabetes mellitus[facility] ([facility] Type II or unspecified type diabetes mellitus without mention of complication, uncontrolled; HTN (hypertension); Hyperlipidemia      lovastatin (MEVACOR) 40 mg tablet Take 40 mg by mouth nightly. Associated Diagnoses: DM (diabetes mellitus) ([facility] Type II or unspecified type diabetes mellitus without mention of complication, uncontrolled; HTN (hypertension); Hyperlipidemia      albuterol (PROVENTIL HFA, VENTOLIN HFA, PROAIR HFA) 90 mcg/actuation inhaler Take 2 Puffs by inhalation every six (6) hours as needed for Wheezing or Shortness of Breath. Qty: 18 g, Refills: 0      diphenhydrAMINE (BENADRYL) 25 mg capsule Take 25 mg by mouth as needed. STOP taking these medications       clopidogreL (PLAVIX) 75 mg tab Comments:   Reason for Stopping:               * Follow-up Care/Patient Instructions:   Activity: Activity as tolerated  Diet: Regular Diet  Wound Care: None needed    Follow-up Information       Follow up With Specialties Details Why Contact Info    Indy Vargas MD Colon and Rectal Surgery Follow up in 2 week(s)  [address] [facility]      Dm Dunn MD Orthopedic Surgery Follow up in 2 week(s)  [facility]  [facility] [address] [facility] [id number] [phone]      David Dent MD [facility]   [facility] [address]  [id number] [phone]              Signed:  Danielle Perez MD  2/7/2023  2:43 PM

## 2023-02-07 NOTE — PROGRESS NOTES
Problem: Falls - Risk of  Goal: *Absence of Falls  Description: Document Eudelia Romberg Fall Risk and appropriate interventions in the flowsheet. Outcome: Progressing Towards Goal  Note: Fall Risk Interventions:  Mobility Interventions: Bed/chair exit alarm, Patient to call before getting OOB         Medication Interventions: Patient to call before getting OOB, Bed/chair exit alarm    Elimination Interventions: Bed/chair exit alarm, Call light in reach              Problem: Patient Education: Go to Patient Education Activity  Goal: Patient/Family Education  Outcome: Progressing Towards Goal     Problem: Pressure Injury - Risk of  Goal: *Prevention of pressure injury  Description: Document Delta Scale and appropriate interventions in the flowsheet.   Outcome: Progressing Towards Goal  Note: Pressure Injury Interventions:  Sensory Interventions: Assess changes in LOC, Discuss PT/OT consult with provider, Minimize linen layers    Moisture Interventions: Absorbent underpads, Check for incontinence Q2 hours and as needed, Internal/External urinary devices, Minimize layers    Activity Interventions: PT/OT evaluation, Increase time out of bed    Mobility Interventions: HOB 30 degrees or less, PT/OT evaluation    Nutrition Interventions: Offer support with meals,snacks and hydration, Document food/fluid/supplement intake    Friction and Shear Interventions: Apply protective barrier, creams and emollients, HOB 30 degrees or less, Minimize layers                Problem: Patient Education: Go to Patient Education Activity  Goal: Patient/Family Education  Outcome: Progressing Towards Goal     Problem: Patient Education: Go to Patient Education Activity  Goal: Patient/Family Education  Outcome: Progressing Towards Goal     Problem: Patient Education: Go to Patient Education Activity  Goal: Patient/Family Education  Outcome: Progressing Towards Goal

## 2023-02-07 NOTE — PROGRESS NOTES
Progress Note    Patient: Sharad Garcia MRN: [id number]  SSN: [id number]    YOB: 1940  Age: [de-identified] y.o. Sex: female      Admit Date: 2/1/2023    LOS: 5 days     Subjective:     Chief Complaint: Low hemoglobin     History of Present Illness: Sharad Garcia is a [de-identified] y.o. female who is seen in consultation for suspected upper GI bleed. Patient was admitted to the hospital with low hemoglobin. She has previous history of colon cancer and resection she complained of pain in the right side in the flank area. She recently went for a CAT scan that report is not available as yet. Patient is very hard of hearing and a poor historian most of the historical data was gathered from patient's chart that was reviewed and interviewing the nursing staff. A consult was placed for gastroenterology for possible upper GI bleed there is some history of coffee-ground emesis. 2/4/23:  Patient is comfortable denies any abdominal pain or any overt bleeding yesterday's hemoglobin was 8.5    ------------------    Patient is seen and examined in [facility]. Patient complains of severe right-sided abdominal pain despite pain medication. Today's labs: stable Hgb (8.5)      Objective:     Vitals:    02/06/23 0813 02/06/23 0941 02/06/23 1708 02/06/23 2042   BP: (!) 140/58 (!) 141/57 (!) 173/69 (!) 152/82   Pulse: 72 89 81 (!) 105   Resp: 18  18 18   Temp: 98.3 °F (36.8 °C)  98.2 °F (36.8 °C) 97.9 °F (36.6 °C)   SpO2: 96%  98% 98%   Weight:            Intake and Output:  Current Shift: 02/06 1901 - 02/07 0700  In: -   Out: 750 [Urine:750]  Last three shifts: 02/05 0701 - 02/06 1900  In: -   Out: 2300 [Urine:2300]    Physical Exam:   Skin:  Extremities and face reveal no rashes. No whyte erythema. HEENT: Sclerae anicteric. Extra-occular muscles are intact. No abnormal pigmentation of the lips. The neck is supple. Cardiovascular: Regular rate and rhythm. Respiratory:  Comfortable breathing with no accessory muscle use.    GI: Abdomen nondistended, soft, and nontender. No enlargement of the liver or spleen. No masses palpable. Rectal:  Deferred  Musculoskeletal: Extremities have good range of motion. Neurological:  Gross memory appears intact. Patient is alert and oriented. Psychiatric:  Mood appears appropriate with judgement intact. Lymphatic:  No visible adenopathy      Lab/Data Review:  Recent Results (from the past 24 hour(s))   GLUCOSE, POC    Collection Time: 02/06/23  7:33 AM   Result Value Ref Range    Glucose (POC) 240 (H) 65 - 100 mg/dL    Performed by Keon Yung, POC    Collection Time: 02/06/23 11:32 AM   Result Value Ref Range    Glucose (POC) 300 (H) 65 - 100 mg/dL    Performed by Rober Munroe, POC    Collection Time: 02/06/23 12:05 PM   Result Value Ref Range    Glucose (POC) 297 (H) 65 - 100 mg/dL    Performed by Carlos Kan    CBC WITH AUTOMATED DIFF    Collection Time: 02/06/23 12:21 PM   Result Value Ref Range    WBC 10.8 3.6 - 11.0 K/uL    RBC 2.72 (L) 3.80 - 5.20 M/uL    HGB 8.5 (L) 11.5 - 16.0 g/dL    HCT 26.5 (L) 35.0 - 47.0 %    MCV 97.4 80.0 - 99.0 FL    MCH 31.3 26.0 - 34.0 PG    MCHC 32.1 30.0 - 36.5 g/dL    RDW 15.0 (H) 11.5 - 14.5 %    PLATELET 739 182 - 638 K/uL    MPV 11.3 8.9 - 12.9 FL    NRBC 0.0 0.0  WBC    ABSOLUTE NRBC 0.00 0.00 - 0.01 K/uL    NEUTROPHILS 84 (H) 32 - 75 %    LYMPHOCYTES 7 (L) 12 - 49 %    MONOCYTES 6 5 - 13 %    EOSINOPHILS 2 0 - 7 %    BASOPHILS 1 0 - 1 %    IMMATURE GRANULOCYTES 0 0 - 0.5 %    ABS. NEUTROPHILS 9.0 (H) 1.8 - 8.0 K/UL    ABS. LYMPHOCYTES 0.8 0.8 - 3.5 K/UL    ABS. MONOCYTES 0.7 0.0 - 1.0 K/UL    ABS. EOSINOPHILS 0.3 0.0 - 0.4 K/UL    ABS. BASOPHILS 0.1 0.0 - 0.1 K/UL    ABS. IMM.  GRANS. 0.0 0.00 - 0.04 K/UL    DF AUTOMATED     GLUCOSE, POC    Collection Time: 02/06/23  4:31 PM   Result Value Ref Range    Glucose (POC) 223 (H) 65 - 100 mg/dL    Performed by Keon Yung, POC    Collection Time: 02/06/23  8:45 PM Result Value Ref Range    Glucose (POC) 147 (H) 65 - 100 mg/dL    Performed by Evan Call               CT ABD PELV WO CONT   Final Result      1. Abnormal wall thickening/inflammation of the pyloric channel. This may   represent peptic ulcer disease. No evidence of perforation. 2. Evidence of right hemicolectomy. 3. Umbilical hernia containing a loop of small bowel. No evidence of bowel   obstruction or perforation. 4. Cholelithiasis. No biliary ductal dilatation. 5. Chronic moderate left pleural effusion. 6. Nonobstructing right renal stone. Assessment:      Active Problems:    Hypertension ()       Hyperlipidemia (2/1/2023)       Abdominal pain (2/1/2023)       GI bleed (2/1/2023)       Hypothyroid (2/1/2023)        Gastrointestinal bleeding due to a gastric ulcer  Abdominal pain is improving  Plan:   Continue proton pump inhibitor therapy. Monitor hemoglobin repeat upper endoscopy in 3 months time to ensure healing of the ulcer.    Pain management per primary team     Thank you for allowing me to participate in this patients care       Signed By: Eleazar Holland MD     February 6, 2023

## 2023-02-07 NOTE — CONSULTS
ORTHOPEDIC CONSULT    Patient: Sid Mendoza MRN: [id number]  SSN: [id number]    YOB: 1940  Age: [de-identified] y.o. Sex: female      Subjective:      Sid Mendoza is a [de-identified] y.o. female who is being seen in orthopedic consultation for right hip pain. The patient denies any history of injury or trauma to her right hip. She denies any recent falls. The patient states the pain is exacerbated with ambulating. She has minimal pain at rest.  She states she does get occasional tingling of the lower extremities because she has a history of peripheral neuropathy. She denies any groin pain. She denies any right knee or right ankle pain. The patient does have history of colon cancer and has undergone recent chemotherapy and was admitted to the hospital for abdominal pain. Denies any other musculoskeletal complaints  Past Medical History:   Diagnosis Date    Cancer ([facility] [address]     lymph nodes    Depression     DM (diabetes mellitus) ([facility] [address]     Hyperlipidemia     Hypertension     Pancreatitis     Thyroid disease      Past Surgical History:   Procedure Laterality Date    HX AORTIC VALVE REPLACEMENT  08/2021    HX HYSTERECTOMY      HX LAPAROTOMY  04/01/2022        HX PARTIAL COLECTOMY Right 04/01/2022        IR FLUORO GUIDE PLC CVAD  5/10/2021    IR INSERT NON TUNL CVC OVER 5 YRS  5/10/2021    IR INSERT TUNL CVC W PORT OVER 5 YEARS  5/10/2022    IR PLACE CVAD FLUORO GUIDE  5/10/2022    OH INS NEW/RPLCMT PRM PM W/TRANSV ELTRD ATRIAL&VENT N/A 5/11/2021    INSERT PPM BIV MULTI performed by Arturo Kathleen MD at [address] [place] [address]      Family History   Problem Relation Age of Onset    Diabetes Mother      Social History     Tobacco Use    Smoking status: Never    Smokeless tobacco: Never   Substance Use Topics    Alcohol use: No      Prior to Admission medications    Medication Sig Start Date End Date Taking? Authorizing Provider   furosemide (LASIX) 40 mg tablet Take 40 mg by mouth daily.  12/12/22  Yes Provider, Historical   lidocaine (Lidoderm) 5 % Apply patch to the affected area for 12 hours a day and remove for 12 hours a day. 1/22/23  Yes Nehal Lee MD   hydrOXYzine pamoate (VISTARIL) 25 mg capsule Take 25 mg by mouth nightly. Yes Provider, Historical   DULoxetine (CYMBALTA) 30 mg capsule Take 30 mg by mouth daily. Yes Provider, Historical   clopidogreL (PLAVIX) 75 mg tab Take 75 mg by mouth daily. Yes Provider, Historical   aspirin delayed-release 81 mg tablet Take 81 mg by mouth daily. Yes Provider, Historical   acetaminophen (TYLENOL) 325 mg tablet Take 325 mg by mouth as needed for Pain. Yes Provider, Historical   cyanocobalamin 1,000 mcg tablet Take 1,000 mcg by mouth daily. Yes Provider, Historical   insulin glargine (Lantus U-100 Insulin) 100 unit/mL injection 40 unit daily  Patient taking differently: 20 Units by SubCUTAneous route daily. 40 unit daily 5/19/21  Yes Javad Cheung MD   ferrous sulfate 325 mg (65 mg iron) tablet Take 1 Tablet by mouth two (2) times daily (with meals). 5/19/21  Yes Javad Cheung MD   metoprolol succinate (TOPROL-XL) 25 mg XL tablet Take 1 Tablet by mouth daily. 5/20/21  Yes Javad Cheung MD   levothyroxine (SYNTHROID) 150 mcg tablet Take 150 mcg by mouth daily (before breakfast). 10/11/10  Yes Provider, Historical   lovastatin (MEVACOR) 40 mg tablet Take 40 mg by mouth nightly. 10/11/10  Yes Provider, Historical   albuterol (PROVENTIL HFA, VENTOLIN HFA, PROAIR HFA) 90 mcg/actuation inhaler Take 2 Puffs by inhalation every six (6) hours as needed for Wheezing or Shortness of Breath. 4/8/22   Paz Galloway PA-C   diphenhydrAMINE (BENADRYL) 25 mg capsule Take 25 mg by mouth as needed.     Provider, Historical       Allergies   Allergen Reactions    Nortriptyline Itching    Contrast Agent [Iodine] Unknown (comments)    Cymbalta [Duloxetine] Itching    Ivp [Fd And C Blue No.1] Hives    Morphine Itching    Tetracycline Itching       Review of Systems:  Review of Systems   Constitutional: Negative. HENT: Negative. Eyes: Negative. Respiratory: Negative. Cardiovascular: Negative. Gastrointestinal:  Positive for abdominal pain. Genitourinary: Negative. Musculoskeletal:  Positive for joint pain. Right-sided flank/hip pain   Skin: Negative. Neurological: Negative. Endo/Heme/Allergies: Negative. Psychiatric/Behavioral: Negative.          Objective:     Current Facility-Administered Medications   Medication Dose Route Frequency    pantoprazole (PROTONIX) tablet 40 mg  40 mg Oral ACB    lidocaine 4 % patch 1 Patch  1 Patch TransDERmal Q24H    oxyCODONE-acetaminophen (PERCOCET) 5-325 mg per tablet 2 Tablet  2 Tablet Oral Q4H PRN    0.9% sodium chloride infusion 250 mL  250 mL IntraVENous PRN    HYDROmorphone (DILAUDID) syringe 0.5 mg  0.5 mg IntraVENous Q4H PRN    HYDROmorphone (DILAUDID) injection 1 mg  1 mg IntraVENous Q4H PRN    ondansetron (ZOFRAN) injection 4 mg  4 mg IntraVENous Q6H PRN    albuterol (PROVENTIL HFA, VENTOLIN HFA, PROAIR HFA) inhaler 2 Puff  2 Puff Inhalation Q6H PRN    [Held by provider] aspirin delayed-release tablet 81 mg  81 mg Oral DAILY    DULoxetine (CYMBALTA) capsule 30 mg  30 mg Oral DAILY    furosemide (LASIX) tablet 40 mg  40 mg Oral DAILY    hydrOXYzine pamoate (VISTARIL) capsule 25 mg  25 mg Oral QHS    levothyroxine (SYNTHROID) tablet 150 mcg  150 mcg Oral ACB    atorvastatin (LIPITOR) tablet 10 mg  10 mg Oral QHS    metoprolol succinate (TOPROL-XL) XL tablet 25 mg  25 mg Oral DAILY    0.9% sodium chloride infusion  75 mL/hr IntraVENous CONTINUOUS    insulin lispro (HUMALOG) injection   SubCUTAneous AC&HS    glucose chewable tablet 16 g  4 Tablet Oral PRN    glucagon (GLUCAGEN) injection 1 mg  1 mg IntraMUSCular PRN    dextrose 10% infusion 0-250 mL  0-250 mL IntraVENous PRN      Vitals:    02/06/23 2042 02/07/23 0742 02/07/23 0800 02/07/23 1200   BP: (!) 152/82 (!) 162/68     Pulse: (!) 105 78 79 79   Resp: 18 18     Temp: 97.9 °F (36.6 °C) 97.4 °F (36.3 °C)     SpO2: 98% 99%     Weight:            Alert and oriented x3, No apparent distress    Physical Exam:  Right hip/lower extremity: Leg lengths were equal.  No malrotation seen. Full range of motion of her right hip without tenderness. There was point tenderness to palpation along the ASIS and iliac crest.  Lidocaine patch was in place. No erythema ecchymosis in this area no induration to palpation in this area. There was no tenderness to internal/external rotation of the right hip. She is able to straight leg raise past 70 degrees without discomfort. No tenderness to abduction of her right hip. There was mild tenderness along the iliac crest to abduction of her right hip. Full range of motion of right knee and ankle without tenderness. Strength right lower extremity 5 out of 5. No calf pain to palpation. DP/PT pulse are palpable. EHL/DF/PF is 5 out of 5. Negative Homans. Right lower extremity neurovascular intact. Labs:  CBC:  Recent Labs     02/06/23  1221 02/05/23  0509 02/04/23  1636   WBC 10.8 8.8 8.8   RBC 2.72* 2.67* 2.66*   HGB 8.5* 8.4* 8.4*   HCT 26.5* 26.4* 25.8*   MCV 97.4 98.9 97.0   RDW 15.0* 15.9* 15.8*    176 174     CHEMISTRIES:  Recent Labs     02/04/23  1636      K 3.6      CO2 28   BUN 16   CREA 1.02   CA 7.6*   PT/INR:No results for input(s): INR, INREXT in the last 72 hours. No lab exists for component: PROTIME  APTT:No results for input(s): APTT in the last 72 hours. LIVER PROFILE:  Recent Labs     02/04/23  1636   AST 19   ALT 16       IMAGING:  X-rays taken on 1/22/2023 of her right hip and pelvis showed mild arthritic changes of her bilateral hips. Mild osteopenia seen. No acute fracture seen at that time. CT scan taken of abdomen pelvis on 2/1/2023 showed no acute fracture throughout the pelvis. Again mild degenerative changes seen in bilateral hips.   Assessment/Plan: Hospital Problems  Date Reviewed: 2/1/2023            Codes Class Noted POA    Hyperlipidemia ICD-10-CM: E78.5  ICD-9-CM: 272.4  2/1/2023 Unknown        Abdominal pain ICD-10-CM: R10.9  ICD-9-CM: 789.00  2/1/2023 Unknown        GI bleed ICD-10-CM: K92.2  ICD-9-CM: 578.9  2/1/2023 Unknown        Hypothyroid ICD-10-CM: E03.9  ICD-9-CM: 244.9  2/1/2023 Unknown        Hypertension ICD-10-CM: I10  ICD-9-CM: 401.9  Unknown Unknown         Right hip/right flank pain    No acute orthopedic surgical intervention needed at this time. I did discuss conservative management consisting of oral pain management. Because of this patient history of gastric ulcer and possible GI bleed would not recommend anti-inflammatories at this time. I will order a stress dose of a steroid for her flank pain see if this will help. She can be discharged on oral tapering dose. She can continue to ambulate weightbearing as tolerated. Okay to discharge patient from orthopedics when cleared by surgery team and placement has been made. Patient can follow-up with Dr. Jamal Ramos or orthopedic surgeon of choice for her right hip/flank pain. This patient was examined in direct consult with Dr. Jamal Ramos. Thank you for the courtesy of this consult.     Signed By: Natalie Robison PA-C     February 7, 2023

## 2023-02-07 NOTE — PROGRESS NOTES
Bedside shift change report given to [facility] LPN (oncoming nurse) by Octaviano Jackson RN (offgoing nurse). Report included the following information SBAR, Kardex, Intake/Output, and MAR.

## 2023-02-07 NOTE — PROGRESS NOTES
[facility] this nurse called report to nurse at [facility] rehab patient taken out of hospital VIA wheelchair and taken to rehab center VIA personal vehicle

## 2023-02-08 NOTE — PROGRESS NOTES
Right port flushed with 10 cc NS good blood return. 3cc Heparin placed in line before koch needle removed. Bandaid placed over site. Patient taken down via wheelchair by [employer].

## 2023-03-16 ENCOUNTER — TELEPHONE (OUTPATIENT)
Dept: UROLOGY | Age: 83
End: 2023-03-16

## 2023-03-16 NOTE — TELEPHONE ENCOUNTER
Spoke with [facility] and scheduled appt for pt with Dr. Pal Smith

## 2023-03-16 NOTE — TELEPHONE ENCOUNTER
Rachell Kendrick  from Dr. Chico Bal [facility] needed refer a new pt for kidney stones .  Pt was seen at [facility] the end of Feb . Had CT done

## 2023-03-22 ENCOUNTER — OFFICE VISIT (OUTPATIENT)
Dept: SURGERY | Age: 83
End: 2023-03-22

## 2023-03-22 VITALS
DIASTOLIC BLOOD PRESSURE: 65 MMHG | BODY MASS INDEX: 29.92 KG/M2 | WEIGHT: 158.5 LBS | RESPIRATION RATE: 18 BRPM | SYSTOLIC BLOOD PRESSURE: 142 MMHG | HEIGHT: 61 IN | HEART RATE: 98 BPM | OXYGEN SATURATION: 98 % | TEMPERATURE: 98.1 F

## 2023-03-22 DIAGNOSIS — K25.4 GASTROINTESTINAL HEMORRHAGE ASSOCIATED WITH GASTRIC ULCER: Primary | ICD-10-CM

## 2023-04-20 ENCOUNTER — OFFICE VISIT (OUTPATIENT)
Dept: UROLOGY | Age: 83
End: 2023-04-20

## 2023-04-20 VITALS
HEART RATE: 79 BPM | BODY MASS INDEX: 29.27 KG/M2 | WEIGHT: 155 LBS | HEIGHT: 61 IN | DIASTOLIC BLOOD PRESSURE: 68 MMHG | OXYGEN SATURATION: 98 % | SYSTOLIC BLOOD PRESSURE: 157 MMHG | RESPIRATION RATE: 16 BRPM | TEMPERATURE: 97.5 F

## 2023-04-20 DIAGNOSIS — N20.0 KIDNEY STONES: Primary | ICD-10-CM

## 2023-04-20 DIAGNOSIS — N20.0 RENAL STONES: ICD-10-CM

## 2023-04-20 DIAGNOSIS — K42.9 UMBILICAL HERNIA WITHOUT OBSTRUCTION AND WITHOUT GANGRENE: ICD-10-CM

## 2023-04-20 DIAGNOSIS — N39.498 OTHER URINARY INCONTINENCE: ICD-10-CM

## 2023-04-20 DIAGNOSIS — R35.0 FREQUENCY OF MICTURITION: ICD-10-CM

## 2023-04-20 DIAGNOSIS — N95.2 POST-MENOPAUSE ATROPHIC VAGINITIS: ICD-10-CM

## 2023-04-20 LAB
BILIRUB UR QL: NEGATIVE
GLUCOSE UR-MCNC: NEGATIVE MG/DL
KETONES P FAST UR STRIP-MCNC: NEGATIVE MG/DL
PH UR STRIP: 6.5 [PH] (ref 4.6–8)
PROT UR QL STRIP: NEGATIVE
PVR POC: 0 CC
SP GR UR STRIP: 1.01 (ref 1–1.03)
UA UROBILINOGEN AMB POC: NORMAL (ref 0.2–1)
URINALYSIS CLARITY POC: CLEAR
URINALYSIS COLOR POC: YELLOW
URINE BLOOD POC: NEGATIVE
URINE LEUKOCYTES POC: NEGATIVE
URINE NITRITES POC: NEGATIVE

## 2023-04-20 RX ORDER — INSULIN GLARGINE 100 [IU]/ML
INJECTION, SOLUTION SUBCUTANEOUS
COMMUNITY
Start: 2023-04-03 | End: 2023-04-20 | Stop reason: SDUPTHER

## 2023-04-20 RX ORDER — AMLODIPINE BESYLATE 5 MG/1
5 TABLET ORAL DAILY
COMMUNITY
Start: 2023-04-11

## 2023-04-20 RX ORDER — BLOOD SUGAR DIAGNOSTIC
STRIP MISCELLANEOUS
COMMUNITY
Start: 2023-04-11

## 2023-04-20 RX ORDER — INSULIN ASPART 100 [IU]/ML
INJECTION, SUSPENSION SUBCUTANEOUS
COMMUNITY
Start: 2023-04-12

## 2023-04-20 RX ORDER — PREDNISONE 20 MG/1
20 TABLET ORAL 2 TIMES DAILY
COMMUNITY
Start: 2023-01-24

## 2023-04-20 RX ORDER — PEN NEEDLE, DIABETIC 32GX 5/32"
NEEDLE, DISPOSABLE MISCELLANEOUS
COMMUNITY
Start: 2023-04-12

## 2023-04-20 RX ORDER — ESTRADIOL 0.1 MG/G
CREAM VAGINAL
Qty: 42.5 G | Refills: 3 | Status: SHIPPED | OUTPATIENT
Start: 2023-04-20

## 2023-04-20 RX ORDER — MELOXICAM 15 MG/1
15 TABLET ORAL DAILY
COMMUNITY
Start: 2023-01-24

## 2023-04-20 RX ORDER — SUCRALFATE 1 G/1
TABLET ORAL
COMMUNITY
Start: 2023-02-28

## 2023-04-20 RX ORDER — TRAMADOL HYDROCHLORIDE 50 MG/1
TABLET ORAL
COMMUNITY
Start: 2023-02-24

## 2023-04-20 RX ORDER — ACETAMINOPHEN AND CODEINE PHOSPHATE 300; 30 MG/1; MG/1
TABLET ORAL
COMMUNITY
Start: 2023-01-30

## 2023-04-20 RX ORDER — DICYCLOMINE HYDROCHLORIDE 10 MG/1
CAPSULE ORAL
COMMUNITY
Start: 2023-04-10

## 2023-04-30 PROBLEM — N20.0 KIDNEY STONES: Status: ACTIVE | Noted: 2023-04-20

## 2023-04-30 PROBLEM — R32 ABSENCE OF BLADDER CONTINENCE: Status: ACTIVE | Noted: 2023-04-30

## 2023-04-30 PROBLEM — R35.0 FREQUENCY OF MICTURITION: Status: ACTIVE | Noted: 2023-04-30

## 2023-04-30 PROBLEM — N95.2 POST-MENOPAUSE ATROPHIC VAGINITIS: Status: ACTIVE | Noted: 2023-04-30

## 2023-05-03 ENCOUNTER — TRANSCRIBE ORDER (OUTPATIENT)
Dept: SCHEDULING | Age: 83
End: 2023-05-03

## 2023-05-03 DIAGNOSIS — C18.2 MALIGNANT NEOPLASM OF ASCENDING COLON (HCC): Primary | ICD-10-CM

## 2023-05-09 DIAGNOSIS — C18.2 MALIGNANT NEOPLASM OF ASCENDING COLON (HCC): Primary | ICD-10-CM

## 2023-05-13 ENCOUNTER — TRANSCRIBE ORDERS (OUTPATIENT)
Facility: HOSPITAL | Age: 83
End: 2023-05-13

## 2023-05-13 DIAGNOSIS — C18.2 MALIGNANT NEOPLASM OF ASCENDING COLON (HCC): Primary | ICD-10-CM

## 2023-06-26 ENCOUNTER — HOSPITAL ENCOUNTER (OUTPATIENT)
Facility: HOSPITAL | Age: 83
Discharge: HOME | End: 2023-06-29
Attending: INTERNAL MEDICINE
Payer: MEDICARE

## 2023-06-26 DIAGNOSIS — C18.2 MALIGNANT NEOPLASM OF ASCENDING COLON (HCC): ICD-10-CM

## 2023-06-26 PROCEDURE — A9552 F18 FDG: HCPCS | Performed by: INTERNAL MEDICINE

## 2023-06-26 PROCEDURE — 3430000000 HC RX DIAGNOSTIC RADIOPHARMACEUTICAL: Performed by: INTERNAL MEDICINE

## 2023-06-26 PROCEDURE — 3430000000 HC RX DIAGNOSTIC RADIOPHARMACEUTICAL

## 2023-06-26 PROCEDURE — 78815 PET IMAGE W/CT SKULL-THIGH: CPT

## 2023-06-26 PROCEDURE — A9552 F18 FDG: HCPCS

## 2023-06-26 RX ORDER — FLUDEOXYGLUCOSE F-18 500 MCI/ML
10.8 INJECTION INTRAVENOUS
Status: COMPLETED | OUTPATIENT
Start: 2023-06-26 | End: 2023-06-26

## 2023-06-26 RX ADMIN — FLUDEOXYGLUCOSE F-18 10.8 MILLICURIE: 500 INJECTION INTRAVENOUS at 14:20

## 2023-06-29 ENCOUNTER — OFFICE VISIT (OUTPATIENT)
Age: 83
End: 2023-06-29
Payer: MEDICARE

## 2023-06-29 ENCOUNTER — OFFICE VISIT (OUTPATIENT)
Age: 83
End: 2023-06-29

## 2023-06-29 VITALS
WEIGHT: 157 LBS | TEMPERATURE: 97.1 F | OXYGEN SATURATION: 98 % | BODY MASS INDEX: 29.64 KG/M2 | HEART RATE: 82 BPM | DIASTOLIC BLOOD PRESSURE: 67 MMHG | SYSTOLIC BLOOD PRESSURE: 153 MMHG | HEIGHT: 61 IN | RESPIRATION RATE: 16 BRPM

## 2023-06-29 VITALS
OXYGEN SATURATION: 97 % | RESPIRATION RATE: 15 BRPM | WEIGHT: 159 LBS | SYSTOLIC BLOOD PRESSURE: 129 MMHG | BODY MASS INDEX: 30.02 KG/M2 | HEIGHT: 61 IN | HEART RATE: 80 BPM | DIASTOLIC BLOOD PRESSURE: 72 MMHG

## 2023-06-29 DIAGNOSIS — N95.2 POSTMENOPAUSAL ATROPHIC VAGINITIS: ICD-10-CM

## 2023-06-29 DIAGNOSIS — K42.9 UMBILICAL HERNIA WITHOUT OBSTRUCTION AND WITHOUT GANGRENE: Primary | ICD-10-CM

## 2023-06-29 DIAGNOSIS — C18.2 MALIGNANT NEOPLASM OF ASCENDING COLON (HCC): Primary | ICD-10-CM

## 2023-06-29 DIAGNOSIS — R35.1 NOCTURIA MORE THAN TWICE PER NIGHT: ICD-10-CM

## 2023-06-29 DIAGNOSIS — N20.0 KIDNEY STONES: ICD-10-CM

## 2023-06-29 DIAGNOSIS — R35.0 FREQUENCY OF MICTURITION: ICD-10-CM

## 2023-06-29 PROBLEM — Z95.0 PRESENCE OF CARDIAC PACEMAKER: Status: ACTIVE | Noted: 2021-05-19

## 2023-06-29 PROBLEM — Z79.4 LONG TERM (CURRENT) USE OF INSULIN (HCC): Status: ACTIVE | Noted: 2021-01-01

## 2023-06-29 PROBLEM — I25.10 ATHSCL HEART DISEASE OF NATIVE CORONARY ARTERY W/O ANG PCTRS: Status: ACTIVE | Noted: 2021-01-01

## 2023-06-29 PROBLEM — I35.0 NONRHEUMATIC AORTIC (VALVE) STENOSIS: Status: ACTIVE | Noted: 2021-05-19

## 2023-06-29 PROBLEM — M48.061 SPINAL STENOSIS OF LUMBAR REGION: Status: ACTIVE | Noted: 2023-06-29

## 2023-06-29 PROBLEM — M51.26 DISPLACEMENT OF LUMBAR INTERVERTEBRAL DISC WITHOUT MYELOPATHY: Status: ACTIVE | Noted: 2023-06-29

## 2023-06-29 PROBLEM — F32.9 MAJOR DEPRESSIVE DISORDER, SINGLE EPISODE, UNSPECIFIED: Status: ACTIVE | Noted: 2021-01-01

## 2023-06-29 PROBLEM — G57.10 MERALGIA PARESTHETICA: Status: ACTIVE | Noted: 2023-06-29

## 2023-06-29 PROBLEM — M17.9 OSTEOARTHRITIS OF KNEE: Status: ACTIVE | Noted: 2023-06-29

## 2023-06-29 PROBLEM — K21.9 GASTRO-ESOPHAGEAL REFLUX DISEASE WITHOUT ESOPHAGITIS: Status: ACTIVE | Noted: 2021-01-01

## 2023-06-29 PROBLEM — I44.2 ATRIOVENTRICULAR BLOCK, COMPLETE (HCC): Status: ACTIVE | Noted: 2021-05-16

## 2023-06-29 PROBLEM — M16.9 OSTEOARTHRITIS OF HIP: Status: ACTIVE | Noted: 2023-06-29

## 2023-06-29 PROBLEM — G89.4 CHRONIC PAIN DISORDER: Status: ACTIVE | Noted: 2023-06-29

## 2023-06-29 PROBLEM — M54.17 LUMBOSACRAL RADICULITIS: Status: ACTIVE | Noted: 2023-06-29

## 2023-06-29 PROBLEM — M47.816 LUMBAR SPONDYLOSIS: Status: ACTIVE | Noted: 2023-06-29

## 2023-06-29 PROBLEM — E11.9 TYPE 2 DIABETES MELLITUS WITHOUT COMPLICATIONS (HCC): Status: ACTIVE | Noted: 2021-01-01

## 2023-06-29 PROBLEM — F41.9 ANXIETY DISORDER, UNSPECIFIED: Status: ACTIVE | Noted: 2021-05-21

## 2023-06-29 PROBLEM — Z95.5 PRESENCE OF CORONARY ANGIOPLASTY IMPLANT AND GRAFT: Status: ACTIVE | Noted: 2021-05-19

## 2023-06-29 PROBLEM — D63.8 ANEMIA IN OTHER CHRONIC DISEASES CLASSIFIED ELSEWHERE: Status: ACTIVE | Noted: 2021-01-01

## 2023-06-29 LAB
BILIRUBIN, URINE, POC: NEGATIVE
BLOOD URINE, POC: NEGATIVE
GLUCOSE URINE, POC: 250
KETONES, URINE, POC: NEGATIVE
LEUKOCYTE ESTERASE, URINE, POC: NEGATIVE
NITRITE, URINE, POC: NEGATIVE
PH, URINE, POC: 5 (ref 4.6–8)
PROTEIN,URINE, POC: NORMAL
PVR, POC: NORMAL CC
SPECIFIC GRAVITY, URINE, POC: 1.03 (ref 1–1.03)
URINALYSIS CLARITY, POC: CLEAR
URINALYSIS COLOR, POC: YELLOW
UROBILINOGEN, POC: NORMAL

## 2023-06-29 PROCEDURE — G8417 CALC BMI ABV UP PARAM F/U: HCPCS | Performed by: COLON & RECTAL SURGERY

## 2023-06-29 PROCEDURE — G8427 DOCREV CUR MEDS BY ELIG CLIN: HCPCS | Performed by: COLON & RECTAL SURGERY

## 2023-06-29 PROCEDURE — 1090F PRES/ABSN URINE INCON ASSESS: CPT | Performed by: COLON & RECTAL SURGERY

## 2023-06-29 PROCEDURE — 1036F TOBACCO NON-USER: CPT | Performed by: COLON & RECTAL SURGERY

## 2023-06-29 PROCEDURE — 3078F DIAST BP <80 MM HG: CPT | Performed by: COLON & RECTAL SURGERY

## 2023-06-29 PROCEDURE — 99213 OFFICE O/P EST LOW 20 MIN: CPT | Performed by: COLON & RECTAL SURGERY

## 2023-06-29 PROCEDURE — 3074F SYST BP LT 130 MM HG: CPT | Performed by: COLON & RECTAL SURGERY

## 2023-06-29 PROCEDURE — 1123F ACP DISCUSS/DSCN MKR DOCD: CPT | Performed by: COLON & RECTAL SURGERY

## 2023-06-29 PROCEDURE — G8400 PT W/DXA NO RESULTS DOC: HCPCS | Performed by: COLON & RECTAL SURGERY

## 2023-06-29 RX ORDER — PEN NEEDLE, DIABETIC 32GX 5/32"
NEEDLE, DISPOSABLE MISCELLANEOUS
COMMUNITY
Start: 2023-04-12

## 2023-06-29 RX ORDER — ALPRAZOLAM 0.5 MG/1
TABLET ORAL
COMMUNITY

## 2023-06-29 RX ORDER — INSULIN GLARGINE 100 [IU]/ML
INJECTION, SOLUTION SUBCUTANEOUS
COMMUNITY
Start: 2023-06-19

## 2023-06-29 RX ORDER — AMLODIPINE BESYLATE 5 MG/1
TABLET ORAL
COMMUNITY
Start: 2023-06-27

## 2023-06-29 RX ORDER — BLOOD SUGAR DIAGNOSTIC
STRIP MISCELLANEOUS
COMMUNITY
Start: 2023-04-11

## 2023-06-29 RX ORDER — OXYBUTYNIN CHLORIDE 10 MG/1
10 TABLET, EXTENDED RELEASE ORAL
Qty: 30 TABLET | Refills: 2 | Status: SHIPPED | OUTPATIENT
Start: 2023-06-29

## 2023-06-29 RX ORDER — DICYCLOMINE HYDROCHLORIDE 10 MG/1
CAPSULE ORAL
COMMUNITY
Start: 2023-04-10

## 2023-06-29 RX ORDER — INSULIN ASPART 100 [IU]/ML
INJECTION, SUSPENSION SUBCUTANEOUS
COMMUNITY
Start: 2023-04-12

## 2023-06-29 RX ORDER — ESTRADIOL 0.1 MG/G
CREAM VAGINAL
COMMUNITY
Start: 2023-04-20

## 2023-06-30 LAB
ERYTHROCYTE [DISTWIDTH] IN BLOOD BY AUTOMATED COUNT: 13.3 % (ref 11.7–15.4)
HCT VFR BLD AUTO: 32.3 % (ref 34–46.6)
HGB BLD-MCNC: 10.2 G/DL (ref 11.1–15.9)
MCH RBC QN AUTO: 30.2 PG (ref 26.6–33)
MCHC RBC AUTO-ENTMCNC: 31.6 G/DL (ref 31.5–35.7)
MCV RBC AUTO: 96 FL (ref 79–97)
PLATELET # BLD AUTO: 306 X10E3/UL (ref 150–450)
RBC # BLD AUTO: 3.38 X10E6/UL (ref 3.77–5.28)
WBC # BLD AUTO: 7.6 X10E3/UL (ref 3.4–10.8)

## 2023-07-05 RX ORDER — POLYETHYLENE GLYCOL 3350, SODIUM CHLORIDE, POTASSIUM CHLORIDE, SODIUM BICARBONATE, AND SODIUM SULFATE 240; 5.84; 2.98; 6.72; 22.72 G/4L; G/4L; G/4L; G/4L; G/4L
4000 POWDER, FOR SOLUTION ORAL ONCE
Qty: 4000 ML | Refills: 0 | Status: SHIPPED | OUTPATIENT
Start: 2023-07-05 | End: 2023-07-05

## 2023-07-09 PROBLEM — C18.2 MALIGNANT NEOPLASM OF ASCENDING COLON (HCC): Status: ACTIVE | Noted: 2023-02-01

## 2023-07-17 NOTE — PRE-PROCEDURE INSTRUCTIONS
PAT completed with pt via telephone. Pt instructed to arrive at 0815. Procedural instructions gone over and pt verbalized understanding.

## 2023-07-18 ENCOUNTER — ANESTHESIA EVENT (OUTPATIENT)
Facility: HOSPITAL | Age: 83
End: 2023-07-18
Payer: MEDICARE

## 2023-07-18 ENCOUNTER — ANESTHESIA (OUTPATIENT)
Facility: HOSPITAL | Age: 83
End: 2023-07-18
Payer: MEDICARE

## 2023-07-18 ENCOUNTER — HOSPITAL ENCOUNTER (OUTPATIENT)
Facility: HOSPITAL | Age: 83
Setting detail: OUTPATIENT SURGERY
Discharge: HOME | End: 2023-07-18
Attending: COLON & RECTAL SURGERY | Admitting: COLON & RECTAL SURGERY
Payer: MEDICARE

## 2023-07-18 VITALS
RESPIRATION RATE: 18 BRPM | HEIGHT: 61 IN | TEMPERATURE: 98 F | OXYGEN SATURATION: 100 % | DIASTOLIC BLOOD PRESSURE: 68 MMHG | HEART RATE: 73 BPM | SYSTOLIC BLOOD PRESSURE: 153 MMHG | WEIGHT: 159 LBS | BODY MASS INDEX: 30.02 KG/M2

## 2023-07-18 DIAGNOSIS — Z85.038 PERSONAL HISTORY OF COLON CANCER: ICD-10-CM

## 2023-07-18 PROCEDURE — 7100000011 HC PHASE II RECOVERY - ADDTL 15 MIN: Performed by: COLON & RECTAL SURGERY

## 2023-07-18 PROCEDURE — 3600007502: Performed by: COLON & RECTAL SURGERY

## 2023-07-18 PROCEDURE — 2580000003 HC RX 258: Performed by: COLON & RECTAL SURGERY

## 2023-07-18 PROCEDURE — 2500000003 HC RX 250 WO HCPCS: Performed by: NURSE ANESTHETIST, CERTIFIED REGISTERED

## 2023-07-18 PROCEDURE — 6360000002 HC RX W HCPCS: Performed by: NURSE ANESTHETIST, CERTIFIED REGISTERED

## 2023-07-18 PROCEDURE — 88305 TISSUE EXAM BY PATHOLOGIST: CPT

## 2023-07-18 PROCEDURE — 7100000010 HC PHASE II RECOVERY - FIRST 15 MIN: Performed by: COLON & RECTAL SURGERY

## 2023-07-18 PROCEDURE — 3700000001 HC ADD 15 MINUTES (ANESTHESIA): Performed by: COLON & RECTAL SURGERY

## 2023-07-18 PROCEDURE — 3600007512: Performed by: COLON & RECTAL SURGERY

## 2023-07-18 PROCEDURE — 2709999900 HC NON-CHARGEABLE SUPPLY: Performed by: COLON & RECTAL SURGERY

## 2023-07-18 PROCEDURE — 3700000000 HC ANESTHESIA ATTENDED CARE: Performed by: COLON & RECTAL SURGERY

## 2023-07-18 RX ORDER — LIDOCAINE HYDROCHLORIDE 20 MG/ML
INJECTION, SOLUTION EPIDURAL; INFILTRATION; INTRACAUDAL; PERINEURAL PRN
Status: DISCONTINUED | OUTPATIENT
Start: 2023-07-18 | End: 2023-07-18 | Stop reason: SDUPTHER

## 2023-07-18 RX ORDER — SODIUM CHLORIDE, SODIUM LACTATE, POTASSIUM CHLORIDE, CALCIUM CHLORIDE 600; 310; 30; 20 MG/100ML; MG/100ML; MG/100ML; MG/100ML
INJECTION, SOLUTION INTRAVENOUS CONTINUOUS
Status: DISCONTINUED | OUTPATIENT
Start: 2023-07-18 | End: 2023-07-18 | Stop reason: HOSPADM

## 2023-07-18 RX ORDER — SODIUM CHLORIDE 9 MG/ML
INJECTION, SOLUTION INTRAVENOUS CONTINUOUS
Status: DISCONTINUED | OUTPATIENT
Start: 2023-07-18 | End: 2023-07-18 | Stop reason: HOSPADM

## 2023-07-18 RX ADMIN — PROPOFOL 20 MG: 10 INJECTION, EMULSION INTRAVENOUS at 09:50

## 2023-07-18 RX ADMIN — SODIUM CHLORIDE, POTASSIUM CHLORIDE, SODIUM LACTATE AND CALCIUM CHLORIDE: 600; 310; 30; 20 INJECTION, SOLUTION INTRAVENOUS at 09:41

## 2023-07-18 RX ADMIN — PROPOFOL 20 MG: 10 INJECTION, EMULSION INTRAVENOUS at 09:47

## 2023-07-18 RX ADMIN — PROPOFOL 20 MG: 10 INJECTION, EMULSION INTRAVENOUS at 09:57

## 2023-07-18 RX ADMIN — PROPOFOL 20 MG: 10 INJECTION, EMULSION INTRAVENOUS at 09:44

## 2023-07-18 RX ADMIN — PROPOFOL 20 MG: 10 INJECTION, EMULSION INTRAVENOUS at 10:06

## 2023-07-18 RX ADMIN — PROPOFOL 20 MG: 10 INJECTION, EMULSION INTRAVENOUS at 09:45

## 2023-07-18 RX ADMIN — PROPOFOL 20 MG: 10 INJECTION, EMULSION INTRAVENOUS at 09:53

## 2023-07-18 RX ADMIN — PROPOFOL 20 MG: 10 INJECTION, EMULSION INTRAVENOUS at 09:55

## 2023-07-18 RX ADMIN — PROPOFOL 20 MG: 10 INJECTION, EMULSION INTRAVENOUS at 10:03

## 2023-07-18 RX ADMIN — PROPOFOL 20 MG: 10 INJECTION, EMULSION INTRAVENOUS at 09:43

## 2023-07-18 RX ADMIN — PROPOFOL 20 MG: 10 INJECTION, EMULSION INTRAVENOUS at 10:00

## 2023-07-18 RX ADMIN — PROPOFOL 20 MG: 10 INJECTION, EMULSION INTRAVENOUS at 10:08

## 2023-07-18 RX ADMIN — LIDOCAINE HYDROCHLORIDE 40 MG: 20 INJECTION, SOLUTION EPIDURAL; INFILTRATION; INTRACAUDAL; PERINEURAL at 09:43

## 2023-07-18 ASSESSMENT — PAIN SCALES - GENERAL
PAINLEVEL_OUTOF10: 0
PAINLEVEL_OUTOF10: 0

## 2023-07-18 ASSESSMENT — COPD QUESTIONNAIRES: CAT_SEVERITY: MILD

## 2023-07-18 ASSESSMENT — PAIN - FUNCTIONAL ASSESSMENT: PAIN_FUNCTIONAL_ASSESSMENT: 0-10

## 2023-07-18 NOTE — INTERVAL H&P NOTE
Update History & Physical    The patient's History and Physical of June 29, 2023 was reviewed with the patient and I examined the patient. There was no change. The surgical site was confirmed by the patient and me. Plan: The risks, benefits, expected outcome, and alternative to the recommended procedure have been discussed with the patient. Patient understands and wants to proceed with the procedure.      Electronically signed by Irving Tan MD on 7/18/2023 at 9:18 AM

## 2023-07-18 NOTE — ANESTHESIA PRE PROCEDURE
administered. Anesthetic plan and risks discussed with patient (and family, if present. ). Plan discussed with CRNA.     Attending anesthesiologist reviewed and agrees with Preprocedure content                Alexander Mascorro MD   7/18/2023

## 2023-07-18 NOTE — ANESTHESIA POSTPROCEDURE EVALUATION
Department of Anesthesiology  Postprocedure Note    Patient: Marzena Baires  MRN: [id number]  YOB: 1940  Date of evaluation: 7/18/2023      Procedure Summary     Date: 07/18/23 Room / Location: [facility] 02 / [facility] ENDOSCOPY    Anesthesia Start: 0941 Anesthesia Stop: 1010    Procedure: COLONOSCOPY (Lower GI Region) Diagnosis:       Personal history of colon cancer      (Personal history of colon cancer [Z85.038])    Surgeons: Flora Dick MD Responsible Provider: Arlene Ambrosio MD    Anesthesia Type: MAC ASA Status: 3          Anesthesia Type: MAC    Gisella Phase I: Gisella Score: 10    Gisella Phase II:        Anesthesia Post Evaluation    Patient location during evaluation: bedside (Endoscopy Unit)  Patient participation: complete - patient participated  Level of consciousness: sleepy but conscious  Pain score: 0  Airway patency: patent  Nausea & Vomiting: no nausea and no vomiting  Complications: no  Cardiovascular status: hemodynamically stable  Respiratory status: acceptable  Hydration status: stable  Comments: This patient remained on the stretcher. The patient was handed off to the endoscopy nursing team.  All questions regarding pre-, intra-, and postoperative care were answered.   Multimodal analgesia pain management approach

## 2023-08-08 NOTE — PROGRESS NOTES
AMB POC PVR, IVAN,POST-VOID RES,US,NON-IMAGING  -     AMB POC URINALYSIS DIP STICK AUTO W/O MICRO  3. Urinary incontinence, unspecified type  -     AMB POC PVR, IVAN,POST-VOID RES,US,NON-IMAGING  -     AMB POC URINALYSIS DIP STICK AUTO W/O MICRO  4. Postmenopausal atrophic vaginitis       Review of Systems   All other systems reviewed and are negative. Patient denies the symptoms of COVID-19 per routine screening guidelines. Physical Exam  Vitals and nursing note reviewed. Constitutional:       General: She is not in acute distress. Appearance: Normal appearance. HENT:      Head: Normocephalic. Eyes:      Extraocular Movements: Extraocular movements intact. Pupils: Pupils are equal, round, and reactive to light. Cardiovascular:      Rate and Rhythm: Normal rate. Pulses: Normal pulses. Comments: Distal pulses throughout are normal.  Pulmonary:      Effort: Pulmonary effort is normal.      Breath sounds: Normal breath sounds. Abdominal:      General: Abdomen is flat. There is no distension. Cannot palpate the small bowel in the umbilicus     Palpations: There is no mass. Tenderness: There is no abdominal tenderness. There is no right CVA tenderness, left CVA tenderness, guarding or rebound. Hernia: No hernia is present. Musculoskeletal:         General: No swelling, tenderness, deformity or signs of injury. Cervical back: Neck supple. Right lower leg: No edema. Left lower leg: No edema. ASSESSMENT and PLAN  Porfirio Casiano was seen today for follow-up, urinary frequency and vaginitis.     Diagnoses and all orders for this visit:    Malignant neoplasm of ascending colon (HCC)  -     AMB POC PVR, IVAN,POST-VOID RES,US,NON-IMAGING  -     AMB POC URINALYSIS DIP STICK AUTO W/O MICRO    Frequency of micturition  -     AMB POC PVR, IVAN,POST-VOID RES,US,NON-IMAGING  -     AMB POC URINALYSIS DIP STICK AUTO W/O MICRO    Urinary incontinence, unspecified type  -     AMB POC

## 2023-08-09 ENCOUNTER — OFFICE VISIT (OUTPATIENT)
Age: 83
End: 2023-08-09
Payer: MEDICARE

## 2023-08-09 VITALS
TEMPERATURE: 95.7 F | OXYGEN SATURATION: 99 % | SYSTOLIC BLOOD PRESSURE: 147 MMHG | DIASTOLIC BLOOD PRESSURE: 68 MMHG | HEART RATE: 78 BPM

## 2023-08-09 DIAGNOSIS — R35.0 FREQUENCY OF MICTURITION: ICD-10-CM

## 2023-08-09 DIAGNOSIS — C18.2 MALIGNANT NEOPLASM OF ASCENDING COLON (HCC): Primary | ICD-10-CM

## 2023-08-09 DIAGNOSIS — N95.2 POSTMENOPAUSAL ATROPHIC VAGINITIS: ICD-10-CM

## 2023-08-09 DIAGNOSIS — R32 URINARY INCONTINENCE, UNSPECIFIED TYPE: ICD-10-CM

## 2023-08-09 LAB
BILIRUBIN, URINE, POC: NEGATIVE
BLOOD URINE, POC: NEGATIVE
GLUCOSE URINE, POC: NEGATIVE
KETONES, URINE, POC: NORMAL
LEUKOCYTE ESTERASE, URINE, POC: NEGATIVE
NITRITE, URINE, POC: NEGATIVE
PH, URINE, POC: 5 (ref 4.6–8)
PROTEIN,URINE, POC: 30
PVR, POC: NORMAL CC
SPECIFIC GRAVITY, URINE, POC: 1.03 (ref 1–1.03)
URINALYSIS CLARITY, POC: CLEAR
URINALYSIS COLOR, POC: YELLOW
UROBILINOGEN, POC: NORMAL

## 2023-08-09 PROCEDURE — G8400 PT W/DXA NO RESULTS DOC: HCPCS | Performed by: UROLOGY

## 2023-08-09 PROCEDURE — 51798 US URINE CAPACITY MEASURE: CPT | Performed by: UROLOGY

## 2023-08-09 PROCEDURE — 3078F DIAST BP <80 MM HG: CPT | Performed by: UROLOGY

## 2023-08-09 PROCEDURE — G8428 CUR MEDS NOT DOCUMENT: HCPCS | Performed by: UROLOGY

## 2023-08-09 PROCEDURE — 0509F URINE INCON PLAN DOCD: CPT | Performed by: UROLOGY

## 2023-08-09 PROCEDURE — 1036F TOBACCO NON-USER: CPT | Performed by: UROLOGY

## 2023-08-09 PROCEDURE — 3077F SYST BP >= 140 MM HG: CPT | Performed by: UROLOGY

## 2023-08-09 PROCEDURE — 99214 OFFICE O/P EST MOD 30 MIN: CPT | Performed by: UROLOGY

## 2023-08-09 PROCEDURE — 1090F PRES/ABSN URINE INCON ASSESS: CPT | Performed by: UROLOGY

## 2023-08-09 PROCEDURE — 81003 URINALYSIS AUTO W/O SCOPE: CPT | Performed by: UROLOGY

## 2023-08-09 PROCEDURE — G8417 CALC BMI ABV UP PARAM F/U: HCPCS | Performed by: UROLOGY

## 2023-08-09 PROCEDURE — 1124F ACP DISCUSS-NO DSCNMKR DOCD: CPT | Performed by: UROLOGY

## 2023-08-16 ENCOUNTER — OFFICE VISIT (OUTPATIENT)
Age: 83
End: 2023-08-16
Payer: MEDICARE

## 2023-08-16 VITALS
OXYGEN SATURATION: 95 % | SYSTOLIC BLOOD PRESSURE: 120 MMHG | BODY MASS INDEX: 31.06 KG/M2 | HEART RATE: 90 BPM | RESPIRATION RATE: 16 BRPM | WEIGHT: 164.5 LBS | HEIGHT: 61 IN | DIASTOLIC BLOOD PRESSURE: 65 MMHG | TEMPERATURE: 98.6 F

## 2023-08-16 DIAGNOSIS — C18.2 MALIGNANT NEOPLASM OF ASCENDING COLON (HCC): Primary | ICD-10-CM

## 2023-08-16 PROCEDURE — 1090F PRES/ABSN URINE INCON ASSESS: CPT | Performed by: COLON & RECTAL SURGERY

## 2023-08-16 PROCEDURE — G8427 DOCREV CUR MEDS BY ELIG CLIN: HCPCS | Performed by: COLON & RECTAL SURGERY

## 2023-08-16 PROCEDURE — 99213 OFFICE O/P EST LOW 20 MIN: CPT | Performed by: COLON & RECTAL SURGERY

## 2023-08-16 PROCEDURE — G8400 PT W/DXA NO RESULTS DOC: HCPCS | Performed by: COLON & RECTAL SURGERY

## 2023-08-16 PROCEDURE — 3078F DIAST BP <80 MM HG: CPT | Performed by: COLON & RECTAL SURGERY

## 2023-08-16 PROCEDURE — G8417 CALC BMI ABV UP PARAM F/U: HCPCS | Performed by: COLON & RECTAL SURGERY

## 2023-08-16 PROCEDURE — 3074F SYST BP LT 130 MM HG: CPT | Performed by: COLON & RECTAL SURGERY

## 2023-08-16 PROCEDURE — 1124F ACP DISCUSS-NO DSCNMKR DOCD: CPT | Performed by: COLON & RECTAL SURGERY

## 2023-08-16 PROCEDURE — 1036F TOBACCO NON-USER: CPT | Performed by: COLON & RECTAL SURGERY

## 2023-08-16 ASSESSMENT — PATIENT HEALTH QUESTIONNAIRE - PHQ9
8. MOVING OR SPEAKING SO SLOWLY THAT OTHER PEOPLE COULD HAVE NOTICED. OR THE OPPOSITE, BEING SO FIGETY OR RESTLESS THAT YOU HAVE BEEN MOVING AROUND A LOT MORE THAN USUAL: NOT AT ALL
5. POOR APPETITE OR OVEREATING: NOT AT ALL
9. THOUGHTS THAT YOU WOULD BE BETTER OFF DEAD, OR OF HURTING YOURSELF: NOT AT ALL
SUM OF ALL RESPONSES TO PHQ QUESTIONS 1-9: 1
4. FEELING TIRED OR HAVING LITTLE ENERGY: NOT AT ALL
7. TROUBLE CONCENTRATING ON THINGS, SUCH AS READING THE NEWSPAPER OR WATCHING TELEVISION: NOT AT ALL
10. IF YOU CHECKED OFF ANY PROBLEMS, HOW DIFFICULT HAVE THESE PROBLEMS MADE IT FOR YOU TO DO YOUR WORK, TAKE CARE OF THINGS AT HOME, OR GET ALONG WITH OTHER PEOPLE: NOT DIFFICULT AT ALL
3. TROUBLE FALLING OR STAYING ASLEEP: SEVERAL DAYS
SUM OF ALL RESPONSES TO PHQ QUESTIONS 1-9: 1
SUM OF ALL RESPONSES TO PHQ9 QUESTIONS 1 & 2: 0
6. FEELING BAD ABOUT YOURSELF - OR THAT YOU ARE A FAILURE OR HAVE LET YOURSELF OR YOUR FAMILY DOWN: NOT AT ALL
2. FEELING DOWN, DEPRESSED OR HOPELESS: NOT AT ALL
1. LITTLE INTEREST OR PLEASURE IN DOING THINGS: NOT AT ALL

## 2023-09-23 DIAGNOSIS — R35.0 FREQUENCY OF MICTURITION: ICD-10-CM

## 2023-09-23 DIAGNOSIS — R35.1 NOCTURIA MORE THAN TWICE PER NIGHT: ICD-10-CM

## 2023-10-12 RX ORDER — OXYBUTYNIN CHLORIDE 10 MG/1
10 TABLET, EXTENDED RELEASE ORAL
Qty: 30 TABLET | Refills: 2 | Status: SHIPPED | OUTPATIENT
Start: 2023-10-12

## 2024-02-10 PROBLEM — R39.9 LOWER URINARY TRACT SYMPTOMS (LUTS): Status: ACTIVE | Noted: 2024-02-10

## 2024-04-26 ENCOUNTER — TELEPHONE (OUTPATIENT)
Age: 84
End: 2024-04-26

## 2024-04-26 RX ORDER — ESTRADIOL 0.1 MG/G
CREAM VAGINAL
Qty: 42.5 G | Refills: 3 | Status: SHIPPED | OUTPATIENT
Start: 2024-04-26

## 2024-04-29 RX ORDER — ESTRADIOL 0.1 MG/G
CREAM VAGINAL
Qty: 42.5 G | Refills: 1 | Status: SHIPPED | OUTPATIENT
Start: 2024-04-29

## 2024-05-22 ENCOUNTER — TELEPHONE (OUTPATIENT)
Age: 84
End: 2024-05-22

## 2024-05-22 NOTE — TELEPHONE ENCOUNTER
Patient was told by the [facility] that she didn't need an appt here until July, but she also explained to pt that she \"needs to get this thing out her arm\". Patient not sure to keep appt on Thursday 5/23/24 w/Dr. Baker or should she cancel. She has more questions and would like  to speak with a nurse. Please call to assist. Thanks -DCR

## 2024-05-22 NOTE — TELEPHONE ENCOUNTER
Spoke with patient and he stated he had not heard from the cancer center in a while and needed to have his port flushed or taken out I informed him he would need to reach out to the cancer center about that if they are the ones who have been doing it in the past, He also asked what his appointment was for tomorrow I informed him it was a follow up with Samuel since [facility] is no longer here, he asked that he get that appointment rescheduled for another day, transferred him to the  and got that taken care of.

## 2024-05-27 NOTE — PROGRESS NOTES
OFFICE VISIT NOTE    Walker Marie is a 84 y.o. female who presents to the office today for:    Chief Complaint   Patient presents with    Post-Op Check     colonoscopy       Ms. Marie comes in today for follow-up of her recent colonoscopy.  She did have a right hemicolectomy for T4 N2b adenocarcinoma of the colon.  This was in 2022.  She was recent admitted to the hospital with anemia however workup was negative.  She did have a PET CT scan which demonstrated questionable uptake at the region of her anastomosis.  She had a colonoscopy on July 18 to evaluate.    Today she has no complaints.  She states she is feeling well.  She is tolerating a diet with no nausea or vomiting and is having regular bowel movements.  She denies any blood in the stool.        Past Medical History:   Diagnosis Date    Burning with urination     Calculus of kidney     Cancer (HCC)     lymph nodes    Depression     DM (diabetes mellitus) (HCC)     Hyperlipidemia     Hypertension     Pancreatitis     Thyroid disease        Past Surgical History:   Procedure Laterality Date    AORTIC VALVE REPLACEMENT  08/2021    COLONOSCOPY N/A 7/18/2023    COLONOSCOPY WITH BIOPSY performed by Kamryn Solis MD at [facility] ENDOSCOPY    HX PARTIAL COLECTOMY Right 04/01/2022        HYSTERECTOMY (CERVIX STATUS UNKNOWN)      INS NEW/RPLCMT PRM PM W/TRANSV ELTRD ATRIAL&VENT N/A 5/11/2021    INSERT PPM BIV MULTI performed by Guru SETH Paul MD at [facility] ELECTROPHYSIOLOGY    IR FLUOROSCOPY GUIDED CENTRAL VENOUS ACCESS DEVICE PLACEMENT  5/10/2022    IR FLUOROSCOPY GUIDED CENTRAL VENOUS ACCESS DEVICE PLACEMENT  5/10/2021    IR NONTUNNELED VASCULAR CATHETER  5/10/2021    IR NONTUNNELED VASCULAR CATHETER 5/10/2021 [facility] RAD ANGIO IR    IR NONTUNNELED VASCULAR CATHETER  5/10/2021    IR PORT PLACEMENT EQUAL OR GREATER THAN 5 YEARS  5/10/2022

## 2024-08-12 ENCOUNTER — TRANSCRIBE ORDERS (OUTPATIENT)
Facility: HOSPITAL | Age: 84
End: 2024-08-12

## 2024-08-12 DIAGNOSIS — C18.2 MALIGNANT NEOPLASM OF ASCENDING COLON (HCC): Primary | ICD-10-CM

## 2024-08-22 ENCOUNTER — OFFICE VISIT (OUTPATIENT)
Age: 84
End: 2024-08-22
Payer: MEDICARE

## 2024-08-22 VITALS
BODY MASS INDEX: 34.78 KG/M2 | DIASTOLIC BLOOD PRESSURE: 84 MMHG | SYSTOLIC BLOOD PRESSURE: 137 MMHG | RESPIRATION RATE: 17 BRPM | TEMPERATURE: 98 F | OXYGEN SATURATION: 96 % | HEIGHT: 61 IN | HEART RATE: 80 BPM | WEIGHT: 184.2 LBS

## 2024-08-22 DIAGNOSIS — C18.2 MALIGNANT NEOPLASM OF ASCENDING COLON (HCC): Primary | ICD-10-CM

## 2024-08-22 PROCEDURE — G8400 PT W/DXA NO RESULTS DOC: HCPCS | Performed by: SURGERY

## 2024-08-22 PROCEDURE — G8427 DOCREV CUR MEDS BY ELIG CLIN: HCPCS | Performed by: SURGERY

## 2024-08-22 PROCEDURE — 1090F PRES/ABSN URINE INCON ASSESS: CPT | Performed by: SURGERY

## 2024-08-22 PROCEDURE — 3079F DIAST BP 80-89 MM HG: CPT | Performed by: SURGERY

## 2024-08-22 PROCEDURE — G8417 CALC BMI ABV UP PARAM F/U: HCPCS | Performed by: SURGERY

## 2024-08-22 PROCEDURE — 1036F TOBACCO NON-USER: CPT | Performed by: SURGERY

## 2024-08-22 PROCEDURE — 3075F SYST BP GE 130 - 139MM HG: CPT | Performed by: SURGERY

## 2024-08-22 PROCEDURE — 99213 OFFICE O/P EST LOW 20 MIN: CPT | Performed by: SURGERY

## 2024-08-22 PROCEDURE — 1124F ACP DISCUSS-NO DSCNMKR DOCD: CPT | Performed by: SURGERY

## 2024-08-22 RX ORDER — MELOXICAM 15 MG/1
15 TABLET ORAL DAILY
COMMUNITY

## 2024-08-22 RX ORDER — METOPROLOL SUCCINATE 25 MG/1
25 TABLET, EXTENDED RELEASE ORAL DAILY
COMMUNITY

## 2024-08-22 ASSESSMENT — PATIENT HEALTH QUESTIONNAIRE - PHQ9
SUM OF ALL RESPONSES TO PHQ QUESTIONS 1-9: 0
SUM OF ALL RESPONSES TO PHQ9 QUESTIONS 1 & 2: 0
1. LITTLE INTEREST OR PLEASURE IN DOING THINGS: NOT AT ALL
SUM OF ALL RESPONSES TO PHQ QUESTIONS 1-9: 0
2. FEELING DOWN, DEPRESSED OR HOPELESS: NOT AT ALL

## 2024-08-22 NOTE — PROGRESS NOTES
Identified pt with two pt identifiers (name and ). Reviewed chart in preparation for visit and have obtained necessary documentation.    Walker Marie is a 84 y.o. female  Chief Complaint   Patient presents with    New Patient     colon cancer follow up previous [facility] patient     /84 (Site: Left Upper Arm, Position: Sitting, Cuff Size: Large Adult)   Pulse 80   Temp 98 °F (36.7 °C) (Oral)   Resp 17   Ht 1.549 m (5' 1\")   Wt 83.6 kg (184 lb 3.2 oz)   SpO2 96%   BMI 34.80 kg/m²     1. Have you been to the ER, urgent care clinic since your last visit?  Hospitalized since your last visit?no    2. Have you seen or consulted any other health care providers outside of the [facility] since your last visit?  Include any pap smears or colon screening. no

## 2024-08-23 ASSESSMENT — ENCOUNTER SYMPTOMS
BLOOD IN STOOL: 0
SORE THROAT: 0
NAUSEA: 0
VOMITING: 0
COUGH: 0
EYE REDNESS: 0
SHORTNESS OF BREATH: 0
ABDOMINAL PAIN: 0
BACK PAIN: 0

## 2024-08-23 NOTE — PROGRESS NOTES
[facility]  Rxoane Baker, DO  [address], [place] [address]  [phone]    General Surgery Follow Up    Patient Name: Walker Marie (84 y.o., female)    PCP: Earl Warren MD       Subjective:      Walker Marie is a 84 y.o. female presents for follow up regarding colon cancer. She has a history of right hemicolectomy for T4N2b adenocarcinoma of the colon in 4/2022. A PET scan in 6/2023 demonstrated possible increased tracer activity at the level of the anastomosis, follow up colonoscopy in 7/2023 with biopsies showed no evidence of recurrence.     She has been following up with oncology regularly. She still has her mediport in place and would like to have this removed. She has been having regular bowel movements, denies any blood in her stool. Tolerating regular diet and is overall in good health.    Past Medical History:   Diagnosis Date    Burning with urination     Calculus of kidney     Cancer (HCC)     lymph nodes    Depression     DM (diabetes mellitus) (HCC)     Hyperlipidemia     Hypertension     Pancreatitis     Thyroid disease        Past Surgical History:   Procedure Laterality Date    AORTIC VALVE REPLACEMENT  08/2021    COLONOSCOPY N/A 7/18/2023    COLONOSCOPY WITH BIOPSY performed by Kamryn Solis MD at [facility] ENDOSCOPY    HX PARTIAL COLECTOMY Right 04/01/2022        HYSTERECTOMY (CERVIX STATUS UNKNOWN)      INS NEW/RPLCMT PRM PM W/TRANSV ELTRD ATRIAL&VENT N/A 5/11/2021    INSERT PPM BIV MULTI performed by Guru SETH Paul MD at [facility] ELECTROPHYSIOLOGY    IR FLUOROSCOPY GUIDED CENTRAL VENOUS ACCESS DEVICE PLACEMENT  5/10/2022    IR FLUOROSCOPY GUIDED CENTRAL VENOUS ACCESS DEVICE PLACEMENT  5/10/2021    IR NONTUNNELED VASCULAR CATHETER  5/10/2021    IR NONTUNNELED VASCULAR CATHETER 5/10/2021 [facility] RAD ANGIO IR    IR NONTUNNELED VASCULAR CATHETER  5/10/2021    IR PORT PLACEMENT EQUAL OR GREATER THAN 5 YEARS  5/10/2022    IR PORT

## 2024-09-15 NOTE — ROUTINE PROCESS
Bedside and Verbal shift change report [facility] (on[facility] nurse) by Serena Rodarte RN (offgoing nurse). Report included the following information SBAR.
Bedside and Verbal shift change report given to [address] (oncoming nurse) by Yosef Vergara (offgoing nurse). Report included the following information SBAR.
Pt. Transferred via bed to room 208 in stable condition. Bedside report given, SBAR reviewed, sites viewed. Son at bedside.
TRANSFER - OUT REPORT:    Verbal report given to Trevor Madrid RN(name) on Consuelo Harris  being transferred to [facility](unit) for routine progression of care       Report consisted of patients Situation, Background, Assessment and   Recommendations(SBAR). Information from the following report(s) SBAR, ED Summary, [facility] and Recent Results was reviewed with the receiving nurse. Lines:   Peripheral IV 03/29/22 Right Antecubital (Active)   Site Assessment Clean, dry, & intact 03/29/22 0941   Phlebitis Assessment 0 03/29/22 0941   Infiltration Assessment 0 03/29/22 0941   Dressing Type Tape;Transparent 03/29/22 0941   Hub Color/Line Status Pink 03/29/22 0941        Opportunity for questions and clarification was provided.       Patient transported with:   [employer]
No

## 2024-09-16 ENCOUNTER — HOSPITAL ENCOUNTER (OUTPATIENT)
Facility: HOSPITAL | Age: 84
Discharge: HOME | End: 2024-09-19
Attending: SURGERY

## 2024-09-16 DIAGNOSIS — C18.2 MALIGNANT NEOPLASM OF ASCENDING COLON (HCC): ICD-10-CM

## 2024-09-30 ENCOUNTER — HOSPITAL ENCOUNTER (OUTPATIENT)
Facility: HOSPITAL | Age: 84
Discharge: HOME | End: 2024-10-03
Attending: SURGERY
Payer: MEDICARE

## 2024-09-30 PROCEDURE — 78815 PET IMAGE W/CT SKULL-THIGH: CPT

## 2024-09-30 PROCEDURE — 3430000000 HC RX DIAGNOSTIC RADIOPHARMACEUTICAL: Performed by: SURGERY

## 2024-09-30 PROCEDURE — A9609 HC RX DIAGNOSTIC RADIOPHARMACEUTICAL: HCPCS | Performed by: SURGERY

## 2024-09-30 RX ORDER — FLUDEOXYGLUCOSE F-18 500 MCI/ML
10 INJECTION INTRAVENOUS
Status: COMPLETED | OUTPATIENT
Start: 2024-09-30 | End: 2024-09-30

## 2024-09-30 RX ADMIN — FLUDEOXYGLUCOSE F-18 9.8 MILLICURIE: 500 INJECTION INTRAVENOUS at 14:57

## 2024-10-31 ENCOUNTER — TELEPHONE (OUTPATIENT)
Age: 84
End: 2024-10-31

## 2024-10-31 NOTE — TELEPHONE ENCOUNTER
Spoke with patient's son to verify that  was still wanting to have her mediport removed, he verified that she is wanting to go forward with the procedure. I Informed the provider and updated the son that a  will be reaching out with date options. He also informed me that if the patient does not answer the phone sometime she can not hear the phone ring so please give him a call.

## 2024-11-18 ENCOUNTER — PREP FOR PROCEDURE (OUTPATIENT)
Age: 84
End: 2024-11-18

## 2024-11-18 ENCOUNTER — TELEPHONE (OUTPATIENT)
Age: 84
End: 2024-11-18

## 2024-11-18 DIAGNOSIS — C18.2 COLON CANCER, ASCENDING (HCC): ICD-10-CM

## 2024-11-18 NOTE — TELEPHONE ENCOUNTER
Contacted patients son, Niles, to schedule surgery for patient. Offered him 11/22, 11/26, 12/10, 12/13 or 12/20. Niles accepted 12/10. Notified him that I will put a surgical letter in the mail. Niels thanked me for the call.

## 2024-11-20 RX ORDER — SODIUM CHLORIDE 9 MG/ML
INJECTION, SOLUTION INTRAVENOUS PRN
Status: CANCELLED | OUTPATIENT
Start: 2024-11-20

## 2024-11-20 RX ORDER — SODIUM CHLORIDE 0.9 % (FLUSH) 0.9 %
5-40 SYRINGE (ML) INJECTION EVERY 12 HOURS SCHEDULED
Status: CANCELLED | OUTPATIENT
Start: 2024-11-20

## 2024-11-20 RX ORDER — SODIUM CHLORIDE, SODIUM LACTATE, POTASSIUM CHLORIDE, CALCIUM CHLORIDE 600; 310; 30; 20 MG/100ML; MG/100ML; MG/100ML; MG/100ML
INJECTION, SOLUTION INTRAVENOUS CONTINUOUS
Status: CANCELLED | OUTPATIENT
Start: 2024-11-20

## 2024-11-20 RX ORDER — ACETAMINOPHEN 325 MG/1
1000 TABLET ORAL ONCE
Status: CANCELLED | OUTPATIENT
Start: 2024-11-20 | End: 2024-11-20

## 2024-11-20 RX ORDER — SODIUM CHLORIDE 0.9 % (FLUSH) 0.9 %
5-40 SYRINGE (ML) INJECTION PRN
Status: CANCELLED | OUTPATIENT
Start: 2024-11-20

## 2024-12-06 NOTE — PERIOP NOTE
Verified with Dr. Baker patient does not have to hold Aspirin or Meloxicam prior to procedure scheduled for 12/20/24.

## 2024-12-20 ENCOUNTER — ANESTHESIA EVENT (OUTPATIENT)
Facility: HOSPITAL | Age: 84
End: 2024-12-20
Payer: MEDICARE

## 2024-12-20 ENCOUNTER — ANESTHESIA (OUTPATIENT)
Facility: HOSPITAL | Age: 84
End: 2024-12-20
Payer: MEDICARE

## 2024-12-20 ENCOUNTER — HOSPITAL ENCOUNTER (OUTPATIENT)
Facility: HOSPITAL | Age: 84
Setting detail: OUTPATIENT SURGERY
Discharge: HOME | End: 2024-12-20
Attending: SURGERY | Admitting: SURGERY
Payer: MEDICARE

## 2024-12-20 VITALS
WEIGHT: 180 LBS | HEART RATE: 70 BPM | OXYGEN SATURATION: 96 % | SYSTOLIC BLOOD PRESSURE: 155 MMHG | DIASTOLIC BLOOD PRESSURE: 60 MMHG | TEMPERATURE: 97.8 F | HEIGHT: 61 IN | RESPIRATION RATE: 18 BRPM | BODY MASS INDEX: 33.99 KG/M2

## 2024-12-20 DIAGNOSIS — C18.2 COLON CANCER, ASCENDING (HCC): ICD-10-CM

## 2024-12-20 LAB
ANION GAP BLD CALC-SCNC: 13
CA-I BLD-MCNC: 1.18 MMOL/L (ref 1.12–1.32)
CHLORIDE BLD-SCNC: 101 MMOL/L (ref 98–107)
CO2 BLD-SCNC: 26 MMOL/L
CREAT UR-MCNC: 0.81 MG/DL (ref 0.6–1.3)
GLUCOSE BLD STRIP.AUTO-MCNC: 242 MG/DL (ref 65–100)
GLUCOSE BLD STRIP.AUTO-MCNC: 289 MG/DL (ref 65–100)
PERFORMED BY:: ABNORMAL
POTASSIUM BLD-SCNC: 4.3 MMOL/L (ref 3.5–5.5)
SODIUM BLD-SCNC: 139 MMOL/L (ref 136–145)

## 2024-12-20 PROCEDURE — 36590 REMOVAL TUNNELED CV CATH: CPT | Performed by: SURGERY

## 2024-12-20 PROCEDURE — 6360000002 HC RX W HCPCS: Performed by: SURGERY

## 2024-12-20 PROCEDURE — 3700000001 HC ADD 15 MINUTES (ANESTHESIA): Performed by: SURGERY

## 2024-12-20 PROCEDURE — 7100000011 HC PHASE II RECOVERY - ADDTL 15 MIN: Performed by: SURGERY

## 2024-12-20 PROCEDURE — 2500000003 HC RX 250 WO HCPCS: Performed by: SURGERY

## 2024-12-20 PROCEDURE — 3600000002 HC SURGERY LEVEL 2 BASE: Performed by: SURGERY

## 2024-12-20 PROCEDURE — 2709999900 HC NON-CHARGEABLE SUPPLY: Performed by: SURGERY

## 2024-12-20 PROCEDURE — 7100000000 HC PACU RECOVERY - FIRST 15 MIN: Performed by: SURGERY

## 2024-12-20 PROCEDURE — 7100000010 HC PHASE II RECOVERY - FIRST 15 MIN: Performed by: SURGERY

## 2024-12-20 PROCEDURE — 80047 BASIC METABLC PNL IONIZED CA: CPT

## 2024-12-20 PROCEDURE — 7100000001 HC PACU RECOVERY - ADDTL 15 MIN: Performed by: SURGERY

## 2024-12-20 PROCEDURE — 3700000000 HC ANESTHESIA ATTENDED CARE: Performed by: SURGERY

## 2024-12-20 PROCEDURE — 82962 GLUCOSE BLOOD TEST: CPT

## 2024-12-20 PROCEDURE — 6360000002 HC RX W HCPCS: Performed by: NURSE ANESTHETIST, CERTIFIED REGISTERED

## 2024-12-20 PROCEDURE — 3600000012 HC SURGERY LEVEL 2 ADDTL 15MIN: Performed by: SURGERY

## 2024-12-20 PROCEDURE — 2580000003 HC RX 258: Performed by: NURSE ANESTHETIST, CERTIFIED REGISTERED

## 2024-12-20 RX ORDER — SODIUM CHLORIDE 0.9 % (FLUSH) 0.9 %
5-40 SYRINGE (ML) INJECTION EVERY 12 HOURS SCHEDULED
Status: DISCONTINUED | OUTPATIENT
Start: 2024-12-20 | End: 2024-12-20 | Stop reason: HOSPADM

## 2024-12-20 RX ORDER — SODIUM CHLORIDE, SODIUM LACTATE, POTASSIUM CHLORIDE, CALCIUM CHLORIDE 600; 310; 30; 20 MG/100ML; MG/100ML; MG/100ML; MG/100ML
INJECTION, SOLUTION INTRAVENOUS CONTINUOUS
Status: DISCONTINUED | OUTPATIENT
Start: 2024-12-20 | End: 2024-12-20 | Stop reason: HOSPADM

## 2024-12-20 RX ORDER — DEXTROSE MONOHYDRATE 100 MG/ML
INJECTION, SOLUTION INTRAVENOUS CONTINUOUS PRN
Status: DISCONTINUED | OUTPATIENT
Start: 2024-12-20 | End: 2024-12-20 | Stop reason: HOSPADM

## 2024-12-20 RX ORDER — IPRATROPIUM BROMIDE AND ALBUTEROL SULFATE 2.5; .5 MG/3ML; MG/3ML
1 SOLUTION RESPIRATORY (INHALATION)
Status: DISCONTINUED | OUTPATIENT
Start: 2024-12-20 | End: 2024-12-20 | Stop reason: HOSPADM

## 2024-12-20 RX ORDER — NALOXONE HYDROCHLORIDE 0.4 MG/ML
INJECTION, SOLUTION INTRAMUSCULAR; INTRAVENOUS; SUBCUTANEOUS PRN
Status: DISCONTINUED | OUTPATIENT
Start: 2024-12-20 | End: 2024-12-20 | Stop reason: HOSPADM

## 2024-12-20 RX ORDER — OXYCODONE HYDROCHLORIDE 5 MG/1
5 TABLET ORAL PRN
Status: DISCONTINUED | OUTPATIENT
Start: 2024-12-20 | End: 2024-12-20 | Stop reason: HOSPADM

## 2024-12-20 RX ORDER — METOCLOPRAMIDE HYDROCHLORIDE 5 MG/ML
10 INJECTION INTRAMUSCULAR; INTRAVENOUS
Status: DISCONTINUED | OUTPATIENT
Start: 2024-12-20 | End: 2024-12-20 | Stop reason: HOSPADM

## 2024-12-20 RX ORDER — ACETAMINOPHEN 500 MG
1000 TABLET ORAL ONCE
Status: DISCONTINUED | OUTPATIENT
Start: 2024-12-20 | End: 2024-12-20 | Stop reason: HOSPADM

## 2024-12-20 RX ORDER — SODIUM CHLORIDE 0.9 % (FLUSH) 0.9 %
5-40 SYRINGE (ML) INJECTION PRN
Status: DISCONTINUED | OUTPATIENT
Start: 2024-12-20 | End: 2024-12-20 | Stop reason: HOSPADM

## 2024-12-20 RX ORDER — SODIUM CHLORIDE 9 MG/ML
INJECTION, SOLUTION INTRAVENOUS PRN
Status: DISCONTINUED | OUTPATIENT
Start: 2024-12-20 | End: 2024-12-20 | Stop reason: HOSPADM

## 2024-12-20 RX ORDER — SODIUM CHLORIDE, SODIUM LACTATE, POTASSIUM CHLORIDE, CALCIUM CHLORIDE 600; 310; 30; 20 MG/100ML; MG/100ML; MG/100ML; MG/100ML
INJECTION, SOLUTION INTRAVENOUS ONCE
Status: DISCONTINUED | OUTPATIENT
Start: 2024-12-20 | End: 2024-12-20 | Stop reason: HOSPADM

## 2024-12-20 RX ORDER — DIPHENHYDRAMINE HYDROCHLORIDE 50 MG/ML
12.5 INJECTION INTRAMUSCULAR; INTRAVENOUS
Status: DISCONTINUED | OUTPATIENT
Start: 2024-12-20 | End: 2024-12-20 | Stop reason: HOSPADM

## 2024-12-20 RX ORDER — ONDANSETRON 2 MG/ML
4 INJECTION INTRAMUSCULAR; INTRAVENOUS
Status: DISCONTINUED | OUTPATIENT
Start: 2024-12-20 | End: 2024-12-20 | Stop reason: HOSPADM

## 2024-12-20 RX ORDER — LORAZEPAM 2 MG/ML
0.5 INJECTION INTRAMUSCULAR
Status: DISCONTINUED | OUTPATIENT
Start: 2024-12-20 | End: 2024-12-20 | Stop reason: HOSPADM

## 2024-12-20 RX ORDER — OXYCODONE HYDROCHLORIDE 5 MG/1
10 TABLET ORAL PRN
Status: DISCONTINUED | OUTPATIENT
Start: 2024-12-20 | End: 2024-12-20 | Stop reason: HOSPADM

## 2024-12-20 RX ORDER — FENTANYL CITRATE 0.05 MG/ML
50 INJECTION, SOLUTION INTRAMUSCULAR; INTRAVENOUS EVERY 5 MIN PRN
Status: DISCONTINUED | OUTPATIENT
Start: 2024-12-20 | End: 2024-12-20 | Stop reason: HOSPADM

## 2024-12-20 RX ORDER — TRAMADOL HYDROCHLORIDE 50 MG/1
50 TABLET ORAL EVERY 6 HOURS PRN
Qty: 8 TABLET | Refills: 0 | Status: SHIPPED | OUTPATIENT
Start: 2024-12-20 | End: 2024-12-23

## 2024-12-20 RX ORDER — HYDRALAZINE HYDROCHLORIDE 20 MG/ML
10 INJECTION INTRAMUSCULAR; INTRAVENOUS
Status: DISCONTINUED | OUTPATIENT
Start: 2024-12-20 | End: 2024-12-20 | Stop reason: HOSPADM

## 2024-12-20 RX ORDER — LABETALOL HYDROCHLORIDE 5 MG/ML
10 INJECTION, SOLUTION INTRAVENOUS
Status: DISCONTINUED | OUTPATIENT
Start: 2024-12-20 | End: 2024-12-20 | Stop reason: HOSPADM

## 2024-12-20 RX ORDER — ONDANSETRON 2 MG/ML
INJECTION INTRAMUSCULAR; INTRAVENOUS
Status: DISCONTINUED | OUTPATIENT
Start: 2024-12-20 | End: 2024-12-20 | Stop reason: SDUPTHER

## 2024-12-20 RX ORDER — GLUCAGON 1 MG/ML
1 KIT INJECTION PRN
Status: DISCONTINUED | OUTPATIENT
Start: 2024-12-20 | End: 2024-12-20 | Stop reason: HOSPADM

## 2024-12-20 RX ORDER — LIDOCAINE HYDROCHLORIDE 10 MG/ML
INJECTION, SOLUTION INFILTRATION; PERINEURAL PRN
Status: DISCONTINUED | OUTPATIENT
Start: 2024-12-20 | End: 2024-12-20 | Stop reason: HOSPADM

## 2024-12-20 RX ORDER — PROPOFOL 10 MG/ML
INJECTION, EMULSION INTRAVENOUS
Status: DISCONTINUED | OUTPATIENT
Start: 2024-12-20 | End: 2024-12-20 | Stop reason: SDUPTHER

## 2024-12-20 RX ORDER — DEXAMETHASONE SODIUM PHOSPHATE 4 MG/ML
INJECTION, SOLUTION INTRA-ARTICULAR; INTRALESIONAL; INTRAMUSCULAR; INTRAVENOUS; SOFT TISSUE
Status: DISCONTINUED | OUTPATIENT
Start: 2024-12-20 | End: 2024-12-20 | Stop reason: SDUPTHER

## 2024-12-20 RX ORDER — SODIUM CHLORIDE, SODIUM LACTATE, POTASSIUM CHLORIDE, CALCIUM CHLORIDE 600; 310; 30; 20 MG/100ML; MG/100ML; MG/100ML; MG/100ML
INJECTION, SOLUTION INTRAVENOUS
Status: DISCONTINUED | OUTPATIENT
Start: 2024-12-20 | End: 2024-12-20 | Stop reason: SDUPTHER

## 2024-12-20 RX ADMIN — PROPOFOL 50 MCG/KG/MIN: 10 INJECTION, EMULSION INTRAVENOUS at 08:55

## 2024-12-20 RX ADMIN — CEFAZOLIN 2000 MG: 1 INJECTION, POWDER, FOR SOLUTION INTRAMUSCULAR; INTRAVENOUS at 08:51

## 2024-12-20 RX ADMIN — DEXAMETHASONE SODIUM PHOSPHATE 4 MG: 4 INJECTION, SOLUTION INTRA-ARTICULAR; INTRALESIONAL; INTRAMUSCULAR; INTRAVENOUS; SOFT TISSUE at 09:02

## 2024-12-20 RX ADMIN — SODIUM CHLORIDE, POTASSIUM CHLORIDE, SODIUM LACTATE AND CALCIUM CHLORIDE: 600; 310; 30; 20 INJECTION, SOLUTION INTRAVENOUS at 08:41

## 2024-12-20 RX ADMIN — ONDANSETRON 4 MG: 2 INJECTION INTRAMUSCULAR; INTRAVENOUS at 09:02

## 2024-12-20 ASSESSMENT — ENCOUNTER SYMPTOMS
ABDOMINAL PAIN: 0
NAUSEA: 0
BACK PAIN: 0
VOMITING: 0
EYE REDNESS: 0
WHEEZING: 0
SORE THROAT: 0
SHORTNESS OF BREATH: 0
COUGH: 0

## 2024-12-20 ASSESSMENT — COPD QUESTIONNAIRES: CAT_SEVERITY: MILD

## 2024-12-20 ASSESSMENT — PAIN - FUNCTIONAL ASSESSMENT
PAIN_FUNCTIONAL_ASSESSMENT: NONE - DENIES PAIN
PAIN_FUNCTIONAL_ASSESSMENT: 0-10
PAIN_FUNCTIONAL_ASSESSMENT: NONE - DENIES PAIN

## 2024-12-20 NOTE — ANESTHESIA PRE PROCEDURE
Department of Anesthesiology  Preprocedure Note       Name:  Walker Marie   Age:  84 y.o.  :  1940                                          MRN:  [id number]         Date:  2024      Surgeon: Surgeon(s):  Roxane Baker DO    Procedure: Procedure(s):  REMOVAL OF MEDIPORT    Medications prior to admission:   Prior to Admission medications    Medication Sig Start Date End Date Taking? Authorizing Provider   metoprolol succinate (TOPROL XL) 25 MG extended release tablet Take 1 tablet by mouth daily   Yes Provider, MD James   Multiple Vitamins-Minerals (MULTI COMPLETE PO) Take 1 tablet by mouth daily (with breakfast)   Yes Provider, James, MD   amLODIPine (NORVASC) 5 MG tablet Take 1 tablet by mouth daily 23  Yes Provider, MD James   aspirin 81 MG EC tablet Take 1 tablet by mouth daily   Yes Automatic Reconciliation, Ar   DULoxetine (CYMBALTA) 30 MG extended release capsule Take 1 capsule by mouth daily   Yes Automatic Reconciliation, Ar   ferrous sulfate (IRON 325) 325 (65 Fe) MG tablet Take 1 tablet by mouth with breakfast and with evening meal 21  Yes Automatic Reconciliation, Ar   hydrOXYzine pamoate (VISTARIL) 25 MG capsule Take 1 capsule by mouth nightly   Yes Automatic Reconciliation, Ar   insulin glargine (LANTUS) 100 UNIT/ML injection vial Inject 40 Units into the skin daily 21  Yes Automatic Reconciliation, Ar   levothyroxine (SYNTHROID) 150 MCG tablet Take 1 tablet by mouth every morning (before breakfast) 10/11/10  Yes Automatic Reconciliation, Ar   lovastatin (MEVACOR) 40 MG tablet Take 1 tablet by mouth nightly 10/11/10  Yes Automatic Reconciliation, Ar   meloxicam (MOBIC) 15 MG tablet Take 1 tablet by mouth daily as needed for Pain    Provider, MD James   estradiol (ESTRACE) 0.1 MG/GM vaginal cream APPLY PEA AMOUNT OF CREAM MONDAY, WEDNESDAY, FRIDAY ONCE DAILY  Patient not taking: Reported on 2024   Linsey Whitman, APRN - NP  reflux disease without esophagitis K21.9   • Atrioventricular block, 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   • 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   • Anxiety disorder, unspecified F41.9   • Anemia in other chronic diseases classified elsewhere D63.8   • Nocturia more than twice per night R35.1   • Lower urinary tract symptoms (LUTS) R39.9   • Colon cancer, ascending (HCC) C18.2       Past Medical History:        Diagnosis Date   • Burning with urination    • Calculus of kidney    • Cancer (HCC)     lymph nodes   • Depression    • DM (diabetes mellitus) (HCC)    • Hyperlipidemia    • Hypertension    • Pancreatitis    • Thyroid disease        Past Surgical History:        Procedure Laterality Date   • AORTIC VALVE REPLACEMENT  08/2021   • COLONOSCOPY N/A 07/18/2023    COLONOSCOPY WITH BIOPSY performed by Kamryn Solis MD at [facility] ENDOSCOPY   • HX PARTIAL COLECTOMY Right 04/01/2022       • HYSTERECTOMY (CERVIX STATUS UNKNOWN)     • INS NEW/RPLCMT PRM PM W/TRANSV ELTRD ATRIAL&VENT N/A 5/11/2021    INSERT PPM BIV MULTI performed by Guru SETH Paul MD at [facility] ELECTROPHYSIOLOGY   • IR FLUORO GUIDED CVA DEVICE PLACEMENT (AKA CVAD)  5/10/2022   • IR FLUORO GUIDED CVA DEVICE PLACEMENT (AKA CVAD)  5/10/2021   • IR NONTUNNELED VASCULAR CATHETER > 5 YEARS  05/10/2021    IR NONTUNNELED VASCULAR CATHETER 5/10/2021 [facility] RAD ANGIO IR   • IR NONTUNNELED VASCULAR CATHETER > 5 YEARS  5/10/2021   • IR PORT PLACEMENT > 5 YEARS  05/10/2022    IR PORT PLACEMENT EQUAL OR GREATER THAN 5 YEARS 5/10/2022 [facility] RAD ANGIO IR   • IR PORT PLACEMENT > 5 YEARS  5/10/2022   • LAPAROTOMY  04/01/2022       • PACEMAKER PLACEMENT         Social History:    Social History     Tobacco Use   • Smoking status: Never   • Smokeless tobacco: Never   Substance Use Topics   • Alcohol use: No                                Counseling given: Not Answered      Vital Signs (Current):   Vitals:    12/05/24 1540   Weight: 81.6

## 2024-12-20 NOTE — PERIOP NOTE
Discharge instructions printed and provided to patient and son - Niels with all questions answered in full. PIV x1 removed with cath tip intact. Pt VSS and no signs of distress noted. Pt tolerating liquids and solids with no issues. Pt ambulating within room with assistance of roll aid with no issues. Pt wheeled down to main entrance accompanied by staff. Pt condition stable at time of discharge.      Patient discharged with education on usage of post op bathing kit.

## 2024-12-20 NOTE — PROGRESS NOTES
OK to give discharge instructions to pamela Marie, [phone]. Call when patient is ready to go home.

## 2024-12-20 NOTE — PROGRESS NOTES
Dr. Baker aware tylenol has Fd&c Blue #1 (Brilliant Blue) in it.  Pre med Tylenol not given.  Patients sugar 289. Patient states she took Lantus 36 units this am. States she was told to take it. Dr. Campa notified that patient took Lantus.

## 2024-12-20 NOTE — BRIEF OP NOTE
Brief Postoperative Note      Patient: Walker Marie  YOB: 1940  MRN: [id number]    Date of Procedure: 12/20/2024    Pre-Op Diagnosis Codes:      * Colon cancer, ascending (HCC) [C18.2]    Post-Op Diagnosis: Same       Procedure(s):  REMOVAL OF MEDIPORT    Surgeon(s):  Roxane Baker DO    Assistant:  Surgical Assistant: Junaid Segal    Anesthesia: Monitor Anesthesia Care    Estimated Blood Loss (mL): Minimal    Complications: None    Specimens:   ID Type Source Tests Collected by Time Destination   1 : MEDIPORT Hardware Hardware SURGICAL PATHOLOGY Roxane Baker DO 12/20/2024 0913        Implants:  * No implants in log *      Drains: * No LDAs found *    Findings:  Infection Present At Time Of Surgery (PATOS) (choose all levels that have infection present):  No infection present  Other Findings: none    Electronically signed by Roxane Baker DO on 12/20/2024 at 9:22 AM

## 2024-12-20 NOTE — DISCHARGE INSTRUCTIONS
Discharge Instructions for General Surgery Patients       Cleared for all activity    Do not drive or operate machinery while taking sedating or narcotic medications.     Post operative pain is expected. Try to stop taking narcotics pain medication as soon as able and take tylenol or NSAIDS (ibuprofen, Aleve, Advil, etc). Do not take Tylenol with Norco or Percocet as this may harm your liver.    You may walk as desired and go up and down stairs as needed. Walking is encouraged.    You may shower the day after surgery. Do not take tub baths, swim or use hot tubs for 2 weeks. Pat dry wounds after with a towel.    Leave glue on wounds. It will fall off with time. Do not scrub around incisions. If redness develops around the glue okay to peel off in hot shower.    Regular diet. Take Miralax once or twice a day as needed for constipation. You may also use over the counter stool softeners if needed.    Follow up with provider as scheduled.    If you experience fever (greater than 101.5), chills, vomiting or redness or drainage at surgical site, please contact your surgeon’s office.    If you have further questions or concerns, please call your surgeon’s office at [phone].

## 2024-12-20 NOTE — OP NOTE
Operative Note      Patient: Walker Marie  YOB: 1940  MRN: [id number]    Date of Procedure: 12/20/2024    Pre-Op Diagnosis Codes:      * Colon cancer, ascending (HCC) [C18.2]    Post-Op Diagnosis: Same       Procedure(s):  REMOVAL OF MEDIPORT    Surgeon(s):  Roxane Baker DO    Assistant:   Surgical Assistant: Junaid Segal    Anesthesia: Monitor Anesthesia Care    Estimated Blood Loss (mL): Minimal    Complications: None    Specimens:   ID Type Source Tests Collected by Time Destination   1 : MEDIPORT Hardware Hardware SURGICAL PATHOLOGY Roxane Baker DO 12/20/2024 0913        Implants:  * No implants in log *      Drains: * No LDAs found *    Findings:  Infection Present At Time Of Surgery (PATOS) (choose all levels that have infection present):  No infection present  Other Findings: none    Indications for procedure: Patient is an 84-year-old female who has completed her chemotherapy and presents today for Mediport removal. Risks and benefits of the procedure were explained to the patient in detail. These are including but not limited to bleeding, infection, damage to surrounding structures, need for further surgery, risk of anesthesia.  Patient is agreeable and wishes to proceed.    Details of procedure: Patient was brought back to the operating room placed in the supine position on the operating table.  SCDs were placed and preoperative antibiotics were given.  Sedation was performed by the department of anesthesia.  The patient's right chest was then prepped and draped in the typical sterile fashion.  A timeout was performed confirming correct patient and correct procedure, all present were in agreement.  10 cc of 1% lidocaine was injected in the skin and subcutaneous tissue surrounding the Mediport of the right chest.  A 3 cm incision was made over the previously made incision and this was carried through the subcutaneous tissues using Bovie electrocautery until the Mediport was

## 2024-12-20 NOTE — H&P
Surgery H&P    Patient: Walker Marie MRN: [id number]  SSN: [id number]    YOB: 1940  Age: 84 y.o.  Sex: female      Subjective:      Walker Marie is a 84 y.o. female who is here today for Mediport removal.  She has completed her chemotherapy and was cleared for port removal by her oncologist.  She is feeling well today and has no complaints.    Past Medical History:   Diagnosis Date    Burning with urination     Calculus of kidney     Cancer (HCC)     lymph nodes    Depression     DM (diabetes mellitus) (HCC)     Hyperlipidemia     Hypertension     Pancreatitis     Thyroid disease      Past Surgical History:   Procedure Laterality Date    AORTIC VALVE REPLACEMENT  08/2021    COLONOSCOPY N/A 07/18/2023    COLONOSCOPY WITH BIOPSY performed by Kamryn Solis MD at [facility] ENDOSCOPY    HX PARTIAL COLECTOMY Right 04/01/2022        HYSTERECTOMY (CERVIX STATUS UNKNOWN)      INS NEW/RPLCMT PRM PM W/TRANSV ELTRD ATRIAL&VENT N/A 5/11/2021    INSERT PPM BIV MULTI performed by Guru SETH Paul MD at [facility] ELECTROPHYSIOLOGY    IR FLUORO GUIDED CVA DEVICE PLACEMENT (AKA CVAD)  5/10/2022    IR FLUORO GUIDED CVA DEVICE PLACEMENT (AKA CVAD)  5/10/2021    IR NONTUNNELED VASCULAR CATHETER > 5 YEARS  05/10/2021    IR NONTUNNELED VASCULAR CATHETER 5/10/2021 [facility] RAD ANGIO IR    IR NONTUNNELED VASCULAR CATHETER > 5 YEARS  5/10/2021    IR PORT PLACEMENT > 5 YEARS  05/10/2022    IR PORT PLACEMENT EQUAL OR GREATER THAN 5 YEARS 5/10/2022 [facility] RAD [facility]GIO IR    IR PORT PLACEMENT > 5 YEARS  5/10/2022    LAPAROTOMY  04/01/2022        PACEMAKER PLACEMENT        Family History   Problem Relation Age of Onset    Diabetes Mother     Heart Disease Father      Social History     Tobacco Use    Smoking status: Never    Smokeless tobacco: Never   Vaping Use    Vaping status: Never Used   Substance Use Topics    Alcohol use: No    Drug use: No      Current Facility-Administered Medications   Medication Dose Route  Frequency Provider Last Rate Last Admin    sodium chloride flush 0.9 % injection 5-40 mL  5-40 mL IntraVENous 2 times per day Samuel, Roxane V, DO        sodium chloride flush 0.9 % injection 5-40 mL  5-40 mL IntraVENous PRN Samuel, Roxane V, DO        0.9 % sodium chloride infusion   IntraVENous PRN Samuel, Roxane V, DO        lactated ringers infusion   IntraVENous Continuous Samuel, Roxane V, DO        ceFAZolin (ANCEF) 2,000 mg in sterile water 20 mL IV syringe  2,000 mg IntraVENous On Call to OR Samuel, Roxane V, DO        acetaminophen (TYLENOL) tablet 1,000 mg  1,000 mg Oral Once Samuel, Roxane V, DO            Allergies   Allergen Reactions    Nortriptyline Itching    Duloxetine Itching    Fd&C Blue #1 (Brilliant Blue) Hives    Iodine      Other reaction(s): Unknown (comments)    Morphine Itching    Tetracycline Itching       Review of Systems:  Review of Systems   Constitutional:  Negative for chills, fever and unexpected weight change.   HENT:  Negative for congestion, ear pain and sore throat.    Eyes:  Negative for redness and visual disturbance.   Respiratory:  Negative for cough, shortness of breath and wheezing.    Cardiovascular:  Negative for chest pain and palpitations.   Gastrointestinal:  Negative for abdominal pain, nausea and vomiting.   Genitourinary:  Negative for dysuria, frequency and hematuria.   Musculoskeletal:  Negative for back pain, myalgias and neck pain.   Skin:  Negative for rash and wound.   Neurological:  Negative for dizziness, weakness and headaches.   Psychiatric/Behavioral:  Negative for confusion. The patient is not nervous/anxious.           Objective:     Vitals:    12/05/24 1540 12/20/24 0800 12/20/24 0802   BP:  (!) 146/131 (!) 155/71   Pulse:  71 70   Resp:  20    Temp:  98 °F (36.7 °C)    TempSrc:  Oral    SpO2:  97%    Weight: 81.6 kg (180 lb)     Height: 1.549 m (5' 1\")          General: alert, oriented, in no acute distress  Neck: supple, no masses, no

## 2024-12-23 NOTE — ANESTHESIA POSTPROCEDURE EVALUATION
Department of Anesthesiology  Postprocedure Note    Patient: Walker Marie  MRN: [id number]  YOB: 1940  Date of evaluation: 12/23/2024    Procedure Summary       Date: 12/20/24 Room / Location: [facility] MAIN OR 06 / [facility] MAIN OR    Anesthesia Start: 0852 Anesthesia Stop: 0926    Procedure: REMOVAL OF MEDIPORT (Chest) Diagnosis:       Colon cancer, ascending (HCC)      (Colon cancer, ascending (HCC) [C18.2])    Surgeons: Roxane Baker DO Responsible Provider: Ирина Campa MD    Anesthesia Type: MAC, TIVA ASA Status: 3            Anesthesia Type: No value filed.    Gisella Phase I: Gisella Score: 10    Gisella Phase II: Gisella Score: 10    Anesthesia Post Evaluation    No notable events documented.

## 2025-03-27 ENCOUNTER — HOSPITAL ENCOUNTER (INPATIENT)
Facility: HOSPITAL | Age: 85
LOS: 6 days | Discharge: NURSING, REHAB OR LONG-TERM CARE | DRG: 291 | End: 2025-04-02
Attending: EMERGENCY MEDICINE | Admitting: INTERNAL MEDICINE
Payer: MEDICARE

## 2025-03-27 ENCOUNTER — APPOINTMENT (OUTPATIENT)
Facility: HOSPITAL | Age: 85
DRG: 291 | End: 2025-03-27
Payer: MEDICARE

## 2025-03-27 DIAGNOSIS — I50.9 ACUTE CONGESTIVE HEART FAILURE, UNSPECIFIED HEART FAILURE TYPE (HCC): Primary | ICD-10-CM

## 2025-03-27 DIAGNOSIS — J81.0 ACUTE PULMONARY EDEMA (HCC): ICD-10-CM

## 2025-03-27 LAB
ALBUMIN SERPL-MCNC: 3.6 G/DL (ref 3.5–5)
ALBUMIN/GLOB SERPL: 1.1 (ref 1.1–2.2)
ALP SERPL-CCNC: 59 U/L (ref 45–117)
ALT SERPL-CCNC: 59 U/L (ref 12–78)
ANION GAP SERPL CALC-SCNC: 6 MMOL/L (ref 2–12)
AST SERPL W P-5'-P-CCNC: 74 U/L (ref 15–37)
BASOPHILS # BLD: 0.12 K/UL (ref 0–0.1)
BASOPHILS NFR BLD: 1.3 % (ref 0–1)
BILIRUB SERPL-MCNC: 0.4 MG/DL (ref 0.2–1)
BNP SERPL-MCNC: 653 PG/ML
BUN SERPL-MCNC: 21 MG/DL (ref 6–20)
BUN/CREAT SERPL: 12 (ref 12–20)
CA-I BLD-MCNC: 9.3 MG/DL (ref 8.5–10.1)
CHLORIDE SERPL-SCNC: 99 MMOL/L (ref 97–108)
CO2 SERPL-SCNC: 33 MMOL/L (ref 21–32)
CREAT SERPL-MCNC: 1.69 MG/DL (ref 0.55–1.02)
DIFFERENTIAL METHOD BLD: ABNORMAL
EOSINOPHIL # BLD: 0.3 K/UL (ref 0–0.4)
EOSINOPHIL NFR BLD: 3.2 % (ref 0–7)
ERYTHROCYTE [DISTWIDTH] IN BLOOD BY AUTOMATED COUNT: 14.3 % (ref 11.5–14.5)
GLOBULIN SER CALC-MCNC: 3.4 G/DL (ref 2–4)
GLUCOSE BLD STRIP.AUTO-MCNC: 88 MG/DL (ref 65–100)
GLUCOSE SERPL-MCNC: 135 MG/DL (ref 65–100)
HCT VFR BLD AUTO: 32 % (ref 35–47)
HGB BLD-MCNC: 10.9 G/DL (ref 11.5–16)
IMM GRANULOCYTES # BLD AUTO: 0.04 K/UL (ref 0–0.04)
IMM GRANULOCYTES NFR BLD AUTO: 0.4 % (ref 0–0.5)
LYMPHOCYTES # BLD: 1.69 K/UL (ref 0.8–3.5)
LYMPHOCYTES NFR BLD: 18.2 % (ref 12–49)
MCH RBC QN AUTO: 33.1 PG (ref 26–34)
MCHC RBC AUTO-ENTMCNC: 34.1 G/DL (ref 30–36.5)
MCV RBC AUTO: 97.3 FL (ref 80–99)
MONOCYTES # BLD: 0.76 K/UL (ref 0–1)
MONOCYTES NFR BLD: 8.2 % (ref 5–13)
NEUTS SEG # BLD: 6.37 K/UL (ref 1.8–8)
NEUTS SEG NFR BLD: 68.7 % (ref 32–75)
NRBC # BLD: 0 K/UL (ref 0–0.01)
NRBC BLD-RTO: 0 PER 100 WBC
PERFORMED BY:: NORMAL
PLATELET # BLD AUTO: 271 K/UL (ref 150–400)
PMV BLD AUTO: 11.9 FL (ref 8.9–12.9)
POTASSIUM SERPL-SCNC: 3.5 MMOL/L (ref 3.5–5.1)
PROT SERPL-MCNC: 7 G/DL (ref 6.4–8.2)
RBC # BLD AUTO: 3.29 M/UL (ref 3.8–5.2)
SODIUM SERPL-SCNC: 138 MMOL/L (ref 136–145)
TROPONIN I SERPL HS-MCNC: 24 NG/L (ref 0–51)
WBC # BLD AUTO: 9.3 K/UL (ref 3.6–11)

## 2025-03-27 PROCEDURE — 2060000000 HC ICU INTERMEDIATE R&B

## 2025-03-27 PROCEDURE — 94761 N-INVAS EAR/PLS OXIMETRY MLT: CPT

## 2025-03-27 PROCEDURE — 99285 EMERGENCY DEPT VISIT HI MDM: CPT

## 2025-03-27 PROCEDURE — 93005 ELECTROCARDIOGRAM TRACING: CPT | Performed by: EMERGENCY MEDICINE

## 2025-03-27 PROCEDURE — 84484 ASSAY OF TROPONIN QUANT: CPT

## 2025-03-27 PROCEDURE — 83880 ASSAY OF NATRIURETIC PEPTIDE: CPT

## 2025-03-27 PROCEDURE — 82962 GLUCOSE BLOOD TEST: CPT

## 2025-03-27 PROCEDURE — 80053 COMPREHEN METABOLIC PANEL: CPT

## 2025-03-27 PROCEDURE — 36415 COLL VENOUS BLD VENIPUNCTURE: CPT

## 2025-03-27 PROCEDURE — 6360000002 HC RX W HCPCS: Performed by: INTERNAL MEDICINE

## 2025-03-27 PROCEDURE — 85025 COMPLETE CBC W/AUTO DIFF WBC: CPT

## 2025-03-27 PROCEDURE — 71045 X-RAY EXAM CHEST 1 VIEW: CPT

## 2025-03-27 RX ORDER — ONDANSETRON 2 MG/ML
4 INJECTION INTRAMUSCULAR; INTRAVENOUS EVERY 6 HOURS PRN
Status: DISCONTINUED | OUTPATIENT
Start: 2025-03-27 | End: 2025-04-02 | Stop reason: HOSPADM

## 2025-03-27 RX ORDER — SODIUM CHLORIDE 0.9 % (FLUSH) 0.9 %
5-40 SYRINGE (ML) INJECTION EVERY 12 HOURS SCHEDULED
Status: DISCONTINUED | OUTPATIENT
Start: 2025-03-27 | End: 2025-04-02 | Stop reason: HOSPADM

## 2025-03-27 RX ORDER — ACETAMINOPHEN 325 MG/1
650 TABLET ORAL EVERY 6 HOURS PRN
Status: DISCONTINUED | OUTPATIENT
Start: 2025-03-27 | End: 2025-04-02 | Stop reason: HOSPADM

## 2025-03-27 RX ORDER — SODIUM CHLORIDE 9 MG/ML
INJECTION, SOLUTION INTRAVENOUS PRN
Status: DISCONTINUED | OUTPATIENT
Start: 2025-03-27 | End: 2025-04-02 | Stop reason: HOSPADM

## 2025-03-27 RX ORDER — ONDANSETRON 4 MG/1
4 TABLET, ORALLY DISINTEGRATING ORAL EVERY 8 HOURS PRN
Status: DISCONTINUED | OUTPATIENT
Start: 2025-03-27 | End: 2025-04-02 | Stop reason: HOSPADM

## 2025-03-27 RX ORDER — INSULIN LISPRO 100 [IU]/ML
0-4 INJECTION, SOLUTION INTRAVENOUS; SUBCUTANEOUS
Status: DISCONTINUED | OUTPATIENT
Start: 2025-03-27 | End: 2025-04-02 | Stop reason: HOSPADM

## 2025-03-27 RX ORDER — SODIUM CHLORIDE 0.9 % (FLUSH) 0.9 %
5-40 SYRINGE (ML) INJECTION PRN
Status: DISCONTINUED | OUTPATIENT
Start: 2025-03-27 | End: 2025-04-02 | Stop reason: HOSPADM

## 2025-03-27 RX ORDER — ENOXAPARIN SODIUM 100 MG/ML
30 INJECTION SUBCUTANEOUS DAILY
Status: DISCONTINUED | OUTPATIENT
Start: 2025-03-28 | End: 2025-04-02 | Stop reason: HOSPADM

## 2025-03-27 RX ORDER — FUROSEMIDE 10 MG/ML
40 INJECTION INTRAMUSCULAR; INTRAVENOUS 2 TIMES DAILY
Status: DISCONTINUED | OUTPATIENT
Start: 2025-03-27 | End: 2025-03-29

## 2025-03-27 RX ORDER — POLYETHYLENE GLYCOL 3350 17 G/17G
17 POWDER, FOR SOLUTION ORAL DAILY PRN
Status: DISCONTINUED | OUTPATIENT
Start: 2025-03-27 | End: 2025-04-02 | Stop reason: HOSPADM

## 2025-03-27 RX ORDER — ACETAMINOPHEN 650 MG/1
650 SUPPOSITORY RECTAL EVERY 6 HOURS PRN
Status: DISCONTINUED | OUTPATIENT
Start: 2025-03-27 | End: 2025-04-02 | Stop reason: HOSPADM

## 2025-03-27 RX ORDER — HYDRALAZINE HYDROCHLORIDE 20 MG/ML
10 INJECTION INTRAMUSCULAR; INTRAVENOUS EVERY 4 HOURS PRN
Status: DISCONTINUED | OUTPATIENT
Start: 2025-03-27 | End: 2025-04-02 | Stop reason: HOSPADM

## 2025-03-27 RX ADMIN — FUROSEMIDE 40 MG: 10 INJECTION, SOLUTION INTRAMUSCULAR; INTRAVENOUS at 21:31

## 2025-03-27 ASSESSMENT — PAIN SCALES - GENERAL
PAINLEVEL_OUTOF10: 0
PAINLEVEL_OUTOF10: 0

## 2025-03-27 ASSESSMENT — LIFESTYLE VARIABLES
HOW OFTEN DO YOU HAVE A DRINK CONTAINING ALCOHOL: PATIENT DECLINED
HOW MANY STANDARD DRINKS CONTAINING ALCOHOL DO YOU HAVE ON A TYPICAL DAY: PATIENT DECLINED

## 2025-03-27 ASSESSMENT — PAIN - FUNCTIONAL ASSESSMENT: PAIN_FUNCTIONAL_ASSESSMENT: NONE - DENIES PAIN

## 2025-03-27 NOTE — ED PROVIDER NOTES
[facility] EMERGENCY DEPARTMENT  EMERGENCY DEPARTMENT HISTORY AND PHYSICAL EXAM      Date: 3/27/2025  Patient Name: Walker Marie  MRN: [id number]  Birthdate 1940  Date of evaluation: 3/27/2025  Provider: Desmond Garcia DO   Note Started: 3:36 PM EDT 3/27/25    HISTORY OF PRESENT ILLNESS     Chief Complaint   Patient presents with    Altered Mental Status    Shortness of Breath     History Provided By: Patient    HPI: Walker Marie is an 85-year-old female with a past medical history of diabetes, hypertension, depression, thyroid disease, and pancreatitis who presents with altered mental status, shortness of breath, and bilateral lower extremity swelling. Per her brother, she has had progressive confusion over the past several days along with increasing fatigue and worsening dyspnea, especially with exertion. He also reports new or worsening bilateral leg swelling and states that home health advised bringing her to the emergency department. The patient lives alone and has become unable to care for herself.    PAST MEDICAL HISTORY   Past Medical History:  Past Medical History:   Diagnosis Date    Burning with urination     Calculus of kidney     Cancer (HCC)     lymph nodes    Depression     DM (diabetes mellitus) (HCC)     Hyperlipidemia     Hypertension     Pancreatitis     Thyroid disease        Past Surgical History:  Past Surgical History:   Procedure Laterality Date    AORTIC VALVE REPLACEMENT  08/2021    COLONOSCOPY N/A 07/18/2023    COLONOSCOPY WITH BIOPSY performed by Kamryn Solis MD at [facility] ENDOSCOPY    HX PARTIAL COLECTOMY Right 04/01/2022        HYSTERECTOMY (CERVIX STATUS UNKNOWN)      INS NEW/RPLCMT PRM PM W/TRANSV ELTRD ATRIAL&VENT N/A 5/11/2021    INSERT PPM BIV MULTI performed by Guru SETH Paul MD at [facility] ELECTROPHYSIOLOGY    IR FLUORO GUIDED CVA DEVICE PLACEMENT (AKA CVAD)  5/10/2022    IR FLUORO GUIDED CVA DEVICE PLACEMENT (AKA CVAD)  5/10/2021    IR

## 2025-03-27 NOTE — ED TRIAGE NOTES
C/o shortness of breath, fatigue, increased swelling in bilateral lower extremities, and confusion per brother. Brother reports that home health suggested that patient come here. Pt also lives by herself, unable to care for self.

## 2025-03-28 ENCOUNTER — APPOINTMENT (OUTPATIENT)
Facility: HOSPITAL | Age: 85
DRG: 291 | End: 2025-03-28
Attending: INTERNAL MEDICINE
Payer: MEDICARE

## 2025-03-28 LAB
ANION GAP SERPL CALC-SCNC: 6 MMOL/L (ref 2–12)
BASOPHILS # BLD: 0.12 K/UL (ref 0–0.1)
BASOPHILS NFR BLD: 1.5 % (ref 0–1)
BUN SERPL-MCNC: 19 MG/DL (ref 6–20)
BUN/CREAT SERPL: 11 (ref 12–20)
CA-I BLD-MCNC: 8.7 MG/DL (ref 8.5–10.1)
CHLORIDE SERPL-SCNC: 101 MMOL/L (ref 97–108)
CO2 SERPL-SCNC: 32 MMOL/L (ref 21–32)
CREAT SERPL-MCNC: 1.79 MG/DL (ref 0.55–1.02)
DIFFERENTIAL METHOD BLD: ABNORMAL
EKG ATRIAL RATE: 75 BPM
EKG DIAGNOSIS: NORMAL
EKG P AXIS: 25 DEGREES
EKG P-R INTERVAL: 128 MS
EKG Q-T INTERVAL: 482 MS
EKG QRS DURATION: 142 MS
EKG QTC CALCULATION (BAZETT): 538 MS
EKG R AXIS: 255 DEGREES
EKG T AXIS: 76 DEGREES
EKG VENTRICULAR RATE: 75 BPM
EOSINOPHIL # BLD: 0.2 K/UL (ref 0–0.4)
EOSINOPHIL NFR BLD: 2.5 % (ref 0–7)
ERYTHROCYTE [DISTWIDTH] IN BLOOD BY AUTOMATED COUNT: 14.3 % (ref 11.5–14.5)
GLUCOSE BLD STRIP.AUTO-MCNC: 242 MG/DL (ref 65–100)
GLUCOSE BLD STRIP.AUTO-MCNC: 310 MG/DL (ref 65–100)
GLUCOSE BLD STRIP.AUTO-MCNC: 337 MG/DL (ref 65–100)
GLUCOSE BLD STRIP.AUTO-MCNC: 338 MG/DL (ref 65–100)
GLUCOSE SERPL-MCNC: 139 MG/DL (ref 65–100)
HCT VFR BLD AUTO: 29.5 % (ref 35–47)
HGB BLD-MCNC: 9.8 G/DL (ref 11.5–16)
IMM GRANULOCYTES # BLD AUTO: 0.04 K/UL (ref 0–0.04)
IMM GRANULOCYTES NFR BLD AUTO: 0.5 % (ref 0–0.5)
LYMPHOCYTES # BLD: 1.3 K/UL (ref 0.8–3.5)
LYMPHOCYTES NFR BLD: 16.1 % (ref 12–49)
MCH RBC QN AUTO: 32.6 PG (ref 26–34)
MCHC RBC AUTO-ENTMCNC: 33.2 G/DL (ref 30–36.5)
MCV RBC AUTO: 98 FL (ref 80–99)
MONOCYTES # BLD: 0.79 K/UL (ref 0–1)
MONOCYTES NFR BLD: 9.8 % (ref 5–13)
NEUTS SEG # BLD: 5.63 K/UL (ref 1.8–8)
NEUTS SEG NFR BLD: 69.6 % (ref 32–75)
NRBC # BLD: 0 K/UL (ref 0–0.01)
NRBC BLD-RTO: 0 PER 100 WBC
PERFORMED BY:: ABNORMAL
PLATELET # BLD AUTO: 235 K/UL (ref 150–400)
PMV BLD AUTO: 12.3 FL (ref 8.9–12.9)
POTASSIUM SERPL-SCNC: 3 MMOL/L (ref 3.5–5.1)
RBC # BLD AUTO: 3.01 M/UL (ref 3.8–5.2)
SODIUM SERPL-SCNC: 139 MMOL/L (ref 136–145)
WBC # BLD AUTO: 8.1 K/UL (ref 3.6–11)

## 2025-03-28 PROCEDURE — 6360000002 HC RX W HCPCS: Performed by: INTERNAL MEDICINE

## 2025-03-28 PROCEDURE — 2500000003 HC RX 250 WO HCPCS: Performed by: INTERNAL MEDICINE

## 2025-03-28 PROCEDURE — 6370000000 HC RX 637 (ALT 250 FOR IP): Performed by: INTERNAL MEDICINE

## 2025-03-28 PROCEDURE — 94010 BREATHING CAPACITY TEST: CPT

## 2025-03-28 PROCEDURE — 36415 COLL VENOUS BLD VENIPUNCTURE: CPT

## 2025-03-28 PROCEDURE — 2060000000 HC ICU INTERMEDIATE R&B

## 2025-03-28 PROCEDURE — 6360000004 HC RX CONTRAST MEDICATION: Performed by: INTERNAL MEDICINE

## 2025-03-28 PROCEDURE — 85025 COMPLETE CBC W/AUTO DIFF WBC: CPT

## 2025-03-28 PROCEDURE — C8929 TTE W OR WO FOL WCON,DOPPLER: HCPCS

## 2025-03-28 PROCEDURE — 94761 N-INVAS EAR/PLS OXIMETRY MLT: CPT

## 2025-03-28 PROCEDURE — 80048 BASIC METABOLIC PNL TOTAL CA: CPT

## 2025-03-28 PROCEDURE — 82962 GLUCOSE BLOOD TEST: CPT

## 2025-03-28 RX ORDER — INSULIN GLARGINE 100 [IU]/ML
40 INJECTION, SOLUTION SUBCUTANEOUS DAILY
Status: DISCONTINUED | OUTPATIENT
Start: 2025-03-28 | End: 2025-04-02 | Stop reason: HOSPADM

## 2025-03-28 RX ORDER — DULOXETIN HYDROCHLORIDE 30 MG/1
30 CAPSULE, DELAYED RELEASE ORAL DAILY
Status: DISCONTINUED | OUTPATIENT
Start: 2025-03-28 | End: 2025-04-02 | Stop reason: HOSPADM

## 2025-03-28 RX ORDER — ATORVASTATIN CALCIUM 10 MG/1
10 TABLET, FILM COATED ORAL DAILY
Status: DISCONTINUED | OUTPATIENT
Start: 2025-03-28 | End: 2025-04-02 | Stop reason: HOSPADM

## 2025-03-28 RX ORDER — AMLODIPINE BESYLATE 5 MG/1
5 TABLET ORAL DAILY
Status: DISCONTINUED | OUTPATIENT
Start: 2025-03-28 | End: 2025-04-02 | Stop reason: HOSPADM

## 2025-03-28 RX ORDER — DEXTROSE MONOHYDRATE 100 MG/ML
INJECTION, SOLUTION INTRAVENOUS CONTINUOUS PRN
Status: DISCONTINUED | OUTPATIENT
Start: 2025-03-28 | End: 2025-04-02 | Stop reason: HOSPADM

## 2025-03-28 RX ORDER — HYDROXYZINE PAMOATE 25 MG/1
25 CAPSULE ORAL NIGHTLY
Status: DISCONTINUED | OUTPATIENT
Start: 2025-03-28 | End: 2025-04-02 | Stop reason: HOSPADM

## 2025-03-28 RX ORDER — PANTOPRAZOLE SODIUM 40 MG/1
40 TABLET, DELAYED RELEASE ORAL DAILY
Status: DISCONTINUED | OUTPATIENT
Start: 2025-03-28 | End: 2025-04-02 | Stop reason: HOSPADM

## 2025-03-28 RX ORDER — POTASSIUM CHLORIDE 1500 MG/1
40 TABLET, EXTENDED RELEASE ORAL ONCE
Status: COMPLETED | OUTPATIENT
Start: 2025-03-28 | End: 2025-03-28

## 2025-03-28 RX ORDER — ASPIRIN 81 MG/1
81 TABLET ORAL DAILY
COMMUNITY

## 2025-03-28 RX ORDER — MELOXICAM 15 MG/1
15 TABLET ORAL DAILY
Status: ON HOLD | COMMUNITY
End: 2025-04-02 | Stop reason: HOSPADM

## 2025-03-28 RX ORDER — GLUCAGON 1 MG/ML
1 KIT INJECTION PRN
Status: DISCONTINUED | OUTPATIENT
Start: 2025-03-28 | End: 2025-04-02 | Stop reason: HOSPADM

## 2025-03-28 RX ORDER — METOPROLOL TARTRATE 25 MG/1
25 TABLET, FILM COATED ORAL 2 TIMES DAILY
Status: DISCONTINUED | OUTPATIENT
Start: 2025-03-28 | End: 2025-04-02 | Stop reason: HOSPADM

## 2025-03-28 RX ORDER — METOPROLOL TARTRATE 25 MG/1
25 TABLET, FILM COATED ORAL 2 TIMES DAILY
COMMUNITY

## 2025-03-28 RX ADMIN — METOPROLOL TARTRATE 25 MG: 25 TABLET, FILM COATED ORAL at 10:10

## 2025-03-28 RX ADMIN — SODIUM CHLORIDE, PRESERVATIVE FREE 10 ML: 5 INJECTION INTRAVENOUS at 08:30

## 2025-03-28 RX ADMIN — POTASSIUM CHLORIDE 40 MEQ: 1500 TABLET, EXTENDED RELEASE ORAL at 08:30

## 2025-03-28 RX ADMIN — INSULIN LISPRO 3 UNITS: 100 INJECTION, SOLUTION INTRAVENOUS; SUBCUTANEOUS at 11:36

## 2025-03-28 RX ADMIN — ATORVASTATIN CALCIUM 10 MG: 10 TABLET, FILM COATED ORAL at 10:10

## 2025-03-28 RX ADMIN — INSULIN LISPRO 3 UNITS: 100 INJECTION, SOLUTION INTRAVENOUS; SUBCUTANEOUS at 17:04

## 2025-03-28 RX ADMIN — INSULIN LISPRO 1 UNITS: 100 INJECTION, SOLUTION INTRAVENOUS; SUBCUTANEOUS at 08:28

## 2025-03-28 RX ADMIN — PANTOPRAZOLE SODIUM 40 MG: 40 TABLET, DELAYED RELEASE ORAL at 10:10

## 2025-03-28 RX ADMIN — INSULIN GLARGINE 40 UNITS: 100 INJECTION, SOLUTION SUBCUTANEOUS at 10:10

## 2025-03-28 RX ADMIN — SULFUR HEXAFLUORIDE 2 ML: 60.7; .19; .19 INJECTION, POWDER, LYOPHILIZED, FOR SUSPENSION INTRAVENOUS; INTRAVESICAL at 13:02

## 2025-03-28 RX ADMIN — AMLODIPINE BESYLATE 5 MG: 5 TABLET ORAL at 10:10

## 2025-03-28 RX ADMIN — FUROSEMIDE 40 MG: 10 INJECTION, SOLUTION INTRAMUSCULAR; INTRAVENOUS at 17:05

## 2025-03-28 RX ADMIN — METOPROLOL TARTRATE 25 MG: 25 TABLET, FILM COATED ORAL at 20:54

## 2025-03-28 RX ADMIN — HYDROXYZINE PAMOATE 25 MG: 25 CAPSULE ORAL at 20:54

## 2025-03-28 RX ADMIN — INSULIN LISPRO 3 UNITS: 100 INJECTION, SOLUTION INTRAVENOUS; SUBCUTANEOUS at 20:55

## 2025-03-28 RX ADMIN — DULOXETINE HYDROCHLORIDE 30 MG: 30 CAPSULE, DELAYED RELEASE ORAL at 10:10

## 2025-03-28 RX ADMIN — ENOXAPARIN SODIUM 30 MG: 100 INJECTION SUBCUTANEOUS at 08:29

## 2025-03-28 RX ADMIN — ACETAMINOPHEN 650 MG: 325 TABLET ORAL at 20:54

## 2025-03-28 RX ADMIN — SODIUM CHLORIDE, PRESERVATIVE FREE 10 ML: 5 INJECTION INTRAVENOUS at 20:55

## 2025-03-28 RX ADMIN — SODIUM CHLORIDE, PRESERVATIVE FREE 10 ML: 5 INJECTION INTRAVENOUS at 00:02

## 2025-03-28 ASSESSMENT — PAIN SCALES - GENERAL
PAINLEVEL_OUTOF10: 0
PAINLEVEL_OUTOF10: 3
PAINLEVEL_OUTOF10: 0
PAINLEVEL_OUTOF10: 0

## 2025-03-28 ASSESSMENT — COPD QUESTIONNAIRES
QUESTION2_CHESTPHLEGM: 0
QUESTION6_LEAVINGHOUSE: 1
QUESTION4_WALKINCLINE: 1
QUESTION7_SLEEPQUALITY: 0
QUESTION3_CHESTTIGHTNESS: 0
QUESTION5_HOMEACTIVITIES: 0
CAT_TOTALSCORE: 3
QUESTION1_COUGHFREQUENCY: 0
QUESTION8_ENERGYLEVEL: 1
TOTAL_EXACERBATIONS_PASTYEAR: 0

## 2025-03-28 ASSESSMENT — PAIN DESCRIPTION - LOCATION: LOCATION: HEAD

## 2025-03-28 ASSESSMENT — PAIN DESCRIPTION - DESCRIPTORS: DESCRIPTORS: ACHING

## 2025-03-28 NOTE — DISCHARGE INSTRUCTIONS
You have been given a copy of the American Heart Association's Heart Failure Educational Booklet.  Please read over this booklet and take it with you to your next physician appointment with any questions you may have.     For additional resources and to access the American Heart Association's Interactive Workbook \"Healthier Living with Heart Failure - Managing Symptoms and Reducing Risk,\" please scan the QR code below.               Download the Heart Failure [place] Eris: Search in your Google Play Store (GeoOP) or tu.nr Eris Store (FABPulous): Search for- HF [place] Eris.    https://www.heart.org/en/health-topics/heart-failure/heart-failure-tools-resources/gc-pmzgri-edi    HF Hale[place]wa is a brand-new phone eris that helps you track daily symptoms, vitals, mood, energy level and more. You can even add your heart failure care team members to view your data and monitor your condition at home.    HF [place] Lets You:  Track symptoms, medications and more  Share health information with your health care team  Connect with others living with heart failure

## 2025-03-28 NOTE — CARE COORDINATION
03/28/25 1532   Service Assessment   Patient Orientation Alert and Oriented   Cognition Alert   History Provided By Patient   Primary Caregiver Self   Support Systems Children   Patient's Healthcare Decision Maker is: Legal Next of Kin   PCP Verified by CM Yes   Last Visit to PCP Within last 3 months   Prior Functional Level Assistance with the following:;Mobility   Current Functional Level Mobility;Assistance with the following:   Can patient return to prior living arrangement Yes   Ability to make needs known: Good   Family able to assist with home care needs: Yes   Would you like for me to discuss the discharge plan with any other family members/significant others, and if so, who? Yes  (children)   Financial Resources Medicare   Community Resources None   CM/SW Referral Other (see comment)   Social/Functional History   Lives With Alone   Type of Home House   Home Layout Two level   Home Access Ramped entrance   Bathroom Equipment Shower chair   Prior Level of Assist for ADLs Independent   Prior Level of Assist for Homemaking Independent   Ambulation Assistance Independent   Active  No   Patient's  Info children       Patient currently lives alone in a 2 story house, stays downstairs, with ramp to the entrance, address on chart confirmed.  Son stays with her at night.    Patient uses a rollator to ambulate, has shower chair.  Patient current with listed PCP, is not an active , son typically transports, uses [facility] [address], no reported issues obtaining medications.    Advance Care Planning   Healthcare Decision Maker:    Primary Decision Maker: Niles Strong - Child - [phone]    Secondary Decision Maker: rigo strong - Child - [phone]    Click here to complete Healthcare Decision Makers including selection of the Healthcare Decision Maker Relationship (ie \"Primary\").

## 2025-03-28 NOTE — H&P
cream APPLY PEA AMOUNT OF CREAM MONDAY, WEDNESDAY, FRIDAY ONCE DAILY  Patient not taking: Reported on 12/5/2024 4/29/24   Linsey Whitman APRN - NP   estradiol (ESTRACE VAGINAL) 0.1 MG/GM vaginal cream Apply pea amount of cream daily on affected area on Monday,Wednesday,Friday  Patient not taking: Reported on 12/5/2024 4/26/24   Linsey Whitman APRN - NP   oxybutynin (DITROPAN-XL) 10 MG extended release tablet TAKE 1 TABLET BY MOUTH EVERY NIGHT  Patient not taking: Reported on 8/22/2024 10/12/23   Linsey Whitman APRN - NP   amLODIPine (NORVASC) 5 MG tablet Take 1 tablet by mouth daily 6/27/23   ProviderJames MD   dicyclomine (BENTYL) 10 MG capsule TAKE ONE CAPSULE BY MOUTH THREE TIMES DAILY AS NEEDED  Patient not taking: Reported on 8/22/2024 4/10/23   Provider, MD James   ACCU-CHEK SMARTVIEW strip CHECK BLOOD SUGAR TWICE A DAY AS DIRECTED 4/11/23   ProviderJames MD   BD PEN NEEDLE RENITA 2ND GEN 32G X 4 MM MISC USE ONECE DAILY OR AS DIRECTED 4/12/23   James Howard MD   acetaminophen (TYLENOL) 325 MG tablet Take 1 tablet by mouth as needed for Pain    Automatic Reconciliation, Ar   albuterol sulfate HFA (PROVENTIL;VENTOLIN;PROAIR) 108 (90 Base) MCG/ACT inhaler Inhale 2 puffs into the lungs every 6 hours as needed for Wheezing or Shortness of Breath 4/8/22   Automatic Reconciliation, Ar   aspirin 81 MG EC tablet Take 1 tablet by mouth daily    Automatic Reconciliation, Ar   DULoxetine (CYMBALTA) 30 MG extended release capsule Take 1 capsule by mouth daily    Automatic Reconciliation, Ar   ferrous sulfate (IRON 325) 325 (65 Fe) MG tablet Take 1 tablet by mouth with breakfast and with evening meal 5/19/21   Automatic Reconciliation, Ar   furosemide (LASIX) 40 MG tablet Take 1 tablet by mouth daily as needed (edema) 12/12/22   Automatic Reconciliation, Ar   hydrOXYzine pamoate (VISTARIL) 25 MG capsule Take 1 capsule by mouth nightly    Automatic Reconciliation, Ar

## 2025-03-28 NOTE — CONSULTS
Cardiology Consult    NAME: Walker Marie   :  1940   MRN:  [id number]     Date/Time:  3/28/2025 8:21 AM    Patient PCP: Earl Warren MD  ________________________________________________________________________    Problem List  85 year old female presenting with altered mental status, dyspnea on exertion, and bilateral lower extremity swelling.  -Cardiology consult invited secondary to onset of congestive heart failure. NT-proBNP elevated at 653.  -Diabetes  -Hypertension  -Thyroid disease  -Pancreatitis  -Depression  -Last echocardiogram 3/31/22 showed EF 55-60%       Assessment and Plan:   2025  -85-year-old well-preserved white female with no known established coronary artery disease admitted to the hospital with shortness of breath and diagnosed with heart failure.  -When I saw the patient this morning she was lying comfortably in the bed and has no symptoms but only feeling fatigue and tired.  -Cardiac enzyme has been unremarkable and EKG shows no acute changes for significant ischemia or injury pattern.  -Echocardiogram is pending.  -Telemetry reveals paced rhythm.  -Will check serial cardiac enzymes obtain echocardiogram and follow her and then make further cardiovascular management decision as clinically warranted.    Thank you for the consultation and letting me participate in the care of your patient.        []        High complexity decision making was performed        Subjective:   CHIEF COMPLAINT: Altered mental status, dyspnea on exertion, bilateral lower extremity swelling      REASON FOR CONSULT: New onset CHF      HISTORY OF PRESENT ILLNESS:     Walker Marie is a 85 y.o. White (non-) female who 85 year old female with history of diabetes and hypertension presenting with altered mental status, dyspnea on exertion, and bilateral lower extremity swelling. She has had increasing confusion over several days as well as fatigue and worsening dyspnea on exertion.

## 2025-03-28 NOTE — ED NOTES
ED TO INPATIENT SBAR HANDOFF    Patient Name: Walker Marie   Preferred Name: Walker PYLEB: 1940  85 y.o.   Family/Caregiver Present: no   Code Status Order: Prior  PO Status: NPO:No  Telemetry Order:   C-SSRS: Risk of Suicide: No Risk  Sitter no     Restraints:     Sepsis Risk Score      Situation  Chief Complaint   Patient presents with    Altered Mental Status    Shortness of Breath     Brief Description of Patient's Condition: Came in for SOB, fatigue and BLE edema. Fall risk, is on purewick  Mental Status: oriented, alert, coherent, logical, thought processes intact, and able to concentrate and follow conversation  Arrived from:Home  Imaging:   XR CHEST 1 VIEW   Final Result      1. New left pleural effusion with underlying left basilar airspace opacities.   2. Cardiomegaly with mild pulmonary vascular congestion.         Electronically signed by Iris Brasher        Abnormal labs:   Abnormal Labs Reviewed   BRAIN NATRIURETIC PEPTIDE - Abnormal; Notable for the following components:       Result Value    NT Pro- (*)     All other components within normal limits   CBC WITH AUTO DIFFERENTIAL - Abnormal; Notable for the following components:    RBC 3.29 (*)     Hemoglobin 10.9 (*)     Hematocrit 32.0 (*)     Basophils % 1.3 (*)     Basophils Absolute 0.12 (*)     All other components within normal limits   COMPREHENSIVE METABOLIC PANEL - Abnormal; Notable for the following components:    CO2 33 (*)     Glucose 135 (*)     BUN 21 (*)     Creatinine 1.69 (*)     Est, Glom Filt Rate 30 (*)     AST 74 (*)     All other components within normal limits       Background  Allergies:   Allergies   Allergen Reactions    Nortriptyline Itching    Duloxetine Itching    Fd&C Blue #1 (Brilliant Blue) Hives    Iodine      Other reaction(s): Unknown (comments)    Morphine Itching    Tetracycline Itching     History:   Past Medical History:   Diagnosis Date    Burning with urination     Calculus of kidney

## 2025-03-28 NOTE — RT PROTOCOL NOTE
Pulmonary Disease Navigator Note  [facility] [place] [facility]    Current GOLD classification for Walker Marie  Patient's chart was reviewed by Pulmonary Disease Navigator for compliance with prescribed treatment with [employer] For Chronic Obstructive Lung Disease (GOLD).    Patient on list as COPD patient.  There is no history of COPD in any of her chart notes or documents that I can find.  Patient is on room air with SPO2 93%, no wheezing or distress noted.  Patient is a life-long non-smoker.  Patient is not on any respiratory medications at home or while hospitalized.  Patient's history includes AMS, colon CA, DM, HTN, pacemaker, pancreatitis, hypothyroidism, kidney stones and new onset CHF        Time spent in review and with patient at bedside: 25 minutes    Electronically signed by Jumana Trejo RRT

## 2025-03-28 NOTE — NURSE NAVIGATOR
Heart Failure Education/Evaluation/Recommendations    Code Status: Full Code        CXRAY:     IMPRESSION:     1. New left pleural effusion with underlying left basilar airspace opacities.  2. Cardiomegaly with mild pulmonary vascular congestion.        Electronically signed by Iris Brasher        Exam Ended: 03/27/25 16:05 EDT         BNP:  DATE   RESULTS  03/27/2025  653        ECHO: PENDING        Cardiology Consulted: YES            ------------------------------------------------------------------------------------    RN-NN knocks, enters the room, and performs hand hygiene/ uses proper PPE. RN-NN introduces herself and the reason for visit.    Education packet brought to the patients room. (Magnet, AHA heart failure book, AHA HF Zone calendar, AHA Take charge of Heart Failure Discussion Guide, Where's the sodium Nutrition Label, What is a Fluid Fluid Restriction, Daily weight knowledge, BP/WT Chart, Fluid/Sodium Restriction Chart)    Patient & Family agrees to Education.    Patient has a scale that works.    Patient & Family educated by RN-NN, Doctor, and/or Nurse. Education includes all the following even if all does not apply to the patient at this time.    Low sodium Diet, reading Nutrition labels, and foods to avoid  Fluid Restriction  Smoking Cessation, ETOH abuse cessation, Drug use/abuse cessation  Daily Weights  Symptoms and Reporting symptoms to Cardiology/Nephrology  Activity/Exercise  Support Groups and family support  Local Resources    *Patient lives with the son who works as a  and is gone through the day. Per the son he is worried about her muscle weakness.    RNFaridaNN defers ONGOING Education to Nursing Staff.    Social Determinants of Health problems issues:  Financial:   Denied  Transportation:  Denied  Education:   Denied  Food:    Denied  Housing:   Denied  Safety:    Denied  Drug/ETOH/Smoking:  Denied  Activity    Admits poor activity        Resources offered via

## 2025-03-29 LAB
ANION GAP SERPL CALC-SCNC: 7 MMOL/L (ref 2–12)
BASOPHILS # BLD: 0.13 K/UL (ref 0–0.1)
BASOPHILS NFR BLD: 1.8 % (ref 0–1)
BUN SERPL-MCNC: 26 MG/DL (ref 6–20)
BUN/CREAT SERPL: 14 (ref 12–20)
CA-I BLD-MCNC: 8.8 MG/DL (ref 8.5–10.1)
CHLORIDE SERPL-SCNC: 102 MMOL/L (ref 97–108)
CO2 SERPL-SCNC: 28 MMOL/L (ref 21–32)
CREAT SERPL-MCNC: 1.91 MG/DL (ref 0.55–1.02)
DIFFERENTIAL METHOD BLD: ABNORMAL
EOSINOPHIL # BLD: 0.24 K/UL (ref 0–0.4)
EOSINOPHIL NFR BLD: 3.3 % (ref 0–7)
ERYTHROCYTE [DISTWIDTH] IN BLOOD BY AUTOMATED COUNT: 14.7 % (ref 11.5–14.5)
GLUCOSE BLD STRIP.AUTO-MCNC: 165 MG/DL (ref 65–100)
GLUCOSE BLD STRIP.AUTO-MCNC: 201 MG/DL (ref 65–100)
GLUCOSE BLD STRIP.AUTO-MCNC: 260 MG/DL (ref 65–100)
GLUCOSE BLD STRIP.AUTO-MCNC: 318 MG/DL (ref 65–100)
GLUCOSE SERPL-MCNC: 188 MG/DL (ref 65–100)
HCT VFR BLD AUTO: 30 % (ref 35–47)
HGB BLD-MCNC: 9.9 G/DL (ref 11.5–16)
IMM GRANULOCYTES # BLD AUTO: 0.03 K/UL (ref 0–0.04)
IMM GRANULOCYTES NFR BLD AUTO: 0.4 % (ref 0–0.5)
LYMPHOCYTES # BLD: 1.51 K/UL (ref 0.8–3.5)
LYMPHOCYTES NFR BLD: 20.5 % (ref 12–49)
MCH RBC QN AUTO: 32.9 PG (ref 26–34)
MCHC RBC AUTO-ENTMCNC: 33 G/DL (ref 30–36.5)
MCV RBC AUTO: 99.7 FL (ref 80–99)
MONOCYTES # BLD: 0.78 K/UL (ref 0–1)
MONOCYTES NFR BLD: 10.6 % (ref 5–13)
NEUTS SEG # BLD: 4.68 K/UL (ref 1.8–8)
NEUTS SEG NFR BLD: 63.4 % (ref 32–75)
NRBC # BLD: 0 K/UL (ref 0–0.01)
NRBC BLD-RTO: 0 PER 100 WBC
PERFORMED BY:: ABNORMAL
PLATELET # BLD AUTO: 251 K/UL (ref 150–400)
PMV BLD AUTO: 12.1 FL (ref 8.9–12.9)
POTASSIUM SERPL-SCNC: 3.4 MMOL/L (ref 3.5–5.1)
RBC # BLD AUTO: 3.01 M/UL (ref 3.8–5.2)
SODIUM SERPL-SCNC: 137 MMOL/L (ref 136–145)
WBC # BLD AUTO: 7.4 K/UL (ref 3.6–11)

## 2025-03-29 PROCEDURE — 2060000000 HC ICU INTERMEDIATE R&B

## 2025-03-29 PROCEDURE — 6360000002 HC RX W HCPCS: Performed by: INTERNAL MEDICINE

## 2025-03-29 PROCEDURE — 94761 N-INVAS EAR/PLS OXIMETRY MLT: CPT

## 2025-03-29 PROCEDURE — 80048 BASIC METABOLIC PNL TOTAL CA: CPT

## 2025-03-29 PROCEDURE — 85025 COMPLETE CBC W/AUTO DIFF WBC: CPT

## 2025-03-29 PROCEDURE — 82962 GLUCOSE BLOOD TEST: CPT

## 2025-03-29 PROCEDURE — 36415 COLL VENOUS BLD VENIPUNCTURE: CPT

## 2025-03-29 PROCEDURE — 6370000000 HC RX 637 (ALT 250 FOR IP): Performed by: INTERNAL MEDICINE

## 2025-03-29 PROCEDURE — 97530 THERAPEUTIC ACTIVITIES: CPT

## 2025-03-29 PROCEDURE — 2500000003 HC RX 250 WO HCPCS: Performed by: INTERNAL MEDICINE

## 2025-03-29 PROCEDURE — 97161 PT EVAL LOW COMPLEX 20 MIN: CPT

## 2025-03-29 RX ORDER — FUROSEMIDE 10 MG/ML
40 INJECTION INTRAMUSCULAR; INTRAVENOUS DAILY
Status: DISCONTINUED | OUTPATIENT
Start: 2025-03-30 | End: 2025-03-31

## 2025-03-29 RX ADMIN — INSULIN LISPRO 1 UNITS: 100 INJECTION, SOLUTION INTRAVENOUS; SUBCUTANEOUS at 12:32

## 2025-03-29 RX ADMIN — SODIUM CHLORIDE, PRESERVATIVE FREE 10 ML: 5 INJECTION INTRAVENOUS at 20:41

## 2025-03-29 RX ADMIN — INSULIN LISPRO 3 UNITS: 100 INJECTION, SOLUTION INTRAVENOUS; SUBCUTANEOUS at 20:41

## 2025-03-29 RX ADMIN — FUROSEMIDE 40 MG: 10 INJECTION, SOLUTION INTRAMUSCULAR; INTRAVENOUS at 08:16

## 2025-03-29 RX ADMIN — INSULIN LISPRO 2 UNITS: 100 INJECTION, SOLUTION INTRAVENOUS; SUBCUTANEOUS at 18:17

## 2025-03-29 RX ADMIN — HYDROXYZINE PAMOATE 25 MG: 25 CAPSULE ORAL at 20:41

## 2025-03-29 RX ADMIN — SODIUM CHLORIDE, PRESERVATIVE FREE 10 ML: 5 INJECTION INTRAVENOUS at 08:16

## 2025-03-29 RX ADMIN — ATORVASTATIN CALCIUM 10 MG: 10 TABLET, FILM COATED ORAL at 08:15

## 2025-03-29 RX ADMIN — PANTOPRAZOLE SODIUM 40 MG: 40 TABLET, DELAYED RELEASE ORAL at 08:15

## 2025-03-29 RX ADMIN — DULOXETINE HYDROCHLORIDE 30 MG: 30 CAPSULE, DELAYED RELEASE ORAL at 08:15

## 2025-03-29 RX ADMIN — INSULIN GLARGINE 40 UNITS: 100 INJECTION, SOLUTION SUBCUTANEOUS at 08:15

## 2025-03-29 RX ADMIN — AMLODIPINE BESYLATE 5 MG: 5 TABLET ORAL at 08:15

## 2025-03-29 RX ADMIN — METOPROLOL TARTRATE 25 MG: 25 TABLET, FILM COATED ORAL at 20:41

## 2025-03-29 RX ADMIN — LEVOTHYROXINE SODIUM 125 MCG: 0.03 TABLET ORAL at 06:07

## 2025-03-29 RX ADMIN — METOPROLOL TARTRATE 25 MG: 25 TABLET, FILM COATED ORAL at 08:15

## 2025-03-29 RX ADMIN — ENOXAPARIN SODIUM 30 MG: 100 INJECTION SUBCUTANEOUS at 08:15

## 2025-03-29 ASSESSMENT — PAIN SCALES - GENERAL
PAINLEVEL_OUTOF10: 0

## 2025-03-29 NOTE — CONSULTS
Nephrology Consultation          Patient: Walker Marie MRN: [id number]  SSN: [id number]    YOB: 1940  Age: 85 y.o.  Sex: female      Subjective:   Reason for the consultation.  Acute kidney injury and volume overload  History of present illness.  The patient is 85-year-old woman with underlying history of diabetes mellitus type 2, status post aortic valve replacement, hypertension, colon cancer, pacemaker placement presented with shortness of breath and lower extremity swelling.  On arrival initial chemistry showed elevated serum creatinine 1.6 from her baseline 1.7 in 2023 which prompted nephrology consultation.  Chest x-ray left pleural effusion and vascular congestion.  2D echocardiogram preserved LVEF.  She was put on IV furosemide.  Her creatinine has gone up to 1.9 today.  No noticed lower extremity swelling now.  She is on room air.  No voiding issues.    Past Medical History:   Diagnosis Date    Burning with urination     Calculus of kidney     Cancer (HCC)     lymph nodes    Depression     DM (diabetes mellitus) (HCC)     History of aortic valve replacement with bioprosthetic valve     Hyperlipidemia     Hypertension     Pancreatitis     Thyroid disease      Past Surgical History:   Procedure Laterality Date    AORTIC VALVE REPLACEMENT  08/2021    COLONOSCOPY N/A 07/18/2023    COLONOSCOPY WITH BIOPSY performed by Kamryn Solis MD at [facility] ENDOSCOPY    HX PARTIAL COLECTOMY Right 04/01/2022        HYSTERECTOMY (CERVIX STATUS UNKNOWN)      INS NEW/RPLCMT PRM PM W/TRANSV ELTRD ATRIAL&VENT N/A 5/11/2021    INSERT PPM BIV MULTI performed by Guru SETH Paul MD at [facility] ELECTROPHYSIOLOGY    IR FLUORO GUIDED CVA DEVICE PLACEMENT (AKA CVAD)  5/10/2022    IR FLUORO GUIDED CVA DEVICE PLACEMENT (AKA CVAD)  5/10/2021    IR NONTUNNELED VASCULAR CATHETER > 5 YEARS  05/10/2021    IR NONTUNNELED VASCULAR CATHETER 5/10/2021 [facility] RAD ANGIO IR    IR NONTUNNELED VASCULAR CATHETER > 5

## 2025-03-30 LAB
ALBUMIN SERPL-MCNC: 3.1 G/DL (ref 3.5–5)
ANION GAP SERPL CALC-SCNC: 5 MMOL/L (ref 2–12)
APPEARANCE UR: ABNORMAL
BACTERIA URNS QL MICRO: NEGATIVE /HPF
BASOPHILS # BLD: 0.11 K/UL (ref 0–0.1)
BASOPHILS NFR BLD: 1.5 % (ref 0–1)
BILIRUB UR QL: NEGATIVE
BUN SERPL-MCNC: 24 MG/DL (ref 6–20)
BUN/CREAT SERPL: 15 (ref 12–20)
CA-I BLD-MCNC: 8.9 MG/DL (ref 8.5–10.1)
CHLORIDE SERPL-SCNC: 99 MMOL/L (ref 97–108)
CO2 SERPL-SCNC: 33 MMOL/L (ref 21–32)
COLOR UR: ABNORMAL
CREAT SERPL-MCNC: 1.64 MG/DL (ref 0.55–1.02)
CREAT UR-MCNC: 178 MG/DL
DIFFERENTIAL METHOD BLD: ABNORMAL
EOSINOPHIL # BLD: 0.24 K/UL (ref 0–0.4)
EOSINOPHIL NFR BLD: 3.2 % (ref 0–7)
EPITH CASTS URNS QL MICRO: ABNORMAL /LPF
ERYTHROCYTE [DISTWIDTH] IN BLOOD BY AUTOMATED COUNT: 14.6 % (ref 11.5–14.5)
FERRITIN SERPL-MCNC: 55 NG/ML (ref 8–252)
GLUCOSE BLD STRIP.AUTO-MCNC: 218 MG/DL (ref 65–100)
GLUCOSE BLD STRIP.AUTO-MCNC: 243 MG/DL (ref 65–100)
GLUCOSE BLD STRIP.AUTO-MCNC: 264 MG/DL (ref 65–100)
GLUCOSE BLD STRIP.AUTO-MCNC: 367 MG/DL (ref 65–100)
GLUCOSE SERPL-MCNC: 248 MG/DL (ref 65–100)
GLUCOSE UR STRIP.AUTO-MCNC: NEGATIVE MG/DL
HCT VFR BLD AUTO: 30.5 % (ref 35–47)
HGB BLD-MCNC: 9.8 G/DL (ref 11.5–16)
HGB UR QL STRIP: NEGATIVE
HYALINE CASTS URNS QL MICRO: ABNORMAL /LPF (ref 0–5)
IMM GRANULOCYTES # BLD AUTO: 0.04 K/UL (ref 0–0.04)
IMM GRANULOCYTES NFR BLD AUTO: 0.5 % (ref 0–0.5)
IRON SATN MFR SERPL: 9 % (ref 20–50)
IRON SERPL-MCNC: 25 UG/DL (ref 35–150)
KETONES UR QL STRIP.AUTO: NEGATIVE MG/DL
LEUKOCYTE ESTERASE UR QL STRIP.AUTO: ABNORMAL
LYMPHOCYTES # BLD: 1.3 K/UL (ref 0.8–3.5)
LYMPHOCYTES NFR BLD: 17.6 % (ref 12–49)
MCH RBC QN AUTO: 32 PG (ref 26–34)
MCHC RBC AUTO-ENTMCNC: 32.1 G/DL (ref 30–36.5)
MCV RBC AUTO: 99.7 FL (ref 80–99)
MONOCYTES # BLD: 0.86 K/UL (ref 0–1)
MONOCYTES NFR BLD: 11.6 % (ref 5–13)
MUCOUS THREADS URNS QL MICRO: ABNORMAL /LPF
NEUTS SEG # BLD: 4.84 K/UL (ref 1.8–8)
NEUTS SEG NFR BLD: 65.6 % (ref 32–75)
NITRITE UR QL STRIP.AUTO: POSITIVE
NRBC # BLD: 0 K/UL (ref 0–0.01)
NRBC BLD-RTO: 0 PER 100 WBC
PERFORMED BY:: ABNORMAL
PH UR STRIP: 5 (ref 5–8)
PHOSPHATE SERPL-MCNC: 3 MG/DL (ref 2.6–4.7)
PLATELET # BLD AUTO: 266 K/UL (ref 150–400)
PMV BLD AUTO: 12.7 FL (ref 8.9–12.9)
POTASSIUM SERPL-SCNC: 3 MMOL/L (ref 3.5–5.1)
PROT UR STRIP-MCNC: NEGATIVE MG/DL
PROT UR-MCNC: 14 MG/DL (ref 0–11.9)
PROT/CREAT UR-RTO: 0.1
RBC # BLD AUTO: 3.06 M/UL (ref 3.8–5.2)
RBC #/AREA URNS HPF: ABNORMAL /HPF (ref 0–5)
SODIUM SERPL-SCNC: 137 MMOL/L (ref 136–145)
SP GR UR REFRACTOMETRY: 1.01 (ref 1–1.03)
TIBC SERPL-MCNC: 276 UG/DL (ref 250–450)
URINE CULTURE IF INDICATED: ABNORMAL
UROBILINOGEN UR QL STRIP.AUTO: 0.1 EU/DL (ref 0.1–1)
WBC # BLD AUTO: 7.4 K/UL (ref 3.6–11)
WBC URNS QL MICRO: >100 /HPF (ref 0–4)

## 2025-03-30 PROCEDURE — 84156 ASSAY OF PROTEIN URINE: CPT

## 2025-03-30 PROCEDURE — 82570 ASSAY OF URINE CREATININE: CPT

## 2025-03-30 PROCEDURE — 6360000002 HC RX W HCPCS: Performed by: INTERNAL MEDICINE

## 2025-03-30 PROCEDURE — 83540 ASSAY OF IRON: CPT

## 2025-03-30 PROCEDURE — 2060000000 HC ICU INTERMEDIATE R&B

## 2025-03-30 PROCEDURE — 87088 URINE BACTERIA CULTURE: CPT

## 2025-03-30 PROCEDURE — 87086 URINE CULTURE/COLONY COUNT: CPT

## 2025-03-30 PROCEDURE — 81001 URINALYSIS AUTO W/SCOPE: CPT

## 2025-03-30 PROCEDURE — 82962 GLUCOSE BLOOD TEST: CPT

## 2025-03-30 PROCEDURE — 2500000003 HC RX 250 WO HCPCS: Performed by: INTERNAL MEDICINE

## 2025-03-30 PROCEDURE — 80069 RENAL FUNCTION PANEL: CPT

## 2025-03-30 PROCEDURE — 6370000000 HC RX 637 (ALT 250 FOR IP): Performed by: INTERNAL MEDICINE

## 2025-03-30 PROCEDURE — 85025 COMPLETE CBC W/AUTO DIFF WBC: CPT

## 2025-03-30 PROCEDURE — 94761 N-INVAS EAR/PLS OXIMETRY MLT: CPT

## 2025-03-30 PROCEDURE — 82728 ASSAY OF FERRITIN: CPT

## 2025-03-30 PROCEDURE — 87186 SC STD MICRODIL/AGAR DIL: CPT

## 2025-03-30 RX ORDER — INSULIN LISPRO 100 [IU]/ML
4 INJECTION, SOLUTION INTRAVENOUS; SUBCUTANEOUS ONCE
Status: COMPLETED | OUTPATIENT
Start: 2025-03-30 | End: 2025-03-30

## 2025-03-30 RX ORDER — POTASSIUM CHLORIDE 1500 MG/1
20 TABLET, EXTENDED RELEASE ORAL 2 TIMES DAILY
Status: DISCONTINUED | OUTPATIENT
Start: 2025-03-30 | End: 2025-04-02 | Stop reason: HOSPADM

## 2025-03-30 RX ORDER — SPIRONOLACTONE 25 MG/1
25 TABLET ORAL DAILY
Status: DISCONTINUED | OUTPATIENT
Start: 2025-03-30 | End: 2025-04-02 | Stop reason: HOSPADM

## 2025-03-30 RX ADMIN — INSULIN LISPRO 1 UNITS: 100 INJECTION, SOLUTION INTRAVENOUS; SUBCUTANEOUS at 20:15

## 2025-03-30 RX ADMIN — METOPROLOL TARTRATE 25 MG: 25 TABLET, FILM COATED ORAL at 08:40

## 2025-03-30 RX ADMIN — INSULIN LISPRO 4 UNITS: 100 INJECTION, SOLUTION INTRAVENOUS; SUBCUTANEOUS at 12:36

## 2025-03-30 RX ADMIN — INSULIN GLARGINE 40 UNITS: 100 INJECTION, SOLUTION SUBCUTANEOUS at 08:41

## 2025-03-30 RX ADMIN — SPIRONOLACTONE 25 MG: 25 TABLET ORAL at 20:16

## 2025-03-30 RX ADMIN — METOPROLOL TARTRATE 25 MG: 25 TABLET, FILM COATED ORAL at 20:16

## 2025-03-30 RX ADMIN — SODIUM CHLORIDE, PRESERVATIVE FREE 10 ML: 5 INJECTION INTRAVENOUS at 08:41

## 2025-03-30 RX ADMIN — PANTOPRAZOLE SODIUM 40 MG: 40 TABLET, DELAYED RELEASE ORAL at 08:40

## 2025-03-30 RX ADMIN — INSULIN LISPRO 4 UNITS: 100 INJECTION, SOLUTION INTRAVENOUS; SUBCUTANEOUS at 12:37

## 2025-03-30 RX ADMIN — INSULIN LISPRO 2 UNITS: 100 INJECTION, SOLUTION INTRAVENOUS; SUBCUTANEOUS at 16:45

## 2025-03-30 RX ADMIN — ATORVASTATIN CALCIUM 10 MG: 10 TABLET, FILM COATED ORAL at 08:40

## 2025-03-30 RX ADMIN — HYDROXYZINE PAMOATE 25 MG: 25 CAPSULE ORAL at 20:16

## 2025-03-30 RX ADMIN — DULOXETINE HYDROCHLORIDE 30 MG: 30 CAPSULE, DELAYED RELEASE ORAL at 08:40

## 2025-03-30 RX ADMIN — AMLODIPINE BESYLATE 5 MG: 5 TABLET ORAL at 08:40

## 2025-03-30 RX ADMIN — POTASSIUM CHLORIDE 20 MEQ: 1500 TABLET, EXTENDED RELEASE ORAL at 16:45

## 2025-03-30 RX ADMIN — ENOXAPARIN SODIUM 30 MG: 100 INJECTION SUBCUTANEOUS at 08:41

## 2025-03-30 RX ADMIN — POTASSIUM BICARBONATE 40 MEQ: 782 TABLET, EFFERVESCENT ORAL at 12:36

## 2025-03-30 RX ADMIN — LEVOTHYROXINE SODIUM 125 MCG: 0.03 TABLET ORAL at 05:41

## 2025-03-30 RX ADMIN — SODIUM CHLORIDE, PRESERVATIVE FREE 10 ML: 5 INJECTION INTRAVENOUS at 20:16

## 2025-03-30 RX ADMIN — FUROSEMIDE 40 MG: 10 INJECTION, SOLUTION INTRAMUSCULAR; INTRAVENOUS at 08:41

## 2025-03-30 RX ADMIN — INSULIN LISPRO 1 UNITS: 100 INJECTION, SOLUTION INTRAVENOUS; SUBCUTANEOUS at 08:41

## 2025-03-30 ASSESSMENT — PAIN SCALES - GENERAL
PAINLEVEL_OUTOF10: 0

## 2025-03-30 NOTE — CASE COMMUNICATION
CM informed by MD that patient would like [facility] if recommended that she go to a skilled nursing facility.      No discharge order at this time to send to facility. Hospitalist/PT/Nephrology notes sent.    CM met with patient at bedside to confirm choice.  Patient confirmed [facility].  Choice letter on patient's file.  Referral and available clinicals sent to facility for potential admission once medically stable.  CM will continue to monitor.

## 2025-03-31 LAB
ALBUMIN SERPL-MCNC: 3.2 G/DL (ref 3.5–5)
ANION GAP SERPL CALC-SCNC: 3 MMOL/L (ref 2–12)
BUN SERPL-MCNC: 25 MG/DL (ref 6–20)
BUN/CREAT SERPL: 15 (ref 12–20)
CA-I BLD-MCNC: 8.5 MG/DL (ref 8.5–10.1)
CHLORIDE SERPL-SCNC: 100 MMOL/L (ref 97–108)
CO2 SERPL-SCNC: 34 MMOL/L (ref 21–32)
CREAT SERPL-MCNC: 1.69 MG/DL (ref 0.55–1.02)
ECHO AO ASC DIAM: 1.6 CM
ECHO AO ASCENDING AORTA INDEX: 0.81 CM/M2
ECHO AO ROOT DIAM: 1.9 CM
ECHO AO ROOT INDEX: 0.96 CM/M2
ECHO AV AREA PEAK VELOCITY: 2.1 CM2
ECHO AV AREA VTI: 2.1 CM2
ECHO AV AREA/BSA PEAK VELOCITY: 1.1 CM2/M2
ECHO AV AREA/BSA VTI: 1.1 CM2/M2
ECHO AV MEAN GRADIENT: 10 MMHG
ECHO AV MEAN VELOCITY: 1.4 M/S
ECHO AV PEAK GRADIENT: 16 MMHG
ECHO AV PEAK VELOCITY: 2 M/S
ECHO AV VELOCITY RATIO: 0.6
ECHO AV VTI: 35.6 CM
ECHO BSA: 1.98 M2
ECHO EST RA PRESSURE: 3 MMHG
ECHO LA AREA 2C: 11.8 CM2
ECHO LA AREA 4C: 19 CM2
ECHO LA DIAMETER INDEX: 2.03 CM/M2
ECHO LA DIAMETER: 4 CM
ECHO LA MAJOR AXIS: 5.8 CM
ECHO LA MINOR AXIS: 4.2 CM
ECHO LA TO AORTIC ROOT RATIO: 2.11
ECHO LA VOL MOD A2C: 27 ML (ref 22–52)
ECHO LA VOL MOD A4C: 43 ML (ref 22–52)
ECHO LA VOLUME INDEX MOD A2C: 14 ML/M2 (ref 16–34)
ECHO LA VOLUME INDEX MOD A4C: 22 ML/M2 (ref 16–34)
ECHO LV E' LATERAL VELOCITY: 5.66 CM/S
ECHO LV E' SEPTAL VELOCITY: 5.87 CM/S
ECHO LV EDV A2C: 20 ML
ECHO LV EDV NDEX A2C: 10 ML/M2
ECHO LV EF PHYSICIAN: 65 %
ECHO LV EJECTION FRACTION A2C: 49 %
ECHO LV ESV A2C: 10 ML
ECHO LV ESV INDEX A2C: 5 ML/M2
ECHO LV FRACTIONAL SHORTENING: 43 % (ref 28–44)
ECHO LV INTERNAL DIMENSION DIASTOLE INDEX: 1.88 CM/M2
ECHO LV INTERNAL DIMENSION DIASTOLIC: 3.7 CM (ref 3.9–5.3)
ECHO LV INTERNAL DIMENSION SYSTOLIC INDEX: 1.07 CM/M2
ECHO LV INTERNAL DIMENSION SYSTOLIC: 2.1 CM
ECHO LV IVSD: 1.6 CM (ref 0.6–0.9)
ECHO LV MASS 2D: 197.7 G (ref 67–162)
ECHO LV MASS INDEX 2D: 100.3 G/M2 (ref 43–95)
ECHO LV POSTERIOR WALL DIASTOLIC: 1.3 CM (ref 0.6–0.9)
ECHO LV RELATIVE WALL THICKNESS RATIO: 0.7
ECHO LVOT AREA: 3.5 CM2
ECHO LVOT AV VTI INDEX: 0.61
ECHO LVOT DIAM: 2.1 CM
ECHO LVOT MEAN GRADIENT: 3 MMHG
ECHO LVOT PEAK GRADIENT: 6 MMHG
ECHO LVOT PEAK VELOCITY: 1.2 M/S
ECHO LVOT STROKE VOLUME INDEX: 38.1 ML/M2
ECHO LVOT SV: 75.1 ML
ECHO LVOT VTI: 21.7 CM
ECHO MV A VELOCITY: 1.95 M/S
ECHO MV AREA VTI: 1.8 CM2
ECHO MV E DECELERATION TIME (DT): 213 MS
ECHO MV E VELOCITY: 1.44 M/S
ECHO MV E/A RATIO: 0.74
ECHO MV E/E' LATERAL: 25.44
ECHO MV E/E' RATIO (AVERAGED): 24.99
ECHO MV E/E' SEPTAL: 24.53
ECHO MV LVOT VTI INDEX: 1.94
ECHO MV MAX VELOCITY: 2 M/S
ECHO MV MEAN GRADIENT: 7 MMHG
ECHO MV MEAN VELOCITY: 1.2 M/S
ECHO MV PEAK GRADIENT: 16 MMHG
ECHO MV REGURGITANT PEAK GRADIENT: 16 MMHG
ECHO MV REGURGITANT PEAK VELOCITY: 2 M/S
ECHO MV REGURGITANT VTIA: 46.8 CM
ECHO MV VTI: 42.2 CM
ECHO PV MAX VELOCITY: 0.8 M/S
ECHO PV MEAN GRADIENT: 1 MMHG
ECHO PV MEAN VELOCITY: 0.5 M/S
ECHO PV PEAK GRADIENT: 2 MMHG
ECHO PV VTI: 12.9 CM
ECHO RA AREA 4C: 11.1 CM2
ECHO RA END SYSTOLIC VOLUME APICAL 4 CHAMBER INDEX BSA: 14 ML/M2
ECHO RA VOLUME: 27 ML
ECHO RIGHT VENTRICULAR SYSTOLIC PRESSURE (RVSP): 25 MMHG
ECHO RV BASAL DIMENSION: 3 CM
ECHO RV FREE WALL PEAK S': 11.2 CM/S
ECHO RV MID DIMENSION: 2.2 CM
ECHO RV TAPSE: 1.8 CM (ref 1.7–?)
ECHO TV REGURGITANT MAX VELOCITY: 2.36 M/S
ECHO TV REGURGITANT PEAK GRADIENT: 22 MMHG
GLUCOSE BLD STRIP.AUTO-MCNC: 252 MG/DL (ref 65–100)
GLUCOSE BLD STRIP.AUTO-MCNC: 342 MG/DL (ref 65–100)
GLUCOSE BLD STRIP.AUTO-MCNC: 370 MG/DL (ref 65–100)
GLUCOSE BLD STRIP.AUTO-MCNC: 381 MG/DL (ref 65–100)
GLUCOSE SERPL-MCNC: 213 MG/DL (ref 65–100)
PERFORMED BY:: ABNORMAL
PHOSPHATE SERPL-MCNC: 2.8 MG/DL (ref 2.6–4.7)
POTASSIUM SERPL-SCNC: 3.6 MMOL/L (ref 3.5–5.1)
SODIUM SERPL-SCNC: 137 MMOL/L (ref 136–145)

## 2025-03-31 PROCEDURE — 6360000002 HC RX W HCPCS: Performed by: INTERNAL MEDICINE

## 2025-03-31 PROCEDURE — 97530 THERAPEUTIC ACTIVITIES: CPT

## 2025-03-31 PROCEDURE — 82962 GLUCOSE BLOOD TEST: CPT

## 2025-03-31 PROCEDURE — 2500000003 HC RX 250 WO HCPCS: Performed by: INTERNAL MEDICINE

## 2025-03-31 PROCEDURE — 80069 RENAL FUNCTION PANEL: CPT

## 2025-03-31 PROCEDURE — 6370000000 HC RX 637 (ALT 250 FOR IP): Performed by: INTERNAL MEDICINE

## 2025-03-31 PROCEDURE — 36415 COLL VENOUS BLD VENIPUNCTURE: CPT

## 2025-03-31 PROCEDURE — 2060000000 HC ICU INTERMEDIATE R&B

## 2025-03-31 PROCEDURE — 97165 OT EVAL LOW COMPLEX 30 MIN: CPT

## 2025-03-31 RX ORDER — FUROSEMIDE 40 MG/1
60 TABLET ORAL DAILY
Status: DISCONTINUED | OUTPATIENT
Start: 2025-03-31 | End: 2025-04-02 | Stop reason: HOSPADM

## 2025-03-31 RX ADMIN — SPIRONOLACTONE 25 MG: 25 TABLET ORAL at 09:11

## 2025-03-31 RX ADMIN — ENOXAPARIN SODIUM 30 MG: 100 INJECTION SUBCUTANEOUS at 09:09

## 2025-03-31 RX ADMIN — INSULIN LISPRO 4 UNITS: 100 INJECTION, SOLUTION INTRAVENOUS; SUBCUTANEOUS at 13:33

## 2025-03-31 RX ADMIN — POTASSIUM CHLORIDE 20 MEQ: 1500 TABLET, EXTENDED RELEASE ORAL at 09:11

## 2025-03-31 RX ADMIN — INSULIN LISPRO 8 UNITS: 100 INJECTION, SOLUTION INTRAVENOUS; SUBCUTANEOUS at 17:44

## 2025-03-31 RX ADMIN — INSULIN LISPRO 4 UNITS: 100 INJECTION, SOLUTION INTRAVENOUS; SUBCUTANEOUS at 13:12

## 2025-03-31 RX ADMIN — POTASSIUM CHLORIDE 20 MEQ: 1500 TABLET, EXTENDED RELEASE ORAL at 20:04

## 2025-03-31 RX ADMIN — ATORVASTATIN CALCIUM 10 MG: 10 TABLET, FILM COATED ORAL at 09:11

## 2025-03-31 RX ADMIN — INSULIN GLARGINE 40 UNITS: 100 INJECTION, SOLUTION SUBCUTANEOUS at 09:09

## 2025-03-31 RX ADMIN — HYDROXYZINE PAMOATE 25 MG: 25 CAPSULE ORAL at 20:04

## 2025-03-31 RX ADMIN — INSULIN LISPRO 3 UNITS: 100 INJECTION, SOLUTION INTRAVENOUS; SUBCUTANEOUS at 20:04

## 2025-03-31 RX ADMIN — LEVOTHYROXINE SODIUM 125 MCG: 0.03 TABLET ORAL at 05:55

## 2025-03-31 RX ADMIN — ONDANSETRON 4 MG: 4 TABLET, ORALLY DISINTEGRATING ORAL at 06:03

## 2025-03-31 RX ADMIN — PANTOPRAZOLE SODIUM 40 MG: 40 TABLET, DELAYED RELEASE ORAL at 09:11

## 2025-03-31 RX ADMIN — SODIUM CHLORIDE, PRESERVATIVE FREE 10 ML: 5 INJECTION INTRAVENOUS at 20:04

## 2025-03-31 RX ADMIN — METOPROLOL TARTRATE 25 MG: 25 TABLET, FILM COATED ORAL at 09:10

## 2025-03-31 RX ADMIN — ACETAMINOPHEN 650 MG: 325 TABLET ORAL at 17:43

## 2025-03-31 RX ADMIN — INSULIN LISPRO 2 UNITS: 100 INJECTION, SOLUTION INTRAVENOUS; SUBCUTANEOUS at 09:10

## 2025-03-31 RX ADMIN — METOPROLOL TARTRATE 25 MG: 25 TABLET, FILM COATED ORAL at 20:04

## 2025-03-31 RX ADMIN — DULOXETINE HYDROCHLORIDE 30 MG: 30 CAPSULE, DELAYED RELEASE ORAL at 09:10

## 2025-03-31 RX ADMIN — AMLODIPINE BESYLATE 5 MG: 5 TABLET ORAL at 09:11

## 2025-03-31 RX ADMIN — FUROSEMIDE 60 MG: 40 TABLET ORAL at 09:11

## 2025-03-31 RX ADMIN — SODIUM CHLORIDE, PRESERVATIVE FREE 10 ML: 5 INJECTION INTRAVENOUS at 09:12

## 2025-03-31 ASSESSMENT — PAIN SCALES - GENERAL
PAINLEVEL_OUTOF10: 3
PAINLEVEL_OUTOF10: 0
PAINLEVEL_OUTOF10: 0
PAINLEVEL_OUTOF10: 5
PAINLEVEL_OUTOF10: 0

## 2025-03-31 ASSESSMENT — PAIN - FUNCTIONAL ASSESSMENT: PAIN_FUNCTIONAL_ASSESSMENT: ACTIVITIES ARE NOT PREVENTED

## 2025-03-31 ASSESSMENT — PAIN DESCRIPTION - DESCRIPTORS: DESCRIPTORS: ACHING

## 2025-03-31 ASSESSMENT — PAIN DESCRIPTION - ORIENTATION: ORIENTATION: ANTERIOR

## 2025-03-31 ASSESSMENT — PAIN DESCRIPTION - LOCATION: LOCATION: HEAD

## 2025-04-01 ENCOUNTER — APPOINTMENT (OUTPATIENT)
Facility: HOSPITAL | Age: 85
DRG: 291 | End: 2025-04-01
Payer: MEDICARE

## 2025-04-01 LAB
ANION GAP SERPL CALC-SCNC: 6 MMOL/L (ref 2–12)
BNP SERPL-MCNC: 750 PG/ML
BUN SERPL-MCNC: 27 MG/DL (ref 6–20)
BUN/CREAT SERPL: 16 (ref 12–20)
CA-I BLD-MCNC: 8.8 MG/DL (ref 8.5–10.1)
CHLORIDE SERPL-SCNC: 97 MMOL/L (ref 97–108)
CO2 SERPL-SCNC: 34 MMOL/L (ref 21–32)
CREAT SERPL-MCNC: 1.72 MG/DL (ref 0.55–1.02)
ERYTHROCYTE [DISTWIDTH] IN BLOOD BY AUTOMATED COUNT: 14.6 % (ref 11.5–14.5)
GLUCOSE BLD STRIP.AUTO-MCNC: 167 MG/DL (ref 65–100)
GLUCOSE BLD STRIP.AUTO-MCNC: 210 MG/DL (ref 65–100)
GLUCOSE BLD STRIP.AUTO-MCNC: 292 MG/DL (ref 65–100)
GLUCOSE BLD STRIP.AUTO-MCNC: 318 MG/DL (ref 65–100)
GLUCOSE SERPL-MCNC: 258 MG/DL (ref 65–100)
HCT VFR BLD AUTO: 31.7 % (ref 35–47)
HGB BLD-MCNC: 10.1 G/DL (ref 11.5–16)
MCH RBC QN AUTO: 32.6 PG (ref 26–34)
MCHC RBC AUTO-ENTMCNC: 31.9 G/DL (ref 30–36.5)
MCV RBC AUTO: 102.3 FL (ref 80–99)
NRBC # BLD: 0 K/UL (ref 0–0.01)
NRBC BLD-RTO: 0 PER 100 WBC
PERFORMED BY:: ABNORMAL
PLATELET # BLD AUTO: 247 K/UL (ref 150–400)
PMV BLD AUTO: 11.6 FL (ref 8.9–12.9)
POTASSIUM SERPL-SCNC: 4.2 MMOL/L (ref 3.5–5.1)
PROCALCITONIN SERPL-MCNC: 0.06 NG/ML
RBC # BLD AUTO: 3.1 M/UL (ref 3.8–5.2)
SODIUM SERPL-SCNC: 137 MMOL/L (ref 136–145)
WBC # BLD AUTO: 9.2 K/UL (ref 3.6–11)

## 2025-04-01 PROCEDURE — 85027 COMPLETE CBC AUTOMATED: CPT

## 2025-04-01 PROCEDURE — 6370000000 HC RX 637 (ALT 250 FOR IP): Performed by: INTERNAL MEDICINE

## 2025-04-01 PROCEDURE — 83880 ASSAY OF NATRIURETIC PEPTIDE: CPT

## 2025-04-01 PROCEDURE — 97530 THERAPEUTIC ACTIVITIES: CPT

## 2025-04-01 PROCEDURE — 6360000002 HC RX W HCPCS

## 2025-04-01 PROCEDURE — 36415 COLL VENOUS BLD VENIPUNCTURE: CPT

## 2025-04-01 PROCEDURE — 71045 X-RAY EXAM CHEST 1 VIEW: CPT

## 2025-04-01 PROCEDURE — 2500000003 HC RX 250 WO HCPCS

## 2025-04-01 PROCEDURE — 82962 GLUCOSE BLOOD TEST: CPT

## 2025-04-01 PROCEDURE — 97535 SELF CARE MNGMENT TRAINING: CPT

## 2025-04-01 PROCEDURE — 80048 BASIC METABOLIC PNL TOTAL CA: CPT

## 2025-04-01 PROCEDURE — 2060000000 HC ICU INTERMEDIATE R&B

## 2025-04-01 PROCEDURE — 2500000003 HC RX 250 WO HCPCS: Performed by: INTERNAL MEDICINE

## 2025-04-01 PROCEDURE — 6360000002 HC RX W HCPCS: Performed by: INTERNAL MEDICINE

## 2025-04-01 PROCEDURE — 84145 PROCALCITONIN (PCT): CPT

## 2025-04-01 RX ADMIN — CEFTRIAXONE SODIUM 1000 MG: 1 INJECTION, POWDER, FOR SOLUTION INTRAMUSCULAR; INTRAVENOUS at 09:32

## 2025-04-01 RX ADMIN — METOPROLOL TARTRATE 25 MG: 25 TABLET, FILM COATED ORAL at 20:42

## 2025-04-01 RX ADMIN — FUROSEMIDE 60 MG: 40 TABLET ORAL at 09:31

## 2025-04-01 RX ADMIN — SODIUM CHLORIDE, PRESERVATIVE FREE 10 ML: 5 INJECTION INTRAVENOUS at 09:33

## 2025-04-01 RX ADMIN — INSULIN LISPRO 3 UNITS: 100 INJECTION, SOLUTION INTRAVENOUS; SUBCUTANEOUS at 20:42

## 2025-04-01 RX ADMIN — INSULIN GLARGINE 40 UNITS: 100 INJECTION, SOLUTION SUBCUTANEOUS at 09:31

## 2025-04-01 RX ADMIN — ENOXAPARIN SODIUM 30 MG: 100 INJECTION SUBCUTANEOUS at 09:31

## 2025-04-01 RX ADMIN — HYDROXYZINE PAMOATE 25 MG: 25 CAPSULE ORAL at 20:42

## 2025-04-01 RX ADMIN — INSULIN LISPRO 1 UNITS: 100 INJECTION, SOLUTION INTRAVENOUS; SUBCUTANEOUS at 12:04

## 2025-04-01 RX ADMIN — DULOXETINE HYDROCHLORIDE 30 MG: 30 CAPSULE, DELAYED RELEASE ORAL at 09:31

## 2025-04-01 RX ADMIN — METOPROLOL TARTRATE 25 MG: 25 TABLET, FILM COATED ORAL at 09:31

## 2025-04-01 RX ADMIN — LEVOTHYROXINE SODIUM 125 MCG: 0.03 TABLET ORAL at 06:04

## 2025-04-01 RX ADMIN — POTASSIUM CHLORIDE 20 MEQ: 1500 TABLET, EXTENDED RELEASE ORAL at 20:42

## 2025-04-01 RX ADMIN — ATORVASTATIN CALCIUM 10 MG: 10 TABLET, FILM COATED ORAL at 09:31

## 2025-04-01 RX ADMIN — AMLODIPINE BESYLATE 5 MG: 5 TABLET ORAL at 09:31

## 2025-04-01 RX ADMIN — INSULIN LISPRO 2 UNITS: 100 INJECTION, SOLUTION INTRAVENOUS; SUBCUTANEOUS at 17:26

## 2025-04-01 RX ADMIN — POTASSIUM CHLORIDE 20 MEQ: 1500 TABLET, EXTENDED RELEASE ORAL at 09:32

## 2025-04-01 RX ADMIN — PANTOPRAZOLE SODIUM 40 MG: 40 TABLET, DELAYED RELEASE ORAL at 09:32

## 2025-04-01 RX ADMIN — SODIUM CHLORIDE, PRESERVATIVE FREE 10 ML: 5 INJECTION INTRAVENOUS at 20:44

## 2025-04-01 RX ADMIN — SPIRONOLACTONE 25 MG: 25 TABLET ORAL at 09:32

## 2025-04-01 ASSESSMENT — PAIN SCALES - GENERAL
PAINLEVEL_OUTOF10: 0

## 2025-04-02 VITALS
BODY MASS INDEX: 39.92 KG/M2 | WEIGHT: 216.93 LBS | DIASTOLIC BLOOD PRESSURE: 60 MMHG | SYSTOLIC BLOOD PRESSURE: 143 MMHG | HEIGHT: 62 IN | RESPIRATION RATE: 18 BRPM | HEART RATE: 71 BPM | TEMPERATURE: 97.9 F | OXYGEN SATURATION: 96 %

## 2025-04-02 LAB
ANION GAP SERPL CALC-SCNC: 4 MMOL/L (ref 2–12)
BACTERIA SPEC CULT: ABNORMAL
BACTERIA SPEC CULT: ABNORMAL
BASOPHILS # BLD: 0.12 K/UL (ref 0–0.1)
BASOPHILS NFR BLD: 1.6 % (ref 0–1)
BUN SERPL-MCNC: 27 MG/DL (ref 6–20)
BUN/CREAT SERPL: 18 (ref 12–20)
CA-I BLD-MCNC: 9 MG/DL (ref 8.5–10.1)
CHLORIDE SERPL-SCNC: 97 MMOL/L (ref 97–108)
CO2 SERPL-SCNC: 34 MMOL/L (ref 21–32)
COLONY COUNT, CNT: ABNORMAL
CREAT SERPL-MCNC: 1.53 MG/DL (ref 0.55–1.02)
DIFFERENTIAL METHOD BLD: ABNORMAL
EOSINOPHIL # BLD: 0.36 K/UL (ref 0–0.4)
EOSINOPHIL NFR BLD: 4.9 % (ref 0–7)
ERYTHROCYTE [DISTWIDTH] IN BLOOD BY AUTOMATED COUNT: 14.3 % (ref 11.5–14.5)
GLUCOSE BLD STRIP.AUTO-MCNC: 266 MG/DL (ref 65–100)
GLUCOSE BLD STRIP.AUTO-MCNC: 287 MG/DL (ref 65–100)
GLUCOSE SERPL-MCNC: 235 MG/DL (ref 65–100)
HCT VFR BLD AUTO: 31.7 % (ref 35–47)
HGB BLD-MCNC: 10.3 G/DL (ref 11.5–16)
IMM GRANULOCYTES # BLD AUTO: 0.03 K/UL (ref 0–0.04)
IMM GRANULOCYTES NFR BLD AUTO: 0.4 % (ref 0–0.5)
LYMPHOCYTES # BLD: 1.24 K/UL (ref 0.8–3.5)
LYMPHOCYTES NFR BLD: 16.9 % (ref 12–49)
Lab: ABNORMAL
MCH RBC QN AUTO: 33.1 PG (ref 26–34)
MCHC RBC AUTO-ENTMCNC: 32.5 G/DL (ref 30–36.5)
MCV RBC AUTO: 101.9 FL (ref 80–99)
MONOCYTES # BLD: 0.94 K/UL (ref 0–1)
MONOCYTES NFR BLD: 12.8 % (ref 5–13)
NEUTS SEG # BLD: 4.66 K/UL (ref 1.8–8)
NEUTS SEG NFR BLD: 63.4 % (ref 32–75)
NRBC # BLD: 0 K/UL (ref 0–0.01)
NRBC BLD-RTO: 0 PER 100 WBC
PERFORMED BY:: ABNORMAL
PERFORMED BY:: ABNORMAL
PLATELET # BLD AUTO: 258 K/UL (ref 150–400)
PMV BLD AUTO: 12.7 FL (ref 8.9–12.9)
POTASSIUM SERPL-SCNC: 4.2 MMOL/L (ref 3.5–5.1)
RBC # BLD AUTO: 3.11 M/UL (ref 3.8–5.2)
SODIUM SERPL-SCNC: 135 MMOL/L (ref 136–145)
WBC # BLD AUTO: 7.4 K/UL (ref 3.6–11)

## 2025-04-02 PROCEDURE — 2500000003 HC RX 250 WO HCPCS

## 2025-04-02 PROCEDURE — 2500000003 HC RX 250 WO HCPCS: Performed by: INTERNAL MEDICINE

## 2025-04-02 PROCEDURE — 6370000000 HC RX 637 (ALT 250 FOR IP)

## 2025-04-02 PROCEDURE — 6370000000 HC RX 637 (ALT 250 FOR IP): Performed by: INTERNAL MEDICINE

## 2025-04-02 PROCEDURE — 85025 COMPLETE CBC W/AUTO DIFF WBC: CPT

## 2025-04-02 PROCEDURE — 82962 GLUCOSE BLOOD TEST: CPT

## 2025-04-02 PROCEDURE — 6360000002 HC RX W HCPCS

## 2025-04-02 PROCEDURE — 80048 BASIC METABOLIC PNL TOTAL CA: CPT

## 2025-04-02 PROCEDURE — 6360000002 HC RX W HCPCS: Performed by: INTERNAL MEDICINE

## 2025-04-02 PROCEDURE — 36415 COLL VENOUS BLD VENIPUNCTURE: CPT

## 2025-04-02 PROCEDURE — 97530 THERAPEUTIC ACTIVITIES: CPT

## 2025-04-02 RX ORDER — CEPHALEXIN 500 MG/1
500 CAPSULE ORAL EVERY 12 HOURS SCHEDULED
Qty: 14 CAPSULE | Refills: 0 | Status: SHIPPED | OUTPATIENT
Start: 2025-04-02 | End: 2025-04-09

## 2025-04-02 RX ORDER — POTASSIUM CHLORIDE 1500 MG/1
20 TABLET, EXTENDED RELEASE ORAL 2 TIMES DAILY
Qty: 60 TABLET | Refills: 0 | Status: SHIPPED | OUTPATIENT
Start: 2025-04-02 | End: 2025-05-02

## 2025-04-02 RX ORDER — ALBUTEROL SULFATE 90 UG/1
2 INHALANT RESPIRATORY (INHALATION) EVERY 6 HOURS PRN
Qty: 18 G | Refills: 3 | Status: SHIPPED | OUTPATIENT
Start: 2025-04-02

## 2025-04-02 RX ORDER — LEVOTHYROXINE SODIUM 125 UG/1
125 TABLET ORAL
Qty: 30 TABLET | Refills: 0 | Status: SHIPPED | OUTPATIENT
Start: 2025-04-03

## 2025-04-02 RX ORDER — CEPHALEXIN 500 MG/1
500 CAPSULE ORAL EVERY 12 HOURS SCHEDULED
Status: DISCONTINUED | OUTPATIENT
Start: 2025-04-02 | End: 2025-04-02 | Stop reason: HOSPADM

## 2025-04-02 RX ORDER — FUROSEMIDE 20 MG/1
60 TABLET ORAL DAILY
Qty: 60 TABLET | Refills: 0 | Status: SHIPPED | OUTPATIENT
Start: 2025-04-03 | End: 2025-04-23

## 2025-04-02 RX ORDER — SPIRONOLACTONE 25 MG/1
25 TABLET ORAL DAILY
Qty: 30 TABLET | Refills: 3 | Status: SHIPPED | OUTPATIENT
Start: 2025-04-03

## 2025-04-02 RX ADMIN — LEVOTHYROXINE SODIUM 125 MCG: 0.03 TABLET ORAL at 05:35

## 2025-04-02 RX ADMIN — ENOXAPARIN SODIUM 30 MG: 100 INJECTION SUBCUTANEOUS at 08:06

## 2025-04-02 RX ADMIN — ATORVASTATIN CALCIUM 10 MG: 10 TABLET, FILM COATED ORAL at 08:10

## 2025-04-02 RX ADMIN — PANTOPRAZOLE SODIUM 40 MG: 40 TABLET, DELAYED RELEASE ORAL at 08:10

## 2025-04-02 RX ADMIN — INSULIN LISPRO 2 UNITS: 100 INJECTION, SOLUTION INTRAVENOUS; SUBCUTANEOUS at 10:44

## 2025-04-02 RX ADMIN — CEFTRIAXONE SODIUM 1000 MG: 1 INJECTION, POWDER, FOR SOLUTION INTRAMUSCULAR; INTRAVENOUS at 08:07

## 2025-04-02 RX ADMIN — CEPHALEXIN 500 MG: 500 CAPSULE ORAL at 12:35

## 2025-04-02 RX ADMIN — AMLODIPINE BESYLATE 5 MG: 5 TABLET ORAL at 08:10

## 2025-04-02 RX ADMIN — INSULIN LISPRO 2 UNITS: 100 INJECTION, SOLUTION INTRAVENOUS; SUBCUTANEOUS at 08:06

## 2025-04-02 RX ADMIN — SPIRONOLACTONE 25 MG: 25 TABLET ORAL at 08:10

## 2025-04-02 RX ADMIN — SODIUM CHLORIDE, PRESERVATIVE FREE 10 ML: 5 INJECTION INTRAVENOUS at 08:09

## 2025-04-02 RX ADMIN — FUROSEMIDE 60 MG: 40 TABLET ORAL at 08:10

## 2025-04-02 RX ADMIN — POTASSIUM CHLORIDE 20 MEQ: 1500 TABLET, EXTENDED RELEASE ORAL at 08:10

## 2025-04-02 RX ADMIN — INSULIN GLARGINE 40 UNITS: 100 INJECTION, SOLUTION SUBCUTANEOUS at 08:05

## 2025-04-02 RX ADMIN — DULOXETINE HYDROCHLORIDE 30 MG: 30 CAPSULE, DELAYED RELEASE ORAL at 08:10

## 2025-04-02 RX ADMIN — METOPROLOL TARTRATE 25 MG: 25 TABLET, FILM COATED ORAL at 08:10

## 2025-04-02 ASSESSMENT — PAIN SCALES - GENERAL
PAINLEVEL_OUTOF10: 0
PAINLEVEL_OUTOF10: 0

## 2025-04-02 NOTE — PLAN OF CARE
PHYSICAL THERAPY TREATMENT     Patient: Walker Marie (85 y.o. female)  Date: 3/31/2025  Diagnosis: Acute pulmonary edema (HCC) [J81.0]  New onset of congestive heart failure (HCC) [I50.9]  Acute congestive heart failure, unspecified heart failure type (HCC) [I50.9] New onset of congestive heart failure (HCC)      Precautions: Fall Risk, General Precautions                      Recommendations for nursing mobility: Out of bed to chair for meals, Encourage HEP in prep for ADLs/mobility; see handout for details, LE elevation for management of edema, Use of BSC for toileting , AD and gt belt for bed to chair , and Assist x2    In place during session: EKG/telemetry   Chart, physical therapy assessment, plan of care and goals were reviewed.  ASSESSMENT  Patient continues with skilled PT services and is progressing towards goals. Pt received semi supine upon PT arrival, agreeable to session. Pt A&O x 4. (See below for objective details and assist levels). Improvement noted with all mobility this session. Pt. Requires less assistance for bed mobility. Pt. Still requires mod assist for standing with RW. Pt. Was able to take a few steps to the chair with RW and Min/mod assist for balance. Pt. Fatigues easily and demonstrates SOB. O2 sats  ranged from 92 to 94% during activity. Instructed PLB throughout session. Pt. Tolerated LE exercises while sitting in the chair.      Overall pt tolerated session fair today with Mobility.  Will continue to benefit from skilled PT services, and will continue to progress as tolerated. Current PT DC recommendation Moderate intensity short-term skilled physical therapy up to 5x/week once medically appropriate.     Start of Session End of Session   SPO2 (%) 95 94   Heart Rate (BPM) 98 96     GOALS:    Problem: Physical Therapy - Adult  Goal: By Discharge: Performs mobility at highest level of function for planned discharge setting.  See evaluation for individualized 
         PHYSICAL THERAPY TREATMENT     Patient: Walker Marie (85 y.o. female)  Date: 4/1/2025  Diagnosis: Acute pulmonary edema (HCC) [J81.0]  New onset of congestive heart failure (HCC) [I50.9]  Acute congestive heart failure, unspecified heart failure type (HCC) [I50.9] New onset of congestive heart failure (HCC)      Precautions: Fall Risk, General Precautions                      Recommendations for nursing mobility: Out of bed to chair for meals, Encourage HEP in prep for ADLs/mobility; see handout for details, LE elevation for management of edema, Use of BSC for toileting , AD and gt belt for bed to chair , and Assist x1    In place during session: Nasal Cannula 2L and EKG/telemetry   Chart, physical therapy assessment, plan of care and goals were reviewed.  ASSESSMENT  Patient continues with skilled PT services and is progressing towards goals. Pt received semi supine upon PT arrival, agreeable to session. Pt A&O x 4. (See below for objective details and assist levels). Improvement noted with all mobility this session. Pt. Required CGA/SBA  for bed mobility and min assist for standing with RW from the bed. Pt. Was able to ambulate 5' with RW and Min A X 1 then needed a seated rest break due to SOB. Pt. Sat on BSC a few minutes then ambulated 5' more and Min A X 1. O2 sats ranged from % during ambulation. Max cues needed for PLB during OOB activities. Pt. Tolerated LE exercises while sitting on side of the bed. Additional time needed for all tasks completed. Frequent rest breaks due to SOB and fatigue.    Overall pt tolerated session fair today with Mobility.  Will continue to benefit from skilled PT services, and will continue to progress as tolerated. Current PT DC recommendation Moderate intensity short-term skilled physical therapy up to 5x/week once medically appropriate.     Start of Session End of Session   SPO2 (%) 92 95   Heart Rate (BPM) 82 84     GOALS:    Description: FUNCTIONAL STATUS PRIOR 
         PHYSICAL THERAPY TREATMENT     Patient: Walker Marie (85 y.o. female)  Date: 4/2/2025  Diagnosis: Acute pulmonary edema (HCC) [J81.0]  New onset of congestive heart failure (HCC) [I50.9]  Acute congestive heart failure, unspecified heart failure type (HCC) [I50.9] New onset of congestive heart failure (HCC)      Precautions: Fall Risk, General Precautions                      Recommendations for nursing mobility: Out of bed to chair for meals, Encourage HEP in prep for ADLs/mobility; see handout for details, LE elevation for management of edema, Use of BSC for toileting , AD and gt belt for bed to chair , and Assist x1    In place during session: EKG/telemetry   Chart, physical therapy assessment, plan of care and goals were reviewed.  ASSESSMENT  Patient continues with skilled PT services and is progressing towards goals. Pt received semi supine upon PT arrival, agreeable to session. Pt A&O x 4. (See below for objective details and assist levels). Pt. Continues to require Min/mod A x 1 for standing with RW. Pt. Able to ambulate 5' with RW and Min assist. Pt. Demonstrated decreased step length/stride. Pt. SOB and fatigued easily. O2 sats dropped to 90%. Pt. Needed to sit in chair for a rest break. Then breakfast arrived and pt. Wanted to eat in chair. Pt. Performed LE exercises on the side of the bed prior to gt.     Overall pt tolerated session fair today with Mobility.  Will continue to benefit from skilled PT services, and will continue to progress as tolerated. Current PT DC recommendation Moderate intensity short-term skilled physical therapy up to 5x/week once medically appropriate.     Start of Session End of Session   SPO2 (%) 98 96   Heart Rate (BPM) 87 88     GOALS:    Problem: Physical Therapy - Adult  Goal: By Discharge: Performs mobility at highest level of function for planned discharge setting.  See evaluation for individualized goals.  Description: FUNCTIONAL STATUS PRIOR TO ADMISSION: 
  Problem: Chronic Conditions and Co-morbidities  Goal: Patient's chronic conditions and co-morbidity symptoms are monitored and maintained or improved  3/29/2025 1122 by Val Morrissey RN  Outcome: Progressing  3/28/2025 2315 by Camila Nicole RN  Outcome: Progressing     Problem: Discharge Planning  Goal: Discharge to home or other facility with appropriate resources  3/29/2025 1122 by Val Morrissey RN  Outcome: Progressing  3/28/2025 2315 by Camila Nicole RN  Outcome: Progressing     Problem: Safety - Adult  Goal: Free from fall injury  3/29/2025 1122 by Val Morrissey RN  Outcome: Progressing  3/28/2025 2315 by Camila Nicole RN  Outcome: Progressing     Problem: ABCDS Injury Assessment  Goal: Absence of physical injury  3/29/2025 1122 by Val Morrissey RN  Outcome: Progressing  3/28/2025 2315 by Camila Nicole RN  Outcome: Progressing     Problem: Cardiovascular - Adult  Goal: Maintains optimal cardiac output and hemodynamic stability  3/29/2025 1122 by Val Morrissey RN  Outcome: Progressing  3/28/2025 2315 by Camila Nicole RN  Outcome: Progressing  Goal: Absence of cardiac dysrhythmias or at baseline  Outcome: Progressing     Problem: Pain  Goal: Verbalizes/displays adequate comfort level or baseline comfort level  Outcome: Progressing     
  Problem: Chronic Conditions and Co-morbidities  Goal: Patient's chronic conditions and co-morbidity symptoms are monitored and maintained or improved  3/31/2025 1511 by Sidney Benites RN  Outcome: Progressing  Flowsheets (Taken 3/31/2025 0909)  Care Plan - Patient's Chronic Conditions and Co-Morbidity Symptoms are Monitored and Maintained or Improved: Monitor and assess patient's chronic conditions and comorbid symptoms for stability, deterioration, or improvement  3/31/2025 0120 by Camila Nicole RN  Outcome: Progressing     Problem: Discharge Planning  Goal: Discharge to home or other facility with appropriate resources  3/31/2025 1511 by Sidney Benites RN  Outcome: Progressing  3/31/2025 0120 by Camila Nicole RN  Outcome: Progressing     Problem: Safety - Adult  Goal: Free from fall injury  3/31/2025 1511 by Sidney Benites RN  Outcome: Progressing  3/31/2025 0120 by Camila Nicole RN  Outcome: Progressing     Problem: ABCDS Injury Assessment  Goal: Absence of physical injury  3/31/2025 1511 by Sidney Benites RN  Outcome: Progressing  3/31/2025 0120 by Camila Nicole RN  Outcome: Progressing     Problem: Cardiovascular - Adult  Goal: Maintains optimal cardiac output and hemodynamic stability  Outcome: Progressing  Goal: Absence of cardiac dysrhythmias or at baseline  Outcome: Progressing     Problem: Pain  Goal: Verbalizes/displays adequate comfort level or baseline comfort level  Outcome: Progressing     Problem: Skin/Tissue Integrity  Goal: Skin integrity remains intact  Description: 1.  Monitor for areas of redness and/or skin breakdown  2.  Assess vascular access sites hourly  3.  Every 4-6 hours minimum:  Change oxygen saturation probe site  4.  Every 4-6 hours:  If on nasal continuous positive airway pressure, respiratory therapy assess nares and determine need for appliance change or resting period  Outcome: Progressing  Flowsheets (Taken 3/31/2025 0909)  Skin Integrity Remains Intact: Monitor 
  Problem: Chronic Conditions and Co-morbidities  Goal: Patient's chronic conditions and co-morbidity symptoms are monitored and maintained or improved  Outcome: Progressing     Problem: Discharge Planning  Goal: Discharge to home or other facility with appropriate resources  Outcome: Progressing     Problem: Safety - Adult  Goal: Free from fall injury  Outcome: Progressing     Problem: ABCDS Injury Assessment  Goal: Absence of physical injury  Outcome: Progressing     Problem: Cardiovascular - Adult  Goal: Maintains optimal cardiac output and hemodynamic stability  Outcome: Progressing     
  Problem: Chronic Conditions and Co-morbidities  Goal: Patient's chronic conditions and co-morbidity symptoms are monitored and maintained or improved  Outcome: Progressing     Problem: Discharge Planning  Goal: Discharge to home or other facility with appropriate resources  Outcome: Progressing     Problem: Safety - Adult  Goal: Free from fall injury  Outcome: Progressing     Problem: ABCDS Injury Assessment  Goal: Absence of physical injury  Outcome: Progressing     Problem: Cardiovascular - Adult  Goal: Maintains optimal cardiac output and hemodynamic stability  Outcome: Progressing  Goal: Absence of cardiac dysrhythmias or at baseline  Outcome: Progressing     Problem: Pain  Goal: Verbalizes/displays adequate comfort level or baseline comfort level  Outcome: Progressing     Problem: Skin/Tissue Integrity  Goal: Skin integrity remains intact  Description: 1.  Monitor for areas of redness and/or skin breakdown  2.  Assess vascular access sites hourly  3.  Every 4-6 hours minimum:  Change oxygen saturation probe site  4.  Every 4-6 hours:  If on nasal continuous positive airway pressure, respiratory therapy assess nares and determine need for appliance change or resting period  Outcome: Progressing  Flowsheets (Taken 3/29/2025 1945 by Camila Nicole RN)  Skin Integrity Remains Intact: Monitor for areas of redness and/or skin breakdown     
  Problem: Chronic Conditions and Co-morbidities  Goal: Patient's chronic conditions and co-morbidity symptoms are monitored and maintained or improved  Outcome: Progressing     Problem: Discharge Planning  Goal: Discharge to home or other facility with appropriate resources  Outcome: Progressing     Problem: Safety - Adult  Goal: Free from fall injury  Outcome: Progressing     Problem: ABCDS Injury Assessment  Goal: Absence of physical injury  Outcome: Progressing     Problem: Skin/Tissue Integrity  Goal: Skin integrity remains intact  Description: 1.  Monitor for areas of redness and/or skin breakdown  2.  Assess vascular access sites hourly  3.  Every 4-6 hours minimum:  Change oxygen saturation probe site  4.  Every 4-6 hours:  If on nasal continuous positive airway pressure, respiratory therapy assess nares and determine need for appliance change or resting period  Recent Flowsheet Documentation  Taken 3/30/2025 2000 by Camila Nicole, RN  Skin Integrity Remains Intact: Monitor for areas of redness and/or skin breakdown     
  Problem: Chronic Conditions and Co-morbidities  Goal: Patient's chronic conditions and co-morbidity symptoms are monitored and maintained or improved  Outcome: Progressing  Care Plan - Patient's Chronic Conditions and Co-Morbidity Symptoms are Monitored and Maintained or Improved:   Monitor and assess patient's chronic conditions and comorbid symptoms for stability, deterioration, or improvement   Collaborate with multidisciplinary team to address chronic and comorbid conditions and prevent exacerbation or deterioration   Update acute care plan with appropriate goals if chronic or comorbid symptoms are exacerbated and prevent overall improvement and discharge       Problem: Discharge Planning  Goal: Discharge to home or other facility with appropriate resources  Outcome: Progressing  Discharge to home or other facility with appropriate resources:   Identify barriers to discharge with patient and caregiver   Identify discharge learning needs (meds, wound care, etc)     Problem: Safety - Adult  Goal: Free from fall injury  Outcome: Progressing     Problem: ABCDS Injury Assessment  Goal: Absence of physical injury  Outcome: Progressing       Problem: ABCDS Injury Assessment  Goal: Absence of physical injury  Outcome: Progressing     Problem: Cardiovascular - Adult  Goal: Maintains optimal cardiac output and hemodynamic stability  Outcome: Progressing  Goal: Absence of cardiac dysrhythmias or at baseline  Outcome: Progressing     
  Problem: Chronic Conditions and Co-morbidities  Goal: Patient's chronic conditions and co-morbidity symptoms are monitored and maintained or improved  Outcome: Progressing  Flowsheets  Taken 4/2/2025 0606  Care Plan - Patient's Chronic Conditions and Co-Morbidity Symptoms are Monitored and Maintained or Improved: Monitor and assess patient's chronic conditions and comorbid symptoms for stability, deterioration, or improvement  Taken 4/1/2025 2010  Care Plan - Patient's Chronic Conditions and Co-Morbidity Symptoms are Monitored and Maintained or Improved:   Monitor and assess patient's chronic conditions and comorbid symptoms for stability, deterioration, or improvement   Collaborate with multidisciplinary team to address chronic and comorbid conditions and prevent exacerbation or deterioration     Problem: Discharge Planning  Goal: Discharge to home or other facility with appropriate resources  Outcome: Progressing  Flowsheets  Taken 4/2/2025 0606  Discharge to home or other facility with appropriate resources: Identify barriers to discharge with patient and caregiver  Taken 4/1/2025 2010  Discharge to home or other facility with appropriate resources:   Identify barriers to discharge with patient and caregiver   Arrange for needed discharge resources and transportation as appropriate     Problem: Safety - Adult  Goal: Free from fall injury  Outcome: Progressing  Flowsheets (Taken 4/2/2025 0606)  Free From Fall Injury: Instruct family/caregiver on patient safety     Problem: Pain  Goal: Verbalizes/displays adequate comfort level or baseline comfort level  Outcome: Progressing  Flowsheets  Taken 4/2/2025 0606  Verbalizes/displays adequate comfort level or baseline comfort level: Assess pain using appropriate pain scale  Taken 4/1/2025 2023  Verbalizes/displays adequate comfort level or baseline comfort level:   Encourage patient to monitor pain and request assistance   Assess pain using appropriate pain scale   
  Problem: Discharge Planning  Goal: Discharge to home or other facility with appropriate resources  Outcome: Progressing  Flowsheets  Taken 4/1/2025 0540  Discharge to home or other facility with appropriate resources: Identify barriers to discharge with patient and caregiver  Taken 3/31/2025 1954  Discharge to home or other facility with appropriate resources:   Identify barriers to discharge with patient and caregiver   Arrange for needed discharge resources and transportation as appropriate  Note: Patient's barriers to discharge: Diurese and SNF approval at [facility].      Problem: Safety - Adult  Goal: Free from fall injury  Outcome: Progressing  Flowsheets (Taken 4/1/2025 0540)  Free From Fall Injury: Instruct family/caregiver on patient safety  Note: Patient to remain free of any falls or injuries during length of stay. Yellow non-skid socks on, bed alarm on, and call light within reach.     Problem: Pain  Goal: Verbalizes/displays adequate comfort level or baseline comfort level  Outcome: Progressing  Flowsheets  Taken 4/1/2025 0540  Verbalizes/displays adequate comfort level or baseline comfort level: Assess pain using appropriate pain scale  Taken 3/31/2025 1954  Verbalizes/displays adequate comfort level or baseline comfort level:   Encourage patient to monitor pain and request assistance   Assess pain using appropriate pain scale  Note: Patient assessed using verbal 1-10 pain scale. Patient denies any pain but will be reassessed Q4.     Problem: Skin/Tissue Integrity  Goal: Skin integrity remains intact  Description: 1.  Monitor for areas of redness and/or skin breakdown  2.  Assess vascular access sites hourly  3.  Every 4-6 hours minimum:  Change oxygen saturation probe site  4.  Every 4-6 hours:  If on nasal continuous positive airway pressure, respiratory therapy assess nares and determine need for appliance change or resting period  Outcome: Progressing  Flowsheets  Taken 4/1/2025 
PHYSICAL THERAPY EVALUATION  Patient: Walker Marie (85 y.o. female)  Date: 3/29/2025  Primary Diagnosis: Acute pulmonary edema (HCC) [J81.0]  New onset of congestive heart failure (HCC) [I50.9]  Acute congestive heart failure, unspecified heart failure type (HCC) [I50.9]       Precautions: Fall Risk, General Precautions                      Recommendations for nursing mobility: Encourage HEP in prep for ADLs/mobility; see handout for details and Frequent repositioning to prevent skin breakdown    In place during session: Peripheral IV, External Catheter, and EKG/telemetry     ASSESSMENT  Pt is a 85 y.o. female admitted on 3/27/2025 for sob; pt currently being treated for CHF . Pt semi supine upon PT arrival, agreeable to evaluation. Pt A&O x 4.  Patient lives with family and was indep at home.    Based on the objective data described below, the patient currently presents with impaired ability to perform high-level IADLs, impaired strength, decreased activity tolerance, poor safety awareness, impaired balance, and impaired posture. (See below for objective details and assist levels).     Overall pt tolerated session fair today with PT. Pt required mod assist for bed mobility and transfers. Pt unable to take any steps demonstrates decreased gen strength. Additional time required for all activities.Pt will benefit from continued skilled PT to address above deficits and return to PLOF. Current PT DC recommendation Moderate intensity short-term skilled physical therapy up to 5x/week once medically appropriate.    GOALS:    Problem: Physical Therapy - Adult  Goal: By Discharge: Performs mobility at highest level of function for planned discharge setting.  See evaluation for individualized goals.  Description: FUNCTIONAL STATUS PRIOR TO ADMISSION: Patient was modified independent using a rolling walker for functional mobility.    HOME SUPPORT PRIOR TO ADMISSION: The patient's son lives with her but did not require 
output and hemodynamic stability  Outcome: Progressing  Flowsheets (Taken 4/1/2025 2010 by Sailaja Martinez RN)  Maintains optimal cardiac output and hemodynamic stability:   Monitor blood pressure and heart rate   Monitor urine output and notify Licensed Independent Practitioner for values outside of normal range   Assess for signs of decreased cardiac output  Goal: Absence of cardiac dysrhythmias or at baseline  Outcome: Progressing  Flowsheets (Taken 4/1/2025 2010 by Sailaja Martinez RN)  Absence of cardiac dysrhythmias or at baseline:   Monitor cardiac rate and rhythm   Assess for signs of decreased cardiac output     Problem: Pain  Goal: Verbalizes/displays adequate comfort level or baseline comfort level  4/2/2025 0745 by Melvin Ornelas RN  Outcome: Progressing  4/2/2025 0606 by Sailaja Martinez RN  Outcome: Progressing  Flowsheets  Taken 4/2/2025 0606  Verbalizes/displays adequate comfort level or baseline comfort level: Assess pain using appropriate pain scale  Taken 4/1/2025 2023  Verbalizes/displays adequate comfort level or baseline comfort level:   Encourage patient to monitor pain and request assistance   Assess pain using appropriate pain scale     Problem: Skin/Tissue Integrity  Goal: Skin integrity remains intact  Description: 1.  Monitor for areas of redness and/or skin breakdown  2.  Assess vascular access sites hourly  3.  Every 4-6 hours minimum:  Change oxygen saturation probe site  4.  Every 4-6 hours:  If on nasal continuous positive airway pressure, respiratory therapy assess nares and determine need for appliance change or resting period  4/2/2025 0745 by Melvin Ornelas RN  Outcome: Progressing  4/2/2025 0606 by Sailaja Martinez RN  Outcome: Progressing  Flowsheets  Taken 4/2/2025 0606  Skin Integrity Remains Intact: Monitor for areas of redness and/or skin breakdown  Taken 4/1/2025 2010  Skin Integrity Remains Intact: Monitor for areas of redness and/or skin breakdown     
pt to complete eval      Based on the above components, the patient evaluation is determined to be of the following complexity level: Low    Pain Ratin/10   Pain Intervention(s):       Activity Tolerance:   Fair , requires rest breaks, observed shortness of breath on exertion, and SpO2 stable on room air    After treatment patient left in no apparent distress:    Patient left in no apparent distress in bed, Call bell within reach, Bed/ chair alarm activated, Side rails x3, and Updated patient's board on functional status and mobility recommendations, bed locked and in lowest position    COMMUNICATION/EDUCATION:   The patient’s plan of care was discussed with: Physical therapist, Physical therapy assistant, and Registered nurse    Patient Education  Education Given To: Patient  Education Provided: Role of Therapy;ADL Adaptive Strategies;Energy Conservation;Fall Prevention Strategies;Plan of Care;Transfer Training;Mobility Training  Education Method: Verbal  Barriers to Learning: None  Education Outcome: Verbalized understanding;Continued education needed    The supervising occupational therapist and treating occupational therapist assistant have met to review this patient’s progress and plan of care.    This patient’s plan of care is appropriate for delegation to RACHELE.     Thank you for this referral,  Rocío Galvez OT  Minutes: 28

## 2025-04-02 NOTE — DISCHARGE SUMMARY
Physician Discharge Summary     Patient ID:    Walker Marie  [id number]  85 y.o.  1940    Admit date: 3/27/2025    Discharge date : 4/2/2025      Final Diagnoses:   Acute pulmonary edema (HCC) [J81.0]  New onset of congestive heart failure (HCC) [I50.9]  Acute congestive heart failure, unspecified heart failure type (HCC) [I50.9]  RAJEEV  UTI  Hyperglycemia   Hypokalemia     Reason for Hospitalization: SOB        Hospital Course:     Patient is an 85-year-old female with a history of colon cancer, diabetes, permanent pacemaker, bioprosthetic aortic valve, hypothyroidism and hypertension who presented to the ED on 3/27 with complaints of shortness of breath and lower extremity edema.  Previous echo in 2022 showed diastolic dysfunction.     In the ED, proBNP was mildly elevated at 653, creatinine 1.69 with a previous creatinine of 1.0 in  February 2023.  However, patient had labs recently through her PCP that did show elevated renal function apparently.  Chest x-ray showed a left pleural effusion, pulmonary vascular congestion and left basilar airspace opacities.     Patient was admitted for suspected new onset heart failure.  She was started on IV furosemide.     Echo showed an EF of 60 to 65% with diastolic dysfunction.  There was moderate MR and moderate MS. Some concern for mayne SAVANNAH. Cardiology cleared for discharge.      Her renal function worsened with diuresis and furosemide was reduced down to once daily by Nephrology.      Patient likely has underlying chronic kidney disease, unknown what her recent baseline is as we have no recent labs.  Her creatinine seems to stabilized at around 1.6 which I suspect is her baseline.    Otherwise, Nephrology cleared patient for discharge. Cardiology cleared patient.  Uclx showed Klebsiella. Got rocephin and then keflex. Tolerated with no issues. She had no concerns and was ready for discharge.      Nurse with no concerns.   Discharged in stable

## 2025-04-03 LAB
GLUCOSE BLD STRIP.AUTO-MCNC: 348 MG/DL (ref 65–100)
PERFORMED BY:: ABNORMAL

## 2025-04-03 NOTE — PROGRESS NOTES
Renal Progress Note    Patient: Walker Marie MRN: [id number]  SSN: [id number]    YOB: 1940  Age: 85 y.o.  Sex: female          Subjective:   Pt seen at the bedside  Pt awake and alert  She appears comfortable  On room air      Objective:     Vitals:    04/01/25 0429 04/01/25 0701 04/01/25 0832 04/01/25 1136   BP: (!) 158/71  134/74 135/67   Pulse: 70 74 84 80   Resp: 20  18 18   Temp: 97.3 °F (36.3 °C)  98.1 °F (36.7 °C) 97.9 °F (36.6 °C)   TempSrc: Oral  Oral Oral   SpO2: 91%  92% 90%   Weight: 98.2 kg (216 lb 7.9 oz)      Height:            Physical Exam:   General: NAD  Eyes: sclera anicteric  Oral Cavity: No thrush or ulcers  Neck: no JVD  Chest: Fair bilateral air entry  Heart: normal sounds  Abdomen: soft and non tender   : no hollingsworth  Lower Extremities: no edema  Skin: no rash  Neuro: intact  Psychiatric: non-depressed      Lab/Data Review  No results displayed because visit has over 200 results.             Assessment/Plan:    Acute kidney injury  2/2 prerenal azotemia from cardiorenal   On admission creatinine 1.6  No recent baseline kidney function last available creatinine 1.02 in 2023.  Creatinine has gone up to 1.91 today likely from IV diuresis  Agree to decrease IV Lasix dose.  Creat 1.6-1.7   Urine pos for UTI; culture pending     Hypokalemia  From loop diuretics  Potassium 3.4  Rec'd P.o. KCl replacement  Receiving PO KCL replacement 20meq BID & Effer K     Hypertension  Blood pressures are okay  Continue amlodipine 5 mg daily/metoprolol 25 mg twice a day.     CHF  Shortness of breath and lower extremity swelling  LV diastolic dysfunction  Status post aortic valve replacement  2D echocardiogram preserved EF  Moderate MR and MS  Clinically approaching euvolemic state  IV Lasix 40mg, switched to PO Lasix 60mg     Anemia  Hemoglobin 9.9-->9.8 today (3/31)  Iron 25  TIBC 276  Iron Sat 9%   Ferritin 55     DVT prophylaxis  Okay to continue low molecular weight heparin.         
           Renal Progress Note    Patient: Walker Marie MRN: [id number]  SSN: [id number]    YOB: 1940  Age: 85 y.o.  Sex: female          Subjective:   Pt seen at the bedside  Pt awake and alert  She appears comfortable  On room air      Objective:     Vitals:    03/31/25 0421 03/31/25 0704 03/31/25 0753 03/31/25 1151   BP: 123/63  (!) 149/64 121/65   Pulse: 87 78 96 87   Resp: 15      Temp: 98.1 °F (36.7 °C)  98.6 °F (37 °C) 98.1 °F (36.7 °C)   TempSrc: Oral  Oral Oral   SpO2: 91%  91% 90%   Weight: 99.4 kg (219 lb 2.2 oz)      Height:            Physical Exam:   General: NAD  Eyes: sclera anicteric  Oral Cavity: No thrush or ulcers  Neck: no JVD  Chest: Fair bilateral air entry  Heart: normal sounds  Abdomen: soft and non tender   : no hollingsworth  Lower Extremities: no edema  Skin: no rash  Neuro: intact  Psychiatric: non-depressed      Lab/Data Review  No results displayed because visit has over 200 results.             Assessment/Plan:    Acute kidney injury  2/2 prerenal azotemia from cardiorenal   On admission creatinine 1.6  No recent baseline kidney function last available creatinine 1.02 in 2023.  Creatinine has gone up to 1.91 today likely from IV diuresis  Agree to decrease IV Lasix dose.  Creat 1.69 today (3/31)  Urine pos for UTI; culture pending     Hypokalemia  From loop diuretics  Potassium 3.4  Rec'd P.o. KCl replacement  K 3.6 today (3/31)  Receiving PO KCL replacement 20meq BID & Effer K     Hypertension  Blood pressures are okay  Continue amlodipine 5 mg daily/metoprolol 25 mg twice a day.     CHF  Shortness of breath and lower extremity swelling  LV diastolic dysfunction  Status post aortic valve replacement  2D echocardiogram preserved EF  Moderate MR and MS  Clinically approaching euvolemic state  IV Lasix 40mg, switched to PO Lasix 60mg     Anemia  Hemoglobin 9.9-->9.8 today (3/31)  Iron 25  TIBC 276  Iron Sat 9%   Ferritin 55     DVT prophylaxis  Okay to continue low molecular 
           Renal Progress Note    Patient: Walker Marie MRN: [id number]  SSN: [id number]    YOB: 1940  Age: 85 y.o.  Sex: female          Subjective:   Pt seen at the bedside  Pt awake and alert  She appears comfortable  On room air    Objective:     Vitals:    03/30/25 0152 03/30/25 0700 03/30/25 0738 03/30/25 1125   BP: (!) 143/71  (!) 125/55 (!) 128/54   Pulse: 72 79 64 80   Resp: 16  16 16   Temp: 98.4 °F (36.9 °C)  98.1 °F (36.7 °C) 99.1 °F (37.3 °C)   TempSrc: Oral  Axillary Oral   SpO2: 93%  92% 94%   Weight: 99 kg (218 lb 4.1 oz)      Height:                Physical Exam:   General: NAD  Eyes: sclera anicteric  Oral Cavity: No thrush or ulcers  Neck: no JVD  Chest: Fair bilateral air entry  Heart: normal sounds  Abdomen: soft and non tender   : no hollingsworth  Lower Extremities: no edema  Skin: no rash  Neuro: intact  Psychiatric: non-depressed      Lab/Data Review  No results displayed because visit has over 200 results.             Assessment/Plan:    Acute kidney injury  2/2 prerenal azotemia from cardiorenal   On admission creatinine 1.6  No recent baseline kidney function last available creatinine 1.02 in 2023.  Creatinine has gone up to 1.91 today likely from IV diuresis  Agree to decrease IV Lasix dose.  Creat 1.64 today (3/30)  Pending UA and UPCR  Resolving    Hypokalemia  From loop diuretics  Potassium 3.4  Rec'd P.o. KCl replacement  K 3.0 today  Receiving PO KCL replacement 20meq BID & Effer K    Hypertension  Blood pressures are okay  Continue amlodipine 5 mg daily/metoprolol 25 mg twice a day.     CHF  Shortness of breath and lower extremity swelling  LV diastolic dysfunction  Status post aortic valve replacement  2D echocardiogram preserved EF  Moderate MR and MS  Clinically approaching euvolemic state  Agree to decrease IV Lasix dose      Anemia  Hemoglobin 9.9  Pending iron studies and ferritin level     DVT prophylaxis  Okay to continue low molecular weight heparin.         
           Renal Progress Note    Patient: Walker Marie MRN: [id number]  SSN: [id number]    YOB: 1940  Age: 85 y.o.  Sex: female          Subjective:   Pt seen at the bedside  Pt awake and alert  She appears comfortable  On room air      Objective:     Vitals:    04/02/25 0600 04/02/25 0700 04/02/25 0742 04/02/25 1105   BP:   (!) 150/61 (!) 143/60   Pulse:  65 81 71   Resp:   19 18   Temp:   97.7 °F (36.5 °C) 97.9 °F (36.6 °C)   TempSrc:   Axillary Oral   SpO2:   93% 96%   Weight: 98.4 kg (216 lb 14.9 oz)      Height:            Physical Exam:   General: NAD  Eyes: sclera anicteric  Oral Cavity: No thrush or ulcers  Neck: no JVD  Chest: Fair bilateral air entry  Heart: normal sounds  Abdomen: soft and non tender   : no hollingsworth  Lower Extremities: no edema  Skin: no rash  Neuro: intact  Psychiatric: non-depressed      Lab/Data Review  No results displayed because visit has over 200 results.             Assessment/Plan:    Acute kidney injury  2/2 prerenal azotemia from cardiorenal   On admission creatinine 1.6  No recent baseline kidney function last available creatinine 1.02 in 2023.  Creatinine has gone up to 1.91 today likely from IV diuresis  Agree to decrease IV Lasix dose.  Creat 1.5  Urine pos for UTI; culture pending     Hypokalemia  From loop diuretics  Potassium 3.4  Rec'd P.o. KCl replacement  Receiving PO KCL replacement 20meq BID & Effer K     Hypertension  Blood pressures are okay  Continue amlodipine 5 mg daily/metoprolol 25 mg twice a day.     CHF  Shortness of breath and lower extremity swelling  LV diastolic dysfunction  Status post aortic valve replacement  2D echocardiogram preserved EF  Moderate MR and MS  Clinically approaching euvolemic state  IV Lasix 40mg, switched to PO Lasix 60mg     Anemia  Hemoglobin 9.9-->9.8 today (3/31)  Iron 25  TIBC 276  Iron Sat 9%   Ferritin 55     DVT prophylaxis  Okay to continue low molecular weight heparin.               Signed By: Porfirio Valladares 
        Progress Note      3/31/2025 8:57 AM  NAME: Walker Marie   MRN:  [id number]   Admit Diagnosis: Acute pulmonary edema (HCC) [J81.0]  New onset of congestive heart failure (HCC) [I50.9]  Acute congestive heart failure, unspecified heart failure type (HCC) [I50.9]      Problem List:   85 year old female presenting with altered mental status, dyspnea on exertion, and bilateral lower extremity swelling.  -Cardiology consult invited secondary to onset of congestive heart failure. NT-proBNP elevated at 653.  -Diabetes  -Hypertension  -Thyroid disease  -Pancreatitis  -Depression  -Last echocardiogram 3/31/22 showed EF 55-60%       Assessment/Plan:   Note dictated March 31, 2025  -Follow-up visit from cardiology.  Patient is lying comfortably in the bed.  -No acute overnight cardiovascular event over the weekend and patient is clinically and hemodynamically stable.   present in the room.  -Continue to optimize guideline directed medical management for possible underlying diastolic heart failure and its underlying medical issues including but not limited to diabetes mellitus and hypertension.  -We will treat her symptomatically with no further cardiovascular testing as it would not change my cardiac management other than echocardiogram.  ===============================================================  March 28, 2025  -85-year-old well-preserved white female with no known established coronary artery disease admitted to the hospital with shortness of breath and diagnosed with heart failure.  -When I saw the patient this morning she was lying comfortably in the bed and has no symptoms but only feeling fatigue and tired.  -Cardiac enzyme has been unremarkable and EKG shows no acute changes for significant ischemia or injury pattern.  -Echocardiogram is pending.  -Telemetry reveals paced rhythm.  -Will check serial cardiac enzymes obtain echocardiogram and follow her and then make further cardiovascular management 
        Progress Note      4/2/2025 8:28 AM  NAME: Walker Marie   MRN:  [id number]   Admit Diagnosis: Acute pulmonary edema (HCC) [J81.0]  New onset of congestive heart failure (HCC) [I50.9]  Acute congestive heart failure, unspecified heart failure type (HCC) [I50.9]      Problem List:   85 year old female presenting with altered mental status, dyspnea on exertion, and bilateral lower extremity swelling.  -Cardiology consult invited secondary to onset of congestive heart failure. NT-proBNP elevated at 653.  -Diabetes  -Hypertension  -Thyroid disease  -Pancreatitis  -Depression  -Last echocardiogram 3/31/22 showed EF 55-60%       Assessment/Plan:   April 2, 2025  --Follow-up visit from cardiology.  Patient is laying comfortably in the bed in no acute distress. No acute overnight cardiovascular events. Brother present in the room.   -Patient denies chest pain. Admits to some shortness of breath on exertion.  -Telemetry reveals paced rhythm at 80 bpm.  -Echocardiogram completed 3/28/25 shows EF 60-65%.  -No further cardiovascular testing as it would not change cardiac management at this time. Patient is okay to be discharged at this time with po medical management of diastolic heart failure as well as treating her diabetes and hypertension.  ===============================================================  Note dictated March 31, 2025  -Follow-up visit from cardiology.  Patient is lying comfortably in the bed.  -No acute overnight cardiovascular event over the weekend and patient is clinically and hemodynamically stable.   present in the room.  -Continue to optimize guideline directed medical management for possible underlying diastolic heart failure and its underlying medical issues including but not limited to diabetes mellitus and hypertension.  -We will treat her symptomatically with no further cardiovascular testing as it would not change my cardiac management other than 
    Hospitalist Progress Note               Daily Progress Note: 3/28/2025      Hospital Day: 2     Chief complaint:   Chief Complaint   Patient presents with    Altered Mental Status    Shortness of Breath        Subjective:   Hospital course to date:    Patient is an 85-year-old female with a history of colon cancer, diabetes, permanent pacemaker, bioprosthetic aortic valve, hypothyroidism and hypertension who presented to the ED on 3/27 with complaints of shortness of breath and lower extremity edema.  Previous echo in 2022 showed diastolic dysfunction.    In the ED, proBNP was mildly elevated at 653, creatinine 1.69 with a previous creatinine of 1.0 in  February 2023.  Chest x-ray showed a left pleural effusion, pulmonary vascular congestion and left basilar airspace opacities.    Patient was admitted for suspected new onset heart failure.  She was started on IV furosemide.    --------  Patient is seen today for follow-up.  Labs this morning show a creatinine of 1.79.  I suspect she has underlying chronic kidney disease.  Previous creatinine prior to this admission was 1 back in 2023 so I suspect she has had progression of CKD since then.    She is currently breathing comfortably on room air      Medications reviewed  Current Facility-Administered Medications   Medication Dose Route Frequency    [Held by provider] furosemide (LASIX) injection 40 mg  40 mg IntraVENous BID    sodium chloride flush 0.9 % injection 5-40 mL  5-40 mL IntraVENous 2 times per day    sodium chloride flush 0.9 % injection 5-40 mL  5-40 mL IntraVENous PRN    0.9 % sodium chloride infusion   IntraVENous PRN    enoxaparin Sodium (LOVENOX) injection 30 mg  30 mg SubCUTAneous Daily    ondansetron (ZOFRAN-ODT) disintegrating tablet 4 mg  4 mg Oral Q8H PRN    Or    ondansetron (ZOFRAN) injection 4 mg  4 mg IntraVENous Q6H PRN    polyethylene glycol (GLYCOLAX) packet 17 g  17 g Oral Daily PRN    acetaminophen (TYLENOL) tablet 650 mg  650 mg Oral 
    Hospitalist Progress Note               Daily Progress Note: 3/29/2025      Hospital Day: 3     Chief complaint:   Chief Complaint   Patient presents with    Altered Mental Status    Shortness of Breath        Subjective:   Hospital course to date:    Patient is an 85-year-old female with a history of colon cancer, diabetes, permanent pacemaker, bioprosthetic aortic valve, hypothyroidism and hypertension who presented to the ED on 3/27 with complaints of shortness of breath and lower extremity edema.  Previous echo in 2022 showed diastolic dysfunction.    In the ED, proBNP was mildly elevated at 653, creatinine 1.69 with a previous creatinine of 1.0 in  February 2023.  However, patient had labs recently through her PCP that did show elevated renal function apparently.  Chest x-ray showed a left pleural effusion, pulmonary vascular congestion and left basilar airspace opacities.    Patient was admitted for suspected new onset heart failure.  She was started on IV furosemide.    Echo showed an EF of 60 to 65% with diastolic dysfunction.  There was moderate MR and moderate MS.    Her renal function worsened with diuresis and furosemide was reduced down to once daily.    Patient likely has underlying chronic kidney disease, unknown what her recent baseline is as we have no recent labs    --------  Patient is seen today for follow-up.  She is awake and alert.  Continues to improve clinically.  Edema much better.  PT recommending SNF.  Patient is agreeable.  Her brother would like her to go to [place]    Creatinine better today at 1.6.  Unclear what her baseline is      Medications reviewed  Current Facility-Administered Medications   Medication Dose Route Frequency    amLODIPine (NORVASC) tablet 5 mg  5 mg Oral Daily    DULoxetine (CYMBALTA) extended release capsule 30 mg  30 mg Oral Daily    hydrOXYzine pamoate (VISTARIL) capsule 25 mg  25 mg Oral Nightly    insulin glargine (LANTUS) injection vial 40 Units  40 
    Hospitalist Progress Note               Daily Progress Note: 3/31/2025      Hospital Day: 5     Chief complaint:   Chief Complaint   Patient presents with    Altered Mental Status    Shortness of Breath        Subjective:   Hospital course to date:    Patient is an 85-year-old female with a history of colon cancer, diabetes, permanent pacemaker, bioprosthetic aortic valve, hypothyroidism and hypertension who presented to the ED on 3/27 with complaints of shortness of breath and lower extremity edema.  Previous echo in 2022 showed diastolic dysfunction.    In the ED, proBNP was mildly elevated at 653, creatinine 1.69 with a previous creatinine of 1.0 in  February 2023.  However, patient had labs recently through her PCP that did show elevated renal function apparently.  Chest x-ray showed a left pleural effusion, pulmonary vascular congestion and left basilar airspace opacities.    Patient was admitted for suspected new onset heart failure.  She was started on IV furosemide.    Echo showed an EF of 60 to 65% with diastolic dysfunction.  There was moderate MR and moderate MS.    Her renal function worsened with diuresis and furosemide was reduced down to once daily.    Patient likely has underlying chronic kidney disease, unknown what her recent baseline is as we have no recent labs.  Her creatinine seems to stabilized at around 1.6 which I suspect is her baseline    --------  Patient is seen today for follow-up.  She is awake and alert.   Lower extremity edema resolved.  SNF referral underway    Medications reviewed  Current Facility-Administered Medications   Medication Dose Route Frequency    potassium chloride (KLOR-CON M) extended release tablet 20 mEq  20 mEq Oral BID    spironolactone (ALDACTONE) tablet 25 mg  25 mg Oral Daily    furosemide (LASIX) injection 40 mg  40 mg IntraVENous Daily    amLODIPine (NORVASC) tablet 5 mg  5 mg Oral Daily    DULoxetine (CYMBALTA) extended release capsule 30 mg  30 mg Oral 
    Hospitalist Progress Note               Daily Progress Note: 4/1/2025      Hospital Day: 6     Chief complaint:   Chief Complaint   Patient presents with    Altered Mental Status    Shortness of Breath        Brief HPI/ Hospital course to date:  Patient is an 85-year-old female with a history of colon cancer, diabetes, permanent pacemaker, bioprosthetic aortic valve, hypothyroidism and hypertension who presented to the ED on 3/27 with complaints of shortness of breath and lower extremity edema.  Previous echo in 2022 showed diastolic dysfunction.     In the ED, proBNP was mildly elevated at 653, creatinine 1.69 with a previous creatinine of 1.0 in  February 2023.  However, patient had labs recently through her PCP that did show elevated renal function apparently.  Chest x-ray showed a left pleural effusion, pulmonary vascular congestion and left basilar airspace opacities.     Patient was admitted for suspected new onset heart failure.  She was started on IV furosemide.     Echo showed an EF of 60 to 65% with diastolic dysfunction.  There was moderate MR and moderate MS.     Her renal function worsened with diuresis and furosemide was reduced down to once daily.     Patient likely has underlying chronic kidney disease, unknown what her recent baseline is as we have no recent labs.  Her creatinine seems to stabilized at around 1.6 which I suspect is her baseline    --------  Patient is seen today for follow-up.   He is sitting upright in a chair with no concerns.  She reports feeling much better.  Per nursing, patient did get short of breath while working with RT today.  Otherwise, she has been on room air.  Patient denies headache, chest pain/palpitations, shortness of breath, Otoniel pain, urinary symptoms.      All ROS negative otherwise mentioned above.      Assessment and Plan:    Acute heart failure with preserved ejection fraction  Elevated proBNP, vascular congestion on chest x-ray and lower extremity 
  Physician Progress Note      PATIENT:               DAE DIAZ  CSN #:                  [id number]  :                       1940  ADMIT DATE:       3/27/2025 3:18 PM  DISCH DATE:        2025 2:42 PM  RESPONDING  PROVIDER #:        Felisa Mack MD          QUERY TEXT:    A mental status change is documented in the medical record in Cardilolgy   consult by NIRANJAN Antunez MD on 3/28/25.  Please specify the underlying cause:    The clinical indicators include:  3/28 Cardilolgy consult by NIRANJAN Antunez MD:  \"85 year old female presenting with altered mental status, dyspnea on   exertion, and bilateral lower extremity swelling.\"    3/31 MD PN by SARAHI Valladares MD:  Acute kidney injury  Urine pos for UTI; culture pending  Options provided:  -- Metabolic encephalopathy  -- Other - I will add my own diagnosis  -- Disagree - Not applicable / Not valid  -- Disagree - Clinically unable to determine / Unknown  -- Refer to Clinical Documentation Reviewer    PROVIDER RESPONSE TEXT:    This patient has metabolic encephalopathy.    Query created by: Nettie Gomez on 2025 3:54 PM      Electronically signed by:  Felisa Mack MD 4/3/2025 8:27 AM          
0727: Dr. Hussein at bedside, orders received to hold lasix and give PO potassium.     0800: Pt does not know what medications she takes at home. Pt's brother (Danelle Dukes) states her medications are over 30 miles away at her house and asked that we call her pharmacy for a list. Spoke with pharmacist in hospital who pulled list filled at pt's home pharmacy and home medication list updated.    1040: Pt's son brought in pt's home medications, med rec verified off home medication bottles. Medications returned to pt's son to take home.    
4 Eyes Skin Assessment     NAME:  Walker Marie  YOB: 1940  MEDICAL RECORD NUMBER:  [id number]    The patient is being assessed for  Admission    I agree that at least one RN has performed a thorough Head to Toe Skin Assessment on the patient. ALL assessment sites listed below have been assessed.      Areas assessed by both nurses:    Head, Face, Ears, Shoulders, Back, Chest, Arms, Elbows, Hands, Sacrum. Buttock, Coccyx, Ischium, Legs. Feet and Heels, and Under Medical Devices         Does the Patient have a Wound? No noted wound(s)       Caio Prevention initiated by RN: Yes  Wound Care Orders initiated by RN: No    Pressure Injury (Stage 3,4, Unstageable, DTI, NWPT, and Complex wounds) if present, place Wound referral order by RN under : No    New Ostomies, if present place, Ostomy referral order under : No     Nurse 1 eSignature: Electronically signed by Raymundo Sher RN on 3/28/25 at 1:50 AM EDT    **SHARE this note so that the co-signing nurse can place an eSignature**    Nurse 2 eSignature: Electronically signed by Charisse Méndez RN on 3/28/25 at 1:51 AM EDT   
CM noted discharge order, but pending whether consultants clear patient for discharge. CM in to patient room to speak with patient and her brother. Explained that CM would attempt to arrange transport for her, but likely she would not qualify for medicare transport due to being able to sit up in a chair.     Brother said he would leave for a bit and then return to pick her up and transport to [facility].     Insurance auth obtained yesterday from [employer] and will  on 4/3/25.     Transition of Care Plan:    RUR: 16%  Prior Level of Functioning: independent as she can be  Disposition: SNF  ULYSSES: today  If SNF or IPR: Date FOC offered: 3/28/25  Date FOC received: 3/28/25  Accepting facility: [facility]  Date authorization started with reference number: 25  Date authorization received and expires: 25 and 4/3/25  Follow up appointments: n/a  DME needed: n/a  Transportation at discharge: yes  IM/IMM Medicare/ letter given: yes  Is patient a [place] and connected with [facility]? N/a   If yes, was [place] transfer form completed and [facility] notified?   Caregiver Contact: n/a  Discharge Caregiver contacted prior to discharge? N/a  Care Conference needed? N/a  Barriers to discharge: n/a      1400 pm  CM has called patient brother Sandy multiple times but no answer. CM notified patient and she will try to get in touch with him.     1430 pm  Nursing report can be called to [phone]. Patient will be going to room 200. Her brother just arrived to pick her up.     
CM reviewed chart and noted patient usually lives alone. According to the MD note, she has a home health agency but is unsure of the name.     PT worked with patient yesterday and is recommending Moderate intensity short-term skilled physical therapy up to 5x/week once medically appropriate. Referral made to [facility] and rehab yesterday. CM currently awaiting OT note in order to start insurance auth for her to go to [facility] and rehab. PT made aware in IDRs, Previously no order but order placed this am for OT.    Patient is followed by cardiology and per IDRs, patient will likely leave tomorrow.   
CM reviewed chart and noted recent PT/OT notes. Auth started through [employer] this am for patient to go to [facility] and rehab. Ref # [id number].     Patient usually lives alone and has home health but unsure which agency.     Patient has agreed to SNF placement and then back home.     Barriers to discharge- diuresis, cardiac clearance, insurance auth    1530 pm  Insurance auth approved from 4/1/25-4/3/25 for patient to go to [facility] and rehab.   
Echo completed. Report to follow.    
Patient refused 4 day IV change. Documentation in chart.  
Pt assisted to wheelchair.    IV removed.    Telebox removed.    Pt rolled to waiting room by PCT.   
Pt sitting upright in chair.    No complaints at this time.    Case management ntfd that discharge summary is in and patient has been ok'd for discharge by attending.    Case management to finalize discharge arrangements.   
Received Order for Telemetry     Walker Marie   1940   [id number]   Acute pulmonary edema (HCC) [J81.0]  New onset of congestive heart failure (HCC) [I50.9]  Acute congestive heart failure, unspecified heart failure type (HCC) [I50.9]   Jovan Mitchell MD     Tele Box # [id number] placed on patient at  2158 pm  ER Room # 24  Admitting to Room 468  Transferring Nurse JUSTICE  Verified with Primary Nurse MARII at  2215 pm   
Report given to [facility] and Rehab Nurse.    Pt to be transported by brother to facility.  
This RN was unable to complete pt's home medication review due to patient's inability to recall her  home medications. This RN  called patient's brother requesting for patient's home med list .  Patient's brother will bring patient's home med list .  Day shift RN updated.   
Training  Bed Mobility Training: No    Transfers:  Transfer Training  Interventions: Verbal cues;Safety awareness training;Visual cues  Sit to Stand: Minimal assistance;Partial/Moderate assistance  Stand to Sit: Minimal assistance  Bed to Chair: Minimal assistance  Toilet Transfer: Minimal assistance      Balance:  Balance  Sitting: Intact  Standing: Impaired  Standing - Static: Constant support;Fair  Standing - Dynamic: Constant support;Fair      ADL Intervention:       LE Dressing: Moderate assistance  LE Dressing Skilled Clinical Factors: pt instructed in using a reacher to get briefs over feet, requiring min a and pt required A to pull to waist            Toileting: Moderate assistance  Toileting Skilled Clinical Factors: pt able to comlete anterior navneet care but required A with clothing management and posterior navneet care          Pain Ratin/10   Pain Intervention(s):   pain is at a level acceptable to the patient    Activity Tolerance:   Fair  and observed shortness of breath on exertion    After treatment patient left in no apparent distress:   Bed locked and returned to lowest position, Patient left in no apparent distress sitting up in chair, Call bell within reach, Heels elevated for pressure relief, and Updated patient's board on functional status and mobility recommendations, and nsg updated     COMMUNICATION/EDUCATION:   The patient’s plan of care was discussed with: Physical therapy assistant and Registered nurse  Partial co-tx for safety and endurance  Patient Education  Education Given To: Patient  Education Provided: Plan of Care;Transfer Training;ADL Adaptive Strategies;Energy Conservation  Education Method: Demonstration;Verbal  Barriers to Learning: Hearing  Education Outcome: Continued education needed    Thank you for this referral.  RACHELE Haq  Minutes: 34

## 2025-07-11 NOTE — MED STUDENT NOTES
PULMONARY CONSULT  [facility]    Name: Miguel Chao MRN: [id number]   : 1940 Hospital: [address] [place] [address]   Date: 3/31/2022  Admission date: 3/29/2022 Hospital Day: 3       HPI:     Hospital Problems  Never Reviewed          Codes Class Noted POA    Colonic mass ICD-10-CM: P07.31  ICD-9-CM: 569.89  3/29/2022 Unknown        SBO (small bowel obstruction) ([facility] [address] ICD-10-CM: N56.957  ICD-9-CM: 560.9  3/29/2022 Unknown        HTN (hypertension), malignant ICD-10-CM: I10  ICD-9-CM: 401.0  3/29/2022 Unknown                   [x] High complexity decision making was performed  [x] See my orders for details      Subjective/Initial History:     I was asked by Kim Antony MD to see Miguel Chao  a [de-identified] y.o.  female in consultation      HPI: Randi Harrison is a [de-identified]year old female PMH of COPD, diabetes, HTN, HLD, Pacemaker, AICD, HX of cardiac stents to have possible surgical resection tomorrow for partial bowel obstruction. Patient is alert and oriented and doing well. No respiratory distress. Excerpts from admission 3/29/2022 or consult notes as follows:          Allergies   Allergen Reactions    Nortriptyline Itching    Contrast Agent [Iodine] Unknown (comments)    Cymbalta [Duloxetine] Itching    Ivp [Fd And C Blue No.1] Hives    Morphine Itching    Tetracycline Itching        MAR reviewed and pertinent medications noted or modified as needed     Current Facility-Administered Medications   Medication    0.9% sodium chloride infusion    glucose chewable tablet 16 g    dextrose 10% infusion 0-250 mL    glucagon (GLUCAGEN) injection 1 mg    sodium chloride (NS) flush 5-40 mL    sodium chloride (NS) flush 5-40 mL    acetaminophen (TYLENOL) tablet 650 mg    Or    acetaminophen (TYLENOL) suppository 650 mg    ondansetron (ZOFRAN ODT) tablet 4 mg    Or    ondansetron (ZOFRAN) injection 4 mg    enoxaparin (LOVENOX) injection 40 mg    insulin lispro (HUMALOG) injection    [Held by provider] aspirin delayed-release tablet 81 mg    [Held by provider] clopidogreL (PLAVIX) tablet 75 mg    DULoxetine (CYMBALTA) capsule 30 mg    ferrous sulfate tablet 325 mg    levothyroxine (SYNTHROID) tablet 150 mcg    metoprolol succinate (TOPROL-XL) XL tablet 25 mg    atorvastatin (LIPITOR) tablet 10 mg    albuterol (PROVENTIL HFA, VENTOLIN HFA, PROAIR HFA) inhaler 2 Puff    oxyCODONE IR (ROXICODONE) tablet 5 mg    insulin glargine (LANTUS) injection 16 Units    cefTRIAXone (ROCEPHIN) 1 g in sterile water (preservative free) 10 mL IV syringe    HYDROmorphone (DILAUDID) syringe 0.5 mg      Patient PCP: Catie Spence MD  PMH:  has a past medical history of Depression, DM (diabetes mellitus) ([address] Hyperlipidemia, Hypertension, Pancreatitis, and Thyroid disease. PSH:   has a past surgical history that includes hx hysterectomy; ir fluoro guide plc cvad (5/10/2021); ir insert non tunl cvc over 5 yrs (5/10/2021); pr ins new/rplcmt prm pm w/transv eltrd atrial&vent (N/A, 5/11/2021); and hx aortic valve replacement (08/2021). FHX: family history is not on file. SHX:  reports that she has never smoked. She has never used smokeless tobacco. She reports that she does not drink alcohol and does not use drugs. ROS:    Review of Systems   Constitutional: Negative. HENT: Negative. Eyes: Negative. Respiratory: Negative for shortness of breath. Cardiovascular: Negative. Gastrointestinal: Positive for abdominal pain. Genitourinary: Negative. Musculoskeletal: Negative. Skin: Negative. Neurological: Negative. Psychiatric/Behavioral: Negative.          Objective:     Vital Signs: Telemetry:   Ventricular-paced rhythm   Left axis deviation   Left ventricular hypertrophy with QRS widening  Intake/Output:   Visit Vitals  BP (!) 135/59 (BP 1 Location: Left upper arm, BP Patient Position: Supine) Comment: Primary nurse notified   Pulse 76   Temp 98.8 °F (37.1 °C)   Resp 16   Ht 5' 1\" (1.549 m)   Wt 165 lb (74.8 kg)   SpO2 97%   BMI 31.18 kg/m²       Temp (24hrs), Av.9 °F (37.2 °C), Min:98.8 °F (37.1 °C), Max:98.9 °F (37.2 °C)        O2 Device: None (Room air)         Wt Readings from Last 4 Encounters:   22 165 lb (74.8 kg)   21 165 lb (74.8 kg)   21 172 lb 6.4 oz (78.2 kg)   10/11/10 225 lb 3.2 oz (102.2 kg)          Intake/Output Summary (Last 24 hours) at 3/31/2022 1003  Last data filed at 3/31/2022 0728  Gross per 24 hour   Intake    Output 500 ml   Net -500 ml       Last shift:       07 - 1900  In: -   Out: 200 [Urine:200]  Last 3 shifts: 1901 -  07  In: 100 [P.O.:100]  Out: 450 [Urine:450]       Physical Exam:     Physical Exam  Constitutional:       Appearance: Normal appearance. HENT:      Head: Normocephalic and atraumatic. Nose: Nose normal.      Mouth/Throat:      Mouth: Mucous membranes are moist.   Eyes:      Pupils: Pupils are equal, round, and reactive to light. Cardiovascular:      Rate and Rhythm: Normal rate. Rhythm irregular. Pulses: Normal pulses. Heart sounds: Normal heart sounds. Pulmonary:      Effort: Pulmonary effort is normal.      Breath sounds: Normal breath sounds. Abdominal:      General: There is distension. Tenderness: There is abdominal tenderness. Musculoskeletal:         General: Normal range of motion. Cervical back: Normal range of motion and neck supple. Skin:     General: Skin is warm. Neurological:      General: No focal deficit present. Mental Status: She is alert.    Psychiatric:         Mood and Affect: Mood normal.          Labs:    Recent Labs     22  0515 22  0901 22  0900   WBC 12.5* 12.3* 12.1*   HGB 9.1* 9.9* 11.4*    305 350   INR  --  1.3*  --    APTT  --  38.4*  --      Recent Labs     22  0515 22  0901 22  0900   * 133* 139   K 4.0 4.2 4.2   CL 96* 99 106   CO2 24 30 28   * 138* 161* BUN 19 23* 21*   CREA 0.73 1.02 0.97   CA 7.8* 8.3* 9.5   ALB 2.3*  --  3.5   ALT 11*  --  20   LPSE  --   --  77     Recent Labs     03/30/22  1110   PH 7.42   PCO2 43   PO2 69*   HCO3 27*   FIO2 21.0     No results for input(s): CPK, CKNDX, TROIQ in the last 72 hours. No lab exists for component: CPKMB  No results found for: BNPP, BNP   Lab Results   Component Value Date/Time    Culture result: No Growth (<1000 cfu/mL) 05/18/2021 05:00 PM    Culture result: No growth 6 days 05/18/2021 12:45 PM    Culture result: No growth 6 days 05/04/2021 10:30 AM     Lab Results   Component Value Date/Time    TSH 1.62 05/04/2021 11:12 AM       Imaging:    CXR Results  (Last 48 hours)    None        Results from Hospital Encounter encounter on 03/29/22    XR CHEST PORT    Narrative  Chest single view. Comparison single view chest May 18, 2021. Lungs clear; no interstitial or alveolar pulmonary edema. Left-sided cardiac  device with similar positioned leads overlying the heart. Cardiac and  mediastinal structures unchanged noting thoracic aorta atherosclerosis. No  pneumothorax or pleural effusion. Bone remodeling related to nonacute fracture proximal left humerus unchanged. Results from Hospital Encounter encounter on 05/04/21    XR CHEST PA LAT    Narrative  Examination: Chest Radiograph, 2 views    Indication: Leukocytosis    Comparison: Chest Radiograph  5/14/2021    Findings:    Pacing device overlying the left chest. Hypoventilatory changes. Blunting of the  posterior costophrenic angles which can indicate small pleural effusions or  atelectasis. No pneumothorax. Stable cardiomediastinal silhouette. Impression  Blunting of the posterior costophrenic angle indicates small pleural effusions  or atelectasis. XR SWALLOW FUNC VIDEO    Narrative  One image and 37 seconds    The oral phase was normal with all consistencies. Swallowing is initiated  without delay. Laryngeal epiglottic motion are strong. Minor penetration  occurred on the first swallow but did not recur. There is no aspiration or other  abnormality    Results from Hospital Encounter encounter on 03/29/22    CT ABD PELV WO CONT    Narrative  Exam: CT ABD PELV WO CONT    TECHNIQUE: Multiple transaxial CT images of the abdomen, and pelvis were  obtained without contrast. Coronal and sagittal reformatted images were  provided. Dose reduction: All CT scans at this facility are performed using dose reduction  optimization techniques as appropriate to a performed exam including the  following: Automated exposure control, adjustments of the mA and/or kV according  to patient size, or use of iterative reconstruction technique. COMPARISON: None    HISTORY: pain R sided    FINDINGS:    Chest:  Severe calcifications of the mitral valve annulus. Status post aortic valve  replacement. 3 chamber pacemaker/AICD. Trace bilateral pleural effusions. Lungs  are otherwise grossly clear. Abdomen and pelvis:  Liver: Liver surface contours are mildly nodular. No focal hepatic lesion is  identified. Gallbladder: Gallstones. Gallbladder is nondistended  Biliary system: Nondilated. Pancreas: Unremarkable. Spleen: Unremarkable. Adrenal glands: Normal.  Kidneys and ureters: Kidneys are symmetric in size. Left cortical renal cyst  measures 4.2 x 3.2 cm left parapelvic cyst measures 2.2 cm. Nonobstructing right  renal calculus. No hydronephrosis. Urinary bladder: Normal  Reproductive organs: Surgically absent uterus. No adnexal mass. Bowel: Colonic diverticulosis. No acute diverticulitis. There is irregular  circumferential thickening (apple core lesion) within the ascending colon  measuring over a segment of approximately 5.5 cm. This is highly concerning for  tumor. There is adjacent stranding and prominence of the pericolonic vessels and  lymph nodes suspicious for lymphovascular invasion.  This mass results in at  least mild obstruction as there is a prominent stool burden proximal within the  cecum and terminal ileum. Peritoneum: Small fat-containing periumbilical hernia. Trace free fluid in the right abdomen and pelvis. No pneumoperitoneum. Lymph nodes: No abdominal/pelvic lymphadenopathy. Aorta and other vessels: Calcific atherosclerotic changes within the aorta. No  aneurysm. Bones: No findings of acute or aggressive osseous abnormality. Advanced lumbar  spondylosis. Superficial soft tissues: Unremarkable. Impression  1. Abnormal masslike thickening within the ascending colon highly concerning for  malignancy with suspected lymphovascular invasion. Tumor results in low-grade  obstruction. 2. Trace ascites within the right abdomen. 3. Subtle morphologic changes suggestive of hepatic fibrosis/early cirrhosis. 4. Cholelithiasis. Notification: Findings were discussed with Dr. Rufino Holland at 11:30 AM on 3/29/2022        IMPRESSION:     1. Chronic Obstructive Pulmonary Disease without excebation  2. Partial large Bowel Obstruction  3. Diabetes Mellitus type 2  4. Essential Hypertension  5. Thyroid Disease   6. Urinary Tract infection      RECOMMENDATIONS/PLAN:     3 21-year-old lady no history of smoking not on any inhalers or oxygen at home, she had a history of secondhand smoke as her  used to smoke and all her children used to smoke she used to work in the form as a farmer significant history of congestive heart failure ejection fraction about 35 to 40% she had aortic valve replaced previously also she has stent placement in the past chest x-ray no acute infiltrate or lung mass  2. Patient is low risk for acute respiratory distress or issues during surgery, patient is having possible surgery on friday  3. monitor respiratory status and O2 PRN  4.  Follow up with Pulmonary outpatient as needed for COPD management            Brenda Goss 99

## 2025-08-21 ENCOUNTER — OFFICE VISIT (OUTPATIENT)
Age: 85
End: 2025-08-21
Payer: MEDICARE

## 2025-08-21 VITALS
SYSTOLIC BLOOD PRESSURE: 127 MMHG | WEIGHT: 181.2 LBS | TEMPERATURE: 97.6 F | RESPIRATION RATE: 20 BRPM | HEART RATE: 77 BPM | HEIGHT: 62 IN | DIASTOLIC BLOOD PRESSURE: 60 MMHG | OXYGEN SATURATION: 97 % | BODY MASS INDEX: 33.34 KG/M2

## 2025-08-21 DIAGNOSIS — Z90.49 HISTORY OF HEMICOLECTOMY: ICD-10-CM

## 2025-08-21 DIAGNOSIS — Z85.038 HISTORY OF COLON CANCER: Primary | ICD-10-CM

## 2025-08-21 PROCEDURE — 1159F MED LIST DOCD IN RCRD: CPT | Performed by: PHYSICIAN ASSISTANT

## 2025-08-21 PROCEDURE — 1126F AMNT PAIN NOTED NONE PRSNT: CPT | Performed by: PHYSICIAN ASSISTANT

## 2025-08-21 PROCEDURE — 99213 OFFICE O/P EST LOW 20 MIN: CPT | Performed by: PHYSICIAN ASSISTANT

## 2025-08-21 PROCEDURE — 3074F SYST BP LT 130 MM HG: CPT | Performed by: PHYSICIAN ASSISTANT

## 2025-08-21 PROCEDURE — 3078F DIAST BP <80 MM HG: CPT | Performed by: PHYSICIAN ASSISTANT

## 2025-08-21 PROCEDURE — 1123F ACP DISCUSS/DSCN MKR DOCD: CPT | Performed by: PHYSICIAN ASSISTANT

## 2025-08-21 ASSESSMENT — PATIENT HEALTH QUESTIONNAIRE - PHQ9
10. IF YOU CHECKED OFF ANY PROBLEMS, HOW DIFFICULT HAVE THESE PROBLEMS MADE IT FOR YOU TO DO YOUR WORK, TAKE CARE OF THINGS AT HOME, OR GET ALONG WITH OTHER PEOPLE: NOT DIFFICULT AT ALL
3. TROUBLE FALLING OR STAYING ASLEEP: NOT AT ALL
SUM OF ALL RESPONSES TO PHQ QUESTIONS 1-9: 0
4. FEELING TIRED OR HAVING LITTLE ENERGY: NOT AT ALL
7. TROUBLE CONCENTRATING ON THINGS, SUCH AS READING THE NEWSPAPER OR WATCHING TELEVISION: NOT AT ALL
SUM OF ALL RESPONSES TO PHQ QUESTIONS 1-9: 0
9. THOUGHTS THAT YOU WOULD BE BETTER OFF DEAD, OR OF HURTING YOURSELF: NOT AT ALL
8. MOVING OR SPEAKING SO SLOWLY THAT OTHER PEOPLE COULD HAVE NOTICED. OR THE OPPOSITE, BEING SO FIGETY OR RESTLESS THAT YOU HAVE BEEN MOVING AROUND A LOT MORE THAN USUAL: NOT AT ALL
5. POOR APPETITE OR OVEREATING: NOT AT ALL
6. FEELING BAD ABOUT YOURSELF - OR THAT YOU ARE A FAILURE OR HAVE LET YOURSELF OR YOUR FAMILY DOWN: NOT AT ALL
1. LITTLE INTEREST OR PLEASURE IN DOING THINGS: NOT AT ALL
2. FEELING DOWN, DEPRESSED OR HOPELESS: NOT AT ALL

## (undated) DEVICE — PRESSURE MONITORING SET: Brand: TRUWAVE, VAMP

## (undated) DEVICE — SUTURE ETHBND EXCEL SZ 0 L30IN NONABSORBABLE GRN L26MM CT-2 X412H

## (undated) DEVICE — INTENDED FOR TISSUE SEPARATION, AND OTHER PROCEDURES THAT REQUIRE A SHARP SURGICAL BLADE TO PUNCTURE OR CUT.: Brand: BARD-PARKER ® CARBON RIB-BACK BLADES

## (undated) DEVICE — GLIDESHEATH SLENDER ACCESS KIT: Brand: GLIDESHEATH SLENDER

## (undated) DEVICE — MOUTHPIECE ENDOSCP 20X27MM --

## (undated) DEVICE — CANNULA NASAL ADULT 10FT ETCO2/CO2 VENTFLO

## (undated) DEVICE — COPE MANDRIL WIRE GUIDE STAINLESS STEEL: Brand: COPE

## (undated) DEVICE — SUT VCRL + 0 27IN CT1 UD --

## (undated) DEVICE — STAPLER SKIN H3.9MM WIRE DIA0.58MM CRWN 6.9MM 35 STPL FIX

## (undated) DEVICE — 3M™ IOBAN™ 2 ANTIMICROBIAL INCISE DRAPE 6650EZ: Brand: IOBAN™ 2

## (undated) DEVICE — GUIDEWIRE VASC L150CM DIA0.035IN TIP L3MM PTFE J CRV FIX

## (undated) DEVICE — COPILOT BLEEDBACK CONTROL VALVE: Brand: COPILOT

## (undated) DEVICE — SUTURE VICRYL + SZ 3-0 L27IN ABSRB UD L26MM SH 1/2 CIR VCP416H

## (undated) DEVICE — STAPLER INT L75MM CUT LN L73MM STPL LN L77MM BLU B FRM 8

## (undated) DEVICE — 3M™ TEGADERM™ TRANSPARENT FILM DRESSING FRAME STYLE, 1627, 4 IN X 10 IN (10 CM X 25 CM), 20/CT 4CT/CASE: Brand: 3M™ TEGADERM™

## (undated) DEVICE — PACER PACK: Brand: MEDLINE INDUSTRIES, INC.

## (undated) DEVICE — TOOL INSRT ANGI GDWIRE MTL SS --

## (undated) DEVICE — SUTURE MONOCRYL + SZ 4-0 L27IN ABSRB UD L19MM PS-2 3/8 CIR MCP426H

## (undated) DEVICE — LIQUIBAND RAPID ADHESIVE 36/CS 0.8ML: Brand: MEDLINE

## (undated) DEVICE — SOUTHSIDE TURNOVER: Brand: MEDLINE INDUSTRIES, INC.

## (undated) DEVICE — ENDOSCOPIC KIT 1.1+ DE BOWL

## (undated) DEVICE — HYPODERMIC SAFETY NEEDLE: Brand: MONOJECT

## (undated) DEVICE — GLOVE SURG SZ 65 L12IN FNGR THK79MIL GRN LTX FREE

## (undated) DEVICE — LINE SAMP LNG AD ORAL NSL PT O2 TBNG SMRT CAPNOLN H +

## (undated) DEVICE — CABLE PACE ALGTR CLP SAF 12FT --

## (undated) DEVICE — BOWL UTIL 16OZ STRL --

## (undated) DEVICE — MASK ANES INF SZ 2 PREM TAIL VLV INFL PRT UNSCENTED SGL PT

## (undated) DEVICE — SYRINGE IRRIG 60ML SFT PLIABLE BLB EZ TO GRP 1 HND USE W/

## (undated) DEVICE — WASTEBAG DRIP/ADAPTER: Brand: MEDLINE INDUSTRIES, INC.

## (undated) DEVICE — SURSITE 4 X 4.8  MED MSC2705Z

## (undated) DEVICE — DRAPE,REIN 53X77,STERILE: Brand: MEDLINE

## (undated) DEVICE — SOLUTION IV 500ML 0.9% SOD CHL PH 5 INJ USP VIAFLX PLAS

## (undated) DEVICE — DRESSING FOAM W4XL4IN SIL FACE BORD ADH PD SUP ABSRB COR

## (undated) DEVICE — GUIDEWIRE VASC L150CM DIA0.025IN TIP L7CM J RAD 3MM PTFE

## (undated) DEVICE — ZIP 8I SURGICAL SKIN CLOSURE DEVICE: Brand: ZIP 8I SURGICAL SKIN CLOSURE DEVICE

## (undated) DEVICE — 3M™ SURGICAL CLIPPER WITH PIVOTING HEAD, 9660, 50/CASE: Brand: 3M™

## (undated) DEVICE — GUIDEWIRE VASC L185CM DIA0.014IN HYDRPHLC PTFE STR TIP

## (undated) DEVICE — BASIC SINGLE BASIN-LF: Brand: MEDLINE INDUSTRIES, INC.

## (undated) DEVICE — SINGLE-USE BIOPSY FORCEPS: Brand: RADIAL JAW 4

## (undated) DEVICE — GUIDEWIRE VASC L260CM DIA0.035IN TIP L3MM STD EXCHG PTFE J

## (undated) DEVICE — RADIFOCUS GLIDEWIRE ADVANTAGE GUIDEWIRE: Brand: GLIDEWIRE ADVANTAGE

## (undated) DEVICE — SHTH INTRO SET 6FR 11CM GRN -- W/GDWIRE SUPER ARROW FLEX

## (undated) DEVICE — SUT VCRL + 2-0 27IN SH UD --

## (undated) DEVICE — GLIDESHEATH SLENDER NITINOL HYDROPHILIC COATED INTRODUCER SHEATH: Brand: GLIDESHEATH SLENDER

## (undated) DEVICE — SYRINGE ANGIO 20ML WHT POLYCARB VAC PRSS CAP PLUNG FIX M

## (undated) DEVICE — Device

## (undated) DEVICE — GUIDE COR SNUS L40CM DIA9FR 0.035IN STD CRV ADV UNIQUE

## (undated) DEVICE — PINNACLE INTRODUCER SHEATH: Brand: PINNACLE

## (undated) DEVICE — BAND COMPR L24CM REG CLR PLAS HEMSTAT EXT HK AND LOOP RETEN

## (undated) DEVICE — RELOAD STPL L75MM OPN H3.8MM CLS 1.5MM WIRE DIA0.2MM REG

## (undated) DEVICE — CATHETER PULM ART 5FR INFL 0.75CC L110CM BAL DIA8MM SGL WDG

## (undated) DEVICE — SUT 1 36 VIO MONO CTX -- PDS PLUS

## (undated) DEVICE — FCPS RAD JAW 4LC 240CM W/NDL -- BX/40

## (undated) DEVICE — CATHETER ANGIO JR4 PIG STD AD 4 FRX110 CM MP QUIK CARE INFIN

## (undated) DEVICE — SLING ARM XL L20.25IN D9IN COT POLY BILAT WEB SHLDR STRP

## (undated) DEVICE — 48" PROBE COVER W/GEL, ULTRASOUND, STERILE: Brand: SITE-RITE

## (undated) DEVICE — COVER,MAYO STAND,STERILE: Brand: MEDLINE

## (undated) DEVICE — PRESSURE TUBING: Brand: TRUWAVE

## (undated) DEVICE — GLOVE ORANGE PI 7 1/2   MSG9075

## (undated) DEVICE — LULU RADIAL/FEMORAL ANGIOGRAPHY SHEET: Brand: CONVERTORS

## (undated) DEVICE — SPONGE LAP SOFT 18X18 IN X RAY DETECTABLE

## (undated) DEVICE — PERCUTANEOUS ENTRY THINWALL NEEDLE  ONE-PART: Brand: COOK

## (undated) DEVICE — GOWN,NON-REINFORCED,XXL: Brand: MEDLINE

## (undated) DEVICE — CATHETER DIAG 6FR L110CM INTRO 6FR BLLN DIA9MM 1CC PULM ART

## (undated) DEVICE — PTCA DILATATION CATHETER: Brand: EMERGE™

## (undated) DEVICE — SUTURE VCRL + SZ 3-0 L18IN ABSRB UD SH 1/2 CIR TAPERCUT NDL VCP864D

## (undated) DEVICE — YANKAUER,BULB TIP,W/O VENT,RIGID,STERILE: Brand: MEDLINE

## (undated) DEVICE — CATHETER ANGIO JR4 PIG 145 DEG 6 FRX100 CM MP SUPER TORQUE +

## (undated) DEVICE — THE ENDO CARRY-ON PROCEDURE KIT CONTAINS ALL OF THE SUPPLIES AND INFECTION PREVENTION PRODUCTS NEEDED FOR ENDOSCOPIC PROCEDURES: Brand: ENDO CARRY-ON PROCEDURE KIT

## (undated) DEVICE — TUBING PRESS INJ FLX SH 30IN --

## (undated) DEVICE — SYRINGE MED 10ML RED POLYCARB BRL FIX M LUER CONN FLAT GRP

## (undated) DEVICE — FORCEPS BX L240CM JAW DIA2.4MM ORNG L CAP W/ NDL DISP RAD

## (undated) DEVICE — 3-0 COATED VICRYL PLUS UNDYED 1X27" SH --

## (undated) DEVICE — 3M™ TEGADERM™ TRANSPARENT FILM DRESSING FRAME STYLE, 1626W, 4 IN X 4-3/4 IN (10 CM X 12 CM), 50/CT 4CT/CASE: Brand: 3M™ TEGADERM™

## (undated) DEVICE — GARMENT,MEDLINE,DVT,INT,CALF,MED, GEN2: Brand: MEDLINE

## (undated) DEVICE — GLOVE SURG SZ 65 THK91MIL LTX FREE SYN POLYISOPRENE

## (undated) DEVICE — SYRINGE MED 10ML LUERLOCK TIP W/O SFTY DISP

## (undated) DEVICE — SUT SLK 0 30IN SH BLK --

## (undated) DEVICE — SYRINGE,PISTON,IRRIGATION,60ML,STERILE: Brand: MEDLINE

## (undated) DEVICE — DRAPE IRRIG FLD WRM W44XL66IN W/ AORN STD PRTBL INTRATEMP

## (undated) DEVICE — GUIDE CATH CONVEY 5FR JL3.5 -- 39228-661

## (undated) DEVICE — MINOR GENERAL: Brand: MEDLINE INDUSTRIES, INC.

## (undated) DEVICE — BLADE,CARBON-STEEL,11,STRL,DISPOSABLE,TB: Brand: MEDLINE

## (undated) DEVICE — MASK O2 MED AD 7 FT 3 IN 1 W/ STD CONN LTX

## (undated) DEVICE — TOWEL SURG W17XL27IN STD BLU COT NONFENESTRATED PREWASHED

## (undated) DEVICE — SURGICAL PROCEDURE TRAY CRD CATH 3 PRT

## (undated) DEVICE — SYRINGE MED 10ML PUR GAM COMPATIBLE POLYCARB FIX M LUER CONN

## (undated) DEVICE — INTRODUCER LAT VEIN L62CM OD5.5FR ADV TELSCP SYS RENAL

## (undated) DEVICE — BLADE ELECTRODE: Brand: EDGE

## (undated) DEVICE — STRATAFIX SPRL PDS + 2-0 23CM CT-2

## (undated) DEVICE — MAJOR LAP PROCEDURE PACK: Brand: MEDLINE INDUSTRIES, INC.

## (undated) DEVICE — NON-WOVEN ADHESIVE WOUND DRESSING: Brand: PRIMAPORE ADHESIVE DRESSING 30*10CM

## (undated) DEVICE — 3M™ STERI-DRAPE™ SMALL DRAPE WITH ADHESIVE APERTURE 1092 25/BX,4/CS&#X20;: Brand: STERI-DRAPE™

## (undated) DEVICE — INTRO PEELWY HEMVLV 7F 13CM -- SHRT PRELUDE SNAP

## (undated) DEVICE — ULNAR NERVE PROTECTOR FOAM POSITIONER: Brand: CARDINAL HEALTH

## (undated) DEVICE — DEVICE INFL SYR BLLN ENDO 30 -- INTELLI